# Patient Record
Sex: MALE | Race: WHITE | NOT HISPANIC OR LATINO | Employment: OTHER | URBAN - METROPOLITAN AREA
[De-identification: names, ages, dates, MRNs, and addresses within clinical notes are randomized per-mention and may not be internally consistent; named-entity substitution may affect disease eponyms.]

---

## 2017-01-03 ENCOUNTER — ANTI-COAG VISIT (OUTPATIENT)
Dept: CARDIOLOGY | Facility: CLINIC | Age: 82
End: 2017-01-03

## 2017-01-03 DIAGNOSIS — Z79.01 LONG TERM CURRENT USE OF ANTICOAGULANT THERAPY: ICD-10-CM

## 2017-01-03 LAB — INR PPP: 2.4

## 2017-01-09 ENCOUNTER — ANTI-COAG VISIT (OUTPATIENT)
Dept: CARDIOLOGY | Facility: CLINIC | Age: 82
End: 2017-01-09

## 2017-01-09 ENCOUNTER — LAB VISIT (OUTPATIENT)
Dept: LAB | Facility: HOSPITAL | Age: 82
End: 2017-01-09
Attending: INTERNAL MEDICINE
Payer: MEDICARE

## 2017-01-09 DIAGNOSIS — G70.00 MYASTHENIA GRAVIS: ICD-10-CM

## 2017-01-09 DIAGNOSIS — D50.0 ANEMIA DUE TO CHRONIC BLOOD LOSS: ICD-10-CM

## 2017-01-09 DIAGNOSIS — Z79.60 LONG-TERM USE OF IMMUNOSUPPRESSANT MEDICATION: ICD-10-CM

## 2017-01-09 DIAGNOSIS — Z79.01 LONG TERM CURRENT USE OF ANTICOAGULANT THERAPY: ICD-10-CM

## 2017-01-09 LAB
ALBUMIN SERPL BCP-MCNC: 3.7 G/DL
ALP SERPL-CCNC: 78 U/L
ALT SERPL W/O P-5'-P-CCNC: 5 U/L
ANION GAP SERPL CALC-SCNC: 8 MMOL/L
AST SERPL-CCNC: 19 U/L
BASOPHILS # BLD AUTO: 0.02 K/UL
BASOPHILS NFR BLD: 0.4 %
BILIRUB SERPL-MCNC: 2.3 MG/DL
BUN SERPL-MCNC: 20 MG/DL
CALCIUM SERPL-MCNC: 9.6 MG/DL
CHLORIDE SERPL-SCNC: 105 MMOL/L
CO2 SERPL-SCNC: 22 MMOL/L
CREAT SERPL-MCNC: 1.2 MG/DL
DIFFERENTIAL METHOD: ABNORMAL
EOSINOPHIL # BLD AUTO: 0.1 K/UL
EOSINOPHIL NFR BLD: 1.1 %
ERYTHROCYTE [DISTWIDTH] IN BLOOD BY AUTOMATED COUNT: 18.1 %
EST. GFR  (AFRICAN AMERICAN): >60 ML/MIN/1.73 M^2
EST. GFR  (NON AFRICAN AMERICAN): 54.8 ML/MIN/1.73 M^2
GLUCOSE SERPL-MCNC: 81 MG/DL
HAPTOGLOB SERPL-MCNC: 77 MG/DL
HCT VFR BLD AUTO: 32.6 %
HGB BLD-MCNC: 10.9 G/DL
INR PPP: 2.6
LYMPHOCYTES # BLD AUTO: 0.8 K/UL
LYMPHOCYTES NFR BLD: 14.4 %
MCH RBC QN AUTO: 38.7 PG
MCHC RBC AUTO-ENTMCNC: 33.4 %
MCV RBC AUTO: 116 FL
MONOCYTES # BLD AUTO: 0.4 K/UL
MONOCYTES NFR BLD: 8.1 %
NEUTROPHILS # BLD AUTO: 4 K/UL
NEUTROPHILS NFR BLD: 75.8 %
PLATELET # BLD AUTO: 319 K/UL
PMV BLD AUTO: 10 FL
POTASSIUM SERPL-SCNC: 4.7 MMOL/L
PROT SERPL-MCNC: 7.2 G/DL
RBC # BLD AUTO: 2.82 M/UL
RETICS/RBC NFR AUTO: 1.7 %
SODIUM SERPL-SCNC: 135 MMOL/L
TESTOST SERPL-MCNC: 390 NG/DL
WBC # BLD AUTO: 5.28 K/UL

## 2017-01-09 PROCEDURE — 85025 COMPLETE CBC W/AUTO DIFF WBC: CPT

## 2017-01-09 PROCEDURE — 83010 ASSAY OF HAPTOGLOBIN QUANT: CPT

## 2017-01-09 PROCEDURE — 84403 ASSAY OF TOTAL TESTOSTERONE: CPT

## 2017-01-09 PROCEDURE — 82747 ASSAY OF FOLIC ACID RBC: CPT

## 2017-01-09 PROCEDURE — 80053 COMPREHEN METABOLIC PANEL: CPT

## 2017-01-09 PROCEDURE — 85045 AUTOMATED RETICULOCYTE COUNT: CPT

## 2017-01-09 PROCEDURE — 36415 COLL VENOUS BLD VENIPUNCTURE: CPT | Mod: PO

## 2017-01-10 ENCOUNTER — TELEPHONE (OUTPATIENT)
Dept: HEMATOLOGY/ONCOLOGY | Facility: CLINIC | Age: 82
End: 2017-01-10

## 2017-01-10 NOTE — TELEPHONE ENCOUNTER
----- Message from Arnulfo Welsh Jr., MD sent at 1/10/2017  6:41 AM CST -----  Same Hydrea: 1/1/2 capsules  Cbc 2 months    The cbc result did NOT come to me; I had to find it when this other lab was reported. Please check to be sure i get them in the future  Patient instructed to continue same Hydrea dose.1/1/2. Patient verbalized understanding.

## 2017-01-11 LAB — FOLATE RBC-MCNC: 678 NG/ML

## 2017-01-16 ENCOUNTER — ANTI-COAG VISIT (OUTPATIENT)
Dept: CARDIOLOGY | Facility: CLINIC | Age: 82
End: 2017-01-16

## 2017-01-16 DIAGNOSIS — Z79.01 LONG TERM CURRENT USE OF ANTICOAGULANT THERAPY: ICD-10-CM

## 2017-01-16 LAB — INR PPP: 2.2

## 2017-01-16 NOTE — PROGRESS NOTES
Pt called in a verbal result dated 1/16/17 as: INR -2.2, states unable to call it in to [facility], states office is closed, advised Pt to continue calling in the result so [facility] can have it on record and so a hard copy can be faxed to [facility]

## 2017-01-23 ENCOUNTER — ANTI-COAG VISIT (OUTPATIENT)
Dept: CARDIOLOGY | Facility: CLINIC | Age: 82
End: 2017-01-23

## 2017-01-23 DIAGNOSIS — Z79.01 LONG TERM CURRENT USE OF ANTICOAGULANT THERAPY: ICD-10-CM

## 2017-01-23 LAB — INR PPP: 1.9

## 2017-01-30 ENCOUNTER — ANTI-COAG VISIT (OUTPATIENT)
Dept: CARDIOLOGY | Facility: CLINIC | Age: 82
End: 2017-01-30

## 2017-01-30 DIAGNOSIS — Z79.01 LONG TERM CURRENT USE OF ANTICOAGULANT THERAPY: ICD-10-CM

## 2017-01-30 LAB — INR PPP: 1.6

## 2017-02-06 ENCOUNTER — ANTI-COAG VISIT (OUTPATIENT)
Dept: CARDIOLOGY | Facility: CLINIC | Age: 82
End: 2017-02-06

## 2017-02-06 DIAGNOSIS — Z79.01 LONG TERM CURRENT USE OF ANTICOAGULANT THERAPY: ICD-10-CM

## 2017-02-06 LAB — INR PPP: 2.7

## 2017-02-13 ENCOUNTER — TELEPHONE (OUTPATIENT)
Dept: INTERNAL MEDICINE | Facility: CLINIC | Age: 82
End: 2017-02-13

## 2017-02-13 ENCOUNTER — ANTI-COAG VISIT (OUTPATIENT)
Dept: CARDIOLOGY | Facility: CLINIC | Age: 82
End: 2017-02-13

## 2017-02-13 DIAGNOSIS — Z79.01 LONG TERM CURRENT USE OF ANTICOAGULANT THERAPY: ICD-10-CM

## 2017-02-13 LAB — INR PPP: 1.6

## 2017-02-13 NOTE — TELEPHONE ENCOUNTER
----- Message from Dalia Witt sent at 2/13/2017 12:03 PM CST -----  Contact: wife  Doctor appointment and lab have been scheduled.  Please link lab orders to the lab appointment.  Date of doctor appointment:  3/27/17  Physical or EP:  phys  Date of lab appointment:  3/20/17  Comments:

## 2017-02-20 ENCOUNTER — ANTI-COAG VISIT (OUTPATIENT)
Dept: CARDIOLOGY | Facility: CLINIC | Age: 82
End: 2017-02-20

## 2017-02-20 DIAGNOSIS — Z79.01 LONG TERM CURRENT USE OF ANTICOAGULANT THERAPY: ICD-10-CM

## 2017-02-20 LAB — INR PPP: 2

## 2017-03-01 ENCOUNTER — ANTI-COAG VISIT (OUTPATIENT)
Dept: CARDIOLOGY | Facility: CLINIC | Age: 82
End: 2017-03-01

## 2017-03-01 DIAGNOSIS — Z79.01 LONG TERM CURRENT USE OF ANTICOAGULANT THERAPY: ICD-10-CM

## 2017-03-01 LAB — INR PPP: 1.7

## 2017-03-03 DIAGNOSIS — G70.00 MYASTHENIA GRAVIS: ICD-10-CM

## 2017-03-03 RX ORDER — AZATHIOPRINE 50 MG/1
TABLET ORAL
Qty: 360 TABLET | Refills: 0 | Status: SHIPPED | OUTPATIENT
Start: 2017-03-03 | End: 2017-08-24 | Stop reason: SDUPTHER

## 2017-03-06 ENCOUNTER — ANTI-COAG VISIT (OUTPATIENT)
Dept: CARDIOLOGY | Facility: CLINIC | Age: 82
End: 2017-03-06
Payer: MEDICARE

## 2017-03-06 DIAGNOSIS — Z79.01 LONG TERM CURRENT USE OF ANTICOAGULANT THERAPY: ICD-10-CM

## 2017-03-06 LAB — INR PPP: 2.2

## 2017-03-06 PROCEDURE — G0250 MD INR TEST REVIE INTER MGMT: HCPCS | Mod: S$GLB,,,

## 2017-03-10 ENCOUNTER — LAB VISIT (OUTPATIENT)
Dept: LAB | Facility: HOSPITAL | Age: 82
End: 2017-03-10
Attending: INTERNAL MEDICINE
Payer: MEDICARE

## 2017-03-10 DIAGNOSIS — D75.839 THROMBOCYTOSIS: ICD-10-CM

## 2017-03-10 LAB
BASOPHILS # BLD AUTO: 0.01 K/UL
BASOPHILS NFR BLD: 0.3 %
DIFFERENTIAL METHOD: ABNORMAL
EOSINOPHIL # BLD AUTO: 0 K/UL
EOSINOPHIL NFR BLD: 0.8 %
ERYTHROCYTE [DISTWIDTH] IN BLOOD BY AUTOMATED COUNT: 17.2 %
HCT VFR BLD AUTO: 27.3 %
HGB BLD-MCNC: 9.4 G/DL
LYMPHOCYTES # BLD AUTO: 0.9 K/UL
LYMPHOCYTES NFR BLD: 22.6 %
MCH RBC QN AUTO: 42.9 PG
MCHC RBC AUTO-ENTMCNC: 34.4 %
MCV RBC AUTO: 125 FL
MONOCYTES # BLD AUTO: 0.3 K/UL
MONOCYTES NFR BLD: 8.6 %
NEUTROPHILS # BLD AUTO: 2.7 K/UL
NEUTROPHILS NFR BLD: 67.4 %
PLATELET # BLD AUTO: 294 K/UL
PMV BLD AUTO: 9.6 FL
RBC # BLD AUTO: 2.19 M/UL
WBC # BLD AUTO: 3.94 K/UL

## 2017-03-10 PROCEDURE — 85025 COMPLETE CBC W/AUTO DIFF WBC: CPT

## 2017-03-10 PROCEDURE — 36415 COLL VENOUS BLD VENIPUNCTURE: CPT | Mod: PO

## 2017-03-13 ENCOUNTER — ANTI-COAG VISIT (OUTPATIENT)
Dept: CARDIOLOGY | Facility: CLINIC | Age: 82
End: 2017-03-13
Payer: MEDICARE

## 2017-03-13 DIAGNOSIS — Z79.01 LONG TERM CURRENT USE OF ANTICOAGULANT THERAPY: ICD-10-CM

## 2017-03-13 LAB — INR PPP: 2.1

## 2017-03-13 PROCEDURE — G0250 MD INR TEST REVIE INTER MGMT: HCPCS | Mod: S$GLB,,,

## 2017-03-20 ENCOUNTER — ANTI-COAG VISIT (OUTPATIENT)
Dept: CARDIOLOGY | Facility: CLINIC | Age: 82
End: 2017-03-20

## 2017-03-20 DIAGNOSIS — Z79.01 LONG TERM CURRENT USE OF ANTICOAGULANT THERAPY: ICD-10-CM

## 2017-03-20 LAB — INR PPP: 1.9

## 2017-03-22 DIAGNOSIS — D75.839 THROMBOCYTHEMIA: Primary | ICD-10-CM

## 2017-03-24 DIAGNOSIS — G70.00 MG (MYASTHENIA GRAVIS): ICD-10-CM

## 2017-03-24 NOTE — TELEPHONE ENCOUNTER
Called patient in attempt to schedule appointment; left voicemail with phone number instructing to return call

## 2017-03-27 ENCOUNTER — LAB VISIT (OUTPATIENT)
Dept: LAB | Facility: HOSPITAL | Age: 82
End: 2017-03-27
Attending: INTERNAL MEDICINE
Payer: MEDICARE

## 2017-03-27 ENCOUNTER — ANTI-COAG VISIT (OUTPATIENT)
Dept: CARDIOLOGY | Facility: CLINIC | Age: 82
End: 2017-03-27
Payer: MEDICARE

## 2017-03-27 ENCOUNTER — OFFICE VISIT (OUTPATIENT)
Dept: INTERNAL MEDICINE | Facility: CLINIC | Age: 82
End: 2017-03-27
Payer: MEDICARE

## 2017-03-27 VITALS
HEART RATE: 2 BPM | HEIGHT: 69 IN | BODY MASS INDEX: 26.66 KG/M2 | SYSTOLIC BLOOD PRESSURE: 120 MMHG | WEIGHT: 180 LBS | DIASTOLIC BLOOD PRESSURE: 62 MMHG

## 2017-03-27 DIAGNOSIS — I10 ESSENTIAL HYPERTENSION: ICD-10-CM

## 2017-03-27 DIAGNOSIS — G70.00 MG (MYASTHENIA GRAVIS): ICD-10-CM

## 2017-03-27 DIAGNOSIS — I48.0 PAROXYSMAL ATRIAL FIBRILLATION: ICD-10-CM

## 2017-03-27 DIAGNOSIS — N40.0 BENIGN NON-NODULAR PROSTATIC HYPERPLASIA WITHOUT LOWER URINARY TRACT SYMPTOMS: ICD-10-CM

## 2017-03-27 DIAGNOSIS — B02.29 POSTHERPETIC NEURALGIA: ICD-10-CM

## 2017-03-27 DIAGNOSIS — N18.30 CHRONIC KIDNEY DISEASE, STAGE III (MODERATE): ICD-10-CM

## 2017-03-27 DIAGNOSIS — D47.3 ESSENTIAL THROMBOCYTOSIS: ICD-10-CM

## 2017-03-27 DIAGNOSIS — Z79.01 LONG TERM CURRENT USE OF ANTICOAGULANT THERAPY: ICD-10-CM

## 2017-03-27 DIAGNOSIS — Z00.00 ANNUAL PHYSICAL EXAM: ICD-10-CM

## 2017-03-27 DIAGNOSIS — E78.5 HYPERLIPIDEMIA, UNSPECIFIED HYPERLIPIDEMIA TYPE: ICD-10-CM

## 2017-03-27 DIAGNOSIS — Z00.00 ANNUAL PHYSICAL EXAM: Primary | ICD-10-CM

## 2017-03-27 LAB
ANION GAP SERPL CALC-SCNC: 6 MMOL/L
BUN SERPL-MCNC: 29 MG/DL
CALCIUM SERPL-MCNC: 9.1 MG/DL
CHLORIDE SERPL-SCNC: 105 MMOL/L
CHOLEST/HDLC SERPL: 3.3 {RATIO}
CO2 SERPL-SCNC: 25 MMOL/L
CREAT SERPL-MCNC: 1.2 MG/DL
EST. GFR  (AFRICAN AMERICAN): >60 ML/MIN/1.73 M^2
EST. GFR  (NON AFRICAN AMERICAN): 54.4 ML/MIN/1.73 M^2
GLUCOSE SERPL-MCNC: 107 MG/DL
HDL/CHOLESTEROL RATIO: 30.2 %
HDLC SERPL-MCNC: 149 MG/DL
HDLC SERPL-MCNC: 45 MG/DL
INR PPP: 1.8
LDLC SERPL CALC-MCNC: 79 MG/DL
NONHDLC SERPL-MCNC: 104 MG/DL
POTASSIUM SERPL-SCNC: 4.8 MMOL/L
SODIUM SERPL-SCNC: 136 MMOL/L
TRIGL SERPL-MCNC: 125 MG/DL
TSH SERPL DL<=0.005 MIU/L-ACNC: 1.76 UIU/ML

## 2017-03-27 PROCEDURE — G0009 ADMIN PNEUMOCOCCAL VACCINE: HCPCS | Mod: S$GLB,,, | Performed by: INTERNAL MEDICINE

## 2017-03-27 PROCEDURE — 36415 COLL VENOUS BLD VENIPUNCTURE: CPT | Mod: PO

## 2017-03-27 PROCEDURE — 99499 UNLISTED E&M SERVICE: CPT | Mod: S$GLB,,, | Performed by: INTERNAL MEDICINE

## 2017-03-27 PROCEDURE — 80061 LIPID PANEL: CPT

## 2017-03-27 PROCEDURE — 99397 PER PM REEVAL EST PAT 65+ YR: CPT | Mod: S$GLB,,, | Performed by: INTERNAL MEDICINE

## 2017-03-27 PROCEDURE — 99999 PR PBB SHADOW E&M-EST. PATIENT-LVL III: CPT | Mod: PBBFAC,,, | Performed by: INTERNAL MEDICINE

## 2017-03-27 PROCEDURE — 90732 PPSV23 VACC 2 YRS+ SUBQ/IM: CPT | Mod: S$GLB,,, | Performed by: INTERNAL MEDICINE

## 2017-03-27 PROCEDURE — 84443 ASSAY THYROID STIM HORMONE: CPT

## 2017-03-27 PROCEDURE — 80048 BASIC METABOLIC PNL TOTAL CA: CPT

## 2017-03-27 RX ORDER — PREDNISONE 5 MG/1
TABLET ORAL
Qty: 45 TABLET | Refills: 2 | Status: SHIPPED | OUTPATIENT
Start: 2017-03-27 | End: 2017-06-08 | Stop reason: SDUPTHER

## 2017-03-27 NOTE — PROGRESS NOTES
PAST MEDICAL HISTORY:   Hypertension.  Myasthenia gravis.  Paroxysmal atrial fibrillation.  Hyperlipidemia.  Mitral regurgitation.  BPH.  Gastroesophageal reflux disease.  Postherpetic neuralgia involving the right medial leg.  Cervical degenerative disk disease.  History of depression.  Osteoarthritis of the knees.   ESSENTIAL THROMBOCYTOSIS       PAST SURGICAL HISTORY:   Cholecystectomy.  Bilateral cataract extraction.  Repair of ruptured right Achilles tendon.  Repair of ptosis, both eyes.    SOCIAL HISTORY:  Tobacco and alcohol use - none.  , has no formal   exercise routine.    FAMILY HISTORY:  Father is , stroke.  Mother  from cancer.  One   sister in good health.    REASON FOR VISIT:  This is an 86-year-old male who comes in for a routine check.    His past medical, surgical, social, and family history is outlined above.  He   continues to follow up with Dr. Welsh regarding essential thrombocytosis; Dr. Crawford regarding myasthenia gravis; Dr. Choudhury due to the use of Imuran; and   Dr. Tate due to paroxysmal atrial fibrillation.    In 2016, because of feeling fatigued and frequent PVCs, metoprolol XL was   discontinued, verapamil 240 mg a day was added.  In review of the chart, CBC on    by Dr. Welsh showed the platelet count to be 294,000, this has   improved.  He was a little bit more anemic with a hemoglobin and hematocrit of   9.4 and 27.3.  He will be making an appointment to meet with Dr. Welsh.    MEDICATIONS:  Aspirin 81 mg.  Imuran 50 mg four a day.  Benazepril 40 mg once a day.  Finasteride 5 mg a day.  Hydroxyurea 500 mg two tablets one day, then one tablet for the next two days   and then alternate the sequence.  Prednisone 5 mg every other day.  Mestinon 60 mg usually once a day, sometimes twice a day.  Tamsulosin 0.4 mg a day.  Verapamil 240 mg a day.  Vitamin D 1000 units.  Coumadin.    REVIEW OF SYSTEMS:  No pains in the chest, palpitations, shortness  of breath, or   abdominal pain.  Regular bowel function.  Urination, nocturia x1 to 3 and some   frequency during the day.  No headaches or heartburn.  Still has a chronic   post-herpetic neuralgia, medial aspect of the right leg.    Also on review of the chart, a chemistry study in January 2016 was normal.    PHYSICAL EXAMINATION:  VITAL SIGNS:  Weight is 180 pounds, pulse rate is 52, blood pressure 120/62.  HEENT:  Tympanic membranes normal.  Nasal mucosa is clear.  Oropharynx, no   abnormal findings.  NECK:  No thyromegaly.  LUNGS:  Clear breath sounds, good effort.  HEART:  Regular rate and rhythm, occasional ectopy.  A 2/6 systolic murmur at   the apex.  ABDOMEN:  Active bowel sounds, soft, nontender.  No hepatosplenomegaly or   abdominal masses.  PULSES:  2+ carotid pulses, 2+ pedal pulses.  EXTREMITIES:  1+ edema.  MUSCULOSKELETAL:  Bilateral knee enlargement.  GENITALIA:  No scrotal masses, no hernias.  RECTAL:  Stool is brown, heme negative.  Prostate is moderately enlarged.    IMPRESSION:  1.  General exam.  2.  Hypertension.  3.  Diastolic dysfunction.  4.  Mitral regurgitation.  5.  Hyperlipidemia.  6.  Benign prostatic hypertrophy.  7.  Osteoarthritis.  8.  Myasthenia gravis.    PLAN:    Just to be up to date, we will repeat a basic metabolic profile, lipid profile,   and TSH.    Continue to be attentive to diet and physical activity.   We will arrange for pneumococcal 23.        /ls [id number] review        OPAL/SHELLIE  dd: 03/27/2017 14:37:42 (CDT)  td: 03/28/2017 12:00:15 (CDT)  Doc ID   #[id number]  Job ID #[id number]    CC:

## 2017-03-27 NOTE — MR AVS SNAPSHOT
[facility] [address]  [address]  [address]  [facility] [address]  Phone: [phone]                  Ernie Phan   3/27/2017 2:00 PM   Office Visit    Description:  Male : 3/19/1931   Provider:  Ernie Michel MD   Department:  [facility] [address]           Reason for Visit     Annual Exam           Diagnoses this Visit        Comments    Annual physical exam    -  Primary     Essential hypertension         Hyperlipidemia, unspecified hyperlipidemia type         MG (myasthenia gravis)         Postherpetic neuralgia         Essential thrombocytosis         Benign non-nodular prostatic hyperplasia without lower urinary tract symptoms         Paroxysmal atrial fibrillation         Chronic kidney disease, stage III (moderate)                To Do List           Future Appointments        Provider Department Dept Phone    3/27/2017 3:00 PM LAB, [place] [facility] [phone]    5/15/2017 9:30 AM LAB, HEMONC CANCER BLDG [facility] [address] [phone]    5/15/2017 10:30 AM MD Hung Erazo Jr. - Hematology Oncology [phone]    2017 1:00 PM Bong Crawford III, MD [facility] - Neurology [phone]    2017 3:30 PM Christa Choudhury MD [facility] - Rheumatology [phone]      Goals (5 Years of Data)     None      [facility] On Call     [facility] On Call Nurse Care Line - 24/7 Assistance  Registered nurses in the [facility] provide clinical advisement, health education, appointment booking, and other advisory services.  Call for this free service at [phone].             Medications           Message regarding Medications     Verify the changes and/or additions to your medication regime listed below are the same as discussed with your clinician today.  If any of these changes or additions are incorrect, please notify your healthcare provider.             Verify that the below list of  "medications is an accurate representation of the medications you are currently taking.  If none reported, the list may be blank. If incorrect, please contact your healthcare provider. Carry this list with you in case of emergency.           Current Medications     aspirin 81 mg Tab Take 81 mg by mouth Daily.    azathioprine (IMURAN) 50 mg Tab TAKE 4 TABLETS ONE TIME DAILY    benazepril (LOTENSIN) 40 MG tablet TAKE 1 TABLET ONE TIME DAILY    DENTURE CLEANSER (EFFERVESCENT DENTURE CLEANSR DENT)     ferrous sulfate 325 (65 FE) MG EC tablet Take 1 tablet (325 mg total) by mouth once daily.    finasteride (PROSCAR) 5 mg tablet TAKE 1 TABLET EVERY DAY    hydroxyurea (HYDREA) 500 mg Cap TAKE 3 CAPSULES (1,500 MG TOTAL) BY MOUTH ONE TIME DAILY, OR AS DIRECTED.    ONE TOUCH ULTRA TEST Strp TEST DAILY    ONE TOUCH ULTRASOFT LANCETS lancets TEST DAILY    predniSONE (DELTASONE) 5 MG tablet TAKE 1 TABLET EVERY OTHER DAY    pyridostigmine (MESTINON) 60 mg Tab Take 1 tablet (60 mg total) by mouth every 6 to 8 hours as needed.    tamsulosin (FLOMAX) 0.4 mg Cp24 TAKE 1 CAPSULE EVERY DAY    verapamil (VERELAN) 240 MG C24P Take 1 capsule (240 mg total) by mouth once daily.    vitamin D 1000 units Tab Take 185 mg by mouth once daily.    warfarin (COUMADIN) 5 MG tablet TAKE 1 TABLET EVERY DAY EXCEPT TAKE 1/2 TABLET  ON SUNDAY, OR AS DIRECTED BY COUMADIN CLINIC    acetaminophen (TYLENOL) 500 mg Cap            Clinical Reference Information           Your Vitals Were     BP Pulse Height Weight BMI    120/62 2 5' 9" (1.753 m) 81.6 kg (180 lb) 26.58 kg/m2      Blood Pressure          Most Recent Value    BP  120/62      Allergies as of 3/27/2017     No Known Allergies      Immunizations Administered on Date of Encounter - 3/27/2017     Name Date Dose VIS Date Route    Pneumococcal Polysaccharide - 23 Valent 3/27/2017 0.5 mL 4/24/2015 Intramuscular      Orders Placed During Today's Visit      Normal Orders This Visit    Pneumococcal " Polysaccharide Vaccine (23 Valent) (SQ/IM)     Future Labs/Procedures Expected by Expires    Basic metabolic panel  3/27/2017 3/27/2018    Lipid panel  3/27/2017 3/27/2018    TSH  3/27/2017 3/27/2018      Language Assistance Services     ATTENTION: Language assistance services are available, free of charge. Please call [phone].      ATENCIÓN: Si habla español, tiene a celeste disposición servicios gratuitos de asistencia lingüística. Llame al [phone].     CHÚ Ý: N?u b?n nói Ti?ng Vi?t, có các d?ch v? h? tr? ngôn ng? mi?n phí dành cho b?n. G?i s? [phone].         [facility] [address] complies with applicable Federal civil rights laws and does not discriminate on the basis of race, color, national origin, age, disability, or sex.

## 2017-04-03 ENCOUNTER — ANTI-COAG VISIT (OUTPATIENT)
Dept: CARDIOLOGY | Facility: CLINIC | Age: 82
End: 2017-04-03

## 2017-04-03 DIAGNOSIS — Z79.01 LONG TERM CURRENT USE OF ANTICOAGULANT THERAPY: ICD-10-CM

## 2017-04-03 LAB — INR PPP: 2.1

## 2017-04-10 ENCOUNTER — ANTI-COAG VISIT (OUTPATIENT)
Dept: CARDIOLOGY | Facility: CLINIC | Age: 82
End: 2017-04-10

## 2017-04-10 DIAGNOSIS — Z79.01 LONG TERM CURRENT USE OF ANTICOAGULANT THERAPY: ICD-10-CM

## 2017-04-10 LAB — INR PPP: 3.2

## 2017-04-17 ENCOUNTER — ANTI-COAG VISIT (OUTPATIENT)
Dept: CARDIOLOGY | Facility: CLINIC | Age: 82
End: 2017-04-17
Payer: MEDICARE

## 2017-04-17 DIAGNOSIS — Z79.01 LONG TERM CURRENT USE OF ANTICOAGULANT THERAPY: ICD-10-CM

## 2017-04-17 LAB — INR PPP: 3.1

## 2017-04-17 PROCEDURE — G0250 MD INR TEST REVIE INTER MGMT: HCPCS | Mod: S$GLB,,,

## 2017-04-24 ENCOUNTER — ANTI-COAG VISIT (OUTPATIENT)
Dept: CARDIOLOGY | Facility: CLINIC | Age: 82
End: 2017-04-24

## 2017-04-24 DIAGNOSIS — Z79.01 LONG TERM CURRENT USE OF ANTICOAGULANT THERAPY: ICD-10-CM

## 2017-04-24 LAB — INR PPP: 2.4

## 2017-05-01 ENCOUNTER — ANTI-COAG VISIT (OUTPATIENT)
Dept: CARDIOLOGY | Facility: CLINIC | Age: 82
End: 2017-05-01

## 2017-05-01 DIAGNOSIS — Z79.01 LONG TERM CURRENT USE OF ANTICOAGULANT THERAPY: ICD-10-CM

## 2017-05-01 LAB — INR PPP: 2.1

## 2017-05-08 ENCOUNTER — ANTI-COAG VISIT (OUTPATIENT)
Dept: CARDIOLOGY | Facility: CLINIC | Age: 82
End: 2017-05-08

## 2017-05-08 DIAGNOSIS — Z79.01 LONG TERM CURRENT USE OF ANTICOAGULANT THERAPY: ICD-10-CM

## 2017-05-08 LAB — INR PPP: 2

## 2017-05-15 ENCOUNTER — ANTI-COAG VISIT (OUTPATIENT)
Dept: CARDIOLOGY | Facility: CLINIC | Age: 82
End: 2017-05-15

## 2017-05-15 DIAGNOSIS — Z79.01 LONG TERM CURRENT USE OF ANTICOAGULANT THERAPY: ICD-10-CM

## 2017-05-15 LAB — INR PPP: 2.1

## 2017-05-22 ENCOUNTER — ANTI-COAG VISIT (OUTPATIENT)
Dept: CARDIOLOGY | Facility: CLINIC | Age: 82
End: 2017-05-22
Payer: MEDICARE

## 2017-05-22 DIAGNOSIS — I49.3 PVC (PREMATURE VENTRICULAR CONTRACTION): ICD-10-CM

## 2017-05-22 DIAGNOSIS — Z79.01 LONG TERM CURRENT USE OF ANTICOAGULANT THERAPY: ICD-10-CM

## 2017-05-22 DIAGNOSIS — I48.91 ATRIAL FIBRILLATION: ICD-10-CM

## 2017-05-22 LAB — INR PPP: 1.9

## 2017-05-22 PROCEDURE — G0250 MD INR TEST REVIE INTER MGMT: HCPCS | Mod: S$GLB,,,

## 2017-05-22 RX ORDER — VERAPAMIL HYDROCHLORIDE 240 MG/1
240 CAPSULE, EXTENDED RELEASE ORAL DAILY
Qty: 90 CAPSULE | Refills: 3 | OUTPATIENT
Start: 2017-05-22 | End: 2018-05-22

## 2017-05-26 DIAGNOSIS — I49.3 PVC (PREMATURE VENTRICULAR CONTRACTION): ICD-10-CM

## 2017-05-26 RX ORDER — VERAPAMIL HYDROCHLORIDE 240 MG/1
240 CAPSULE, EXTENDED RELEASE ORAL DAILY
Qty: 90 CAPSULE | Refills: 3 | Status: SHIPPED | OUTPATIENT
Start: 2017-05-26 | End: 2018-05-07 | Stop reason: SDUPTHER

## 2017-05-29 ENCOUNTER — ANTI-COAG VISIT (OUTPATIENT)
Dept: CARDIOLOGY | Facility: CLINIC | Age: 82
End: 2017-05-29

## 2017-05-29 DIAGNOSIS — Z79.01 LONG TERM CURRENT USE OF ANTICOAGULANT THERAPY: ICD-10-CM

## 2017-05-29 LAB — INR PPP: 2.5

## 2017-05-29 NOTE — PROGRESS NOTES
Verbal result taken from Patient/CPS_________. PT/INR _2.5______ Date drawn_5/29/17_______ Hardcopy to be faxed.

## 2017-06-01 ENCOUNTER — LAB VISIT (OUTPATIENT)
Dept: LAB | Facility: HOSPITAL | Age: 82
End: 2017-06-01
Attending: INTERNAL MEDICINE
Payer: MEDICARE

## 2017-06-01 ENCOUNTER — OFFICE VISIT (OUTPATIENT)
Dept: HEMATOLOGY/ONCOLOGY | Facility: CLINIC | Age: 82
End: 2017-06-01
Payer: MEDICARE

## 2017-06-01 VITALS
BODY MASS INDEX: 26.87 KG/M2 | HEART RATE: 68 BPM | DIASTOLIC BLOOD PRESSURE: 68 MMHG | SYSTOLIC BLOOD PRESSURE: 122 MMHG | WEIGHT: 181.44 LBS | HEIGHT: 69 IN

## 2017-06-01 DIAGNOSIS — G70.00 MYASTHENIA GRAVIS WITHOUT ACUTE EXACERBATION: ICD-10-CM

## 2017-06-01 DIAGNOSIS — D47.3 ESSENTIAL THROMBOCYTOSIS: Primary | ICD-10-CM

## 2017-06-01 DIAGNOSIS — D75.839 THROMBOCYTOSIS: ICD-10-CM

## 2017-06-01 DIAGNOSIS — D75.839 THROMBOCYTHEMIA: ICD-10-CM

## 2017-06-01 DIAGNOSIS — I48.20 CHRONIC ATRIAL FIBRILLATION: ICD-10-CM

## 2017-06-01 LAB
ALBUMIN SERPL BCP-MCNC: 3.8 G/DL
ALP SERPL-CCNC: 75 U/L
ALT SERPL W/O P-5'-P-CCNC: 6 U/L
ANION GAP SERPL CALC-SCNC: 9 MMOL/L
AST SERPL-CCNC: 19 U/L
BASOPHILS # BLD AUTO: 0.02 K/UL
BASOPHILS NFR BLD: 0.3 %
BILIRUB SERPL-MCNC: 1.8 MG/DL
BUN SERPL-MCNC: 22 MG/DL
CALCIUM SERPL-MCNC: 9.1 MG/DL
CHLORIDE SERPL-SCNC: 105 MMOL/L
CO2 SERPL-SCNC: 22 MMOL/L
CREAT SERPL-MCNC: 1.2 MG/DL
DIFFERENTIAL METHOD: ABNORMAL
EOSINOPHIL # BLD AUTO: 0 K/UL
EOSINOPHIL NFR BLD: 0.3 %
ERYTHROCYTE [DISTWIDTH] IN BLOOD BY AUTOMATED COUNT: 14.9 %
EST. GFR  (AFRICAN AMERICAN): >60 ML/MIN/1.73 M^2
EST. GFR  (NON AFRICAN AMERICAN): 54.4 ML/MIN/1.73 M^2
GLUCOSE SERPL-MCNC: 105 MG/DL
HCT VFR BLD AUTO: 34.6 %
HGB BLD-MCNC: 11.5 G/DL
LYMPHOCYTES # BLD AUTO: 0.6 K/UL
LYMPHOCYTES NFR BLD: 8.7 %
MCH RBC QN AUTO: 40.6 PG
MCHC RBC AUTO-ENTMCNC: 33.2 %
MCV RBC AUTO: 122 FL
MONOCYTES # BLD AUTO: 0.4 K/UL
MONOCYTES NFR BLD: 5.4 %
NEUTROPHILS # BLD AUTO: 5.7 K/UL
NEUTROPHILS NFR BLD: 85 %
PLATELET # BLD AUTO: 382 K/UL
PMV BLD AUTO: 8.9 FL
POTASSIUM SERPL-SCNC: 4.2 MMOL/L
PROT SERPL-MCNC: 7.1 G/DL
RBC # BLD AUTO: 2.83 M/UL
SODIUM SERPL-SCNC: 136 MMOL/L
WBC # BLD AUTO: 6.67 K/UL

## 2017-06-01 PROCEDURE — 99499 UNLISTED E&M SERVICE: CPT | Mod: S$GLB,,, | Performed by: INTERNAL MEDICINE

## 2017-06-01 PROCEDURE — 99214 OFFICE O/P EST MOD 30 MIN: CPT | Mod: S$GLB,,, | Performed by: INTERNAL MEDICINE

## 2017-06-01 PROCEDURE — 99999 PR PBB SHADOW E&M-EST. PATIENT-LVL III: CPT | Mod: PBBFAC,,, | Performed by: INTERNAL MEDICINE

## 2017-06-01 PROCEDURE — 1159F MED LIST DOCD IN RCRD: CPT | Mod: S$GLB,,, | Performed by: INTERNAL MEDICINE

## 2017-06-01 PROCEDURE — 1125F AMNT PAIN NOTED PAIN PRSNT: CPT | Mod: S$GLB,,, | Performed by: INTERNAL MEDICINE

## 2017-06-01 NOTE — PROGRESS NOTES
Mr. Phan is an 86-year-old man whom I evaluated in January 2016 for   thrombocytosis.  His platelet count had been elevated since 2012, but the counts   had become substantially higher starting in March 2015 and were in the range of   888,000-1,126,000.    A bone marrow examination had 80% cellularity with slight dyserythropoiesis.    There was an increased number of large megakaryocytes and some clusters of   megakaryocytes.  Grade I reticulin fibrosis was present.  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  I thought that his findings were best explained by   diagnosis of essential thrombocytosis.  He is taking aspirin 81 mg daily and he   is also taking warfarin for atrial fibrillation.    After the diagnosis of essential thrombocytosis, I started him on therapy with   hydroxyurea and this has been successful in lowering his platelet count.  His   current dosage is the following three-day schedule: 0.5 g; 0.5 g; and 1 g.    Since I last saw him, he has had no symptoms suggestive of new thromboembolic   events.  He tells me that he is to see his cardiologist soon.    He was diagnosed with myasthenia gravis in 2008 and he remains on Imuran 200 mg   daily and prednisone 5 mg every other day.  He takes Mestinon as needed.  He   still has some pain in his right thigh, which followed an episode of herpes   zoster.    Other problems include degenerative arthritis, gastroesophageal reflux,   prostatic hypertrophy, diastolic dysfunction, peripheral vascular disease and   hypertension.    ADDITIONAL PAST HISTORY, SYSTEM REVIEW, SOCIAL HISTORY AND FAMILY HISTORY:  Have   been reviewed and updated in the electronic record.    PHYSICAL EXAMINATION:  GENERAL APPEARANCE:  Well-developed, well-nourished man who is using a cane, but   can easily climb on to an examination table.  EYES:  No jaundice or pallor.  SINUSES:  No tenderness.  EARS:  Clear canals and membranes.  NOSE:  Clear nares.  MOUTH AND THROAT:  Partial  upper dentures.  Dentition is otherwise unremarkable.    No mucosal lesions.  NECK:  No masses or bruits.  No thyroid abnormalities.  LYMPH NODES:  No enlarged cervical, axillary or inguinal nodes.  CHEST AND LUNGS:  Normal respiratory effort.  Clear to auscultation and   percussion.  HEART:  Irregular rhythm.  No gallop.  1/6 systolic murmur heard at the base.  ABDOMEN:  The spleen is palpable about 3-4 cm below the left costal margin.  No   hepatomegaly.  No tenderness.  EXTREMITIES:  1+ pretibial and ankle edema bilaterally.  PERIPHERAL VASCULATURE:  Diminished pulses in the feet and ankles.  Good   popliteal pulses.  SKIN:  No suspicious lesions in the areas examined.  NEUROLOGIC:  Memory is good.  He is fully oriented.  Motor function is good.    LABORATORY STUDIES:  Blood counts today include hemoglobin 11.5, WBC 6660 and   platelets 382,000.  Comprehensive metabolic profile is normal with the exception   of bilirubin 1.8.    IMPRESSION:  1.  Essential thrombocytosis.  2.  Atrial fibrillation.  3.  Myasthenia gravis.  4.  Right leg weakness related to postherpetic neuropathy and   degenerative arthritis.    RECOMMENDATIONS:  1.  Continue hydroxyurea at the current dose.  2.  CBC every three months.  3.  Return visit in one year with CBC and CMP.    Mr. Phan and I talked about essential thrombosis, hydroxyurea therapy and   other medical issues, most of which were directly related to his blood problem.    Most of his 25-minute appointment today was devoted to this discussion and   answering the questions which he posed.      HOME/IN  dd: 06/01/2017 14:21:09 (CDT)  td: 06/02/2017 08:16:40 (CDT)  Doc ID   #[id number]  Job ID #[id number]    CC:

## 2017-06-05 ENCOUNTER — ANTI-COAG VISIT (OUTPATIENT)
Dept: CARDIOLOGY | Facility: CLINIC | Age: 82
End: 2017-06-05

## 2017-06-05 DIAGNOSIS — Z79.01 LONG TERM CURRENT USE OF ANTICOAGULANT THERAPY: ICD-10-CM

## 2017-06-05 LAB — INR PPP: 2.9

## 2017-06-08 ENCOUNTER — OFFICE VISIT (OUTPATIENT)
Dept: NEUROLOGY | Facility: CLINIC | Age: 82
End: 2017-06-08
Payer: MEDICARE

## 2017-06-08 ENCOUNTER — OFFICE VISIT (OUTPATIENT)
Dept: RHEUMATOLOGY | Facility: CLINIC | Age: 82
End: 2017-06-08
Payer: MEDICARE

## 2017-06-08 VITALS
HEIGHT: 69 IN | DIASTOLIC BLOOD PRESSURE: 73 MMHG | BODY MASS INDEX: 26.64 KG/M2 | HEART RATE: 63 BPM | WEIGHT: 179.88 LBS | SYSTOLIC BLOOD PRESSURE: 140 MMHG

## 2017-06-08 VITALS
HEIGHT: 69 IN | DIASTOLIC BLOOD PRESSURE: 69 MMHG | SYSTOLIC BLOOD PRESSURE: 149 MMHG | BODY MASS INDEX: 26.44 KG/M2 | HEART RATE: 50 BPM | WEIGHT: 178.5 LBS

## 2017-06-08 DIAGNOSIS — G70.00 MG (MYASTHENIA GRAVIS): Primary | ICD-10-CM

## 2017-06-08 DIAGNOSIS — I48.0 PAROXYSMAL ATRIAL FIBRILLATION: ICD-10-CM

## 2017-06-08 DIAGNOSIS — Z79.60 LONG-TERM USE OF IMMUNOSUPPRESSANT MEDICATION: ICD-10-CM

## 2017-06-08 DIAGNOSIS — G70.00 MYASTHENIA GRAVIS: Primary | ICD-10-CM

## 2017-06-08 DIAGNOSIS — D47.3 ESSENTIAL THROMBOCYTHEMIA: ICD-10-CM

## 2017-06-08 DIAGNOSIS — D47.3 ESSENTIAL THROMBOCYTOSIS: ICD-10-CM

## 2017-06-08 DIAGNOSIS — B02.29 POSTHERPETIC NEURALGIA: ICD-10-CM

## 2017-06-08 DIAGNOSIS — N40.0 BENIGN NON-NODULAR PROSTATIC HYPERPLASIA WITHOUT LOWER URINARY TRACT SYMPTOMS: ICD-10-CM

## 2017-06-08 DIAGNOSIS — I10 ESSENTIAL HYPERTENSION: ICD-10-CM

## 2017-06-08 DIAGNOSIS — G70.00 MYASTHENIA GRAVIS WITHOUT ACUTE EXACERBATION: ICD-10-CM

## 2017-06-08 PROCEDURE — 1125F AMNT PAIN NOTED PAIN PRSNT: CPT | Mod: S$GLB,,, | Performed by: INTERNAL MEDICINE

## 2017-06-08 PROCEDURE — 99499 UNLISTED E&M SERVICE: CPT | Mod: S$GLB,,, | Performed by: INTERNAL MEDICINE

## 2017-06-08 PROCEDURE — 99499 UNLISTED E&M SERVICE: CPT | Mod: S$GLB,,,

## 2017-06-08 PROCEDURE — 1125F AMNT PAIN NOTED PAIN PRSNT: CPT | Mod: S$GLB,,,

## 2017-06-08 PROCEDURE — 1159F MED LIST DOCD IN RCRD: CPT | Mod: S$GLB,,,

## 2017-06-08 PROCEDURE — 99999 PR PBB SHADOW E&M-EST. PATIENT-LVL III: CPT | Mod: PBBFAC,,,

## 2017-06-08 PROCEDURE — 99214 OFFICE O/P EST MOD 30 MIN: CPT | Mod: S$GLB,,,

## 2017-06-08 PROCEDURE — 99999 PR PBB SHADOW E&M-EST. PATIENT-LVL III: CPT | Mod: PBBFAC,,, | Performed by: INTERNAL MEDICINE

## 2017-06-08 PROCEDURE — 1159F MED LIST DOCD IN RCRD: CPT | Mod: S$GLB,,, | Performed by: INTERNAL MEDICINE

## 2017-06-08 PROCEDURE — 99214 OFFICE O/P EST MOD 30 MIN: CPT | Mod: S$GLB,,, | Performed by: INTERNAL MEDICINE

## 2017-06-08 RX ORDER — PREDNISONE 5 MG/1
5 TABLET ORAL EVERY OTHER DAY
Qty: 45 TABLET | Refills: 1 | Status: SHIPPED | OUTPATIENT
Start: 2017-06-08 | End: 2018-04-17 | Stop reason: SDUPTHER

## 2017-06-08 RX ORDER — PYRIDOSTIGMINE BROMIDE 60 MG/1
60 TABLET ORAL
Qty: 180 TABLET | Refills: 1 | Status: SHIPPED | OUTPATIENT
Start: 2017-06-08 | End: 2019-06-07

## 2017-06-08 ASSESSMENT — ROUTINE ASSESSMENT OF PATIENT INDEX DATA (RAPID3)
AM STIFFNESS SCORE: 1, YES
WHEN YOU AWAKENED IN THE MORNING OVER THE LAST WEEK, PLEASE INDICATE THE AMOUNT OF TIME IT TAKES UNTIL YOU ARE AS LIMBER AS YOU WILL BE FOR THE DAY: 1 HOUR
TOTAL RAPID3 SCORE: 4.72
PSYCHOLOGICAL DISTRESS SCORE: 3.3
MDHAQ FUNCTION SCORE: .8
FATIGUE SCORE: 3.5
PATIENT GLOBAL ASSESSMENT SCORE: 5.5
PAIN SCORE: 6

## 2017-06-08 NOTE — PROGRESS NOTES
Mr. Phan is a 86-year-old gentleman whom we are following for azathioprine therapy for his myasthenia gravis at the request of Dr. Crawford, his neurologist.   He also has OA of his R knee.    Myasthenia gravis was diagnosed in July 2008, manifested by blurred   vision, respiratory involvement, left lower extremity weakness and gait   abnormality. He also had ocular ptosis. He has been on prednisone and   Mestinon since. He had 1 hospitalization in August 2008 for shortness of   breath. He was started on azathioprine on April 7, 2009. We have been   increasing his dosage and he has been up to 300 mg of azathioprine;   however, he began to experience abnormal liver function tests in November 2009, and his dose was dropped down to 200 mg.     The patient returns to the clinic for followup today. He was last seen over 1 year ago. He is still feeling well, but has lost 9  Lbs since last year. He states he has been at the same weight for a while.  He still maintains that his eating habits have changed. He is not hungry often. Food does not taste good..  He denies joint pain and joint swelling. Herpetic neuralgia is not as bothersome as it used to be. He states he has not had any exacerbations of his myasthenia gravis symptoms in recent years, He was seen by Dr. Crawford this AM but was not told if he should continue azathioprine. He has no problems with azathioprine & is able to get labs every 3 months or so.      He is doing well with his essential thrombocytosis as he is taking hydroxyurea and his platelet count is very close to normal now.          Current Outpatient Prescriptions on File Prior to Visit   Medication Sig Dispense Refill    acetaminophen (TYLENOL) 500 mg Cap       aspirin 81 mg Tab Take 81 mg by mouth Daily.      azathioprine (IMURAN) 50 mg Tab TAKE 4 TABLETS ONE TIME DAILY 360 tablet 0    benazepril (LOTENSIN) 40 MG tablet TAKE 1 TABLET ONE TIME DAILY 90 tablet 3    DENTURE CLEANSER (EFFERVESCENT  DENTURE CLEANSR DENT)       finasteride (PROSCAR) 5 mg tablet TAKE 1 TABLET EVERY DAY 90 tablet 3    hydroxyurea (HYDREA) 500 mg Cap TAKE 3 CAPSULES (1,500 MG TOTAL) BY MOUTH ONE TIME DAILY, OR AS DIRECTED. 270 capsule 10    ONE TOUCH ULTRA TEST Strp TEST DAILY 100 each 3    ONE TOUCH ULTRASOFT LANCETS lancets TEST DAILY 100 each 3    tamsulosin (FLOMAX) 0.4 mg Cp24 TAKE 1 CAPSULE EVERY DAY 90 capsule 3    verapamil (VERELAN) 240 MG C24P Take 1 capsule (240 mg total) by mouth once daily. 90 capsule 3    vitamin D 1000 units Tab Take 185 mg by mouth once daily.      warfarin (COUMADIN) 5 MG tablet TAKE 1 TABLET EVERY DAY EXCEPT TAKE 1/2 TABLET  ON SUNDAY, OR AS DIRECTED BY COUMADIN CLINIC 90 tablet 3    [DISCONTINUED] predniSONE (DELTASONE) 5 MG tablet TAKE 1 TABLET EVERY OTHER DAY 45 tablet 2    [DISCONTINUED] pyridostigmine (MESTINON) 60 mg Tab Take 1 tablet (60 mg total) by mouth every 6 to 8 hours as needed. 270 tablet 1     No current facility-administered medications on file prior to visit.      He has no known allergies.   Past Medical History:   Diagnosis Date    *Atrial fibrillation     Arthritis     Atrial fibrillation     BPH (benign prostatic hypertrophy)     Degenerative disc disease     Depression     GERD (gastroesophageal reflux disease)     Hyperglycemia     Hyperlipidemia     Hypertension     MG (myasthenia gravis)     Postherpetic neuralgia      Past Surgical History:   Procedure Laterality Date    ACHILLES TENDON SURGERY      CHOLECYSTECTOMY     Review of Systems   Constitutional: Positive for weight loss. Negative for fever and malaise/fatigue.   HENT: Negative.    Eyes: Negative.    Respiratory: Negative.  Negative for cough and shortness of breath.    Cardiovascular: Negative.  Negative for chest pain, leg swelling and PND.   Gastrointestinal: Negative.    Genitourinary: Positive for frequency.   Musculoskeletal: Negative.  Negative for back pain, falls and joint pain.  "  Skin: Negative.    Neurological: Positive for dizziness. Negative for headaches.   Endo/Heme/Allergies: Bruises/bleeds easily.   Psychiatric/Behavioral: Positive for depression. The patient is nervous/anxious and has insomnia.        Family History   Problem Relation Age of Onset    Stroke Mother     Cancer Father          SOCIAL HISTORY: He is . He has a remote smoking history 50 years   ago. He does not drink alcohol. He is retired. He does do some walking.  He has given up driving.    PHYSICAL EXAMINATION: This is a well-developed, well-nourished    male, in no acute distress. He is oriented x3 with an appropriate affect   and cognition.     VITAL SIGNS: BP (!) 149/69   Pulse (!) 50   Ht 5' 9" (1.753 m)   Wt 81 kg (178 lb 8 oz)   BMI 26.36 kg/m²    Was 187 lbs 8 oz on 4/27/16: Was 218 lbs 2014;     HEENT: No oral ulcers. Adequate moist of the oral mucosa. No parotid gland   swelling. No TMJ.   NECK: Supple. No adenopathy. No thyromegaly.   LUNGS: clear to A&P;   COR: no murmurs gallops or rubs. Irregularly irregular rhythm.  EXAMINATION OF JOINTS: He has bony hypertrophy of the right knee with varus deformity and PF crepitus.  No tenderness. No instability.  He has mild kyphosis of the thoracic spine.  No synovitis;  MSK: mild pedal edema bilaterally      Labs dated   6/1/17:  Hg 11.5; Ht 34.6; very hi indices; plt 382; cnne 1.2; T bili 1.8;   3/31/16: Hg 11.5; Ht 36.2; plt 389  2/19/15: Hg 13.4; plt 898;hi indices; cnne 1.2; T bili 2.2  8/21/14: CBC, ok; CMP cnne 1.4; Na 134;     IMPRESSION:     Myasthenia gravis with respiratory involvement and lower extremity   weakness with gait disturbance, ocular ptosis and blurred vision status   post plasmapheresis therapy, on Mestinon p.r.n., prednisone 5 mg every   other day and azathioprine 200 mg daily, doing well from the symptomatic   standpoint.     Essential thrombocythemia with mild anemia with macrocytic indices   On hydroxyurea    Weight " loss--continuing    Osteoarthritis of the right knee---symptomatic    Cervical osteoarthritis with moderate spinal canal stenosis.     Pedal edema chronic.     Atrial fibrillation, on warfarin.     Hypertension with hx of postural dizziness.     BPH.     GERD.    Tendency for depression with tolerance issues on cymbalta.     Status post multiple surgeries including cholecystectomy, T&A,   bilateral cataracts, lens implant and ptosis procedure, and repair of ruptured achilles tendon.     Postherpetic neuralgia, right lower extremity intermittent & persisting but appears improved..       PLAN:   Continue azathioprine 200 mg daily.  I will contact Dr. Crawford to ensure that he wants azathioprine continued.  We will get labs every 3 months.  I will see him back in 1 year as per his request unless he has a problem.  Call for problems.  RTC 1 year  .        Addendum: ok to continue azathioprine as per Dr. Crawford.

## 2017-06-08 NOTE — Clinical Note
Dr. Crawford:  Do you want Mr. Phan to continue azathioprine? He was not certain.   Jennie Choudhury

## 2017-06-08 NOTE — PROGRESS NOTES
This office note has been dictated.  HISTORY OF PRESENT ILLNESS:  Ernie Phan returns to Neurology Clinic for   medical management of myasthenia gravis.  His disease is currently well   controlled on a combination of prednisone 5 mg every other day, Mestinon 60 mg   one or two tablets as needed per day from me.  He also has Imuran 200 mg, which   he gets from Dr. Choudhury in Rheumatology who is monitoring his blood work.    He again has other health issues including thrombocytosis, atrial fibrillation   and chronic postherpetic neuralgia, which is having an effect on his gait.    NEUROLOGICAL REVIEW OF SYSTEMS:  He only has occasional chest pain and   infrequent headaches.  The latter are quite mild and usually do not even require   medication.  He has a slight degree of incontinence and is prescribed   medication for prostatic hypertrophy.  He will have an occasional bout of   diarrhea as well.  He denies fever, chills, sweats, dizziness, tinnitus, hearing   loss, vision problems, speech or swallowing difficulty, cough, shortness of   breath, nausea, vomiting and focal weakness in the extremities.    SOCIAL HISTORY:  He will not be doing any traveling this summer.    MEDICATIONS:  I reviewed his list of medicines and edited the electronic record.    PHYSICAL EXAMINATION:  NEUROLOGIC:  This is an alert, oriented and attentive man.  His speech is   appropriate and adequately articulated.  The vital signs are reviewed and also   included in this note.  He is neatly dressed and in no acute distress.  Cranial   nerve examination reveals small postoperative pupils and ectropion as noted   previously.  Nerves II through XII are otherwise serially examined and intact.    Specifically, there is no ptosis, diplopia or bulbar weakness.  The neck is   supple, pulses equal and no bruits are heard.  There is no focal motor weakness,   tremor or dysmetria.  He again has a slightly unsteady gait, which he   attributes to a  combination of postherpetic neuralgia and arthritis in the right   knee.  Romberg sign is not present.  He has normal tone and there are no gross   sensory deficits.  The deep tendon reflexes are symmetrical, though depressed at   the ankles.    I reviewed his clinic record including his most recent laboratory on the   computer console.  I discussed the situation with him in detail.  His prednisone   and pyridostigmine are being refilled electronically.  His next appointment   with me will be in six months.  He will need to follow up with Dr. Choudhury   for his Imuran.  He may call if there are any questions in the interim.      FSO/SHELLIE  dd: 06/08/2017 13:24:58 (CDT)  td: 06/09/2017 08:34:15 (CDT)  Doc ID   #[id number]  Job ID #[id number]    CC:

## 2017-06-12 ENCOUNTER — ANTI-COAG VISIT (OUTPATIENT)
Dept: CARDIOLOGY | Facility: CLINIC | Age: 82
End: 2017-06-12

## 2017-06-12 DIAGNOSIS — Z79.01 LONG TERM CURRENT USE OF ANTICOAGULANT THERAPY: ICD-10-CM

## 2017-06-12 LAB — INR PPP: 2.7

## 2017-06-16 ENCOUNTER — CLINICAL SUPPORT (OUTPATIENT)
Dept: CARDIOLOGY | Facility: CLINIC | Age: 82
End: 2017-06-16
Payer: MEDICARE

## 2017-06-16 DIAGNOSIS — I10 ESSENTIAL HYPERTENSION: ICD-10-CM

## 2017-06-16 DIAGNOSIS — I48.91 ATRIAL FIBRILLATION: ICD-10-CM

## 2017-06-16 LAB
AORTIC VALVE STENOSIS: ABNORMAL
ESTIMATED PA SYSTOLIC PRESSURE: 40.45
MITRAL VALVE MOBILITY: NORMAL
RETIRED EF AND QEF - SEE NOTES: 60 (ref 55–65)
TRICUSPID VALVE REGURGITATION: ABNORMAL

## 2017-06-16 PROCEDURE — 93306 TTE W/DOPPLER COMPLETE: CPT | Mod: S$GLB,,, | Performed by: INTERNAL MEDICINE

## 2017-06-19 ENCOUNTER — ANTI-COAG VISIT (OUTPATIENT)
Dept: CARDIOLOGY | Facility: CLINIC | Age: 82
End: 2017-06-19

## 2017-06-19 DIAGNOSIS — Z79.01 LONG TERM CURRENT USE OF ANTICOAGULANT THERAPY: ICD-10-CM

## 2017-06-19 LAB — INR PPP: 2.4

## 2017-06-21 DIAGNOSIS — I48.0 PAF (PAROXYSMAL ATRIAL FIBRILLATION): Primary | ICD-10-CM

## 2017-06-23 ENCOUNTER — OFFICE VISIT (OUTPATIENT)
Dept: ELECTROPHYSIOLOGY | Facility: CLINIC | Age: 82
End: 2017-06-23
Payer: MEDICARE

## 2017-06-23 ENCOUNTER — HOSPITAL ENCOUNTER (OUTPATIENT)
Dept: CARDIOLOGY | Facility: CLINIC | Age: 82
Discharge: HOME | End: 2017-06-23
Payer: MEDICARE

## 2017-06-23 VITALS
HEART RATE: 54 BPM | SYSTOLIC BLOOD PRESSURE: 148 MMHG | BODY MASS INDEX: 26.36 KG/M2 | DIASTOLIC BLOOD PRESSURE: 60 MMHG | WEIGHT: 178 LBS | HEIGHT: 69 IN

## 2017-06-23 DIAGNOSIS — I48.0 PAF (PAROXYSMAL ATRIAL FIBRILLATION): ICD-10-CM

## 2017-06-23 DIAGNOSIS — I10 ESSENTIAL HYPERTENSION: ICD-10-CM

## 2017-06-23 DIAGNOSIS — I48.19 PERSISTENT ATRIAL FIBRILLATION: Primary | ICD-10-CM

## 2017-06-23 DIAGNOSIS — Z79.01 LONG TERM CURRENT USE OF ANTICOAGULANT THERAPY: ICD-10-CM

## 2017-06-23 DIAGNOSIS — E78.5 HYPERLIPIDEMIA, UNSPECIFIED HYPERLIPIDEMIA TYPE: ICD-10-CM

## 2017-06-23 PROCEDURE — 93000 ELECTROCARDIOGRAM COMPLETE: CPT | Mod: S$GLB,,, | Performed by: INTERNAL MEDICINE

## 2017-06-23 PROCEDURE — 99999 PR PBB SHADOW E&M-EST. PATIENT-LVL III: CPT | Mod: PBBFAC,,, | Performed by: INTERNAL MEDICINE

## 2017-06-23 PROCEDURE — 99214 OFFICE O/P EST MOD 30 MIN: CPT | Mod: S$GLB,,, | Performed by: INTERNAL MEDICINE

## 2017-06-23 PROCEDURE — 1126F AMNT PAIN NOTED NONE PRSNT: CPT | Mod: S$GLB,,, | Performed by: INTERNAL MEDICINE

## 2017-06-23 PROCEDURE — 1159F MED LIST DOCD IN RCRD: CPT | Mod: S$GLB,,, | Performed by: INTERNAL MEDICINE

## 2017-06-23 PROCEDURE — 99499 UNLISTED E&M SERVICE: CPT | Mod: S$GLB,,, | Performed by: INTERNAL MEDICINE

## 2017-06-23 NOTE — PROGRESS NOTES
"Subjective:    Patient ID:  Ernie Phan is a 86 y.o. male who presents for follow-up of Atrial Fibrillation    Atrial Fibrillation   Symptoms are negative for chest pain, dizziness, palpitations, shortness of breath, syncope and weakness. Past medical history includes atrial fibrillation.    Comments: 85 year old M;  of Eulalia Phan (Bitsy), my pt also   PAF, asx, stable, rate-control strategy  Myasthenia gravis, stable  HTN, on meds    Since his last office visit, Mr. Phan reports feeling well overall with occasional fatigue per baseline. He denies chest pain, SOB/MCMILLAN, dizziness, palpitations, or syncope. He remains active with ADLs which he is able to complete without difficulty.  Last year, we changed BB to CCB, with good effect.    Recent cardiac studies:  Echo 60% LVEF    I reviewed today's ECG which demonstrated AF at 54 bpm.    Review of Systems   Constitution: Negative for decreased appetite, diaphoresis and weakness.   HENT: Negative for congestion, headaches and nosebleeds.    Eyes: Negative for blurred vision and double vision.   Cardiovascular: Negative for chest pain, claudication, cyanosis, dyspnea on exertion, irregular heartbeat, leg swelling, near-syncope, orthopnea, palpitations, paroxysmal nocturnal dyspnea and syncope.   Respiratory: Negative for cough, hemoptysis, shortness of breath, sleep disturbances due to breathing, snoring, sputum production and wheezing.    Skin: Negative.    Musculoskeletal: Negative.    Gastrointestinal: Negative for abdominal pain, hematemesis, hematochezia, nausea and vomiting.   Neurological: Negative for dizziness and light-headedness.   Psychiatric/Behavioral: Negative for altered mental status.        Objective:    Physical Exam   Constitutional: He is oriented to person, place, and time. He appears well-developed and well-nourished.   HENT:   Head: Normocephalic and atraumatic.   Eyes: Pupils are equal, round, and reactive to light.   Neck: Normal " range of motion. Neck supple.   Cardiovascular: Normal rate, normal heart sounds and intact distal pulses.  An irregularly irregular rhythm present.   Pulmonary/Chest: Effort normal and breath sounds normal.   Abdominal: Soft. Bowel sounds are normal.   Musculoskeletal:   Mr. Phan is utilizing a rollator walker today to assist in ambulation.    Neurological: He is alert and oriented to person, place, and time.   Vitals reviewed.        Assessment:       1. Persistent atrial fibrillation    2. Essential hypertension    3. Hyperlipidemia, unspecified hyperlipidemia type    4. Long term current use of anticoagulant therapy         Plan:       In summary, Mr. Phan is a 86 y.o. male with a hx of AF, HTN, DM, CKD III, and Myasthenia Gravis.   Mr. Phan is doing well from a rhythm perspective; rate-controlled on verapamil and on a/c.    Return in 1 year with echo, or earlier prn.

## 2017-06-26 ENCOUNTER — ANTI-COAG VISIT (OUTPATIENT)
Dept: CARDIOLOGY | Facility: CLINIC | Age: 82
End: 2017-06-26
Payer: MEDICARE

## 2017-06-26 DIAGNOSIS — Z79.01 LONG TERM CURRENT USE OF ANTICOAGULANT THERAPY: ICD-10-CM

## 2017-06-26 LAB — INR PPP: 1.8

## 2017-06-26 PROCEDURE — G0250 MD INR TEST REVIE INTER MGMT: HCPCS | Mod: S$GLB,,,

## 2017-07-03 ENCOUNTER — ANTI-COAG VISIT (OUTPATIENT)
Dept: CARDIOLOGY | Facility: CLINIC | Age: 82
End: 2017-07-03

## 2017-07-03 DIAGNOSIS — Z79.01 LONG TERM CURRENT USE OF ANTICOAGULANT THERAPY: ICD-10-CM

## 2017-07-03 LAB — INR PPP: 1.8

## 2017-07-03 NOTE — PROGRESS NOTES
Verbal result taken from Pt called in today_________. PT/INR __1.8_____ Date drawn___7/3/17_____ Hardcopy to be faxed.

## 2017-07-10 ENCOUNTER — ANTI-COAG VISIT (OUTPATIENT)
Dept: CARDIOLOGY | Facility: CLINIC | Age: 82
End: 2017-07-10

## 2017-07-10 DIAGNOSIS — Z79.01 LONG TERM CURRENT USE OF ANTICOAGULANT THERAPY: ICD-10-CM

## 2017-07-10 LAB — INR PPP: 2.8

## 2017-07-17 ENCOUNTER — ANTI-COAG VISIT (OUTPATIENT)
Dept: CARDIOLOGY | Facility: CLINIC | Age: 82
End: 2017-07-17

## 2017-07-17 DIAGNOSIS — Z79.01 LONG TERM CURRENT USE OF ANTICOAGULANT THERAPY: ICD-10-CM

## 2017-07-17 LAB — INR PPP: 2

## 2017-07-24 ENCOUNTER — ANTI-COAG VISIT (OUTPATIENT)
Dept: CARDIOLOGY | Facility: CLINIC | Age: 82
End: 2017-07-24

## 2017-07-24 DIAGNOSIS — Z79.01 LONG TERM CURRENT USE OF ANTICOAGULANT THERAPY: ICD-10-CM

## 2017-07-24 LAB — INR PPP: 2.5

## 2017-07-28 ENCOUNTER — TELEPHONE (OUTPATIENT)
Dept: HEMATOLOGY/ONCOLOGY | Facility: CLINIC | Age: 82
End: 2017-07-28

## 2017-07-28 NOTE — TELEPHONE ENCOUNTER
----- Message from Padmaja Foster RN sent at 7/28/2017  2:41 PM CDT -----  Contact: Pt      ----- Message -----  From: Olimpia Horn  Sent: 7/28/2017   2:19 PM  To: Blaise LUNA Jr Staff    PT called to speak to the nurse regarding rescheduling his appt and would like a call back.    Pt can be reached at [phone].    Thanks

## 2017-08-01 ENCOUNTER — ANTI-COAG VISIT (OUTPATIENT)
Dept: CARDIOLOGY | Facility: CLINIC | Age: 82
End: 2017-08-01
Payer: MEDICARE

## 2017-08-01 DIAGNOSIS — Z79.01 LONG TERM CURRENT USE OF ANTICOAGULANT THERAPY: ICD-10-CM

## 2017-08-01 LAB — INR PPP: 1.6

## 2017-08-01 PROCEDURE — G0250 MD INR TEST REVIE INTER MGMT: HCPCS | Mod: S$GLB,,,

## 2017-08-07 ENCOUNTER — ANTI-COAG VISIT (OUTPATIENT)
Dept: CARDIOLOGY | Facility: CLINIC | Age: 82
End: 2017-08-07

## 2017-08-07 DIAGNOSIS — Z79.01 LONG TERM CURRENT USE OF ANTICOAGULANT THERAPY: ICD-10-CM

## 2017-08-07 LAB — INR PPP: 1.9

## 2017-08-14 ENCOUNTER — ANTI-COAG VISIT (OUTPATIENT)
Dept: CARDIOLOGY | Facility: CLINIC | Age: 82
End: 2017-08-14

## 2017-08-14 DIAGNOSIS — Z79.01 LONG TERM CURRENT USE OF ANTICOAGULANT THERAPY: ICD-10-CM

## 2017-08-14 LAB — INR PPP: 2.2

## 2017-08-21 ENCOUNTER — ANTI-COAG VISIT (OUTPATIENT)
Dept: CARDIOLOGY | Facility: CLINIC | Age: 82
End: 2017-08-21

## 2017-08-21 DIAGNOSIS — Z79.01 LONG TERM CURRENT USE OF ANTICOAGULANT THERAPY: ICD-10-CM

## 2017-08-21 LAB — INR PPP: 2.2

## 2017-08-24 DIAGNOSIS — G70.00 MYASTHENIA GRAVIS: ICD-10-CM

## 2017-08-24 RX ORDER — AZATHIOPRINE 50 MG/1
TABLET ORAL
Qty: 360 TABLET | Refills: 0 | Status: SHIPPED | OUTPATIENT
Start: 2017-08-24 | End: 2017-10-31 | Stop reason: SDUPTHER

## 2017-08-29 ENCOUNTER — LAB VISIT (OUTPATIENT)
Dept: LAB | Facility: HOSPITAL | Age: 82
End: 2017-08-29
Attending: INTERNAL MEDICINE
Payer: MEDICARE

## 2017-08-29 ENCOUNTER — OFFICE VISIT (OUTPATIENT)
Dept: INTERNAL MEDICINE | Facility: CLINIC | Age: 82
End: 2017-08-29
Payer: MEDICARE

## 2017-08-29 ENCOUNTER — ANTI-COAG VISIT (OUTPATIENT)
Dept: CARDIOLOGY | Facility: CLINIC | Age: 82
End: 2017-08-29
Payer: MEDICARE

## 2017-08-29 VITALS
SYSTOLIC BLOOD PRESSURE: 110 MMHG | TEMPERATURE: 98 F | DIASTOLIC BLOOD PRESSURE: 60 MMHG | RESPIRATION RATE: 15 BRPM | HEIGHT: 68 IN | BODY MASS INDEX: 27.28 KG/M2 | HEART RATE: 54 BPM | WEIGHT: 180 LBS | OXYGEN SATURATION: 98 %

## 2017-08-29 DIAGNOSIS — Z79.60 LONG-TERM USE OF IMMUNOSUPPRESSANT MEDICATION: ICD-10-CM

## 2017-08-29 DIAGNOSIS — D75.839 THROMBOCYTOSIS: ICD-10-CM

## 2017-08-29 DIAGNOSIS — I48.91 ATRIAL FIBRILLATION, UNSPECIFIED TYPE: ICD-10-CM

## 2017-08-29 DIAGNOSIS — I27.20 PULMONARY HYPERTENSION: ICD-10-CM

## 2017-08-29 DIAGNOSIS — Z79.01 LONG TERM CURRENT USE OF ANTICOAGULANT THERAPY: ICD-10-CM

## 2017-08-29 DIAGNOSIS — K21.9 GASTROESOPHAGEAL REFLUX DISEASE, ESOPHAGITIS PRESENCE NOT SPECIFIED: ICD-10-CM

## 2017-08-29 DIAGNOSIS — Z13.5 SCREENING FOR EYE CONDITION: ICD-10-CM

## 2017-08-29 DIAGNOSIS — G70.00 MYASTHENIA GRAVIS: ICD-10-CM

## 2017-08-29 DIAGNOSIS — H61.91 LESION OF EXTERNAL EAR, RIGHT: ICD-10-CM

## 2017-08-29 DIAGNOSIS — G70.00 MYASTHENIA GRAVIS WITHOUT ACUTE EXACERBATION: ICD-10-CM

## 2017-08-29 DIAGNOSIS — N40.0 BENIGN PROSTATIC HYPERPLASIA, PRESENCE OF LOWER URINARY TRACT SYMPTOMS UNSPECIFIED: ICD-10-CM

## 2017-08-29 DIAGNOSIS — E78.5 HYPERLIPIDEMIA, UNSPECIFIED HYPERLIPIDEMIA TYPE: ICD-10-CM

## 2017-08-29 DIAGNOSIS — I10 ESSENTIAL HYPERTENSION: ICD-10-CM

## 2017-08-29 DIAGNOSIS — D47.3 ESSENTIAL THROMBOCYTOSIS: ICD-10-CM

## 2017-08-29 DIAGNOSIS — Z00.00 ENCOUNTER FOR PREVENTIVE HEALTH EXAMINATION: ICD-10-CM

## 2017-08-29 DIAGNOSIS — I36.1 NON-RHEUMATIC TRICUSPID VALVE INSUFFICIENCY: ICD-10-CM

## 2017-08-29 DIAGNOSIS — N18.30 CHRONIC KIDNEY DISEASE, STAGE III (MODERATE): ICD-10-CM

## 2017-08-29 DIAGNOSIS — B02.29 POSTHERPETIC NEURALGIA: Primary | ICD-10-CM

## 2017-08-29 DIAGNOSIS — R73.9 HYPERGLYCEMIA: ICD-10-CM

## 2017-08-29 PROBLEM — H93.90 EAR LESION: Status: ACTIVE | Noted: 2017-08-29

## 2017-08-29 LAB — INR PPP: 3.6

## 2017-08-29 PROCEDURE — G0439 PPPS, SUBSEQ VISIT: HCPCS | Mod: S$GLB,,, | Performed by: NURSE PRACTITIONER

## 2017-08-29 PROCEDURE — 99999 PR PBB SHADOW E&M-EST. PATIENT-LVL V: CPT | Mod: PBBFAC,,, | Performed by: NURSE PRACTITIONER

## 2017-08-29 PROCEDURE — 99211 OFF/OP EST MAY X REQ PHY/QHP: CPT | Mod: S$GLB,,, | Performed by: INTERNAL MEDICINE

## 2017-08-29 PROCEDURE — 99499 UNLISTED E&M SERVICE: CPT | Mod: S$GLB,,, | Performed by: NURSE PRACTITIONER

## 2017-08-29 NOTE — PATIENT INSTRUCTIONS
Counseling and Referral of Other Preventative  (Italic type indicates deductible and co-insurance are waived)    Patient Name: Ernie Phan  Today's Date: 8/29/2017      SERVICE LIMITATIONS RECOMMENDATION    Vaccines    · Pneumococcal (once after 65)    · Influenza (annually)    · Hepatitis B (if medium/high risk)    · Prevnar 13      Hepatitis B medium/high risk factors:       - End-stage renal disease       - Hemophiliacs who received Factor VII or         IX concentrates       - Clients of institutions for the mentally             retarded       - Persons who live in the same house as          a HepB carrier       - Homosexual men       - Illicit injectable drug abusers     Pneumococcal: current     Influenza: n/a     Hepatitis B: n/a     Prevnar 13: current    Prostate cancer screening (annually to age 75)     Prostate specific antigen (PSA) Shared decision making with Provider. Sometimes a co-pay may be required if the patient decides to have this test. The USPSTF no longer recommends prostate cancer screening routinely in medicine:   n/a    Colorectal cancer screening (to age 75)    · Fecal occult blood test (annual)  · Flexible sigmoidoscopy (5y)  · Screening colonoscopy (10y)  · Barium enema   n/a    Diabetes self-management training (no USPSTF recommendations)  Requires referral by treating physician for patient with diabetes or renal disease. 10 hours of initial DSMT sessions of no less than 30 minutes each in a continuous 12-month period. 2 hours of follow-up DSMT in subsequent years.  declined    Glaucoma screening (no USPSTF recommendation)  Diabetes mellitus, family history   , age 50 or over    American, age 65 or over  due    Medical nutrition therapy for diabetes or renal disease (no recommended schedule)  Requires referral by treating physician for patient with diabetes or renal disease or kidney transplant within the past 3 years.  Can be provided in same year as  diabetes self-management training (DSMT), and CMS recommends medical nutrition therapy take place after DSMT. Up to 3 hours for initial year and 2 hours in subsequent years.  n/a    Cardiovascular screening blood tests (every 5 years)  · Fasting lipid panel  Order as a panel if possible  current    Diabetes screening tests (at least every 3 years, Medicare covers annually or at 6-month intervals for prediabetic patients)  · Fasting blood sugar (FBS) or glucose tolerance test (GTT)  Patient must be diagnosed with one of the following:       - Hypertension       - Dyslipidemia       - Obesity (BMI 30kg/m2)       - Previous elevated impaired FBS or GTT       ... or any two of the following:       - Overweight (BMI 25 but <30)       - Family history of diabetes       - Age 65 or older       - History of gestational diabetes or birth of baby weighing more than 9 pounds  current    Abdominal aortic aneurysm screening (once)  · Sonogram   Limited to patients who meet one of the following criteria:       - Men who are 65-75 years old and have smoked more than 100 cigarette in their lifetime       - Anyone with a family history of abdominal aortic aneurysm       - Anyone recommended for screening by the USPSTF  n/a    HIV screening (annually for increased risk patients)  · HIV-1 and HIV-2 by EIA, or BELKYS, rapid antibody test or oral mucosa transudate  Patients must be at increased risk for HIV infection per USPSTF guidelines or pregnant. Tests covered annually for patient at increased risk or as requested by the patient. Pregnant patients may receive up to 3 tests during pregnancy.  Risks discussed, screening is declined    Smoking cessation counseling (up to 8 sessions per year)  Patients must be asymptomatic of tobacco-related conditions to receive as a preventative service.  n/a    Subsequent annual wellness visit  At least 12 months since last AWV  Return in one year     The following information is provided to all  patients.  This information is to help you find resources for any of the problems found today that may be affecting your health:                Living healthy guide: www.[facility].[place]      Understanding Diabetes: www.diabetes.org      Eating healthy: www.cdc.gov/healthyweight      CDC home safety checklist: www.cdc.gov/steadi/patient.html      Agency on Aging: www.goea.[place]      Alcoholics anonymous (AA): www.aa.org      Physical Activity: www.reece.nih.gov/gl6rqps      Tobacco use: www.quitwithusla.org

## 2017-08-29 NOTE — PROGRESS NOTES
"Ernie Phan presented for a  Medicare AWV and comprehensive Health Risk Assessment today. The following components were reviewed and updated:    · Medical history  · Family History  · Social history  · Allergies and Current Medications  · Health Risk Assessment  · Health Maintenance  · Care Team     ** See Completed Assessments for Annual Wellness Visit within the encounter summary.**       The following assessments were completed:  · Living Situation  · CAGE  · Depression Screening  · Timed Get Up and Go  · Whisper Test  · Cognitive Function Screening  · Nutrition Screening  · ADL Screening  · PAQ Screening    Vitals:    08/29/17 1311   BP: 110/60   Pulse: (!) 54   Resp: 15   Temp: 97.5 °F (36.4 °C)   TempSrc: Oral   SpO2: 98%   Weight: 81.6 kg (180 lb)   Height: 5' 8" (1.727 m)     Body mass index is 27.37 kg/m².  Physical Exam   Constitutional: He is oriented to person, place, and time. He appears well-developed and well-nourished.   HENT:   Head: Normocephalic and atraumatic.   Eyes:   carol lower lid ptosis   Cardiovascular: Normal rate, regular rhythm and normal heart sounds.    No murmur heard.  Pulmonary/Chest: Effort normal and breath sounds normal.   Abdominal: Soft. Bowel sounds are normal. There is no tenderness.   Musculoskeletal: He exhibits no edema.   Neurological: He is alert and oriented to person, place, and time.   Skin: Skin is warm and dry.   Psychiatric: He has a normal mood and affect. His behavior is normal. Judgment and thought content normal.   Nursing note and vitals reviewed.        Diagnoses and health risks identified today and associated recommendations/orders:    1. Postherpetic neuralgia  Stable- followed by PCP    2. Myasthenia gravis without acute exacerbation  Stable- followed by neurology    3. Long-term use of immunosuppressant medication-Imuran,prednisone  Stable- followed by rheumatology    4. Long term current use of anticoagulant therapy  Stable- followed by coumadin " clinic    5. Atrial fibrillation, unspecified type  Stable- followed by cardiology/arrythmia clinics    6. Benign prostatic hyperplasia, presence of lower urinary tract symptoms unspecified  Stable- followed by PCP    7. Chronic kidney disease, stage III (moderate)  Stable- followed by PCP    8. Essential thrombocytosis  Stable- followed by neurology    9. Gastroesophageal reflux disease, esophagitis presence not specified  Stable- followed by PCP    10. Hyperglycemia  Stable- followed by PCP    11. Hyperlipidemia, unspecified hyperlipidemia type  Stable- followed by PCP    12. Essential hypertension  Stable- followed by PCP    13. Encounter for preventive health examination  Assessments completed  Preventative health recommendations reviewed       14. Lesion of external ear, right  - Ambulatory Referral to Dermatology    15. Screening for eye condition  - Ambulatory Referral to Ophthalmology    16. Pulmonary hypertension  Stable- followed by PCP,cardiology    17.  Tricuspid valve regurgation  Stable- followed by cardiology        Provided Ernie with a 5-10 year written screening schedule and personal prevention plan. Recommendations were developed using the USPSTF age appropriate recommendations. Education, counseling, and referrals were provided as needed. After Visit Summary printed and given to patient which includes a list of additional screenings\tests needed. He will self schedule eye & derm appt. States knowledgeable of immunosuppression & prevention of infections & warfarin & potential for bleeding.    Return in about 1 year (around 8/29/2018) for hra.    Erlinda Lucero NP

## 2017-08-29 NOTE — Clinical Note
Primary Care Providers: Ernie Michel MD, MD (General)  Your patient was seen today for a HRA visit. Gap(s) in care (HEDIS gaps) have been identified during this visit that require additional testing and possible follow up.  Orders Placed This Encounter     Ambulatory Referral to Dermatology         Referral Priority:Routine         Referral Type:Consultation         Referral Reason:Specialty Services Required         Requested Specialty:Dermatology         Number of Visits Requested:1     Ambulatory Referral to Ophthalmology         Referral Priority:Routine         Referral Type:Consultation         Referral Reason:Specialty Services Required         Requested Specialty:Ophthalmology         Number of Visits Requested:1    I have included a copy of my visit note; please review the note and feel free to contact me with any questions.   Thank you for allowing me to participate in the care of your patients. Erlinda Lucero NP

## 2017-08-29 NOTE — PROGRESS NOTES
Patient presents for meter follow-up appointment.  Patient demonstrated proper use of meter.  Patient denies any difficulty using home monitor or reporting results.  INR results in meter are consistent with those reported and are without discrepancies. Actually, patient and wife switched meters for the month of March. However, they reported correctly for themselves. We wrote their names on the meters today to help. Patient denies self-adjusting diet or dose based on readings.  Reminded patient to continue to contact clinic with any new medications, changes in health, or changes in diet. Patient will continue monitoring INR weekly and return to clinic in 12 months.  Patient advised to contact clinic with any changes, questions, or concerns.    INR high. Patient reports not feeling well yesterday and having diarrhea. No other changes. No signs or symptoms of bleeding. We will decrease dose and continue weekly INR.

## 2017-08-30 ENCOUNTER — TELEPHONE (OUTPATIENT)
Dept: RHEUMATOLOGY | Facility: CLINIC | Age: 82
End: 2017-08-30

## 2017-08-30 NOTE — TELEPHONE ENCOUNTER
"Verbalized to pt. Per Dr. Lagunas "Blood work remained stable.  He is to continue the same dose of azathioprine. ". Pt. Verbalized understanding.    "

## 2017-09-05 LAB — INR PPP: 2.7

## 2017-09-06 ENCOUNTER — ANTI-COAG VISIT (OUTPATIENT)
Dept: CARDIOLOGY | Facility: CLINIC | Age: 82
End: 2017-09-06
Payer: MEDICARE

## 2017-09-06 DIAGNOSIS — Z79.01 LONG TERM CURRENT USE OF ANTICOAGULANT THERAPY: ICD-10-CM

## 2017-09-06 PROCEDURE — G0250 MD INR TEST REVIE INTER MGMT: HCPCS | Mod: S$GLB,,,

## 2017-09-06 RX ORDER — FINASTERIDE 5 MG/1
TABLET, FILM COATED ORAL
Qty: 90 TABLET | Refills: 3 | Status: SHIPPED | OUTPATIENT
Start: 2017-09-06 | End: 2019-01-31 | Stop reason: SDUPTHER

## 2017-09-06 RX ORDER — WARFARIN SODIUM 5 MG/1
TABLET ORAL
Qty: 90 TABLET | Refills: 3 | Status: SHIPPED | OUTPATIENT
Start: 2017-09-06 | End: 2019-02-21 | Stop reason: SDUPTHER

## 2017-09-06 RX ORDER — TAMSULOSIN HYDROCHLORIDE 0.4 MG/1
CAPSULE ORAL
Qty: 90 CAPSULE | Refills: 3 | Status: SHIPPED | OUTPATIENT
Start: 2017-09-06 | End: 2019-01-31 | Stop reason: SDUPTHER

## 2017-09-06 RX ORDER — BENAZEPRIL HYDROCHLORIDE 40 MG/1
TABLET ORAL
Qty: 90 TABLET | Refills: 3 | Status: SHIPPED | OUTPATIENT
Start: 2017-09-06 | End: 2018-10-01 | Stop reason: SDUPTHER

## 2017-09-11 ENCOUNTER — ANTI-COAG VISIT (OUTPATIENT)
Dept: CARDIOLOGY | Facility: CLINIC | Age: 82
End: 2017-09-11

## 2017-09-11 DIAGNOSIS — Z79.01 LONG TERM CURRENT USE OF ANTICOAGULANT THERAPY: ICD-10-CM

## 2017-09-11 LAB — INR PPP: 3

## 2017-09-18 ENCOUNTER — ANTI-COAG VISIT (OUTPATIENT)
Dept: CARDIOLOGY | Facility: CLINIC | Age: 82
End: 2017-09-18

## 2017-09-18 DIAGNOSIS — Z79.01 LONG TERM CURRENT USE OF ANTICOAGULANT THERAPY: ICD-10-CM

## 2017-09-18 LAB — INR PPP: 3

## 2017-09-25 LAB — INR PPP: 2.5

## 2017-09-26 ENCOUNTER — ANTI-COAG VISIT (OUTPATIENT)
Dept: CARDIOLOGY | Facility: CLINIC | Age: 82
End: 2017-09-26

## 2017-09-26 DIAGNOSIS — Z79.01 LONG TERM CURRENT USE OF ANTICOAGULANT THERAPY: ICD-10-CM

## 2017-10-02 ENCOUNTER — ANTI-COAG VISIT (OUTPATIENT)
Dept: CARDIOLOGY | Facility: CLINIC | Age: 82
End: 2017-10-02

## 2017-10-02 DIAGNOSIS — Z79.01 LONG TERM CURRENT USE OF ANTICOAGULANT THERAPY: ICD-10-CM

## 2017-10-02 LAB — INR PPP: 2.2

## 2017-10-09 ENCOUNTER — ANTI-COAG VISIT (OUTPATIENT)
Dept: CARDIOLOGY | Facility: CLINIC | Age: 82
End: 2017-10-09
Payer: MEDICARE

## 2017-10-09 DIAGNOSIS — Z79.01 LONG TERM CURRENT USE OF ANTICOAGULANT THERAPY: ICD-10-CM

## 2017-10-09 LAB — INR PPP: 2.1

## 2017-10-09 PROCEDURE — G0250 MD INR TEST REVIE INTER MGMT: HCPCS | Mod: S$GLB,,,

## 2017-10-16 ENCOUNTER — ANTI-COAG VISIT (OUTPATIENT)
Dept: CARDIOLOGY | Facility: CLINIC | Age: 82
End: 2017-10-16

## 2017-10-16 DIAGNOSIS — Z79.01 LONG TERM CURRENT USE OF ANTICOAGULANT THERAPY: ICD-10-CM

## 2017-10-16 LAB — INR PPP: 2.7

## 2017-10-23 ENCOUNTER — ANTI-COAG VISIT (OUTPATIENT)
Dept: CARDIOLOGY | Facility: CLINIC | Age: 82
End: 2017-10-23

## 2017-10-23 DIAGNOSIS — Z79.01 LONG TERM CURRENT USE OF ANTICOAGULANT THERAPY: ICD-10-CM

## 2017-10-23 LAB — INR PPP: 2.7

## 2017-10-30 ENCOUNTER — ANTI-COAG VISIT (OUTPATIENT)
Dept: CARDIOLOGY | Facility: CLINIC | Age: 82
End: 2017-10-30

## 2017-10-30 DIAGNOSIS — Z79.01 LONG TERM CURRENT USE OF ANTICOAGULANT THERAPY: ICD-10-CM

## 2017-10-30 LAB — INR PPP: 2.9

## 2017-10-31 DIAGNOSIS — G70.00 MYASTHENIA GRAVIS: ICD-10-CM

## 2017-10-31 RX ORDER — AZATHIOPRINE 50 MG/1
TABLET ORAL
Qty: 360 TABLET | Refills: 0 | Status: SHIPPED | OUTPATIENT
Start: 2017-10-31 | End: 2018-04-17 | Stop reason: SDUPTHER

## 2017-11-06 ENCOUNTER — ANTI-COAG VISIT (OUTPATIENT)
Dept: CARDIOLOGY | Facility: CLINIC | Age: 82
End: 2017-11-06

## 2017-11-06 DIAGNOSIS — Z79.01 LONG TERM CURRENT USE OF ANTICOAGULANT THERAPY: ICD-10-CM

## 2017-11-06 LAB — INR PPP: 2.5

## 2017-11-13 LAB — INR PPP: 2.9

## 2017-11-14 ENCOUNTER — ANTI-COAG VISIT (OUTPATIENT)
Dept: CARDIOLOGY | Facility: CLINIC | Age: 82
End: 2017-11-14
Payer: MEDICARE

## 2017-11-14 DIAGNOSIS — Z79.01 LONG TERM CURRENT USE OF ANTICOAGULANT THERAPY: ICD-10-CM

## 2017-11-14 PROCEDURE — G0250 MD INR TEST REVIE INTER MGMT: HCPCS | Mod: S$GLB,,,

## 2017-11-20 ENCOUNTER — ANTI-COAG VISIT (OUTPATIENT)
Dept: CARDIOLOGY | Facility: CLINIC | Age: 82
End: 2017-11-20

## 2017-11-20 DIAGNOSIS — Z79.01 LONG TERM CURRENT USE OF ANTICOAGULANT THERAPY: ICD-10-CM

## 2017-11-20 LAB — INR PPP: 2.5

## 2017-11-27 ENCOUNTER — ANTI-COAG VISIT (OUTPATIENT)
Dept: CARDIOLOGY | Facility: CLINIC | Age: 82
End: 2017-11-27

## 2017-11-27 DIAGNOSIS — Z79.01 LONG TERM CURRENT USE OF ANTICOAGULANT THERAPY: ICD-10-CM

## 2017-11-27 LAB — INR PPP: 2.1

## 2017-12-04 ENCOUNTER — ANTI-COAG VISIT (OUTPATIENT)
Dept: CARDIOLOGY | Facility: CLINIC | Age: 82
End: 2017-12-04

## 2017-12-04 DIAGNOSIS — Z79.01 LONG TERM CURRENT USE OF ANTICOAGULANT THERAPY: ICD-10-CM

## 2017-12-04 LAB — INR PPP: 2.7

## 2017-12-11 ENCOUNTER — ANTI-COAG VISIT (OUTPATIENT)
Dept: CARDIOLOGY | Facility: CLINIC | Age: 82
End: 2017-12-11

## 2017-12-11 DIAGNOSIS — Z79.01 LONG TERM CURRENT USE OF ANTICOAGULANT THERAPY: ICD-10-CM

## 2017-12-11 LAB — INR PPP: 3.1

## 2017-12-15 ENCOUNTER — LAB VISIT (OUTPATIENT)
Dept: LAB | Facility: HOSPITAL | Age: 82
End: 2017-12-15
Attending: INTERNAL MEDICINE
Payer: MEDICARE

## 2017-12-15 DIAGNOSIS — D75.839 THROMBOCYTOSIS: ICD-10-CM

## 2017-12-15 DIAGNOSIS — G70.00 MYASTHENIA GRAVIS: ICD-10-CM

## 2017-12-15 DIAGNOSIS — Z79.60 LONG-TERM USE OF IMMUNOSUPPRESSANT MEDICATION: ICD-10-CM

## 2017-12-15 LAB
ALBUMIN SERPL BCP-MCNC: 3.4 G/DL
ALP SERPL-CCNC: 79 U/L
ALT SERPL W/O P-5'-P-CCNC: 8 U/L
ANION GAP SERPL CALC-SCNC: 7 MMOL/L
AST SERPL-CCNC: 22 U/L
BASOPHILS # BLD AUTO: 0.04 K/UL
BASOPHILS NFR BLD: 0.5 %
BILIRUB SERPL-MCNC: 2.1 MG/DL
BUN SERPL-MCNC: 23 MG/DL
CALCIUM SERPL-MCNC: 9.3 MG/DL
CHLORIDE SERPL-SCNC: 107 MMOL/L
CO2 SERPL-SCNC: 25 MMOL/L
CREAT SERPL-MCNC: 1.3 MG/DL
DIFFERENTIAL METHOD: ABNORMAL
EOSINOPHIL # BLD AUTO: 0.1 K/UL
EOSINOPHIL NFR BLD: 0.6 %
ERYTHROCYTE [DISTWIDTH] IN BLOOD BY AUTOMATED COUNT: 15.6 %
EST. GFR  (AFRICAN AMERICAN): 57.1 ML/MIN/1.73 M^2
EST. GFR  (NON AFRICAN AMERICAN): 49.4 ML/MIN/1.73 M^2
GLUCOSE SERPL-MCNC: 83 MG/DL
HCT VFR BLD AUTO: 32 %
HGB BLD-MCNC: 10.5 G/DL
IMM GRANULOCYTES # BLD AUTO: 0.08 K/UL
IMM GRANULOCYTES NFR BLD AUTO: 1 %
LYMPHOCYTES # BLD AUTO: 0.9 K/UL
LYMPHOCYTES NFR BLD: 11 %
MCH RBC QN AUTO: 39.3 PG
MCHC RBC AUTO-ENTMCNC: 32.8 G/DL
MCV RBC AUTO: 120 FL
MONOCYTES # BLD AUTO: 0.7 K/UL
MONOCYTES NFR BLD: 8.5 %
NEUTROPHILS # BLD AUTO: 6.4 K/UL
NEUTROPHILS NFR BLD: 78.4 %
NRBC BLD-RTO: 0 /100 WBC
PLATELET # BLD AUTO: 527 K/UL
PMV BLD AUTO: 9.9 FL
POTASSIUM SERPL-SCNC: 4 MMOL/L
PROT SERPL-MCNC: 6.9 G/DL
RBC # BLD AUTO: 2.67 M/UL
SODIUM SERPL-SCNC: 139 MMOL/L
WBC # BLD AUTO: 8.15 K/UL

## 2017-12-15 PROCEDURE — 36415 COLL VENOUS BLD VENIPUNCTURE: CPT | Mod: PO

## 2017-12-15 PROCEDURE — 85025 COMPLETE CBC W/AUTO DIFF WBC: CPT

## 2017-12-15 PROCEDURE — 80053 COMPREHEN METABOLIC PANEL: CPT

## 2017-12-17 DIAGNOSIS — E53.8 B12 DEFICIENCY: ICD-10-CM

## 2017-12-17 DIAGNOSIS — D64.9 ANEMIA, UNSPECIFIED TYPE: Primary | ICD-10-CM

## 2017-12-18 ENCOUNTER — ANTI-COAG VISIT (OUTPATIENT)
Dept: CARDIOLOGY | Facility: CLINIC | Age: 82
End: 2017-12-18
Payer: MEDICARE

## 2017-12-18 ENCOUNTER — TELEPHONE (OUTPATIENT)
Dept: HEMATOLOGY/ONCOLOGY | Facility: CLINIC | Age: 82
End: 2017-12-18

## 2017-12-18 DIAGNOSIS — Z79.01 LONG TERM CURRENT USE OF ANTICOAGULANT THERAPY: ICD-10-CM

## 2017-12-18 LAB — INR PPP: 2.3

## 2017-12-18 PROCEDURE — G0250 MD INR TEST REVIE INTER MGMT: HCPCS | Mod: S$GLB,,,

## 2017-12-18 NOTE — TELEPHONE ENCOUNTER
----- Message from Arnulfo Welsh Jr., MD sent at 12/17/2017  9:03 AM CST -----  Increase Hydrea to 2/2/1 capsules  In one month, cbc,FE/TIBC, ferritin and B12  +    Patient was told to Increase Hydrea to 2/2/1 capsules  In one month, cbc,FE/TIBC, ferritin and B12   Patient verbalized understanding.

## 2017-12-26 ENCOUNTER — ANTI-COAG VISIT (OUTPATIENT)
Dept: CARDIOLOGY | Facility: CLINIC | Age: 82
End: 2017-12-26

## 2017-12-26 DIAGNOSIS — Z79.01 LONG TERM CURRENT USE OF ANTICOAGULANT THERAPY: ICD-10-CM

## 2017-12-26 LAB — INR PPP: 2.1

## 2018-01-02 ENCOUNTER — ANTI-COAG VISIT (OUTPATIENT)
Dept: CARDIOLOGY | Facility: CLINIC | Age: 83
End: 2018-01-02

## 2018-01-02 DIAGNOSIS — Z79.01 LONG TERM CURRENT USE OF ANTICOAGULANT THERAPY: ICD-10-CM

## 2018-01-02 LAB — INR PPP: 2.2

## 2018-01-08 ENCOUNTER — ANTI-COAG VISIT (OUTPATIENT)
Dept: CARDIOLOGY | Facility: CLINIC | Age: 83
End: 2018-01-08

## 2018-01-08 ENCOUNTER — TELEPHONE (OUTPATIENT)
Dept: INTERNAL MEDICINE | Facility: CLINIC | Age: 83
End: 2018-01-08

## 2018-01-08 DIAGNOSIS — Z79.01 LONG TERM CURRENT USE OF ANTICOAGULANT THERAPY: ICD-10-CM

## 2018-01-08 LAB — INR PPP: 2.3

## 2018-01-08 NOTE — TELEPHONE ENCOUNTER
----- Message from Britta Sebastian sent at 1/8/2018 12:24 PM CST -----  Contact: Eulalia/Wife/[phone] home  Pt's wife is calling to speak with someone in the office in regards to getting a sooner appt date. Pt's wife is asking that he be seen the same day as her before or after her. She states that the pt is getting worse, that he's getting weaker and he really needs to be soon as soon as possible. Please call and advise.    Thank You

## 2018-01-09 NOTE — TELEPHONE ENCOUNTER
Called pt back and informed her to bring all of the pts medications and to come to [facility] on the 4th floor for 1pm on thursday

## 2018-01-09 NOTE — TELEPHONE ENCOUNTER
Ok  I can see him 1 pm Thursday      he needs to bring his medications   arrive at 1245 pm     he has to be instructed on the exact location      he will go to [address] [facility]

## 2018-01-16 ENCOUNTER — ANTI-COAG VISIT (OUTPATIENT)
Dept: CARDIOLOGY | Facility: CLINIC | Age: 83
End: 2018-01-16

## 2018-01-16 DIAGNOSIS — Z79.01 LONG TERM CURRENT USE OF ANTICOAGULANT THERAPY: ICD-10-CM

## 2018-01-16 LAB — INR PPP: 1.9

## 2018-01-18 ENCOUNTER — DOCUMENTATION ONLY (OUTPATIENT)
Dept: INTERNAL MEDICINE | Facility: CLINIC | Age: 83
End: 2018-01-18

## 2018-01-18 NOTE — PROGRESS NOTES
PAST MEDICAL HISTORY:   Hypertension.  Myasthenia gravis.  Paroxysmal atrial fibrillation.  Hyperlipidemia.  Tricuspid regurgitation moderate  Aortic stenosis mild  BPH.  Gastroesophageal reflux disease.  Postherpetic neuralgia involving the right medial leg.  Cervical degenerative disk disease.  History of depression.  Osteoarthritis of the knees  Essential thrombocytosis       PAST SURGICAL HISTORY: we need to do to   Cholecystectomy.  Bilateral cataract extraction.  Repair of ruptured right Achilles tendon.  Repair of ptosis, both eyes.    SOCIAL HISTORY:  Tobacco and alcohol use - none.  , has no formal   exercise routine.    FAMILY HISTORY:  Father is , stroke.  Mother  from cancer.  One   sister in good health.

## 2018-01-22 ENCOUNTER — ANTI-COAG VISIT (OUTPATIENT)
Dept: CARDIOLOGY | Facility: CLINIC | Age: 83
End: 2018-01-22
Payer: MEDICARE

## 2018-01-22 DIAGNOSIS — Z79.01 LONG TERM CURRENT USE OF ANTICOAGULANT THERAPY: ICD-10-CM

## 2018-01-22 LAB — INR PPP: 2.1

## 2018-01-22 PROCEDURE — G0250 MD INR TEST REVIE INTER MGMT: HCPCS | Mod: S$GLB,,,

## 2018-01-26 ENCOUNTER — OFFICE VISIT (OUTPATIENT)
Dept: INTERNAL MEDICINE | Facility: CLINIC | Age: 83
End: 2018-01-26
Payer: MEDICARE

## 2018-01-26 ENCOUNTER — LAB VISIT (OUTPATIENT)
Dept: LAB | Facility: HOSPITAL | Age: 83
End: 2018-01-26
Attending: INTERNAL MEDICINE
Payer: MEDICARE

## 2018-01-26 VITALS
BODY MASS INDEX: 28.57 KG/M2 | HEART RATE: 54 BPM | DIASTOLIC BLOOD PRESSURE: 62 MMHG | HEIGHT: 68 IN | WEIGHT: 188.5 LBS | SYSTOLIC BLOOD PRESSURE: 120 MMHG

## 2018-01-26 DIAGNOSIS — B02.29 POSTHERPETIC NEURALGIA: ICD-10-CM

## 2018-01-26 DIAGNOSIS — I48.0 PAROXYSMAL ATRIAL FIBRILLATION: ICD-10-CM

## 2018-01-26 DIAGNOSIS — E53.8 B12 DEFICIENCY: ICD-10-CM

## 2018-01-26 DIAGNOSIS — G70.00 MG (MYASTHENIA GRAVIS): ICD-10-CM

## 2018-01-26 DIAGNOSIS — D47.3 ESSENTIAL THROMBOCYTOSIS: ICD-10-CM

## 2018-01-26 DIAGNOSIS — D75.839 THROMBOCYTOSIS: ICD-10-CM

## 2018-01-26 DIAGNOSIS — N40.0 BENIGN PROSTATIC HYPERPLASIA, PRESENCE OF LOWER URINARY TRACT SYMPTOMS UNSPECIFIED: ICD-10-CM

## 2018-01-26 DIAGNOSIS — D64.9 ANEMIA, UNSPECIFIED TYPE: ICD-10-CM

## 2018-01-26 DIAGNOSIS — I10 ESSENTIAL HYPERTENSION: Primary | ICD-10-CM

## 2018-01-26 DIAGNOSIS — K21.9 GASTROESOPHAGEAL REFLUX DISEASE, ESOPHAGITIS PRESENCE NOT SPECIFIED: ICD-10-CM

## 2018-01-26 DIAGNOSIS — D64.9 ANEMIA, UNSPECIFIED TYPE: Primary | ICD-10-CM

## 2018-01-26 LAB
BASOPHILS # BLD AUTO: 0.02 K/UL
BASOPHILS NFR BLD: 0.3 %
DIFFERENTIAL METHOD: ABNORMAL
EOSINOPHIL # BLD AUTO: 0 K/UL
EOSINOPHIL NFR BLD: 0.6 %
ERYTHROCYTE [DISTWIDTH] IN BLOOD BY AUTOMATED COUNT: 16.3 %
FERRITIN SERPL-MCNC: 203 NG/ML
HAPTOGLOB SERPL-MCNC: 99 MG/DL
HCT VFR BLD AUTO: 32.7 %
HGB BLD-MCNC: 10.8 G/DL
IRON SERPL-MCNC: 97 UG/DL
LYMPHOCYTES # BLD AUTO: 1 K/UL
LYMPHOCYTES NFR BLD: 16.1 %
MCH RBC QN AUTO: 40.9 PG
MCHC RBC AUTO-ENTMCNC: 33 G/DL
MCV RBC AUTO: 124 FL
MONOCYTES # BLD AUTO: 0.4 K/UL
MONOCYTES NFR BLD: 6.9 %
NEUTROPHILS # BLD AUTO: 4.7 K/UL
NEUTROPHILS NFR BLD: 76.1 %
PLATELET # BLD AUTO: 490 K/UL
PMV BLD AUTO: 9.2 FL
RBC # BLD AUTO: 2.64 M/UL
SATURATED IRON: 31 %
TOTAL IRON BINDING CAPACITY: 315 UG/DL
TRANSFERRIN SERPL-MCNC: 213 MG/DL
VIT B12 SERPL-MCNC: 466 PG/ML
WBC # BLD AUTO: 6.23 K/UL

## 2018-01-26 PROCEDURE — 85025 COMPLETE CBC W/AUTO DIFF WBC: CPT | Mod: PO

## 2018-01-26 PROCEDURE — 83540 ASSAY OF IRON: CPT

## 2018-01-26 PROCEDURE — 99999 PR PBB SHADOW E&M-EST. PATIENT-LVL IV: CPT | Mod: PBBFAC,,, | Performed by: INTERNAL MEDICINE

## 2018-01-26 PROCEDURE — 82607 VITAMIN B-12: CPT

## 2018-01-26 PROCEDURE — 83010 ASSAY OF HAPTOGLOBIN QUANT: CPT

## 2018-01-26 PROCEDURE — 99499 UNLISTED E&M SERVICE: CPT | Mod: S$GLB,,, | Performed by: INTERNAL MEDICINE

## 2018-01-26 PROCEDURE — 99214 OFFICE O/P EST MOD 30 MIN: CPT | Mod: S$GLB,,, | Performed by: INTERNAL MEDICINE

## 2018-01-26 PROCEDURE — 82728 ASSAY OF FERRITIN: CPT

## 2018-01-26 PROCEDURE — 82668 ASSAY OF ERYTHROPOIETIN: CPT

## 2018-01-26 NOTE — PROGRESS NOTES
PAST MEDICAL HISTORY:   Hypertension.  Myasthenia gravis.  Paroxysmal atrial fibrillation.  Hyperlipidemia.  Tricuspid regurgitation moderate  Aortic stenosis mild  BPH.  Gastroesophageal reflux disease.  Postherpetic neuralgia involving the right medial leg.  Cervical degenerative disk disease.  History of depression.  Osteoarthritis of the knees  Essential thrombocytosis       PAST SURGICAL HISTORY: we need to do to   Cholecystectomy.  Bilateral cataract extraction.  Repair of ruptured right Achilles tendon.  Repair of ptosis, both eyes.    SOCIAL HISTORY:  Tobacco and alcohol use - none.  , has no formal   exercise routine.    FAMILY HISTORY:  Father is , stroke.  Mother  from cancer.  One   sister in good health.    REASON FOR VISIT:  This is an 86-year-old male who comes in for a follow-up   visit.  In the interim, he continues to follow up with Hematology, Cardiology,   Rheumatology, and Neurology.    Recently he was started on iron once a day.  He had blood test son Karen 15 which   showed him to be a little bit more anemic.  He had a repeat today.    He was seen by Cardiology in  where his beta blocker was changed to a   calcium channel blocker.  Echo at that time showed a normal ejection fraction   with aortic valve stenosis which was stable.  He continues to see Rheumatology   for management of his medications regarding myasthenia gravis, as well as seeing   Neurology.  His last Neurology appointment was in  and the plan was to come   back in six months.  He feels he is doing well in regard to that.  He is only   taking Mestinon usually once a day, still on Imuran 50 mg four a day, and   prednisone 5 mg every other day.  He has no diplopia, but toward the end of the   day, he can feel that his vision is not as focused.  He has no problems with   swallowing and speaking.    REVIEW OF SYMPTOMS:  The main thing is that he just gets tired very easily doing   activities.  Overall,  he feels his breathing is fine.  No chest pain.  No   abdominal pain.  Regular bowel function, maybe twice a day.  No difficulty   urinating.  Nocturia x2.  He will have knee arthralgias.  He still has   paresthesias involving his right anterior medial thigh from previous shingles.    MEDICATIONS:  Aspirin 81 mg.  Imuran 50 mg four a day.  Benazepril 40 mg.  Finasteride 5 mg.  Hydroxyurea 500 mg three capsules once a day.  Iron sulfate.  Prednisone 50 mg twice a day.  Mestinon 60 mg.  Tamsulosin 0.4 mg.  Verapamil  mg.  Coumadin.  Vitamin D 1000 units.    PHYSICAL EXAMINATION:  VITAL SIGNS:  His weight is 188, pulse rate 60, blood pressure 120/62.  HEENT:  Tympanic membranes normal.  Nasal mucosa is clear.  Oropharynx, no   abnormal findings.  NECK:  No thyromegaly.  No masses.  LUNGS:  Clear breath sounds, good effort.  HEART:  Regular rate and rhythm.  No murmurs.  ABDOMEN:  Active bowel sounds, soft, nontender.  No hepatosplenomegaly or   abdominal masses.  PULSES:  2+ carotid pulses, 2+ pedal pulses.  EXTREMITIES:  No edema.    IMPRESSION:  1.  Hypertension.  2.  Myasthenia gravis.  3.  Paroxysmal atrial fibrillation.  4.  Mild aortic stenosis.  5.  BPH.  6.  Essential thrombocytosis  7.  Gastroesophageal reflux disease.  8.  Osteoarthritis of the knees.    PLAN:  He can try taking Tylenol Extra Strength one twice a day to help with   arthralgias.  He has labs ordered through Hematology today.  I am going to add   lipids, basic metabolic profile, and TSH.  Phone review to follow up.      GIAN  dd: 01/26/2018 15:31:02 (CST)  td: 01/27/2018 10:03:59 (CST)  Doc ID   #[id number]  Job ID #[id number]    CC:

## 2018-01-29 ENCOUNTER — ANTI-COAG VISIT (OUTPATIENT)
Dept: CARDIOLOGY | Facility: CLINIC | Age: 83
End: 2018-01-29

## 2018-01-29 DIAGNOSIS — Z79.01 LONG TERM CURRENT USE OF ANTICOAGULANT THERAPY: ICD-10-CM

## 2018-01-29 DIAGNOSIS — D47.3 ESSENTIAL THROMBOCYTHEMIA: ICD-10-CM

## 2018-01-29 DIAGNOSIS — D75.839 THROMBOCYTOSIS: ICD-10-CM

## 2018-01-29 LAB — INR PPP: 1.9

## 2018-01-29 RX ORDER — HYDROXYUREA 500 MG/1
CAPSULE ORAL
Qty: 270 CAPSULE | Refills: 10 | Status: SHIPPED | OUTPATIENT
Start: 2018-01-29 | End: 2019-02-06 | Stop reason: SDUPTHER

## 2018-01-29 NOTE — TELEPHONE ENCOUNTER
----- Message from Arnulfo Welsh Jr., MD sent at 1/26/2018  4:24 PM CST -----  Increase Hydrea to 2 every day  In one month cbc, retic and soluble transferrin receptor    Patient was told to increase Hydrea to 2 every day. Patient verbalized understanding.

## 2018-01-31 LAB — ERYTHROPOIETIN: 23.6 MIU/ML

## 2018-02-12 ENCOUNTER — ANTI-COAG VISIT (OUTPATIENT)
Dept: CARDIOLOGY | Facility: CLINIC | Age: 83
End: 2018-02-12

## 2018-02-12 DIAGNOSIS — Z79.01 LONG TERM CURRENT USE OF ANTICOAGULANT THERAPY: ICD-10-CM

## 2018-02-12 LAB — INR PPP: 2

## 2018-02-19 ENCOUNTER — ANTI-COAG VISIT (OUTPATIENT)
Dept: CARDIOLOGY | Facility: CLINIC | Age: 83
End: 2018-02-19

## 2018-02-19 DIAGNOSIS — Z79.01 LONG TERM CURRENT USE OF ANTICOAGULANT THERAPY: ICD-10-CM

## 2018-02-19 LAB — INR PPP: 1.7

## 2018-02-26 ENCOUNTER — ANTI-COAG VISIT (OUTPATIENT)
Dept: CARDIOLOGY | Facility: CLINIC | Age: 83
End: 2018-02-26
Payer: MEDICARE

## 2018-02-26 DIAGNOSIS — Z79.01 LONG TERM CURRENT USE OF ANTICOAGULANT THERAPY: ICD-10-CM

## 2018-02-26 LAB — INR PPP: 2.6

## 2018-02-26 PROCEDURE — G0250 MD INR TEST REVIE INTER MGMT: HCPCS | Mod: S$GLB,,,

## 2018-03-05 ENCOUNTER — ANTI-COAG VISIT (OUTPATIENT)
Dept: CARDIOLOGY | Facility: CLINIC | Age: 83
End: 2018-03-05

## 2018-03-05 DIAGNOSIS — Z79.01 LONG TERM CURRENT USE OF ANTICOAGULANT THERAPY: ICD-10-CM

## 2018-03-05 LAB — INR PPP: 2.1

## 2018-03-12 ENCOUNTER — ANTI-COAG VISIT (OUTPATIENT)
Dept: CARDIOLOGY | Facility: CLINIC | Age: 83
End: 2018-03-12

## 2018-03-12 DIAGNOSIS — I48.0 PAROXYSMAL ATRIAL FIBRILLATION: ICD-10-CM

## 2018-03-12 DIAGNOSIS — Z79.01 LONG TERM CURRENT USE OF ANTICOAGULANT THERAPY: ICD-10-CM

## 2018-03-12 LAB — INR PPP: 1.7

## 2018-03-14 ENCOUNTER — LAB VISIT (OUTPATIENT)
Dept: LAB | Facility: HOSPITAL | Age: 83
End: 2018-03-14
Attending: INTERNAL MEDICINE
Payer: MEDICARE

## 2018-03-14 DIAGNOSIS — D75.839 THROMBOCYTOSIS: ICD-10-CM

## 2018-03-14 DIAGNOSIS — G70.00 MYASTHENIA GRAVIS: ICD-10-CM

## 2018-03-14 DIAGNOSIS — D64.9 ANEMIA, UNSPECIFIED TYPE: Primary | ICD-10-CM

## 2018-03-14 DIAGNOSIS — Z79.60 LONG-TERM USE OF IMMUNOSUPPRESSANT MEDICATION: ICD-10-CM

## 2018-03-14 DIAGNOSIS — D64.9 ANEMIA, UNSPECIFIED TYPE: ICD-10-CM

## 2018-03-14 LAB
ALBUMIN SERPL BCP-MCNC: 3.9 G/DL
ALP SERPL-CCNC: 66 U/L
ALT SERPL W/O P-5'-P-CCNC: 10 U/L
ANION GAP SERPL CALC-SCNC: 14 MMOL/L
AST SERPL-CCNC: 19 U/L
BASOPHILS # BLD AUTO: 0 K/UL
BASOPHILS NFR BLD: 0 %
BILIRUB SERPL-MCNC: 2.4 MG/DL
BUN SERPL-MCNC: 21 MG/DL
CALCIUM SERPL-MCNC: 9.6 MG/DL
CHLORIDE SERPL-SCNC: 105 MMOL/L
CO2 SERPL-SCNC: 18 MMOL/L
CREAT SERPL-MCNC: 1.2 MG/DL
DIFFERENTIAL METHOD: ABNORMAL
EOSINOPHIL # BLD AUTO: 0 K/UL
EOSINOPHIL NFR BLD: 0.3 %
ERYTHROCYTE [DISTWIDTH] IN BLOOD BY AUTOMATED COUNT: 19.2 %
EST. GFR  (AFRICAN AMERICAN): >60 ML/MIN/1.73 M^2
EST. GFR  (NON AFRICAN AMERICAN): 54.4 ML/MIN/1.73 M^2
GLUCOSE SERPL-MCNC: 86 MG/DL
HCT VFR BLD AUTO: 24.6 %
HGB BLD-MCNC: 8.3 G/DL
IMM GRANULOCYTES # BLD AUTO: 0.03 K/UL
IMM GRANULOCYTES NFR BLD AUTO: 0.9 %
LYMPHOCYTES # BLD AUTO: 0.7 K/UL
LYMPHOCYTES NFR BLD: 20.9 %
MCH RBC QN AUTO: 43.9 PG
MCHC RBC AUTO-ENTMCNC: 33.7 G/DL
MCV RBC AUTO: 130 FL
MONOCYTES # BLD AUTO: 0.3 K/UL
MONOCYTES NFR BLD: 7.4 %
NEUTROPHILS # BLD AUTO: 2.4 K/UL
NEUTROPHILS NFR BLD: 70.5 %
NRBC BLD-RTO: 1 /100 WBC
PLATELET # BLD AUTO: 291 K/UL
PMV BLD AUTO: 9.9 FL
POTASSIUM SERPL-SCNC: 4.3 MMOL/L
PROT SERPL-MCNC: 7.2 G/DL
RBC # BLD AUTO: 1.89 M/UL
RETICS/RBC NFR AUTO: 2.4 %
SODIUM SERPL-SCNC: 137 MMOL/L
WBC # BLD AUTO: 3.39 K/UL

## 2018-03-14 PROCEDURE — 85045 AUTOMATED RETICULOCYTE COUNT: CPT

## 2018-03-14 PROCEDURE — 85025 COMPLETE CBC W/AUTO DIFF WBC: CPT

## 2018-03-14 PROCEDURE — 36415 COLL VENOUS BLD VENIPUNCTURE: CPT

## 2018-03-14 PROCEDURE — 80053 COMPREHEN METABOLIC PANEL: CPT

## 2018-03-14 PROCEDURE — 84238 ASSAY NONENDOCRINE RECEPTOR: CPT

## 2018-03-15 ENCOUNTER — TELEPHONE (OUTPATIENT)
Dept: HEMATOLOGY/ONCOLOGY | Facility: CLINIC | Age: 83
End: 2018-03-15

## 2018-03-15 NOTE — TELEPHONE ENCOUNTER
----- Message from Arnulfo Welsh Jr., MD sent at 3/14/2018  3:56 PM CDT -----  Much more anemic than last month.  Decrease Hydrea to 1 capsule a day  2 stools for OB  Cbc one month    Patient was told to decrease Hydrea to 1 cap/day.  stool kit. Appointment will be mailed for cbc next month. Patient verbalized understanding.

## 2018-03-16 LAB — STFR SERPL-MCNC: 3.3 MG/L

## 2018-03-19 ENCOUNTER — LAB VISIT (OUTPATIENT)
Dept: LAB | Facility: HOSPITAL | Age: 83
End: 2018-03-19
Attending: INTERNAL MEDICINE
Payer: MEDICARE

## 2018-03-19 ENCOUNTER — NURSE TRIAGE (OUTPATIENT)
Dept: ADMINISTRATIVE | Facility: CLINIC | Age: 83
End: 2018-03-19

## 2018-03-19 ENCOUNTER — ANTI-COAG VISIT (OUTPATIENT)
Dept: CARDIOLOGY | Facility: CLINIC | Age: 83
End: 2018-03-19

## 2018-03-19 DIAGNOSIS — D64.9 ANEMIA, UNSPECIFIED TYPE: ICD-10-CM

## 2018-03-19 DIAGNOSIS — I48.0 PAROXYSMAL ATRIAL FIBRILLATION: ICD-10-CM

## 2018-03-19 DIAGNOSIS — Z79.01 LONG TERM CURRENT USE OF ANTICOAGULANT THERAPY: ICD-10-CM

## 2018-03-19 LAB — INR PPP: 2.8

## 2018-03-19 PROCEDURE — 82272 OCCULT BLD FECES 1-3 TESTS: CPT

## 2018-03-19 NOTE — TELEPHONE ENCOUNTER
Pt is a pt of Dr. Welsh Hematologist. Was given a kit for stool collection and there are no instructions. Calling for directions on how to collect specimen. Instructions given and caller verbalized understanding

## 2018-03-19 NOTE — TELEPHONE ENCOUNTER
Reason for Disposition   Health Information question, no triage required and triager able to answer question    Protocols used: ST INFORMATION ONLY CALL-A-AH

## 2018-03-20 LAB
OB PNL STL: NEGATIVE
OB PNL STL: NEGATIVE

## 2018-03-26 ENCOUNTER — ANTI-COAG VISIT (OUTPATIENT)
Dept: CARDIOLOGY | Facility: CLINIC | Age: 83
End: 2018-03-26

## 2018-03-26 DIAGNOSIS — I48.0 PAROXYSMAL ATRIAL FIBRILLATION: ICD-10-CM

## 2018-03-26 DIAGNOSIS — Z79.01 LONG TERM CURRENT USE OF ANTICOAGULANT THERAPY: ICD-10-CM

## 2018-03-26 LAB — INR PPP: 3.3

## 2018-04-02 LAB — INR PPP: 3.9

## 2018-04-03 ENCOUNTER — ANTI-COAG VISIT (OUTPATIENT)
Dept: CARDIOLOGY | Facility: CLINIC | Age: 83
End: 2018-04-03
Payer: MEDICARE

## 2018-04-03 DIAGNOSIS — Z79.01 LONG TERM CURRENT USE OF ANTICOAGULANT THERAPY: ICD-10-CM

## 2018-04-03 DIAGNOSIS — I48.0 PAROXYSMAL ATRIAL FIBRILLATION: ICD-10-CM

## 2018-04-03 PROCEDURE — G0250 MD INR TEST REVIE INTER MGMT: HCPCS | Mod: S$GLB,,,

## 2018-04-09 ENCOUNTER — ANTI-COAG VISIT (OUTPATIENT)
Dept: CARDIOLOGY | Facility: CLINIC | Age: 83
End: 2018-04-09

## 2018-04-09 DIAGNOSIS — I48.0 PAROXYSMAL ATRIAL FIBRILLATION: ICD-10-CM

## 2018-04-09 DIAGNOSIS — Z79.01 LONG TERM CURRENT USE OF ANTICOAGULANT THERAPY: ICD-10-CM

## 2018-04-09 LAB — INR PPP: 3.3

## 2018-04-16 ENCOUNTER — ANTI-COAG VISIT (OUTPATIENT)
Dept: CARDIOLOGY | Facility: CLINIC | Age: 83
End: 2018-04-16

## 2018-04-16 DIAGNOSIS — Z79.01 LONG TERM CURRENT USE OF ANTICOAGULANT THERAPY: ICD-10-CM

## 2018-04-16 DIAGNOSIS — I48.0 PAROXYSMAL ATRIAL FIBRILLATION: ICD-10-CM

## 2018-04-16 LAB — INR PPP: 2.8

## 2018-04-17 ENCOUNTER — OFFICE VISIT (OUTPATIENT)
Dept: NEUROLOGY | Facility: CLINIC | Age: 83
End: 2018-04-17
Payer: MEDICARE

## 2018-04-17 ENCOUNTER — LAB VISIT (OUTPATIENT)
Dept: LAB | Facility: HOSPITAL | Age: 83
End: 2018-04-17
Attending: INTERNAL MEDICINE
Payer: MEDICARE

## 2018-04-17 ENCOUNTER — TELEPHONE (OUTPATIENT)
Dept: HEMATOLOGY/ONCOLOGY | Facility: CLINIC | Age: 83
End: 2018-04-17

## 2018-04-17 VITALS
HEIGHT: 68 IN | HEART RATE: 71 BPM | BODY MASS INDEX: 28.74 KG/M2 | WEIGHT: 189.63 LBS | SYSTOLIC BLOOD PRESSURE: 155 MMHG | DIASTOLIC BLOOD PRESSURE: 74 MMHG

## 2018-04-17 DIAGNOSIS — D75.839 THROMBOCYTOSIS: ICD-10-CM

## 2018-04-17 DIAGNOSIS — E53.8 FOLATE DEFICIENCY: ICD-10-CM

## 2018-04-17 DIAGNOSIS — G70.00 MYASTHENIA GRAVIS WITHOUT ACUTE EXACERBATION: ICD-10-CM

## 2018-04-17 DIAGNOSIS — G70.00 MG (MYASTHENIA GRAVIS): ICD-10-CM

## 2018-04-17 DIAGNOSIS — Z79.60 LONG-TERM USE OF IMMUNOSUPPRESSANT MEDICATION: ICD-10-CM

## 2018-04-17 DIAGNOSIS — D64.9 ANEMIA, UNSPECIFIED TYPE: Primary | ICD-10-CM

## 2018-04-17 DIAGNOSIS — G70.00 MYASTHENIA GRAVIS: ICD-10-CM

## 2018-04-17 LAB
BASOPHILS # BLD AUTO: 0.03 K/UL
BASOPHILS NFR BLD: 0.5 %
DIFFERENTIAL METHOD: ABNORMAL
EOSINOPHIL # BLD AUTO: 0 K/UL
EOSINOPHIL NFR BLD: 0.2 %
ERYTHROCYTE [DISTWIDTH] IN BLOOD BY AUTOMATED COUNT: 15.8 %
HCT VFR BLD AUTO: 26.2 %
HGB BLD-MCNC: 8.5 G/DL
IMM GRANULOCYTES # BLD AUTO: 0.09 K/UL
IMM GRANULOCYTES NFR BLD AUTO: 1.6 %
LYMPHOCYTES # BLD AUTO: 0.5 K/UL
LYMPHOCYTES NFR BLD: 9.4 %
MCH RBC QN AUTO: 44.7 PG
MCHC RBC AUTO-ENTMCNC: 32.4 G/DL
MCV RBC AUTO: 138 FL
MONOCYTES # BLD AUTO: 0.4 K/UL
MONOCYTES NFR BLD: 6.7 %
NEUTROPHILS # BLD AUTO: 4.5 K/UL
NEUTROPHILS NFR BLD: 81.6 %
NRBC BLD-RTO: 0 /100 WBC
PLATELET # BLD AUTO: 382 K/UL
PMV BLD AUTO: 9.5 FL
RBC # BLD AUTO: 1.9 M/UL
WBC # BLD AUTO: 5.53 K/UL

## 2018-04-17 PROCEDURE — 36415 COLL VENOUS BLD VENIPUNCTURE: CPT

## 2018-04-17 PROCEDURE — 99214 OFFICE O/P EST MOD 30 MIN: CPT | Mod: S$GLB,,, | Performed by: PSYCHIATRY & NEUROLOGY

## 2018-04-17 PROCEDURE — 85025 COMPLETE CBC W/AUTO DIFF WBC: CPT

## 2018-04-17 PROCEDURE — 99499 UNLISTED E&M SERVICE: CPT | Mod: S$GLB,,, | Performed by: PSYCHIATRY & NEUROLOGY

## 2018-04-17 PROCEDURE — 99999 PR PBB SHADOW E&M-EST. PATIENT-LVL III: CPT | Mod: PBBFAC,,, | Performed by: PSYCHIATRY & NEUROLOGY

## 2018-04-17 RX ORDER — AZATHIOPRINE 50 MG/1
200 TABLET ORAL DAILY
Qty: 360 TABLET | Refills: 3 | Status: ON HOLD | OUTPATIENT
Start: 2018-04-17 | End: 2019-03-21 | Stop reason: SDUPTHER

## 2018-04-17 RX ORDER — PREDNISONE 5 MG/1
5 TABLET ORAL EVERY OTHER DAY
Qty: 45 TABLET | Refills: 3 | Status: ON HOLD | OUTPATIENT
Start: 2018-04-17 | End: 2019-03-21 | Stop reason: SDUPTHER

## 2018-04-17 NOTE — TELEPHONE ENCOUNTER
Called pt and informed of below results.   Pt verbalized understanding of hydrea decrease.   Pt stated that he cannot get labs done in 2 weeks- wife is getting surgery and he will need scheduled end of May- scheduled per pt's request.   Pt will come get stool cup today.             ----- Message from Arnulfo Welsh Jr., MD sent at 4/17/2018  2:27 PM CDT -----  Decrease Hydrea to 1/1/0 capsules (one for 2 days; none third day)  Cbc, retic count, haptoglobin and folate in 2 weeks  Needs to turn in 2 stool specimens for occult blood

## 2018-04-17 NOTE — PROGRESS NOTES
[facility] - NEUROLOGY EPILEPSY  [facility] [place]    Date: 4/17/18  Patient Name: Ernie Phan   MRN: [id number]   PCP: Ernie Michel  Referring Provider: Self, Aaareferral    Assessment:   Ernie Phan is a 87 y.o. male presenting to establish care for myasthenia gravis.  We will make no changes to the patient's current vacation regimen today as he is currently stable.  Have reviewed labs listed below.    Plan:     Problem List Items Addressed This Visit        Neuro    Myasthenia gravis without acute exacerbation    Current Assessment & Plan     -- continuing current imuran and prednisone  -- mestinon 60 mg PRN  -- CBC and CMP reviewed            Other    Long-term use of immunosuppressant medication    Current Assessment & Plan     -- CBC/CMP reviewed           Other Visit Diagnoses     Myasthenia gravis        Relevant Medications    azaTHIOprine (IMURAN) 50 mg Tab    MG (myasthenia gravis)        Relevant Medications    predniSONE (DELTASONE) 5 MG tablet        Dale Gutierrez MD  [facility]   Department of Neurology    Patient note was created using Dragon Dictation.  Any errors in syntax or even information may not have been identified and edited on initial review prior to signing this note.  Subjective:   HPI:   Mr. Ernie Phan is a 87 y.o. male who presents with a chief complaint of myasthenia gravis.  The patient is previously patient of Dr. Crawford, and I have reviewed his notes detailing his initial treatment.  The patient has been managed on Imuran and Prednisone with Mestinon used only as needed.  He states that very rarely, he will notice diplopia late in the evening.  He denies dysarthria, dysphagia, dyspnea, head drop, or other weakness, indicative of flare.  He has started no new medications.  He recently had screening labs were completed by his PCP.    PAST MEDICAL HISTORY:  Past Medical History:   Diagnosis Date    *Atrial fibrillation      Arthritis     Atrial fibrillation     BPH (benign prostatic hypertrophy)     Degenerative disc disease     Depression     GERD (gastroesophageal reflux disease)     Hyperglycemia     Hyperlipidemia     Hypertension     MG (myasthenia gravis)     Postherpetic neuralgia        PAST SURGICAL HISTORY:  Past Surgical History:   Procedure Laterality Date    ACHILLES TENDON SURGERY      CHOLECYSTECTOMY         CURRENT MEDS:  Current Outpatient Prescriptions   Medication Sig Dispense Refill    aspirin 81 mg Tab Take 81 mg by mouth Daily.      azaTHIOprine (IMURAN) 50 mg Tab Take 4 tablets (200 mg total) by mouth once daily. 360 tablet 3    benazepril (LOTENSIN) 40 MG tablet TAKE 1 TABLET ONE TIME DAILY 90 tablet 3    finasteride (PROSCAR) 5 mg tablet TAKE 1 TABLET EVERY DAY 90 tablet 3    hydroxyurea (HYDREA) 500 mg Cap TAKE 3 CAPSULES (1,500 MG TOTAL) BY MOUTH ONE TIME DAILY, OR AS DIRECTED. 270 capsule 10    IRON, FERROUS SULFATE, ORAL Take 65 mg by mouth once daily.      predniSONE (DELTASONE) 5 MG tablet Take 1 tablet (5 mg total) by mouth every other day. 45 tablet 3    pyridostigmine (MESTINON) 60 mg Tab Take 1 tablet (60 mg total) by mouth every 6 to 8 hours as needed. 180 tablet 1    tamsulosin (FLOMAX) 0.4 mg Cp24 TAKE 1 CAPSULE EVERY DAY 90 capsule 3    verapamil (VERELAN) 240 MG C24P Take 1 capsule (240 mg total) by mouth once daily. 90 capsule 3    vitamin D 1000 units Tab Take 185 mg by mouth once daily.      warfarin (COUMADIN) 5 MG tablet TAKE 1 TABLET EVERY DAY EXCEPT TAKE 1/2 TABLET ON SUNDAY, OR AS DIRECTED BY COUMADIN CLINIC 90 tablet 3    DENTURE CLEANSER (EFFERVESCENT DENTURE CLEANSR DENT)       ONE TOUCH ULTRA TEST Strp TEST DAILY 100 each 3    ONE TOUCH ULTRASOFT LANCETS lancets TEST DAILY 100 each 3     No current facility-administered medications for this visit.        ALLERGIES:  Review of patient's allergies indicates:  No Known Allergies    FAMILY HISTORY:  Family  "History   Problem Relation Age of Onset    Stroke Mother     Cancer Father        SOCIAL HISTORY:  Social History   Substance Use Topics    Smoking status: Former Smoker    Smokeless tobacco: Never Used    Alcohol use No       Review of Systems:  12 review of systems is negative except for the symptoms mentioned in HPI.      Objective:     Vitals:    04/17/18 1355   BP: (!) 155/74   Pulse: 71   Weight: 86 kg (189 lb 9.5 oz)   Height: 5' 8" (1.727 m)     General: NAD, well nourished   Eyes: no tearing, discharge, no erythema   ENT: moist mucous membranes of the oral cavity, nares patent    Neck: Supple, full range of motion  Cardiovascular: Warm and well perfused, pulses equal and symmetrical  Lungs: Normal work of breathing, normal chest wall excursions  Skin: No rash, lesions, or breakdown on exposed skin  Psychiatry: Mood and affect are appropriate   Abdomen: soft, non tender, non distended  Extremeties: No cyanosis, clubbing or edema.    Neurological   MENTAL STATUS: Alert and oriented to person, place, and time. Attention and concentration within normal limits. Speech without dysarthria, able to name and repeat without difficulty. Recent and remote memory within normal limits   CRANIAL NERVES: Visual fields intact. PERRL. EOMI. Facial sensation intact. Face symmetrical with ptosis to 10 and 2. Hearing grossly intact. Full shoulder shrug bilaterally. Tongue protrudes midline. Tongue in cheek and cheek puff mildly weak. Eye closure weak/  SENSORY: Sensation is intact to light touch throughout.  Joint position perception intact. Negative Romberg.   MOTOR: Normal bulk and tone. No pronator drift. Neck flexion 5/5, neck extension 5/5.  5/5 deltoid, biceps, triceps, interosseous, hand  bilaterally. 5/5 iliopsoas, knee extension/flexion, foot dorsi/plantarflexion bilaterally.    REFLEXES: Symmetric and 2+ throughout. CEREBELLAR/COORDINATION/GAIT: Gait steady with normal arm swing and stride length. Finger to " nose intact. Normal rapid alternating movements.

## 2018-04-17 NOTE — PATIENT INSTRUCTIONS
"      THIS PATIENT HAS MYASTHENIA GRAVIS OR LAMBERT-EATON SYNDROME    USE THESE DRUGS WITH CAUTION  1. Antibiotics of the Following Classes   A. Aminoglycosides (end in "mycin", "micin" e.g. Neomycin, tobramycin,                               gentamicin)   B. Flagyl (metronidazole)   C. Macrolides (e.g. Erythromycin, azithromycin (Zithromax), clarithromycin)  2. Beta Blockers (oral, parenteral and ophthalmic preparations)   A. (end in "olol" e.g. Atenolol, labetalol, metoprolol, propranolol, sotalol, timolol,                     etc)  3. Calcium Channel Blockers (end in "ipine" e.g. Amlodipine (Norvasc), nicardipine,     nifedipine (Procardia), felodipine (Plendil))  4. Corticosteroids & ACTH  5. Interferons  6. Magnesium  7. Narcotic analgesics (if there is respiratory compromise e.g. Demerol, morphine)  8. Phenothiazines (end in "zine" e.g. Compazine, chlorpromazine (Thorazine),     fluphenazine (Prolixin), perphenazine (Trilafon), Sparine)  9. Respiratory Depressants  10. Sedatives and Hypnotics    THESE DRUGS SHOULD *NOT* BE USED IN PATIENTS WITH MYASTHENIA GRAVIS WITHOUT PRIOR DISCUSSION WITH A NEUROMUSCULAR SPECIALIST  1. Ketolides (e.g. Telithromycin) - FDA BLACK BOX WARNING - Do NOT Use  2. Fluoroquinolones (end in "acin" e.g. Levofloxacin (Levaquin), ciprofloxacin (CIPRO),     moxifloxacin (Avelox) - FDA BLACK BOX WARNING  3. Botulinum toxin  4. D-Penicillamine    NURSES -- TRIPLE CHECK BEFORE GIVING THE FOLLOWING DUE TO THE HIGH PROBABILITY OF PROLONGED WEAKNESS OR MG CRISIS  1. Neuromuscular blocking agent (both depolarizing and non-depolarizing)   A. Succinylcholine-like   B. Curare-like  2. Quinidine  3. Quinine    Note: This is a small, limited list of potentially dangerous drugs for patient's with disease of neuromuscular transmission (Myasthenia Gravis, Lambert-Eaton Syndrome). Up to date information regarding the potential for drug-induced exacerbation of myasthenic patients may be found at the " Myasthenia Gravis Foundation (MGFA) website, http://www.myasthenia.org/docs/MGFAMedicationsandMG.pdf. The decision to use a potentially dangerous drug must be made on the basis of the clinical decision, urgency of need and lack of alternative agents.

## 2018-04-23 ENCOUNTER — ANTI-COAG VISIT (OUTPATIENT)
Dept: CARDIOLOGY | Facility: CLINIC | Age: 83
End: 2018-04-23

## 2018-04-23 DIAGNOSIS — Z79.01 LONG TERM CURRENT USE OF ANTICOAGULANT THERAPY: ICD-10-CM

## 2018-04-23 DIAGNOSIS — I48.0 PAROXYSMAL ATRIAL FIBRILLATION: ICD-10-CM

## 2018-04-23 LAB — INR PPP: 2.8

## 2018-04-26 ENCOUNTER — PES CALL (OUTPATIENT)
Dept: ADMINISTRATIVE | Facility: CLINIC | Age: 83
End: 2018-04-26

## 2018-04-30 ENCOUNTER — ANTI-COAG VISIT (OUTPATIENT)
Dept: CARDIOLOGY | Facility: CLINIC | Age: 83
End: 2018-04-30

## 2018-04-30 DIAGNOSIS — I48.0 PAROXYSMAL ATRIAL FIBRILLATION: ICD-10-CM

## 2018-04-30 DIAGNOSIS — Z79.01 LONG TERM CURRENT USE OF ANTICOAGULANT THERAPY: ICD-10-CM

## 2018-04-30 LAB — INR PPP: 2.3

## 2018-05-07 ENCOUNTER — ANTI-COAG VISIT (OUTPATIENT)
Dept: CARDIOLOGY | Facility: CLINIC | Age: 83
End: 2018-05-07
Payer: MEDICARE

## 2018-05-07 DIAGNOSIS — I48.0 PAROXYSMAL ATRIAL FIBRILLATION: ICD-10-CM

## 2018-05-07 DIAGNOSIS — I49.3 PVC (PREMATURE VENTRICULAR CONTRACTION): ICD-10-CM

## 2018-05-07 DIAGNOSIS — Z79.01 LONG TERM CURRENT USE OF ANTICOAGULANT THERAPY: ICD-10-CM

## 2018-05-07 LAB — INR PPP: 1.7

## 2018-05-07 PROCEDURE — G0250 MD INR TEST REVIE INTER MGMT: HCPCS | Mod: S$GLB,,,

## 2018-05-09 RX ORDER — VERAPAMIL HYDROCHLORIDE 240 MG/1
CAPSULE, EXTENDED RELEASE ORAL
Qty: 90 CAPSULE | Refills: 3 | Status: SHIPPED | OUTPATIENT
Start: 2018-05-09 | End: 2019-03-08 | Stop reason: SDUPTHER

## 2018-05-14 ENCOUNTER — ANTI-COAG VISIT (OUTPATIENT)
Dept: CARDIOLOGY | Facility: CLINIC | Age: 83
End: 2018-05-14

## 2018-05-14 DIAGNOSIS — I48.0 PAROXYSMAL ATRIAL FIBRILLATION: ICD-10-CM

## 2018-05-14 DIAGNOSIS — Z79.01 LONG TERM CURRENT USE OF ANTICOAGULANT THERAPY: ICD-10-CM

## 2018-05-14 LAB — INR PPP: 2.2

## 2018-05-16 ENCOUNTER — TELEPHONE (OUTPATIENT)
Dept: RHEUMATOLOGY | Facility: CLINIC | Age: 83
End: 2018-05-16

## 2018-05-16 DIAGNOSIS — Z79.60 LONG-TERM USE OF IMMUNOSUPPRESSANT MEDICATION: ICD-10-CM

## 2018-05-16 DIAGNOSIS — G70.00 MYASTHENIA GRAVIS: Primary | ICD-10-CM

## 2018-05-16 NOTE — TELEPHONE ENCOUNTER
----- Message from Lakshmi Lopez RN sent at 5/15/2018  3:31 PM CDT -----  Please put cmp lab order in epic

## 2018-05-21 ENCOUNTER — ANTI-COAG VISIT (OUTPATIENT)
Dept: CARDIOLOGY | Facility: CLINIC | Age: 83
End: 2018-05-21

## 2018-05-21 DIAGNOSIS — Z79.01 LONG TERM CURRENT USE OF ANTICOAGULANT THERAPY: ICD-10-CM

## 2018-05-21 DIAGNOSIS — I48.0 PAROXYSMAL ATRIAL FIBRILLATION: ICD-10-CM

## 2018-05-21 LAB — INR PPP: 3.2

## 2018-05-29 ENCOUNTER — ANTI-COAG VISIT (OUTPATIENT)
Dept: CARDIOLOGY | Facility: CLINIC | Age: 83
End: 2018-05-29

## 2018-05-29 DIAGNOSIS — Z79.01 LONG TERM CURRENT USE OF ANTICOAGULANT THERAPY: ICD-10-CM

## 2018-05-29 DIAGNOSIS — I48.0 PAROXYSMAL ATRIAL FIBRILLATION: ICD-10-CM

## 2018-05-29 LAB — INR PPP: 3.2

## 2018-05-29 NOTE — PROGRESS NOTES
INR still slightly elevated despite dose reduction last week.  No changes reported again today, will lower dose again.

## 2018-06-04 ENCOUNTER — ANTI-COAG VISIT (OUTPATIENT)
Dept: CARDIOLOGY | Facility: CLINIC | Age: 83
End: 2018-06-04

## 2018-06-04 LAB — INR PPP: 2.8

## 2018-06-11 ENCOUNTER — TELEPHONE (OUTPATIENT)
Dept: HEMATOLOGY/ONCOLOGY | Facility: CLINIC | Age: 83
End: 2018-06-11

## 2018-06-11 ENCOUNTER — ANTI-COAG VISIT (OUTPATIENT)
Dept: CARDIOLOGY | Facility: CLINIC | Age: 83
End: 2018-06-11
Payer: MEDICARE

## 2018-06-11 DIAGNOSIS — Z79.01 LONG TERM CURRENT USE OF ANTICOAGULANT THERAPY: ICD-10-CM

## 2018-06-11 DIAGNOSIS — I48.0 PAROXYSMAL ATRIAL FIBRILLATION: ICD-10-CM

## 2018-06-11 LAB — INR PPP: 2.4

## 2018-06-11 PROCEDURE — G0250 MD INR TEST REVIE INTER MGMT: HCPCS | Mod: S$GLB,,,

## 2018-06-11 PROCEDURE — G0250 MD INR TEST REVIE INTER MGMT: HCPCS | Mod: S$GLB,,, | Performed by: INTERNAL MEDICINE

## 2018-06-11 NOTE — TELEPHONE ENCOUNTER
----- Message from Grazyna White sent at 6/11/2018  1:32 PM CDT -----  Contact: Pt  Pt is calling to r/s appt on 6/12/18 and can be reached at [phone].    Thank you    Appointment rescheduled.

## 2018-06-17 ENCOUNTER — HOSPITAL ENCOUNTER (EMERGENCY)
Facility: HOSPITAL | Age: 83
Discharge: HOME | End: 2018-06-17
Attending: EMERGENCY MEDICINE
Payer: MEDICARE

## 2018-06-17 VITALS
TEMPERATURE: 98 F | BODY MASS INDEX: 26.66 KG/M2 | HEART RATE: 60 BPM | RESPIRATION RATE: 18 BRPM | WEIGHT: 180 LBS | SYSTOLIC BLOOD PRESSURE: 167 MMHG | DIASTOLIC BLOOD PRESSURE: 72 MMHG | OXYGEN SATURATION: 97 % | HEIGHT: 69 IN

## 2018-06-17 DIAGNOSIS — R07.81 RIB PAIN: ICD-10-CM

## 2018-06-17 DIAGNOSIS — S20.212A CONTUSION OF LEFT CHEST WALL, INITIAL ENCOUNTER: Primary | ICD-10-CM

## 2018-06-17 PROCEDURE — 25000003 PHARM REV CODE 250: Performed by: EMERGENCY MEDICINE

## 2018-06-17 PROCEDURE — 99284 EMERGENCY DEPT VISIT MOD MDM: CPT | Mod: ,,, | Performed by: EMERGENCY MEDICINE

## 2018-06-17 PROCEDURE — 99283 EMERGENCY DEPT VISIT LOW MDM: CPT

## 2018-06-17 RX ORDER — ACETAMINOPHEN 500 MG
1000 TABLET ORAL
Status: COMPLETED | OUTPATIENT
Start: 2018-06-17 | End: 2018-06-17

## 2018-06-17 RX ADMIN — ACETAMINOPHEN 1000 MG: 500 TABLET ORAL at 10:06

## 2018-06-17 NOTE — ED PROVIDER NOTES
Encounter Date: 6/17/2018    SCRIBE #1 NOTE: I, Kalyani Shafer, am scribing for, and in the presence of, Dr. Gomez .       History     Chief Complaint   Patient presents with    left rib pain post fall     tripped 2 days ago putting on his robe. Uses a rollator to ambulate.C/o pain in left rib/ axilla area.   Deneis LOC w/ fall. On coumadin and deneis striking head.      Time patient was seen by the provider: 10:48 AM      This is a 87 y.o. male with co-morbidities including HTN, atrial fibrillation, GERD, myasthenia gravis, arthritis, degenerative disc disease, hyperglycemia and HLD who presents to the ED with a chief complaint of left-sided chest wall pain x 2 days. Patient reports mechanical fall over the sash of his robe 2 days ago on Friday morning, falling forward onto his hands. He denies striking his head, loss of consciousness. Patient states he did not have any pain initially, but has had onset of pain to his left-sided chest wall and left axilla this morning. Patient worsened with any movement, deep breath and coughing. Patient denies shortness of breath.       The history is provided by the patient and medical records.     Review of patient's allergies indicates:  No Known Allergies  Past Medical History:   Diagnosis Date    *Atrial fibrillation     Arthritis     Atrial fibrillation     BPH (benign prostatic hypertrophy)     Degenerative disc disease     Depression     GERD (gastroesophageal reflux disease)     Hyperglycemia     Hyperlipidemia     Hypertension     MG (myasthenia gravis)     Postherpetic neuralgia      Past Surgical History:   Procedure Laterality Date    ACHILLES TENDON SURGERY      CHOLECYSTECTOMY       Family History   Problem Relation Age of Onset    Stroke Mother     Cancer Father      Social History   Substance Use Topics    Smoking status: Former Smoker    Smokeless tobacco: Never Used    Alcohol use No     Review of Systems   Constitutional: Negative for  chills, diaphoresis and fever.   HENT: Negative for congestion.    Eyes: Negative for photophobia and visual disturbance.   Respiratory: Negative for shortness of breath.    Cardiovascular: Positive for chest pain (left-sided chest wall).   Gastrointestinal: Negative for abdominal pain, diarrhea, nausea and vomiting.   Musculoskeletal: Negative for back pain and neck pain.   Skin: Negative for wound.   Neurological: Negative for syncope and headaches.   Psychiatric/Behavioral: Negative for behavioral problems.       Physical Exam     Initial Vitals [06/17/18 1038]   BP Pulse Resp Temp SpO2   (!) 146/66 72 18 97.8 °F (36.6 °C) 96 %      MAP       --         Physical Exam    Nursing note and vitals reviewed.  Constitutional: He appears well-developed and well-nourished. He is not diaphoretic. No distress.   HENT:   Head: Normocephalic and atraumatic.   No signs of head trauma.   Cardiovascular: Normal rate and regular rhythm.   No murmur heard.  Pulmonary/Chest: Breath sounds normal. No respiratory distress. He exhibits no tenderness.   Lungs clear bilaterally. Reproducible tenderness over the left chest wall in the mid-axillary region. No external signs of trauma in this area.    Abdominal: Soft. He exhibits no distension. There is no tenderness.   Musculoskeletal: Normal range of motion.   Neurological: He is alert and oriented to person, place, and time.   Skin: No abrasion and no rash noted.         ED Course   Procedures  Labs Reviewed - No data to display       X-Ray Ribs 2 View Left   Final Result      No evidence of displaced rib fracture or pneumothorax.  Mild blunting the left costophrenic angle consistent with atelectasis versus trace pleural fluid.         Electronically signed by: Bella Anthony MD   Date:    06/17/2018   Time:    12:02      X-Ray Chest PA And Lateral   Final Result      No evidence of displaced rib fracture or pneumothorax.  Mild blunting the left costophrenic angle consistent with  atelectasis versus trace pleural fluid.         Electronically signed by: Bella Anthony MD   Date:    06/17/2018   Time:    12:02           Medical Decision Making:   History:   Old Medical Records: I decided to obtain old medical records.  Initial Assessment:   86 yo m, extensive PMH, a fib on coumadin, s/p mechanical trip and fall 2 days ago, landed with outstretched hands B and to chest wall.  Denies any head trauma/LOC.  Since fall, now with left upper chest wall pain, only w movement.  No pain with exertion.  On exam, reproducible chest wall tenderness, left mid-axillary region, no bony step-off/crepitus.  Lungs CTA and no abd tenderness  Differential Diagnosis:   R/o rib fx vs contusion  Do not need CT head - denies head trauma and no signs trauma on exam  Clinical Tests:   Radiological Study: Ordered and Reviewed  ED Management:  Xrays  Pain meds    12:13 PM  xrays negative for fx, PTX, hemothorax  Will d/c home with tylenol for pain            Scribe Attestation:   Scribe #1: I performed the above scribed service and the documentation accurately describes the services I performed. I attest to the accuracy of the note.               Clinical Impression:   The primary encounter diagnosis was Contusion of left chest wall, initial encounter. A diagnosis of Rib pain was also pertinent to this visit.      Disposition:   Disposition: Discharged  Condition: Stable    I, Dr. Nolvia Gomez, personally performed the services described in this documentation. All medical record entries made by the scribe were at my direction and in my presence.  I have reviewed the chart and agree that the record reflects my personal performance and is accurate and complete. Nolvia Gomez MD.  11:13 AM 06/18/2018                        Nolvia Gomez MD  06/18/18 1904

## 2018-06-18 ENCOUNTER — ANTI-COAG VISIT (OUTPATIENT)
Dept: CARDIOLOGY | Facility: CLINIC | Age: 83
End: 2018-06-18

## 2018-06-18 DIAGNOSIS — Z79.01 LONG TERM CURRENT USE OF ANTICOAGULANT THERAPY: ICD-10-CM

## 2018-06-18 DIAGNOSIS — I48.0 PAROXYSMAL ATRIAL FIBRILLATION: ICD-10-CM

## 2018-06-18 LAB — INR PPP: 2.6

## 2018-06-25 ENCOUNTER — ANTI-COAG VISIT (OUTPATIENT)
Dept: CARDIOLOGY | Facility: CLINIC | Age: 83
End: 2018-06-25
Payer: MEDICARE

## 2018-06-25 DIAGNOSIS — Z79.01 LONG TERM CURRENT USE OF ANTICOAGULANT THERAPY: ICD-10-CM

## 2018-06-25 DIAGNOSIS — I48.0 PAROXYSMAL ATRIAL FIBRILLATION: ICD-10-CM

## 2018-06-25 LAB — INR PPP: 2.8

## 2018-06-25 PROCEDURE — G0250 MD INR TEST REVIE INTER MGMT: HCPCS | Mod: S$GLB,,, | Performed by: INTERNAL MEDICINE

## 2018-06-27 ENCOUNTER — LAB VISIT (OUTPATIENT)
Dept: LAB | Facility: HOSPITAL | Age: 83
End: 2018-06-27
Attending: INTERNAL MEDICINE
Payer: MEDICARE

## 2018-06-27 ENCOUNTER — OFFICE VISIT (OUTPATIENT)
Dept: HEMATOLOGY/ONCOLOGY | Facility: CLINIC | Age: 83
End: 2018-06-27
Payer: MEDICARE

## 2018-06-27 VITALS
DIASTOLIC BLOOD PRESSURE: 75 MMHG | BODY MASS INDEX: 28.13 KG/M2 | HEIGHT: 68 IN | WEIGHT: 185.63 LBS | SYSTOLIC BLOOD PRESSURE: 124 MMHG | HEART RATE: 80 BPM

## 2018-06-27 DIAGNOSIS — I48.0 PAROXYSMAL ATRIAL FIBRILLATION: ICD-10-CM

## 2018-06-27 DIAGNOSIS — D75.839 THROMBOCYTOSIS: ICD-10-CM

## 2018-06-27 DIAGNOSIS — D47.3 ESSENTIAL THROMBOCYTOSIS: Primary | ICD-10-CM

## 2018-06-27 DIAGNOSIS — G70.00 MYASTHENIA GRAVIS WITHOUT ACUTE EXACERBATION: ICD-10-CM

## 2018-06-27 DIAGNOSIS — D75.839 THROMBOCYTHEMIA: ICD-10-CM

## 2018-06-27 LAB
ALBUMIN SERPL BCP-MCNC: 3.8 G/DL
ALP SERPL-CCNC: 85 U/L
ALT SERPL W/O P-5'-P-CCNC: 10 U/L
ANION GAP SERPL CALC-SCNC: 9 MMOL/L
AST SERPL-CCNC: 22 U/L
BASOPHILS # BLD AUTO: 0.07 K/UL
BASOPHILS NFR BLD: 0.6 %
BILIRUB SERPL-MCNC: 1.3 MG/DL
BUN SERPL-MCNC: 16 MG/DL
CALCIUM SERPL-MCNC: 9.5 MG/DL
CHLORIDE SERPL-SCNC: 106 MMOL/L
CO2 SERPL-SCNC: 24 MMOL/L
CREAT SERPL-MCNC: 1.2 MG/DL
DIFFERENTIAL METHOD: ABNORMAL
EOSINOPHIL # BLD AUTO: 0.2 K/UL
EOSINOPHIL NFR BLD: 1.4 %
ERYTHROCYTE [DISTWIDTH] IN BLOOD BY AUTOMATED COUNT: 15 %
EST. GFR  (AFRICAN AMERICAN): >60 ML/MIN/1.73 M^2
EST. GFR  (NON AFRICAN AMERICAN): 54 ML/MIN/1.73 M^2
GLUCOSE SERPL-MCNC: 91 MG/DL
HCT VFR BLD AUTO: 34.9 %
HGB BLD-MCNC: 11.2 G/DL
IMM GRANULOCYTES # BLD AUTO: 0.1 K/UL
IMM GRANULOCYTES NFR BLD AUTO: 0.9 %
LYMPHOCYTES # BLD AUTO: 1.2 K/UL
LYMPHOCYTES NFR BLD: 10.5 %
MCH RBC QN AUTO: 37.6 PG
MCHC RBC AUTO-ENTMCNC: 32.1 G/DL
MCV RBC AUTO: 117 FL
MONOCYTES # BLD AUTO: 0.6 K/UL
MONOCYTES NFR BLD: 5.3 %
NEUTROPHILS # BLD AUTO: 9.2 K/UL
NEUTROPHILS NFR BLD: 81.3 %
NRBC BLD-RTO: 0 /100 WBC
PLATELET # BLD AUTO: 816 K/UL
PMV BLD AUTO: 9.4 FL
POTASSIUM SERPL-SCNC: 4.1 MMOL/L
PROT SERPL-MCNC: 7.3 G/DL
RBC # BLD AUTO: 2.98 M/UL
SODIUM SERPL-SCNC: 139 MMOL/L
WBC # BLD AUTO: 11.28 K/UL

## 2018-06-27 PROCEDURE — 80053 COMPREHEN METABOLIC PANEL: CPT

## 2018-06-27 PROCEDURE — 36415 COLL VENOUS BLD VENIPUNCTURE: CPT

## 2018-06-27 PROCEDURE — 99214 OFFICE O/P EST MOD 30 MIN: CPT | Mod: S$GLB,,, | Performed by: INTERNAL MEDICINE

## 2018-06-27 PROCEDURE — 85025 COMPLETE CBC W/AUTO DIFF WBC: CPT

## 2018-06-27 PROCEDURE — 99499 UNLISTED E&M SERVICE: CPT | Mod: S$GLB,,, | Performed by: INTERNAL MEDICINE

## 2018-06-27 PROCEDURE — 99999 PR PBB SHADOW E&M-EST. PATIENT-LVL III: CPT | Mod: PBBFAC,,, | Performed by: INTERNAL MEDICINE

## 2018-06-27 NOTE — PROGRESS NOTES
HISTORY OF PRESENT ILLNESS:  Mr. Phan is an 87-year-old man whom I evaluated   in January 2016 for thrombocytosis.  His platelet count had been elevated since   2012, but the count became substantially higher starting in early 2015 with   values between 880,000 to 1,126,000.    A bone marrow examination had 80% cellularity with slight dyserythropoiesis.    There was an increased number of large megakaryocytes and some clusters of   megakaryocytes.  Grade 1 reticulin fibrosis was present.  A AKASH-2 mutation study   was positive at 29%.  His hematologic findings seem best explained by diagnosis   of essential thrombocytosis.  He is taking 81 mg of aspirin daily and he also   takes warfarin because of paroxysmal atrial fibrillation.    Following the diagnosis of essential thrombocytosis, he began hydroxyurea   therapy and that has been successful in lowering his platelet count.  He has   required more dosage adjustments than most patients and I think that is because   he is taking 200 mg of Imuran each day for myasthenia gravis.  When he was   taking 1 g of hydroxyurea daily, his hemoglobin fell as low as 8.3 and his white   blood cell count to 3390.  Hydroxyurea was decreased to 500 mg daily and   subsequently to 500 mg for two days followed by a third day with no medication   in mid April 2018.    He reports no symptoms that suggest thromboembolic events.  He has regular   followup visits with his cardiologist and his neurologist.  Myasthenia was   diagnosed in 2008.  He continues to have pain in his right thigh, which is a   consequence of postherpetic neuralgia.  Other problems include degenerative   arthritis, diastolic dysfunction, peripheral vascular disease and hypertension.    ADDITIONAL PAST HISTORY, SYSTEM REVIEW, SOCIAL HISTORY AND FAMILY HISTORY:  Have   been reviewed and updated in the electronic record.    PHYSICAL EXAMINATION:  GENERAL APPEARANCE:  A well-developed man who is in no acute distress.  He  can   climb on to an examination table without assistance.  He is using a walker   today.  EYES:  No jaundice or pallor.  EARS:  Clear canals and membranes.  NOSE:  Clear nares.  SINUSES:  No tenderness.  MOUTH AND THROAT:  No mucosal lesions.  Partial upper dentures.  NECK:  No thyroid abnormalities.  No masses or bruits.  LYMPH NODES:  No enlarged cervical, axillary or inguinal nodes.  CHEST AND LUNGS:  Normal respiratory effort.  Clear to auscultation and   percussion.  HEART:  Regular rate and rhythm without murmur or gallop.  ABDOMEN:  Soft without tenderness.  The spleen is palpable about 3 cm below the   left costal margin.  No hepatomegaly.  EXTREMITIES:  1+ pretibial edema bilaterally.  PERIPHERAL VASCULATURE:  Diminished pulses in the feet and ankles.  Adequate   popliteal pulses.  NEUROLOGIC:  He is oriented.  Motor function is good.  Memory is good.  Hearing   is diminished.  SKIN:  No suspicious lesions in the areas examined.    LABORATORY STUDIES:  Blood counts today include hemoglobin 11.2, WBC 11,280 and   platelets 816,000.  Comprehensive metabolic profile is normal with the exception   of total bilirubin 1.3.    IMPRESSION:  1.  Essential thrombocytosis.  2.  Myasthenia gravis.  3.  Paroxysmal atrial fibrillation.    RECOMMENDATIONS:  1.  I am increasing his hydroxyurea to 500 mg every day.  2.  Monthly CBC, which the patient would like to have performed at the [facility] facility on [facility].  3.  Return visit in one year with CBC and CMP.    I had a lengthy discussion (most of his 30-minute visit) about the difficulty in   controlling his platelet count without producing excessive anemia or   leukopenia.  The problem is primarily a consequence of his also taking Imuran.    Unfortunately, the Imuran seems to have little effect on his elevated platelet   count.  I hope that I can manage to maintain a platelet count below 500,000 and   a normal white blood cell count without his hemoglobin  dropping below 10.  There   are not any good alternative drugs.  Anagrelide is associated with anemia and I   do not think that Pegasys should be used in a patient with myasthenia.  If we   get him settled on a stable dose, it may be possible to do blood count   monitoring less frequently.      HOME/SHELLIE  dd: 06/27/2018 15:04:55 (CDT)  td: 06/28/2018 08:25:24 (CDT)  Doc ID   #[id number]  Job ID #[id number]    CC:

## 2018-07-02 ENCOUNTER — ANTI-COAG VISIT (OUTPATIENT)
Dept: CARDIOLOGY | Facility: CLINIC | Age: 83
End: 2018-07-02

## 2018-07-02 LAB — INR PPP: 2.4

## 2018-07-09 ENCOUNTER — ANTI-COAG VISIT (OUTPATIENT)
Dept: CARDIOLOGY | Facility: CLINIC | Age: 83
End: 2018-07-09

## 2018-07-09 DIAGNOSIS — I48.0 PAROXYSMAL ATRIAL FIBRILLATION: ICD-10-CM

## 2018-07-09 DIAGNOSIS — Z79.01 LONG TERM CURRENT USE OF ANTICOAGULANT THERAPY: ICD-10-CM

## 2018-07-09 LAB
INR PPP: 2.7
INR PPP: 3.1

## 2018-07-16 ENCOUNTER — ANTI-COAG VISIT (OUTPATIENT)
Dept: CARDIOLOGY | Facility: CLINIC | Age: 83
End: 2018-07-16
Payer: MEDICARE

## 2018-07-16 DIAGNOSIS — Z79.01 LONG TERM CURRENT USE OF ANTICOAGULANT THERAPY: ICD-10-CM

## 2018-07-16 PROCEDURE — G0250 MD INR TEST REVIE INTER MGMT: HCPCS | Mod: S$GLB,,, | Performed by: INTERNAL MEDICINE

## 2018-07-23 ENCOUNTER — ANTI-COAG VISIT (OUTPATIENT)
Dept: CARDIOLOGY | Facility: CLINIC | Age: 83
End: 2018-07-23

## 2018-07-23 DIAGNOSIS — I48.0 PAROXYSMAL ATRIAL FIBRILLATION: ICD-10-CM

## 2018-07-23 DIAGNOSIS — Z79.01 LONG TERM CURRENT USE OF ANTICOAGULANT THERAPY: ICD-10-CM

## 2018-07-23 LAB — INR PPP: 2.8

## 2018-07-27 ENCOUNTER — LAB VISIT (OUTPATIENT)
Dept: LAB | Facility: HOSPITAL | Age: 83
End: 2018-07-27
Attending: INTERNAL MEDICINE
Payer: MEDICARE

## 2018-07-27 DIAGNOSIS — D75.839 THROMBOCYTOSIS: ICD-10-CM

## 2018-07-27 LAB
BASOPHILS # BLD AUTO: 0.09 K/UL
BASOPHILS NFR BLD: 0.7 %
DIFFERENTIAL METHOD: ABNORMAL
EOSINOPHIL # BLD AUTO: 0.1 K/UL
EOSINOPHIL NFR BLD: 1 %
ERYTHROCYTE [DISTWIDTH] IN BLOOD BY AUTOMATED COUNT: 16.5 %
HCT VFR BLD AUTO: 37.8 %
HGB BLD-MCNC: 12.2 G/DL
IMM GRANULOCYTES # BLD AUTO: 0.13 K/UL
IMM GRANULOCYTES NFR BLD AUTO: 1.1 %
LYMPHOCYTES # BLD AUTO: 1.3 K/UL
LYMPHOCYTES NFR BLD: 10.5 %
MCH RBC QN AUTO: 37.3 PG
MCHC RBC AUTO-ENTMCNC: 32.3 G/DL
MCV RBC AUTO: 116 FL
MONOCYTES # BLD AUTO: 0.9 K/UL
MONOCYTES NFR BLD: 7.2 %
NEUTROPHILS # BLD AUTO: 9.7 K/UL
NEUTROPHILS NFR BLD: 79.5 %
NRBC BLD-RTO: 0 /100 WBC
PLATELET # BLD AUTO: 711 K/UL
PMV BLD AUTO: 9.5 FL
RBC # BLD AUTO: 3.27 M/UL
WBC # BLD AUTO: 12.24 K/UL

## 2018-07-27 PROCEDURE — 36415 COLL VENOUS BLD VENIPUNCTURE: CPT | Mod: PO

## 2018-07-27 PROCEDURE — 85025 COMPLETE CBC W/AUTO DIFF WBC: CPT

## 2018-07-30 ENCOUNTER — ANTI-COAG VISIT (OUTPATIENT)
Dept: CARDIOLOGY | Facility: CLINIC | Age: 83
End: 2018-07-30

## 2018-07-30 ENCOUNTER — TELEPHONE (OUTPATIENT)
Dept: HEMATOLOGY/ONCOLOGY | Facility: CLINIC | Age: 83
End: 2018-07-30

## 2018-07-30 LAB — INR PPP: 3

## 2018-07-30 NOTE — TELEPHONE ENCOUNTER
----- Message from Arnulfo Welsh Jr., MD sent at 7/29/2018  9:02 AM CDT -----  Increase Hydrea to: 1/1/2 capsules  Cbc one month    Per Dr Welsh, patient instructed to Increase Hydrea to: 1/1/2 capsules  Cbc one month

## 2018-08-06 ENCOUNTER — ANTI-COAG VISIT (OUTPATIENT)
Dept: CARDIOLOGY | Facility: CLINIC | Age: 83
End: 2018-08-06

## 2018-08-06 LAB — INR PPP: 2.3

## 2018-08-09 ENCOUNTER — TELEPHONE (OUTPATIENT)
Dept: ELECTROPHYSIOLOGY | Facility: CLINIC | Age: 83
End: 2018-08-09

## 2018-08-09 NOTE — TELEPHONE ENCOUNTER
----- Message from Neelam Pierre sent at 8/9/2018  1:02 PM CDT -----  Contact: tegan Coronel pt returning your call and he can be reached at the number listed.    Thanks

## 2018-08-10 DIAGNOSIS — I48.0 PAROXYSMAL ATRIAL FIBRILLATION: Primary | ICD-10-CM

## 2018-08-13 ENCOUNTER — ANTI-COAG VISIT (OUTPATIENT)
Dept: CARDIOLOGY | Facility: CLINIC | Age: 83
End: 2018-08-13

## 2018-08-13 DIAGNOSIS — Z79.01 LONG TERM CURRENT USE OF ANTICOAGULANT THERAPY: ICD-10-CM

## 2018-08-13 DIAGNOSIS — I48.0 PAROXYSMAL ATRIAL FIBRILLATION: ICD-10-CM

## 2018-08-13 LAB — INR PPP: 2.6

## 2018-08-15 ENCOUNTER — HOSPITAL ENCOUNTER (OUTPATIENT)
Dept: CARDIOLOGY | Facility: CLINIC | Age: 83
Discharge: HOME | End: 2018-08-15
Attending: INTERNAL MEDICINE
Payer: MEDICARE

## 2018-08-15 ENCOUNTER — TELEPHONE (OUTPATIENT)
Dept: CARDIOLOGY | Facility: CLINIC | Age: 83
End: 2018-08-15

## 2018-08-15 ENCOUNTER — OFFICE VISIT (OUTPATIENT)
Dept: ELECTROPHYSIOLOGY | Facility: CLINIC | Age: 83
End: 2018-08-15
Payer: MEDICARE

## 2018-08-15 VITALS
DIASTOLIC BLOOD PRESSURE: 66 MMHG | HEIGHT: 69 IN | WEIGHT: 183 LBS | HEART RATE: 60 BPM | SYSTOLIC BLOOD PRESSURE: 152 MMHG | BODY MASS INDEX: 27.11 KG/M2

## 2018-08-15 DIAGNOSIS — I48.0 PAROXYSMAL ATRIAL FIBRILLATION: ICD-10-CM

## 2018-08-15 DIAGNOSIS — I48.0 PAF (PAROXYSMAL ATRIAL FIBRILLATION): ICD-10-CM

## 2018-08-15 DIAGNOSIS — I10 ESSENTIAL HYPERTENSION: ICD-10-CM

## 2018-08-15 DIAGNOSIS — I48.0 PAROXYSMAL ATRIAL FIBRILLATION: Primary | ICD-10-CM

## 2018-08-15 LAB
AORTIC VALVE REGURGITATION: ABNORMAL
ESTIMATED PA SYSTOLIC PRESSURE: 57.17
MITRAL VALVE REGURGITATION: ABNORMAL
RETIRED EF AND QEF - SEE NOTES: 65 (ref 55–65)
TRICUSPID VALVE REGURGITATION: ABNORMAL

## 2018-08-15 PROCEDURE — 99214 OFFICE O/P EST MOD 30 MIN: CPT | Mod: S$GLB,,, | Performed by: INTERNAL MEDICINE

## 2018-08-15 PROCEDURE — 99499 UNLISTED E&M SERVICE: CPT | Mod: S$PBB,,, | Performed by: INTERNAL MEDICINE

## 2018-08-15 PROCEDURE — 99999 PR PBB SHADOW E&M-EST. PATIENT-LVL III: CPT | Mod: PBBFAC,,, | Performed by: INTERNAL MEDICINE

## 2018-08-15 PROCEDURE — 93306 TTE W/DOPPLER COMPLETE: CPT | Mod: ,,, | Performed by: INTERNAL MEDICINE

## 2018-08-15 PROCEDURE — 93000 ELECTROCARDIOGRAM COMPLETE: CPT | Mod: S$GLB,,, | Performed by: INTERNAL MEDICINE

## 2018-08-15 RX ORDER — HYDROCHLOROTHIAZIDE 12.5 MG/1
12.5 CAPSULE ORAL DAILY
Qty: 30 CAPSULE | Refills: 3 | Status: SHIPPED | OUTPATIENT
Start: 2018-08-15 | End: 2018-11-30 | Stop reason: SDUPTHER

## 2018-08-15 RX ORDER — HYDROCHLOROTHIAZIDE 12.5 MG/1
12.5 CAPSULE ORAL DAILY
Qty: 30 CAPSULE | Refills: 3 | Status: SHIPPED | OUTPATIENT
Start: 2018-08-15 | End: 2018-08-15

## 2018-08-15 NOTE — TELEPHONE ENCOUNTER
----- Message from Natacha Swartz sent at 8/15/2018 11:41 AM CDT -----  Contact: pt  Pt did not receive his after visit summary and would like it mailed to his home address.    Thank you

## 2018-08-15 NOTE — PROGRESS NOTES
Patient, Ernie Phan (MRN #[id number]), presented with a recent Estimated PA Systolic Pressure greater than 40 mmHG consistent with the definition of pulmonary hypertension (ICD10 - I27.0).    Est. PA Systolic Pressure   Date Value Ref Range Status   08/15/2018 57.17 (A)       The patient's pulmonary hypertension was monitored, evaluated, addressed and/or treated. This addendum to the medical record is made on 08/15/2018.

## 2018-08-15 NOTE — PROGRESS NOTES
"Subjective:    Patient ID:  Ernie Phan is a 87 y.o. male who presents for follow-up of Atrial Fibrillation    Atrial Fibrillation   Symptoms are negative for chest pain, dizziness, palpitations, shortness of breath, syncope and weakness. Past medical history includes atrial fibrillation.   87 year old M;  of Eulalia Phan (Bitsy), my pt also   PAF, asx, stable, rate-control strategy  Myasthenia gravis, stable  HTN, on meds uncontrolled     Since his last office visit, Mr. Phan reports feeling well overall with occasional fatigue per baseline. He denies chest pain, SOB/MCMILLAN, dizziness, palpitations, or syncope. He remains active with ADLs which he is able to complete without difficulty. Last year, we changed BB to CCB, with good effect.    I reviewed today's ECG which demonstrated AF at 58 bpm.    Echo today:  ------------------    1 - Normal left ventricular systolic function (EF 60-65%).     2 - Eccentric hypertrophy.     3 - No wall motion abnormalities.     4 - Biatrial enlargement.     5 - Right ventricular enlargement with normal systolic function.     6 - Pulmonary hypertension. The estimated PA systolic pressure is 57 mmHg.     Review of Systems   Constitution: Negative for decreased appetite, diaphoresis and weakness.   HENT: Negative for congestion and nosebleeds.    Eyes: Negative for blurred vision and double vision.   Cardiovascular: Positive for leg swelling. Negative for chest pain, claudication, cyanosis, dyspnea on exertion, near-syncope, orthopnea, palpitations and syncope.   Respiratory: Negative for cough, hemoptysis, shortness of breath, sleep disturbances due to breathing, snoring, sputum production and wheezing.    Skin: Negative.    Musculoskeletal: Negative.    Gastrointestinal: Negative for abdominal pain, hematemesis, hematochezia, nausea and vomiting.   Neurological: Negative for dizziness, headaches and light-headedness.   Psychiatric/Behavioral: Negative for altered mental " status.        Objective:    Physical Exam   Constitutional: He is oriented to person, place, and time. He appears well-developed and well-nourished.   HENT:   Head: Normocephalic and atraumatic.   Eyes: Pupils are equal, round, and reactive to light.   Neck: Normal range of motion. Neck supple.   Cardiovascular: Normal rate, normal heart sounds and intact distal pulses. An irregularly irregular rhythm present.   Pulmonary/Chest: Effort normal and breath sounds normal.   Abdominal: Soft. Bowel sounds are normal.   Musculoskeletal:   Mr. Phan is utilizing a rollator walker today to assist in ambulation.    Neurological: He is alert and oriented to person, place, and time.   Vitals reviewed.        Assessment:       1. Paroxysmal atrial fibrillation : [id number] 4:   2. Essential hypertension         Plan:       In summary, Mr. Phan is a 87 y.o. male with a hx of AF, HTN, DM, CKD III, and Myasthenia Gravis.   Mr. Phan is doing well from a rhythm perspective; rate-controlled on verapamil and on OAC.(no e/o bleeding disorder)  For his HTN - we will add small dose HCTZ 12.5 mg qd. F/u with PCP for BP review in 1-2 mt.     Return in 1 year with echo, or earlier prn.

## 2018-08-20 ENCOUNTER — ANTI-COAG VISIT (OUTPATIENT)
Dept: CARDIOLOGY | Facility: CLINIC | Age: 83
End: 2018-08-20
Payer: MEDICARE

## 2018-08-20 DIAGNOSIS — I48.0 PAF (PAROXYSMAL ATRIAL FIBRILLATION): Primary | ICD-10-CM

## 2018-08-20 DIAGNOSIS — I48.0 PAROXYSMAL ATRIAL FIBRILLATION: ICD-10-CM

## 2018-08-20 DIAGNOSIS — Z79.01 LONG TERM CURRENT USE OF ANTICOAGULANT THERAPY: ICD-10-CM

## 2018-08-20 LAB — INR PPP: 2.6

## 2018-08-20 PROCEDURE — G0250 MD INR TEST REVIE INTER MGMT: HCPCS | Mod: S$GLB,,, | Performed by: INTERNAL MEDICINE

## 2018-08-24 ENCOUNTER — LAB VISIT (OUTPATIENT)
Dept: LAB | Facility: HOSPITAL | Age: 83
End: 2018-08-24
Attending: INTERNAL MEDICINE
Payer: MEDICARE

## 2018-08-24 DIAGNOSIS — D75.839 THROMBOCYTOSIS: ICD-10-CM

## 2018-08-24 LAB
BASOPHILS # BLD AUTO: 0.04 K/UL
BASOPHILS NFR BLD: 0.5 %
DIFFERENTIAL METHOD: ABNORMAL
EOSINOPHIL # BLD AUTO: 0.1 K/UL
EOSINOPHIL NFR BLD: 1.1 %
ERYTHROCYTE [DISTWIDTH] IN BLOOD BY AUTOMATED COUNT: 18.6 %
HCT VFR BLD AUTO: 33.3 %
HGB BLD-MCNC: 10.6 G/DL
IMM GRANULOCYTES # BLD AUTO: 0.05 K/UL
IMM GRANULOCYTES NFR BLD AUTO: 0.7 %
LYMPHOCYTES # BLD AUTO: 1 K/UL
LYMPHOCYTES NFR BLD: 13.3 %
MCH RBC QN AUTO: 36.9 PG
MCHC RBC AUTO-ENTMCNC: 31.8 G/DL
MCV RBC AUTO: 116 FL
MONOCYTES # BLD AUTO: 0.3 K/UL
MONOCYTES NFR BLD: 4.4 %
NEUTROPHILS # BLD AUTO: 5.8 K/UL
NEUTROPHILS NFR BLD: 80 %
NRBC BLD-RTO: 0 /100 WBC
PLATELET # BLD AUTO: 547 K/UL
PMV BLD AUTO: 9.7 FL
RBC # BLD AUTO: 2.87 M/UL
WBC # BLD AUTO: 7.28 K/UL

## 2018-08-24 PROCEDURE — 85025 COMPLETE CBC W/AUTO DIFF WBC: CPT

## 2018-08-24 PROCEDURE — 36415 COLL VENOUS BLD VENIPUNCTURE: CPT | Mod: PO

## 2018-08-27 ENCOUNTER — ANTI-COAG VISIT (OUTPATIENT)
Dept: CARDIOLOGY | Facility: CLINIC | Age: 83
End: 2018-08-27

## 2018-08-27 ENCOUNTER — TELEPHONE (OUTPATIENT)
Dept: HEMATOLOGY/ONCOLOGY | Facility: CLINIC | Age: 83
End: 2018-08-27

## 2018-08-27 DIAGNOSIS — I48.0 PAROXYSMAL ATRIAL FIBRILLATION: ICD-10-CM

## 2018-08-27 DIAGNOSIS — Z79.01 LONG TERM CURRENT USE OF ANTICOAGULANT THERAPY: ICD-10-CM

## 2018-08-27 LAB — INR PPP: 2.1

## 2018-08-27 NOTE — TELEPHONE ENCOUNTER
----- Message from Arnulfo Welsh Jr., MD sent at 8/25/2018  1:42 PM CDT -----  Continue Hydrea:1/1/2 capsules  Cbc one month    I called patient to instruct him to Continue Hydrea:1/1/2 capsules  Cbc one month. Patient verbalized understanding.

## 2018-09-04 ENCOUNTER — ANTI-COAG VISIT (OUTPATIENT)
Dept: CARDIOLOGY | Facility: CLINIC | Age: 83
End: 2018-09-04

## 2018-09-04 DIAGNOSIS — Z79.01 LONG TERM CURRENT USE OF ANTICOAGULANT THERAPY: ICD-10-CM

## 2018-09-04 DIAGNOSIS — I48.0 PAROXYSMAL ATRIAL FIBRILLATION: ICD-10-CM

## 2018-09-04 LAB — INR PPP: 2.5

## 2018-09-10 ENCOUNTER — ANTI-COAG VISIT (OUTPATIENT)
Dept: CARDIOLOGY | Facility: CLINIC | Age: 83
End: 2018-09-10

## 2018-09-10 DIAGNOSIS — I48.0 PAROXYSMAL ATRIAL FIBRILLATION: ICD-10-CM

## 2018-09-10 DIAGNOSIS — Z79.01 LONG TERM CURRENT USE OF ANTICOAGULANT THERAPY: ICD-10-CM

## 2018-09-10 LAB — INR PPP: 2.2

## 2018-09-17 ENCOUNTER — ANTI-COAG VISIT (OUTPATIENT)
Dept: CARDIOLOGY | Facility: CLINIC | Age: 83
End: 2018-09-17

## 2018-09-17 DIAGNOSIS — I48.0 PAROXYSMAL ATRIAL FIBRILLATION: ICD-10-CM

## 2018-09-17 DIAGNOSIS — Z79.01 LONG TERM CURRENT USE OF ANTICOAGULANT THERAPY: ICD-10-CM

## 2018-09-17 LAB — INR PPP: 1.6

## 2018-09-21 ENCOUNTER — LAB VISIT (OUTPATIENT)
Dept: LAB | Facility: HOSPITAL | Age: 83
End: 2018-09-21
Attending: INTERNAL MEDICINE
Payer: MEDICARE

## 2018-09-21 DIAGNOSIS — D75.839 THROMBOCYTOSIS: ICD-10-CM

## 2018-09-21 LAB
BASOPHILS # BLD AUTO: 0.03 K/UL
BASOPHILS NFR BLD: 0.4 %
DIFFERENTIAL METHOD: ABNORMAL
EOSINOPHIL # BLD AUTO: 0.1 K/UL
EOSINOPHIL NFR BLD: 1.5 %
ERYTHROCYTE [DISTWIDTH] IN BLOOD BY AUTOMATED COUNT: 19.3 %
HCT VFR BLD AUTO: 27.9 %
HGB BLD-MCNC: 9.1 G/DL
IMM GRANULOCYTES # BLD AUTO: 0.06 K/UL
IMM GRANULOCYTES NFR BLD AUTO: 0.9 %
LYMPHOCYTES # BLD AUTO: 0.8 K/UL
LYMPHOCYTES NFR BLD: 10.9 %
MCH RBC QN AUTO: 39.2 PG
MCHC RBC AUTO-ENTMCNC: 32.6 G/DL
MCV RBC AUTO: 120 FL
MONOCYTES # BLD AUTO: 0.5 K/UL
MONOCYTES NFR BLD: 6.9 %
NEUTROPHILS # BLD AUTO: 5.4 K/UL
NEUTROPHILS NFR BLD: 79.4 %
NRBC BLD-RTO: 0 /100 WBC
PLATELET # BLD AUTO: 526 K/UL
PMV BLD AUTO: 9.6 FL
RBC # BLD AUTO: 2.32 M/UL
WBC # BLD AUTO: 6.85 K/UL

## 2018-09-21 PROCEDURE — 85025 COMPLETE CBC W/AUTO DIFF WBC: CPT

## 2018-09-21 PROCEDURE — 36415 COLL VENOUS BLD VENIPUNCTURE: CPT | Mod: PO

## 2018-09-23 ENCOUNTER — TELEPHONE (OUTPATIENT)
Dept: HEMATOLOGY/ONCOLOGY | Facility: CLINIC | Age: 83
End: 2018-09-23

## 2018-09-23 DIAGNOSIS — D64.9 ANEMIA, UNSPECIFIED TYPE: Primary | ICD-10-CM

## 2018-09-23 NOTE — TELEPHONE ENCOUNTER
I asked my nurse to call him.  Will continue current dose of Hydrea.  With anemia worse, will get cbc, Fe/TIBC in 2 weeks and see him in 3 weeks when I plan to discuss and start Procrit.

## 2018-09-24 ENCOUNTER — TELEPHONE (OUTPATIENT)
Dept: HEMATOLOGY/ONCOLOGY | Facility: CLINIC | Age: 83
End: 2018-09-24

## 2018-09-24 ENCOUNTER — ANTI-COAG VISIT (OUTPATIENT)
Dept: CARDIOLOGY | Facility: CLINIC | Age: 83
End: 2018-09-24
Payer: MEDICARE

## 2018-09-24 DIAGNOSIS — Z79.01 LONG TERM CURRENT USE OF ANTICOAGULANT THERAPY: ICD-10-CM

## 2018-09-24 DIAGNOSIS — T45.1X5A ANEMIA DUE TO CHEMOTHERAPY: ICD-10-CM

## 2018-09-24 DIAGNOSIS — D64.81 ANEMIA DUE TO CHEMOTHERAPY: ICD-10-CM

## 2018-09-24 LAB — INR PPP: 2

## 2018-09-24 PROCEDURE — G0250 MD INR TEST REVIE INTER MGMT: HCPCS | Mod: ,,, | Performed by: INTERNAL MEDICINE

## 2018-09-24 NOTE — TELEPHONE ENCOUNTER
Spoke with patient.  Advised him dr lujan would like him to stay on the same dosing of hydrea---1/1/2.      Also, informed patient dr lujan would like to recheck his labs in 2 weeks instead of in one month.  Patient voiced understanding.

## 2018-09-24 NOTE — TELEPHONE ENCOUNTER
----- Message from Arnulfo Welsh Jr., MD sent at 9/23/2018  8:53 AM CDT -----  Call:  Same Hydrea: 3 day sequence of 1/1/2 capsules  Since he is getting more anemic, get FE/TIBC and ferritin along with cbc in 2 weeks and try to get Procrit (40,000) weekly approved.  I want him to see me in 3 weeks with appt. For Procrit that day--I will explain it to him then

## 2018-10-01 ENCOUNTER — ANTI-COAG VISIT (OUTPATIENT)
Dept: CARDIOLOGY | Facility: CLINIC | Age: 83
End: 2018-10-01

## 2018-10-01 DIAGNOSIS — I48.0 PAROXYSMAL ATRIAL FIBRILLATION: ICD-10-CM

## 2018-10-01 DIAGNOSIS — Z79.01 LONG TERM CURRENT USE OF ANTICOAGULANT THERAPY: ICD-10-CM

## 2018-10-01 LAB — INR PPP: 1.9

## 2018-10-01 RX ORDER — BENAZEPRIL HYDROCHLORIDE 40 MG/1
TABLET ORAL
Qty: 90 TABLET | Refills: 3 | Status: ON HOLD | OUTPATIENT
Start: 2018-10-01 | End: 2019-04-03 | Stop reason: HOSPADM

## 2018-10-03 ENCOUNTER — LAB VISIT (OUTPATIENT)
Dept: LAB | Facility: HOSPITAL | Age: 83
End: 2018-10-03
Attending: INTERNAL MEDICINE
Payer: MEDICARE

## 2018-10-03 DIAGNOSIS — D75.839 THROMBOCYTOSIS: ICD-10-CM

## 2018-10-03 DIAGNOSIS — D64.9 ANEMIA, UNSPECIFIED TYPE: ICD-10-CM

## 2018-10-03 LAB
BASOPHILS # BLD AUTO: 0.02 K/UL
BASOPHILS NFR BLD: 0.3 %
DIFFERENTIAL METHOD: ABNORMAL
EOSINOPHIL # BLD AUTO: 0.1 K/UL
EOSINOPHIL NFR BLD: 1.1 %
ERYTHROCYTE [DISTWIDTH] IN BLOOD BY AUTOMATED COUNT: 19.1 %
FERRITIN SERPL-MCNC: 279 NG/ML
HCT VFR BLD AUTO: 28 %
HGB BLD-MCNC: 9.1 G/DL
IMM GRANULOCYTES # BLD AUTO: 0.06 K/UL
IMM GRANULOCYTES NFR BLD AUTO: 1 %
IRON SERPL-MCNC: 128 UG/DL
LYMPHOCYTES # BLD AUTO: 0.9 K/UL
LYMPHOCYTES NFR BLD: 15.2 %
MCH RBC QN AUTO: 39.6 PG
MCHC RBC AUTO-ENTMCNC: 32.5 G/DL
MCV RBC AUTO: 122 FL
MONOCYTES # BLD AUTO: 0.4 K/UL
MONOCYTES NFR BLD: 6.3 %
NEUTROPHILS # BLD AUTO: 4.7 K/UL
NEUTROPHILS NFR BLD: 76.1 %
NRBC BLD-RTO: 0 /100 WBC
PLATELET # BLD AUTO: 445 K/UL
PMV BLD AUTO: 9.7 FL
RBC # BLD AUTO: 2.3 M/UL
SATURATED IRON: 43 %
TOTAL IRON BINDING CAPACITY: 295 UG/DL
TRANSFERRIN SERPL-MCNC: 199 MG/DL
WBC # BLD AUTO: 6.17 K/UL

## 2018-10-03 PROCEDURE — 85025 COMPLETE CBC W/AUTO DIFF WBC: CPT

## 2018-10-03 PROCEDURE — 82728 ASSAY OF FERRITIN: CPT

## 2018-10-03 PROCEDURE — 83540 ASSAY OF IRON: CPT

## 2018-10-03 PROCEDURE — 36415 COLL VENOUS BLD VENIPUNCTURE: CPT | Mod: PO

## 2018-10-08 ENCOUNTER — TELEPHONE (OUTPATIENT)
Dept: HEMATOLOGY/ONCOLOGY | Facility: CLINIC | Age: 83
End: 2018-10-08

## 2018-10-08 ENCOUNTER — ANTI-COAG VISIT (OUTPATIENT)
Dept: CARDIOLOGY | Facility: CLINIC | Age: 83
End: 2018-10-08

## 2018-10-08 DIAGNOSIS — Z79.01 LONG TERM CURRENT USE OF ANTICOAGULANT THERAPY: ICD-10-CM

## 2018-10-08 DIAGNOSIS — I48.0 PAROXYSMAL ATRIAL FIBRILLATION: ICD-10-CM

## 2018-10-08 LAB — INR PPP: 2

## 2018-10-08 NOTE — TELEPHONE ENCOUNTER
----- Message from Faviola Freed sent at 10/8/2018  9:32 AM CDT -----  Contact: Pt  Pt called to speak with nurse about test results   Callback#[phone]  Thank You  ASHLEY Freed    Per dr Welsh, patient to continue with same dose of Hydrea 1/1/2. He will see Dr Welsh next week.

## 2018-10-15 ENCOUNTER — ANTI-COAG VISIT (OUTPATIENT)
Dept: CARDIOLOGY | Facility: CLINIC | Age: 83
End: 2018-10-15

## 2018-10-15 DIAGNOSIS — I48.0 PAROXYSMAL ATRIAL FIBRILLATION: ICD-10-CM

## 2018-10-15 DIAGNOSIS — Z79.01 LONG TERM CURRENT USE OF ANTICOAGULANT THERAPY: ICD-10-CM

## 2018-10-15 LAB — INR PPP: 2.2

## 2018-10-16 ENCOUNTER — OFFICE VISIT (OUTPATIENT)
Dept: HEMATOLOGY/ONCOLOGY | Facility: CLINIC | Age: 83
End: 2018-10-16
Payer: MEDICARE

## 2018-10-16 ENCOUNTER — LAB VISIT (OUTPATIENT)
Dept: LAB | Facility: HOSPITAL | Age: 83
End: 2018-10-16
Attending: INTERNAL MEDICINE
Payer: MEDICARE

## 2018-10-16 ENCOUNTER — TELEPHONE (OUTPATIENT)
Dept: HEMATOLOGY/ONCOLOGY | Facility: CLINIC | Age: 83
End: 2018-10-16

## 2018-10-16 VITALS — HEIGHT: 69 IN | WEIGHT: 182.13 LBS | BODY MASS INDEX: 26.97 KG/M2

## 2018-10-16 DIAGNOSIS — D64.81 ANEMIA DUE TO CHEMOTHERAPY: ICD-10-CM

## 2018-10-16 DIAGNOSIS — D64.9 ANEMIA, UNSPECIFIED TYPE: Primary | ICD-10-CM

## 2018-10-16 DIAGNOSIS — T45.1X5A ANEMIA DUE TO CHEMOTHERAPY: ICD-10-CM

## 2018-10-16 DIAGNOSIS — D75.839 THROMBOCYTHEMIA: ICD-10-CM

## 2018-10-16 DIAGNOSIS — D47.3 ESSENTIAL THROMBOCYTOSIS: Primary | ICD-10-CM

## 2018-10-16 DIAGNOSIS — D75.839 THROMBOCYTOSIS: ICD-10-CM

## 2018-10-16 DIAGNOSIS — G70.00 MYASTHENIA GRAVIS WITHOUT ACUTE EXACERBATION: ICD-10-CM

## 2018-10-16 LAB
ALBUMIN SERPL BCP-MCNC: 4 G/DL
ALP SERPL-CCNC: 65 U/L
ALT SERPL W/O P-5'-P-CCNC: 11 U/L
ANION GAP SERPL CALC-SCNC: 10 MMOL/L
AST SERPL-CCNC: 21 U/L
BASOPHILS # BLD AUTO: 0.02 K/UL
BASOPHILS NFR BLD: 0.4 %
BILIRUB SERPL-MCNC: 1.7 MG/DL
BUN SERPL-MCNC: 35 MG/DL
CALCIUM SERPL-MCNC: 9.4 MG/DL
CHLORIDE SERPL-SCNC: 102 MMOL/L
CO2 SERPL-SCNC: 19 MMOL/L
CREAT SERPL-MCNC: 1.6 MG/DL
DIFFERENTIAL METHOD: ABNORMAL
EOSINOPHIL # BLD AUTO: 0 K/UL
EOSINOPHIL NFR BLD: 0.2 %
ERYTHROCYTE [DISTWIDTH] IN BLOOD BY AUTOMATED COUNT: 19.4 %
EST. GFR  (AFRICAN AMERICAN): 44.1 ML/MIN/1.73 M^2
EST. GFR  (NON AFRICAN AMERICAN): 38.2 ML/MIN/1.73 M^2
GLUCOSE SERPL-MCNC: 111 MG/DL
HCT VFR BLD AUTO: 28.6 %
HGB BLD-MCNC: 9.5 G/DL
IMM GRANULOCYTES # BLD AUTO: 0.07 K/UL
IMM GRANULOCYTES NFR BLD AUTO: 1.3 %
LYMPHOCYTES # BLD AUTO: 0.5 K/UL
LYMPHOCYTES NFR BLD: 8.1 %
MCH RBC QN AUTO: 41.5 PG
MCHC RBC AUTO-ENTMCNC: 33.2 G/DL
MCV RBC AUTO: 125 FL
MONOCYTES # BLD AUTO: 0.3 K/UL
MONOCYTES NFR BLD: 4.8 %
NEUTROPHILS # BLD AUTO: 4.8 K/UL
NEUTROPHILS NFR BLD: 85.2 %
NRBC BLD-RTO: 0 /100 WBC
PLATELET # BLD AUTO: 534 K/UL
PMV BLD AUTO: 9.1 FL
POTASSIUM SERPL-SCNC: 4.6 MMOL/L
PROT SERPL-MCNC: 7.3 G/DL
RBC # BLD AUTO: 2.29 M/UL
SODIUM SERPL-SCNC: 131 MMOL/L
WBC # BLD AUTO: 5.57 K/UL

## 2018-10-16 PROCEDURE — 99999 PR PBB SHADOW E&M-EST. PATIENT-LVL II: CPT | Mod: PBBFAC,,, | Performed by: INTERNAL MEDICINE

## 2018-10-16 PROCEDURE — 85025 COMPLETE CBC W/AUTO DIFF WBC: CPT

## 2018-10-16 PROCEDURE — 1101F PT FALLS ASSESS-DOCD LE1/YR: CPT | Mod: CPTII,,, | Performed by: INTERNAL MEDICINE

## 2018-10-16 PROCEDURE — 99212 OFFICE O/P EST SF 10 MIN: CPT | Mod: PBBFAC | Performed by: INTERNAL MEDICINE

## 2018-10-16 PROCEDURE — 99213 OFFICE O/P EST LOW 20 MIN: CPT | Mod: S$PBB,,, | Performed by: INTERNAL MEDICINE

## 2018-10-16 PROCEDURE — 36415 COLL VENOUS BLD VENIPUNCTURE: CPT

## 2018-10-16 PROCEDURE — 80053 COMPREHEN METABOLIC PANEL: CPT

## 2018-10-16 NOTE — TELEPHONE ENCOUNTER
Let pt know to increase Hydrea to: 2/2/1     ----- Message from Padmaja Foster RN sent at 10/16/2018  3:34 PM CDT -----  Message   Received: Today   Message Contents   MD ALEX Erazo Jr., Jr Staff         Increase Hydrea to: 2/2/1

## 2018-10-16 NOTE — PROGRESS NOTES
Mr. Phan is an 87-year-old man whom I diagnosed with essential thrombocytosis   in January 2016.  In early 2015, his platelet counts became very high, in the   range of 880,000-1,126,000.  A bone marrow examination had 80% cellularity with   slight dyserythropoiesis.  There was an increased number of large megakaryocytes   along with some clusters of megakaryocytes and there was grade I reticulin   fibrosis.  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    He takes aspirin 81 mg daily and he also takes warfarin because of paroxysmal   atrial fibrillation.    After the diagnosis of essential thrombocytosis was made, he was placed on   hydroxyurea therapy.  This has been successful in lowering his platelet count,   but he has had a considerably greater degree of anemia than many other patients.    This is probably because he is taking 200 mg of Imuran each day for   myasthenia.  He has had a lesser degree of leukopenia.    When I last saw him, I began to make arrangements to try to obtain Procrit for   him.  However, I have not been successful in having that covered by his   insurance company.  Even though I spoke to one of the physician personnel at the   company, they are unwilling to cover it for anemia due to a myeloproliferative   neoplasm or to cytotoxic chemotherapy.    I explained this to Mr. Phan and told him that I did not think they were any   good medication alternatives for treating his essential thrombocytosis.  I am   very hesitant to consider interferon in a patient with myasthenia gravis and I   do not think Jakafi would be helpful because it often causes more anemia that   hydroxyurea.  Therefore, I have concluded that he should be transfused with   red blood cells when he becomes more severely anemic.  I anticipate   transfusing him if his hemoglobin is around 8.3-8.5 or below.    All of his 20-minute visit today was devoted to discussing transfusion therapy.    He has some difficulty with  transportation and depends on his son.  He lives   much closer to the [facility] on [address] so he would like to   have his blood counts drawn there and he would also like to have blood for type   and cross drawn there.    IMPRESSION:  1.  Essential thrombocytosis.  2.  Myasthenia gravis.  3.  Paroxysmal atrial fibrillation.  4.  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    PLAN:  1.  CBC today.  2.  Unless there is some major change in his hematologic parameters, he will   remain on the current dose of hydroxyurea, which is the following three-day   sequence:  0.5 g; 0.5 g; 1 g.  3.  I will decide on when his next blood count should be when I have the result   of the study performed today.  4.  Return visit in four months with CBC and CMP.      HOME/SHELLIE  dd: 10/16/2018 15:12:11 (CDT)  td: 10/17/2018 03:17:05 (CDT)  Doc ID   #[id number]  Job ID #[id number]    CC:

## 2018-10-24 ENCOUNTER — ANTI-COAG VISIT (OUTPATIENT)
Dept: CARDIOLOGY | Facility: CLINIC | Age: 83
End: 2018-10-24

## 2018-10-24 DIAGNOSIS — Z79.01 LONG TERM CURRENT USE OF ANTICOAGULANT THERAPY: ICD-10-CM

## 2018-10-24 DIAGNOSIS — I48.0 PAROXYSMAL ATRIAL FIBRILLATION: ICD-10-CM

## 2018-10-24 LAB — INR PPP: 2.2

## 2018-10-24 NOTE — PROGRESS NOTES
Called Patient to check if INR was tested 10/23/18, he stated he did test and did call result in to [facility], asked Patient to please call them back and to give them the result again so a hard copy can be faxed to coumadin clinic

## 2018-10-29 ENCOUNTER — ANTI-COAG VISIT (OUTPATIENT)
Dept: CARDIOLOGY | Facility: CLINIC | Age: 83
End: 2018-10-29
Payer: MEDICARE

## 2018-10-29 DIAGNOSIS — I48.0 PAROXYSMAL ATRIAL FIBRILLATION: ICD-10-CM

## 2018-10-29 DIAGNOSIS — Z79.01 LONG TERM CURRENT USE OF ANTICOAGULANT THERAPY: ICD-10-CM

## 2018-10-29 LAB — INR PPP: 2.3

## 2018-10-29 PROCEDURE — G0250 MD INR TEST REVIE INTER MGMT: HCPCS | Mod: ,,,

## 2018-11-05 ENCOUNTER — ANTI-COAG VISIT (OUTPATIENT)
Dept: CARDIOLOGY | Facility: CLINIC | Age: 83
End: 2018-11-05

## 2018-11-05 DIAGNOSIS — I48.0 PAROXYSMAL ATRIAL FIBRILLATION: ICD-10-CM

## 2018-11-05 DIAGNOSIS — Z79.01 LONG TERM CURRENT USE OF ANTICOAGULANT THERAPY: Primary | ICD-10-CM

## 2018-11-05 LAB — INR PPP: 2.3

## 2018-11-05 NOTE — PROGRESS NOTES
Discussed with him that strips have been recalled and he will go to lab at [facility] tomorrow to have INR drawn.

## 2018-11-06 ENCOUNTER — LAB VISIT (OUTPATIENT)
Dept: LAB | Facility: HOSPITAL | Age: 83
End: 2018-11-06
Attending: INTERNAL MEDICINE
Payer: MEDICARE

## 2018-11-06 DIAGNOSIS — D75.839 THROMBOCYTOSIS: ICD-10-CM

## 2018-11-06 DIAGNOSIS — Z79.01 LONG TERM CURRENT USE OF ANTICOAGULANT THERAPY: ICD-10-CM

## 2018-11-06 DIAGNOSIS — I48.0 PAROXYSMAL ATRIAL FIBRILLATION: ICD-10-CM

## 2018-11-06 DIAGNOSIS — D64.9 ANEMIA, UNSPECIFIED TYPE: ICD-10-CM

## 2018-11-06 LAB
ABO + RH BLD: NORMAL
BASOPHILS # BLD AUTO: 0.02 K/UL
BASOPHILS NFR BLD: 0.4 %
BLD GP AB SCN CELLS X3 SERPL QL: NORMAL
DIFFERENTIAL METHOD: ABNORMAL
EOSINOPHIL # BLD AUTO: 0 K/UL
EOSINOPHIL NFR BLD: 0.8 %
ERYTHROCYTE [DISTWIDTH] IN BLOOD BY AUTOMATED COUNT: 18.6 %
HCT VFR BLD AUTO: 25.4 %
HGB BLD-MCNC: 8.2 G/DL
IMM GRANULOCYTES # BLD AUTO: 0.03 K/UL
IMM GRANULOCYTES NFR BLD AUTO: 0.6 %
INR PPP: 2.4
LYMPHOCYTES # BLD AUTO: 0.9 K/UL
LYMPHOCYTES NFR BLD: 19.3 %
MCH RBC QN AUTO: 42.3 PG
MCHC RBC AUTO-ENTMCNC: 32.3 G/DL
MCV RBC AUTO: 131 FL
MONOCYTES # BLD AUTO: 0.4 K/UL
MONOCYTES NFR BLD: 7.4 %
NEUTROPHILS # BLD AUTO: 3.5 K/UL
NEUTROPHILS NFR BLD: 71.5 %
NRBC BLD-RTO: 0 /100 WBC
PLATELET # BLD AUTO: 372 K/UL
PMV BLD AUTO: 9.8 FL
PROTHROMBIN TIME: 23.6 SEC
RBC # BLD AUTO: 1.94 M/UL
WBC # BLD AUTO: 4.87 K/UL

## 2018-11-06 PROCEDURE — 85610 PROTHROMBIN TIME: CPT | Mod: HCNC

## 2018-11-06 PROCEDURE — 86901 BLOOD TYPING SEROLOGIC RH(D): CPT | Mod: HCNC

## 2018-11-06 PROCEDURE — 85025 COMPLETE CBC W/AUTO DIFF WBC: CPT | Mod: HCNC

## 2018-11-06 PROCEDURE — 86920 COMPATIBILITY TEST SPIN: CPT

## 2018-11-06 PROCEDURE — 36415 COLL VENOUS BLD VENIPUNCTURE: CPT | Mod: HCNC,PO

## 2018-11-07 ENCOUNTER — ANTI-COAG VISIT (OUTPATIENT)
Dept: CARDIOLOGY | Facility: CLINIC | Age: 83
End: 2018-11-07

## 2018-11-07 ENCOUNTER — TELEPHONE (OUTPATIENT)
Dept: HEMATOLOGY/ONCOLOGY | Facility: CLINIC | Age: 83
End: 2018-11-07

## 2018-11-07 DIAGNOSIS — D63.0 ANEMIA IN NEOPLASTIC DISEASE: Primary | ICD-10-CM

## 2018-11-07 DIAGNOSIS — I48.0 PAROXYSMAL ATRIAL FIBRILLATION: ICD-10-CM

## 2018-11-07 DIAGNOSIS — Z79.01 LONG TERM CURRENT USE OF ANTICOAGULANT THERAPY: ICD-10-CM

## 2018-11-07 RX ORDER — HYDROCODONE BITARTRATE AND ACETAMINOPHEN 500; 5 MG/1; MG/1
TABLET ORAL ONCE
Status: CANCELLED | OUTPATIENT
Start: 2018-11-07 | End: 2018-11-07

## 2018-11-07 RX ORDER — FUROSEMIDE 10 MG/ML
20 INJECTION INTRAMUSCULAR; INTRAVENOUS
Status: CANCELLED | OUTPATIENT
Start: 2018-11-07

## 2018-11-07 NOTE — PROGRESS NOTES
INR from lab  is stable.  Will schedule lab INR in 1.5 weeks with his already scheduled non-fasting lab.  He may resume home testing when his new test strips arrive.

## 2018-11-07 NOTE — TELEPHONE ENCOUNTER
Returned call to pt.   Pt stated just wanted to know what time his appts on 11/14 were.   Informed pt of times.   Pt verbalized understanding.           ----- Message from Vandana Carr sent at 11/7/2018  4:04 PM CST -----  Contact: PT  Patient would like to speak to nurse about blood levels and the infusion that is scheduled for Wednesday 11/14.    ----- Message -----  From: Faviola Freed  Sent: 11/7/2018   3:36 PM  To: Vandana Carr    Patient Returning Call from [facility]    Who Left Message for Patient:  Communication Preference:Phone [phone] or [phone]  Additional Information:  Thank You  ASHLEY Freed

## 2018-11-07 NOTE — TELEPHONE ENCOUNTER
Returning call patient wanted to know if the infusion was scheduled. It's scheduled for Wednesday 11/14. Mailed appt slip

## 2018-11-11 LAB
BLD PROD TYP BPU: NORMAL
BLD PROD TYP BPU: NORMAL
BLOOD UNIT EXPIRATION DATE: NORMAL
BLOOD UNIT EXPIRATION DATE: NORMAL
BLOOD UNIT TYPE CODE: 7300
BLOOD UNIT TYPE CODE: 7300
BLOOD UNIT TYPE: NORMAL
BLOOD UNIT TYPE: NORMAL
CODING SYSTEM: NORMAL
CODING SYSTEM: NORMAL
DISPENSE STATUS: NORMAL
DISPENSE STATUS: NORMAL
NUM UNITS TRANS PACKED RBC: NORMAL
NUM UNITS TRANS PACKED RBC: NORMAL

## 2018-11-13 NOTE — PROGRESS NOTES
Wife called 11/13/18 to report that new test strips have been received (lot # [id number]), advised wife to have Patient resume testing on Mondays with the new strips

## 2018-11-14 ENCOUNTER — LAB VISIT (OUTPATIENT)
Dept: LAB | Facility: HOSPITAL | Age: 83
End: 2018-11-14
Attending: INTERNAL MEDICINE
Payer: MEDICARE

## 2018-11-14 ENCOUNTER — TELEPHONE (OUTPATIENT)
Dept: HEMATOLOGY/ONCOLOGY | Facility: CLINIC | Age: 83
End: 2018-11-14

## 2018-11-14 DIAGNOSIS — D64.9 ANEMIA, UNSPECIFIED TYPE: ICD-10-CM

## 2018-11-14 DIAGNOSIS — D75.839 THROMBOCYTOSIS: ICD-10-CM

## 2018-11-14 DIAGNOSIS — D64.9 ANEMIA, UNSPECIFIED TYPE: Primary | ICD-10-CM

## 2018-11-14 LAB
ABO + RH BLD: NORMAL
ANISOCYTOSIS BLD QL SMEAR: SLIGHT
BASOPHILS # BLD AUTO: 0.03 K/UL
BASOPHILS NFR BLD: 0.6 %
BLD GP AB SCN CELLS X3 SERPL QL: NORMAL
DACRYOCYTES BLD QL SMEAR: ABNORMAL
DIFFERENTIAL METHOD: ABNORMAL
DOHLE BOD BLD QL SMEAR: PRESENT
EOSINOPHIL # BLD AUTO: 0.1 K/UL
EOSINOPHIL NFR BLD: 1.1 %
ERYTHROCYTE [DISTWIDTH] IN BLOOD BY AUTOMATED COUNT: 18.7 %
HCT VFR BLD AUTO: 26.3 %
HGB BLD-MCNC: 8.5 G/DL
HYPOCHROMIA BLD QL SMEAR: ABNORMAL
IMM GRANULOCYTES # BLD AUTO: 0.04 K/UL
IMM GRANULOCYTES NFR BLD AUTO: 0.8 %
LYMPHOCYTES # BLD AUTO: 1.3 K/UL
LYMPHOCYTES NFR BLD: 24.2 %
MCH RBC QN AUTO: 44.3 PG
MCHC RBC AUTO-ENTMCNC: 32.3 G/DL
MCV RBC AUTO: 137 FL
MONOCYTES # BLD AUTO: 0.4 K/UL
MONOCYTES NFR BLD: 7.4 %
NEUTROPHILS # BLD AUTO: 3.5 K/UL
NEUTROPHILS NFR BLD: 65.9 %
NRBC BLD-RTO: 0 /100 WBC
PLATELET # BLD AUTO: 445 K/UL
PMV BLD AUTO: 9.7 FL
POIKILOCYTOSIS BLD QL SMEAR: SLIGHT
POLYCHROMASIA BLD QL SMEAR: ABNORMAL
RBC # BLD AUTO: 1.92 M/UL
WBC # BLD AUTO: 5.28 K/UL

## 2018-11-14 PROCEDURE — 85025 COMPLETE CBC W/AUTO DIFF WBC: CPT | Mod: 91,HCNC

## 2018-11-14 PROCEDURE — 86901 BLOOD TYPING SEROLOGIC RH(D): CPT | Mod: HCNC

## 2018-11-14 PROCEDURE — 36415 COLL VENOUS BLD VENIPUNCTURE: CPT | Mod: HCNC

## 2018-11-14 NOTE — TELEPHONE ENCOUNTER
Patient instructed to decrease Hydrea to 1/1/2. Per Dr Welsh, patient does not need a blood transfusion today. Next appointment will be in Dec. Appointment mailed.    ----- Message from Arnulfo Welsh Jr., MD sent at 11/14/2018  1:44 PM CST -----  Decrease Hydrea to: 1/1/2  I am surprised that his anemia did not improve much after the transfusion--next Tues or Wed.                            Decrease Hydrea to 1/1/2  Cbc and type and cross 1 week  More tests today

## 2018-11-19 ENCOUNTER — ANTI-COAG VISIT (OUTPATIENT)
Dept: CARDIOLOGY | Facility: CLINIC | Age: 83
End: 2018-11-19

## 2018-11-19 DIAGNOSIS — Z79.01 LONG TERM CURRENT USE OF ANTICOAGULANT THERAPY: ICD-10-CM

## 2018-11-19 DIAGNOSIS — I48.0 PAROXYSMAL ATRIAL FIBRILLATION: ICD-10-CM

## 2018-11-19 LAB — INR PPP: 1.9

## 2018-11-26 ENCOUNTER — ANTI-COAG VISIT (OUTPATIENT)
Dept: CARDIOLOGY | Facility: CLINIC | Age: 83
End: 2018-11-26

## 2018-11-26 ENCOUNTER — PES CALL (OUTPATIENT)
Dept: ADMINISTRATIVE | Facility: CLINIC | Age: 83
End: 2018-11-26

## 2018-11-26 DIAGNOSIS — I48.0 PAROXYSMAL ATRIAL FIBRILLATION: Primary | ICD-10-CM

## 2018-11-26 LAB — INR PPP: 2.2

## 2018-11-26 RX ORDER — HYDROCHLOROTHIAZIDE 12.5 MG/1
12.5 CAPSULE ORAL DAILY
Qty: 30 CAPSULE | Refills: 3 | Status: CANCELLED | OUTPATIENT
Start: 2018-11-26 | End: 2019-11-26

## 2018-11-30 DIAGNOSIS — I48.0 PAF (PAROXYSMAL ATRIAL FIBRILLATION): Primary | ICD-10-CM

## 2018-12-03 ENCOUNTER — ANTI-COAG VISIT (OUTPATIENT)
Dept: CARDIOLOGY | Facility: CLINIC | Age: 83
End: 2018-12-03
Payer: MEDICARE

## 2018-12-03 DIAGNOSIS — Z79.01 LONG TERM CURRENT USE OF ANTICOAGULANT THERAPY: ICD-10-CM

## 2018-12-03 DIAGNOSIS — I48.0 PAROXYSMAL ATRIAL FIBRILLATION: ICD-10-CM

## 2018-12-03 LAB — INR PPP: 2.2

## 2018-12-03 PROCEDURE — G0250 MD INR TEST REVIE INTER MGMT: HCPCS | Mod: S$GLB,,,

## 2018-12-03 RX ORDER — HYDROCHLOROTHIAZIDE 12.5 MG/1
12.5 CAPSULE ORAL DAILY
Qty: 90 CAPSULE | Refills: 3 | Status: SHIPPED | OUTPATIENT
Start: 2018-12-03 | End: 2019-03-11 | Stop reason: SDUPTHER

## 2018-12-10 ENCOUNTER — ANTI-COAG VISIT (OUTPATIENT)
Dept: CARDIOLOGY | Facility: CLINIC | Age: 83
End: 2018-12-10

## 2018-12-10 DIAGNOSIS — Z79.01 LONG TERM CURRENT USE OF ANTICOAGULANT THERAPY: ICD-10-CM

## 2018-12-10 DIAGNOSIS — I48.0 PAROXYSMAL ATRIAL FIBRILLATION: ICD-10-CM

## 2018-12-10 LAB — INR PPP: 2.7

## 2018-12-14 ENCOUNTER — LAB VISIT (OUTPATIENT)
Dept: LAB | Facility: HOSPITAL | Age: 83
End: 2018-12-14
Attending: INTERNAL MEDICINE
Payer: MEDICARE

## 2018-12-14 DIAGNOSIS — D75.839 THROMBOCYTOSIS: ICD-10-CM

## 2018-12-14 LAB
BASOPHILS # BLD AUTO: 0.02 K/UL
BASOPHILS NFR BLD: 0.4 %
DIFFERENTIAL METHOD: ABNORMAL
EOSINOPHIL # BLD AUTO: 0 K/UL
EOSINOPHIL NFR BLD: 0.7 %
ERYTHROCYTE [DISTWIDTH] IN BLOOD BY AUTOMATED COUNT: 16.8 %
HCT VFR BLD AUTO: 26.4 %
HGB BLD-MCNC: 8.5 G/DL
IMM GRANULOCYTES # BLD AUTO: 0.06 K/UL
IMM GRANULOCYTES NFR BLD AUTO: 1.1 %
LYMPHOCYTES # BLD AUTO: 1 K/UL
LYMPHOCYTES NFR BLD: 18.7 %
MCH RBC QN AUTO: 43.8 PG
MCHC RBC AUTO-ENTMCNC: 32.2 G/DL
MCV RBC AUTO: 136 FL
MONOCYTES # BLD AUTO: 0.4 K/UL
MONOCYTES NFR BLD: 7.4 %
NEUTROPHILS # BLD AUTO: 4 K/UL
NEUTROPHILS NFR BLD: 71.7 %
NRBC BLD-RTO: 0 /100 WBC
PLATELET # BLD AUTO: 517 K/UL
PMV BLD AUTO: 9.7 FL
RBC # BLD AUTO: 1.94 M/UL
WBC # BLD AUTO: 5.52 K/UL

## 2018-12-14 PROCEDURE — 85025 COMPLETE CBC W/AUTO DIFF WBC: CPT | Mod: HCNC

## 2018-12-14 PROCEDURE — 36415 COLL VENOUS BLD VENIPUNCTURE: CPT | Mod: HCNC,PO

## 2018-12-16 DIAGNOSIS — D53.9 MACROCYTIC ANEMIA: Primary | ICD-10-CM

## 2018-12-16 DIAGNOSIS — E53.8 B12 DEFICIENCY: ICD-10-CM

## 2018-12-17 ENCOUNTER — TELEPHONE (OUTPATIENT)
Dept: HEMATOLOGY/ONCOLOGY | Facility: CLINIC | Age: 83
End: 2018-12-17

## 2018-12-17 LAB — INR PPP: 2.4

## 2018-12-17 NOTE — TELEPHONE ENCOUNTER
----- Message from Arnulfo Welsh Jr., MD sent at 12/16/2018  9:11 AM CST -----  Change Hydrea back to: 2/2/1  In 2 weeks, cbc, B12, MMA, folate, retic, haptoglobin,type and hold      Patient was told to change Hydrea to 2/2/1.

## 2018-12-18 ENCOUNTER — ANTI-COAG VISIT (OUTPATIENT)
Dept: CARDIOLOGY | Facility: CLINIC | Age: 83
End: 2018-12-18

## 2018-12-18 DIAGNOSIS — I48.0 PAROXYSMAL ATRIAL FIBRILLATION: ICD-10-CM

## 2018-12-18 DIAGNOSIS — Z79.01 LONG TERM CURRENT USE OF ANTICOAGULANT THERAPY: ICD-10-CM

## 2018-12-24 ENCOUNTER — ANTI-COAG VISIT (OUTPATIENT)
Dept: CARDIOLOGY | Facility: CLINIC | Age: 83
End: 2018-12-24

## 2018-12-24 DIAGNOSIS — I48.0 PAROXYSMAL ATRIAL FIBRILLATION: ICD-10-CM

## 2018-12-24 DIAGNOSIS — Z79.01 LONG TERM CURRENT USE OF ANTICOAGULANT THERAPY: ICD-10-CM

## 2018-12-24 LAB — INR PPP: 3.3

## 2018-12-31 LAB — INR PPP: 2.1

## 2019-01-02 ENCOUNTER — ANTI-COAG VISIT (OUTPATIENT)
Dept: CARDIOLOGY | Facility: CLINIC | Age: 84
End: 2019-01-02

## 2019-01-02 DIAGNOSIS — I48.0 PAROXYSMAL ATRIAL FIBRILLATION: ICD-10-CM

## 2019-01-02 DIAGNOSIS — Z79.01 LONG TERM CURRENT USE OF ANTICOAGULANT THERAPY: ICD-10-CM

## 2019-01-07 ENCOUNTER — ANTI-COAG VISIT (OUTPATIENT)
Dept: CARDIOLOGY | Facility: CLINIC | Age: 84
End: 2019-01-07
Payer: MEDICARE

## 2019-01-07 DIAGNOSIS — I48.0 PAROXYSMAL ATRIAL FIBRILLATION: ICD-10-CM

## 2019-01-07 DIAGNOSIS — Z79.01 LONG TERM CURRENT USE OF ANTICOAGULANT THERAPY: ICD-10-CM

## 2019-01-07 LAB — INR PPP: 2.3

## 2019-01-07 PROCEDURE — G0250 MD INR TEST REVIE INTER MGMT: HCPCS | Mod: S$GLB,,, | Performed by: INTERNAL MEDICINE

## 2019-01-07 PROCEDURE — G0250 PR MD REVIEW INTERPRET OF TEST: ICD-10-PCS | Mod: S$GLB,,, | Performed by: INTERNAL MEDICINE

## 2019-01-11 ENCOUNTER — ANTI-COAG VISIT (OUTPATIENT)
Dept: CARDIOLOGY | Facility: CLINIC | Age: 84
End: 2019-01-11
Payer: MEDICARE

## 2019-01-11 ENCOUNTER — LAB VISIT (OUTPATIENT)
Dept: LAB | Facility: HOSPITAL | Age: 84
End: 2019-01-11
Attending: INTERNAL MEDICINE
Payer: MEDICARE

## 2019-01-11 DIAGNOSIS — Z79.01 LONG TERM CURRENT USE OF ANTICOAGULANT THERAPY: ICD-10-CM

## 2019-01-11 DIAGNOSIS — D53.9 MACROCYTIC ANEMIA: ICD-10-CM

## 2019-01-11 DIAGNOSIS — I48.0 PAROXYSMAL ATRIAL FIBRILLATION: ICD-10-CM

## 2019-01-11 DIAGNOSIS — E53.8 B12 DEFICIENCY: ICD-10-CM

## 2019-01-11 DIAGNOSIS — D75.839 THROMBOCYTOSIS: ICD-10-CM

## 2019-01-11 LAB
BASOPHILS # BLD AUTO: 0.02 K/UL
BASOPHILS # BLD AUTO: 0.02 K/UL
BASOPHILS NFR BLD: 0.5 %
BASOPHILS NFR BLD: 0.5 %
DIFFERENTIAL METHOD: ABNORMAL
DIFFERENTIAL METHOD: ABNORMAL
EOSINOPHIL # BLD AUTO: 0.1 K/UL
EOSINOPHIL # BLD AUTO: 0.1 K/UL
EOSINOPHIL NFR BLD: 1.5 %
EOSINOPHIL NFR BLD: 1.5 %
ERYTHROCYTE [DISTWIDTH] IN BLOOD BY AUTOMATED COUNT: 16.2 %
ERYTHROCYTE [DISTWIDTH] IN BLOOD BY AUTOMATED COUNT: 16.2 %
FOLATE SERPL-MCNC: 9.9 NG/ML
HAPTOGLOB SERPL-MCNC: 81 MG/DL
HCT VFR BLD AUTO: 25.4 %
HCT VFR BLD AUTO: 25.4 %
HGB BLD-MCNC: 8.1 G/DL
HGB BLD-MCNC: 8.1 G/DL
IMM GRANULOCYTES # BLD AUTO: 0.03 K/UL
IMM GRANULOCYTES # BLD AUTO: 0.03 K/UL
IMM GRANULOCYTES NFR BLD AUTO: 0.7 %
IMM GRANULOCYTES NFR BLD AUTO: 0.7 %
INR PPP: 2.8
LYMPHOCYTES # BLD AUTO: 0.9 K/UL
LYMPHOCYTES # BLD AUTO: 0.9 K/UL
LYMPHOCYTES NFR BLD: 21.9 %
LYMPHOCYTES NFR BLD: 21.9 %
MCH RBC QN AUTO: 44 PG
MCH RBC QN AUTO: 44 PG
MCHC RBC AUTO-ENTMCNC: 31.9 G/DL
MCHC RBC AUTO-ENTMCNC: 31.9 G/DL
MCV RBC AUTO: 138 FL
MCV RBC AUTO: 138 FL
MONOCYTES # BLD AUTO: 0.3 K/UL
MONOCYTES # BLD AUTO: 0.3 K/UL
MONOCYTES NFR BLD: 6.8 %
MONOCYTES NFR BLD: 6.8 %
NEUTROPHILS # BLD AUTO: 2.8 K/UL
NEUTROPHILS # BLD AUTO: 2.8 K/UL
NEUTROPHILS NFR BLD: 68.6 %
NEUTROPHILS NFR BLD: 68.6 %
NRBC BLD-RTO: 0 /100 WBC
NRBC BLD-RTO: 0 /100 WBC
PLATELET # BLD AUTO: 384 K/UL
PLATELET # BLD AUTO: 384 K/UL
PMV BLD AUTO: 9.4 FL
PMV BLD AUTO: 9.4 FL
RBC # BLD AUTO: 1.84 M/UL
RBC # BLD AUTO: 1.84 M/UL
RETICS/RBC NFR AUTO: 2 %
VIT B12 SERPL-MCNC: 561 PG/ML
WBC # BLD AUTO: 4.11 K/UL
WBC # BLD AUTO: 4.11 K/UL

## 2019-01-11 PROCEDURE — 82746 ASSAY OF FOLIC ACID SERUM: CPT | Mod: HCNC

## 2019-01-11 PROCEDURE — 82607 VITAMIN B-12: CPT | Mod: HCNC

## 2019-01-11 PROCEDURE — 85045 AUTOMATED RETICULOCYTE COUNT: CPT | Mod: HCNC

## 2019-01-11 PROCEDURE — 99211 OFF/OP EST MAY X REQ PHY/QHP: CPT | Mod: HCNC,S$GLB,, | Performed by: INTERNAL MEDICINE

## 2019-01-11 PROCEDURE — 83921 ORGANIC ACID SINGLE QUANT: CPT | Mod: HCNC

## 2019-01-11 PROCEDURE — 85025 COMPLETE CBC W/AUTO DIFF WBC: CPT | Mod: HCNC

## 2019-01-11 PROCEDURE — 36415 COLL VENOUS BLD VENIPUNCTURE: CPT | Mod: HCNC

## 2019-01-11 PROCEDURE — 99211 PR OFFICE/OUTPT VISIT, EST, LEVL I: ICD-10-PCS | Mod: HCNC,S$GLB,, | Performed by: INTERNAL MEDICINE

## 2019-01-11 PROCEDURE — 83010 ASSAY OF HAPTOGLOBIN QUANT: CPT | Mod: HCNC

## 2019-01-11 NOTE — PROGRESS NOTES
NR within range today, will maintain, and follow up in 1 week with home meter testing.    Patient presents for meter follow-up appointment.  Patient demonstrated proper use of meter.  Patient denies any difficulty using home monitor or reporting results.  INR results in meter are consistent with those reported and are without discrepancies.  Patient denies self-adjusting diet or dose based on readings.  Reminded patient to continue to contact clinic with any new medications, changes in health, or changes in diet. Patient will continue monitoring INR weekly and return to clinic in 6 months.  Patient advised to contact clinic with any changes, questions, or concerns.    Patient was re-educated on situations that would require placing a call to the Coumadin Clinic, including bleeding or unusual bruising issues, changes in health, diet or medications, upcoming procedures that require Coumadin interruption, and missed Coumadin dose(s). Patient expressed understanding that avoidance of consistency with these parameters could cause fluctuations in INR, leading to more frequent visits and increase risk of adverse events.

## 2019-01-14 ENCOUNTER — TELEPHONE (OUTPATIENT)
Dept: HEMATOLOGY/ONCOLOGY | Facility: CLINIC | Age: 84
End: 2019-01-14

## 2019-01-14 ENCOUNTER — ANTI-COAG VISIT (OUTPATIENT)
Dept: CARDIOLOGY | Facility: CLINIC | Age: 84
End: 2019-01-14

## 2019-01-14 DIAGNOSIS — D64.81 ANEMIA DUE TO CHEMOTHERAPY: Primary | ICD-10-CM

## 2019-01-14 DIAGNOSIS — I48.0 PAROXYSMAL ATRIAL FIBRILLATION: ICD-10-CM

## 2019-01-14 DIAGNOSIS — T45.1X5A ANEMIA DUE TO CHEMOTHERAPY: Primary | ICD-10-CM

## 2019-01-14 DIAGNOSIS — Z79.01 LONG TERM CURRENT USE OF ANTICOAGULANT THERAPY: ICD-10-CM

## 2019-01-14 LAB — INR PPP: 2.6

## 2019-01-14 RX ORDER — HYDROCODONE BITARTRATE AND ACETAMINOPHEN 500; 5 MG/1; MG/1
TABLET ORAL ONCE
Status: CANCELLED | OUTPATIENT
Start: 2019-01-14 | End: 2019-01-14

## 2019-01-14 NOTE — TELEPHONE ENCOUNTER
Spoke to patient to let him know that I scheduled him for a lab and infusion appt omn 1/23. Mailed appt slip.          ----- Message from Padmaja Foster RN sent at 1/14/2019  8:29 AM CST -----  Please schedule a blood transfusion, 2 units, on 1/23. He will need a type and hold. Please call with appointment.   ----- Message -----  From: Anrulfo Welsh Jr., MD  Sent: 1/13/2019   9:58 AM  To: Blaise LUNA Jr Staff    Same [facility] 2/2/1    2 units RBCs with Lasix 20 mg iv between

## 2019-01-15 LAB — METHYLMALONATE SERPL-SCNC: 0.52 UMOL/L

## 2019-01-21 ENCOUNTER — ANTI-COAG VISIT (OUTPATIENT)
Dept: CARDIOLOGY | Facility: CLINIC | Age: 84
End: 2019-01-21

## 2019-01-21 DIAGNOSIS — I48.0 PAROXYSMAL ATRIAL FIBRILLATION: ICD-10-CM

## 2019-01-21 DIAGNOSIS — Z79.01 LONG TERM CURRENT USE OF ANTICOAGULANT THERAPY: ICD-10-CM

## 2019-01-21 LAB — INR PPP: 2.3

## 2019-01-21 NOTE — PROGRESS NOTES
Verbal result taken from _Patient william valadez________. PT/INR _2.3_____ Date drawn___1/21/19 11:56 am._____ Hardcopy to be faxed.  Pt reports [facility] is closed today 1/21/19

## 2019-01-23 ENCOUNTER — INFUSION (OUTPATIENT)
Dept: INFUSION THERAPY | Facility: HOSPITAL | Age: 84
End: 2019-01-23
Attending: INTERNAL MEDICINE
Payer: MEDICARE

## 2019-01-23 ENCOUNTER — TELEPHONE (OUTPATIENT)
Dept: HEMATOLOGY/ONCOLOGY | Facility: CLINIC | Age: 84
End: 2019-01-23

## 2019-01-23 VITALS
TEMPERATURE: 98 F | RESPIRATION RATE: 18 BRPM | DIASTOLIC BLOOD PRESSURE: 60 MMHG | HEART RATE: 49 BPM | SYSTOLIC BLOOD PRESSURE: 136 MMHG

## 2019-01-23 DIAGNOSIS — D64.81 ANEMIA DUE TO CHEMOTHERAPY: ICD-10-CM

## 2019-01-23 DIAGNOSIS — T45.1X5A ANEMIA DUE TO CHEMOTHERAPY: ICD-10-CM

## 2019-01-23 PROCEDURE — P9038 RBC IRRADIATED: HCPCS | Mod: HCNC

## 2019-01-23 PROCEDURE — 86920 COMPATIBILITY TEST SPIN: CPT | Mod: HCNC,59

## 2019-01-23 PROCEDURE — 36430 TRANSFUSION BLD/BLD COMPNT: CPT | Mod: HCNC

## 2019-01-23 RX ORDER — HYDROCODONE BITARTRATE AND ACETAMINOPHEN 500; 5 MG/1; MG/1
TABLET ORAL ONCE
Status: DISCONTINUED | OUTPATIENT
Start: 2019-01-23 | End: 2019-01-23 | Stop reason: HOSPADM

## 2019-01-23 NOTE — TELEPHONE ENCOUNTER
Call to pt.   Pt getting blood transfusion now.   Informed pt same hydrea- 2/2/1 and will recheck CBC in 2 weeks.   Pt verbalized understanding.           ----- Message from Arnulfo Welsh Jr., MD sent at 1/23/2019  1:09 PM CST -----  2 units RBCs with Lasix 20 mg iv between  Same Hydrea:3 day sequence of 2/2/1 capsules  Cbc 2 weeks

## 2019-01-23 NOTE — PLAN OF CARE
Problem: Adult Inpatient Plan of Care  Goal: Plan of Care Review  Outcome: Ongoing (interventions implemented as appropriate)  Pt tolerated 2units PRBC well.  No s/s of reaction. Vitals stable, NAD.

## 2019-01-24 ENCOUNTER — OFFICE VISIT (OUTPATIENT)
Dept: INTERNAL MEDICINE | Facility: CLINIC | Age: 84
End: 2019-01-24
Payer: MEDICARE

## 2019-01-24 ENCOUNTER — LAB VISIT (OUTPATIENT)
Dept: LAB | Facility: HOSPITAL | Age: 84
End: 2019-01-24
Attending: INTERNAL MEDICINE
Payer: MEDICARE

## 2019-01-24 VITALS
OXYGEN SATURATION: 99 % | WEIGHT: 176 LBS | DIASTOLIC BLOOD PRESSURE: 80 MMHG | SYSTOLIC BLOOD PRESSURE: 132 MMHG | BODY MASS INDEX: 26.07 KG/M2 | HEIGHT: 69 IN | HEART RATE: 80 BPM

## 2019-01-24 DIAGNOSIS — B02.29 POSTHERPETIC NEURALGIA: ICD-10-CM

## 2019-01-24 DIAGNOSIS — D47.3 ESSENTIAL THROMBOCYTOSIS: ICD-10-CM

## 2019-01-24 DIAGNOSIS — I48.0 PAROXYSMAL ATRIAL FIBRILLATION: ICD-10-CM

## 2019-01-24 DIAGNOSIS — I10 ESSENTIAL HYPERTENSION: Primary | ICD-10-CM

## 2019-01-24 DIAGNOSIS — I27.20 PULMONARY HYPERTENSION: ICD-10-CM

## 2019-01-24 DIAGNOSIS — I10 ESSENTIAL HYPERTENSION: ICD-10-CM

## 2019-01-24 DIAGNOSIS — G70.00 MG (MYASTHENIA GRAVIS): ICD-10-CM

## 2019-01-24 DIAGNOSIS — N40.0 BENIGN PROSTATIC HYPERPLASIA, PRESENCE OF LOWER URINARY TRACT SYMPTOMS UNSPECIFIED: ICD-10-CM

## 2019-01-24 LAB
ALBUMIN SERPL BCP-MCNC: 4 G/DL
ALP SERPL-CCNC: 56 U/L
ALT SERPL W/O P-5'-P-CCNC: 11 U/L
ANION GAP SERPL CALC-SCNC: 8 MMOL/L
AST SERPL-CCNC: 23 U/L
BILIRUB SERPL-MCNC: 4.8 MG/DL
BUN SERPL-MCNC: 45 MG/DL
CALCIUM SERPL-MCNC: 9.6 MG/DL
CHLORIDE SERPL-SCNC: 107 MMOL/L
CHOLEST SERPL-MCNC: 133 MG/DL
CHOLEST/HDLC SERPL: 2.6 {RATIO}
CO2 SERPL-SCNC: 22 MMOL/L
CREAT SERPL-MCNC: 1.7 MG/DL
EST. GFR  (AFRICAN AMERICAN): 41 ML/MIN/1.73 M^2
EST. GFR  (NON AFRICAN AMERICAN): 35.5 ML/MIN/1.73 M^2
GLUCOSE SERPL-MCNC: 89 MG/DL
HDLC SERPL-MCNC: 51 MG/DL
HDLC SERPL: 38.3 %
LDLC SERPL CALC-MCNC: 49 MG/DL
NONHDLC SERPL-MCNC: 82 MG/DL
POTASSIUM SERPL-SCNC: 5.1 MMOL/L
PROT SERPL-MCNC: 7.4 G/DL
SODIUM SERPL-SCNC: 137 MMOL/L
TRIGL SERPL-MCNC: 165 MG/DL
TSH SERPL DL<=0.005 MIU/L-ACNC: 1.79 UIU/ML

## 2019-01-24 PROCEDURE — 99499 RISK ADDL DX/OHS AUDIT: ICD-10-PCS | Mod: HCNC,,, | Performed by: INTERNAL MEDICINE

## 2019-01-24 PROCEDURE — 1101F PR PT FALLS ASSESS DOC 0-1 FALLS W/OUT INJ PAST YR: ICD-10-PCS | Mod: HCNC,CPTII,S$GLB, | Performed by: INTERNAL MEDICINE

## 2019-01-24 PROCEDURE — 99499 RISK ADDL DX/OHS AUDIT: ICD-10-PCS | Mod: HCNC,S$GLB,, | Performed by: INTERNAL MEDICINE

## 2019-01-24 PROCEDURE — 99214 PR OFFICE/OUTPT VISIT, EST, LEVL IV, 30-39 MIN: ICD-10-PCS | Mod: HCNC,S$GLB,, | Performed by: INTERNAL MEDICINE

## 2019-01-24 PROCEDURE — 36415 COLL VENOUS BLD VENIPUNCTURE: CPT | Mod: HCNC,PO

## 2019-01-24 PROCEDURE — 99999 PR PBB SHADOW E&M-EST. PATIENT-LVL IV: CPT | Mod: PBBFAC,HCNC,, | Performed by: INTERNAL MEDICINE

## 2019-01-24 PROCEDURE — 1101F PT FALLS ASSESS-DOCD LE1/YR: CPT | Mod: HCNC,CPTII,S$GLB, | Performed by: INTERNAL MEDICINE

## 2019-01-24 PROCEDURE — 99499 UNLISTED E&M SERVICE: CPT | Mod: HCNC,S$GLB,, | Performed by: INTERNAL MEDICINE

## 2019-01-24 PROCEDURE — 99499 UNLISTED E&M SERVICE: CPT | Mod: HCNC,,, | Performed by: INTERNAL MEDICINE

## 2019-01-24 PROCEDURE — 99214 OFFICE O/P EST MOD 30 MIN: CPT | Mod: HCNC,S$GLB,, | Performed by: INTERNAL MEDICINE

## 2019-01-24 PROCEDURE — 84443 ASSAY THYROID STIM HORMONE: CPT | Mod: HCNC

## 2019-01-24 PROCEDURE — 99999 PR PBB SHADOW E&M-EST. PATIENT-LVL IV: ICD-10-PCS | Mod: PBBFAC,HCNC,, | Performed by: INTERNAL MEDICINE

## 2019-01-24 PROCEDURE — 80053 COMPREHEN METABOLIC PANEL: CPT | Mod: HCNC

## 2019-01-24 PROCEDURE — 80061 LIPID PANEL: CPT | Mod: HCNC

## 2019-01-24 NOTE — PROGRESS NOTES
PAST MEDICAL HISTORY:   Hypertension.  Myasthenia gravis.  Paroxysmal atrial fibrillation.  Essential thrombocytosis   Anemia , due to Myeloproliferative disorder , Imuran, Hydroxyurea  Hyperlipidemia.  Tricuspid regurgitation moderate  Aortic stenosis mild  Pulmonary Hypertension  BPH.  Gastroesophageal reflux disease.  Postherpetic neuralgia involving the right medial leg.  Cervical degenerative disk disease.  History of depression.  Osteoarthritis of the knees       2DECHO 2018  CONCLUSIONS     1 - Normal left ventricular systolic function (EF 60-65%).     2 - Eccentric hypertrophy.     3 - No wall motion abnormalities.     4 - Biatrial enlargement.     5 - Right ventricular enlargement with normal systolic function.     6 - Pulmonary hypertension. The estimated PA systolic pressure is 57 mmHg.     7 - Trivial to mild aortic regurgitation.     8 - Mild mitral regurgitation.     9 - Moderate tricuspid regurgitation.         PAST SURGICAL HISTORY: we need to do to   Cholecystectomy.  Bilateral cataract extraction.  Repair of ruptured right Achilles tendon.  Repair of ptosis, both eyes.    SOCIAL HISTORY:  Tobacco and alcohol use - none.  , has no formal   exercise routine.    FAMILY HISTORY:  Father is , stroke.  Mother  from cancer.  One   sister in good health.    MEDICATIONS:  Aspirin 81 mg.  Imuran 50 mg four a day.  Benazepril 40 mg.  Finasteride 5 mg.  Hydroxyurea 500 mg three capsules once a day.  Iron sulfate.  Prednisone 50 mg twice a day.  Mestinon 60 mg.  Tamsulosin 0.4 mg.  Verapamil  mg.  Coumadin.  Vitamin D 1000 units.  Hydrochlorothiazide 12.5 mg        This is an 87-year-old male who is coming in for a regular followup visit.    Yesterday, he received a transfusion of 2 units when his hemoglobin and   hematocrit down to 7.4 and 22.2.  Also at that time, the white count was 3.19   and a platelet count 272,000.  The situation regarding this as I felt that his  "  anemia is result of having essential thrombocytosis and being on the medications   of Imuran and hydroxyurea.  It appears that his hematologist, Dr. Welsh,   initially wanted to have him on Procrit, but the insurance company would not   approve this.  So, the plan was to periodically give blood transfusions when his   hemoglobin and hematocrit, gets below 8 and 24.  He can tell a difference on   how he feels, mentally and physically.  He does not feel sluggish today and he   is more awake and alert.    In the interim, in August, hydrochlorothiazide was added by the cardiologist.    Another issue brought up as an insurance technicality of the insurance covering   verapamil for "hypertension".  However, in June 2016, he was on the medicine   metoprolol XL for that of paroxysmal atrial fibrillation and PVCs.  He saw his   cardiologist, Dr. Tate, who noted that he was having extra PVCs and feeling   fatigued and at that time, the metoprolol was changed to verapamil in June of 2016.  He has been on this medication since then.  It appears that it has worked   well for him and thus the medication was not given for the sole purpose of   hypertension both for that to treat his arrhythmia and this was explained to Mr. Phan.    PAST MEDICAL HISTORY AND MEDICATIONS:  Outlined above.    Review of the chart, he continues to follow up with Neurology regarding   myasthenia gravis, Hematology regarding essential thrombocytosis and anemia, and   Cardiology given his history of paroxysmal atrial fibrillation.    REVIEW OF SYSTEMS:  Currently, no chest pain, no shortness of breath.    Occasional dyspnea on exertion.  No heart palpitations.  No abdominal pain.    Bowel function every day.  Urination can be frequent in the morning and some   urgency.  Still has paresthesia involving his right inner thigh due to   postherpetic neuralgia.  No headaches or heartburn.    PHYSICAL EXAMINATION:  VITAL SIGNS:  Weight 176 pounds, pulse " rate is 80, blood pressure 132/72.  HEENT:  Tympanic membranes normal.  Nasal mucosa is clear.  Oropharynx, no   abnormal findings.  NECK:  No thyromegaly.  LUNGS:  Clear.  HEART:  Regular rate and rhythm.  No murmurs detected.  ABDOMEN:  Active bowel sounds, soft, nontender.  No hepatosplenomegaly or   abdominal masses.  PULSES:  2+ carotid pulses, no bruits.  1+ pedal pulses.  EXTREMITIES:  No edema.    IMPRESSION:  1.  Hypertension.  2.  Paroxysmal atrial fibrillation.  3.  Myasthenia gravis.  4.  Essential thrombocytosis.  5.  Mild aortic stenosis.  6.  BPH.  7.  Chronic anemia, multifactorial.    PLAN:  Today, we will get a chemistry, lipid profile and TSH.  Continue to   maintain attention, any physical activity and proper diet and he has to receive   the flu vaccine this year.        /nils [id number] review        OPAL/SHELLIE  dd: 01/24/2019 13:44:07 (CST)  td: 01/24/2019 23:52:11 (CST)  Doc ID   #[id number]  Job ID #[id number]    CC:

## 2019-01-26 ENCOUNTER — TELEPHONE (OUTPATIENT)
Dept: INTERNAL MEDICINE | Facility: CLINIC | Age: 84
End: 2019-01-26

## 2019-01-26 DIAGNOSIS — R17 TOTAL BILIRUBIN, ELEVATED: Primary | ICD-10-CM

## 2019-01-26 NOTE — TELEPHONE ENCOUNTER
[facility]    A at or length the hepatic function panel lab order to that lab, under my name

## 2019-01-26 NOTE — PROGRESS NOTES
Test results reviewed      Creatinine 1.7     discussed with him for improve hydration    Total bilirubin was 4.8 the the of the liver enzymes were normal      He will have a follow-up CBC on December 13, 2020 meet with Hematology    Will link the hepatic function panel at that time

## 2019-01-28 ENCOUNTER — TELEPHONE (OUTPATIENT)
Dept: HEMATOLOGY/ONCOLOGY | Facility: CLINIC | Age: 84
End: 2019-01-28

## 2019-01-28 ENCOUNTER — ANTI-COAG VISIT (OUTPATIENT)
Dept: CARDIOLOGY | Facility: CLINIC | Age: 84
End: 2019-01-28

## 2019-01-28 DIAGNOSIS — Z79.01 LONG TERM CURRENT USE OF ANTICOAGULANT THERAPY: ICD-10-CM

## 2019-01-28 DIAGNOSIS — I48.0 PAROXYSMAL ATRIAL FIBRILLATION: ICD-10-CM

## 2019-01-28 LAB — INR PPP: 1.9

## 2019-01-28 NOTE — TELEPHONE ENCOUNTER
Returned call patient rescheduled the lab appts to [facility]. Mailed appt slips.      ----- Message from Padmaja Foster RN sent at 1/28/2019  3:06 PM CST -----  Contact: self      ----- Message -----  From: Ella Gonzalez  Sent: 1/28/2019   2:52 PM  To: Blaise LUNA Jr Staff    Pt is calling in regards to rescheduling his lab(02/06) and appt(02/13). Pt would like a call back to do so. Per pt, he would like to have both appts on 02/11 in the afternoon, if possible.     Pt can be reached at [phone].    Thank you

## 2019-01-31 RX ORDER — FINASTERIDE 5 MG/1
TABLET, FILM COATED ORAL
Qty: 90 TABLET | Refills: 3 | Status: SHIPPED | OUTPATIENT
Start: 2019-01-31 | End: 2019-12-03 | Stop reason: SDUPTHER

## 2019-01-31 RX ORDER — TAMSULOSIN HYDROCHLORIDE 0.4 MG/1
1 CAPSULE ORAL DAILY
Qty: 90 CAPSULE | Refills: 3 | Status: SHIPPED | OUTPATIENT
Start: 2019-01-31 | End: 2019-03-26 | Stop reason: SDUPTHER

## 2019-01-31 RX ORDER — TAMSULOSIN HYDROCHLORIDE 0.4 MG/1
CAPSULE ORAL
Qty: 90 CAPSULE | Refills: 3 | Status: ON HOLD | OUTPATIENT
Start: 2019-01-31 | End: 2020-05-22

## 2019-01-31 NOTE — TELEPHONE ENCOUNTER
----- Message from Matthias Tellez sent at 1/31/2019 12:36 PM CST -----  Contact: Patient [phone]  RX request - refill or new RX.  Is this a refill or new RX:  Refill  RX name and strength: tamsulosin (FLOMAX) 0.4 mg Cp24 and finasteride (PROSCAR) 5 mg tablet    Pharmacy name and phone # [facility] - [place], [place] - [address] [facility] [phone] (Phone) [phone] (Fax)       Comments:  Patient stating is all out of Rx and would like to have called in asap, stating for got to mention at last office visit.    Please call an advise  Thank you

## 2019-02-01 RX ORDER — VERAPAMIL HYDROCHLORIDE 120 MG/1
120 TABLET, FILM COATED ORAL 2 TIMES DAILY
Qty: 180 TABLET | Refills: 3 | Status: SHIPPED | OUTPATIENT
Start: 2019-02-01 | End: 2019-03-10

## 2019-02-04 ENCOUNTER — ANTI-COAG VISIT (OUTPATIENT)
Dept: CARDIOLOGY | Facility: CLINIC | Age: 84
End: 2019-02-04

## 2019-02-04 DIAGNOSIS — Z79.01 LONG TERM CURRENT USE OF ANTICOAGULANT THERAPY: ICD-10-CM

## 2019-02-04 DIAGNOSIS — I48.0 PAROXYSMAL ATRIAL FIBRILLATION: ICD-10-CM

## 2019-02-04 LAB — INR PPP: 1.6

## 2019-02-06 DIAGNOSIS — G70.00 MYASTHENIA GRAVIS: ICD-10-CM

## 2019-02-06 DIAGNOSIS — D75.839 THROMBOCYTOSIS: ICD-10-CM

## 2019-02-06 DIAGNOSIS — D47.3 ESSENTIAL THROMBOCYTHEMIA: ICD-10-CM

## 2019-02-06 DIAGNOSIS — G70.00 MG (MYASTHENIA GRAVIS): ICD-10-CM

## 2019-02-06 RX ORDER — AZATHIOPRINE 50 MG/1
TABLET ORAL
Qty: 360 TABLET | Refills: 3 | OUTPATIENT
Start: 2019-02-06

## 2019-02-06 RX ORDER — PREDNISONE 5 MG/1
TABLET ORAL
Qty: 45 TABLET | Refills: 3 | OUTPATIENT
Start: 2019-02-06

## 2019-02-07 RX ORDER — HYDROXYUREA 500 MG/1
CAPSULE ORAL
Qty: 270 CAPSULE | Refills: 10 | Status: SHIPPED | OUTPATIENT
Start: 2019-02-07 | End: 2019-03-01

## 2019-02-11 ENCOUNTER — ANTI-COAG VISIT (OUTPATIENT)
Dept: CARDIOLOGY | Facility: CLINIC | Age: 84
End: 2019-02-11
Payer: MEDICARE

## 2019-02-11 DIAGNOSIS — I48.0 PAROXYSMAL ATRIAL FIBRILLATION: ICD-10-CM

## 2019-02-11 DIAGNOSIS — Z79.01 LONG TERM CURRENT USE OF ANTICOAGULANT THERAPY: ICD-10-CM

## 2019-02-11 LAB — INR PPP: 1.8

## 2019-02-11 PROCEDURE — G0250 MD INR TEST REVIE INTER MGMT: HCPCS | Mod: S$GLB,,, | Performed by: INTERNAL MEDICINE

## 2019-02-11 PROCEDURE — G0250 PR MD REVIEW INTERPRET OF TEST: ICD-10-PCS | Mod: S$GLB,,, | Performed by: INTERNAL MEDICINE

## 2019-02-13 ENCOUNTER — LAB VISIT (OUTPATIENT)
Dept: LAB | Facility: HOSPITAL | Age: 84
End: 2019-02-13
Attending: INTERNAL MEDICINE
Payer: MEDICARE

## 2019-02-13 ENCOUNTER — TELEPHONE (OUTPATIENT)
Dept: HEMATOLOGY/ONCOLOGY | Facility: CLINIC | Age: 84
End: 2019-02-13

## 2019-02-13 ENCOUNTER — OFFICE VISIT (OUTPATIENT)
Dept: HEMATOLOGY/ONCOLOGY | Facility: CLINIC | Age: 84
End: 2019-02-13
Payer: MEDICARE

## 2019-02-13 VITALS
HEIGHT: 69 IN | SYSTOLIC BLOOD PRESSURE: 130 MMHG | WEIGHT: 178 LBS | DIASTOLIC BLOOD PRESSURE: 75 MMHG | HEART RATE: 80 BPM | BODY MASS INDEX: 26.36 KG/M2

## 2019-02-13 DIAGNOSIS — D63.1 ANEMIA OF RENAL DISEASE: ICD-10-CM

## 2019-02-13 DIAGNOSIS — N18.9 ANEMIA OF RENAL DISEASE: ICD-10-CM

## 2019-02-13 DIAGNOSIS — G70.00 MYASTHENIA GRAVIS WITHOUT ACUTE EXACERBATION: ICD-10-CM

## 2019-02-13 DIAGNOSIS — D64.9 ANEMIA, UNSPECIFIED TYPE: ICD-10-CM

## 2019-02-13 DIAGNOSIS — N18.30 CHRONIC KIDNEY DISEASE, STAGE III (MODERATE): ICD-10-CM

## 2019-02-13 DIAGNOSIS — D63.0 ANEMIA IN NEOPLASTIC DISEASE: Primary | ICD-10-CM

## 2019-02-13 DIAGNOSIS — D75.839 THROMBOCYTHEMIA: ICD-10-CM

## 2019-02-13 DIAGNOSIS — D75.839 THROMBOCYTOSIS: ICD-10-CM

## 2019-02-13 DIAGNOSIS — D47.3 ESSENTIAL THROMBOCYTOSIS: Primary | ICD-10-CM

## 2019-02-13 DIAGNOSIS — R17 TOTAL BILIRUBIN, ELEVATED: ICD-10-CM

## 2019-02-13 PROBLEM — T45.1X5A ANEMIA DUE TO CHEMOTHERAPY: Status: RESOLVED | Noted: 2018-09-24 | Resolved: 2019-02-13

## 2019-02-13 PROBLEM — D64.81 ANEMIA DUE TO CHEMOTHERAPY: Status: RESOLVED | Noted: 2018-09-24 | Resolved: 2019-02-13

## 2019-02-13 LAB
ABO + RH BLD: NORMAL
ALBUMIN SERPL BCP-MCNC: 3.7 G/DL
ALBUMIN SERPL BCP-MCNC: 3.7 G/DL
ALP SERPL-CCNC: 70 U/L
ALP SERPL-CCNC: 70 U/L
ALT SERPL W/O P-5'-P-CCNC: 16 U/L
ALT SERPL W/O P-5'-P-CCNC: 16 U/L
ANION GAP SERPL CALC-SCNC: 8 MMOL/L
AST SERPL-CCNC: 21 U/L
AST SERPL-CCNC: 21 U/L
BASOPHILS # BLD AUTO: 0.01 K/UL
BASOPHILS NFR BLD: 0.3 %
BILIRUB DIRECT SERPL-MCNC: 0.5 MG/DL
BILIRUB SERPL-MCNC: 1.3 MG/DL
BILIRUB SERPL-MCNC: 1.3 MG/DL
BLD GP AB SCN CELLS X3 SERPL QL: NORMAL
BUN SERPL-MCNC: 34 MG/DL
CALCIUM SERPL-MCNC: 9.4 MG/DL
CHLORIDE SERPL-SCNC: 102 MMOL/L
CO2 SERPL-SCNC: 21 MMOL/L
CREAT SERPL-MCNC: 1.6 MG/DL
DIFFERENTIAL METHOD: ABNORMAL
EOSINOPHIL # BLD AUTO: 0 K/UL
EOSINOPHIL NFR BLD: 0.9 %
ERYTHROCYTE [DISTWIDTH] IN BLOOD BY AUTOMATED COUNT: ABNORMAL %
EST. GFR  (AFRICAN AMERICAN): 44.1 ML/MIN/1.73 M^2
EST. GFR  (NON AFRICAN AMERICAN): 38.2 ML/MIN/1.73 M^2
FERRITIN SERPL-MCNC: 603 NG/ML
GLUCOSE SERPL-MCNC: 80 MG/DL
HCT VFR BLD AUTO: 25.1 %
HGB BLD-MCNC: 8.4 G/DL
IMM GRANULOCYTES # BLD AUTO: 0.02 K/UL
IMM GRANULOCYTES NFR BLD AUTO: 0.6 %
IRON SERPL-MCNC: 162 UG/DL
LYMPHOCYTES # BLD AUTO: 1 K/UL
LYMPHOCYTES NFR BLD: 27.7 %
MCH RBC QN AUTO: 42.9 PG
MCHC RBC AUTO-ENTMCNC: 33.5 G/DL
MCV RBC AUTO: 128 FL
MONOCYTES # BLD AUTO: 0.3 K/UL
MONOCYTES NFR BLD: 9.3 %
NEUTROPHILS # BLD AUTO: 2.1 K/UL
NEUTROPHILS NFR BLD: 61.2 %
NRBC BLD-RTO: 0 /100 WBC
PLATELET # BLD AUTO: 383 K/UL
PMV BLD AUTO: 9.2 FL
POTASSIUM SERPL-SCNC: 4.5 MMOL/L
PROT SERPL-MCNC: 6.8 G/DL
PROT SERPL-MCNC: 6.8 G/DL
RBC # BLD AUTO: 1.96 M/UL
SATURATED IRON: 58 %
SODIUM SERPL-SCNC: 131 MMOL/L
TOTAL IRON BINDING CAPACITY: 277 UG/DL
TRANSFERRIN SERPL-MCNC: 187 MG/DL
WBC # BLD AUTO: 3.43 K/UL

## 2019-02-13 PROCEDURE — 99999 PR PBB SHADOW E&M-EST. PATIENT-LVL IV: CPT | Mod: PBBFAC,HCNC,, | Performed by: INTERNAL MEDICINE

## 2019-02-13 PROCEDURE — 99214 OFFICE O/P EST MOD 30 MIN: CPT | Mod: HCNC,S$GLB,, | Performed by: INTERNAL MEDICINE

## 2019-02-13 PROCEDURE — 85025 COMPLETE CBC W/AUTO DIFF WBC: CPT | Mod: HCNC

## 2019-02-13 PROCEDURE — 99999 PR PBB SHADOW E&M-EST. PATIENT-LVL IV: ICD-10-PCS | Mod: PBBFAC,HCNC,, | Performed by: INTERNAL MEDICINE

## 2019-02-13 PROCEDURE — 36415 COLL VENOUS BLD VENIPUNCTURE: CPT | Mod: HCNC

## 2019-02-13 PROCEDURE — 86901 BLOOD TYPING SEROLOGIC RH(D): CPT | Mod: HCNC

## 2019-02-13 PROCEDURE — 1101F PT FALLS ASSESS-DOCD LE1/YR: CPT | Mod: HCNC,CPTII,S$GLB, | Performed by: INTERNAL MEDICINE

## 2019-02-13 PROCEDURE — 82668 ASSAY OF ERYTHROPOIETIN: CPT | Mod: HCNC

## 2019-02-13 PROCEDURE — 83540 ASSAY OF IRON: CPT | Mod: HCNC

## 2019-02-13 PROCEDURE — 99499 RISK ADDL DX/OHS AUDIT: ICD-10-PCS | Mod: HCNC,S$GLB,, | Performed by: INTERNAL MEDICINE

## 2019-02-13 PROCEDURE — 86920 COMPATIBILITY TEST SPIN: CPT

## 2019-02-13 PROCEDURE — 82728 ASSAY OF FERRITIN: CPT | Mod: HCNC

## 2019-02-13 PROCEDURE — 99214 PR OFFICE/OUTPT VISIT, EST, LEVL IV, 30-39 MIN: ICD-10-PCS | Mod: HCNC,S$GLB,, | Performed by: INTERNAL MEDICINE

## 2019-02-13 PROCEDURE — 27201040 HC RC 50 FILTER

## 2019-02-13 PROCEDURE — 1101F PR PT FALLS ASSESS DOC 0-1 FALLS W/OUT INJ PAST YR: ICD-10-PCS | Mod: HCNC,CPTII,S$GLB, | Performed by: INTERNAL MEDICINE

## 2019-02-13 PROCEDURE — 80053 COMPREHEN METABOLIC PANEL: CPT | Mod: HCNC

## 2019-02-13 PROCEDURE — 80076 HEPATIC FUNCTION PANEL: CPT | Mod: HCNC

## 2019-02-13 PROCEDURE — 99499 UNLISTED E&M SERVICE: CPT | Mod: HCNC,S$GLB,, | Performed by: INTERNAL MEDICINE

## 2019-02-13 RX ORDER — FUROSEMIDE 10 MG/ML
20 INJECTION INTRAMUSCULAR; INTRAVENOUS
Status: CANCELLED | OUTPATIENT
Start: 2019-02-13

## 2019-02-13 RX ORDER — HYDROCODONE BITARTRATE AND ACETAMINOPHEN 500; 5 MG/1; MG/1
TABLET ORAL ONCE
Status: CANCELLED | OUTPATIENT
Start: 2019-02-13 | End: 2019-02-13

## 2019-02-13 NOTE — TELEPHONE ENCOUNTER
----- Message from Faviola Freed sent at 2/13/2019 12:56 PM CST -----  Contact: pt   Pt called to speak with nurse judy have some questions about infusion and would like appt for 3/14  Callback#[phone]  Thank You   ASHLEY Freed    Appointment made for blood transfusion.

## 2019-02-13 NOTE — PROGRESS NOTES
HISTORY OF PRESENT ILLNESS:  Mr. Phan is an 87-year-old man with essential   thrombocytosis.  I evaluated him initially in January 2016.  His platelet count   had been high since 2012, but it became much higher in 2015 with values between   888,000 to 1,126,000.  A bone marrow examination had 80% cellularity with slight   dyserythropoiesis.  There was an increased number of large megakaryocytes along   with some clusters of megakaryocytes and there was grade I reticulin fibrosis. A   JAK2 V617F mutation study was positive at 29%.  He is taking aspirin 81 mg   daily and he also takes warfarin because of paroxysmal atrial fibrillation.    Following the diagnosis of essential thrombocytosis, he began to take   hydroxyurea and that drug has been very successful in lowering his platelet   count.  However, he has had far more anemia than many other patients with this   disorder.  This is likely related to his taking Imuran 200 mg a day for   myasthenia gravis and to renal impairment.    He has recently been requiring periodic red cell transfusions.  I attempted   to obtain Procrit, but was unable to get it approved for anemia related to a   myeloproliferative disease and cytotoxic chemotherapy.  The physician that I   talked to at his insurance carrier did not understand that Imuran and Hydrea can   have the same effects on red cell production as other cytotoxic drugs.  I may   be able to get at least low-dose Procrit approved for anemia of renal disease.    He is currently taking hydroxyurea on the following three-day schedule:  1 g; 1   g; 0.5 g.  This has been adequate in controlling his platelet count.  I have   been transfusing him with red cells when his hemoglobin in the range of 8.3-8.5   or below.    He reports no change in his overall function today.  He has had myasthenia since   2008, but has had reasonably good control of that with his current medication.    Other problems include degenerative arthritis,  diastolic dysfunction,   peripheral vascular disease and hypertension.    ADDITIONAL PAST HISTORY, SYSTEM REVIEW, SOCIAL HISTORY AND FAMILY HISTORY:  Have   been reviewed and updated in the electronic record.    PHYSICAL EXAMINATION:  GENERAL APPEARANCE:  A well-developed and well-nourished man in no distress.  He   can climb on to an examination table without assistance.  He uses a walker to   move around the clinic.  EYES:  Some conjunctival pallor.  No jaundice.  MOUTH AND THROAT:  Partial upper dentures.  No mucosal lesions.  NECK:  No masses or bruits.  No thyroid abnormalities.  LYMPH NODES:  No enlarged cervical, axillary or inguinal nodes.  CHEST AND LUNGS:  Normal respiratory effort.  Clear to auscultation and   percussion.  HEART:  Regular rate and rhythm without murmur or gallop.  ABDOMEN:  soft, without tenderness.  The spleen is palpable about 3 cm below the   left costal margin.  No hepatomegaly.  EXTREMITIES:  1+ pretibial and ankle edema on the left and trace pretibial edema   on the right.  PERIPHERAL VASCULATURE:  Very diminished pulses in the feet and ankles.    Adequate popliteal pulses.  NEUROLOGIC:  Motor function is good.  Memory is good.  He is fully oriented.    His hearing is impaired.  SKIN:  No suspicious lesions in the areas examined.    LABORATORY STUDIES:  Blood counts today include hemoglobin 8.4, WBC 3430 and   platelets 383,000.  Chemistry profile is remarkable for sodium 131, BUN 34 and   creatinine 1.6.    IMPRESSION:  1.  Essential thrombocytosis.  2.  Myasthenia gravis.  3.  Paroxysmal atrial fibrillation.  4.  Anemia of chronic renal disease.    RECOMMENDATIONS:  1.  He will be transfused with 2 units of red blood cells within the next week   and given Lasix 20 mg intravenously between the 2 units.  2.  I am ordering Procrit 20,000 units weekly for anemia of renal dysfunction.  3.  CBC every three weeks at [facility] with results sent to Dr. Nick Gutierrez.  4.   Return visit in four months to see Dr. Gutierrez.  At that time, he will have a   CBC and CMP.  I explained to Mr. Phan that he can be started on Procrit  if it is approved, but he may still continue to require periodic transfusions.      AWB/HN  dd: 02/13/2019 10:33:42 (CST)  td: 02/14/2019 07:01:21 (CST)  Doc ID   #[id number]  Job ID #[id number]    CC:  KALI Gutierrez MD

## 2019-02-14 ENCOUNTER — INFUSION (OUTPATIENT)
Dept: INFUSION THERAPY | Facility: HOSPITAL | Age: 84
End: 2019-02-14
Attending: INTERNAL MEDICINE
Payer: MEDICARE

## 2019-02-14 VITALS
DIASTOLIC BLOOD PRESSURE: 59 MMHG | RESPIRATION RATE: 16 BRPM | HEART RATE: 51 BPM | SYSTOLIC BLOOD PRESSURE: 130 MMHG | TEMPERATURE: 98 F

## 2019-02-14 DIAGNOSIS — D63.0 ANEMIA IN NEOPLASTIC DISEASE: ICD-10-CM

## 2019-02-14 PROCEDURE — 36430 TRANSFUSION BLD/BLD COMPNT: CPT | Mod: HCNC

## 2019-02-14 PROCEDURE — 96374 THER/PROPH/DIAG INJ IV PUSH: CPT | Mod: HCNC

## 2019-02-14 PROCEDURE — 25000003 PHARM REV CODE 250: Mod: HCNC | Performed by: INTERNAL MEDICINE

## 2019-02-14 PROCEDURE — 63600175 PHARM REV CODE 636 W HCPCS: Mod: HCNC | Performed by: INTERNAL MEDICINE

## 2019-02-14 PROCEDURE — P9038 RBC IRRADIATED: HCPCS | Mod: HCNC

## 2019-02-14 RX ORDER — FUROSEMIDE 10 MG/ML
20 INJECTION INTRAMUSCULAR; INTRAVENOUS
Status: DISCONTINUED | OUTPATIENT
Start: 2019-02-14 | End: 2019-02-14 | Stop reason: HOSPADM

## 2019-02-14 RX ORDER — HYDROCODONE BITARTRATE AND ACETAMINOPHEN 500; 5 MG/1; MG/1
TABLET ORAL ONCE
Status: COMPLETED | OUTPATIENT
Start: 2019-02-14 | End: 2019-02-14

## 2019-02-14 RX ADMIN — FUROSEMIDE 20 MG: 10 INJECTION, SOLUTION INTRAMUSCULAR; INTRAVENOUS at 03:02

## 2019-02-14 RX ADMIN — SODIUM CHLORIDE: 9 INJECTION, SOLUTION INTRAVENOUS at 01:02

## 2019-02-14 NOTE — PLAN OF CARE
Problem: Anemia  Goal: Anemia Symptom Improvement  Outcome: Ongoing (interventions implemented as appropriate)  1400 pt here for 2 units PRBC, labs, hx, meds, allergies reviewed, pt reclined in chair, continue to monitor

## 2019-02-15 ENCOUNTER — PES CALL (OUTPATIENT)
Dept: ADMINISTRATIVE | Facility: CLINIC | Age: 84
End: 2019-02-15

## 2019-02-15 ENCOUNTER — TELEPHONE (OUTPATIENT)
Dept: HEMATOLOGY/ONCOLOGY | Facility: CLINIC | Age: 84
End: 2019-02-15

## 2019-02-15 LAB — EPO SERPL-ACNC: 57.3 MIU/ML

## 2019-02-15 NOTE — TELEPHONE ENCOUNTER
----- Message from Quinten Chiu sent at 2/15/2019  2:00 PM CST -----  Contact: Patient  Pt calling for clarification on 02/22 infusion, and wants to know if this can me done at the [place] location.     Contact:: [phone]    Patient was told that Procrit can be given at the main campus.

## 2019-02-15 NOTE — PLAN OF CARE
Problem: Adult Inpatient Plan of Care  Goal: Plan of Care Review  Outcome: Outcome(s) achieved Date Met: 02/14/19  Tolerated 2 units of PRBCs with complications or adverse reactions. Lasix 20 mg IV given between units. VS stable. PIV removed. Discharged home with AVS.

## 2019-02-18 ENCOUNTER — ANTI-COAG VISIT (OUTPATIENT)
Dept: CARDIOLOGY | Facility: CLINIC | Age: 84
End: 2019-02-18

## 2019-02-18 DIAGNOSIS — I48.0 PAROXYSMAL ATRIAL FIBRILLATION: ICD-10-CM

## 2019-02-18 DIAGNOSIS — Z79.01 LONG TERM CURRENT USE OF ANTICOAGULANT THERAPY: ICD-10-CM

## 2019-02-18 LAB — INR PPP: 1.8

## 2019-02-21 RX ORDER — WARFARIN SODIUM 5 MG/1
TABLET ORAL
Qty: 90 TABLET | Refills: 3 | Status: SHIPPED | OUTPATIENT
Start: 2019-02-21 | End: 2019-04-04

## 2019-02-22 ENCOUNTER — INFUSION (OUTPATIENT)
Dept: INFUSION THERAPY | Facility: HOSPITAL | Age: 84
End: 2019-02-22
Attending: INTERNAL MEDICINE
Payer: MEDICARE

## 2019-02-22 ENCOUNTER — TELEPHONE (OUTPATIENT)
Dept: HEMATOLOGY/ONCOLOGY | Facility: CLINIC | Age: 84
End: 2019-02-22

## 2019-02-22 VITALS — SYSTOLIC BLOOD PRESSURE: 129 MMHG | HEART RATE: 64 BPM | RESPIRATION RATE: 18 BRPM | DIASTOLIC BLOOD PRESSURE: 61 MMHG

## 2019-02-22 DIAGNOSIS — D47.3 ESSENTIAL THROMBOCYTOSIS: ICD-10-CM

## 2019-02-22 DIAGNOSIS — D63.1 ANEMIA OF RENAL DISEASE: Primary | ICD-10-CM

## 2019-02-22 DIAGNOSIS — N18.30 CHRONIC KIDNEY DISEASE, STAGE III (MODERATE): ICD-10-CM

## 2019-02-22 DIAGNOSIS — N18.9 ANEMIA OF RENAL DISEASE: Primary | ICD-10-CM

## 2019-02-22 PROCEDURE — 63600175 PHARM REV CODE 636 W HCPCS: Mod: HCNC | Performed by: INTERNAL MEDICINE

## 2019-02-22 PROCEDURE — 63600175 PHARM REV CODE 636 W HCPCS: Mod: HCNC,JG | Performed by: INTERNAL MEDICINE

## 2019-02-22 NOTE — NURSING
Pt arrived for Procrit, not approved from insurance.  Insurance wants Retacrit.  Order put in, but still not approved by insurance.  Sent pt home, notified Padmaja Foster RN and pharmacy.  Instructed pt we will call him.

## 2019-02-22 NOTE — TELEPHONE ENCOUNTER
----- Message from Amira Bay sent at 2/22/2019  2:16 PM CST -----  Contact: self  Patient need to speak with nurse regarding injections scheduled for 3/1/2019.   Pt states need to changed lab appointment scheduled for 3/6/2019 need it on Monday 2/25/2019   Please call patient [phone]    Patient called regarding his Procrit appointment. Appointment for injection was not approved by insurance and patient had to wait.     Patient canceled all appointments until he sees Dr Gutierrez on 3/01.

## 2019-02-25 ENCOUNTER — ANTI-COAG VISIT (OUTPATIENT)
Dept: CARDIOLOGY | Facility: CLINIC | Age: 84
End: 2019-02-25

## 2019-02-25 DIAGNOSIS — I48.0 PAROXYSMAL ATRIAL FIBRILLATION: ICD-10-CM

## 2019-02-25 DIAGNOSIS — Z79.01 LONG TERM CURRENT USE OF ANTICOAGULANT THERAPY: ICD-10-CM

## 2019-02-25 LAB — INR PPP: 1.7

## 2019-02-28 ENCOUNTER — ANTI-COAG VISIT (OUTPATIENT)
Dept: CARDIOLOGY | Facility: CLINIC | Age: 84
End: 2019-02-28

## 2019-02-28 DIAGNOSIS — I48.0 PAROXYSMAL ATRIAL FIBRILLATION: ICD-10-CM

## 2019-02-28 DIAGNOSIS — Z79.01 LONG TERM CURRENT USE OF ANTICOAGULANT THERAPY: ICD-10-CM

## 2019-02-28 LAB — INR PPP: 1.9

## 2019-03-01 ENCOUNTER — OFFICE VISIT (OUTPATIENT)
Dept: HEMATOLOGY/ONCOLOGY | Facility: CLINIC | Age: 84
End: 2019-03-01
Payer: MEDICARE

## 2019-03-01 ENCOUNTER — LAB VISIT (OUTPATIENT)
Dept: LAB | Facility: HOSPITAL | Age: 84
End: 2019-03-01
Attending: INTERNAL MEDICINE
Payer: MEDICARE

## 2019-03-01 VITALS
BODY MASS INDEX: 26.4 KG/M2 | TEMPERATURE: 98 F | HEART RATE: 60 BPM | OXYGEN SATURATION: 99 % | WEIGHT: 178.81 LBS | DIASTOLIC BLOOD PRESSURE: 67 MMHG | RESPIRATION RATE: 16 BRPM | SYSTOLIC BLOOD PRESSURE: 146 MMHG

## 2019-03-01 DIAGNOSIS — E53.8 FOLATE DEFICIENCY: ICD-10-CM

## 2019-03-01 DIAGNOSIS — D64.9 ANEMIA, UNSPECIFIED TYPE: ICD-10-CM

## 2019-03-01 DIAGNOSIS — D51.0 PERNICIOUS ANEMIA: ICD-10-CM

## 2019-03-01 DIAGNOSIS — D75.839 THROMBOCYTOSIS: ICD-10-CM

## 2019-03-01 DIAGNOSIS — D75.839 THROMBOCYTHEMIA: ICD-10-CM

## 2019-03-01 DIAGNOSIS — D63.1 ANEMIA OF RENAL DISEASE: ICD-10-CM

## 2019-03-01 DIAGNOSIS — D47.3 ESSENTIAL THROMBOCYTHEMIA: ICD-10-CM

## 2019-03-01 DIAGNOSIS — Z09 CHEMOTHERAPY FOLLOW-UP EXAMINATION: ICD-10-CM

## 2019-03-01 DIAGNOSIS — N18.9 ANEMIA OF RENAL DISEASE: ICD-10-CM

## 2019-03-01 DIAGNOSIS — D47.3 ESSENTIAL THROMBOCYTOSIS: Primary | ICD-10-CM

## 2019-03-01 LAB
ABO + RH BLD: NORMAL
ALBUMIN SERPL BCP-MCNC: 3.7 G/DL
ALP SERPL-CCNC: 82 U/L
ALT SERPL W/O P-5'-P-CCNC: 19 U/L
ANION GAP SERPL CALC-SCNC: 8 MMOL/L
ANISOCYTOSIS BLD QL SMEAR: SLIGHT
AST SERPL-CCNC: 22 U/L
BASOPHILS # BLD AUTO: 0.02 K/UL
BASOPHILS NFR BLD: 0.6 %
BILIRUB SERPL-MCNC: 2.1 MG/DL
BLD GP AB SCN CELLS X3 SERPL QL: NORMAL
BUN SERPL-MCNC: 49 MG/DL
CALCIUM SERPL-MCNC: 9.2 MG/DL
CHLORIDE SERPL-SCNC: 99 MMOL/L
CO2 SERPL-SCNC: 21 MMOL/L
CREAT SERPL-MCNC: 1.8 MG/DL
DIFFERENTIAL METHOD: ABNORMAL
EOSINOPHIL # BLD AUTO: 0 K/UL
EOSINOPHIL NFR BLD: 0.8 %
ERYTHROCYTE [DISTWIDTH] IN BLOOD BY AUTOMATED COUNT: ABNORMAL %
EST. GFR  (AFRICAN AMERICAN): 38.3 ML/MIN/1.73 M^2
EST. GFR  (NON AFRICAN AMERICAN): 33.1 ML/MIN/1.73 M^2
FERRITIN SERPL-MCNC: 844 NG/ML
FOLATE SERPL-MCNC: 7.5 NG/ML
GLUCOSE SERPL-MCNC: 98 MG/DL
HCT VFR BLD AUTO: 29.1 %
HGB BLD-MCNC: 9.6 G/DL
HYPOCHROMIA BLD QL SMEAR: ABNORMAL
IMM GRANULOCYTES # BLD AUTO: 0.03 K/UL
IMM GRANULOCYTES NFR BLD AUTO: 0.8 %
IRON SERPL-MCNC: 211 UG/DL
LYMPHOCYTES # BLD AUTO: 0.9 K/UL
LYMPHOCYTES NFR BLD: 24 %
MCH RBC QN AUTO: 37.2 PG
MCHC RBC AUTO-ENTMCNC: 33 G/DL
MCV RBC AUTO: 113 FL
MONOCYTES # BLD AUTO: 0.3 K/UL
MONOCYTES NFR BLD: 9.1 %
NEUTROPHILS # BLD AUTO: 2.4 K/UL
NEUTROPHILS NFR BLD: 64.7 %
NRBC BLD-RTO: 0 /100 WBC
OVALOCYTES BLD QL SMEAR: ABNORMAL
PLATELET # BLD AUTO: 342 K/UL
PLATELET BLD QL SMEAR: ABNORMAL
PMV BLD AUTO: 9.2 FL
POIKILOCYTOSIS BLD QL SMEAR: SLIGHT
POLYCHROMASIA BLD QL SMEAR: ABNORMAL
POTASSIUM SERPL-SCNC: 4.6 MMOL/L
PROT SERPL-MCNC: 7 G/DL
RBC # BLD AUTO: 2.58 M/UL
RETICS/RBC NFR AUTO: 1.4 %
SATURATED IRON: 82 %
SODIUM SERPL-SCNC: 128 MMOL/L
TOTAL IRON BINDING CAPACITY: 256 UG/DL
TRANSFERRIN SERPL-MCNC: 173 MG/DL
URATE SERPL-MCNC: 7.6 MG/DL
VIT B12 SERPL-MCNC: 479 PG/ML
WBC # BLD AUTO: 3.63 K/UL

## 2019-03-01 PROCEDURE — 99999 PR PBB SHADOW E&M-EST. PATIENT-LVL III: ICD-10-PCS | Mod: PBBFAC,HCNC,, | Performed by: INTERNAL MEDICINE

## 2019-03-01 PROCEDURE — 82746 ASSAY OF FOLIC ACID SERUM: CPT | Mod: HCNC

## 2019-03-01 PROCEDURE — 99499 UNLISTED E&M SERVICE: CPT | Mod: HCNC,S$GLB,, | Performed by: INTERNAL MEDICINE

## 2019-03-01 PROCEDURE — 1101F PT FALLS ASSESS-DOCD LE1/YR: CPT | Mod: HCNC,CPTII,S$GLB, | Performed by: INTERNAL MEDICINE

## 2019-03-01 PROCEDURE — 83540 ASSAY OF IRON: CPT | Mod: HCNC

## 2019-03-01 PROCEDURE — 99214 PR OFFICE/OUTPT VISIT, EST, LEVL IV, 30-39 MIN: ICD-10-PCS | Mod: HCNC,S$GLB,, | Performed by: INTERNAL MEDICINE

## 2019-03-01 PROCEDURE — 36415 COLL VENOUS BLD VENIPUNCTURE: CPT | Mod: HCNC

## 2019-03-01 PROCEDURE — 99214 OFFICE O/P EST MOD 30 MIN: CPT | Mod: HCNC,S$GLB,, | Performed by: INTERNAL MEDICINE

## 2019-03-01 PROCEDURE — 1101F PR PT FALLS ASSESS DOC 0-1 FALLS W/OUT INJ PAST YR: ICD-10-PCS | Mod: HCNC,CPTII,S$GLB, | Performed by: INTERNAL MEDICINE

## 2019-03-01 PROCEDURE — 85025 COMPLETE CBC W/AUTO DIFF WBC: CPT | Mod: HCNC

## 2019-03-01 PROCEDURE — 99499 RISK ADDL DX/OHS AUDIT: ICD-10-PCS | Mod: HCNC,S$GLB,, | Performed by: INTERNAL MEDICINE

## 2019-03-01 PROCEDURE — 82607 VITAMIN B-12: CPT | Mod: HCNC

## 2019-03-01 PROCEDURE — 99999 PR PBB SHADOW E&M-EST. PATIENT-LVL III: CPT | Mod: PBBFAC,HCNC,, | Performed by: INTERNAL MEDICINE

## 2019-03-01 PROCEDURE — 85045 AUTOMATED RETICULOCYTE COUNT: CPT | Mod: HCNC

## 2019-03-01 PROCEDURE — 82728 ASSAY OF FERRITIN: CPT | Mod: HCNC

## 2019-03-01 PROCEDURE — 84550 ASSAY OF BLOOD/URIC ACID: CPT | Mod: HCNC

## 2019-03-01 PROCEDURE — 86901 BLOOD TYPING SEROLOGIC RH(D): CPT | Mod: HCNC

## 2019-03-01 PROCEDURE — 80053 COMPREHEN METABOLIC PANEL: CPT | Mod: HCNC

## 2019-03-01 RX ORDER — HYDROXYUREA 500 MG/1
CAPSULE ORAL
Qty: 150 CAPSULE | Refills: 10 | Status: ON HOLD | OUTPATIENT
Start: 2019-03-01 | End: 2019-03-23 | Stop reason: HOSPADM

## 2019-03-01 NOTE — Clinical Note
Please seek authorization for darbepoetin plan. Once approved, please schedule first injection per patient preference with CBC, CMP, type and screen on day of 1st appt.Schedule follow-up appt 4 weeks after darbepoetin shot with CBC, CMp, Type and screen. Please schedule chemo appt for second shot on that day.

## 2019-03-04 ENCOUNTER — ANTI-COAG VISIT (OUTPATIENT)
Dept: CARDIOLOGY | Facility: CLINIC | Age: 84
End: 2019-03-04

## 2019-03-04 DIAGNOSIS — I48.0 PAROXYSMAL ATRIAL FIBRILLATION: ICD-10-CM

## 2019-03-04 DIAGNOSIS — Z79.01 LONG TERM CURRENT USE OF ANTICOAGULANT THERAPY: ICD-10-CM

## 2019-03-04 LAB — INR PPP: 1.7

## 2019-03-05 NOTE — ASSESSMENT & PLAN NOTE
Platelet count at goal. Continue hydroxyurea at current dosing schedule (1000 mg on days 1 and 2 and 500 mg on day 3)

## 2019-03-05 NOTE — PROGRESS NOTES
"HEMATOLOGIC MALIGNANCIES CONSULT NOTE    IDENTIFYING STATEMENT   Ernie Perez) is a 87 y.o. male with a  of 3/19/1931 from [place], referred by Dr. Arnulfo Welsh for evaluation of essential thrombocytosis.     HISTORY OF PRESENT ILLNESS:      Ms. Phan is 87 and presents to establish care with me for management of essential thrombocytosis. He was previously followed by Dr. Welsh. Please see his history below:    "Mr. Phan is an 87-year-old man with essential   thrombocytosis.  I evaluated him initially in 2016.  His platelet count   had been high since , but it became much higher in  with values between   888,000 to 1,126,000.  A bone marrow examination had 80% cellularity with slight   dyserythropoiesis.  There was an increased number of large megakaryocytes along   with some clusters of megakaryocytes and there was grade I reticulin fibrosis. A   JAK2 V617F mutation study was positive at 29%.  He is taking aspirin 81 mg   daily and he also takes warfarin because of paroxysmal atrial fibrillation.     Following the diagnosis of essential thrombocytosis, he began to take   hydroxyurea and that drug has been very successful in lowering his platelet   count.  However, he has had far more anemia than many other patients with this   disorder.  This is likely related to his taking Imuran 200 mg a day for   myasthenia gravis and to renal impairment.     He has recently been requiring periodic red cell transfusions.  I attempted   to obtain Procrit, but was unable to get it approved for anemia related to a   myeloproliferative disease and cytotoxic chemotherapy.  The physician that I   talked to at his insurance carrier did not understand that Imuran and Hydrea can   have the same effects on red cell production as other cytotoxic drugs.  I may   be able to get at least low-dose Procrit approved for anemia of renal disease.     He is currently taking hydroxyurea on the following three-day " "schedule:  1 g; 1   g; 0.5 g.  This has been adequate in controlling his platelet count.  I have   been transfusing him with red cells when his hemoglobin in the range of 8.3-8.5   or below."    Today, he is feeling okay. He wants to know if he can receive ESAs for his anemia. He has required two transfusions in the last several months and wishes to avoid being transfusion dependent.     Past Medical History:   Diagnosis Date    *Atrial fibrillation     Arthritis     Atrial fibrillation     BPH (benign prostatic hypertrophy)     Degenerative disc disease     Depression     GERD (gastroesophageal reflux disease)     Hyperglycemia     Hyperlipidemia     Hypertension     MG (myasthenia gravis)     Postherpetic neuralgia        Family History   Problem Relation Age of Onset    Stroke Mother     Cancer Father        Social History     Socioeconomic History    Marital status:      Spouse name: Not on file    Number of children: Not on file    Years of education: Not on file    Highest education level: Not on file   Social Needs    Financial resource strain: Not on file    Food insecurity - worry: Not on file    Food insecurity - inability: Not on file    Transportation needs - medical: Not on file    Transportation needs - non-medical: Not on file   Occupational History    Not on file   Tobacco Use    Smoking status: Former Smoker    Smokeless tobacco: Never Used   Substance and Sexual Activity    Alcohol use: No    Drug use: No    Sexual activity: Not Currently   Other Topics Concern    Not on file   Social History Narrative    Not on file         MEDICATIONS:     Current Outpatient Medications on File Prior to Visit   Medication Sig Dispense Refill    aspirin 81 mg Tab Take 81 mg by mouth Daily.      azaTHIOprine (IMURAN) 50 mg Tab Take 4 tablets (200 mg total) by mouth once daily. 360 tablet 3    benazepril (LOTENSIN) 40 MG tablet TAKE 1 TABLET EVERY DAY 90 tablet 3    DENTURE " CLEANSER (EFFERVESCENT DENTURE CLEANSR DENT)       finasteride (PROSCAR) 5 mg tablet TAKE 1 TABLET EVERY DAY 90 tablet 3    FLUZONE HIGH-DOSE 2018-19, PF, 180 mcg/0.5 mL vaccine       hydroCHLOROthiazide (MICROZIDE) 12.5 mg capsule Take 1 capsule (12.5 mg total) by mouth once daily. 90 capsule 3    IRON, FERROUS SULFATE, ORAL Take 65 mg by mouth once daily.      ONE TOUCH ULTRA TEST Strp TEST DAILY 100 each 3    ONE TOUCH ULTRASOFT LANCETS lancets TEST DAILY 100 each 3    predniSONE (DELTASONE) 5 MG tablet Take 1 tablet (5 mg total) by mouth every other day. 45 tablet 3    pyridostigmine (MESTINON) 60 mg Tab Take 1 tablet (60 mg total) by mouth every 6 to 8 hours as needed. 180 tablet 1    tamsulosin (FLOMAX) 0.4 mg Cap Take 1 capsule (0.4 mg total) by mouth once daily. 90 capsule 3    tamsulosin (FLOMAX) 0.4 mg Cap TAKE 1 CAPSULE EVERY DAY 90 capsule 3    verapamil (CALAN) 120 MG tablet Take 1 tablet (120 mg total) by mouth 2 (two) times daily. 180 tablet 3    verapamil (VERELAN) 240 MG C24P TAKE 1 CAPSULE ONE TIME DAILY 90 capsule 3    vitamin D 1000 units Tab Take 185 mg by mouth once daily.      warfarin (COUMADIN) 5 MG tablet TAKE 1 TABLET EVERY DAY EXCEPT TAKE 1/2 TABLET ON SUNDAY, OR AS DIRECTED BY COUMADIN CLINIC 90 tablet 3     No current facility-administered medications on file prior to visit.        ALLERGIES: Review of patient's allergies indicates:  No Known Allergies     ROS:       Review of Systems - Oncology    PHYSICAL EXAM:  Vitals:    03/01/19 1309   BP: (!) 146/67   Pulse: 60   Resp: 16   Temp: 97.5 °F (36.4 °C)   SpO2: 99%   Weight: 81.1 kg (178 lb 12.7 oz)   PainSc: 0-No pain       Physical Exam    LAB:   Results for orders placed or performed in visit on 03/01/19   CBC auto differential   Result Value Ref Range    WBC 3.63 (L) 3.90 - 12.70 K/uL    RBC 2.58 (L) 4.60 - 6.20 M/uL    Hemoglobin 9.6 (L) 14.0 - 18.0 g/dL    Hematocrit 29.1 (L) 40.0 - 54.0 %     (H) 82 - 98 fL     MCH 37.2 (H) 27.0 - 31.0 pg    MCHC 33.0 32.0 - 36.0 g/dL    RDW SEE COMMENT 11.5 - 14.5 %    Platelets 342 150 - 350 K/uL    MPV 9.2 9.2 - 12.9 fL    Immature Granulocytes 0.8 (H) 0.0 - 0.5 %    Gran # (ANC) 2.4 1.8 - 7.7 K/uL    Immature Grans (Abs) 0.03 0.00 - 0.04 K/uL    Lymph # 0.9 (L) 1.0 - 4.8 K/uL    Mono # 0.3 0.3 - 1.0 K/uL    Eos # 0.0 0.0 - 0.5 K/uL    Baso # 0.02 0.00 - 0.20 K/uL    nRBC 0 0 /100 WBC    Gran% 64.7 38.0 - 73.0 %    Lymph% 24.0 18.0 - 48.0 %    Mono% 9.1 4.0 - 15.0 %    Eosinophil% 0.8 0.0 - 8.0 %    Basophil% 0.6 0.0 - 1.9 %    Platelet Estimate Appears normal     Aniso Slight     Poik Slight     Poly Occasional     Hypo Occasional     Ovalocytes Occasional     Differential Method Automated    Uric acid   Result Value Ref Range    Uric Acid 7.6 (H) 3.4 - 7.0 mg/dL   Ferritin   Result Value Ref Range    Ferritin 844 (H) 20.0 - 300.0 ng/mL   Iron and TIBC   Result Value Ref Range    Iron 211 (H) 45 - 160 ug/dL    Transferrin 173 (L) 200 - 375 mg/dL    TIBC 256 250 - 450 ug/dL    Saturated Iron 82 (H) 20 - 50 %   Vitamin B12   Result Value Ref Range    Vitamin B-12 479 210 - 950 pg/mL   Comprehensive metabolic panel   Result Value Ref Range    Sodium 128 (L) 136 - 145 mmol/L    Potassium 4.6 3.5 - 5.1 mmol/L    Chloride 99 95 - 110 mmol/L    CO2 21 (L) 23 - 29 mmol/L    Glucose 98 70 - 110 mg/dL    BUN, Bld 49 (H) 8 - 23 mg/dL    Creatinine 1.8 (H) 0.5 - 1.4 mg/dL    Calcium 9.2 8.7 - 10.5 mg/dL    Total Protein 7.0 6.0 - 8.4 g/dL    Albumin 3.7 3.5 - 5.2 g/dL    Total Bilirubin 2.1 (H) 0.1 - 1.0 mg/dL    Alkaline Phosphatase 82 55 - 135 U/L    AST 22 10 - 40 U/L    ALT 19 10 - 44 U/L    Anion Gap 8 8 - 16 mmol/L    eGFR if African American 38.3 (A) >60 mL/min/1.73 m^2    eGFR if non  33.1 (A) >60 mL/min/1.73 m^2   Reticulocytes   Result Value Ref Range    Retic 1.4 0.4 - 2.0 %   Folate   Result Value Ref Range    Folate 7.5 4.0 - 24.0 ng/mL   Type & Screen   Result  Value Ref Range    Group & Rh B POS     Indirect Devika NEG        PROBLEMS ASSESSED THIS VISIT:    1. Essential thrombocytosis    2. Thrombocytosis    3. Essential thrombocythemia    4. Anemia of renal disease    5. Chemotherapy follow-up examination        IMPRESSION:    1. Essential thrombocytosis    2. 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 Myasthenia gravis on azathioprine  4. Pulmonary hypertension  5. HTN  6. Atrial fibrillation  7. CKD-III    PLAN:       Essential thrombocytosis  Platelet count at goal. Continue hydroxyurea at current dosing schedule (1000 mg on days 1 and 2 and 500 mg on day 3)    Anemia of renal disease  Will manage with darbepoetin. Will begin once authorized.       Advance Care Planning   Not addressed today. will discuss within next 2 visits.     Follow-up to begin darbepoetin    Nick Gutierrez MD  Hematology and Stem Cell Transplant

## 2019-03-08 ENCOUNTER — TELEPHONE (OUTPATIENT)
Dept: NEUROLOGY | Facility: CLINIC | Age: 84
End: 2019-03-08

## 2019-03-08 DIAGNOSIS — I49.3 PVC (PREMATURE VENTRICULAR CONTRACTION): ICD-10-CM

## 2019-03-08 DIAGNOSIS — G70.00 MG (MYASTHENIA GRAVIS): ICD-10-CM

## 2019-03-08 DIAGNOSIS — G70.00 MYASTHENIA GRAVIS: ICD-10-CM

## 2019-03-08 RX ORDER — PREDNISONE 5 MG/1
TABLET ORAL
Qty: 45 TABLET | Refills: 3 | OUTPATIENT
Start: 2019-03-08

## 2019-03-08 RX ORDER — AZATHIOPRINE 50 MG/1
TABLET ORAL
Qty: 360 TABLET | Refills: 3 | OUTPATIENT
Start: 2019-03-08

## 2019-03-08 NOTE — TELEPHONE ENCOUNTER
The patient asked that I get the doctor on call to get the rx refill request handled for him. I will call back on Tuesday.

## 2019-03-10 RX ORDER — VERAPAMIL HYDROCHLORIDE 240 MG/1
CAPSULE, EXTENDED RELEASE ORAL
Qty: 90 CAPSULE | Refills: 3 | Status: ON HOLD | OUTPATIENT
Start: 2019-03-10 | End: 2019-04-03 | Stop reason: SDUPTHER

## 2019-03-11 ENCOUNTER — ANTI-COAG VISIT (OUTPATIENT)
Dept: CARDIOLOGY | Facility: CLINIC | Age: 84
End: 2019-03-11

## 2019-03-11 DIAGNOSIS — Z79.01 LONG TERM CURRENT USE OF ANTICOAGULANT THERAPY: ICD-10-CM

## 2019-03-11 DIAGNOSIS — I48.0 PAF (PAROXYSMAL ATRIAL FIBRILLATION): ICD-10-CM

## 2019-03-11 DIAGNOSIS — I48.0 PAROXYSMAL ATRIAL FIBRILLATION: ICD-10-CM

## 2019-03-11 LAB — INR PPP: 1.8

## 2019-03-11 RX ORDER — HYDROCHLOROTHIAZIDE 12.5 MG/1
12.5 CAPSULE ORAL DAILY
Qty: 90 CAPSULE | Refills: 3 | Status: SHIPPED | OUTPATIENT
Start: 2019-03-11 | End: 2019-03-18 | Stop reason: SDUPTHER

## 2019-03-12 ENCOUNTER — TELEPHONE (OUTPATIENT)
Dept: HEMATOLOGY/ONCOLOGY | Facility: CLINIC | Age: 84
End: 2019-03-12

## 2019-03-12 NOTE — TELEPHONE ENCOUNTER
Spoke to patient. Scheduled appts.    ----- Message from Quinten Chiu sent at 3/12/2019  4:08 PM CDT -----  Contact: Patient  Pt would like a call ASAP has a few questions regarding an injection.      Contact:: [phone] or [phone]     negative...

## 2019-03-14 ENCOUNTER — ANTI-COAG VISIT (OUTPATIENT)
Dept: CARDIOLOGY | Facility: CLINIC | Age: 84
End: 2019-03-14

## 2019-03-14 DIAGNOSIS — I48.0 PAROXYSMAL ATRIAL FIBRILLATION: ICD-10-CM

## 2019-03-14 DIAGNOSIS — Z79.01 LONG TERM CURRENT USE OF ANTICOAGULANT THERAPY: ICD-10-CM

## 2019-03-14 LAB — INR PPP: 1.8

## 2019-03-18 ENCOUNTER — ANTI-COAG VISIT (OUTPATIENT)
Dept: CARDIOLOGY | Facility: CLINIC | Age: 84
End: 2019-03-18
Payer: MEDICARE

## 2019-03-18 DIAGNOSIS — I48.0 PAF (PAROXYSMAL ATRIAL FIBRILLATION): ICD-10-CM

## 2019-03-18 DIAGNOSIS — I48.0 PAROXYSMAL ATRIAL FIBRILLATION: ICD-10-CM

## 2019-03-18 DIAGNOSIS — Z79.01 LONG TERM CURRENT USE OF ANTICOAGULANT THERAPY: ICD-10-CM

## 2019-03-18 LAB — INR PPP: 2.7

## 2019-03-18 PROCEDURE — G0250 MD INR TEST REVIE INTER MGMT: HCPCS | Mod: S$GLB,,,

## 2019-03-18 PROCEDURE — G0250 PR MD REVIEW INTERPRET OF TEST: ICD-10-PCS | Mod: S$GLB,,,

## 2019-03-19 ENCOUNTER — HOSPITAL ENCOUNTER (INPATIENT)
Facility: HOSPITAL | Age: 84
LOS: 4 days | Discharge: HOME HEALTH | DRG: 378 | End: 2019-03-23
Attending: EMERGENCY MEDICINE | Admitting: HOSPITALIST
Payer: MEDICARE

## 2019-03-19 ENCOUNTER — ANESTHESIA EVENT (OUTPATIENT)
Dept: ENDOSCOPY | Facility: HOSPITAL | Age: 84
DRG: 378 | End: 2019-03-19
Payer: MEDICARE

## 2019-03-19 DIAGNOSIS — D64.9 SYMPTOMATIC ANEMIA: ICD-10-CM

## 2019-03-19 DIAGNOSIS — N18.3 ACUTE RENAL FAILURE SUPERIMPOSED ON STAGE 3 CHRONIC KIDNEY DISEASE, UNSPECIFIED ACUTE RENAL FAILURE TYPE: ICD-10-CM

## 2019-03-19 DIAGNOSIS — Z79.60 LONG-TERM USE OF IMMUNOSUPPRESSANT MEDICATION: ICD-10-CM

## 2019-03-19 DIAGNOSIS — D47.3 ESSENTIAL THROMBOCYTOSIS: ICD-10-CM

## 2019-03-19 DIAGNOSIS — D62 ACUTE BLOOD LOSS ANEMIA: ICD-10-CM

## 2019-03-19 DIAGNOSIS — G70.00 MG (MYASTHENIA GRAVIS): ICD-10-CM

## 2019-03-19 DIAGNOSIS — D70.9 NEUTROPENIA, UNSPECIFIED TYPE: ICD-10-CM

## 2019-03-19 DIAGNOSIS — K62.5 RECTAL BLEEDING: Primary | ICD-10-CM

## 2019-03-19 DIAGNOSIS — G70.00 MYASTHENIA GRAVIS: ICD-10-CM

## 2019-03-19 DIAGNOSIS — D64.9 ANEMIA, UNSPECIFIED TYPE: ICD-10-CM

## 2019-03-19 DIAGNOSIS — G70.00 MYASTHENIA GRAVIS WITHOUT ACUTE EXACERBATION: ICD-10-CM

## 2019-03-19 DIAGNOSIS — N17.9 ACUTE RENAL FAILURE SUPERIMPOSED ON STAGE 3 CHRONIC KIDNEY DISEASE, UNSPECIFIED ACUTE RENAL FAILURE TYPE: ICD-10-CM

## 2019-03-19 PROBLEM — K92.2 GASTROINTESTINAL HEMORRHAGE: Status: ACTIVE | Noted: 2019-03-19

## 2019-03-19 PROBLEM — N18.30 ACUTE RENAL FAILURE SUPERIMPOSED ON STAGE 3 CHRONIC KIDNEY DISEASE: Status: ACTIVE | Noted: 2019-03-19

## 2019-03-19 LAB
ABO + RH BLD: NORMAL
ALBUMIN SERPL BCP-MCNC: 3.7 G/DL
ALP SERPL-CCNC: 88 U/L
ALT SERPL W/O P-5'-P-CCNC: 36 U/L
ANION GAP SERPL CALC-SCNC: 13 MMOL/L
ANION GAP SERPL CALC-SCNC: 9 MMOL/L
ANISOCYTOSIS BLD QL SMEAR: ABNORMAL
ANISOCYTOSIS BLD QL SMEAR: SLIGHT
AST SERPL-CCNC: 55 U/L
BASOPHILS # BLD AUTO: 0 K/UL
BASOPHILS # BLD AUTO: ABNORMAL K/UL
BASOPHILS NFR BLD: 0 %
BASOPHILS NFR BLD: 0 %
BILIRUB SERPL-MCNC: 1.4 MG/DL
BILIRUB UR QL STRIP: NEGATIVE
BLD GP AB SCN CELLS X3 SERPL QL: NORMAL
BUN SERPL-MCNC: 45 MG/DL
BUN SERPL-MCNC: 67 MG/DL
BURR CELLS BLD QL SMEAR: ABNORMAL
CALCIUM SERPL-MCNC: 9.2 MG/DL
CALCIUM SERPL-MCNC: 9.3 MG/DL
CHLORIDE SERPL-SCNC: 109 MMOL/L
CHLORIDE SERPL-SCNC: 111 MMOL/L
CLARITY UR REFRACT.AUTO: CLEAR
CO2 SERPL-SCNC: 14 MMOL/L
CO2 SERPL-SCNC: 16 MMOL/L
COLOR UR AUTO: YELLOW
CREAT SERPL-MCNC: 1.5 MG/DL
CREAT SERPL-MCNC: 2.2 MG/DL
DIFFERENTIAL METHOD: ABNORMAL
DIFFERENTIAL METHOD: ABNORMAL
EOSINOPHIL # BLD AUTO: 0 K/UL
EOSINOPHIL # BLD AUTO: ABNORMAL K/UL
EOSINOPHIL NFR BLD: 0.8 %
EOSINOPHIL NFR BLD: 1 %
ERYTHROCYTE [DISTWIDTH] IN BLOOD BY AUTOMATED COUNT: ABNORMAL %
ERYTHROCYTE [DISTWIDTH] IN BLOOD BY AUTOMATED COUNT: ABNORMAL %
EST. GFR  (AFRICAN AMERICAN): 29.8 ML/MIN/1.73 M^2
EST. GFR  (AFRICAN AMERICAN): 47.4 ML/MIN/1.73 M^2
EST. GFR  (NON AFRICAN AMERICAN): 25.8 ML/MIN/1.73 M^2
EST. GFR  (NON AFRICAN AMERICAN): 41 ML/MIN/1.73 M^2
GLUCOSE SERPL-MCNC: 94 MG/DL
GLUCOSE SERPL-MCNC: 98 MG/DL
GLUCOSE UR QL STRIP: NEGATIVE
HCT VFR BLD AUTO: 21.7 %
HCT VFR BLD AUTO: 24.3 %
HGB BLD-MCNC: 6.9 G/DL
HGB BLD-MCNC: 8.1 G/DL
HGB UR QL STRIP: NEGATIVE
HYPOCHROMIA BLD QL SMEAR: ABNORMAL
IMM GRANULOCYTES # BLD AUTO: 0.04 K/UL
IMM GRANULOCYTES # BLD AUTO: ABNORMAL K/UL
IMM GRANULOCYTES NFR BLD AUTO: 3.1 %
IMM GRANULOCYTES NFR BLD AUTO: ABNORMAL %
INR PPP: 2.4
INR PPP: 2.4
KETONES UR QL STRIP: NEGATIVE
LEUKOCYTE ESTERASE UR QL STRIP: NEGATIVE
LYMPHOCYTES # BLD AUTO: 0.4 K/UL
LYMPHOCYTES # BLD AUTO: ABNORMAL K/UL
LYMPHOCYTES NFR BLD: 32.8 %
LYMPHOCYTES NFR BLD: 37 %
MCH RBC QN AUTO: 36.8 PG
MCH RBC QN AUTO: 38.3 PG
MCHC RBC AUTO-ENTMCNC: 31.8 G/DL
MCHC RBC AUTO-ENTMCNC: 33.3 G/DL
MCV RBC AUTO: 111 FL
MCV RBC AUTO: 121 FL
MONOCYTES # BLD AUTO: 0.1 K/UL
MONOCYTES # BLD AUTO: ABNORMAL K/UL
MONOCYTES NFR BLD: 7 %
MONOCYTES NFR BLD: 8.6 %
NEUTROPHILS # BLD AUTO: 0.7 K/UL
NEUTROPHILS NFR BLD: 54 %
NEUTROPHILS NFR BLD: 54.7 %
NEUTS BAND NFR BLD MANUAL: 1 %
NITRITE UR QL STRIP: NEGATIVE
NRBC BLD-RTO: 0 /100 WBC
NRBC BLD-RTO: 0 /100 WBC
OVALOCYTES BLD QL SMEAR: ABNORMAL
OVALOCYTES BLD QL SMEAR: ABNORMAL
PH UR STRIP: 5 [PH] (ref 5–8)
PLATELET # BLD AUTO: 193 K/UL
PLATELET # BLD AUTO: 266 K/UL
PLATELET BLD QL SMEAR: ABNORMAL
PLATELET BLD QL SMEAR: ABNORMAL
PMV BLD AUTO: 10.5 FL
PMV BLD AUTO: 9.4 FL
POIKILOCYTOSIS BLD QL SMEAR: SLIGHT
POIKILOCYTOSIS BLD QL SMEAR: SLIGHT
POLYCHROMASIA BLD QL SMEAR: ABNORMAL
POLYCHROMASIA BLD QL SMEAR: ABNORMAL
POTASSIUM SERPL-SCNC: 4.5 MMOL/L
POTASSIUM SERPL-SCNC: 5.2 MMOL/L
PROT SERPL-MCNC: 7.2 G/DL
PROT UR QL STRIP: NEGATIVE
PROTHROMBIN TIME: 22.8 SEC
PROTHROMBIN TIME: 23.2 SEC
RBC # BLD AUTO: 1.8 M/UL
RBC # BLD AUTO: 2.2 M/UL
SODIUM SERPL-SCNC: 136 MMOL/L
SODIUM SERPL-SCNC: 136 MMOL/L
SP GR UR STRIP: 1.01 (ref 1–1.03)
URN SPEC COLLECT METH UR: NORMAL
WBC # BLD AUTO: 1.28 K/UL
WBC # BLD AUTO: 1.6 K/UL

## 2019-03-19 PROCEDURE — 99223 PR INITIAL HOSPITAL CARE,LEVL III: ICD-10-PCS | Mod: HCNC,AI,, | Performed by: HOSPITALIST

## 2019-03-19 PROCEDURE — 99291 PR CRITICAL CARE, E/M 30-74 MINUTES: ICD-10-PCS | Mod: ,,, | Performed by: EMERGENCY MEDICINE

## 2019-03-19 PROCEDURE — 36430 TRANSFUSION BLD/BLD COMPNT: CPT | Mod: HCNC

## 2019-03-19 PROCEDURE — 99223 PR INITIAL HOSPITAL CARE,LEVL III: ICD-10-PCS | Mod: HCNC,GC,, | Performed by: INTERNAL MEDICINE

## 2019-03-19 PROCEDURE — 27201040 HC RC 50 FILTER: Mod: HCNC

## 2019-03-19 PROCEDURE — 99223 1ST HOSP IP/OBS HIGH 75: CPT | Mod: HCNC,AI,, | Performed by: HOSPITALIST

## 2019-03-19 PROCEDURE — 63600175 PHARM REV CODE 636 W HCPCS: Mod: HCNC | Performed by: PHYSICIAN ASSISTANT

## 2019-03-19 PROCEDURE — 85007 BL SMEAR W/DIFF WBC COUNT: CPT | Mod: HCNC

## 2019-03-19 PROCEDURE — 85025 COMPLETE CBC W/AUTO DIFF WBC: CPT | Mod: HCNC

## 2019-03-19 PROCEDURE — 25000003 PHARM REV CODE 250: Mod: HCNC | Performed by: HOSPITALIST

## 2019-03-19 PROCEDURE — 93010 ELECTROCARDIOGRAM REPORT: CPT | Mod: HCNC,,, | Performed by: INTERNAL MEDICINE

## 2019-03-19 PROCEDURE — 96376 TX/PRO/DX INJ SAME DRUG ADON: CPT | Mod: HCNC

## 2019-03-19 PROCEDURE — 99223 1ST HOSP IP/OBS HIGH 75: CPT | Mod: HCNC,GC,, | Performed by: INTERNAL MEDICINE

## 2019-03-19 PROCEDURE — 99223 1ST HOSP IP/OBS HIGH 75: CPT | Mod: HCNC,GC,, | Performed by: PSYCHIATRY & NEUROLOGY

## 2019-03-19 PROCEDURE — C9113 INJ PANTOPRAZOLE SODIUM, VIA: HCPCS | Mod: HCNC | Performed by: PHYSICIAN ASSISTANT

## 2019-03-19 PROCEDURE — 80053 COMPREHEN METABOLIC PANEL: CPT | Mod: HCNC

## 2019-03-19 PROCEDURE — 85027 COMPLETE CBC AUTOMATED: CPT | Mod: HCNC

## 2019-03-19 PROCEDURE — 63600175 PHARM REV CODE 636 W HCPCS: Mod: HCNC | Performed by: HOSPITALIST

## 2019-03-19 PROCEDURE — 12000002 HC ACUTE/MED SURGE SEMI-PRIVATE ROOM: Mod: HCNC

## 2019-03-19 PROCEDURE — 85610 PROTHROMBIN TIME: CPT | Mod: 91,HCNC

## 2019-03-19 PROCEDURE — P9038 RBC IRRADIATED: HCPCS | Mod: HCNC

## 2019-03-19 PROCEDURE — 86901 BLOOD TYPING SEROLOGIC RH(D): CPT | Mod: HCNC

## 2019-03-19 PROCEDURE — 96375 TX/PRO/DX INJ NEW DRUG ADDON: CPT | Mod: HCNC

## 2019-03-19 PROCEDURE — 25000003 PHARM REV CODE 250: Mod: HCNC | Performed by: PHYSICIAN ASSISTANT

## 2019-03-19 PROCEDURE — 93005 ELECTROCARDIOGRAM TRACING: CPT | Mod: HCNC

## 2019-03-19 PROCEDURE — 81003 URINALYSIS AUTO W/O SCOPE: CPT | Mod: HCNC

## 2019-03-19 PROCEDURE — 96361 HYDRATE IV INFUSION ADD-ON: CPT | Mod: HCNC

## 2019-03-19 PROCEDURE — 85610 PROTHROMBIN TIME: CPT | Mod: HCNC

## 2019-03-19 PROCEDURE — 99223 PR INITIAL HOSPITAL CARE,LEVL III: ICD-10-PCS | Mod: HCNC,GC,, | Performed by: PSYCHIATRY & NEUROLOGY

## 2019-03-19 PROCEDURE — 99291 CRITICAL CARE FIRST HOUR: CPT | Mod: ,,, | Performed by: EMERGENCY MEDICINE

## 2019-03-19 PROCEDURE — 99291 CRITICAL CARE FIRST HOUR: CPT | Mod: 25,HCNC

## 2019-03-19 PROCEDURE — 93010 EKG 12-LEAD: ICD-10-PCS | Mod: HCNC,,, | Performed by: INTERNAL MEDICINE

## 2019-03-19 PROCEDURE — 86920 COMPATIBILITY TEST SPIN: CPT | Mod: HCNC

## 2019-03-19 PROCEDURE — 96374 THER/PROPH/DIAG INJ IV PUSH: CPT | Mod: HCNC

## 2019-03-19 PROCEDURE — 25000003 PHARM REV CODE 250: Mod: HCNC | Performed by: STUDENT IN AN ORGANIZED HEALTH CARE EDUCATION/TRAINING PROGRAM

## 2019-03-19 PROCEDURE — 80048 BASIC METABOLIC PNL TOTAL CA: CPT | Mod: HCNC

## 2019-03-19 RX ORDER — FINASTERIDE 5 MG/1
5 TABLET, FILM COATED ORAL DAILY
Status: DISCONTINUED | OUTPATIENT
Start: 2019-03-19 | End: 2019-03-23 | Stop reason: HOSPADM

## 2019-03-19 RX ORDER — HYDROCODONE BITARTRATE AND ACETAMINOPHEN 500; 5 MG/1; MG/1
TABLET ORAL
Status: DISCONTINUED | OUTPATIENT
Start: 2019-03-19 | End: 2019-03-23 | Stop reason: HOSPADM

## 2019-03-19 RX ORDER — AZATHIOPRINE 50 MG/1
200 TABLET ORAL DAILY
Status: DISCONTINUED | OUTPATIENT
Start: 2019-03-19 | End: 2019-03-23 | Stop reason: HOSPADM

## 2019-03-19 RX ORDER — ONDANSETRON 8 MG/1
8 TABLET, ORALLY DISINTEGRATING ORAL EVERY 8 HOURS PRN
Status: DISCONTINUED | OUTPATIENT
Start: 2019-03-19 | End: 2019-03-23 | Stop reason: HOSPADM

## 2019-03-19 RX ORDER — GLUCAGON 1 MG
1 KIT INJECTION
Status: DISCONTINUED | OUTPATIENT
Start: 2019-03-19 | End: 2019-03-23 | Stop reason: HOSPADM

## 2019-03-19 RX ORDER — SODIUM CHLORIDE 0.9 % (FLUSH) 0.9 %
5 SYRINGE (ML) INJECTION
Status: DISCONTINUED | OUTPATIENT
Start: 2019-03-19 | End: 2019-03-23 | Stop reason: HOSPADM

## 2019-03-19 RX ORDER — PREDNISONE 5 MG/1
5 TABLET ORAL EVERY OTHER DAY
Status: DISCONTINUED | OUTPATIENT
Start: 2019-03-20 | End: 2019-03-19

## 2019-03-19 RX ORDER — TAMSULOSIN HYDROCHLORIDE 0.4 MG/1
1 CAPSULE ORAL DAILY
Status: DISCONTINUED | OUTPATIENT
Start: 2019-03-19 | End: 2019-03-23 | Stop reason: HOSPADM

## 2019-03-19 RX ORDER — PANTOPRAZOLE SODIUM 40 MG/10ML
40 INJECTION, POWDER, LYOPHILIZED, FOR SOLUTION INTRAVENOUS
Status: COMPLETED | OUTPATIENT
Start: 2019-03-19 | End: 2019-03-19

## 2019-03-19 RX ORDER — HYDROXYUREA 500 MG/1
500 CAPSULE ORAL DAILY
Status: DISCONTINUED | OUTPATIENT
Start: 2019-03-19 | End: 2019-03-22

## 2019-03-19 RX ORDER — ACETAMINOPHEN 325 MG/1
650 TABLET ORAL EVERY 4 HOURS PRN
Status: DISCONTINUED | OUTPATIENT
Start: 2019-03-19 | End: 2019-03-23 | Stop reason: HOSPADM

## 2019-03-19 RX ORDER — TAMSULOSIN HYDROCHLORIDE 0.4 MG/1
1 CAPSULE ORAL DAILY
Status: DISCONTINUED | OUTPATIENT
Start: 2019-03-19 | End: 2019-03-19

## 2019-03-19 RX ORDER — POLYETHYLENE GLYCOL 3350, SODIUM SULFATE ANHYDROUS, SODIUM BICARBONATE, SODIUM CHLORIDE, POTASSIUM CHLORIDE 236; 22.74; 6.74; 5.86; 2.97 G/4L; G/4L; G/4L; G/4L; G/4L
4000 POWDER, FOR SOLUTION ORAL ONCE
Status: COMPLETED | OUTPATIENT
Start: 2019-03-19 | End: 2019-03-19

## 2019-03-19 RX ORDER — IBUPROFEN 200 MG
24 TABLET ORAL
Status: DISCONTINUED | OUTPATIENT
Start: 2019-03-19 | End: 2019-03-23 | Stop reason: HOSPADM

## 2019-03-19 RX ORDER — PYRIDOSTIGMINE BROMIDE 60 MG/1
60 TABLET ORAL EVERY 6 HOURS
Status: DISCONTINUED | OUTPATIENT
Start: 2019-03-19 | End: 2019-03-19

## 2019-03-19 RX ORDER — HYDROCHLOROTHIAZIDE 12.5 MG/1
12.5 CAPSULE ORAL DAILY
Qty: 90 CAPSULE | Refills: 3 | Status: ON HOLD | OUTPATIENT
Start: 2019-03-19 | End: 2019-03-23 | Stop reason: HOSPADM

## 2019-03-19 RX ORDER — IBUPROFEN 200 MG
16 TABLET ORAL
Status: DISCONTINUED | OUTPATIENT
Start: 2019-03-19 | End: 2019-03-23 | Stop reason: HOSPADM

## 2019-03-19 RX ADMIN — SODIUM CHLORIDE 1000 ML: 0.9 INJECTION, SOLUTION INTRAVENOUS at 02:03

## 2019-03-19 RX ADMIN — PANTOPRAZOLE SODIUM 40 MG: 40 INJECTION, POWDER, FOR SOLUTION INTRAVENOUS at 05:03

## 2019-03-19 RX ADMIN — HYDROXYUREA 500 MG: 500 CAPSULE ORAL at 05:03

## 2019-03-19 RX ADMIN — METHYLPREDNISOLONE SODIUM SUCCINATE 4 MG: 40 INJECTION, POWDER, FOR SOLUTION INTRAMUSCULAR; INTRAVENOUS at 07:03

## 2019-03-19 RX ADMIN — Medication 5 MG: at 05:03

## 2019-03-19 RX ADMIN — POLYETHYLENE GLYCOL 3350, SODIUM SULFATE ANHYDROUS, SODIUM BICARBONATE, SODIUM CHLORIDE, POTASSIUM CHLORIDE 4000 ML: 236; 22.74; 6.74; 5.86; 2.97 POWDER, FOR SOLUTION ORAL at 09:03

## 2019-03-19 RX ADMIN — PANTOPRAZOLE SODIUM 40 MG: 40 INJECTION, POWDER, FOR SOLUTION INTRAVENOUS at 02:03

## 2019-03-19 RX ADMIN — AZATHIOPRINE 200 MG: 50 TABLET ORAL at 07:03

## 2019-03-19 RX ADMIN — FINASTERIDE 5 MG: 5 TABLET, FILM COATED ORAL at 05:03

## 2019-03-19 RX ADMIN — TAMSULOSIN HYDROCHLORIDE 0.4 MG: 0.4 CAPSULE ORAL at 06:03

## 2019-03-19 NOTE — ED NOTES
Pt resting comfortably and independently repositioned in stretcher with bed locked in lowest position for safety. NAD noted at this time. Respirations even and unlabored and visible chest rise noted. Blood infusing @ 125 ml/h. IV patent. No signs of a reaction noted. Pt instructed to call if assistance is needed. Pt on continuous cardiac, BP, and O2 monitoring. Call light within reach. Wife at bedside. No needs at this time. Will continue to monitor.

## 2019-03-19 NOTE — ED NOTES
Patient is resting comfortably. Wife at bedside. Continuing to ask about treatment plan. Instructed dr is on the way to talk to him.

## 2019-03-19 NOTE — SUBJECTIVE & OBJECTIVE
Past Medical History:   Diagnosis Date    *Atrial fibrillation     Arthritis     Atrial fibrillation     BPH (benign prostatic hypertrophy)     Degenerative disc disease     Depression     GERD (gastroesophageal reflux disease)     Hyperglycemia     Hyperlipidemia     Hypertension     MG (myasthenia gravis)     Postherpetic neuralgia        Past Surgical History:   Procedure Laterality Date    ACHILLES TENDON SURGERY      CHOLECYSTECTOMY         Review of patient's allergies indicates:  No Known Allergies    Current Neurological Medications:   Imuran 200mg qd  Prednisone 5mg qd  Mestinon 60mg PRN    No current facility-administered medications on file prior to encounter.      Current Outpatient Medications on File Prior to Encounter   Medication Sig    aspirin 81 mg Tab Take 81 mg by mouth Daily.    azaTHIOprine (IMURAN) 50 mg Tab Take 4 tablets (200 mg total) by mouth once daily.    benazepril (LOTENSIN) 40 MG tablet TAKE 1 TABLET EVERY DAY    DENTURE CLEANSER (EFFERVESCENT DENTURE CLEANSR DENT)     finasteride (PROSCAR) 5 mg tablet TAKE 1 TABLET EVERY DAY    FLUZONE HIGH-DOSE 2018-19, PF, 180 mcg/0.5 mL vaccine     hydroxyurea (HYDREA) 500 mg Cap Take 2 capsules (1,000 mg) on day 1; 2 caps (1,000 mg) on day 2; and then 1 capsule (500 mg) on day 3. Repeat continuously..    IRON, FERROUS SULFATE, ORAL Take 65 mg by mouth once daily.    ONE TOUCH ULTRA TEST Strp TEST DAILY    ONE TOUCH ULTRASOFT LANCETS lancets TEST DAILY    predniSONE (DELTASONE) 5 MG tablet Take 1 tablet (5 mg total) by mouth every other day.    pyridostigmine (MESTINON) 60 mg Tab Take 1 tablet (60 mg total) by mouth every 6 to 8 hours as needed.    tamsulosin (FLOMAX) 0.4 mg Cap Take 1 capsule (0.4 mg total) by mouth once daily.    tamsulosin (FLOMAX) 0.4 mg Cap TAKE 1 CAPSULE EVERY DAY    verapamil (VERELAN) 240 MG C24P TAKE 1 CAPSULE EVERY DAY    vitamin D 1000 units Tab Take 185 mg by mouth once daily.    warfarin  (COUMADIN) 5 MG tablet TAKE 1 TABLET EVERY DAY EXCEPT TAKE 1/2 TABLET ON SUNDAY, OR AS DIRECTED BY COUMADIN CLINIC    hydroCHLOROthiazide (MICROZIDE) 12.5 mg capsule Take 1 capsule (12.5 mg total) by mouth once daily.     Family History     Problem Relation (Age of Onset)    Cancer Father    Stroke Mother        Tobacco Use    Smoking status: Former Smoker    Smokeless tobacco: Never Used   Substance and Sexual Activity    Alcohol use: No    Drug use: No    Sexual activity: Not Currently     Review of Systems   Constitutional: Positive for fatigue.   Eyes: Negative for visual disturbance.   Respiratory: Positive for shortness of breath.    Cardiovascular: Negative for chest pain.   Gastrointestinal: Negative for abdominal pain.   Genitourinary: Negative for dysuria.   Musculoskeletal: Negative for back pain.   Neurological: Positive for weakness. Negative for numbness.   Psychiatric/Behavioral: Negative for agitation.     Objective:     Vital Signs (Most Recent):  Temp: 97.6 °F (36.4 °C) (03/19/19 0627)  Pulse: (!) 51 (03/19/19 1732)  Resp: 15 (03/19/19 1447)  BP: 129/60 (03/19/19 1732)  SpO2: 100 % (03/19/19 1732) Vital Signs (24h Range):  Temp:  [97.4 °F (36.3 °C)-97.6 °F (36.4 °C)] 97.6 °F (36.4 °C)  Pulse:  [48-85] 51  Resp:  [15-40] 15  SpO2:  [98 %-100 %] 100 %  BP: (102-151)/(53-70) 129/60     Weight: 77.1 kg (170 lb)  Body mass index is 25.1 kg/m².    Physical Exam   Constitutional: He is oriented to person, place, and time. He appears well-developed and well-nourished.   HENT:   Head: Normocephalic and atraumatic.   Eyes: EOM are normal. Pupils are equal, round, and reactive to light.   Neurological: He is oriented to person, place, and time. He has normal strength.       NEUROLOGICAL EXAMINATION:     MENTAL STATUS   Oriented to person, place, and time.   Level of consciousness: alert    CRANIAL NERVES     CN II   Visual fields full to confrontation.     CN III, IV, VI   Pupils are equal, round, and  reactive to light.  Extraocular motions are normal.     CN V   Facial sensation intact.     CN VII   Facial expression full, symmetric.     CN IX, X   CN IX normal.     CN XII   CN XII normal.     MOTOR EXAM   Muscle bulk: normal  Overall muscle tone: normal    Strength   Strength 5/5 throughout.   Right deltoid: 5/5  Left deltoid: 5/5  Right biceps: 5/5  Left biceps: 5/5  Right triceps: 5/5  Left triceps: 5/5  Right wrist flexion: 5/5  Left wrist flexion: 5/5  Right wrist extension: 5/5  Left wrist extension: 5/5  Right interossei: 5/5  Left interossei: 5/5  Right iliopsoas: 5/5  Left iliopsoas: 5/5  Right quadriceps: 5/5  Left quadriceps: 5/5  Right hamstrin/5  Left hamstrin/5  Right anterior tibial: 5/5  Left anterior tibial: 5/5  Right gastroc: 5/5  Left gastroc: 5/5       Neck flexion 5/5  Neck extension 5/5       Significant Labs:   Recent Lab Results       19  0639   19  0139        Immature Grans (Abs)   CANCELED  Comment:  Mild elevation in immature granulocytes is non specific and   can be seen in a variety of conditions including stress response,   acute inflammation, trauma and pregnancy. Correlation with other   laboratory and clinical findings is essential.    Result canceled by the ancillary.       Unit Blood Type Code   7300[P]        7300[P]     Unit Expiration   [id number][P]        [id number][P]     Unit Blood Type   B POS[P]        B POS[P]     Albumin   3.7     Alkaline Phosphatase   88     ALT   36     Anion Gap   13     Aniso   Moderate     Appearance, UA Clear       AST   55  Comment:  *Result may be interfered by visible hemolysis     BANDS   1.0     Baso #   Test Not Performed  Comment:  Corrected result; previously reported as 0.00 on %DDDDDDDD% at %TTT%.[C]     Basophil%   0.0     Bilirubin (UA) Negative       Total Bilirubin   1.4  Comment:  For infants and newborns, interpretation of results should be based  on gestational age, weight and in agreement with  clinical  observations.  Premature Infant recommended reference ranges:  Up to 24 hours.............<8.0 mg/dL  Up to 48 hours............<12.0 mg/dL  3-5 days..................<15.0 mg/dL  6-29 days.................<15.0 mg/dL       BUN, Bld   67     [place] Cells   Occasional     Calcium   9.2     Chloride   109     CO2   14     CODING SYSTEM   [id number][P]        [id number][P]     Color, UA Yellow       Creatinine   2.2     Differential Method   Manual     DISPENSE STATUS   CROSSMATCHED[P]        ISSUED[P]     eGFR if African American   29.8     eGFR if non    25.8  Comment:  Calculation used to obtain the estimated glomerular filtration  rate (eGFR) is the CKD-EPI equation.        Eos #   Test Not Performed  Comment:  Corrected result; previously reported as 0.0 on %DDDDDDDD% at %TTT%.[C]     Eosinophil%   1.0  Comment:  Corrected result; previously reported as 0.6 on %DDDDDDDD% at %TTT%.[C]     Glucose   98     Glucose, UA Negative       Gran%   54.0  Comment:  Corrected result; previously reported as 44.4 on %DDDDDDDD% at %TTT%.[C]     Group & Rh   B POS     Hematocrit   21.7     Hemoglobin   6.9     Immature Granulocytes   CANCELED  Comment:  Result canceled by the ancillary.     INDIRECT PHAN   NEG     Coumadin Monitoring INR   2.4  Comment:  Coumadin Therapy:  2.0 - 3.0 for INR for all indicators except mechanical heart valves  and antiphospholipid syndromes which should use 2.5 - 3.5.       Ketones, UA Negative       Leukocytes, UA Negative       Lymph #   Test Not Performed  Comment:  Corrected result; previously reported as 0.6 on %DDDDDDDD% at %TTT%.[C]     Lymph%   37.0  Comment:  Corrected result; previously reported as 36.9 on %DDDDDDDD% at %TTT%.[C]     MCH   38.3     MCHC   31.8     MCV   121     Mono #   Test Not Performed  Comment:  Corrected result; previously reported as 0.2 on %DDDDDDDD% at %TTT%.[C]     Mono%   7.0  Comment:  Corrected result; previously reported as 10.6 on  %DDDDDDDD% at %TTT%.[C]     MPV   10.5     Nitrite, UA Negative       nRBC   0     Occult Blood UA Negative       Ovalocytes   Occasional     pH, UA 5.0       Platelet Estimate   Appears normal     Platelets   266     Poik   Slight     Poly   Occasional     Potassium   5.2  Comment:  *Result may be interfered by visible hemolysis     PRODUCT CODE   [id number][P]        [id number][P]     Total Protein   7.2  Comment:  *Result may be interfered by visible hemolysis     Protein, UA Negative  Comment:  Recommend a 24 hour urine protein or a urine   protein/creatinine ratio if globulin induced proteinuria is  clinically suspected.         Protime   22.8     RBC   1.80     RDW   SEE COMMENT  Comment:  Result not available.     Sodium   136     Specific Gravity, UA 1.010       Specimen UA Urine, Clean Catch       UNIT NUMBER   [id number][P]        [id number][P]     WBC   1.60  Comment:  WBC critical result(s) called and verbal readback obtained from   FAIZAN SANDOVAL RN, 03/19/2019 02:09

## 2019-03-19 NOTE — ED NOTES
Assumed care of pt. Pt is resting comfortably on bed with wife at bedside. Pt asking about treatment plan at this time.

## 2019-03-19 NOTE — ED PROVIDER NOTES
Encounter Date: 3/19/2019       History     Chief Complaint   Patient presents with    Rectal Bleeding     Pt to ER stating he had a BM 30 minutes ago and had bright red blood when he wiped. Denies abd pain. He takes coumadin at home.      88-year-old male presents to the ER for evaluation of rectal bleeding.  The patient was sitting at home and went to the bathroom and noticed blood in his rectum, bright red blood.  He denies any nausea vomiting fever or chills tonight.  He reports for the past week increased fatigue, and increasing fatigue with exertion.   He denies any history of GI bleeding.  He is on coumadin for Afib.           Review of patient's allergies indicates:  No Known Allergies  Past Medical History:   Diagnosis Date    *Atrial fibrillation     Arthritis     Atrial fibrillation     BPH (benign prostatic hypertrophy)     Degenerative disc disease     Depression     GERD (gastroesophageal reflux disease)     Hyperglycemia     Hyperlipidemia     Hypertension     MG (myasthenia gravis)     Postherpetic neuralgia      Past Surgical History:   Procedure Laterality Date    ACHILLES TENDON SURGERY      CHOLECYSTECTOMY       Family History   Problem Relation Age of Onset    Stroke Mother     Cancer Father      Social History     Tobacco Use    Smoking status: Former Smoker    Smokeless tobacco: Never Used   Substance Use Topics    Alcohol use: No    Drug use: No     Review of Systems   Constitutional: Negative for fever.   HENT: Negative for sore throat.    Respiratory: Negative for shortness of breath.    Cardiovascular: Negative for chest pain.   Gastrointestinal: Positive for abdominal pain, anal bleeding and blood in stool. Negative for nausea and vomiting.   Genitourinary: Negative for dysuria.   Musculoskeletal: Negative for back pain.   Skin: Negative for rash.   Neurological: Negative for weakness.   Hematological: Bruises/bleeds easily.       Physical Exam     Initial Vitals  [03/19/19 0025]   BP Pulse Resp Temp SpO2   (!) 141/70 76 16 97.6 °F (36.4 °C) 100 %      MAP       --         Physical Exam    Constitutional: Vital signs are normal. He appears well-developed and well-nourished. He is not diaphoretic. No distress.   HENT:   Head: Normocephalic and atraumatic.   Right Ear: External ear normal.   Left Ear: External ear normal.   Eyes: Conjunctivae are normal.   Cardiovascular:   AFib, bradycardic   Abdominal: Soft. Normal appearance and bowel sounds are normal.   Abdomen soft and nontender   Genitourinary:   Genitourinary Comments: Bright red blood per rectum  No fissures or hemorrhoids, no pain on exam   Musculoskeletal: Normal range of motion.   Neurological: He is alert and oriented to person, place, and time.   Skin: Skin is warm and intact.   Psychiatric: He has a normal mood and affect. His speech is normal and behavior is normal. Cognition and memory are normal.         ED Course   Critical Care  Date/Time: 3/19/2019 4:12 AM  Performed by: Manoj Chamberlain PA-C  Authorized by: Phu Bennett MD   Direct patient critical care time: 15 minutes  Additional history critical care time: 5 minutes  Ordering / reviewing critical care time: 5 minutes  Documentation critical care time: 5 minutes  Consulting other physicians critical care time: 5 minutes  Consult with family critical care time: 5 minutes  Total critical care time (exclusive of procedural time) : 40 minutes  Critical care was necessary to treat or prevent imminent or life-threatening deterioration of the following conditions: Anemia requiring blood transfusion.  Critical care was time spent personally by me on the following activities: re-evaluation of patient's condition, ordering and performing treatments and interventions and examination of patient.        Labs Reviewed   CBC W/ AUTO DIFFERENTIAL - Abnormal; Notable for the following components:       Result Value    WBC 1.60 (*)     RBC 1.80 (*)     Hemoglobin  6.9 (*)     Hematocrit 21.7 (*)      (*)     MCH 38.3 (*)     MCHC 31.8 (*)     All other components within normal limits    Narrative:       WBC critical result(s) called and verbal readback obtained from   FAIZAN SANDOVAL RN, 03/19/2019 02:09   COMPREHENSIVE METABOLIC PANEL - Abnormal; Notable for the following components:    Potassium 5.2 (*)     CO2 14 (*)     BUN, Bld 67 (*)     Creatinine 2.2 (*)     Total Bilirubin 1.4 (*)     AST 55 (*)     eGFR if  29.8 (*)     eGFR if non  25.8 (*)     All other components within normal limits   PROTIME-INR - Abnormal; Notable for the following components:    Prothrombin Time 22.8 (*)     INR 2.4 (*)     All other components within normal limits   URINALYSIS, REFLEX TO URINE CULTURE   TYPE & SCREEN   PREPARE RBC SOFT          Imaging Results    None          Medical Decision Making:   Differential Diagnosis:   Diverticulosis, diverticulitis, upper GI bleed, lower GI bleed  ED Management:  88-year-old male with rectal bleeding.   Labs revealed neutropenia, ANC calculated at 880  Hemoglobin less than 7, patient consented and will start transfusion in the ED  After initial workup patient is slightly orthostatic with a raise in his heart rate,   Will reassess after 1 L of fluids plan on admission.  Patient NPO.  Patient's blood pressure remained stable  His heart rate does increase with orthostasis but the patient does not feel orthostatic  Case discussed with Hospital Medicine, he will be admitted hospital medicine                      Clinical Impression:       ICD-10-CM ICD-9-CM   1. Rectal bleeding K62.5 569.3   2. Neutropenia, unspecified type D70.9 288.00   3. Anemia, unspecified type D64.9 285.9         Disposition:   Disposition: Admitted  Condition: Stable                        Manoj Chamberlain PA-C  03/19/19 0413

## 2019-03-19 NOTE — ED TRIAGE NOTES
Pt presents to ED c/o bloody BM x1 today. Reports that the blood was bright red and was a large amount. Pt currently on Coumodin. Pt reports weakness and fatigue. Denies abd pain, CP, or SOB.    LOC: Patient name and date of birth verified.  The patient is awake, alert and aware of environment with an appropriate affect, the patient is oriented x 3 and speaking appropriately.  Pt in NAD.    APPEARANCE: Patient resting comfortably and in no acute distress, patient is clean and well groomed, patient's clothing is properly fastened.  SKIN: The skin is warm and dry, color consistent with ethnicity, patient has normal skin turgor and moist mucus membranes, skin intact, no breakdown or brusing noted.  MUSCULOSKELETAL: Patient moving all extremities well, no obvious swelling or deformities noted.  RESPIRATORY: Airway is open and patent, respirations are spontaneous, patient has a normal effort and rate, no accessory muscle use noted.  CARDIAC: Patient has a normal rate and rhythm, no periphreal edema noted, capillary refill < 3 seconds.  ABDOMEN: Soft and non tender to palpation, no distention noted. Bowel sounds present in all four quadrants.  NEUROLOGIC: Eyes open spontaneously, behavior appropriate to situation, follows commands, facial expression symmetrical, bilateral hand grasp equal and even, purposeful motor response noted, normal sensation in all extremities when touched with a finger.

## 2019-03-19 NOTE — HPI
88 yr old male with PMHx of A fib (on Coumadin), myasthenia gravis, essential thrombocytosis who presents to the ED with chief complaint of rectal bleeding. Neurology is being consulted for possible myasthenia flare.   History obtained from the patient and his wife. Patient reports that for the past 7-10 days, he has been having symptoms of generalized fatigue, dizziness, and shortness of breath. Patient reports that he walking a few steps within his condo has been because of the shortness of breath. Prior to 10 days back, patient reports that he intermittently has diplopia for the past 1 year. Denies symptoms of neck weakness, swallowing difficulties or worsening diplopia in the past 10 days however.  Patient's wife reports that over the past 1 year, patient has been slowing declining - specifically that he gets short winded and has weakness with minimal activity and that overall his activity level has decreased this past year compared to years prior. He has been on prednisone 5mg every other day and azathioprine 200mg daily for several years now.   He used to be followed by Dr. Crawford in clinic and most recently was followed by Dr. Gutierrez in neurology. His azathioprine has been managed by Dr. Diaz in rheumatology for myasthenia gravis.   On admit, patient's labs were remarkable for H/H of 6.9/21.7, a drop from 9.6/29.1, 2 weeks prior. He was administered one unit of pRBC by the primary team this morning.

## 2019-03-19 NOTE — ED NOTES
LOC: Patient name and date of birth verified for Ernie Phan. The patient is awake, alert and aware of environment with an appropriate affect, the patient is oriented x 3 and speaking appropriately.   APPEARANCE: Patient resting comfortably, patient is clean and well groomed, patient's clothing is properly fastened.  SKIN: The skin is warm and dry, color consistent with ethnicity, patient has normal skin turgor and moist mucus membranes, skin intact, no breakdown or bruising noted.  MUSCULOSKELETAL: Patient moving all extremities well, no obvious swelling or deformities noted.   RESPIRATORY: Respirations are spontaneous, patient has a normal effort and rate, no accessory muscle use noted.  CARDIAC: Patient has a normal rate, no periphreal edema noted.  ABDOMEN: Soft and non tender, no distention noted.   NEUROLOGIC: Eyes open spontaneously, behavior appropriate to situation, follows commands, facial expression symmetrical.

## 2019-03-20 ENCOUNTER — ANESTHESIA (OUTPATIENT)
Dept: ENDOSCOPY | Facility: HOSPITAL | Age: 84
DRG: 378 | End: 2019-03-20
Payer: MEDICARE

## 2019-03-20 LAB
ANION GAP SERPL CALC-SCNC: 9 MMOL/L
ANISOCYTOSIS BLD QL SMEAR: ABNORMAL
BASOPHILS # BLD AUTO: 0 K/UL
BASOPHILS NFR BLD: 0 %
BUN SERPL-MCNC: 38 MG/DL
CALCIUM SERPL-MCNC: 9.8 MG/DL
CHLORIDE SERPL-SCNC: 110 MMOL/L
CO2 SERPL-SCNC: 20 MMOL/L
CREAT SERPL-MCNC: 1.5 MG/DL
DIFFERENTIAL METHOD: ABNORMAL
EOSINOPHIL # BLD AUTO: 0 K/UL
EOSINOPHIL NFR BLD: 1.4 %
ERYTHROCYTE [DISTWIDTH] IN BLOOD BY AUTOMATED COUNT: ABNORMAL %
EST. GFR  (AFRICAN AMERICAN): 47.4 ML/MIN/1.73 M^2
EST. GFR  (NON AFRICAN AMERICAN): 41 ML/MIN/1.73 M^2
ESTIMATED AVG GLUCOSE: 105 MG/DL
GLUCOSE SERPL-MCNC: 92 MG/DL
HBA1C MFR BLD HPLC: 5.3 %
HCT VFR BLD AUTO: 26.1 %
HGB BLD-MCNC: 8.6 G/DL
HYPOCHROMIA BLD QL SMEAR: ABNORMAL
IMM GRANULOCYTES # BLD AUTO: 0.02 K/UL
IMM GRANULOCYTES NFR BLD AUTO: 1.4 %
INR PPP: 1.9
LYMPHOCYTES # BLD AUTO: 0.5 K/UL
LYMPHOCYTES NFR BLD: 33.8 %
MAGNESIUM SERPL-MCNC: 1.9 MG/DL
MCH RBC QN AUTO: 36.6 PG
MCHC RBC AUTO-ENTMCNC: 33 G/DL
MCV RBC AUTO: 111 FL
MONOCYTES # BLD AUTO: 0.1 K/UL
MONOCYTES NFR BLD: 9.4 %
NEUTROPHILS # BLD AUTO: 0.8 K/UL
NEUTROPHILS NFR BLD: 54 %
NRBC BLD-RTO: 0 /100 WBC
OVALOCYTES BLD QL SMEAR: ABNORMAL
PHOSPHATE SERPL-MCNC: 2.7 MG/DL
PLATELET # BLD AUTO: 209 K/UL
PMV BLD AUTO: 9.7 FL
POIKILOCYTOSIS BLD QL SMEAR: SLIGHT
POLYCHROMASIA BLD QL SMEAR: ABNORMAL
POTASSIUM SERPL-SCNC: 5.1 MMOL/L
PREALB SERPL-MCNC: 38 MG/DL
PROTHROMBIN TIME: 18.9 SEC
RBC # BLD AUTO: 2.35 M/UL
SODIUM SERPL-SCNC: 139 MMOL/L
WBC # BLD AUTO: 1.39 K/UL

## 2019-03-20 PROCEDURE — 85610 PROTHROMBIN TIME: CPT | Mod: HCNC

## 2019-03-20 PROCEDURE — 63600175 PHARM REV CODE 636 W HCPCS: Mod: HCNC | Performed by: NURSE ANESTHETIST, CERTIFIED REGISTERED

## 2019-03-20 PROCEDURE — 25000003 PHARM REV CODE 250: Mod: HCNC | Performed by: NURSE ANESTHETIST, CERTIFIED REGISTERED

## 2019-03-20 PROCEDURE — 83735 ASSAY OF MAGNESIUM: CPT | Mod: HCNC

## 2019-03-20 PROCEDURE — 84100 ASSAY OF PHOSPHORUS: CPT | Mod: HCNC

## 2019-03-20 PROCEDURE — 36415 COLL VENOUS BLD VENIPUNCTURE: CPT | Mod: HCNC

## 2019-03-20 PROCEDURE — 84134 ASSAY OF PREALBUMIN: CPT | Mod: HCNC

## 2019-03-20 PROCEDURE — 88305 TISSUE EXAM BY PATHOLOGIST: CPT | Mod: HCNC | Performed by: PATHOLOGY

## 2019-03-20 PROCEDURE — 25000003 PHARM REV CODE 250: Mod: HCNC | Performed by: HOSPITALIST

## 2019-03-20 PROCEDURE — 80048 BASIC METABOLIC PNL TOTAL CA: CPT | Mod: HCNC

## 2019-03-20 PROCEDURE — 37000009 HC ANESTHESIA EA ADD 15 MINS: Mod: HCNC | Performed by: INTERNAL MEDICINE

## 2019-03-20 PROCEDURE — 43235 PR EGD, FLEX, DIAGNOSTIC: ICD-10-PCS | Mod: 51,HCNC,, | Performed by: INTERNAL MEDICINE

## 2019-03-20 PROCEDURE — 63600175 PHARM REV CODE 636 W HCPCS: Mod: HCNC | Performed by: PHYSICIAN ASSISTANT

## 2019-03-20 PROCEDURE — 25000003 PHARM REV CODE 250: Mod: HCNC | Performed by: INTERNAL MEDICINE

## 2019-03-20 PROCEDURE — 88305 TISSUE EXAM BY PATHOLOGIST: CPT | Mod: 26,HCNC,, | Performed by: PATHOLOGY

## 2019-03-20 PROCEDURE — 27201012 HC FORCEPS, HOT/COLD, DISP: Mod: HCNC | Performed by: INTERNAL MEDICINE

## 2019-03-20 PROCEDURE — 83036 HEMOGLOBIN GLYCOSYLATED A1C: CPT | Mod: HCNC

## 2019-03-20 PROCEDURE — 99232 PR SUBSEQUENT HOSPITAL CARE,LEVL II: ICD-10-PCS | Mod: HCNC,GC,, | Performed by: PSYCHIATRY & NEUROLOGY

## 2019-03-20 PROCEDURE — 11000001 HC ACUTE MED/SURG PRIVATE ROOM: Mod: HCNC

## 2019-03-20 PROCEDURE — 45380 COLONOSCOPY AND BIOPSY: CPT | Mod: HCNC | Performed by: INTERNAL MEDICINE

## 2019-03-20 PROCEDURE — 99232 SBSQ HOSP IP/OBS MODERATE 35: CPT | Mod: HCNC,GC,, | Performed by: PSYCHIATRY & NEUROLOGY

## 2019-03-20 PROCEDURE — 99233 SBSQ HOSP IP/OBS HIGH 50: CPT | Mod: HCNC,,, | Performed by: HOSPITALIST

## 2019-03-20 PROCEDURE — 88305 TISSUE SPECIMEN TO PATHOLOGY - SURGERY: ICD-10-PCS | Mod: 26,HCNC,, | Performed by: PATHOLOGY

## 2019-03-20 PROCEDURE — 43235 EGD DIAGNOSTIC BRUSH WASH: CPT | Mod: 51,HCNC,, | Performed by: INTERNAL MEDICINE

## 2019-03-20 PROCEDURE — 45380 COLONOSCOPY AND BIOPSY: CPT | Mod: HCNC,GC,, | Performed by: INTERNAL MEDICINE

## 2019-03-20 PROCEDURE — 63600175 PHARM REV CODE 636 W HCPCS: Mod: HCNC | Performed by: HOSPITALIST

## 2019-03-20 PROCEDURE — 43235 EGD DIAGNOSTIC BRUSH WASH: CPT | Mod: HCNC | Performed by: INTERNAL MEDICINE

## 2019-03-20 PROCEDURE — D9220A PRA ANESTHESIA: ICD-10-PCS | Mod: HCNC,ANES,, | Performed by: ANESTHESIOLOGY

## 2019-03-20 PROCEDURE — 37000008 HC ANESTHESIA 1ST 15 MINUTES: Mod: HCNC | Performed by: INTERNAL MEDICINE

## 2019-03-20 PROCEDURE — 97165 OT EVAL LOW COMPLEX 30 MIN: CPT | Mod: HCNC

## 2019-03-20 PROCEDURE — 85025 COMPLETE CBC W/AUTO DIFF WBC: CPT | Mod: HCNC

## 2019-03-20 PROCEDURE — D9220A PRA ANESTHESIA: Mod: HCNC,ANES,, | Performed by: ANESTHESIOLOGY

## 2019-03-20 PROCEDURE — 99233 PR SUBSEQUENT HOSPITAL CARE,LEVL III: ICD-10-PCS | Mod: HCNC,,, | Performed by: HOSPITALIST

## 2019-03-20 PROCEDURE — 45380 PR COLONOSCOPY,BIOPSY: ICD-10-PCS | Mod: HCNC,GC,, | Performed by: INTERNAL MEDICINE

## 2019-03-20 RX ORDER — FENTANYL CITRATE 50 UG/ML
INJECTION, SOLUTION INTRAMUSCULAR; INTRAVENOUS
Status: DISCONTINUED | OUTPATIENT
Start: 2019-03-20 | End: 2019-03-20

## 2019-03-20 RX ORDER — PROPOFOL 10 MG/ML
VIAL (ML) INTRAVENOUS CONTINUOUS PRN
Status: DISCONTINUED | OUTPATIENT
Start: 2019-03-20 | End: 2019-03-20

## 2019-03-20 RX ORDER — PROPOFOL 10 MG/ML
VIAL (ML) INTRAVENOUS
Status: DISCONTINUED | OUTPATIENT
Start: 2019-03-20 | End: 2019-03-20

## 2019-03-20 RX ORDER — FENTANYL CITRATE 50 UG/ML
25 INJECTION, SOLUTION INTRAMUSCULAR; INTRAVENOUS EVERY 5 MIN PRN
Status: DISCONTINUED | OUTPATIENT
Start: 2019-03-20 | End: 2019-03-20 | Stop reason: HOSPADM

## 2019-03-20 RX ORDER — LIDOCAINE HCL/PF 100 MG/5ML
SYRINGE (ML) INTRAVENOUS
Status: DISCONTINUED | OUTPATIENT
Start: 2019-03-20 | End: 2019-03-20

## 2019-03-20 RX ORDER — OXYCODONE HYDROCHLORIDE 5 MG/1
5 TABLET ORAL
Status: DISCONTINUED | OUTPATIENT
Start: 2019-03-20 | End: 2019-03-20 | Stop reason: HOSPADM

## 2019-03-20 RX ORDER — GLYCOPYRROLATE 0.2 MG/ML
INJECTION INTRAMUSCULAR; INTRAVENOUS
Status: DISCONTINUED | OUTPATIENT
Start: 2019-03-20 | End: 2019-03-20

## 2019-03-20 RX ORDER — WARFARIN SODIUM 5 MG/1
5 TABLET ORAL DAILY
Status: DISCONTINUED | OUTPATIENT
Start: 2019-03-20 | End: 2019-03-21

## 2019-03-20 RX ORDER — SODIUM CHLORIDE 9 MG/ML
INJECTION, SOLUTION INTRAVENOUS CONTINUOUS
Status: DISCONTINUED | OUTPATIENT
Start: 2019-03-20 | End: 2019-03-22

## 2019-03-20 RX ORDER — PHENYLEPHRINE HYDROCHLORIDE 10 MG/ML
INJECTION INTRAVENOUS
Status: DISCONTINUED | OUTPATIENT
Start: 2019-03-20 | End: 2019-03-20

## 2019-03-20 RX ADMIN — PROPOFOL 10 MG: 10 INJECTION, EMULSION INTRAVENOUS at 10:03

## 2019-03-20 RX ADMIN — FENTANYL CITRATE 25 MCG: 50 INJECTION, SOLUTION INTRAMUSCULAR; INTRAVENOUS at 11:03

## 2019-03-20 RX ADMIN — FENTANYL CITRATE 25 MCG: 50 INJECTION, SOLUTION INTRAMUSCULAR; INTRAVENOUS at 10:03

## 2019-03-20 RX ADMIN — HYDROXYUREA 500 MG: 500 CAPSULE ORAL at 09:03

## 2019-03-20 RX ADMIN — PROPOFOL 100 MCG/KG/MIN: 10 INJECTION, EMULSION INTRAVENOUS at 10:03

## 2019-03-20 RX ADMIN — PHENYLEPHRINE HYDROCHLORIDE 150 MCG: 10 INJECTION INTRAVENOUS at 11:03

## 2019-03-20 RX ADMIN — PHENYLEPHRINE HYDROCHLORIDE 100 MCG: 10 INJECTION INTRAVENOUS at 11:03

## 2019-03-20 RX ADMIN — LIDOCAINE HYDROCHLORIDE 50 MG: 20 INJECTION, SOLUTION INTRAVENOUS at 10:03

## 2019-03-20 RX ADMIN — FINASTERIDE 5 MG: 5 TABLET, FILM COATED ORAL at 09:03

## 2019-03-20 RX ADMIN — PROPOFOL 5 MG: 10 INJECTION, EMULSION INTRAVENOUS at 10:03

## 2019-03-20 RX ADMIN — WARFARIN SODIUM 5 MG: 5 TABLET ORAL at 06:03

## 2019-03-20 RX ADMIN — GLYCOPYRROLATE 0.2 MG: 0.2 INJECTION, SOLUTION INTRAMUSCULAR; INTRAVENOUS at 10:03

## 2019-03-20 RX ADMIN — TAMSULOSIN HYDROCHLORIDE 0.4 MG: 0.4 CAPSULE ORAL at 09:03

## 2019-03-20 RX ADMIN — AZATHIOPRINE 200 MG: 50 TABLET ORAL at 09:03

## 2019-03-20 RX ADMIN — PROPOFOL 20 MG: 10 INJECTION, EMULSION INTRAVENOUS at 10:03

## 2019-03-20 RX ADMIN — PHENYLEPHRINE HYDROCHLORIDE 50 MCG: 10 INJECTION INTRAVENOUS at 11:03

## 2019-03-20 RX ADMIN — SODIUM CHLORIDE: 0.9 INJECTION, SOLUTION INTRAVENOUS at 09:03

## 2019-03-20 RX ADMIN — METHYLPREDNISOLONE SODIUM SUCCINATE 4 MG: 40 INJECTION, POWDER, FOR SOLUTION INTRAMUSCULAR; INTRAVENOUS at 09:03

## 2019-03-20 NOTE — ANESTHESIA PREPROCEDURE EVALUATION
[facility]  Anesthesia Pre-Operative Evaluation         Patient Name: Ernie Phan  YOB: 1931  MRN: [id number]    SUBJECTIVE:     Pre-operative evaluation for Procedure(s) (LRB):  EGD (ESOPHAGOGASTRODUODENOSCOPY) (N/A)  COLONOSCOPY (N/A)     03/19/2019    Ernie Phan is a 88 y.o. male w/ a significant PMHx of  A fib (on Coumadin), myasthenia gravis (9 yr hx), essential thrombocytosis, pulm htn (PASP 57) and rectal bleeding who presents for the above procedure.    Neurology assessed patient for possible myasthenia exacerbation due to patient reporting 1 yr hx of worsening weakness and SOB; neurology feels these sx worsening acutely are likely more related to anemia and did not feel patient was experiencing myasthenia crisis at this time.     LDA:   R AV 18  R FA 20    Prev airway: None documented.     Drips: none           Patient Active Problem List   Diagnosis    Paroxysmal atrial fibrillation    Long term current use of anticoagulant therapy    Myasthenia gravis without acute exacerbation    Chronic kidney disease, stage III (moderate)    Essential hypertension    Benign prostatic hyperplasia    GERD (gastroesophageal reflux disease)    Postherpetic neuralgia    Hyperglycemia    Hyperlipidemia    Long-term use of immunosuppressant medication    Essential thrombocytosis    Pulmonary hypertension    Ear lesion    Non-rheumatic tricuspid valve insufficiency    Anemia of renal disease    Rectal bleeding    Symptomatic anemia       Review of patient's allergies indicates:  No Known Allergies    Current Inpatient Medications:   azaTHIOprine  200 mg Oral Daily    finasteride  5 mg Oral Daily    hydroxyurea  500 mg Oral Daily    methylPREDNISolone sodium succinate  4 mg Intravenous Daily    polyethylene glycol  4,000 mL Oral Once    tamsulosin  1 capsule Oral Daily       No current facility-administered medications on file prior to encounter.      Current  Outpatient Medications on File Prior to Encounter   Medication Sig Dispense Refill    aspirin 81 mg Tab Take 81 mg by mouth Daily.      azaTHIOprine (IMURAN) 50 mg Tab Take 4 tablets (200 mg total) by mouth once daily. 360 tablet 3    benazepril (LOTENSIN) 40 MG tablet TAKE 1 TABLET EVERY DAY 90 tablet 3    DENTURE CLEANSER (EFFERVESCENT DENTURE CLEANSR DENT)       finasteride (PROSCAR) 5 mg tablet TAKE 1 TABLET EVERY DAY 90 tablet 3    FLUZONE HIGH-DOSE 2018-19, PF, 180 mcg/0.5 mL vaccine       hydroxyurea (HYDREA) 500 mg Cap Take 2 capsules (1,000 mg) on day 1; 2 caps (1,000 mg) on day 2; and then 1 capsule (500 mg) on day 3. Repeat continuously.. 150 capsule 10    IRON, FERROUS SULFATE, ORAL Take 65 mg by mouth once daily.      ONE TOUCH ULTRA TEST Strp TEST DAILY 100 each 3    ONE TOUCH ULTRASOFT LANCETS lancets TEST DAILY 100 each 3    predniSONE (DELTASONE) 5 MG tablet Take 1 tablet (5 mg total) by mouth every other day. 45 tablet 3    pyridostigmine (MESTINON) 60 mg Tab Take 1 tablet (60 mg total) by mouth every 6 to 8 hours as needed. 180 tablet 1    tamsulosin (FLOMAX) 0.4 mg Cap Take 1 capsule (0.4 mg total) by mouth once daily. 90 capsule 3    tamsulosin (FLOMAX) 0.4 mg Cap TAKE 1 CAPSULE EVERY DAY 90 capsule 3    verapamil (VERELAN) 240 MG C24P TAKE 1 CAPSULE EVERY DAY 90 capsule 3    vitamin D 1000 units Tab Take 185 mg by mouth once daily.      warfarin (COUMADIN) 5 MG tablet TAKE 1 TABLET EVERY DAY EXCEPT TAKE 1/2 TABLET ON SUNDAY, OR AS DIRECTED BY COUMADIN CLINIC 90 tablet 3    hydroCHLOROthiazide (MICROZIDE) 12.5 mg capsule Take 1 capsule (12.5 mg total) by mouth once daily. 90 capsule 3       Past Surgical History:   Procedure Laterality Date    ACHILLES TENDON SURGERY      CHOLECYSTECTOMY         Social History     Socioeconomic History    Marital status:      Spouse name: Not on file    Number of children: Not on file    Years of education: Not on file    Highest  education level: Not on file   Social Needs    Financial resource strain: Not on file    Food insecurity - worry: Not on file    Food insecurity - inability: Not on file    Transportation needs - medical: Not on file    Transportation needs - non-medical: Not on file   Occupational History    Not on file   Tobacco Use    Smoking status: Former Smoker    Smokeless tobacco: Never Used   Substance and Sexual Activity    Alcohol use: No    Drug use: No    Sexual activity: Not Currently   Other Topics Concern    Not on file   Social History Narrative    Not on file       OBJECTIVE:     Vital Signs Range (Last 24H):  Temp:  [36.3 °C (97.4 °F)-36.4 °C (97.6 °F)]   Pulse:  [48-85]   Resp:  [15-40]   BP: (102-151)/(53-70)   SpO2:  [98 %-100 %]       CBC:   Recent Labs     03/19/19 0139   WBC 1.60*   RBC 1.80*   HGB 6.9*   HCT 21.7*      *   MCH 38.3*   MCHC 31.8*       CMP:   Recent Labs     03/19/19 0139      K 5.2*      CO2 14*   BUN 67*   CREATININE 2.2*   GLU 98   CALCIUM 9.2   ALBUMIN 3.7   PROT 7.2   ALKPHOS 88   ALT 36   AST 55*   BILITOT 1.4*       INR:  Recent Labs     03/18/19 03/19/19 0139   INR 2.7 2.4*       Diagnostic Studies: No relevant studies.    EKG: No recent studies available.    2D ECHO:  Results for orders placed or performed during the hospital encounter of 08/15/18   2D echo with color flow doppler   Result Value Ref Range    QEF 65 55 - 65    Mitral Valve Regurgitation MILD     Aortic Valve Regurgitation TRIVIAL TO MILD     Est. PA Systolic Pressure 57.17 (A)     Tricuspid Valve Regurgitation MODERATE (A)          ASSESSMENT/PLAN:         Anesthesia Evaluation    I have reviewed the Patient Summary Reports.     I have reviewed the Medications.     Review of Systems  Anesthesia Hx:  History of prior surgery of interest to airway management or planning: Denies Family Hx of Anesthesia complications.    Cardiovascular:   Hypertension    Pulmonary:   pulm htn     Renal/:   Chronic Renal Disease, CRI    Hepatic/GI:   GERD GI bleed   Musculoskeletal:   Arthritis     Neurological:   Neuromuscular Disease, (myasthenia gravis)        Physical Exam  General:  Well nourished    Airway/Jaw/Neck:  Airway Findings: Mouth Opening: Normal Tongue: Normal  Mallampati: III  Improves to II with phonation.  TM Distance: Normal, at least 6 cm      Dental:  Dental Findings: Upper Dentures    Chest/Lungs:  Chest/Lungs Findings: Clear to auscultation, Normal Respiratory Rate     Heart/Vascular:  Heart Findings: Rate: Normal  Rhythm: Irregularly Irregular     Abdomen:  Abdomen Findings:  Normal, Soft, Nontender       Mental Status:  Mental Status Findings:  Cooperative, Alert and Oriented         Anesthesia Plan  Type of Anesthesia, risks & benefits discussed:  Anesthesia Type:  general, MAC  Patient's Preference:   Intra-op Monitoring Plan: standard ASA monitors  Intra-op Monitoring Plan Comments:   Post Op Pain Control Plan: multimodal analgesia, IV/PO Opioids PRN and per primary service following discharge from PACU  Post Op Pain Control Plan Comments:   Induction:   IV  Beta Blocker:  Patient is not currently on a Beta-Blocker (No further documentation required).       Informed Consent: Patient understands risks and agrees with Anesthesia plan.  Questions answered. Anesthesia consent signed with patient.  ASA Score: 3     Day of Surgery Review of History & Physical:    H&P update referred to the surgeon.         Ready For Surgery From Anesthesia Perspective.

## 2019-03-20 NOTE — ASSESSMENT & PLAN NOTE
Management per primary team and GI consulted  S/p EGD and colonoscopy today with evidence of colonic polyp which was removed and diverticulosis. Otherwise unremarkable.   Plan to resume home dose coumadin today per GI recs

## 2019-03-20 NOTE — ASSESSMENT & PLAN NOTE
Management per primary team and GI consulted  Plan for EGD+colonoscopy tomorrow.   Continue to trend H/H

## 2019-03-20 NOTE — [employer] PATIENT INSTRUCTIONS
Discharge Summary/Instructions after an Endoscopic Procedure  Patient Name: Ernie Phan  Patient MRN: [id number]  Patient YOB: 1931  Wednesday, March 20, 2019  Leeroy Thornton MD  RESTRICTIONS:  During your procedure today, you received medications for sedation.  These   medications may affect your judgment, balance and coordination.  Therefore,   for 24 hours, you have the following restrictions:   - DO NOT drive a car, operate machinery, make legal/financial decisions,   sign important papers or drink alcohol.    ACTIVITY:  Today: no heavy lifting, straining or running due to procedural   sedation/anesthesia.  The following day: return to full activity including work.  DIET:  Eat and drink normally unless instructed otherwise.     TREATMENT FOR COMMON SIDE EFFECTS:  - Mild abdominal pain, nausea, belching, bloating or excessive gas:  rest,   eat lightly and use a heating pad.  - Sore Throat: treat with throat lozenges and/or gargle with warm salt   water.  - Because air was used during the procedure, expelling large amounts of air   from your rectum or belching is normal.  - If a bowel prep was taken, you may not have a bowel movement for 1-3 days.    This is normal.  SYMPTOMS TO WATCH FOR AND REPORT TO YOUR PHYSICIAN:  1. Abdominal pain or bloating, other than gas cramps.  2. Chest pain.  3. Back pain.  4. Signs of infection such as: chills or fever occurring within 24 hours   after the procedure.  5. Rectal bleeding, which would show as bright red, maroon, or black stools.   (A tablespoon of blood from the rectum is not serious, especially if   hemorrhoids are present.)  6. Vomiting.  7. Weakness or dizziness.  GO DIRECTLY TO THE NEAREST EMERGENCY ROOM IF YOU HAVE ANY OF THE FOLLOWING:      Difficulty breathing              Chills and/or fever over 101 F   Persistent vomiting and/or vomiting blood   Severe abdominal pain   Severe chest pain   Black, tarry stools   Bleeding- more than one  tablespoon   Any other symptom or condition that you feel may need urgent attention  Your doctor recommends these additional instructions:  If any biopsies were taken, your doctors clinic will contact you in 1 to 2   weeks with any results.  - Return patient to hospital cain for ongoing care.   - Perform a colonoscopy today.   - The findings and recommendations were discussed with the patient.   - The findings and recommendations were discussed with the patient's family.     - The findings and recommendations were discussed with the patient's primary   physician.   - Resume Coumadin (warfarin) at prior dose today.  For questions, problems or results please call your physician - Leeroy Thornton MD at Work:  [phone].  [facility], EMERGENCY ROOM PHONE NUMBER: [phone]  IF A COMPLICATION OR EMERGENCY SITUATION ARISES AND YOU ARE UNABLE TO REACH   YOUR PHYSICIAN - GO DIRECTLY TO THE EMERGENCY ROOM.  Leeroy Thornton MD  3/20/2019 11:30:37 AM  This report has been verified and signed electronically.  [employer]

## 2019-03-20 NOTE — ASSESSMENT & PLAN NOTE
Patient does report 1 year history of worsening weakness and SOB presumably from myasthenia gravis and would want to adjust medications to help optimize his symptoms. However, acute worsening over the last week most likely related to anemia and patient does not have symptoms concerning for myasthenic exacerbation/crisis at this time    - continue prednisone 5mg qd  - mestinon 60mg q6-8h PRN for worsening myasthenic symptoms.   For both the above mentioned medications - plan for the patient to have a bowel prep tonight and unlikely that these medications will be well absorbed orally.   - In the interim, please consider switching to IV mestinon 2mg q6h PRN and IV methyprednisolone 4mg daily until patient is to resume a PO diet  - continue imuran 200mg qd for now as myasthenic symptoms are not optimized and would not want to discontinue imuran at this time. Trend CBC to follow WBC, H/H - less likely that imuran is cause for bone marrow suppression (although platelets are normal -- patient has a history of essential thrombocytosis and is on hydroxyurea which can also cause suspected bone marrow suppression - management per primary team)  - Check NIF/VC daily    Myasthenia Gravis IMPORTANT INFORMATION:    Elective intubation should be considered if serial measurements of the VC show values less than 20 mL/kg or if the MIF is worse than -30 cmH20.    Medication warning list:          1. Absolute contraindication   curare,   d-penicillamine,   botulinum toxin,   interferon alpha    2. Contraindicated    a. Antibiotics-- aminoglycosides (gentamycin, kanamycin, neomycin, streptomycin, tobramycine); macrolides (erythromycin, azithromycin (Z-pack), telithromycin, Biaxin)  Fluoroquinolones ( ciprofloxacin, norfloxacin, levofloxacin);  b. quinine, quinidine, procainamide,  c. magnesium salts, iv magnesium replacement.    3. Caution- may exacerbate weakness in some myasthenics  a. Calcium channel blockers  b. Beta blockers  c.  Lithium  d. Statins  e. Iodinated contrast agents  F. benzodiazepines

## 2019-03-20 NOTE — PROGRESS NOTES
Hospital Medicine  Progress note    Team: [facility] Rafael Mullins MD  Admit Date: 3/19/2019  BRIANA 3/22/2019  Length of Stay:  LOS: 1 day   Code status: Full Code    Principal Problem:  Rectal bleeding    Overview:    Interval hx:Patient seen and examined at bedside, underwent EGD and colonoscopy today which did not find any active bleeding.     ROS     Constitutional: Negative for chills, fatigue, fever.   HENT: Negative for sore throat, trouble swallowing.    Eyes: Negative for photophobia, visual disturbance.   Respiratory: Negative for cough, shortness of breath.    Cardiovascular: Negative for chest pain, palpitations, leg swelling.   Gastrointestinal: Negative for abdominal pain, constipation, diarrhea, nausea, vomiting.   Endocrine: Negative for cold intolerance, heat intolerance.   Genitourinary: Negative for dysuria, frequency.   Musculoskeletal: Negative for arthralgias, myalgias.   Skin: Negative for rash, wound, erythema   Neurological: Negative for dizziness, syncope, weakness, light-headedness.   Psychiatric/Behavioral: Negative for confusion, hallucinations, anxiety  All other systems reviewed and are negative.        PEx  Temp:  [97.1 °F (36.2 °C)-98.4 °F (36.9 °C)]   Pulse:  []   Resp:  [10-20]   BP: (108-161)/(58-72)   SpO2:  [96 %-100 %]     Intake/Output Summary (Last 24 hours) at 3/20/2019 1714  Last data filed at 3/20/2019 1139  Gross per 24 hour   Intake 950 ml   Output 700 ml   Net 250 ml       Constitutional: Appears well-developed and well-nourished.   Head: Normocephalic and atraumatic.   Mouth/Throat: Oropharynx is clear and moist.   Eyes: EOM are normal. Pupils are equal, round, and reactive to light. No scleral icterus.   Neck: Normal range of motion. Neck supple.   Cardiovascular: Normal rate and regular rhythm.  No murmur heard.  Pulmonary/Chest: Effort normal and breath sounds normal. No respiratory distress. No wheezes, rales, or rhonchi  Abdominal: Soft. Bowel sounds  are normal.  No distension or tenderness  Musculoskeletal: Normal range of motion. No edema.   Neurological: Alert and oriented to person, place, and time.   Skin: Skin is warm and dry.   Psychiatric: Normal mood and affect. Behavior is normal.         Recent Labs   Lab 03/19/19 0139 03/19/19 2045 03/20/19 0458   WBC 1.60* 1.28* 1.39*   HGB 6.9* 8.1* 8.6*   HCT 21.7* 24.3* 26.1*    193 209     Recent Labs   Lab 03/19/19 0139 03/19/19 2045 03/20/19 0457    136 139   K 5.2* 4.5 5.1    111* 110   CO2 14* 16* 20*   BUN 67* 45* 38*   CREATININE 2.2* 1.5* 1.5*   GLU 98 94 92   CALCIUM 9.2 9.3 9.8   MG  --   --  1.9   PHOS  --   --  2.7     Recent Labs   Lab 03/19/19 0139 03/19/19 2045 03/20/19 0458   ALKPHOS 88  --   --    ALT 36  --   --    AST 55*  --   --    ALBUMIN 3.7  --   --    PROT 7.2  --   --    BILITOT 1.4*  --   --    INR 2.4* 2.4* 1.9*        No results for input(s): CPK, CPKMB, MB, TROPONINI in the last 72 hours.  No results for input(s): POCTGLUCOSE in the last 168 hours.  Hemoglobin A1C   Date Value Ref Range Status   03/20/2019 5.3 4.0 - 5.6 % Final     Comment:     ADA Screening Guidelines:  5.7-6.4%  Consistent with prediabetes  >or=6.5%  Consistent with diabetes  High levels of fetal hemoglobin interfere with the HbA1C  assay. Heterozygous hemoglobin variants (HbS, HgC, etc)do  not significantly interfere with this assay.   However, presence of multiple variants may affect accuracy.     03/02/2015 5.3 4.5 - 6.2 % Final   11/16/2012 6.1 4.0 - 6.2 % Final       Scheduled Meds:   azaTHIOprine  200 mg Oral Daily    finasteride  5 mg Oral Daily    hydroxyurea  500 mg Oral Daily    methylPREDNISolone sodium succinate  4 mg Intravenous Daily    tamsulosin  1 capsule Oral Daily    warfarin  5 mg Oral Daily     Continuous Infusions:   sodium chloride 0.9% 10 mL/hr at 03/20/19 0951     As Needed:  sodium chloride, acetaminophen, dextrose 50%, dextrose 50%, glucagon (human  recombinant), glucose, glucose, ondansetron, pyridostigmine, sodium chloride 0.9%, sodium chloride 0.9%    Active Hospital Problems    Diagnosis  POA    *Rectal bleeding [K62.5]  Yes    Symptomatic anemia [D64.9]  Yes    Acute blood loss anemia [D62]  Yes    Gastrointestinal hemorrhage [K92.2]  Yes    Acute renal failure superimposed on stage 3 chronic kidney disease [N17.9, N18.3]  Yes    Essential thrombocytosis [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         Assessment and Plan    # Acute blood loss anemia  # Gastrointestinal hemorrhage   # Rectal bleeding  - unclear etiology   - s/p 1 unit of prbc; on coumadin at home; holding giving vitamin K PO  - EGD/colonoscopy performed on 03/20 only relevant for one polyp found     # Myasthenia Gravis   - seems to be in a slight flare  - seen by neurology   - cont azathioprine and started on Methylprednisolone 4mg daily      # Acute on chronic renal failure stage 3  - back to baseline of 1.6 after transfusion      # Paroxysmal afib  - may resume coumadin as per GI recs   - rate control     # Essential HTN  - holding BP meds as patient is orthostatic       High Risk Conditions  afib on coumadin, myasthenia gravis      Diet:  Cardiac diet  GI PPx:   DVT PPx:    Anticoagulants   Medication Route Frequency    warfarin (COUMADIN) tablet 5 mg Oral Daily         Goals of Care: Full code  Discharge plan: pending Neuro recs     Time (minutes) spent in care of the patient (Greater than 1/2 spent in direct face-to-face contact) 35 minutes     Rafael Mullins MD  Staff Hospitalist  Department of Hospital Medicine  [facility]   [phone]

## 2019-03-20 NOTE — ASSESSMENT & PLAN NOTE
Hgb 6.9 on admit, 8.6 today s/p pRBC transfusion  Symptoms in the last week most consistent severe anemia rather than a myasthenic crisis.  Management per primary team

## 2019-03-20 NOTE — SUBJECTIVE & OBJECTIVE
Past Medical History:   Diagnosis Date    *Atrial fibrillation     Arthritis     Atrial fibrillation     BPH (benign prostatic hypertrophy)     Degenerative disc disease     Depression     GERD (gastroesophageal reflux disease)     Hyperglycemia     Hyperlipidemia     Hypertension     MG (myasthenia gravis)     Postherpetic neuralgia        Past Surgical History:   Procedure Laterality Date    ACHILLES TENDON SURGERY      CHOLECYSTECTOMY         Review of patient's allergies indicates:  No Known Allergies  Family History     Problem Relation (Age of Onset)    Cancer Father    Stroke Mother        Tobacco Use    Smoking status: Former Smoker    Smokeless tobacco: Never Used   Substance and Sexual Activity    Alcohol use: No    Drug use: No    Sexual activity: Not Currently     Review of Systems   Constitutional: Positive for appetite change and unexpected weight change. Negative for activity change, chills and fever.   HENT: Negative for trouble swallowing.    Eyes: Negative for visual disturbance.   Respiratory: Negative for cough and shortness of breath.    Cardiovascular: Negative for chest pain.   Gastrointestinal: Positive for blood in stool. Negative for abdominal distention, abdominal pain, constipation, diarrhea, nausea and vomiting.   Genitourinary: Negative for flank pain.   Musculoskeletal: Negative for arthralgias and back pain.   Skin: Positive for pallor. Negative for color change.   Allergic/Immunologic: Positive for immunocompromised state.   Psychiatric/Behavioral: Negative for confusion.     Objective:     Vital Signs (Most Recent):  Temp: 97.6 °F (36.4 °C) (03/19/19 0627)  Pulse: 72 (03/19/19 1832)  Resp: 15 (03/19/19 1447)  BP: (!) 144/65 (03/19/19 1832)  SpO2: 100 % (03/19/19 1832) Vital Signs (24h Range):  Temp:  [97.4 °F (36.3 °C)-97.6 °F (36.4 °C)] 97.6 °F (36.4 °C)  Pulse:  [48-85] 72  Resp:  [15-40] 15  SpO2:  [98 %-100 %] 100 %  BP: (102-151)/(53-70) 144/65     Weight:  77.1 kg (170 lb) (03/19/19 0025)  Body mass index is 25.1 kg/m².      Intake/Output Summary (Last 24 hours) at 3/19/2019 2129  Last data filed at 3/19/2019 0627  Gross per 24 hour   Intake 1658.33 ml   Output --   Net 1658.33 ml       Lines/Drains/Airways     Peripheral Intravenous Line                 Peripheral IV - Single Lumen 03/19/19 0216 Right Antecubital less than 1 day         Peripheral IV - Single Lumen 03/19/19 0349 Right Forearm less than 1 day                Physical Exam   Constitutional: He is oriented to person, place, and time. He appears lethargic. No distress.   HENT:   Head: Normocephalic.   Eyes: Conjunctivae are normal. No scleral icterus.   Neck: Normal range of motion. Neck supple.   Cardiovascular: Normal rate and regular rhythm.   Pulmonary/Chest: Effort normal and breath sounds normal.   Abdominal: Soft. Bowel sounds are normal. He exhibits no distension and no mass. There is no tenderness. There is no guarding.   Musculoskeletal: Normal range of motion.   Neurological: He is oriented to person, place, and time. He appears lethargic.   Skin: Skin is warm and dry. There is pallor.   Psychiatric: He has a normal mood and affect.       Significant Labs:  CBC:   Recent Labs   Lab 03/19/19 0139 03/19/19 2045   WBC 1.60* 1.28*   HGB 6.9* 8.1*   HCT 21.7* 24.3*    193     BMP:   Recent Labs   Lab 03/19/19 2045   GLU 94      K 4.5   *   CO2 16*   BUN 45*   CREATININE 1.5*   CALCIUM 9.3     CMP:   Recent Labs   Lab 03/19/19 0139 03/19/19 2045   GLU 98 94   CALCIUM 9.2 9.3   ALBUMIN 3.7  --    PROT 7.2  --     136   K 5.2* 4.5   CO2 14* 16*    111*   BUN 67* 45*   CREATININE 2.2* 1.5*   ALKPHOS 88  --    ALT 36  --    AST 55*  --    BILITOT 1.4*  --      Coagulation:   Recent Labs   Lab 03/19/19 2045   INR 2.4*     Lipase: No results for input(s): LIPASE in the last 48 hours.    Significant Imaging:  Imaging results within the past 24 hours have been reviewed.

## 2019-03-20 NOTE — PLAN OF CARE
03/20/19 1432   Discharge Assessment   Assessment Type Discharge Planning Assessment   Confirmed/corrected address and phone number on facesheet? Yes   Assessment information obtained from? Caregiver   Expected Length of Stay (days) 3   Communicated expected length of stay with patient/caregiver yes   Prior to hospitilization cognitive status: Alert/Oriented   Prior to hospitalization functional status: Assistive Equipment   Current cognitive status: Alert/Oriented   Current Functional Status: Assistive Equipment   Lives With spouse   Able to Return to Prior Arrangements yes   Readmission Within the Last 30 Days no previous admission in last 30 days   Patient currently being followed by outpatient case management? No   Equipment Currently Used at Home walker, standard   Do you have any problems affording any of your prescribed medications? No   Is the patient taking medications as prescribed? yes   Does the patient have transportation home? Yes   Transportation Anticipated family or friend will provide   Does the patient receive services at the [facility]? Yes   Discharge Plan A Home Health   Discharge Plan B Skilled Nursing Facility   Patient/Family in Agreement with Plan yes   Patient sleeping. Spoke with spouse at bedside. Explained role of CM/SW. Wife states that patient is weak. PT/OT eval pending. Will follow for discharge needs.

## 2019-03-20 NOTE — ASSESSMENT & PLAN NOTE
Patient does report 1 year history of worsening weakness and SOB presumably from myasthenia gravis and would want to adjust medications to help optimize his symptoms. However, acute worsening the week prior to admission most likely related to anemia and patient does not have symptoms concerning for myasthenic exacerbation/crisis at this time    - IV mestinon 2mg q6h PRN and IV methyprednisolone 4mg daily until patient is to resume a PO diet. At that time, can consider switching to prednisone 5mg qd and mestinon 60mg q6-8h PRN for worsening myasthenic symptoms.   - continue imuran 200mg qd for now as myasthenic symptoms are not optimized and would not want to discontinue imuran at this time. Trend CBC to follow WBC, H/H - less likely that imuran is cause for bone marrow suppression (although platelets are normal -- patient has a history of essential thrombocytosis and is on hydroxyurea which can also cause suspected bone marrow suppression - management per primary team  - Consider heme/onc consult to evaluate anemia and leukopenia.   - Check NIF/VC daily    Myasthenia Gravis IMPORTANT INFORMATION:    Elective intubation should be considered if serial measurements of the VC show values less than 20 mL/kg or if the MIF is worse than -30 cmH20.    Medication warning list:          1. Absolute contraindication  NM blocking agents   curare,   d-penicillamine,   botulinum toxin,   interferon alpha    2. Contraindicated    a. Antibiotics-- aminoglycosides (gentamycin, kanamycin, neomycin, streptomycin, tobramycine); macrolides (erythromycin, azithromycin (Z-pack), telithromycin, Biaxin)  Fluoroquinolones ( ciprofloxacin, norfloxacin, levofloxacin);  b. quinine, quinidine, procainamide,  c. magnesium salts, iv magnesium replacement.    3. Caution- may exacerbate weakness in some myasthenics  a. Calcium channel blockers  b. Beta blockers  c. Lithium  d. Statins  e. Iodinated contrast agents  F. benzodiazepines

## 2019-03-20 NOTE — TREATMENT PLAN
Colonoscopy done today    Impression:           - The examined portion of the ileum was normal.                        - One 2 mm polyp in the transverse colon, removed                         with a jumbo cold forceps. Resected and retrieved.                        - Diverticulosis in the recto-sigmoid colon, in the                         sigmoid colon, in the descending colon, in the                         transverse colon and in the ascending colon.                        - Non-bleeding internal hemorrhoids.  Recommendation:       - Return patient to hospital cain for ongoing care.                         If any further bleeding call GI to offer patient                         small bowel Video Capsule Endoscopy Study.                        - Repeat colonoscopy in 5 years for surveillance                         based on pathology results.                        - Resume Coumadin (warfarin) at prior dose today.                        - Await pathology results.    Please contact us with further questions or concerns.

## 2019-03-20 NOTE — [employer] PATIENT INSTRUCTIONS
Discharge Summary/Instructions after an Endoscopic Procedure  Patient Name: Ernie Phan  Patient MRN: [id number]  Patient YOB: 1931  Wednesday, March 20, 2019  Leeroy Thornton MD  RESTRICTIONS:  During your procedure today, you received medications for sedation.  These   medications may affect your judgment, balance and coordination.  Therefore,   for 24 hours, you have the following restrictions:   - DO NOT drive a car, operate machinery, make legal/financial decisions,   sign important papers or drink alcohol.    ACTIVITY:  Today: no heavy lifting, straining or running due to procedural   sedation/anesthesia.  The following day: return to full activity including work.  DIET:  Eat and drink normally unless instructed otherwise.     TREATMENT FOR COMMON SIDE EFFECTS:  - Mild abdominal pain, nausea, belching, bloating or excessive gas:  rest,   eat lightly and use a heating pad.  - Sore Throat: treat with throat lozenges and/or gargle with warm salt   water.  - Because air was used during the procedure, expelling large amounts of air   from your rectum or belching is normal.  - If a bowel prep was taken, you may not have a bowel movement for 1-3 days.    This is normal.  SYMPTOMS TO WATCH FOR AND REPORT TO YOUR PHYSICIAN:  1. Abdominal pain or bloating, other than gas cramps.  2. Chest pain.  3. Back pain.  4. Signs of infection such as: chills or fever occurring within 24 hours   after the procedure.  5. Rectal bleeding, which would show as bright red, maroon, or black stools.   (A tablespoon of blood from the rectum is not serious, especially if   hemorrhoids are present.)  6. Vomiting.  7. Weakness or dizziness.  GO DIRECTLY TO THE NEAREST EMERGENCY ROOM IF YOU HAVE ANY OF THE FOLLOWING:      Difficulty breathing              Chills and/or fever over 101 F   Persistent vomiting and/or vomiting blood   Severe abdominal pain   Severe chest pain   Black, tarry stools   Bleeding- more than one  tablespoon   Any other symptom or condition that you feel may need urgent attention  Your doctor recommends these additional instructions:  If any biopsies were taken, your doctors clinic will contact you in 1 to 2   weeks with any results.  - Return patient to hospital cain for ongoing care. If any further bleeding   call GI to offer patient small bowel Video Capsule Endoscopy Study.  - Await pathology results.   - Telephone endoscopist for pathology results in 2 weeks.   - Repeat colonoscopy in 5 years for surveillance based on pathology results.     - The findings and recommendations were discussed with the patient.   - The findings and recommendations were discussed with the patient's family.     - Resume Coumadin (warfarin) at prior dose today.   - Await pathology results.   - The findings and recommendations were discussed with the patient's primary   physician.  For questions, problems or results please call your physician - Leeroy Thornton MD at Work:  [phone].  [facility], EMERGENCY ROOM PHONE NUMBER: [phone]  IF A COMPLICATION OR EMERGENCY SITUATION ARISES AND YOU ARE UNABLE TO REACH   YOUR PHYSICIAN - GO DIRECTLY TO THE EMERGENCY ROOM.  Leeroy Thornton MD  3/20/2019 11:29:46 AM  This report has been verified and signed electronically.  [employer]

## 2019-03-20 NOTE — CONSULTS
[facility]  Gastroenterology  Consult Note    Patient Name: Ernie Phan  MRN: [id number]  Admission Date: 3/19/2019  Hospital Length of Stay: 0 days  Code Status: Full Code   Attending Provider: Renny Dixon MD   Consulting Provider: Robert Ruiz MD  Primary Care Physician: Ernie Michel MD  Principal Problem:Rectal bleeding    Inpatient consult to Gastroenterology  Consult performed by: Robert Ruiz MD  Consult ordered by: Renny Dixon MD        Subjective:     HPI:  Mr. Phan is an 89 y/o male with past medical history of Afib on coumadin, HTN, Myasthenia Gravis on imuran and pyridostigmine, essential thrombocythemia on hydroxyurea and ASA 81mg daily, presented with multiple episodes of hematochezia.    The patient states that he was doing well until ~  Midnight when he went to have a BM, and noted to have a large bloody bowel movement, with brown stool. This was not associated with abdominal pain. Denies associated nausea, vomiting, hematemesis. Denies fevers or chills. Denies constipation or diarrhea. He continued to have a few bowel movements, however with decreasing amount of blood in stool. He reports that his last BM was in the morning today with brown stool and small amount of red blood with it.     The patient denies history of GI bleeding in the past. Denies use of NSAIDs. Endorses change in appetite recently and weight loss, however he is not sure how much he lost. Denies tobacco use.     On presentation the patient is hemodynamically stable with Hb of 6.9 from a baseline of 9-10.    Colonoscopy 2008 for screening, reported normal colon.      Past Medical History:   Diagnosis Date    *Atrial fibrillation     Arthritis     Atrial fibrillation     BPH (benign prostatic hypertrophy)     Degenerative disc disease     Depression     GERD (gastroesophageal reflux disease)     Hyperglycemia     Hyperlipidemia     Hypertension     MG (myasthenia gravis)     Postherpetic  neuralgia        Past Surgical History:   Procedure Laterality Date    ACHILLES TENDON SURGERY      CHOLECYSTECTOMY         Review of patient's allergies indicates:  No Known Allergies  Family History     Problem Relation (Age of Onset)    Cancer Father    Stroke Mother        Tobacco Use    Smoking status: Former Smoker    Smokeless tobacco: Never Used   Substance and Sexual Activity    Alcohol use: No    Drug use: No    Sexual activity: Not Currently     Review of Systems   Constitutional: Positive for appetite change and unexpected weight change. Negative for activity change, chills and fever.   HENT: Negative for trouble swallowing.    Eyes: Negative for visual disturbance.   Respiratory: Negative for cough and shortness of breath.    Cardiovascular: Negative for chest pain.   Gastrointestinal: Positive for blood in stool. Negative for abdominal distention, abdominal pain, constipation, diarrhea, nausea and vomiting.   Genitourinary: Negative for flank pain.   Musculoskeletal: Negative for arthralgias and back pain.   Skin: Positive for pallor. Negative for color change.   Allergic/Immunologic: Positive for immunocompromised state.   Psychiatric/Behavioral: Negative for confusion.     Objective:     Vital Signs (Most Recent):  Temp: 97.6 °F (36.4 °C) (03/19/19 0627)  Pulse: 72 (03/19/19 1832)  Resp: 15 (03/19/19 1447)  BP: (!) 144/65 (03/19/19 1832)  SpO2: 100 % (03/19/19 1832) Vital Signs (24h Range):  Temp:  [97.4 °F (36.3 °C)-97.6 °F (36.4 °C)] 97.6 °F (36.4 °C)  Pulse:  [48-85] 72  Resp:  [15-40] 15  SpO2:  [98 %-100 %] 100 %  BP: (102-151)/(53-70) 144/65     Weight: 77.1 kg (170 lb) (03/19/19 0025)  Body mass index is 25.1 kg/m².      Intake/Output Summary (Last 24 hours) at 3/19/2019 2129  Last data filed at 3/19/2019 0627  Gross per 24 hour   Intake 1658.33 ml   Output --   Net 1658.33 ml       Lines/Drains/Airways     Peripheral Intravenous Line                 Peripheral IV - Single Lumen  03/19/19 0216 Right Antecubital less than 1 day         Peripheral IV - Single Lumen 03/19/19 0349 Right Forearm less than 1 day                Physical Exam   Constitutional: He is oriented to person, place, and time. He appears lethargic. No distress.   HENT:   Head: Normocephalic.   Eyes: Conjunctivae are normal. No scleral icterus.   Neck: Normal range of motion. Neck supple.   Cardiovascular: Normal rate and regular rhythm.   Pulmonary/Chest: Effort normal and breath sounds normal.   Abdominal: Soft. Bowel sounds are normal. He exhibits no distension and no mass. There is no tenderness. There is no guarding.   Musculoskeletal: Normal range of motion.   Neurological: He is oriented to person, place, and time. He appears lethargic.   Skin: Skin is warm and dry. There is pallor.   Psychiatric: He has a normal mood and affect.       Significant Labs:  CBC:   Recent Labs   Lab 03/19/19 0139 03/19/19 2045   WBC 1.60* 1.28*   HGB 6.9* 8.1*   HCT 21.7* 24.3*    193     BMP:   Recent Labs   Lab 03/19/19 2045   GLU 94      K 4.5   *   CO2 16*   BUN 45*   CREATININE 1.5*   CALCIUM 9.3     CMP:   Recent Labs   Lab 03/19/19 0139 03/19/19 2045   GLU 98 94   CALCIUM 9.2 9.3   ALBUMIN 3.7  --    PROT 7.2  --     136   K 5.2* 4.5   CO2 14* 16*    111*   BUN 67* 45*   CREATININE 2.2* 1.5*   ALKPHOS 88  --    ALT 36  --    AST 55*  --    BILITOT 1.4*  --      Coagulation:   Recent Labs   Lab 03/19/19 2045   INR 2.4*     Lipase: No results for input(s): LIPASE in the last 48 hours.    Significant Imaging:  Imaging results within the past 24 hours have been reviewed.    Assessment/Plan:     * Rectal bleeding    Patient is an 87 y/o male with history of Afib on coumadin with current INR of 2.4, presented with hematochezia painless that seems to be improving. Likely source of bleeding is diverticular. We will evaluated patient with EGD and colonoscopy to evaluate for source of bleeding.    Of  note, patient has new onset leukopenia, which can be a side effect of imuran. Recommend evaluation by primary team if imuran needs to be adjusted/discontinued given leukopenia.    Trend H/H and transfuse for target H/H >7/21  Maintain 2 large bore IVs  Continue to hold coumadin. No contraindication to steroid treatment.  Keep on Clear liquid today, and NPO after MN, prep ordered for colonoscopy         Thank you for your consult. I will follow-up with patient. Please contact us if you have any additional questions.    Chandana Ruiz MD  Gastroenterology  [facility]

## 2019-03-20 NOTE — NURSING TRANSFER
Nursing Transfer Note      3/20/2019     Transfer To: 610[address]    Transfer via stretcher    Transfer with cardiac monitoring    Transported by pct    Medicines sent: no    Chart send with patient: Yes    Notified: spouse    Patient reassessed at: 3/20/19@1229hrs

## 2019-03-20 NOTE — PROGRESS NOTES
[facility]  Neurology  Progress Note    Patient Name: Ernie Phan  MRN: [id number]  Admission Date: 3/19/2019  Hospital Length of Stay: 1 days  Code Status: Full Code   Attending Provider: Rafael Mullins MD  Primary Care Physician: Ernie Michel MD   Principal Problem:Rectal bleeding      Subjective:     Interval History:   No acute events overnight. Patient had his EGD and colonoscopy this morning    Current Neurological Medications:   Azathioprine 200mg qd  Methylprednisolone 4mg qd    Current Facility-Administered Medications   Medication Dose Route Frequency Provider Last Rate Last Dose    0.9%  NaCl infusion (for blood administration)   Intravenous Q24H PRN Manoj Chamberlain PA-C        0.9%  NaCl infusion   Intravenous Continuous Leeroy Thornton MD 10 mL/hr at 03/20/19 0951      acetaminophen tablet 650 mg  650 mg Oral Q4H PRN Renny Dixon MD        azaTHIOprine tablet 200 mg  200 mg Oral Daily Renny Dixon MD   200 mg at 03/20/19 0916    dextrose 50% injection 12.5 g  12.5 g Intravenous PRN Renny Dixon MD        dextrose 50% injection 25 g  25 g Intravenous PRN Renny Dixon MD        finasteride tablet 5 mg  5 mg Oral Daily Renny Dixon MD   5 mg at 03/20/19 0916    glucagon (human recombinant) injection 1 mg  1 mg Intramuscular PRN Renny Dixon MD        glucose chewable tablet 16 g  16 g Oral PRN Renny Dixon MD        glucose chewable tablet 24 g  24 g Oral PRN Renny Dixon MD        hydroxyurea capsule 500 mg  500 mg Oral Daily Renny Dixon MD   500 mg at 03/20/19 0916    methylPREDNISolone sodium succinate injection 4 mg  4 mg Intravenous Daily Cristino Hogan PA-C   4 mg at 03/20/19 0917    ondansetron disintegrating tablet 8 mg  8 mg Oral Q8H PRN Renny Dixon MD        pyridostigmine injection 2 mg  2 mg Intravenous Q6H PRN Cristino Hogan PA-C        sodium chloride 0.9% flush 5 mL  5 mL Intravenous PRN Manoj Chamberlain PA-C        sodium  chloride 0.9% flush 5 mL  5 mL Intravenous PRN Renny Dixon MD        tamsulosin 24 hr capsule 0.4 mg  1 capsule Oral Daily Renny Dixon MD   0.4 mg at 03/20/19 0916    warfarin (COUMADIN) tablet 5 mg  5 mg Oral Daily Rafael Mullins MD           Review of Systems   HENT: Negative for trouble swallowing.    Respiratory: Negative for shortness of breath.    Cardiovascular: Negative for chest pain.   Neurological: Negative for weakness.     Objective:     Vital Signs (Most Recent):  Temp: 97.2 °F (36.2 °C) (03/20/19 1628)  Pulse: 62 (03/20/19 1628)  Resp: 18 (03/20/19 1628)  BP: 108/68 (03/20/19 1628)  SpO2: 98 % (03/20/19 1628) Vital Signs (24h Range):  Temp:  [97.1 °F (36.2 °C)-98.4 °F (36.9 °C)] 97.2 °F (36.2 °C)  Pulse:  [] 62  Resp:  [10-20] 18  SpO2:  [96 %-100 %] 98 %  BP: (108-161)/(58-72) 108/68     Weight: 75.2 kg (165 lb 12.8 oz)  Body mass index is 24.48 kg/m².    Physical Exam   Constitutional: He appears well-developed and well-nourished.   HENT:   Head: Normocephalic and atraumatic.       NEUROLOGICAL EXAMINATION:     MENTAL STATUS        Exam was done soon after patient returned from endoscopy    - drowsy but awaken to voice, does not stay awake too long  - mumbles appropriately to questions, drowsy and mostly not using full sentences  - followed simple and lateralized commands     CRANIAL NERVES        No notable facial asymmetry     MOTOR EXAM     Strength   Right biceps: 5/5  Left biceps: 5/5  Right iliopsoas: 5/5  Left iliopsoas: 5/5  Right anterior tibial: 5/5       Was able to participate in the strength exam.       Significant Labs:   Recent Lab Results       03/20/19  0458   03/20/19  0457   03/19/19 2045        Immature Grans (Abs) 0.02  Comment:  Mild elevation in immature granulocytes is non specific and   can be seen in a variety of conditions including stress response,   acute inflammation, trauma and pregnancy. Correlation with other   laboratory and clinical findings is  essential.     0.04  Comment:  Mild elevation in immature granulocytes is non specific and   can be seen in a variety of conditions including stress response,   acute inflammation, trauma and pregnancy. Correlation with other   laboratory and clinical findings is essential.       Anion Gap   9 9     Aniso Moderate   Slight     Baso # 0.00   0.00     Basophil% 0.0   0.0     BUN, Bld   38 45     Calcium   9.8 9.3     Chloride   110 111     CO2   20 16     Creatinine   1.5 1.5     Differential Method Automated   Automated     eGFR if    47.4 47.4     eGFR if non    41.0  Comment:  Calculation used to obtain the estimated glomerular filtration  rate (eGFR) is the CKD-EPI equation.    41.0  Comment:  Calculation used to obtain the estimated glomerular filtration  rate (eGFR) is the CKD-EPI equation.        Eos # 0.0   0.0     Eosinophil% 1.4   0.8     Estimated Avg Glucose 105         Glucose   92 94     Gran # (ANC) 0.8   0.7     Gran% 54.0   54.7     Hematocrit 26.1   24.3     Hemoglobin 8.6   8.1     Hemoglobin A1C External 5.3  Comment:  ADA Screening Guidelines:  5.7-6.4%  Consistent with prediabetes  >or=6.5%  Consistent with diabetes  High levels of fetal hemoglobin interfere with the HbA1C  assay. Heterozygous hemoglobin variants (HbS, HgC, etc)do  not significantly interfere with this assay.   However, presence of multiple variants may affect accuracy.           Hypo Occasional   Occasional     Immature Granulocytes 1.4   3.1     Coumadin Monitoring INR 1.9  Comment:  Coumadin Therapy:  2.0 - 3.0 for INR for all indicators except mechanical heart valves  and antiphospholipid syndromes which should use 2.5 - 3.5.     2.4  Comment:  Coumadin Therapy:  2.0 - 3.0 for INR for all indicators except mechanical heart valves  and antiphospholipid syndromes which should use 2.5 - 3.5.       Lymph # 0.5   0.4     Lymph% 33.8   32.8     Magnesium   1.9       MCH 36.6   36.8     MCHC 33.0    33.3        111     Mono # 0.1   0.1     Mono% 9.4   8.6     MPV 9.7   9.4     nRBC 0   0     Ovalocytes Occasional   Occasional     Phosphorus   2.7       Platelet Estimate     Appears normal     Platelets 209   193     Poik Slight   Slight     Poly Occasional   Occasional     Potassium   5.1 4.5     Prealbumin   38       Protime 18.9   23.2     RBC 2.35   2.20     RDW SEE COMMENT  Comment:  Result not available.   SEE COMMENT  Comment:  Result not available.     Sodium   139 136     WBC 1.39  Comment:  WBC critical result(s) called and verbal readback obtained from   ADAIR HODGE RN, 03/20/2019 06:21     1.28  Comment:  WBC critical result(s) called and verbal readback obtained from   SRIRAM ROJO RN, 03/19/2019 21:20             Significant Imaging:   No pertinent neurologic imaging to review    Assessment and Plan:     * Rectal bleeding    Management per primary team and GI consulted  S/p EGD and colonoscopy today with evidence of colonic polyp which was removed and diverticulosis. Otherwise unremarkable.   Plan to resume home dose coumadin today per GI recs     Symptomatic anemia    Hgb 6.9 on admit, 8.6 today s/p pRBC transfusion  Symptoms in the last week most consistent severe anemia rather than a myasthenic crisis.  Management per primary team     Essential thrombocytosis    On hydroxyurea  Followed by Dr. Welsh as an outpatient     Myasthenia gravis without acute exacerbation    Patient does report 1 year history of worsening weakness and SOB presumably from myasthenia gravis and would want to adjust medications to help optimize his symptoms. However, acute worsening the week prior to admission most likely related to anemia and patient does not have symptoms concerning for myasthenic exacerbation/crisis at this time    - IV mestinon 2mg q6h PRN and IV methyprednisolone 4mg daily until patient is to resume a PO diet. At that time, can consider switching to prednisone 5mg qd and mestinon 60mg  q6-8h PRN for worsening myasthenic symptoms.   - continue imuran 200mg qd for now as myasthenic symptoms are not optimized and would not want to discontinue imuran at this time. Trend CBC to follow WBC, H/H - less likely that imuran is cause for bone marrow suppression (although platelets are normal -- patient has a history of essential thrombocytosis and is on hydroxyurea which can also cause suspected bone marrow suppression - management per primary team  - Consider heme/onc consult to evaluate anemia and leukopenia.   - Check NIF/VC daily    Myasthenia Gravis IMPORTANT INFORMATION:    Elective intubation should be considered if serial measurements of the VC show values less than 20 mL/kg or if the MIF is worse than -30 cmH20.    Medication warning list:          1. Absolute contraindication  NM blocking agents   curare,   d-penicillamine,   botulinum toxin,   interferon alpha    2. Contraindicated    a. Antibiotics-- aminoglycosides (gentamycin, kanamycin, neomycin, streptomycin, tobramycine); macrolides (erythromycin, azithromycin (Z-pack), telithromycin, Biaxin)  Fluoroquinolones ( ciprofloxacin, norfloxacin, levofloxacin);  b. quinine, quinidine, procainamide,  c. magnesium salts, iv magnesium replacement.    3. Caution- may exacerbate weakness in some myasthenics  a. Calcium channel blockers  b. Beta blockers  c. Lithium  d. Statins  e. Iodinated contrast agents  F. benzodiazepines         VTE Risk Mitigation (From admission, onward)        Ordered     warfarin (COUMADIN) tablet 5 mg  Daily      03/20/19 1454     IP VTE HIGH RISK PATIENT  Once      03/19/19 1327     Place STELLA hose  Until discontinued      03/19/19 1327     IP VTE LOW RISK PATIENT  Once      03/19/19 1327          Darius Barrios MD  Neurology  [facility]

## 2019-03-20 NOTE — HPI
Mr. Phan is an 87 y/o male with past medical history of Afib on coumadin, HTN, Myasthenia Gravis on imuran and pyridostigmine, essential thrombocythemia on hydroxyurea and ASA 81mg daily, presented with multiple episodes of hematochezia.    The patient states that he was doing well until ~  Midnight when he went to have a BM, and noted to have a large bloody bowel movement, with brown stool. This was not associated with abdominal pain. Denies associated nausea, vomiting, hematemesis. Denies fevers or chills. Denies constipation or diarrhea. He continued to have a few bowel movements, however with decreasing amount of blood in stool. He reports that his last BM was in the morning today with brown stool and small amount of red blood with it.     The patient denies history of GI bleeding in the past. Denies use of NSAIDs. Endorses change in appetite recently and weight loss, however he is not sure how much he lost. Denies tobacco use.     On presentation the patient is hemodynamically stable with Hb of 6.9 from a baseline of 9-10.    Colonoscopy 2008 for screening, reported normal colon.

## 2019-03-20 NOTE — SUBJECTIVE & OBJECTIVE
Subjective:     Interval History:   No acute events overnight. Patient had his EGD and colonoscopy this morning    Current Neurological Medications:   Azathioprine 200mg qd  Methylprednisolone 4mg qd    Current Facility-Administered Medications   Medication Dose Route Frequency Provider Last Rate Last Dose    0.9%  NaCl infusion (for blood administration)   Intravenous Q24H PRN Manoj Chamberlain PA-C        0.9%  NaCl infusion   Intravenous Continuous Leeroy Thornton MD 10 mL/hr at 03/20/19 0951      acetaminophen tablet 650 mg  650 mg Oral Q4H PRN Renny Dixon MD        azaTHIOprine tablet 200 mg  200 mg Oral Daily Renny Dixon MD   200 mg at 03/20/19 0916    dextrose 50% injection 12.5 g  12.5 g Intravenous PRN Renny Dixon MD        dextrose 50% injection 25 g  25 g Intravenous PRN Renny Dixon MD        finasteride tablet 5 mg  5 mg Oral Daily Renny Dixon MD   5 mg at 03/20/19 0916    glucagon (human recombinant) injection 1 mg  1 mg Intramuscular PRN Renny Dixon MD        glucose chewable tablet 16 g  16 g Oral PRN Renny Dixon MD        glucose chewable tablet 24 g  24 g Oral PRN Renny Dixon MD        hydroxyurea capsule 500 mg  500 mg Oral Daily Renny Dixon MD   500 mg at 03/20/19 0916    methylPREDNISolone sodium succinate injection 4 mg  4 mg Intravenous Daily Cristino Hogan PA-C   4 mg at 03/20/19 0917    ondansetron disintegrating tablet 8 mg  8 mg Oral Q8H PRN Renny Dixon MD        pyridostigmine injection 2 mg  2 mg Intravenous Q6H PRN Cristino Hogan PA-C        sodium chloride 0.9% flush 5 mL  5 mL Intravenous PRN Manoj Chamberlain PA-C        sodium chloride 0.9% flush 5 mL  5 mL Intravenous PRN Renny Dixon MD        tamsulosin 24 hr capsule 0.4 mg  1 capsule Oral Daily Renny Dixon MD   0.4 mg at 03/20/19 0916    warfarin (COUMADIN) tablet 5 mg  5 mg Oral Daily Rafael Mullins MD           Review of Systems   HENT: Negative for trouble swallowing.     Respiratory: Negative for shortness of breath.    Cardiovascular: Negative for chest pain.   Neurological: Negative for weakness.     Objective:     Vital Signs (Most Recent):  Temp: 97.2 °F (36.2 °C) (03/20/19 1628)  Pulse: 62 (03/20/19 1628)  Resp: 18 (03/20/19 1628)  BP: 108/68 (03/20/19 1628)  SpO2: 98 % (03/20/19 1628) Vital Signs (24h Range):  Temp:  [97.1 °F (36.2 °C)-98.4 °F (36.9 °C)] 97.2 °F (36.2 °C)  Pulse:  [] 62  Resp:  [10-20] 18  SpO2:  [96 %-100 %] 98 %  BP: (108-161)/(58-72) 108/68     Weight: 75.2 kg (165 lb 12.8 oz)  Body mass index is 24.48 kg/m².    Physical Exam   Constitutional: He appears well-developed and well-nourished.   HENT:   Head: Normocephalic and atraumatic.       NEUROLOGICAL EXAMINATION:     MENTAL STATUS        Exam was done soon after patient returned from endoscopy    - drowsy but awaken to voice, does not stay awake too long  - mumbles appropriately to questions, drowsy and mostly not using full sentences  - followed simple and lateralized commands     CRANIAL NERVES        No notable facial asymmetry     MOTOR EXAM     Strength   Right biceps: 5/5  Left biceps: 5/5  Right iliopsoas: 5/5  Left iliopsoas: 5/5  Right anterior tibial: 5/5       Was able to participate in the strength exam.       Significant Labs:   Recent Lab Results       03/20/19  0458   03/20/19  0457   03/19/19 2045        Immature Grans (Abs) 0.02  Comment:  Mild elevation in immature granulocytes is non specific and   can be seen in a variety of conditions including stress response,   acute inflammation, trauma and pregnancy. Correlation with other   laboratory and clinical findings is essential.     0.04  Comment:  Mild elevation in immature granulocytes is non specific and   can be seen in a variety of conditions including stress response,   acute inflammation, trauma and pregnancy. Correlation with other   laboratory and clinical findings is essential.       Anion Gap   9 9     Aniso Moderate    Slight     Baso # 0.00   0.00     Basophil% 0.0   0.0     BUN, Bld   38 45     Calcium   9.8 9.3     Chloride   110 111     CO2   20 16     Creatinine   1.5 1.5     Differential Method Automated   Automated     eGFR if    47.4 47.4     eGFR if non    41.0  Comment:  Calculation used to obtain the estimated glomerular filtration  rate (eGFR) is the CKD-EPI equation.    41.0  Comment:  Calculation used to obtain the estimated glomerular filtration  rate (eGFR) is the CKD-EPI equation.        Eos # 0.0   0.0     Eosinophil% 1.4   0.8     Estimated Avg Glucose 105         Glucose   92 94     Gran # (ANC) 0.8   0.7     Gran% 54.0   54.7     Hematocrit 26.1   24.3     Hemoglobin 8.6   8.1     Hemoglobin A1C External 5.3  Comment:  ADA Screening Guidelines:  5.7-6.4%  Consistent with prediabetes  >or=6.5%  Consistent with diabetes  High levels of fetal hemoglobin interfere with the HbA1C  assay. Heterozygous hemoglobin variants (HbS, HgC, etc)do  not significantly interfere with this assay.   However, presence of multiple variants may affect accuracy.           Hypo Occasional   Occasional     Immature Granulocytes 1.4   3.1     Coumadin Monitoring INR 1.9  Comment:  Coumadin Therapy:  2.0 - 3.0 for INR for all indicators except mechanical heart valves  and antiphospholipid syndromes which should use 2.5 - 3.5.     2.4  Comment:  Coumadin Therapy:  2.0 - 3.0 for INR for all indicators except mechanical heart valves  and antiphospholipid syndromes which should use 2.5 - 3.5.       Lymph # 0.5   0.4     Lymph% 33.8   32.8     Magnesium   1.9       MCH 36.6   36.8     MCHC 33.0   33.3        111     Mono # 0.1   0.1     Mono% 9.4   8.6     MPV 9.7   9.4     nRBC 0   0     Ovalocytes Occasional   Occasional     Phosphorus   2.7       Platelet Estimate     Appears normal     Platelets 209   193     Poik Slight   Slight     Poly Occasional   Occasional     Potassium   5.1 4.5     Prealbumin    38       Protime 18.9   23.2     RBC 2.35   2.20     RDW SEE COMMENT  Comment:  Result not available.   SEE COMMENT  Comment:  Result not available.     Sodium   139 136     WBC 1.39  Comment:  WBC critical result(s) called and verbal readback obtained from   ADAIR HODGE RN, 03/20/2019 06:21     1.28  Comment:  WBC critical result(s) called and verbal readback obtained from   SRIRAM ROJO RN, 03/19/2019 21:20             Significant Imaging:   No pertinent neurologic imaging to review

## 2019-03-20 NOTE — NURSING
Pt arrived to Room 610 via bed, with transports. Oriented to  Room and environment. AAO X4. Denies any pain and discomfort. Wife at the bedside. Getting GoLytely. NPO for Coloscopy tomorrow. Safety maintained. Will monitor.

## 2019-03-20 NOTE — H&P
Short Stay Endoscopy History and Physical    PCP - Ernie Michel MD  Referring Physician - Aaareferral Self  No address on file    Procedure - EGD and Colonoscopy  ASA - per anesthesia  Mallampati - per anesthesia  History of Anesthesia problems - see anesthesia note  Family history Anesthesia problems -  see anesthesia note  Plan of anesthesia - as per anesthesia    HPI  88 y.o. male    Reason for procedure:  acute blood loss anemia, hematochezia    Medical History:  has a past medical history of *Atrial fibrillation, Arthritis, Atrial fibrillation, BPH (benign prostatic hypertrophy), Degenerative disc disease, Depression, GERD (gastroesophageal reflux disease), Hyperglycemia, Hyperlipidemia, Hypertension, MG (myasthenia gravis), and Postherpetic neuralgia.    Surgical History:  has a past surgical history that includes Cholecystectomy and Achilles tendon surgery.    Family History: family history includes Cancer in his father; Stroke in his mother.    Social History:  reports that he has quit smoking. he has never used smokeless tobacco. He reports that he does not drink alcohol or use drugs.    Review of patient's allergies indicates:  No Known Allergies    Medications:   Medications Prior to Admission   Medication Sig Dispense Refill Last Dose    aspirin 81 mg Tab Take 81 mg by mouth Daily.   3/19/2019    azaTHIOprine (IMURAN) 50 mg Tab Take 4 tablets (200 mg total) by mouth once daily. 360 tablet 3 3/19/2019    benazepril (LOTENSIN) 40 MG tablet TAKE 1 TABLET EVERY DAY 90 tablet 3 3/19/2019    DENTURE CLEANSER (EFFERVESCENT DENTURE CLEANSR DENT)    3/19/2019    finasteride (PROSCAR) 5 mg tablet TAKE 1 TABLET EVERY DAY 90 tablet 3 3/19/2019    FLUZONE HIGH-DOSE 2018-19, PF, 180 mcg/0.5 mL vaccine    3/19/2019    hydroxyurea (HYDREA) 500 mg Cap Take 2 capsules (1,000 mg) on day 1; 2 caps (1,000 mg) on day 2; and then 1 capsule (500 mg) on day 3. Repeat continuously.. 150 capsule 10 3/19/2019    IRON,  FERROUS SULFATE, ORAL Take 65 mg by mouth once daily.   3/19/2019    ONE TOUCH ULTRA TEST Strp TEST DAILY 100 each 3 3/19/2019    ONE TOUCH ULTRASOFT LANCETS lancets TEST DAILY 100 each 3 3/19/2019    predniSONE (DELTASONE) 5 MG tablet Take 1 tablet (5 mg total) by mouth every other day. 45 tablet 3 3/19/2019    pyridostigmine (MESTINON) 60 mg Tab Take 1 tablet (60 mg total) by mouth every 6 to 8 hours as needed. 180 tablet 1 3/19/2019    tamsulosin (FLOMAX) 0.4 mg Cap Take 1 capsule (0.4 mg total) by mouth once daily. 90 capsule 3 3/19/2019    tamsulosin (FLOMAX) 0.4 mg Cap TAKE 1 CAPSULE EVERY DAY 90 capsule 3 3/19/2019    verapamil (VERELAN) 240 MG C24P TAKE 1 CAPSULE EVERY DAY 90 capsule 3 3/19/2019    vitamin D 1000 units Tab Take 185 mg by mouth once daily.   3/19/2019    warfarin (COUMADIN) 5 MG tablet TAKE 1 TABLET EVERY DAY EXCEPT TAKE 1/2 TABLET ON SUNDAY, OR AS DIRECTED BY COUMADIN CLINIC 90 tablet 3 3/19/2019    hydroCHLOROthiazide (MICROZIDE) 12.5 mg capsule Take 1 capsule (12.5 mg total) by mouth once daily. 90 capsule 3        Physical Exam:    Vital Signs:   Vitals:    03/20/19 0941   BP: (!) 159/69   Pulse: 64   Resp: 17   Temp: 97.9 °F (36.6 °C)       General Appearance: Chronically ill appearing in no acute distress  Lungs: No respiratory distress.   Heart:  Regular rate, S1, S2 normal.  Abdomen: Soft, non tender, non distended with normal bowel sounds, no masses    Labs:  Lab Results   Component Value Date    WBC 1.39 (LL) 03/20/2019    HGB 8.6 (L) 03/20/2019    HCT 26.1 (L) 03/20/2019     (H) 03/20/2019     03/20/2019     Lab Results   Component Value Date    INR 1.9 (H) 03/20/2019     Lab Results   Component Value Date    IRON 211 (H) 03/01/2019    FERRITIN 844 (H) 03/01/2019    TIBC 256 03/01/2019    FESATURATED 82 (H) 03/01/2019     Lab Results   Component Value Date     03/20/2019    K 5.1 03/20/2019     03/20/2019    CO2 20 (L) 03/20/2019    BUN 38 (H)  03/20/2019    CREATININE 1.5 (H) 03/20/2019       I have explained the risks and benefits of this endoscopic procedure to the patient/family including but not limited to missed lesion, missed cancer, bleeding, inflammation, infection, perforation, hypoxia/respiratory failure, and death.       Kavin Thakur MD  Gastroenterology & Hepatology Fellow  Pager: [phone]

## 2019-03-20 NOTE — PLAN OF CARE
Problem: Adult Inpatient Plan of Care  Goal: Plan of Care Review  Outcome: Ongoing (interventions implemented as appropriate)   03/20/19 9382   Plan of Care Review   Plan of Care Reviewed With patient     Pt remain free from fall and injuries at this time. Remain NPO. Encourage to finish Golytely. Bedside commode provided. Voiding freely. No further C/O blood in stool. Denies any pain and discomfort. Safety maintained. Remain fall risk. Advised/ encouraged to call for assistance before getting up. Verbalize understanding. Wife remain at the bedside. Call light to easy reach, bed locked and lowest position. Will monitor.

## 2019-03-20 NOTE — ASSESSMENT & PLAN NOTE
Patient is an 89 y/o male with history of Afib on coumadin with current INR of 2.4, presented with hematochezia painless that seems to be improving. Likely source of bleeding is diverticular. We will evaluated patient with EGD and colonoscopy to evaluate for source of bleeding.    Of note, patient has new onset leukopenia, which can be a side effect of imuran. Recommend evaluation by primary team if imuran needs to be adjusted/discontinued given leukopenia.    Trend H/H and transfuse for target H/H >7/21  Maintain 2 large bore IVs  Continue to hold coumadin. No contraindication to steroid treatment.  Keep on Clear liquid today, and NPO after MN, prep ordered for colonoscopy

## 2019-03-20 NOTE — ANESTHESIA POSTPROCEDURE EVALUATION
"Anesthesia Post Evaluation    Patient: Ernie Phan    Procedure(s) Performed: Procedure(s) (LRB):  EGD (ESOPHAGOGASTRODUODENOSCOPY) (N/A)  COLONOSCOPY (N/A)    Final Anesthesia Type: general  Patient location during evaluation: PACU  Patient participation: Yes- Able to Participate  Level of consciousness: awake and alert and oriented  Post-procedure vital signs: reviewed and stable  Pain management: adequate  Airway patency: patent  PONV status at discharge: No PONV  Anesthetic complications: no      Cardiovascular status: stable  Respiratory status: unassisted  Hydration status: euvolemic  Follow-up not needed.        Visit Vitals  /65   Pulse (!) 58   Temp 36.9 °C (98.4 °F) (Temporal)   Resp 19   Ht 5' 9" (1.753 m)   Wt 75.2 kg (165 lb 12.8 oz)   SpO2 98%   BMI 24.48 kg/m²       Pain/Kenia Score: No Data Recorded      "

## 2019-03-20 NOTE — ASSESSMENT & PLAN NOTE
Hgb 6.9 today - drop from 9.6, 2 weeks prior  Symptoms in the last week most consistent severe anemia rather than a myasthenic crisis.  Management per primary team

## 2019-03-20 NOTE — H&P
History and Physical  Hospital Medicine       Patient Name: Ernie Phan  MRN:  [id number]  [facility] Medicine Team: [facility] MED R Renny Dixon MD  Date of Admission:  3/19/2019     Principal Problem:  Rectal bleeding   Primary Care Physician: Ernie Michel MD      History of Present Illness:     88-year-old male presents to the ER for evaluation of rectal bleeding.  The patient was sitting at home and went to the bathroom and noticed blood in his rectum, bright red blood.  He denies any nausea vomiting fever or chills tonight.  He reports for the past week increased fatigue, and increasing fatigue with exertion.   He denies any history of GI bleeding.  He is on coumadin for Afib.         Review of Systems   Constitutional: Negative for chills, fatigue, fever.   HENT: Negative for sore throat, trouble swallowing.    Eyes: Negative for photophobia, visual disturbance.   Respiratory: Negative for cough, shortness of breath.    Cardiovascular: Negative for chest pain, palpitations, leg swelling.   Gastrointestinal: Negative for abdominal pain, constipation, diarrhea, nausea, vomiting.   Endocrine: Negative for cold intolerance, heat intolerance.   Genitourinary: Negative for dysuria, frequency.   Musculoskeletal: Negative for arthralgias, myalgias.   Skin: Negative for rash, wound, erythema   Neurological: Negative for dizziness, syncope, weakness, light-headedness.   Psychiatric/Behavioral: Negative for confusion, hallucinations, anxiety  All other systems reviewed and are negative.      Past Medical History: Patient has a past medical history of *Atrial fibrillation, Arthritis, Atrial fibrillation, BPH (benign prostatic hypertrophy), Degenerative disc disease, Depression, GERD (gastroesophageal reflux disease), Hyperglycemia, Hyperlipidemia, Hypertension, MG (myasthenia gravis), and Postherpetic neuralgia.    Past Surgical History: Patient has a past surgical history that includes Cholecystectomy and Achilles  tendon surgery.    Social History: Patient reports that he has quit smoking. he has never used smokeless tobacco. He reports that he does not drink alcohol or use drugs.    Family History: family history includes Cancer in his father; Stroke in his mother.    Medications: Scheduled Meds:   azaTHIOprine  200 mg Oral Daily    finasteride  5 mg Oral Daily    hydroxyurea  500 mg Oral Daily    methylPREDNISolone sodium succinate  4 mg Intravenous Daily    tamsulosin  1 capsule Oral Daily     Continuous Infusions:  PRN Meds:.sodium chloride, acetaminophen, dextrose 50%, dextrose 50%, glucagon (human recombinant), glucose, glucose, ondansetron, pyridostigmine, sodium chloride 0.9%, sodium chloride 0.9%    Allergies: Patient has No Known Allergies.    Physical Exam:     Vital Signs (Most Recent):  Temp: 97.6 °F (36.4 °C) (03/19/19 0627)  Pulse: 72 (03/19/19 1832)  Resp: 15 (03/19/19 1447)  BP: (!) 144/65 (03/19/19 1832)  SpO2: 100 % (03/19/19 1832) Vital Signs Range (Last 24H):  Temp:  [97.4 °F (36.3 °C)-97.6 °F (36.4 °C)]   Pulse:  [48-85]   Resp:  [15-40]   BP: (102-151)/(53-70)   SpO2:  [98 %-100 %]    Body mass index is 25.1 kg/m².     Physical Exam:  Constitutional: Appears well-developed and well-nourished.   Head: Normocephalic and atraumatic.   Mouth/Throat: Oropharynx is clear and moist.   Eyes: EOM are normal. Pupils are equal, round, and reactive to light. No scleral icterus.   Neck: Normal range of motion. Neck supple.   Cardiovascular: Normal rate and regular rhythm.  No murmur heard.  Pulmonary/Chest: Effort normal and breath sounds normal. No respiratory distress. No wheezes, rales, or rhonchi  Abdominal: Soft. Bowel sounds are normal.  No distension or tenderness  Musculoskeletal: Normal range of motion. No edema.   Neurological: Alert and oriented to person, place, and time.   Skin: Skin is warm and dry.   Psychiatric: Normal mood and affect. Behavior is normal.   Vitals reviewed.    Recent Labs   Lab  03/19/19 0139 03/19/19 2045   WBC 1.60* 1.28*   HGB 6.9* 8.1*   HCT 21.7* 24.3*    193       Recent Labs   Lab 03/19/19 0139 03/19/19 2045    136   K 5.2* 4.5    111*   CO2 14* 16*   BUN 67* 45*   CREATININE 2.2* 1.5*   GLU 98 94   CALCIUM 9.2 9.3     Recent Labs   Lab 03/18/19 03/19/19 0139 03/19/19 2045   ALKPHOS  --  88  --    ALT  --  36  --    AST  --  55*  --    ALBUMIN  --  3.7  --    PROT  --  7.2  --    BILITOT  --  1.4*  --    INR 2.7 2.4* 2.4*      No results for input(s): POCTGLUCOSE in the last 168 hours.      Assessment and Plan:     Mr. Ernie Phan is a 88 y.o. 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     # Acute blood loss anemia  # Gastrointestinal hemorrhage   # Rectal bleeding  - unclear etiology   - s/p 1 unit of prbc; on coumadin at home; holding giving vitamin K PO  - GI consulted, planning for EGD/C-scope    # Myasthenia Gravis   - seems to be in a slight flare  - neurology consulted, appreciate recs    # Acute on chronic renal failure stage 3  - Cr 2.2, baseline 1.6  - getting PRBC    # Paroxysmal afib  - holding warfarin, consider switching to xarelto on d/c  - rate control    # Essential HTN  - holding BP meds as patient is orthostatic        Diet:  CLD  GI PPx:    DVT PPx:    Goals of Care:  full    High Risk Conditions:    GI bleed     Disposition:  Later this week once bleed controlled     Renny Dixon MD  Medical  Director [facility] Medicine  Spectra:  [phone]  Pager: [phone]

## 2019-03-20 NOTE — TRANSFER OF CARE
"Anesthesia Transfer of Care Note    Patient: Ernie Phan    Procedure(s) Performed: Procedure(s) (LRB):  EGD (ESOPHAGOGASTRODUODENOSCOPY) (N/A)  COLONOSCOPY (N/A)    Patient location: PACU    Anesthesia Type: general    Transport from OR: Transported from OR on 2-3 L/min O2 by NC with adequate spontaneous ventilation    Post pain: adequate analgesia    Post assessment: no apparent anesthetic complications and tolerated procedure well    Post vital signs: stable    Level of consciousness: awake    Nausea/Vomiting: no nausea/vomiting    Complications: none    Transfer of care protocol was followed      Last vitals:   Visit Vitals  /66   Pulse 65   Temp 36.9 °C (98.4 °F) (Temporal)   Resp 20   Ht 5' 9" (1.753 m)   Wt 75.2 kg (165 lb 12.8 oz)   SpO2 100%   BMI 24.48 kg/m²     "

## 2019-03-21 LAB
ANION GAP SERPL CALC-SCNC: 8 MMOL/L
ANISOCYTOSIS BLD QL SMEAR: SLIGHT
BASOPHILS NFR BLD: 0 %
BUN SERPL-MCNC: 31 MG/DL
CALCIUM SERPL-MCNC: 9.2 MG/DL
CHLORIDE SERPL-SCNC: 110 MMOL/L
CO2 SERPL-SCNC: 19 MMOL/L
CREAT SERPL-MCNC: 1.5 MG/DL
DIFFERENTIAL METHOD: ABNORMAL
EOSINOPHIL NFR BLD: 1 %
ERYTHROCYTE [DISTWIDTH] IN BLOOD BY AUTOMATED COUNT: ABNORMAL %
EST. GFR  (AFRICAN AMERICAN): 47.4 ML/MIN/1.73 M^2
EST. GFR  (NON AFRICAN AMERICAN): 41 ML/MIN/1.73 M^2
GLUCOSE SERPL-MCNC: 90 MG/DL
HCT VFR BLD AUTO: 23.7 %
HGB BLD-MCNC: 7.9 G/DL
IMM GRANULOCYTES # BLD AUTO: ABNORMAL K/UL
IMM GRANULOCYTES NFR BLD AUTO: ABNORMAL %
INR PPP: 1.5
LYMPHOCYTES NFR BLD: 33 %
MAGNESIUM SERPL-MCNC: 1.6 MG/DL
MCH RBC QN AUTO: 36.9 PG
MCHC RBC AUTO-ENTMCNC: 33.3 G/DL
MCV RBC AUTO: 111 FL
MONOCYTES NFR BLD: 5 %
NEUTROPHILS NFR BLD: 60 %
NEUTS BAND NFR BLD MANUAL: 1 %
NRBC BLD-RTO: 0 /100 WBC
OVALOCYTES BLD QL SMEAR: ABNORMAL
PHOSPHATE SERPL-MCNC: 2.4 MG/DL
PLATELET # BLD AUTO: 208 K/UL
PLATELET BLD QL SMEAR: ABNORMAL
PMV BLD AUTO: 9.5 FL
POIKILOCYTOSIS BLD QL SMEAR: SLIGHT
POLYCHROMASIA BLD QL SMEAR: ABNORMAL
POTASSIUM SERPL-SCNC: 4.9 MMOL/L
PROTHROMBIN TIME: 14.7 SEC
RBC # BLD AUTO: 2.14 M/UL
SODIUM SERPL-SCNC: 137 MMOL/L
WBC # BLD AUTO: 1.49 K/UL

## 2019-03-21 PROCEDURE — 36415 COLL VENOUS BLD VENIPUNCTURE: CPT | Mod: HCNC

## 2019-03-21 PROCEDURE — 84100 ASSAY OF PHOSPHORUS: CPT | Mod: HCNC

## 2019-03-21 PROCEDURE — 99222 PR INITIAL HOSPITAL CARE,LEVL II: ICD-10-PCS | Mod: HCNC,GC,, | Performed by: INTERNAL MEDICINE

## 2019-03-21 PROCEDURE — 63600175 PHARM REV CODE 636 W HCPCS: Mod: HCNC | Performed by: PHYSICIAN ASSISTANT

## 2019-03-21 PROCEDURE — 94761 N-INVAS EAR/PLS OXIMETRY MLT: CPT | Mod: HCNC

## 2019-03-21 PROCEDURE — 85027 COMPLETE CBC AUTOMATED: CPT | Mod: HCNC

## 2019-03-21 PROCEDURE — 80048 BASIC METABOLIC PNL TOTAL CA: CPT | Mod: HCNC

## 2019-03-21 PROCEDURE — 11000001 HC ACUTE MED/SURG PRIVATE ROOM: Mod: HCNC

## 2019-03-21 PROCEDURE — 99233 SBSQ HOSP IP/OBS HIGH 50: CPT | Mod: HCNC,,, | Performed by: HOSPITALIST

## 2019-03-21 PROCEDURE — 85610 PROTHROMBIN TIME: CPT | Mod: HCNC

## 2019-03-21 PROCEDURE — 25000003 PHARM REV CODE 250: Mod: HCNC | Performed by: HOSPITALIST

## 2019-03-21 PROCEDURE — 83735 ASSAY OF MAGNESIUM: CPT | Mod: HCNC

## 2019-03-21 PROCEDURE — 85007 BL SMEAR W/DIFF WBC COUNT: CPT | Mod: HCNC

## 2019-03-21 PROCEDURE — 94150 VITAL CAPACITY TEST: CPT | Mod: HCNC

## 2019-03-21 PROCEDURE — 99222 1ST HOSP IP/OBS MODERATE 55: CPT | Mod: HCNC,GC,, | Performed by: INTERNAL MEDICINE

## 2019-03-21 PROCEDURE — 99233 PR SUBSEQUENT HOSPITAL CARE,LEVL III: ICD-10-PCS | Mod: HCNC,,, | Performed by: HOSPITALIST

## 2019-03-21 PROCEDURE — 63600175 PHARM REV CODE 636 W HCPCS: Mod: HCNC | Performed by: HOSPITALIST

## 2019-03-21 PROCEDURE — 94010 BREATHING CAPACITY TEST: CPT | Mod: HCNC

## 2019-03-21 RX ORDER — WARFARIN SODIUM 5 MG/1
5 TABLET ORAL
Status: DISCONTINUED | OUTPATIENT
Start: 2019-03-25 | End: 2019-03-23 | Stop reason: HOSPADM

## 2019-03-21 RX ORDER — PYRIDOSTIGMINE BROMIDE 60 MG/1
60 TABLET ORAL EVERY 6 HOURS PRN
Status: DISCONTINUED | OUTPATIENT
Start: 2019-03-21 | End: 2019-03-23 | Stop reason: HOSPADM

## 2019-03-21 RX ORDER — PREDNISONE 5 MG/1
5 TABLET ORAL DAILY
Status: DISCONTINUED | OUTPATIENT
Start: 2019-03-21 | End: 2019-03-23 | Stop reason: HOSPADM

## 2019-03-21 RX ORDER — PREDNISONE 5 MG/1
5 TABLET ORAL DAILY
Qty: 45 TABLET | Refills: 3 | Status: SHIPPED | OUTPATIENT
Start: 2019-03-21 | End: 2019-06-05 | Stop reason: SDUPTHER

## 2019-03-21 RX ORDER — VERAPAMIL HYDROCHLORIDE 240 MG/1
240 TABLET, FILM COATED, EXTENDED RELEASE ORAL DAILY
Status: DISCONTINUED | OUTPATIENT
Start: 2019-03-21 | End: 2019-03-22

## 2019-03-21 RX ORDER — BENAZEPRIL HYDROCHLORIDE 20 MG/1
40 TABLET ORAL DAILY
Status: DISCONTINUED | OUTPATIENT
Start: 2019-03-21 | End: 2019-03-23 | Stop reason: HOSPADM

## 2019-03-21 RX ORDER — PREDNISONE 5 MG/1
5 TABLET ORAL EVERY OTHER DAY
Qty: 45 TABLET | Refills: 3 | Status: SHIPPED | OUTPATIENT
Start: 2019-03-21 | End: 2019-03-21 | Stop reason: SDUPTHER

## 2019-03-21 RX ORDER — AZATHIOPRINE 50 MG/1
200 TABLET ORAL DAILY
Qty: 360 TABLET | Refills: 3 | Status: SHIPPED | OUTPATIENT
Start: 2019-03-21 | End: 2020-01-13 | Stop reason: SDUPTHER

## 2019-03-21 RX ORDER — WARFARIN SODIUM 5 MG/1
5 TABLET ORAL
Status: DISCONTINUED | OUTPATIENT
Start: 2019-03-27 | End: 2019-03-23 | Stop reason: HOSPADM

## 2019-03-21 RX ADMIN — HYDROXYUREA 500 MG: 500 CAPSULE ORAL at 08:03

## 2019-03-21 RX ADMIN — BENAZEPRIL HYDROCHLORIDE 40 MG: 20 TABLET, FILM COATED ORAL at 11:03

## 2019-03-21 RX ADMIN — VERAPAMIL HYDROCHLORIDE 240 MG: 240 TABLET, FILM COATED, EXTENDED RELEASE ORAL at 11:03

## 2019-03-21 RX ADMIN — METHYLPREDNISOLONE SODIUM SUCCINATE 4 MG: 40 INJECTION, POWDER, FOR SOLUTION INTRAMUSCULAR; INTRAVENOUS at 08:03

## 2019-03-21 RX ADMIN — PREDNISONE 5 MG: 5 TABLET ORAL at 03:03

## 2019-03-21 RX ADMIN — FINASTERIDE 5 MG: 5 TABLET, FILM COATED ORAL at 08:03

## 2019-03-21 RX ADMIN — AZATHIOPRINE 200 MG: 50 TABLET ORAL at 08:03

## 2019-03-21 RX ADMIN — TAMSULOSIN HYDROCHLORIDE 0.4 MG: 0.4 CAPSULE ORAL at 08:03

## 2019-03-21 RX ADMIN — WARFARIN SODIUM 7.5 MG: 2.5 TABLET ORAL at 05:03

## 2019-03-21 NOTE — CONSULTS
"Consult Note    Inpatient consult to Hematology/Oncology  Consult performed by: Theresa Bautista MD  Consult ordered by: Rafael Mullins MD        SUBJECTIVE:     History of Present Illness:  Ernie Phan is an 88-year-old male with JAK2+ ET, MG, chronic anemia, who presented to the ER for evaluation of rectal bleeding.  The patient was sitting at home and went to the bathroom and noticed blood in his rectum, bright red blood. He reports for the past week increased fatigue, and increasing fatigue with exertion. He denies any history of GI bleeding.  He is on coumadin for Afib. Hematology consulted for anemia and leukopenia.    Patient endorses bruising d/t Coumadin, not more than usual. No weight loss, fevers/chills, night sweats, LN. Had colonoscopy on 3/20. Noted some BRBPR after that not mixed in with stool, but on toilet paper after BM. Was due for hematology visit and procrit on 3/22.    Hematologic History  "Mr. Phan is an 87-year-old man with essential thrombocytosis.  I evaluated him initially in January 2016.  His platelet count had been high since 2012, but it became much higher in 2015 with values between 888,000 to 1,126,000.  A bone marrow examination had 80% cellularity with slight dyserythropoiesis. There was an increased number of large megakaryocytes along with some clusters of megakaryocytes and there was grade I reticulin fibrosis. A JAK2 V617F mutation study was positive at 29%.  He is taking aspirin 81 mg daily and he also takes warfarin because of paroxysmal atrial fibrillation.     Following the diagnosis of essential thrombocytosis, he began to take   hydroxyurea and that drug has been very successful in lowering his platelet count.  However, he has had far more anemia than many other patients with this disorder.  This is likely related to his taking Imuran 200 mg a day for myasthenia gravis and to renal impairment.     He has recently been requiring periodic red cell transfusions.  I " "attempted to obtain Procrit, but was unable to get it approved for anemia related to a myeloproliferative disease and cytotoxic chemotherapy.  The physician that I talked to at his insurance carrier did not understand that Imuran and Hydrea can have the same effects on red cell production as other cytotoxic drugs.  I may be able to get at least low-dose Procrit approved for anemia of renal disease.     He is currently taking hydroxyurea on the following three-day schedule:  1 g; 1 g; 0.5 g.  This has been adequate in controlling his platelet count.  I have been transfusing him with red cells when his hemoglobin in the range of 8.3-8.5 or below."    Review of patient's allergies indicates:  No Known Allergies  Past Medical History:   Diagnosis Date    *Atrial fibrillation     Arthritis     Atrial fibrillation     BPH (benign prostatic hypertrophy)     Degenerative disc disease     Depression     GERD (gastroesophageal reflux disease)     Hyperglycemia     Hyperlipidemia     Hypertension     MG (myasthenia gravis)     Postherpetic neuralgia      Past Surgical History:   Procedure Laterality Date    ACHILLES TENDON SURGERY      CHOLECYSTECTOMY      COLONOSCOPY N/A 3/20/2019    Performed by Leer[facility] at [facility] (2ND FLR)    EGD (ESOPHAGOGASTRODUODENOSCOPY) N/A 3/20/2019    Performed by Leeroy Thornton MD at [facility] (2ND FLR)     Family History   Problem Relation Age of Onset    Stroke Mother     Cancer Father      Social History     Tobacco Use    Smoking status: Former Smoker    Smokeless tobacco: Never Used   Substance Use Topics    Alcohol use: No    Drug use: No     Review of Systems   Constitutional: Positive for malaise/fatigue. Negative for chills and fever.   HENT: Negative for nosebleeds and sore throat.    Eyes: Negative for blurred vision and double vision.   Respiratory: Positive for shortness of breath. Negative for cough and hemoptysis.    Cardiovascular: Negative for " chest pain and leg swelling.   Gastrointestinal: Negative for abdominal pain, blood in stool, melena, nausea and vomiting.   Genitourinary: Negative for hematuria.   Musculoskeletal: Negative for joint pain and myalgias.   Skin: Negative for rash.   Neurological: Negative for dizziness, sensory change and headaches.   Endo/Heme/Allergies: Bruises/bleeds easily.     OBJECTIVE:     Vital Signs:  Temp:  [97.2 °F (36.2 °C)-97.4 °F (36.3 °C)]   Pulse:  [54-93]   Resp:  [18]   BP: (105-189)/(54-81)   SpO2:  [98 %]     Physical Exam   Constitutional: He appears well-developed and well-nourished.   HENT:   Head: Normocephalic and atraumatic.   Eyes: EOM are normal. Pupils are equal, round, and reactive to light. No scleral icterus.   Neck: Neck supple.   Cardiovascular: Normal rate and regular rhythm.   Pulmonary/Chest: Effort normal and breath sounds normal. No respiratory distress.   Abdominal: Soft. He exhibits no distension. There is no tenderness.   Musculoskeletal: Normal range of motion. He exhibits no edema.   Lymphadenopathy:     He has no cervical adenopathy.   Neurological: He is alert.   Skin: Skin is warm and dry.   Ecchymosis   Psychiatric: He has a normal mood and affect. His behavior is normal.     Laboratory:  CBC:   Recent Labs   Lab 03/21/19  0429   WBC 1.49*   RBC 2.14*   HGB 7.9*   HCT 23.7*      *   MCH 36.9*   MCHC 33.3     CMP:   Recent Labs   Lab 03/19/19  0139  03/21/19  0430   GLU 98   < > 90   CALCIUM 9.2   < > 9.2   ALBUMIN 3.7  --   --    PROT 7.2  --   --       < > 137   K 5.2*   < > 4.9   CO2 14*   < > 19*      < > 110   BUN 67*   < > 31*   CREATININE 2.2*   < > 1.5*   ALKPHOS 88  --   --    ALT 36  --   --    AST 55*  --   --    BILITOT 1.4*  --   --     < > = values in this interval not displayed.       ASSESSMENT/PLAN:   IMPRESSION  1) Anemia, leukopenia. Acute on chronic. In setting of Imuran use  -WBC 3/21: 1.49, Hb 7.9.   -3/1/19: WBC 3.63, Hb 9.6  -B12  and iron studies were checked 3/1/19  -TSH was checked 1/24/19  -On weekly procrit  -EGD/colonoscopy negative for active bleeding, showed diverticulosis and internal hemorrhoids, needs repeat colonoscopy in 5 years  -In 2015: bone marrow examination had 80% cellularity with slight dyserythropoiesis. There was an increased number of large megakaryocytes along with some clusters of megakaryocytes and there was grade I reticulin fibrosis. A JAK2 V617F mutation study was positive at 29%.     2) ET, JAK2+  -On hydrea    RECOMMENDATIONS  -Transfuse for Hb<7, plt<10 or if active brisk bleeding  -Can check PTT and fibrinogen, haptoglobin, reticulocyte, LDH, peripheral blood smear  -Consider abdominal U/S to evaluate for splenomegaly  -Hold Hydrea for 1 week  -Patient should have CBC checked 1 week after discharge  -If persistent pancytopenia, can consider repeat BM biopsy  -Patient has a f/u visit with Dr. Gutierrez on 4/22/19. Was supposed to get procrit today 3/22, will send message to Dr. Gutierrez regarding rescheduling this.  -Patient also indicated he needs assistance getting his MG medications refilled    The following was staffed and discussed with supervising physician Dr. Roman.     Theresa Bautista MD  Hematology/Oncology Fellow

## 2019-03-21 NOTE — PT/OT/SLP EVAL
Occupational Therapy   Evaluation    Name: Ernie Phan  MRN: [id number]  Admitting Diagnosis:  Rectal bleeding 1 Day Post-Op    Recommendations:     Discharge Recommendations: home with home health  Discharge Equipment Recommendations:  none  Barriers to discharge:  Decreased caregiver support    Assessment:     Ernie Phan is a 88 y.o. male with a medical diagnosis of Rectal bleeding.  He presents with supine with wife on room.  Pt with poor balance and tolerance for daily tasks.  Pt with good potential for gains and return to PLOF and will benefit from home health to ensure home safety  Performance deficits affecting function: weakness, impaired endurance, impaired self care skills, impaired functional mobilty, gait instability, impaired balance.      Rehab Prognosis: Good; patient would benefit from acute skilled OT services to address these deficits and reach maximum level of function.       Plan:     Patient to be seen 3 x/week to address the above listed problems via self-care/home management, therapeutic activities, therapeutic exercises  · Plan of Care Expires: 04/20/19  · Plan of Care Reviewed with: patient, spouse    Subjective     Chief Complaint: fatigue   Patient/Family Comments/goals: return to home     Occupational Profile:  Living Environment: Pt lives in [facility] with wife with no steps to enter   Previous level of function: Pt was able to complete dressing and toielting without assist but admits asssit with shower task  Equipment Used at Home:  shower chair, walker, rolling, cane, straight  Assistance upon Discharge: wife able to assist and pt with adult children in area that are able to assist     Pain/Comfort:  ·      Patients cultural, spiritual, Gnosticism conflicts given the current situation: no    Objective:     Communicated with: RN prior to session.  Patient found supine with (lines intact) upon OT entry to room.    General Precautions: Standard, fall   Orthopedic Precautions:N/A    Braces:       Occupational Performance:    Bed Mobility:    · Patient completed Rolling/Turning to Right with stand by assistance  · Patient completed Scooting/Bridging with stand by assistance  · Patient completed Supine to Sit with stand by assistance  · Patient completed Sit to Supine with stand by assistance    Functional Mobility/Transfers:  · Patient completed Sit <> Stand Transfer with contact guard assistance  with  no assistive device   · Patient completed Bed <> Chair Transfer using Step Transfer technique with contact guard assistance with no assistive device  · Patient completed Toilet Transfer Step Transfer technique with contact guard assistance with  no AD  · Functional Mobility: pt with poor balance and endurance     Activities of Daily Living:  · Feeding:  independence    · Grooming: stand by assistance    · Bathing: moderate assistance    · Upper Body Dressing: contact guard assistance    · Lower Body Dressing: minimum assistance    · Toileting: contact guard assistance      Cognitive/Visual Perceptual:  Cognitive/Psychosocial Skills:     -       Oriented to: Person, Place and Time   -       Follows Commands/attention:Follows two-step commands  -       Communication: clear/fluent  -       Memory: No Deficits noted  -       Safety awareness/insight to disability: intact   -       Mood/Affect/Coping skills/emotional control: Appropriate to situation    Physical Exam:  Upper Extremity Range of Motion:     -       Right Upper Extremity: WFL  -       Left Upper Extremity: WFL  Upper Extremity Strength:    -       Right Upper Extremity: WFL  -       Left Upper Extremity: WFL    AMPAC 6 Click ADL:  AMPAC Total Score:      Treatment & Education:  Evaluation complete and goals set. Pt educated on safety, role of OT, importance of increased participation in self care for gains , expectations for participation, expectations for gains, POC, energy conservation, caregiver strain. White board updated.      Education:    Patient left up in chair with all lines intact and wife present    GOALS:   Multidisciplinary Problems     Occupational Therapy Goals        Problem: Occupational Therapy Goal    Goal Priority Disciplines Outcome Interventions   Occupational Therapy Goal     OT, PT/OT Ongoing (interventions implemented as appropriate)    Description:  Goals to be met by:3/31    Patient will increase functional independence with ADLs by performing:    LE Dressing with [place].  Grooming while standing with Contact Guard Assistance.  Toileting from toilet with Contact Guard Assistance for hygiene and clothing management.   Bathing from  tub bench with Minimal Assistance.                      History:     Past Medical History:   Diagnosis Date    *Atrial fibrillation     Arthritis     Atrial fibrillation     BPH (benign prostatic hypertrophy)     Degenerative disc disease     Depression     GERD (gastroesophageal reflux disease)     Hyperglycemia     Hyperlipidemia     Hypertension     MG (myasthenia gravis)     Postherpetic neuralgia        Past Surgical History:   Procedure Laterality Date    ACHILLES TENDON SURGERY      CHOLECYSTECTOMY         Time Tracking:     OT Date of Treatment: 03/20/19  OT Start Time: 0845  OT Stop Time: 0900  OT Total Time (min): 15 min    Billable Minutes:Evaluation 15    Carly Kern OT  3/21/2019

## 2019-03-21 NOTE — PLAN OF CARE
Problem: Occupational Therapy Goal  Goal: Occupational Therapy Goal  Goals to be met by:3/31    Patient will increase functional independence with ADLs by performing:    LE Dressing with [place].  Grooming while standing with Contact Guard Assistance.  Toileting from toilet with Contact Guard Assistance for hygiene and clothing management.   Bathing from  tub bench with Minimal Assistance.    Outcome: Ongoing (interventions implemented as appropriate)  Evaluation complete and goals set.  Cont with POC  Carly Kern, OT  3/21/2019

## 2019-03-21 NOTE — ASSESSMENT & PLAN NOTE
- Hgb 6.9 on admit, 8.6 today s/p pRBC transfusion  - Symptoms in the last week most consistent severe anemia rather than a myasthenic crisis.  - Management per primary team

## 2019-03-21 NOTE — PLAN OF CARE
Problem: Fall Injury Risk  Goal: Absence of Fall and Fall-Related Injury    Intervention: Identify and Manage Contributors to Fall Injury Risk   03/20/19 2135 03/21/19 0519   Identify and Manage Contributors to Fall Injury Risk   Self-Care Promotion --  independence encouraged;BADL personal objects within reach   Manage Acute Allergic Reaction   Medication Review/Management medications reviewed;high risk medications identified --      Intervention: Promote Injury-Free Environment   03/20/19 2135 03/21/19 0400   Optimize Balance and Safe Activity   Safety Promotion/Fall Prevention --  assistive device/personal item within reach   Optimize Iron and Functional Mobility   Environmental Safety Modification assistive device/personal items within reach;clutter free environment maintained;lighting adjusted --          Problem: Adult Inpatient Plan of Care  Goal: Plan of Care Review  Outcome: Ongoing (interventions implemented as appropriate)   03/21/19 0519   Plan of Care Review   Plan of Care Reviewed With patient     Goal: Absence of Hospital-Acquired Illness or Injury    Intervention: Identify and Manage Fall Risk   03/21/19 0400   Optimize Balance and Safe Activity   Safety Promotion/Fall Prevention assistive device/personal item within reach     Intervention: Prevent VTE (venous thromboembolism)   03/20/19 2135   Prevent or Manage Embolism   VTE Prevention/Management bleeding risk assessed       Goal: Optimal Comfort and Wellbeing    Intervention: Provide Person-Centered Care   03/21/19 0519   Support Dyspnea Relief   Trust Relationship/Rapport care explained;choices provided;emotional support provided;empathic listening provided;questions answered;questions encouraged;reassurance provided;thoughts/feelings acknowledged

## 2019-03-21 NOTE — SUBJECTIVE & OBJECTIVE
Subjective:     Interval History: Patient doing well this morning, states that his weakness (generalized) had improved. States that he has no issues tolerating PO, no dysphagia, no diplopia and no changes in his voice quality.   Seen eating breakfast w/ no issues.    Current Neurological Medications:   Azathioprine 200mg qd  Methylprednisolone 4mg qd    Current Facility-Administered Medications   Medication Dose Route Frequency Provider Last Rate Last Dose    0.9%  NaCl infusion (for blood administration)   Intravenous Q24H PRN Manoj Chamberlain PA-C        0.9%  NaCl infusion   Intravenous Continuous Leeroy Thornton MD 10 mL/hr at 03/20/19 0951      acetaminophen tablet 650 mg  650 mg Oral Q4H PRN Renny Dixon MD        azaTHIOprine tablet 200 mg  200 mg Oral Daily Renny Dixon MD   200 mg at 03/21/19 0832    benazepril tablet 40 mg  40 mg Oral Daily Rafael Mullins MD        dextrose 50% injection 12.5 g  12.5 g Intravenous PRN Renny Dixon MD        dextrose 50% injection 25 g  25 g Intravenous PRN Renny Dixon MD        finasteride tablet 5 mg  5 mg Oral Daily Renny Dixon MD   5 mg at 03/21/19 0832    glucagon (human recombinant) injection 1 mg  1 mg Intramuscular PRN Renny Dixon MD        glucose chewable tablet 16 g  16 g Oral PRN Renny Dixon MD        glucose chewable tablet 24 g  24 g Oral PRN Renny Dixon MD        hydroxyurea capsule 500 mg  500 mg Oral Daily Renny Dixon MD   500 mg at 03/21/19 0832    methylPREDNISolone sodium succinate injection 4 mg  4 mg Intravenous Daily Cristino Hogan PA-C   4 mg at 03/21/19 0832    ondansetron disintegrating tablet 8 mg  8 mg Oral Q8H PRN Renny Dixon MD        pyridostigmine injection 2 mg  2 mg Intravenous Q6H PRN Cristino Hogan PA-C        sodium chloride 0.9% flush 5 mL  5 mL Intravenous PRN Manoj Chamberlain PA-C        sodium chloride 0.9% flush 5 mL  5 mL Intravenous PRN Renny Dixon MD        tamsulosin 24 hr  capsule 0.4 mg  1 capsule Oral Daily Renny Dixon MD   0.4 mg at 03/21/19 0832    verapamil CR tablet 240 mg  240 mg Oral Daily Rafael Mullins MD        warfarin (COUMADIN) tablet 5 mg  5 mg Oral Daily Rafael Mullins MD   5 mg at 03/20/19 1829       Review of Systems   HENT: Negative for trouble swallowing.    Respiratory: Negative for shortness of breath.    Cardiovascular: Negative for chest pain.   Neurological: Negative for weakness.   Objective:     Vital Signs (Most Recent):  Temp: 97.4 °F (36.3 °C) (03/21/19 0734)  Pulse: 93 (03/21/19 0747)  Resp: 18 (03/21/19 0734)  BP: (!) 182/73 (03/21/19 0802)  SpO2: 98 % (03/21/19 0734) Vital Signs (24h Range):  Temp:  [96.4 °F (35.8 °C)-98.4 °F (36.9 °C)] 97.4 °F (36.3 °C)  Pulse:  [50-93] 93  Resp:  [10-20] 18  SpO2:  [96 %-100 %] 98 %  BP: (108-189)/(58-81) 182/73     Weight: 75.2 kg (165 lb 12.8 oz)  Body mass index is 24.48 kg/m².    Physical Exam  General:  Well-developed, well-nourished, nad, pleasant elderly gentleman   HEENT:  NCAT, PERRLA, EOMI, oropharyngeal membranes non-erythematous/without exudate  Neck:  Supple, normal ROM without nuchal rigidity  Resp:  Symmetric expansion, no increased wob  CVS:  No LE edema, peripheral pulses 2+ (radial, dorsalis pedis)  GI:  Abd soft, non-distended, non-tender to palpation  Neurologic Exam:  Mental Status:  AAOx3.  Speech, thought content appropriate.   Cranial Nerves:  VFs intact on counting fingers in all quadrants bilaterally.  PERRLA, EOMI.  Facial movement, sensation intact and symmetric.  Palate raises symmetrically, tongue protrudes midline.  Trapezius, SCM strength 5/5 bilaterally.  Neck flexion and extension 5/5  No diplopia noted  No ptosis  Single breath count test (fast) - patient got to 42  Motor:  Normal bulk and tone.  BUE shoulder abduction, biceps/triceps, wrist flexion/extension,  strength 5/5.  BLE hip flexors, knee flexion/extension, plantarflexion/dorsiflexion 5/5.  Sensory:  Intact to  light touch at all extremities and face without inattention.  Vibratory sensation intact at BUE/BLE digits.  Reflexes:  Biceps, brachioradialis 1+ and symmetric, patellar, Achilles absent and symmetric.  Coordination: No resting tremor.  Gait:  Deferred          Significant Labs:   Hemoglobin A1c:   Recent Labs   Lab 03/20/19 0458   HGBA1C 5.3     Blood Culture: No results for input(s): LABBLOO in the last 48 hours.  BMP:   Recent Labs   Lab 03/19/19 2045 03/20/19 0457 03/21/19  0430   GLU 94 92 90    139 137   K 4.5 5.1 4.9   * 110 110   CO2 16* 20* 19*   BUN 45* 38* 31*   CREATININE 1.5* 1.5* 1.5*   CALCIUM 9.3 9.8 9.2   MG  --  1.9 1.6     CBC:   Recent Labs   Lab 03/19/19 2045 03/20/19 0458 03/21/19 0429   WBC 1.28* 1.39* 1.49*   HGB 8.1* 8.6* 7.9*   HCT 24.3* 26.1* 23.7*    209 208     CMP:   Recent Labs   Lab 03/19/19 2045 03/20/19 0457 03/21/19  0430   GLU 94 92 90    139 137   K 4.5 5.1 4.9   * 110 110   CO2 16* 20* 19*   BUN 45* 38* 31*   CREATININE 1.5* 1.5* 1.5*   CALCIUM 9.3 9.8 9.2   MG  --  1.9 1.6   ANIONGAP 9 9 8   EGFRNONAA 41.0* 41.0* 41.0*     CSF Culture: No results for input(s): CSFCULTURE in the last 48 hours.  CSF Studies: No results for input(s): ALIQUT, APPEARCSF, COLORCSF, CSFWBC, CSFRBC, GLUCCSF, LDHCSF, PROTEINCSF, VDRLCSF in the last 48 hours.  Inflammatory Markers: No results for input(s): SEDRATE, CRP, PROCAL in the last 48 hours.  POCT Glucose: No results for input(s): POCTGLUCOSE in the last 24 hours.  Prealbumin:   Recent Labs   Lab 03/20/19  0457   PREALBUMIN 38     Respiratory Culture: No results for input(s): GSRESP, RESPIRATORYC in the last 48 hours.  Urine Culture: No results for input(s): LABURIN in the last 48 hours.  Urine Studies: No results for input(s): COLORU, APPEARANCEUA, PHUR, SPECGRAV, PROTEINUA, GLUCUA, KETONESU, BILIRUBINUA, OCCULTUA, NITRITE, UROBILINOGEN, LEUKOCYTESUR, RBCUA, WBCUA, BACTERIA, SQUAMEPITHEL, HYALINECASTS in  the last 48 hours.    Invalid input(s): ANGELO  All pertinent lab results from the past 24 hours have been reviewed.    Significant Imaging: I have reviewed all pertinent imaging results/findings within the past 24 hours.

## 2019-03-21 NOTE — PROGRESS NOTES
Hospital Medicine  Progress note    [place] [facility] Rafael Mullins MD  Admit Date: 3/19/2019  BRIANA 3/22/2019  Length of Stay:  LOS: 2 days   Code status: Full Code    Principal Problem:  Rectal bleeding    Overview:    Interval hx: Patient seen and examined at bedside, Hgb slightly down trending.  Methylprednisolone IV switched to po prednisone.     ROS     Constitutional: Negative for chills, fatigue, fever.   HENT: Negative for sore throat, trouble swallowing.    Eyes: Negative for photophobia, visual disturbance.   Respiratory: Negative for cough, shortness of breath.    Cardiovascular: Negative for chest pain, palpitations, leg swelling.   Gastrointestinal: Negative for abdominal pain, constipation, diarrhea, nausea, vomiting.   Endocrine: Negative for cold intolerance, heat intolerance.   Genitourinary: Negative for dysuria, frequency.   Musculoskeletal: Negative for arthralgias, myalgias.   Skin: Negative for rash, wound, erythema   Neurological: Negative for dizziness, syncope, weakness, light-headedness.   Psychiatric/Behavioral: Negative for confusion, hallucinations, anxiety  All other systems reviewed and are negative.        PEx  Temp:  [96.4 °F (35.8 °C)-97.4 °F (36.3 °C)]   Pulse:  [50-93]   Resp:  [16-18]   BP: (105-189)/(54-81)   SpO2:  [98 %-99 %]     Intake/Output Summary (Last 24 hours) at 3/21/2019 1708  Last data filed at 3/21/2019 0600  Gross per 24 hour   Intake 250 ml   Output --   Net 250 ml       Constitutional: Appears well-developed and well-nourished.   Head: Normocephalic and atraumatic.   Mouth/Throat: Oropharynx is clear and moist.   Eyes: EOM are normal. Pupils are equal, round, and reactive to light. No scleral icterus.   Neck: Normal range of motion. Neck supple.   Cardiovascular: Normal rate and regular rhythm.  No murmur heard.  Pulmonary/Chest: Effort normal and breath sounds normal. No respiratory distress. No wheezes, rales, or rhonchi  Abdominal: Soft. Bowel sounds are  normal.  No distension or tenderness  Musculoskeletal: Normal range of motion. No edema.   Neurological: Alert and oriented to person, place, and time.   Skin: Skin is warm and dry.   Psychiatric: Normal mood and affect. Behavior is normal.         Recent Labs   Lab 03/19/19 2045 03/20/19 0458 03/21/19 0429   WBC 1.28* 1.39* 1.49*   HGB 8.1* 8.6* 7.9*   HCT 24.3* 26.1* 23.7*    209 208     Recent Labs   Lab 03/19/19 2045 03/20/19 0457 03/21/19 0430    139 137   K 4.5 5.1 4.9   * 110 110   CO2 16* 20* 19*   BUN 45* 38* 31*   CREATININE 1.5* 1.5* 1.5*   GLU 94 92 90   CALCIUM 9.3 9.8 9.2   MG  --  1.9 1.6   PHOS  --  2.7 2.4*     Recent Labs   Lab 03/19/19 0139 03/19/19 2045 03/20/19 0458 03/21/19 0430   ALKPHOS 88  --   --   --    ALT 36  --   --   --    AST 55*  --   --   --    ALBUMIN 3.7  --   --   --    PROT 7.2  --   --   --    BILITOT 1.4*  --   --   --    INR 2.4* 2.4* 1.9* 1.5*        No results for input(s): CPK, CPKMB, MB, TROPONINI in the last 72 hours.  No results for input(s): POCTGLUCOSE in the last 168 hours.  Hemoglobin A1C   Date Value Ref Range Status   03/20/2019 5.3 4.0 - 5.6 % Final     Comment:     ADA Screening Guidelines:  5.7-6.4%  Consistent with prediabetes  >or=6.5%  Consistent with diabetes  High levels of fetal hemoglobin interfere with the HbA1C  assay. Heterozygous hemoglobin variants (HbS, HgC, etc)do  not significantly interfere with this assay.   However, presence of multiple variants may affect accuracy.     03/02/2015 5.3 4.5 - 6.2 % Final   11/16/2012 6.1 4.0 - 6.2 % Final       Scheduled Meds:   azaTHIOprine  200 mg Oral Daily    benazepril  40 mg Oral Daily    finasteride  5 mg Oral Daily    hydroxyurea  500 mg Oral Daily    predniSONE  5 mg Oral Daily    tamsulosin  1 capsule Oral Daily    verapamil  240 mg Oral Daily    [START ON 3/25/2019] warfarin  5 mg Oral Every Mon    And    [START ON 3/27/2019] warfarin  5 mg Oral Every Wed    And     warfarin  7.5 mg Oral Every Tues, Thurs, Sat, Sun    And    [START ON 3/22/2019] warfarin  7.5 mg Oral Every Fri     Continuous Infusions:   sodium chloride 0.9% 10 mL/hr at 03/20/19 0951     As Needed:  sodium chloride, acetaminophen, dextrose 50%, dextrose 50%, glucagon (human recombinant), glucose, glucose, ondansetron, pyridostigmine, sodium chloride 0.9%, sodium chloride 0.9%    Active Hospital Problems    Diagnosis  POA    *Rectal bleeding [K62.5]  Yes    Symptomatic anemia [D64.9]  Yes    Acute blood loss anemia [D62]  Yes    Gastrointestinal hemorrhage [K92.2]  Yes    Acute renal failure superimposed on stage 3 chronic kidney disease [N17.9, N18.3]  Yes    Essential thrombocytosis [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         Assessment and Plan    # Acute blood loss anemia  # Gastrointestinal hemorrhage   # Rectal bleeding  - resolved,  - on Coumadin   - EGD/colonoscopy performed on 03/20 only relevant for one polyp found     # Myasthenia Gravis   - seems to be in a slight flare  - seen by neurology   - cont azathioprine, resume Prednisone daily and Mestinone PRN      # Acute on chronic renal failure stage 3  - back to baseline of 1.6 after transfusion      # Paroxysmal afib  - cont coumadin as per GI recs   - rate control     # Essential HTN  - holding BP meds as patient is orthostatic       High Risk Conditions  Afib on coumadin, Rectal bleeding myasthenia gravis      Diet:  Cardiac diet  GI PPx:   DVT PPx:    Anticoagulants   Medication Route Frequency    [START ON 3/25/2019] warfarin (COUMADIN) tablet 5 mg Oral Every Mon    And    [START ON 3/27/2019] warfarin (COUMADIN) tablet 5 mg Oral Every Wed    And    warfarin tablet 7.5 mg Oral Every Tues, Thurs, Sat, Sun    And    [START ON 3/22/2019] warfarin tablet  7.5 mg Oral Every Fri         Goals of Care: Full code  Discharge plan: pending Neuro recs     Time (minutes) spent in care of the patient (Greater than 1/2 spent in direct face-to-face contact) 35 minutes     Rafael Mullins MD  Staff Hospitalist  Department of Hospital Medicine  [facility]   [phone]

## 2019-03-21 NOTE — PROGRESS NOTES
[facility]  Neurology  Progress Note    Patient Name: Ernie Phan  MRN: [id number]  Admission Date: 3/19/2019  Hospital Length of Stay: 2 days  Code Status: Full Code   Attending Provider: Rafael Mullins MD  Primary Care Physician: Ernie Michel MD   Principal Problem:Rectal bleeding      Subjective:     Interval History: Patient doing well this morning, states that his weakness (generalized) had improved. States that he has no issues tolerating PO, no dysphagia, no diplopia and no changes in his voice quality.   Seen eating breakfast w/ no issues.    Current Neurological Medications:   Azathioprine 200mg qd  Methylprednisolone 4mg qd    Current Facility-Administered Medications   Medication Dose Route Frequency Provider Last Rate Last Dose    0.9%  NaCl infusion (for blood administration)   Intravenous Q24H PRN Manoj Chamberlain PA-C        0.9%  NaCl infusion   Intravenous Continuous Leeroy Thornton MD 10 mL/hr at 03/20/19 0951      acetaminophen tablet 650 mg  650 mg Oral Q4H PRN Renny Dixon MD        azaTHIOprine tablet 200 mg  200 mg Oral Daily Renny Dixon MD   200 mg at 03/21/19 0832    benazepril tablet 40 mg  40 mg Oral Daily Rafael Mullins MD        dextrose 50% injection 12.5 g  12.5 g Intravenous PRN Renny Dixon MD        dextrose 50% injection 25 g  25 g Intravenous PRN Renny Dixon MD        finasteride tablet 5 mg  5 mg Oral Daily Renny Dixon MD   5 mg at 03/21/19 0832    glucagon (human recombinant) injection 1 mg  1 mg Intramuscular PRN Renny Dixon MD        glucose chewable tablet 16 g  16 g Oral PRN Renny Dixon MD        glucose chewable tablet 24 g  24 g Oral PRN Renny Dixon MD        hydroxyurea capsule 500 mg  500 mg Oral Daily Renny Dixon MD   500 mg at 03/21/19 0832    methylPREDNISolone sodium succinate injection 4 mg  4 mg Intravenous Daily Cristino Hogan PA-C   4 mg at 03/21/19 0832    ondansetron disintegrating tablet 8  mg  8 mg Oral Q8H PRN Renny Dixon MD        pyridostigmine injection 2 mg  2 mg Intravenous Q6H PRN Cristino Hogan PA-C        sodium chloride 0.9% flush 5 mL  5 mL Intravenous PRN Manoj Chamberlain PA-C        sodium chloride 0.9% flush 5 mL  5 mL Intravenous PRN Renny Dixon MD        tamsulosin 24 hr capsule 0.4 mg  1 capsule Oral Daily Renny Dixon MD   0.4 mg at 03/21/19 0832    verapamil CR tablet 240 mg  240 mg Oral Daily Rafael Mullins MD        warfarin (COUMADIN) tablet 5 mg  5 mg Oral Daily Rafael Mullins MD   5 mg at 03/20/19 1829       Review of Systems   HENT: Negative for trouble swallowing.    Respiratory: Negative for shortness of breath.    Cardiovascular: Negative for chest pain.   Neurological: Negative for weakness.   Objective:     Vital Signs (Most Recent):  Temp: 97.4 °F (36.3 °C) (03/21/19 0734)  Pulse: 93 (03/21/19 0747)  Resp: 18 (03/21/19 0734)  BP: (!) 182/73 (03/21/19 0802)  SpO2: 98 % (03/21/19 0734) Vital Signs (24h Range):  Temp:  [96.4 °F (35.8 °C)-98.4 °F (36.9 °C)] 97.4 °F (36.3 °C)  Pulse:  [50-93] 93  Resp:  [10-20] 18  SpO2:  [96 %-100 %] 98 %  BP: (108-189)/(58-81) 182/73     Weight: 75.2 kg (165 lb 12.8 oz)  Body mass index is 24.48 kg/m².    Physical Exam  General:  Well-developed, well-nourished, nad, pleasant elderly gentleman   HEENT:  NCAT, PERRLA, EOMI, oropharyngeal membranes non-erythematous/without exudate  Neck:  Supple, normal ROM without nuchal rigidity  Resp:  Symmetric expansion, no increased wob  CVS:  No LE edema, peripheral pulses 2+ (radial, dorsalis pedis)  GI:  Abd soft, non-distended, non-tender to palpation  Neurologic Exam:  Mental Status:  AAOx3.  Speech, thought content appropriate.   Cranial Nerves:  VFs intact on counting fingers in all quadrants bilaterally.  PERRLA, EOMI.  Facial movement, sensation intact and symmetric.  Palate raises symmetrically, tongue protrudes midline.  Trapezius, SCM strength 5/5 bilaterally.  Neck flexion  and extension 5/5  No diplopia noted  No ptosis  Single breath count test (fast) - patient got to 42  Motor:  Normal bulk and tone.  BUE shoulder abduction, biceps/triceps, wrist flexion/extension,  strength 5/5.  BLE hip flexors, knee flexion/extension, plantarflexion/dorsiflexion 5/5.  Sensory:  Intact to light touch at all extremities and face without inattention.  Vibratory sensation intact at BUE/BLE digits.  Reflexes:  Biceps, brachioradialis 1+ and symmetric, patellar, Achilles absent and symmetric.  Coordination: No resting tremor.  Gait:  Deferred          Significant Labs:   Hemoglobin A1c:   Recent Labs   Lab 03/20/19 0458   HGBA1C 5.3     Blood Culture: No results for input(s): LABBLOO in the last 48 hours.  BMP:   Recent Labs   Lab 03/19/19 2045 03/20/19 0457 03/21/19  0430   GLU 94 92 90    139 137   K 4.5 5.1 4.9   * 110 110   CO2 16* 20* 19*   BUN 45* 38* 31*   CREATININE 1.5* 1.5* 1.5*   CALCIUM 9.3 9.8 9.2   MG  --  1.9 1.6     CBC:   Recent Labs   Lab 03/19/19 2045 03/20/19 0458 03/21/19  0429   WBC 1.28* 1.39* 1.49*   HGB 8.1* 8.6* 7.9*   HCT 24.3* 26.1* 23.7*    209 208     CMP:   Recent Labs   Lab 03/19/19 2045 03/20/19 0457 03/21/19  0430   GLU 94 92 90    139 137   K 4.5 5.1 4.9   * 110 110   CO2 16* 20* 19*   BUN 45* 38* 31*   CREATININE 1.5* 1.5* 1.5*   CALCIUM 9.3 9.8 9.2   MG  --  1.9 1.6   ANIONGAP 9 9 8   EGFRNONAA 41.0* 41.0* 41.0*     CSF Culture: No results for input(s): CSFCULTURE in the last 48 hours.  CSF Studies: No results for input(s): ALIQUT, APPEARCSF, COLORCSF, CSFWBC, CSFRBC, GLUCCSF, LDHCSF, PROTEINCSF, VDRLCSF in the last 48 hours.  Inflammatory Markers: No results for input(s): SEDRATE, CRP, PROCAL in the last 48 hours.  POCT Glucose: No results for input(s): POCTGLUCOSE in the last 24 hours.  Prealbumin:   Recent Labs   Lab 03/20/19  0457   PREALBUMIN 38     Respiratory Culture: No results for input(s): GSRESP, RESPIRATORYC in  the last 48 hours.  Urine Culture: No results for input(s): LABURIN in the last 48 hours.  Urine Studies: No results for input(s): COLORU, APPEARANCEUA, PHUR, SPECGRAV, PROTEINUA, GLUCUA, KETONESU, BILIRUBINUA, OCCULTUA, NITRITE, UROBILINOGEN, LEUKOCYTESUR, RBCUA, WBCUA, BACTERIA, SQUAMEPITHEL, HYALINECASTS in the last 48 hours.    Invalid input(s): ANAR  All pertinent lab results from the past 24 hours have been reviewed.    Significant Imaging: I have reviewed all pertinent imaging results/findings within the past 24 hours.    Assessment and Plan:     * Rectal bleeding    - Management per primary team and GI consulted  - S/p EGD and colonoscopy today with evidence of colonic polyp which was removed and diverticulosis. Otherwise unremarkable.   - Plan to resume home dose coumadin today per GI recs     Symptomatic anemia    - Hgb 6.9 on admit, 8.6 today s/p pRBC transfusion  - Symptoms in the last week most consistent severe anemia rather than a myasthenic crisis.  - Management per primary team     Essential thrombocytosis    - On hydroxyurea  - Followed by Dr. Welsh as an outpatient   - States that has an appointment tomorrow, and also scheduled for an injection      Myasthenia gravis without acute exacerbation    Patient does report 1 year history of worsening weakness and SOB presumably from myasthenia gravis and would want to adjust medications to help optimize his symptoms. However, acute worsening the week prior to admission most likely related to anemia and patient does not have symptoms concerning for myasthenic exacerbation/crisis at this time    - Patient can be transitioned to PO medication as he is toleration PO well    - Consider switching to prednisone 5mg qd and mestinon 60mg q6-8h PRN for worsening myasthenic symptoms from IV mestinon 2mg q6h PRN and IV methyprednisolone 4mg daily  - Continue imuran 200mg qd for now as myasthenic symptoms are not optimized and would not want to discontinue  imuran at this time. Trend CBC to follow WBC, H/H - less likely that imuran is cause for bone marrow suppression (although platelets are normal -- patient has a history of essential thrombocytosis and is on hydroxyurea which can also cause suspected bone marrow suppression - management per primary team  - Consider heme/onc consult to evaluate anemia and leukopenia.   - Check NIF/VC daily  - No further recommendations at this time, will sign off.   - Appreciate consult, will sign off at this time.   - Please ensure patient follows up w/ Dr. Gutierrez     Myasthenia Gravis IMPORTANT INFORMATION:    Elective intubation should be considered if serial measurements of the VC show values less than 20 mL/kg or if the MIF is worse than -30 cmH20.    Medication warning list:          1. Absolute contraindication  NM blocking agents   curare,   d-penicillamine,   botulinum toxin,   interferon alpha    2. Contraindicated    a. Antibiotics-- aminoglycosides (gentamycin, kanamycin, neomycin, streptomycin, tobramycine); macrolides (erythromycin, azithromycin (Z-pack), telithromycin, Biaxin)  Fluoroquinolones ( ciprofloxacin, norfloxacin, levofloxacin);  b. quinine, quinidine, procainamide,  c. magnesium salts, iv magnesium replacement.    3. Caution- may exacerbate weakness in some myasthenics  a. Calcium channel blockers  b. Beta blockers  c. Lithium  d. Statins  e. Iodinated contrast agents  F. benzodiazepines         VTE Risk Mitigation (From admission, onward)        Ordered     warfarin (COUMADIN) tablet 5 mg  Every Wednesday 03/21/19 1032     warfarin (COUMADIN) tablet 5 mg  Every Monday 03/21/19 1032     warfarin tablet 7.5 mg  Every Friday 03/21/19 1032     warfarin tablet 7.5 mg  Every Tues, Thurs, Sat, Sun      03/21/19 1032     IP VTE HIGH RISK PATIENT  Once      03/19/19 1327     Place STELLA hose  Until discontinued      03/19/19 1327     IP VTE LOW RISK PATIENT  Once      03/19/19 1327          Alanis Shanks,  MD  Neurology  [facility]

## 2019-03-21 NOTE — PROGRESS NOTES
Notified Dr. Acosta pts /73. He stated pt has been off home BP meds D/T GIB. Stated he will place orders to resume his home BP meds. Will continue to monitor pt.

## 2019-03-21 NOTE — ASSESSMENT & PLAN NOTE
- Management per primary team and GI consulted  - S/p EGD and colonoscopy today with evidence of colonic polyp which was removed and diverticulosis. Otherwise unremarkable.   - Plan to resume home dose coumadin today per GI recs

## 2019-03-21 NOTE — ASSESSMENT & PLAN NOTE
- On hydroxyurea  - Followed by Dr. Welsh as an outpatient   - States that has an appointment tomorrow, and also scheduled for an injection

## 2019-03-21 NOTE — ASSESSMENT & PLAN NOTE
Patient does report 1 year history of worsening weakness and SOB presumably from myasthenia gravis and would want to adjust medications to help optimize his symptoms. However, acute worsening the week prior to admission most likely related to anemia and patient does not have symptoms concerning for myasthenic exacerbation/crisis at this time    - Patient can be transitioned to PO medication as he is toleration PO well    - Consider switching to prednisone 5mg qd and mestinon 60mg q6-8h PRN for worsening myasthenic symptoms from IV mestinon 2mg q6h PRN and IV methyprednisolone 4mg daily  - Continue imuran 200mg qd for now as myasthenic symptoms are not optimized and would not want to discontinue imuran at this time. Trend CBC to follow WBC, H/H - less likely that imuran is cause for bone marrow suppression (although platelets are normal -- patient has a history of essential thrombocytosis and is on hydroxyurea which can also cause suspected bone marrow suppression - management per primary team  - Consider heme/onc consult to evaluate anemia and leukopenia.   - Check NIF/VC daily  - No further recommendations at this time, will sign off.   - Appreciate consult, will sign off at this time.   - Please ensure patient follows up w/ Dr. Gutierrez     Myasthenia Gravis IMPORTANT INFORMATION:    Elective intubation should be considered if serial measurements of the VC show values less than 20 mL/kg or if the MIF is worse than -30 cmH20.    Medication warning list:          1. Absolute contraindication  NM blocking agents   curare,   d-penicillamine,   botulinum toxin,   interferon alpha    2. Contraindicated    a. Antibiotics-- aminoglycosides (gentamycin, kanamycin, neomycin, streptomycin, tobramycine); macrolides (erythromycin, azithromycin (Z-pack), telithromycin, Biaxin)  Fluoroquinolones ( ciprofloxacin, norfloxacin, levofloxacin);  b. quinine, quinidine, procainamide,  c. magnesium salts, iv magnesium replacement.    3.  Caution- may exacerbate weakness in some myasthenics  a. Calcium channel blockers  b. Beta blockers  c. Lithium  d. Statins  e. Iodinated contrast agents  F. benzodiazepines

## 2019-03-21 NOTE — PT/OT/SLP PROGRESS
Physical Therapy      Patient Name:  Ernie Phan   MRN:  [id number]    Patient not seen today secondary to Unavailable (Comment)(off floor ). Will follow-up as able.    Satniago Glass, PT

## 2019-03-21 NOTE — PLAN OF CARE
Problem: Adult Inpatient Plan of Care  Goal: Plan of Care Review  Outcome: Ongoing (interventions implemented as appropriate)  POC reviewed with pt & spouse. AAO x4.  Steroids changed to PO. Telemetry monitoring.  Pt remained free from falls. Questions and concerns addressed. Pt progressing towards goals. Will continue to monitor. See flow sheets for full assessment and VS

## 2019-03-22 LAB
ANION GAP SERPL CALC-SCNC: 10 MMOL/L
ANISOCYTOSIS BLD QL SMEAR: SLIGHT
BASOPHILS # BLD AUTO: 0 K/UL
BASOPHILS NFR BLD: 0 %
BUN SERPL-MCNC: 35 MG/DL
CALCIUM SERPL-MCNC: 8.9 MG/DL
CHLORIDE SERPL-SCNC: 108 MMOL/L
CO2 SERPL-SCNC: 18 MMOL/L
CREAT SERPL-MCNC: 1.6 MG/DL
DIFFERENTIAL METHOD: ABNORMAL
EOSINOPHIL # BLD AUTO: 0 K/UL
EOSINOPHIL NFR BLD: 0.7 %
ERYTHROCYTE [DISTWIDTH] IN BLOOD BY AUTOMATED COUNT: ABNORMAL %
EST. GFR  (AFRICAN AMERICAN): 43.8 ML/MIN/1.73 M^2
EST. GFR  (NON AFRICAN AMERICAN): 37.9 ML/MIN/1.73 M^2
GLUCOSE SERPL-MCNC: 98 MG/DL
HCT VFR BLD AUTO: 22.8 %
HGB BLD-MCNC: 7.4 G/DL
HYPOCHROMIA BLD QL SMEAR: ABNORMAL
IMM GRANULOCYTES # BLD AUTO: 0.03 K/UL
IMM GRANULOCYTES NFR BLD AUTO: 2.2 %
INR PPP: 1.3
LYMPHOCYTES # BLD AUTO: 0.5 K/UL
LYMPHOCYTES NFR BLD: 34.3 %
MAGNESIUM SERPL-MCNC: 1.8 MG/DL
MCH RBC QN AUTO: 36.6 PG
MCHC RBC AUTO-ENTMCNC: 32.5 G/DL
MCV RBC AUTO: 113 FL
MONOCYTES # BLD AUTO: 0.1 K/UL
MONOCYTES NFR BLD: 10.2 %
NEUTROPHILS # BLD AUTO: 0.7 K/UL
NEUTROPHILS NFR BLD: 52.6 %
NRBC BLD-RTO: 0 /100 WBC
OVALOCYTES BLD QL SMEAR: ABNORMAL
PHOSPHATE SERPL-MCNC: 2.9 MG/DL
PLATELET # BLD AUTO: 207 K/UL
PMV BLD AUTO: 9.7 FL
POIKILOCYTOSIS BLD QL SMEAR: SLIGHT
POLYCHROMASIA BLD QL SMEAR: ABNORMAL
POTASSIUM SERPL-SCNC: 4.4 MMOL/L
PROTHROMBIN TIME: 13.2 SEC
RBC # BLD AUTO: 2.02 M/UL
SODIUM SERPL-SCNC: 136 MMOL/L
WBC # BLD AUTO: 1.37 K/UL

## 2019-03-22 PROCEDURE — 99233 PR SUBSEQUENT HOSPITAL CARE,LEVL III: ICD-10-PCS | Mod: HCNC,,, | Performed by: HOSPITALIST

## 2019-03-22 PROCEDURE — 11000001 HC ACUTE MED/SURG PRIVATE ROOM: Mod: HCNC

## 2019-03-22 PROCEDURE — 36415 COLL VENOUS BLD VENIPUNCTURE: CPT | Mod: HCNC

## 2019-03-22 PROCEDURE — 94150 VITAL CAPACITY TEST: CPT | Mod: HCNC

## 2019-03-22 PROCEDURE — 63600175 PHARM REV CODE 636 W HCPCS: Mod: HCNC | Performed by: HOSPITALIST

## 2019-03-22 PROCEDURE — 85610 PROTHROMBIN TIME: CPT | Mod: HCNC

## 2019-03-22 PROCEDURE — 84100 ASSAY OF PHOSPHORUS: CPT | Mod: HCNC

## 2019-03-22 PROCEDURE — 94010 BREATHING CAPACITY TEST: CPT | Mod: HCNC

## 2019-03-22 PROCEDURE — 83735 ASSAY OF MAGNESIUM: CPT | Mod: HCNC

## 2019-03-22 PROCEDURE — 97530 THERAPEUTIC ACTIVITIES: CPT | Mod: HCNC

## 2019-03-22 PROCEDURE — 97161 PT EVAL LOW COMPLEX 20 MIN: CPT | Mod: HCNC

## 2019-03-22 PROCEDURE — 25000003 PHARM REV CODE 250: Mod: HCNC | Performed by: HOSPITALIST

## 2019-03-22 PROCEDURE — 85025 COMPLETE CBC W/AUTO DIFF WBC: CPT | Mod: HCNC

## 2019-03-22 PROCEDURE — 80048 BASIC METABOLIC PNL TOTAL CA: CPT | Mod: HCNC

## 2019-03-22 PROCEDURE — 99233 SBSQ HOSP IP/OBS HIGH 50: CPT | Mod: HCNC,,, | Performed by: HOSPITALIST

## 2019-03-22 RX ORDER — VERAPAMIL HYDROCHLORIDE 180 MG/1
180 TABLET, FILM COATED, EXTENDED RELEASE ORAL DAILY
Status: DISCONTINUED | OUTPATIENT
Start: 2019-03-23 | End: 2019-03-23 | Stop reason: HOSPADM

## 2019-03-22 RX ADMIN — PREDNISONE 5 MG: 5 TABLET ORAL at 08:03

## 2019-03-22 RX ADMIN — AZATHIOPRINE 200 MG: 50 TABLET ORAL at 08:03

## 2019-03-22 RX ADMIN — TAMSULOSIN HYDROCHLORIDE 0.4 MG: 0.4 CAPSULE ORAL at 08:03

## 2019-03-22 RX ADMIN — FINASTERIDE 5 MG: 5 TABLET, FILM COATED ORAL at 08:03

## 2019-03-22 RX ADMIN — VERAPAMIL HYDROCHLORIDE 240 MG: 240 TABLET, FILM COATED, EXTENDED RELEASE ORAL at 08:03

## 2019-03-22 RX ADMIN — BENAZEPRIL HYDROCHLORIDE 40 MG: 20 TABLET, FILM COATED ORAL at 08:03

## 2019-03-22 RX ADMIN — WARFARIN SODIUM 7.5 MG: 2.5 TABLET ORAL at 06:03

## 2019-03-22 RX ADMIN — HYDROXYUREA 500 MG: 500 CAPSULE ORAL at 08:03

## 2019-03-22 NOTE — PROGRESS NOTES
Hospital Medicine  Progress note    Team: [facility] HOSP MED R Rafael Mullins MD  Admit Date: 3/19/2019  BRIANA 3/22/2019  Length of Stay:  LOS: 3 days   Code status: Full Code    Principal Problem:  Rectal bleeding    Overview:    Interval hx: Patient seen and examined at bedside, Hgb slightly down trending.       ROS     Constitutional: Negative for chills, fatigue, fever.   HENT: Negative for sore throat, trouble swallowing.    Eyes: Negative for photophobia, visual disturbance.   Respiratory: Negative for cough, shortness of breath.    Cardiovascular: Negative for chest pain, palpitations, leg swelling.   Gastrointestinal: Negative for abdominal pain, constipation, diarrhea, nausea, vomiting.   Endocrine: Negative for cold intolerance, heat intolerance.   Genitourinary: Negative for dysuria, frequency.   Musculoskeletal: Negative for arthralgias, myalgias.   Skin: Negative for rash, wound, erythema   Neurological: Negative for dizziness, syncope, weakness, light-headedness.   Psychiatric/Behavioral: Negative for confusion, hallucinations, anxiety  All other systems reviewed and are negative.        PEx  Temp:  [96.2 °F (35.7 °C)-97.7 °F (36.5 °C)]   Pulse:  [44-65]   Resp:  [16-20]   BP: (105-165)/(54-74)   SpO2:  [94 %-98 %]     Intake/Output Summary (Last 24 hours) at 3/22/2019 1456  Last data filed at 3/22/2019 0600  Gross per 24 hour   Intake 100 ml   Output --   Net 100 ml       Constitutional: Appears well-developed and well-nourished.   Head: Normocephalic and atraumatic.   Mouth/Throat: Oropharynx is clear and moist.   Eyes: EOM are normal. Pupils are equal, round, and reactive to light. No scleral icterus.   Neck: Normal range of motion. Neck supple.   Cardiovascular: Normal rate and regular rhythm.  No murmur heard.  Pulmonary/Chest: Effort normal and breath sounds normal. No respiratory distress. No wheezes, rales, or rhonchi  Abdominal: Soft. Bowel sounds are normal.  No distension or  tenderness  Musculoskeletal: Normal range of motion. No edema.   Neurological: Alert and oriented to person, place, and time.   Skin: Skin is warm and dry.   Psychiatric: Normal mood and affect. Behavior is normal.         Recent Labs   Lab 03/20/19 0458 03/21/19 0429 03/22/19  0326   WBC 1.39* 1.49* 1.37*   HGB 8.6* 7.9* 7.4*   HCT 26.1* 23.7* 22.8*    208 207     Recent Labs   Lab 03/20/19 0457 03/21/19  0430 03/22/19  0326    137 136   K 5.1 4.9 4.4    110 108   CO2 20* 19* 18*   BUN 38* 31* 35*   CREATININE 1.5* 1.5* 1.6*   GLU 92 90 98   CALCIUM 9.8 9.2 8.9   MG 1.9 1.6 1.8   PHOS 2.7 2.4* 2.9     Recent Labs   Lab 03/19/19  0139  03/20/19 0458 03/21/19 0430 03/22/19  0326   ALKPHOS 88  --   --   --   --    ALT 36  --   --   --   --    AST 55*  --   --   --   --    ALBUMIN 3.7  --   --   --   --    PROT 7.2  --   --   --   --    BILITOT 1.4*  --   --   --   --    INR 2.4*   < > 1.9* 1.5* 1.3*    < > = values in this interval not displayed.        No results for input(s): CPK, CPKMB, MB, TROPONINI in the last 72 hours.  No results for input(s): POCTGLUCOSE in the last 168 hours.  Hemoglobin A1C   Date Value Ref Range Status   03/20/2019 5.3 4.0 - 5.6 % Final     Comment:     ADA Screening Guidelines:  5.7-6.4%  Consistent with prediabetes  >or=6.5%  Consistent with diabetes  High levels of fetal hemoglobin interfere with the HbA1C  assay. Heterozygous hemoglobin variants (HbS, HgC, etc)do  not significantly interfere with this assay.   However, presence of multiple variants may affect accuracy.     03/02/2015 5.3 4.5 - 6.2 % Final   11/16/2012 6.1 4.0 - 6.2 % Final       Scheduled Meds:   azaTHIOprine  200 mg Oral Daily    benazepril  40 mg Oral Daily    finasteride  5 mg Oral Daily    predniSONE  5 mg Oral Daily    tamsulosin  1 capsule Oral Daily    verapamil  240 mg Oral Daily    [START ON 3/25/2019] warfarin  5 mg Oral Every Mon    And    [START ON 3/27/2019] warfarin  5 mg  Oral Every Wed    And    warfarin  7.5 mg Oral Every Tues, Thurs, Sat, Sun    And    warfarin  7.5 mg Oral Every Fri     Continuous Infusions:    As Needed:  sodium chloride, acetaminophen, dextrose 50%, dextrose 50%, glucagon (human recombinant), glucose, glucose, ondansetron, pyridostigmine, sodium chloride 0.9%, sodium chloride 0.9%    Active Hospital Problems    Diagnosis  POA    *Rectal bleeding [K62.5]  Yes    Symptomatic anemia [D64.9]  Yes    Acute blood loss anemia [D62]  Yes    Gastrointestinal hemorrhage [K92.2]  Yes    Acute renal failure superimposed on stage 3 chronic kidney disease [N17.9, N18.3]  Yes    Essential thrombocytosis [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         Assessment and Plan    # Acute blood loss anemia  # Gastrointestinal hemorrhage   # Rectal bleeding  #Anemia and leukopenia  - resolved,  - cont Coumadin   - holding hydrea for 1 week as per Hematology recommendations   - EGD/colonoscopy performed on 03/20 only relevant for one polyp found  - will need repeat CBC as outpatient in 2 week  - US Abdomen to rule out splenomegaly       # Myasthenia Gravis   - seems to be in a slight flare  - seen by neurology   - cont azathioprine, resume Prednisone daily and Mestinone PRN      # Acute on chronic renal failure stage 3  - back to baseline of 1.6 after transfusion      # Paroxysmal afib  - cont coumadin as per GI recs   - rate control     # Essential HTN  - BP meds resumed, well controlled        High Risk Conditions  Afib on coumadin, Rectal bleeding myasthenia gravis      Diet:  Cardiac diet  GI PPx:   DVT PPx:    Anticoagulants   Medication Route Frequency    [START ON 3/25/2019] warfarin (COUMADIN) tablet 5 mg Oral Every Mon    And    [START ON 3/27/2019] warfarin (COUMADIN) tablet 5 mg Oral Every  Wed    And    warfarin tablet 7.5 mg Oral Every Tues, Thurs, Sat, Sun    And    warfarin tablet 7.5 mg Oral Every Fri         Goals of Care: Full code  Discharge plan: pending Neuro recs     Time (minutes) spent in care of the patient (Greater than 1/2 spent in direct face-to-face contact) 35 minutes     Rafael Mullins MD  Staff Hospitalist  Department of Hospital Medicine  [facility]   [phone]

## 2019-03-22 NOTE — PLAN OF CARE
Discharge planning: Discussed recommendation for home health with patient. He is agreeable and has no preferred agency. Plan for d/c with HH when medically stable.  Referral to [facility] placed in [facility].

## 2019-03-22 NOTE — PLAN OF CARE
Problem: Adult Inpatient Plan of Care  Goal: Plan of Care Review  Outcome: Ongoing (interventions implemented as appropriate)  POC reviewed with pt. AAO.  Telemetry monitoring. abd U/S pending.  Pt remained free from falls. Questions and concerns addressed. Pt progressing towards goals. Will continue to monitor. See flow sheets for full assessment and VS

## 2019-03-22 NOTE — PT/OT/SLP EVAL
Physical Therapy Evaluation and Discharge Note    Patient Name:  Ernie Phan   MRN:  [id number]    Recommendations:     Discharge Recommendations:  home health PT   Discharge Equipment Recommendations: none   Barriers to discharge: None    Assessment:     Ernie Phan is a 88 y.o. male admitted with a medical diagnosis of Rectal bleeding. .  At this time, patient is functioning at their prior level of function and does not require further acute PT services. Pt performing bed mobility Ind. And standing/gait training with SBA/CGA secondary to initial unsteadiness. Educated and agreeable to d/c from acute therapy with HHPT following d/c. Pt safe to amb using RW with family/staff.     Recent Surgery: Procedure(s) (LRB):  EGD (ESOPHAGOGASTRODUODENOSCOPY) (N/A)  COLONOSCOPY (N/A) 2 Days Post-Op    Plan:     During this hospitalization, patient does not require further acute PT services.  Please re-consult if situation changes.      Subjective     Chief Complaint: none noted   Patient/Family Comments/goals: pt very pleasant and willing to participate with therapy on this date.  Pt in agreeable to d/c from acute therapy with plans to begin HHPT following d/c.   Pain/Comfort:  · Pain Rating 1: 0/10    Patients cultural, spiritual, Methodist conflicts given the current situation: no    Living Environment:  Pt lives with wife in single level condo     Prior to admission, patients level of function was mostly Mod I with ADL's and amb but requiring assist to enter shower.  Equipment used at home: walker, rolling, cane, straight, shower chair.  DME owned (not currently used): none.  Upon discharge, patient will have assistance from wife.    Objective:     Communicated with RN prior to session.  Patient found supine with peripheral IV upon PT entry to room.    General Precautions: Standard, fall   Orthopedic Precautions:N/A   Braces: N/A     Exams:  · RLE ROM: WFL  · RLE Strength: WFL  · LLE ROM: WFL  · LLE Strength:  WFL    Functional Mobility:  · Bed Mobility:     · Rolling Right: independence  · Scooting: independence  · Supine to Sit: independence  · Sit to Supine: independence  · Transfers:     · Sit to Stand:  contact guard assistance with rolling walker  · Gait: pt amb 240' with RW SBA with VC to decrease bella and speed while naviagating obstacles. Minor LOB with initial amb  · Balance: Sitting: Ind.    Standing: SBA/CGA    AM-PAC 6 CLICK MOBILITY  Total Score:20       Therapeutic Activities and Exercises:   - Initial LOB with STS requiring CGA to regain balance  -Pt educated on:   -PT roles, expectations, and POC    -Safety with mobility   -Benefits of OOB activities to increase strength and functional mobility    -Performing ther ex for increasing LE ROM and strength   -Discharge recommendations     AM-PAC 6 CLICK MOBILITY  Total Score:20     Patient left supine with all lines intact and call button in reach.    GOALS:   Multidisciplinary Problems     Physical Therapy Goals     Not on file          Multidisciplinary Problems (Resolved)        Problem: Physical Therapy Goal    Goal Priority Disciplines Outcome Goal Variances Interventions   Physical Therapy Goal   (Resolved)     PT, PT/OT Outcome(s) achieved                     History:     Past Medical History:   Diagnosis Date    *Atrial fibrillation     Arthritis     Atrial fibrillation     BPH (benign prostatic hypertrophy)     Degenerative disc disease     Depression     GERD (gastroesophageal reflux disease)     Hyperglycemia     Hyperlipidemia     Hypertension     MG (myasthenia gravis)     Postherpetic neuralgia        Past Surgical History:   Procedure Laterality Date    ACHILLES TENDON SURGERY      CHOLECYSTECTOMY      COLONOSCOPY N/A 3/20/2019    Performed by Leeroy Thornton MD at [facility] (2ND FLR)    EGD (ESOPHAGOGASTRODUODENOSCOPY) N/A 3/20/2019    Performed by Leeroy Thornton MD at [facility] ENDO (2ND FLR)       Time Tracking:     PT  Received On: 03/22/19  PT Start Time: 1142     PT Stop Time: 1156  PT Total Time (min): 14 min     Billable Minutes: Evaluation 14      Santiago Glass, PT  03/22/2019

## 2019-03-22 NOTE — PLAN OF CARE
Problem: Occupational Therapy Goal  Goal: Occupational Therapy Goal  Goals to be met by:3/31    Patient will increase functional independence with ADLs by performing:    LE Dressing with [place].  Grooming while standing with Contact Guard Assistance.  Toileting from toilet with Contact Guard Assistance for hygiene and clothing management.   Bathing from  tub bench with Minimal Assistance.     Outcome: Outcome(s) achieved Date Met: 03/22/19  Goals addressed and unmet.  Cont with POC    Carly Kern, OT  3/22/2019

## 2019-03-22 NOTE — PLAN OF CARE
Problem: Physical Therapy Goal  Goal: Physical Therapy Goal  Outcome: Outcome(s) achieved Date Met: 03/22/19  No goals set, pt does not require additional acute PT services at this time due to baseline functional mobility.     Pt educated on asking medical staff for PT consult if changes in functional status occurs.     - Jason Glass, PT, DPT  3/22/2019

## 2019-03-22 NOTE — PLAN OF CARE
Problem: Fall Injury Risk  Goal: Absence of Fall and Fall-Related Injury    Intervention: Identify and Manage Contributors to Fall Injury Risk   03/21/19 1928 03/22/19 0200   Identify and Manage Contributors to Fall Injury Risk   Self-Care Promotion --  independence encouraged;BADL personal objects within reach   Manage Acute Allergic Reaction   Medication Review/Management medications reviewed;high risk medications identified --      Intervention: Promote Injury-Free Environment   03/21/19 1928 03/22/19 0200   Optimize Balance and Safe Activity   Safety Promotion/Fall Prevention --  assistive device/personal item within reach;commode/urinal/bedpan at bedside;diversional activities provided;Fall Risk reviewed with patient/family;Fall Risk signage in place;high risk medications identified;lighting adjusted;medications reviewed;nonskid shoes/socks when out of bed;side rails raised x 2;instructed to call staff for mobility   Optimize [place] and Functional Mobility   Environmental Safety Modification assistive device/personal items within reach;clutter free environment maintained;lighting adjusted --          Problem: Adult Inpatient Plan of Care  Goal: Absence of Hospital-Acquired Illness or Injury    Intervention: Identify and Manage Fall Risk   03/22/19 0200   Optimize Balance and Safe Activity   Safety Promotion/Fall Prevention assistive device/personal item within reach;commode/urinal/bedpan at bedside;diversional activities provided;Fall Risk reviewed with patient/family;Fall Risk signage in place;high risk medications identified;lighting adjusted;medications reviewed;nonskid shoes/socks when out of bed;side rails raised x 2;instructed to call staff for mobility     Intervention: Prevent VTE (venous thromboembolism)   03/21/19 1928   Prevent or Manage Embolism   VTE Prevention/Management bleeding risk assessed       Goal: Optimal Comfort and Wellbeing    Intervention: Provide Person-Centered Care   03/22/19  0213   Support Dyspnea Relief   Trust Relationship/Rapport care explained;choices provided;emotional support provided;empathic listening provided;questions answered;questions encouraged;reassurance provided;thoughts/feelings acknowledged

## 2019-03-23 VITALS
TEMPERATURE: 97 F | DIASTOLIC BLOOD PRESSURE: 67 MMHG | OXYGEN SATURATION: 98 % | RESPIRATION RATE: 18 BRPM | HEART RATE: 59 BPM | WEIGHT: 165.81 LBS | BODY MASS INDEX: 24.56 KG/M2 | HEIGHT: 69 IN | SYSTOLIC BLOOD PRESSURE: 106 MMHG

## 2019-03-23 LAB
ANION GAP SERPL CALC-SCNC: 7 MMOL/L
ANISOCYTOSIS BLD QL SMEAR: SLIGHT
BASOPHILS # BLD AUTO: 0 K/UL
BASOPHILS NFR BLD: 0 %
BLD PROD TYP BPU: NORMAL
BLD PROD TYP BPU: NORMAL
BLOOD UNIT EXPIRATION DATE: NORMAL
BLOOD UNIT EXPIRATION DATE: NORMAL
BLOOD UNIT TYPE CODE: 7300
BLOOD UNIT TYPE CODE: 7300
BLOOD UNIT TYPE: NORMAL
BLOOD UNIT TYPE: NORMAL
BUN SERPL-MCNC: 37 MG/DL
CALCIUM SERPL-MCNC: 9.3 MG/DL
CHLORIDE SERPL-SCNC: 108 MMOL/L
CO2 SERPL-SCNC: 20 MMOL/L
CODING SYSTEM: NORMAL
CODING SYSTEM: NORMAL
CREAT SERPL-MCNC: 1.4 MG/DL
DACRYOCYTES BLD QL SMEAR: ABNORMAL
DIFFERENTIAL METHOD: ABNORMAL
DISPENSE STATUS: NORMAL
DISPENSE STATUS: NORMAL
EOSINOPHIL # BLD AUTO: 0 K/UL
EOSINOPHIL NFR BLD: 1.3 %
ERYTHROCYTE [DISTWIDTH] IN BLOOD BY AUTOMATED COUNT: 28.2 %
EST. GFR  (AFRICAN AMERICAN): 51.5 ML/MIN/1.73 M^2
EST. GFR  (NON AFRICAN AMERICAN): 44.5 ML/MIN/1.73 M^2
GLUCOSE SERPL-MCNC: 93 MG/DL
HCT VFR BLD AUTO: 22.5 %
HGB BLD-MCNC: 7.3 G/DL
HYPOCHROMIA BLD QL SMEAR: ABNORMAL
IMM GRANULOCYTES # BLD AUTO: 0.04 K/UL
IMM GRANULOCYTES NFR BLD AUTO: 2.6 %
INR PPP: 1.4
LYMPHOCYTES # BLD AUTO: 0.5 K/UL
LYMPHOCYTES NFR BLD: 31.2 %
MAGNESIUM SERPL-MCNC: 1.6 MG/DL
MCH RBC QN AUTO: 37.2 PG
MCHC RBC AUTO-ENTMCNC: 32.4 G/DL
MCV RBC AUTO: 115 FL
MONOCYTES # BLD AUTO: 0.2 K/UL
MONOCYTES NFR BLD: 11 %
NEUTROPHILS # BLD AUTO: 0.8 K/UL
NEUTROPHILS NFR BLD: 53.9 %
NRBC BLD-RTO: 0 /100 WBC
NUM UNITS TRANS PACKED RBC: NORMAL
NUM UNITS TRANS PACKED RBC: NORMAL
OVALOCYTES BLD QL SMEAR: ABNORMAL
PHOSPHATE SERPL-MCNC: 3.1 MG/DL
PLATELET # BLD AUTO: 193 K/UL
PLATELET BLD QL SMEAR: ABNORMAL
PMV BLD AUTO: 9.7 FL
POIKILOCYTOSIS BLD QL SMEAR: SLIGHT
POLYCHROMASIA BLD QL SMEAR: ABNORMAL
POTASSIUM SERPL-SCNC: 4.1 MMOL/L
PROTHROMBIN TIME: 13.5 SEC
RBC # BLD AUTO: 1.96 M/UL
SODIUM SERPL-SCNC: 135 MMOL/L
WBC # BLD AUTO: 1.54 K/UL

## 2019-03-23 PROCEDURE — 99238 PR HOSPITAL DISCHARGE DAY,<30 MIN: ICD-10-PCS | Mod: HCNC,,, | Performed by: HOSPITALIST

## 2019-03-23 PROCEDURE — 99238 HOSP IP/OBS DSCHRG MGMT 30/<: CPT | Mod: HCNC,,, | Performed by: HOSPITALIST

## 2019-03-23 PROCEDURE — 25000003 PHARM REV CODE 250: Mod: HCNC | Performed by: HOSPITALIST

## 2019-03-23 PROCEDURE — 36415 COLL VENOUS BLD VENIPUNCTURE: CPT | Mod: HCNC

## 2019-03-23 PROCEDURE — 63600175 PHARM REV CODE 636 W HCPCS: Mod: HCNC | Performed by: HOSPITALIST

## 2019-03-23 PROCEDURE — 84100 ASSAY OF PHOSPHORUS: CPT | Mod: HCNC

## 2019-03-23 PROCEDURE — 94150 VITAL CAPACITY TEST: CPT | Mod: HCNC

## 2019-03-23 PROCEDURE — 94010 BREATHING CAPACITY TEST: CPT | Mod: HCNC

## 2019-03-23 PROCEDURE — 80048 BASIC METABOLIC PNL TOTAL CA: CPT | Mod: HCNC

## 2019-03-23 PROCEDURE — 85610 PROTHROMBIN TIME: CPT | Mod: HCNC

## 2019-03-23 PROCEDURE — 85025 COMPLETE CBC W/AUTO DIFF WBC: CPT | Mod: HCNC

## 2019-03-23 PROCEDURE — 83735 ASSAY OF MAGNESIUM: CPT | Mod: HCNC

## 2019-03-23 RX ADMIN — VERAPAMIL HYDROCHLORIDE 180 MG: 180 TABLET, FILM COATED, EXTENDED RELEASE ORAL at 08:03

## 2019-03-23 RX ADMIN — PREDNISONE 5 MG: 5 TABLET ORAL at 08:03

## 2019-03-23 RX ADMIN — TAMSULOSIN HYDROCHLORIDE 0.4 MG: 0.4 CAPSULE ORAL at 08:03

## 2019-03-23 RX ADMIN — AZATHIOPRINE 200 MG: 50 TABLET ORAL at 08:03

## 2019-03-23 RX ADMIN — BENAZEPRIL HYDROCHLORIDE 40 MG: 20 TABLET, FILM COATED ORAL at 08:03

## 2019-03-23 RX ADMIN — SODIUM CHLORIDE 125 MG: 9 INJECTION, SOLUTION INTRAVENOUS at 12:03

## 2019-03-23 RX ADMIN — FINASTERIDE 5 MG: 5 TABLET, FILM COATED ORAL at 08:03

## 2019-03-23 NOTE — PLAN OF CARE
CM updated by Dr Acosta that pt will d/c today w/ h/h. CM noted that referral in place for [facility] and CM forwarded orders.

## 2019-03-23 NOTE — PLAN OF CARE
[facility]    HOME HEALTH ORDERS  FACE TO FACE ENCOUNTER    Patient Name: Ernie Phan  YOB: 1931    PCP: Ernie Michel MD   PCP Address: [address] / [place] [address]  PCP Phone Number: [phone]  PCP Fax: [phone]    Encounter Date: 03/23/2019    Admit to Home Health    Diagnoses:  Active Hospital Problems    Diagnosis  POA    *Rectal bleeding [K62.5]  Yes    Symptomatic anemia [D64.9]  Yes    Acute blood loss anemia [D62]  Yes    Gastrointestinal hemorrhage [K92.2]  Yes    Acute renal failure superimposed on stage 3 chronic kidney disease [N17.9, N18.3]  Yes    Essential thrombocytosis [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       Future Appointments   Date Time Provider Department Center   3/29/2019 10:30 AM LAB, [facility]CER BLDG [facility]   3/29/2019 11:45 AM INJECTION, [facility]   [facility]  [facility]40 [facility]   4/22/2019 10:40 AM Nick Gutierrez MD [facility]   4/22/2019 11:30 AM INJECTION, [facility]           I have seen and examined this patient face to face today. My clinical findings that support the need for the home health skilled services and home bound status are the following:  Weakness/numbness causing balance and gait disturbance due to Anemia making it taxing to leave home.    Allergies:Review of patient's allergies indicates:  No Known Allergies    Diet: cardiac diet    Activities: activity as tolerated    Nursing:   SN to complete comprehensive assessment including routine vital signs. Instruct on disease process and s/s of complications to report to MD. Review/verify medication list sent home with the patient at time of  discharge  and instruct patient/caregiver as needed. Frequency may be adjusted depending on start of care date.    Notify MD if SBP > 160 or < 90; DBP > 90 or < 50; HR > 120 or < 50; Temp > 101; Other:          CONSULTS:    Physical Therapy to evaluate and treat. Evaluate for home safety and equipment needs; Establish/upgrade home exercise program. Perform / instruct on therapeutic exercises, gait training, transfer training, and Range of Motion.  Occupational Therapy to evaluate and treat. Evaluate home environment for safety and equipment needs. Perform/Instruct on transfers, ADL training, ROM, and therapeutic exercises.    MISCELLANEOUS CARE:  N/A    WOUND CARE ORDERS  n/a      Medications: Review discharge medications with patient and family and provide education.      Current Discharge Medication List      CONTINUE these medications which have CHANGED    Details   azaTHIOprine (IMURAN) 50 mg Tab Take 4 tablets (200 mg total) by mouth once daily.  Qty: 360 tablet, Refills: 3    Associated Diagnoses: Myasthenia gravis      predniSONE (DELTASONE) 5 MG tablet Take 1 tablet (5 mg total) by mouth once daily.  Qty: 45 tablet, Refills: 3    Associated Diagnoses: MG (myasthenia gravis)         CONTINUE these medications which have NOT CHANGED    Details   benazepril (LOTENSIN) 40 MG tablet TAKE 1 TABLET EVERY DAY  Qty: 90 tablet, Refills: 3      DENTURE CLEANSER (EFFERVESCENT DENTURE CLEANSR DENT)       finasteride (PROSCAR) 5 mg tablet TAKE 1 TABLET EVERY DAY  Qty: 90 tablet, Refills: 3      FLUZONE HIGH-DOSE 2018-19, PF, 180 mcg/0.5 mL vaccine       IRON, FERROUS SULFATE, ORAL Take 65 mg by mouth once daily.      ONE TOUCH ULTRA TEST Strp TEST DAILY  Qty: 100 each, Refills: 3      ONE TOUCH ULTRASOFT LANCETS lancets TEST DAILY  Qty: 100 each, Refills: 3      pyridostigmine (MESTINON) 60 mg Tab Take 1 tablet (60 mg total) by mouth every 6 to 8 hours as needed.  Qty: 180 tablet, Refills: 1    Associated Diagnoses:  Myasthenia gravis without acute exacerbation      !! tamsulosin (FLOMAX) 0.4 mg Cap Take 1 capsule (0.4 mg total) by mouth once daily.  Qty: 90 capsule, Refills: 3      !! tamsulosin (FLOMAX) 0.4 mg Cap TAKE 1 CAPSULE EVERY DAY  Qty: 90 capsule, Refills: 3      verapamil (VERELAN) 240 MG C24P TAKE 1 CAPSULE EVERY DAY  Qty: 90 capsule, Refills: 3    Associated Diagnoses: PVC (premature ventricular contraction)      vitamin D 1000 units Tab Take 185 mg by mouth once daily.      warfarin (COUMADIN) 5 MG tablet TAKE 1 TABLET EVERY DAY EXCEPT TAKE 1/2 TABLET ON SUNDAY, OR AS DIRECTED BY COUMADIN CLINIC  Qty: 90 tablet, Refills: 3       !! - Potential duplicate medications found. Please discuss with provider.      STOP taking these medications       aspirin 81 mg Tab Comments:   Reason for Stopping:         hydroxyurea (HYDREA) 500 mg Cap Comments:   Reason for Stopping:               I certify that this patient is confined to his home and needs intermittent skilled nursing care, physical therapy and occupational therapy.

## 2019-03-23 NOTE — PLAN OF CARE
Problem: Adult Inpatient Plan of Care  Goal: Plan of Care Review  Went over plan of care - all questions answered. No complaints during night. States he is supposed to go home today

## 2019-03-23 NOTE — NURSING
2030 called and advised md of heart rate going as low as 40. He stated hes been doing that all day. No new orders

## 2019-03-24 NOTE — DISCHARGE SUMMARY
Discharge Summary  Hospital Medicine    Attending Provider on Discharge: Rafael Mullins MD  [facility] Medicine Team: [facility]OSP MED R  Date of Admission:  3/19/2019     Date of Discharge:  3/23/2019  Length of Stay:  LOS: 4 days   Code status: Full Code    Active Hospital Problems    Diagnosis  POA    *Rectal bleeding [K62.5]  Yes    Symptomatic anemia [D64.9]  Yes    Acute blood loss anemia [D62]  Yes    Gastrointestinal hemorrhage [K92.2]  Yes    Acute renal failure superimposed on stage 3 chronic kidney disease [N17.9, N18.3]  Yes    Essential thrombocytosis [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        HPI    88-year-old male presents to the ER for evaluation of rectal bleeding.  The patient was sitting at home and went to the bathroom and noticed blood in his rectum, bright red blood.  He denies any nausea vomiting fever or chills tonight.  He reports for the past week increased fatigue, and increasing fatigue with exertion. He denies any history of GI bleeding.  He is on coumadin for Afib.     Hospital Course    Patient admitted for GI bleed, underwent EGD and colonoscopy which were unremarkable except for external hemorrhoids and a polyp. Hematology was consulted due to anemia and leukopenia, recommended outpatient follow up and Abdominal US which was performed prior to discharge. One time dose of IV iron was given. Coumadin restarted as per GI recs. Patient discharged with home Health.        Recent Labs   Lab 03/21/19  0429 03/22/19  0326 03/23/19 0417   WBC 1.49* 1.37* 1.54*   HGB 7.9* 7.4* 7.3*   HCT 23.7* 22.8* 22.5*    207 193     Recent Labs   Lab 03/21/19  0430 03/22/19  0326 03/23/19 0417    136 135*   K 4.9 4.4 4.1    108 108   CO2 19* 18* 20*   BUN 31* 35* 37*   CREATININE 1.5* 1.6* 1.4   GLU 90  98 93   CALCIUM 9.2 8.9 9.3   MG 1.6 1.8 1.6   PHOS 2.4* 2.9 3.1     Recent Labs   Lab 03/19/19  0139  03/21/19  0430 03/22/19  0326 03/23/19  0417   ALKPHOS 88  --   --   --   --    ALT 36  --   --   --   --    AST 55*  --   --   --   --    ALBUMIN 3.7  --   --   --   --    PROT 7.2  --   --   --   --    BILITOT 1.4*  --   --   --   --    INR 2.4*   < > 1.5* 1.3* 1.4*    < > = values in this interval not displayed.      No results for input(s): CPK, CPKMB, MB, TROPONINI in the last 72 hours.  No results for input(s): LACTATE in the last 72 hours.    No results for input(s): POCTGLUCOSE in the last 168 hours.   Hemoglobin A1C   Date Value Ref Range Status   03/20/2019 5.3 4.0 - 5.6 % Final     Comment:     ADA Screening Guidelines:  5.7-6.4%  Consistent with prediabetes  >or=6.5%  Consistent with diabetes  High levels of fetal hemoglobin interfere with the HbA1C  assay. Heterozygous hemoglobin variants (HbS, HgC, etc)do  not significantly interfere with this assay.   However, presence of multiple variants may affect accuracy.     03/02/2015 5.3 4.5 - 6.2 % Final   11/16/2012 6.1 4.0 - 6.2 % Final       Procedures:   EGD:         The examined duodenum was normal.       The entire examined stomach was normal.       The cardia and gastric fundus were normal on retroflexion.       A 1 cm hiatal hernia was found. The proximal extent of the gastric        folds (end of tubular esophagus) was 39 cm from the incisors. The        hiatal narrowing was 40 cm from the incisors. The Z-line was 39 cm        from the incisors. Z-line was sharp. No esophagitis and no columnar        esophagus and no lesions seen with NBI or white light.       A widely patent, non-obstructing and mild Schatzki ring was found at        the gastroesophageal junction.  Impression:           - Normal examined duodenum.                        - Normal stomach.                        - 1 cm hiatal hernia.                        - Widely patent,  non-obstructing and mild Schatzki                         ring.                        - No specimens collected.  Recommendation:       - Return patient to hospital cain for ongoing care.                        - Perform a colonoscopy today.                        - The findings and recommendations were discussed                         with the patient.                        - The findings and recommendations were discussed                         with the patient's family.                        - The findings and recommendations were discussed                         with the patient's primary physician.                        - Resume Coumadin (warfarin) at prior dose today.      Colonoscopy:     mpression:           - The examined portion of the ileum was normal.                        - One 2 mm polyp in the transverse colon, removed                         with a jumbo cold forceps. Resected and retrieved.                        - Diverticulosis in the recto-sigmoid colon, in the                         sigmoid colon, in the descending colon, in the                         transverse colon and in the ascending colon.                        - Non-bleeding internal hemorrhoids.  Recommendation:       - Return patient to hospital cain for ongoing care.                         If any further bleeding call GI to offer patient                         small bowel Video Capsule Endoscopy Study.                        - Await pathology results.                        - Telephone endoscopist for pathology results in 2                         weeks.                        - Repeat colonoscopy in 5 years for surveillance                         based on pathology results.                        - The findings and recommendations were discussed                         with the patient.                        - The findings and recommendations were discussed                         with the patient's family.                         - Resume Coumadin (warfarin) at prior dose today.                        - Await pathology results.                        - The findings and recommendations were discussed                         with the patient's primary physician.    Consultants: Gastroenterology, Hematology     Discharge Medication List as of 3/23/2019  2:06 PM      CONTINUE these medications which have CHANGED    Details   azaTHIOprine (IMURAN) 50 mg Tab Take 4 tablets (200 mg total) by mouth once daily., Starting Thu 3/21/2019, Normal      predniSONE (DELTASONE) 5 MG tablet Take 1 tablet (5 mg total) by mouth once daily., Starting Thu 3/21/2019, Normal         CONTINUE these medications which have NOT CHANGED    Details   benazepril (LOTENSIN) 40 MG tablet TAKE 1 TABLET EVERY DAY, Normal      DENTURE CLEANSER (EFFERVESCENT DENTURE CLEANSR DENT) Starting 5/9/2016, Until Discontinued, Historical Med      finasteride (PROSCAR) 5 mg tablet TAKE 1 TABLET EVERY DAY, Normal      FLUZONE HIGH-DOSE 2018-19, PF, 180 mcg/0.5 mL vaccine Starting Tue 10/30/2018, Historical Med      IRON, FERROUS SULFATE, ORAL Take 65 mg by mouth once daily., Historical Med      ONE TOUCH ULTRA TEST Strp TEST DAILY, Normal      ONE TOUCH ULTRASOFT LANCETS lancets TEST DAILY, Normal      pyridostigmine (MESTINON) 60 mg Tab Take 1 tablet (60 mg total) by mouth every 6 to 8 hours as needed., Starting Thu 6/8/2017, Normal      !! tamsulosin (FLOMAX) 0.4 mg Cap Take 1 capsule (0.4 mg total) by mouth once daily., Starting Thu 1/31/2019, Normal      !! tamsulosin (FLOMAX) 0.4 mg Cap TAKE 1 CAPSULE EVERY DAY, Normal      verapamil (VERELAN) 240 MG C24P TAKE 1 CAPSULE EVERY DAY, Normal      vitamin D 1000 units Tab Take 185 mg by mouth once daily., Until Discontinued, Historical Med      warfarin (COUMADIN) 5 MG tablet TAKE 1 TABLET EVERY DAY EXCEPT TAKE 1/2 TABLET ON SUNDAY, OR AS DIRECTED BY COUMADIN CLINIC, Normal       !! - Potential duplicate medications found. Please  discuss with provider.      STOP taking these medications       aspirin 81 mg Tab Comments:   Reason for Stopping:         hydroCHLOROthiazide (MICROZIDE) 12.5 mg capsule Comments:   Reason for Stopping:         hydroxyurea (HYDREA) 500 mg Cap Comments:   Reason for Stopping:               Discharge Diet:cardiac diet with Normal Fluid intake of 1500 - 2000 mL per day    Activity: activity as tolerated    Discharge Condition: Stable    Disposition: Home-Health Care Svc    Tests pending at the time of discharge: none     Time spent  on the discharge of the patient including review of hospital course with the patient. reviewing discharge medications and arranging follow-up care 35 minutes    Discharge examination Patient was seen and examined on the date of discharge and determined to be suitable for discharge.    Discharge plan   Follow up with PCP, Neurology and Hematology   Future Appointments   Date Time Provider Department Center   3/29/2019 10:30 AM LAB, [facility]   3/29/2019 11:45 AM INJECTION, [facility]   4/22/2019  9:40 AM LAB, [facility]ER [facility]   4/22/2019 10:40 AM Nick Gutierrez MD [facility] BM [facility]   4/22/2019 11:30 AM INJECTION, [facility]       Rafael Mullins MD  Staff Hospitalist  Department of Hospital Medicine  [facility]   [phone]

## 2019-03-25 ENCOUNTER — TELEPHONE (OUTPATIENT)
Dept: HEMATOLOGY/ONCOLOGY | Facility: CLINIC | Age: 84
End: 2019-03-25

## 2019-03-25 ENCOUNTER — ANTI-COAG VISIT (OUTPATIENT)
Dept: CARDIOLOGY | Facility: CLINIC | Age: 84
End: 2019-03-25

## 2019-03-25 DIAGNOSIS — I48.0 PAROXYSMAL ATRIAL FIBRILLATION: ICD-10-CM

## 2019-03-25 DIAGNOSIS — Z79.01 LONG TERM CURRENT USE OF ANTICOAGULANT THERAPY: ICD-10-CM

## 2019-03-25 LAB — INR PPP: 1.3

## 2019-03-25 NOTE — TELEPHONE ENCOUNTER
Spoke to patient.  Moved appts time to later in the day.  Informed him I would have approval extended for his injection. Verbalized understanding      ----- Message from Ray Massey sent at 3/25/2019 11:47 AM CDT -----  Contact: Pt  Pt called asking for advice about changes chemotherapy injection appt time with Lab. No further information was given.    Pt can be reached at [phone].    Thank You.

## 2019-03-25 NOTE — PHYSICIAN QUERY
PT Name: Ernie Phan  MR #: [id number]    Physician Query Form - CardioPulmonary Clarification    Missy Wei RN, CCDS  Desk # [phone]; stefan # [phone] [email or web address]    This form is a permanent document in the medical record.    Query Date: March 25, 2019    By submitting this query, we are merely seeking further clarification of documentation. Please utilize your independent clinical judgment when addressing the question(s) below.    The Medical record contains the following:   Indicators   Supporting Clinical Findings Location in Medical Record   x Pulmonary Hypertension documented Pulmonary HTN EGD & Colonoscopy report;   Anesthesia Chart   x Acute/Chronic Illness Rectal bleed  Abla  Arf superimposed on ckd 3  Essential thrombocytosis  Myasthenia Gravis - seems to be in a slight flare  Paroxysmal afib H&P   x Echo and/or Heart Cath Findings 2D ECHO:  Results for orders placed or performed during the hospital encounter of 08/15/18  2D echo with color flow doppler  Result Value Ref Range    QEF 65 55 - 65    Mitral Valve Regurgitation MILD     Aortic Valve Regurgitation TRIVIAL TO MILD     Est. PA Systolic Pressure 57.17 (A)     Tricuspid Valve Regurgitation MODERATE (A)  Anesthesia Chart    BiPAP/Intubation/Supplemental O2      SOB, MCMILLAN, Fatigue, Dizziness, LE Edema, Cyanosis, Chest Pain, Respiratory Distress, Hypoxia, etc.     x Treatment         Medication Benzapril  Inpt Meds    Other Home Meds:   Medications: Scheduled Meds:  o azaTHIOprine 200 mg Oral Daily  o finasteride 5 mg Oral Daily  o hydroxyurea 500 mg Oral Daily  o methylPREDNISolone sodium succinate 4 mg Intravenous Daily  o tamsulosin 1 capsule Oral Daily H&P   Provider, please specify the type of pulmonary hypertension:  [   ] Group 1:  Pulmonary Arterial Hypertension - includes Primary, Idiopathic, Inheritable, and Secondary (due to drugs, toxins, congenital heart diease, HIV infection, etc.)     [  X  ] Group 2:  Pulmonary  Hypertension due to Left Heart Disease, including left heart failure and/or left heart valve disease     [   ] Group 3:  Pulmonary Hypertension due to Lung Disease     [   ] Group 4:  Pulmonary Hypertension due to Obstruction of the Pulmonary Vessels caused by Chronic Thromboemboli, Tumor, or Foreign Bodies     [   ] Group 5:  Pulmonary Hypertension due to other, multifactorial, or unclear mechanisms     [   ] Pulmonary Hypertension, unspecified     [   ] Other Cardiopulmonary Condition (please specify):     [  ] Clinically Undetermined         Please document in your progress notes daily for the duration of treatment, until resolved, and include in your discharge summary.

## 2019-03-25 NOTE — PROGRESS NOTES
INR low. Patient was admitted late 3/18 for bloody BM. See hospital course below. D/c 3/23 on current dose. Hydrea stopped. Prednisone increased from EOD to daily. He was given 5mg vit K on 3/19 for INR reversal. Coumadin held 3/19 then resumed 3/20. We will have to work against vit K reversal and dose cautiously due to bleeding event.     Hospital Course Patient admitted for GI bleed, underwent EGD and colonoscopy which were unremarkable except for external hemorrhoids and a polyp. Hematology was consulted due to anemia and leukopenia, recommended outpatient follow up and Abdominal US which was performed prior to discharge. One time dose of IV iron was given. Coumadin restarted as per GI recs. Patient discharged with home Health.

## 2019-03-25 NOTE — PHYSICIAN QUERY
"PT Name: Ernie Phan  MR #: [id number]     Physician Query Form - Etiology of Condition Clarification    Missy Wei RN, CCDS  Desk # [phone]; [facility] # [phone] [email or web address]    This form is a permanent document in the medical record.     Query Date: March 25, 2019    By submitting this query, we are merely seeking further clarification of documentation.  Please utilize your independent clinical judgment when addressing the question(s) below.     The medical record contains the following:    Findings Supporting Clinical Information Location in Medical Record   GI Bleed  Per DC Summary admitted for GI bleed,   underwent EGD and colonoscopy which were unremarkable except for external hemorrhoids and a polyp.    Hematology was consulted due to anemia and leukopenia, recommended outpatient follow up and Abdominal US which was performed prior to discharge.  One time dose of IV iron was given    PRBC x2 units   DC Summary                  Blood Bank    EGD:  Impression:    - Normal examined duodenum.  - Normal stomach.  - 1 cm hiatal hernia.  - Widely patent, non-obstructing and mild Schatzki ring.  - No specimens collected.    Colonoscopy:   Impression:    - The examined portion of the ileum was normal.  - One 2 mm polyp in the transverse colon, removed    with a jumbo cold forceps. Resected and retrieved.   - Diverticulosis in the recto-sigmoid colon, in the    sigmoid colon, in the descending colon, in the     transverse colon and in the ascending colon.   - Non-bleeding internal hemorrhoids. DC Summary     Please document your best medical opinion regarding the etiology of  "GI Bleed" for which treatment is/was directed.     Provider use only    Polyp, diverticulosis              [  ] Clinically Undetermined     Please document in your progress notes daily for the duration of treatment, until resolved, and include in your discharge summary.                                                             "

## 2019-03-26 ENCOUNTER — PATIENT OUTREACH (OUTPATIENT)
Dept: ADMINISTRATIVE | Facility: CLINIC | Age: 84
End: 2019-03-26

## 2019-03-26 DIAGNOSIS — K62.5 RECTAL BLEEDING: Primary | ICD-10-CM

## 2019-03-26 NOTE — PATIENT INSTRUCTIONS
When You Have Gastrointestinal (GI) Bleeding    Blood in your vomit or stool can be a sign of gastrointestinal (GI) bleeding. GI bleeding can be scary. But the cause may not be serious. You should always see a doctor if GI bleeding occurs.  The GI tract  The GI tract is the path through which food travels in the body. Food passes from the mouth down the esophagus (the tube from the mouth to the stomach). Food begins to break down in the stomach. It then moves through the duodenum, the first part of the small intestine. Nutrients are absorbed as food travels through the small intestine. What is left passes into the colon (large intestine) as waste. The colon removes water from the waste. Waste continues from the colon to the rectum (where stool is stored). Waste then leaves the body through the anus.  Causes of GI bleeding  GI bleeding can be caused by many different problems. Some of the more common causes include:  · Swollen veins in the anus (hemorrhoids)  · Swollen veins in the esophagus (varices)  · Sore on the lining of the GI tract (ulcer)  · Cuts or scrapes in the mouth or throat  · Infection caused by germs such as bacteria or parasites  · Food allergies, such as milk allergy in young children  · Medicines  · Inflammation of the GI tract (gastritis or esophagitis)  · Colitis (Crohn's disease or ulcerative colitis)  · Cancer (tumors or polyps)  · Abnormal pouches in the colon (diverticula)  · Tears in the esophagus or anus  · Nosebleed  · Abnormal blood vessels in the GI tract (angiodysplasia)  Diagnosing the cause of blood in stool  If blood is coming out in your stool, you may have a lower GI tract problem or a very fast upper GI tract bleed. Bleeding from the GI tract can be bright red. Or it may look dark and tarry. Tests may also find blood in your stool that cant be seen with the eye (occult blood). To find out the cause, tests that may be ordered include:  · Blood tests. A blood sample is taken and  sent to a lab for exam.  · Hemoccult test. Checks a stool sample for blood.  · Stool culture. Checks a stool sample for bacteria or parasites.  · X-ray, ultrasound, or CT scan. Imaging tests that take pictures of the digestive tract.  · Colonoscopy or sigmoidoscopy. This test uses a flexible tube with a tiny camera. The tube is inserted through your anus into your rectum to see the inside of your colon. Your provider can also take a tiny tissue sample (biopsy) and treat a bleeding source  Diagnosing the cause of blood in vomit  If you are vomiting blood or something that looks like coffee grounds, you may have an upper GI tract problem. To find the cause, tests that may be done include:  · Upper Endoscopy. A flexible tube with a tiny camera is inserted through your mouth and throat to see inside your upper GI tract. This lets your provider take a tiny tissue sample (biopsy) and treat a bleeding source.  · Nasogastric lavage. This can tell if you have upper GI or lower GI bleeding.  · X-ray, ultrasound, or CT scan. Imaging tests that take pictures of your digestive tract.  · Upper GI series. X-rays of the upper part of your GI tract taken from inside your body.  · Enteroscopy. This sends a flexible tube or a small, swallowed capsule camera into your small intestine.  When to call your healthcare provider  Call your healthcare provider right away if you have any of the following:  · Bleeding from your mouth or anus that can't be stopped  · Fever of 100.4°F (38.0°) or higher  · Bleeding along with feeling lightheaded or dizzy  · Signs of fluid loss (dehydration). These include a dry, sticky mouth, decreased urine output; and very dark urine.  · Belly (abdominal) pain   Date Last Reviewed: 7/1/2016  © 1994-3333 [employer]. [address] [place] [address], [place] [address]. All rights reserved. This information is not intended as a substitute for professional medical care. Always follow your healthcare  professional's instructions.

## 2019-03-26 NOTE — PROGRESS NOTES
C3 nurse attempted to contact patient. The following occurred:   C3 nurse attempted to contact Ernie Phan for a TCC post hospital discharge follow up call. The patient is unable to conduct the call @ this time. The patient requested a callback.    The patient does not have a scheduled HOSFU appointment within 7 days post hospital discharge date 3\23. Message sent to Physician staff to assist with HOSFU appointment scheduling.

## 2019-03-28 ENCOUNTER — LAB VISIT (OUTPATIENT)
Dept: LAB | Facility: HOSPITAL | Age: 84
End: 2019-03-28
Attending: INTERNAL MEDICINE
Payer: MEDICARE

## 2019-03-28 ENCOUNTER — ANTI-COAG VISIT (OUTPATIENT)
Dept: CARDIOLOGY | Facility: CLINIC | Age: 84
End: 2019-03-28

## 2019-03-28 DIAGNOSIS — N39.0 URINARY TRACT INFECTION, SITE NOT SPECIFIED: Primary | ICD-10-CM

## 2019-03-28 DIAGNOSIS — Z79.01 LONG TERM CURRENT USE OF ANTICOAGULANT THERAPY: ICD-10-CM

## 2019-03-28 DIAGNOSIS — I48.0 PAROXYSMAL ATRIAL FIBRILLATION: ICD-10-CM

## 2019-03-28 LAB
BACTERIA #/AREA URNS AUTO: ABNORMAL /HPF
BILIRUB UR QL STRIP: NEGATIVE
CLARITY UR REFRACT.AUTO: CLEAR
COLOR UR AUTO: ABNORMAL
GLUCOSE UR QL STRIP: NEGATIVE
HGB UR QL STRIP: ABNORMAL
HYALINE CASTS UR QL AUTO: 0 /LPF
INR PPP: 1.6
KETONES UR QL STRIP: NEGATIVE
LEUKOCYTE ESTERASE UR QL STRIP: ABNORMAL
MICROSCOPIC COMMENT: ABNORMAL
NITRITE UR QL STRIP: POSITIVE
PH UR STRIP: 5 [PH] (ref 5–8)
PROT UR QL STRIP: ABNORMAL
RBC #/AREA URNS AUTO: 65 /HPF (ref 0–4)
SP GR UR STRIP: 1.01 (ref 1–1.03)
SQUAMOUS #/AREA URNS AUTO: 1 /HPF
URN SPEC COLLECT METH UR: ABNORMAL
WBC #/AREA URNS AUTO: 7 /HPF (ref 0–5)

## 2019-03-28 PROCEDURE — 87086 URINE CULTURE/COLONY COUNT: CPT | Mod: HCNC

## 2019-03-28 PROCEDURE — 87088 URINE BACTERIA CULTURE: CPT | Mod: HCNC

## 2019-03-28 PROCEDURE — 87077 CULTURE AEROBIC IDENTIFY: CPT | Mod: HCNC

## 2019-03-28 PROCEDURE — 81001 URINALYSIS AUTO W/SCOPE: CPT | Mod: HCNC

## 2019-03-28 PROCEDURE — 87186 SC STD MICRODIL/AGAR DIL: CPT | Mod: HCNC

## 2019-03-29 NOTE — PROGRESS NOTES
Forwarded results to Delaney Nolasco, my collaborating LARISSA in [facility]. She had ordered the result during home visits.  She would take appropriate actions based on her encounter with the patient.

## 2019-03-30 NOTE — PROGRESS NOTES
Forwarded results to Delaney Nolasco, my collaborating LARISSA in [facility]. She had ordered the result during home visits.  She would take appropriate actions based on her encounter with the patient.

## 2019-04-01 ENCOUNTER — TELEPHONE (OUTPATIENT)
Dept: ELECTROPHYSIOLOGY | Facility: CLINIC | Age: 84
End: 2019-04-01

## 2019-04-01 ENCOUNTER — TELEPHONE (OUTPATIENT)
Dept: INTERNAL MEDICINE | Facility: CLINIC | Age: 84
End: 2019-04-01

## 2019-04-01 ENCOUNTER — ANTI-COAG VISIT (OUTPATIENT)
Dept: CARDIOLOGY | Facility: CLINIC | Age: 84
End: 2019-04-01

## 2019-04-01 ENCOUNTER — HOSPITAL ENCOUNTER (INPATIENT)
Facility: HOSPITAL | Age: 84
LOS: 2 days | Discharge: HOME HEALTH | DRG: 312 | End: 2019-04-03
Attending: EMERGENCY MEDICINE | Admitting: EMERGENCY MEDICINE
Payer: MEDICARE

## 2019-04-01 DIAGNOSIS — D47.3 ESSENTIAL THROMBOCYTOSIS: ICD-10-CM

## 2019-04-01 DIAGNOSIS — Z79.60 LONG-TERM USE OF IMMUNOSUPPRESSANT MEDICATION: ICD-10-CM

## 2019-04-01 DIAGNOSIS — I48.0 PAROXYSMAL ATRIAL FIBRILLATION: ICD-10-CM

## 2019-04-01 DIAGNOSIS — R00.1 BRADYCARDIA: ICD-10-CM

## 2019-04-01 DIAGNOSIS — I49.3 PVC (PREMATURE VENTRICULAR CONTRACTION): ICD-10-CM

## 2019-04-01 DIAGNOSIS — N18.9 ANEMIA OF RENAL DISEASE: ICD-10-CM

## 2019-04-01 DIAGNOSIS — G70.00 MYASTHENIA GRAVIS WITHOUT ACUTE EXACERBATION: ICD-10-CM

## 2019-04-01 DIAGNOSIS — I10 ESSENTIAL HYPERTENSION: ICD-10-CM

## 2019-04-01 DIAGNOSIS — Z79.01 LONG TERM CURRENT USE OF ANTICOAGULANT THERAPY: Primary | ICD-10-CM

## 2019-04-01 DIAGNOSIS — D63.1 ANEMIA OF RENAL DISEASE: ICD-10-CM

## 2019-04-01 DIAGNOSIS — N18.3 ACUTE RENAL FAILURE SUPERIMPOSED ON STAGE 3 CHRONIC KIDNEY DISEASE, UNSPECIFIED ACUTE RENAL FAILURE TYPE: ICD-10-CM

## 2019-04-01 DIAGNOSIS — D64.9 SYMPTOMATIC ANEMIA: Primary | ICD-10-CM

## 2019-04-01 DIAGNOSIS — N17.9 ACUTE RENAL FAILURE SUPERIMPOSED ON STAGE 3 CHRONIC KIDNEY DISEASE, UNSPECIFIED ACUTE RENAL FAILURE TYPE: ICD-10-CM

## 2019-04-01 LAB
ABO + RH BLD: NORMAL
ALBUMIN SERPL BCP-MCNC: 3.3 G/DL (ref 3.5–5.2)
ALP SERPL-CCNC: 108 U/L (ref 55–135)
ALT SERPL W/O P-5'-P-CCNC: 35 U/L (ref 10–44)
ANION GAP SERPL CALC-SCNC: 8 MMOL/L (ref 8–16)
ANISOCYTOSIS BLD QL SMEAR: SLIGHT
AST SERPL-CCNC: 38 U/L (ref 10–40)
BACTERIA UR CULT: NORMAL
BASOPHILS # BLD AUTO: ABNORMAL K/UL (ref 0–0.2)
BASOPHILS NFR BLD: 1 % (ref 0–1.9)
BILIRUB SERPL-MCNC: 1.3 MG/DL (ref 0.1–1)
BLD GP AB SCN CELLS X3 SERPL QL: NORMAL
BLD PROD TYP BPU: NORMAL
BLOOD UNIT EXPIRATION DATE: NORMAL
BLOOD UNIT TYPE CODE: 7300
BLOOD UNIT TYPE: NORMAL
BUN SERPL-MCNC: 32 MG/DL (ref 8–23)
CALCIUM SERPL-MCNC: 8.8 MG/DL (ref 8.7–10.5)
CHLORIDE SERPL-SCNC: 106 MMOL/L (ref 95–110)
CO2 SERPL-SCNC: 18 MMOL/L (ref 23–29)
CODING SYSTEM: NORMAL
CREAT SERPL-MCNC: 1.8 MG/DL (ref 0.5–1.4)
DACRYOCYTES BLD QL SMEAR: ABNORMAL
DIFFERENTIAL METHOD: ABNORMAL
DISPENSE STATUS: NORMAL
EOSINOPHIL # BLD AUTO: ABNORMAL K/UL (ref 0–0.5)
EOSINOPHIL NFR BLD: 0 % (ref 0–8)
ERYTHROCYTE [DISTWIDTH] IN BLOOD BY AUTOMATED COUNT: ABNORMAL % (ref 11.5–14.5)
EST. GFR  (AFRICAN AMERICAN): 38 ML/MIN/1.73 M^2
EST. GFR  (NON AFRICAN AMERICAN): 32.9 ML/MIN/1.73 M^2
GLUCOSE SERPL-MCNC: 118 MG/DL (ref 70–110)
HCT VFR BLD AUTO: 20.5 % (ref 40–54)
HGB BLD-MCNC: 6.5 G/DL (ref 14–18)
HYPOCHROMIA BLD QL SMEAR: ABNORMAL
IMM GRANULOCYTES # BLD AUTO: ABNORMAL K/UL (ref 0–0.04)
IMM GRANULOCYTES NFR BLD AUTO: ABNORMAL % (ref 0–0.5)
INR PPP: 2.6
LACTATE SERPL-SCNC: 1.4 MMOL/L (ref 0.5–2.2)
LYMPHOCYTES # BLD AUTO: ABNORMAL K/UL (ref 1–4.8)
LYMPHOCYTES NFR BLD: 19 % (ref 18–48)
MAGNESIUM SERPL-MCNC: 1.9 MG/DL (ref 1.6–2.6)
MCH RBC QN AUTO: 37.4 PG (ref 27–31)
MCHC RBC AUTO-ENTMCNC: 31.7 G/DL (ref 32–36)
MCV RBC AUTO: 118 FL (ref 82–98)
MONOCYTES # BLD AUTO: ABNORMAL K/UL (ref 0.3–1)
MONOCYTES NFR BLD: 7 % (ref 4–15)
NEUTROPHILS NFR BLD: 73 % (ref 38–73)
NRBC BLD-RTO: 0 /100 WBC
NUM UNITS TRANS PACKED RBC: NORMAL
OVALOCYTES BLD QL SMEAR: ABNORMAL
PHOSPHATE SERPL-MCNC: 3.5 MG/DL (ref 2.7–4.5)
PLATELET # BLD AUTO: 303 K/UL (ref 150–350)
PLATELET BLD QL SMEAR: ABNORMAL
PMV BLD AUTO: 9.8 FL (ref 9.2–12.9)
POIKILOCYTOSIS BLD QL SMEAR: SLIGHT
POLYCHROMASIA BLD QL SMEAR: ABNORMAL
POTASSIUM SERPL-SCNC: 4.6 MMOL/L (ref 3.5–5.1)
PROT SERPL-MCNC: 6.2 G/DL (ref 6–8.4)
RBC # BLD AUTO: 1.74 M/UL (ref 4.6–6.2)
SODIUM SERPL-SCNC: 132 MMOL/L (ref 136–145)
TROPONIN I SERPL DL<=0.01 NG/ML-MCNC: 0.64 NG/ML (ref 0–0.03)
WBC # BLD AUTO: 1.2 K/UL (ref 3.9–12.7)

## 2019-04-01 PROCEDURE — 99291 CRITICAL CARE FIRST HOUR: CPT | Mod: HCNC,,, | Performed by: EMERGENCY MEDICINE

## 2019-04-01 PROCEDURE — 80053 COMPREHEN METABOLIC PANEL: CPT | Mod: HCNC

## 2019-04-01 PROCEDURE — 36430 TRANSFUSION BLD/BLD COMPNT: CPT | Mod: HCNC

## 2019-04-01 PROCEDURE — 84100 ASSAY OF PHOSPHORUS: CPT | Mod: HCNC

## 2019-04-01 PROCEDURE — 99223 1ST HOSP IP/OBS HIGH 75: CPT | Mod: HCNC,AI,, | Performed by: HOSPITALIST

## 2019-04-01 PROCEDURE — 99291 PR CRITICAL CARE, E/M 30-74 MINUTES: ICD-10-PCS | Mod: HCNC,,, | Performed by: EMERGENCY MEDICINE

## 2019-04-01 PROCEDURE — 99223 PR INITIAL HOSPITAL CARE,LEVL III: ICD-10-PCS | Mod: HCNC,AI,, | Performed by: HOSPITALIST

## 2019-04-01 PROCEDURE — 85007 BL SMEAR W/DIFF WBC COUNT: CPT | Mod: HCNC

## 2019-04-01 PROCEDURE — 93005 ELECTROCARDIOGRAM TRACING: CPT | Mod: HCNC

## 2019-04-01 PROCEDURE — 86920 COMPATIBILITY TEST SPIN: CPT | Mod: HCNC

## 2019-04-01 PROCEDURE — P9040 RBC LEUKOREDUCED IRRADIATED: HCPCS | Mod: HCNC

## 2019-04-01 PROCEDURE — 11000001 HC ACUTE MED/SURG PRIVATE ROOM: Mod: HCNC

## 2019-04-01 PROCEDURE — 93010 ELECTROCARDIOGRAM REPORT: CPT | Mod: HCNC,,, | Performed by: INTERNAL MEDICINE

## 2019-04-01 PROCEDURE — 86850 RBC ANTIBODY SCREEN: CPT | Mod: HCNC

## 2019-04-01 PROCEDURE — 99285 EMERGENCY DEPT VISIT HI MDM: CPT | Mod: HCNC

## 2019-04-01 PROCEDURE — 83605 ASSAY OF LACTIC ACID: CPT | Mod: HCNC

## 2019-04-01 PROCEDURE — 83735 ASSAY OF MAGNESIUM: CPT | Mod: HCNC

## 2019-04-01 PROCEDURE — 85027 COMPLETE CBC AUTOMATED: CPT | Mod: HCNC

## 2019-04-01 PROCEDURE — 12000002 HC ACUTE/MED SURGE SEMI-PRIVATE ROOM: Mod: HCNC

## 2019-04-01 PROCEDURE — 25000003 PHARM REV CODE 250: Mod: HCNC | Performed by: EMERGENCY MEDICINE

## 2019-04-01 PROCEDURE — 93010 EKG 12-LEAD: ICD-10-PCS | Mod: HCNC,,, | Performed by: INTERNAL MEDICINE

## 2019-04-01 PROCEDURE — 84484 ASSAY OF TROPONIN QUANT: CPT | Mod: HCNC

## 2019-04-01 RX ORDER — HYDROCODONE BITARTRATE AND ACETAMINOPHEN 500; 5 MG/1; MG/1
TABLET ORAL
Status: DISCONTINUED | OUTPATIENT
Start: 2019-04-01 | End: 2019-04-03 | Stop reason: HOSPADM

## 2019-04-01 RX ADMIN — SODIUM CHLORIDE 500 ML: 0.9 INJECTION, SOLUTION INTRAVENOUS at 07:04

## 2019-04-01 NOTE — TELEPHONE ENCOUNTER
----- Message from Frannie Bush sent at 4/1/2019  3:01 PM CDT -----  Contact: Val [phone]  Type: Home Health (orders, updates, clarifications, etc.)    Home Health Agency/Nurse:[facility]     Phone number:[phone]  Reason for call: Reporting HBP 80/40 sitting upon arrival 30 minutes; Blurred vision, warmth on back of neck , nausea, feeling of gas in chest, very week. Pulse 60. Notified Cardiology    Comments:

## 2019-04-01 NOTE — PROGRESS NOTES
Forwarded results to Delaney Nolasco, my collaborating LARISSA in [facility]. She had ordered the result during home visits.  She would take appropriate actions based on her encounter with the patient.

## 2019-04-01 NOTE — TELEPHONE ENCOUNTER
HH with Ernie Phan, on arrival 45 minutes ago , wife/c/o symptoms blurred vision, warm neck, nausea, felt like gas in his chest, weak and unable to get up.  80/40 sitting, pulse 60, resp 20, she elevated his leg, 90/45,  I asked Val to take a lying down blood pressure, 80/50, feeling of gas on his chest, no sensation now but did feel a sensation down the back of his neck, no vomiting.  Ems Called to get patient to the ED for triage/evaluation, his blood pressure is significantly down from his visit to Hem/Onc 3/1//2019

## 2019-04-01 NOTE — TELEPHONE ENCOUNTER
----- Message from Nancy Espinoza MA sent at 4/1/2019  4:02 PM CDT -----  Contact: [facility]      ----- Message -----  From: Dianne Rodriguez  Sent: 4/1/2019   2:56 PM  To: Bebeto JOHNSON Staff    .Needs Advice    Reason for call: Called to report Pt's complaint of blurry vision, warm back of neck, Nausea, felt like needs to burp & weakness. Around 1:30, Vitals- BP 80/40, RESp-20 , PULSE 60        Communication Preference: Laura [phone]    Additional Information:

## 2019-04-01 NOTE — ED TRIAGE NOTES
Ernie Phan, a 88 y.o. male presents to the ED via EMS with CC generalized weakness, reported hypotension per home health nurse.       Patient identifiers verified verbally with patient and correct for Ernie Phan.    LOC/ APPEARANCE: The patient is AAOx4. Pt is speaking appropriately, no slurred speech. Pt changed into hospital gown. Continuous cardiac monitor, cont pulse ox, and auto BP cuff applied to patient. Pt is clean and well groomed. No JVD visible. Pt reports pain level of 0. Pt updated on POC. Bed low and locked with side rails up x2, call bell in pt reach.  SKIN: Skin is warm dry and intact, and pale. Capillary refill <3 seconds. No breakdown or brusing visible. Mucus membranes moist, acyanotic.  RESPIRATORY: Airway is open and patent. Respirations-spontaneous, unlabored, regular rate, equal bilaterally on inspiration and expiration. No accessory muscle use noted. Lungs clear to auscultation in all fields bilaterally anterior and posterior.   CARDIAC: Patient has regular heart rate.  No peripheral edema noted, and patient has no c/o chest pain. Peripheral pulses present equal and strong throughout.  ABDOMEN: Soft and non-tender to palpation with no distention noted. Normoactive bowel sounds x4 quadrants. Pt has no complaints of abnormal bowel movements, denies blood in stool. Pt reports normal appetite.   NEUROLOGIC: Eyes open spontaneously and facial expression symmetrical. Pt behavior appropriate to situation, and pt follows commands. Pt reports sensation present in all extremities when touched with a finger, denies any numbness or tingling. PERRLA  MUSCULOSKELETAL: Spontaneous movement noted to all extremities. Hand  equal and leg strength strong +5 bilaterally.   : No complaints of frequency, burning, urgency or blood in the urine. No complaints of incontinence.

## 2019-04-01 NOTE — ED PROVIDER NOTES
Encounter Date: 4/1/2019    SCRIBE #1 NOTE: I, Zofia Grace, am scribing for, and in the presence of,  Dr. Ram. I have scribed the entire note.       History     Chief Complaint   Patient presents with    Fatigue     Pt complaining of generalized weakness and hypotension per home health nurse. Recently discharged for rectal bleed. Pt denies rectal bleeding.      Time patient was seen by the provider: 6:12 PM    The patient is a 88 y.o. male presents to the ED with a chief complaint of generalized weakness today. The patient reports his blood pressure was low today per home health nurse. Patient was recently discharged for rectal bleeding. He denies any rectal bleed at this time.       The history is provided by the patient and medical records.     Review of patient's allergies indicates:  No Known Allergies  Past Medical History:   Diagnosis Date    *Atrial fibrillation     Arthritis     Atrial fibrillation     BPH (benign prostatic hypertrophy)     Degenerative disc disease     Depression     GERD (gastroesophageal reflux disease)     Hyperglycemia     Hyperlipidemia     Hypertension     MG (myasthenia gravis)     Postherpetic neuralgia      Past Surgical History:   Procedure Laterality Date    ACHILLES TENDON SURGERY      CHOLECYSTECTOMY      COLONOSCOPY N/A 3/20/2019    Performed by [facility] at [facility] (2ND F[facility])    EGD (ESOPHAGOGASTRODUODENOSCOPY) N/A 3/20/2019    Performed by Le[facility] at [facility] (2ND FLR)     Family History   Problem Relation Age of Onset    Stroke Mother     Cancer Father      Social History     Tobacco Use    Smoking status: Former Smoker    Smokeless tobacco: Never Used   Substance Use Topics    Alcohol use: No    Drug use: No     Review of Systems  General: No fever.  No chills. Fatigue  Eyes: No visual changes.  Head: No headache.    Integument: No rashes or lesions.  Chest: No shortness of breath.  Cardiovascular: No chest  pain.  Abdomen: No abdominal pain.  No nausea or vomiting.  Urinary: No abnormal urination.  Neurologic: No focal weakness.  No numbness.  Hematologic: No easy bruising.  Endocrine: No excessive thirst or urination.    Physical Exam     Initial Vitals [04/01/19 1625]   BP Pulse Resp Temp SpO2   116/62 65 18 97.4 °F (36.3 °C) 98 %      MAP       --         Physical Exam    Nursing note and vitals reviewed.    Appearance: No acute distress. Wan appearing.   Skin: Pale No rashes seen.  Good turgor.  No abrasions.  No ecchymoses.  Eyes: No conjunctival injection.  ENT: Oropharynx clear.    Chest: Clear to auscultation bilaterally.  Good air movement.  No wheezes.  No rhonchi.  Cardiovascular: Bradycardia. Regular rhythm.  No murmurs. No gallops. No rubs.  Abdomen: Soft.  Not distended.  Nontender.  No guarding.  No rebound.  Musculoskeletal: Good range of motion all joints.  No deformities.  Neck supple.  No meningismus.  Neurologic: Motor intact.  Sensation intact.  Cerebellar intact.  Cranial nerves intact.  Mental Status:  Alert and oriented x 3.  Appropriate, conversant.      ED Course   Procedures  Labs Reviewed   CBC W/ AUTO DIFFERENTIAL - Abnormal; Notable for the following components:       Result Value    WBC 1.20 (*)     RBC 1.74 (*)     Hemoglobin 6.5 (*)     Hematocrit 20.5 (*)      (*)     MCH 37.4 (*)     MCHC 31.7 (*)     All other components within normal limits    Narrative:        WBC critical result(s) called and verbal readback obtained from   Daiana Gotti., 04/01/2019 18:53   COMPREHENSIVE METABOLIC PANEL - Abnormal; Notable for the following components:    Sodium 132 (*)     CO2 18 (*)     Glucose 118 (*)     BUN, Bld 32 (*)     Creatinine 1.8 (*)     Albumin 3.3 (*)     Total Bilirubin 1.3 (*)     eGFR if  38.0 (*)     eGFR if non  32.9 (*)     All other components within normal limits   TROPONIN I - Abnormal; Notable for the following components:     Troponin I 0.642 (*)     All other components within normal limits   LACTIC ACID, PLASMA   MAGNESIUM   PHOSPHORUS   TYPE & SCREEN   PREPARE RBC SOFT     EKG Readings: (Independently Interpreted)   Initial Reading: No STEMI. Rhythm: Atrial Fibrillation. Heart Rate: 50.   Occasional PVC        Imaging Results    None          Medical Decision Making:   History:   Old Medical Records: I decided to obtain old medical records.  Initial Assessment:   Patient with hypotension and weakness. He was recently admitted for rectal bleeding. It looks like white blood count is low again. Will evaluate. Anticipate admission.   Independently Interpreted Test(s):   I have ordered and independently interpreted EKG Reading(s) - see prior notes  Clinical Tests:   Lab Tests: Ordered and Reviewed  Medical Tests: Ordered and Reviewed            Scribe Attestation:   Scribe #1: I performed the above scribed service and the documentation accurately describes the services I performed. I attest to the accuracy of the note.    Attending Attestation:         Attending Critical Care:   Critical Care Times:   ==============================================================  · Total Critical Care Time - exclusive of procedural time: 30 minutes.  ==============================================================  Critical care was necessary to treat or prevent imminent or life-threatening deterioration of the following conditions: GI bleeding.               ED Course as of Apr 03 2020 Mon Apr 01, 2019   1908 Hemoglobin(!): 6.5 [DC]   1908 Troponin I(!): 0.642 [DC]   1908 WBC(!!): 1.20 [DC]   1909 Creatinine(!): 1.8 [DC]   1909 CO2(!): 18 [DC]      ED Course User Index  [DC] Toño Ram MD     Clinical Impression:       ICD-10-CM ICD-9-CM   1. Symptomatic anemia D64.9 285.9   2. Bradycardia R00.1 427.89   3. PVC (premature ventricular contraction) I49.3 427.69   4. Paroxysmal atrial fibrillation I48.0 427.31   5. Myasthenia gravis without acute  exacerbation G70.00 358.00   6. Essential hypertension I10 401.9   7. Long-term use of immunosuppressant medication Z79.899 V58.69   8. Anemia of renal disease D63.1 285.21   9. Acute renal failure superimposed on stage 3 chronic kidney disease, unspecified acute renal failure type N17.9 584.9    N18.3 585.3   10. Essential thrombocytosis D47.3 238.71         Disposition:   Disposition: Admitted                        Toño Ram MD  04/03/19 2020

## 2019-04-02 ENCOUNTER — TELEPHONE (OUTPATIENT)
Dept: GASTROENTEROLOGY | Facility: CLINIC | Age: 84
End: 2019-04-02

## 2019-04-02 PROBLEM — I95.9 HYPOTENSION: Status: ACTIVE | Noted: 2019-04-02

## 2019-04-02 LAB
ANION GAP SERPL CALC-SCNC: 6 MMOL/L (ref 8–16)
ANISOCYTOSIS BLD QL SMEAR: ABNORMAL
BASOPHILS # BLD AUTO: 0 K/UL (ref 0–0.2)
BASOPHILS NFR BLD: 0 % (ref 0–1.9)
BNP SERPL-MCNC: 971 PG/ML (ref 0–99)
BUN SERPL-MCNC: 27 MG/DL (ref 8–23)
CALCIUM SERPL-MCNC: 9.2 MG/DL (ref 8.7–10.5)
CHLORIDE SERPL-SCNC: 107 MMOL/L (ref 95–110)
CO2 SERPL-SCNC: 21 MMOL/L (ref 23–29)
CREAT SERPL-MCNC: 1.6 MG/DL (ref 0.5–1.4)
DIFFERENTIAL METHOD: ABNORMAL
EOSINOPHIL # BLD AUTO: 0 K/UL (ref 0–0.5)
EOSINOPHIL NFR BLD: 0.7 % (ref 0–8)
ERYTHROCYTE [DISTWIDTH] IN BLOOD BY AUTOMATED COUNT: ABNORMAL % (ref 11.5–14.5)
EST. GFR  (AFRICAN AMERICAN): 43.8 ML/MIN/1.73 M^2
EST. GFR  (NON AFRICAN AMERICAN): 37.9 ML/MIN/1.73 M^2
GLUCOSE SERPL-MCNC: 92 MG/DL (ref 70–110)
HCT VFR BLD AUTO: 23.4 % (ref 40–54)
HCT VFR BLD AUTO: 23.9 % (ref 40–54)
HGB BLD-MCNC: 7.6 G/DL (ref 14–18)
HGB BLD-MCNC: 7.8 G/DL (ref 14–18)
HYPOCHROMIA BLD QL SMEAR: ABNORMAL
IMM GRANULOCYTES # BLD AUTO: 0.02 K/UL (ref 0–0.04)
IMM GRANULOCYTES NFR BLD AUTO: 1.4 % (ref 0–0.5)
INR PPP: 2.5 (ref 0.8–1.2)
LYMPHOCYTES # BLD AUTO: 0.5 K/UL (ref 1–4.8)
LYMPHOCYTES NFR BLD: 35.5 % (ref 18–48)
MAGNESIUM SERPL-MCNC: 1.9 MG/DL (ref 1.6–2.6)
MCH RBC QN AUTO: 35.7 PG (ref 27–31)
MCHC RBC AUTO-ENTMCNC: 32.5 G/DL (ref 32–36)
MCV RBC AUTO: 110 FL (ref 82–98)
MONOCYTES # BLD AUTO: 0.2 K/UL (ref 0.3–1)
MONOCYTES NFR BLD: 14.2 % (ref 4–15)
NEUTROPHILS # BLD AUTO: 0.7 K/UL (ref 1.8–7.7)
NEUTROPHILS NFR BLD: 48.2 % (ref 38–73)
NRBC BLD-RTO: 0 /100 WBC
OVALOCYTES BLD QL SMEAR: ABNORMAL
PHOSPHATE SERPL-MCNC: 3.3 MG/DL (ref 2.7–4.5)
PLATELET # BLD AUTO: 305 K/UL (ref 150–350)
PMV BLD AUTO: 9.7 FL (ref 9.2–12.9)
POIKILOCYTOSIS BLD QL SMEAR: SLIGHT
POLYCHROMASIA BLD QL SMEAR: ABNORMAL
POTASSIUM SERPL-SCNC: 4.8 MMOL/L (ref 3.5–5.1)
PREALB SERPL-MCNC: 27 MG/DL (ref 20–43)
PROTHROMBIN TIME: 23.9 SEC (ref 9–12.5)
RBC # BLD AUTO: 2.13 M/UL (ref 4.6–6.2)
SODIUM SERPL-SCNC: 134 MMOL/L (ref 136–145)
TROPONIN I SERPL DL<=0.01 NG/ML-MCNC: 0.58 NG/ML (ref 0–0.03)
TROPONIN I SERPL DL<=0.01 NG/ML-MCNC: 0.77 NG/ML (ref 0–0.03)
WBC # BLD AUTO: 1.41 K/UL (ref 3.9–12.7)

## 2019-04-02 PROCEDURE — 84484 ASSAY OF TROPONIN QUANT: CPT | Mod: 91,HCNC

## 2019-04-02 PROCEDURE — 83735 ASSAY OF MAGNESIUM: CPT | Mod: HCNC

## 2019-04-02 PROCEDURE — 85025 COMPLETE CBC W/AUTO DIFF WBC: CPT | Mod: HCNC

## 2019-04-02 PROCEDURE — 85018 HEMOGLOBIN: CPT | Mod: HCNC

## 2019-04-02 PROCEDURE — 83880 ASSAY OF NATRIURETIC PEPTIDE: CPT | Mod: HCNC

## 2019-04-02 PROCEDURE — 99232 SBSQ HOSP IP/OBS MODERATE 35: CPT | Mod: HCNC,,, | Performed by: INTERNAL MEDICINE

## 2019-04-02 PROCEDURE — 84484 ASSAY OF TROPONIN QUANT: CPT | Mod: HCNC

## 2019-04-02 PROCEDURE — 84100 ASSAY OF PHOSPHORUS: CPT | Mod: HCNC

## 2019-04-02 PROCEDURE — 85610 PROTHROMBIN TIME: CPT | Mod: HCNC

## 2019-04-02 PROCEDURE — 25000003 PHARM REV CODE 250: Mod: HCNC | Performed by: HOSPITALIST

## 2019-04-02 PROCEDURE — 36415 COLL VENOUS BLD VENIPUNCTURE: CPT | Mod: HCNC

## 2019-04-02 PROCEDURE — 80048 BASIC METABOLIC PNL TOTAL CA: CPT | Mod: HCNC

## 2019-04-02 PROCEDURE — 84134 ASSAY OF PREALBUMIN: CPT | Mod: HCNC

## 2019-04-02 PROCEDURE — 11000001 HC ACUTE MED/SURG PRIVATE ROOM: Mod: HCNC

## 2019-04-02 PROCEDURE — 99232 PR SUBSEQUENT HOSPITAL CARE,LEVL II: ICD-10-PCS | Mod: HCNC,,, | Performed by: INTERNAL MEDICINE

## 2019-04-02 PROCEDURE — 85014 HEMATOCRIT: CPT | Mod: HCNC

## 2019-04-02 PROCEDURE — 25000003 PHARM REV CODE 250: Mod: HCNC | Performed by: INTERNAL MEDICINE

## 2019-04-02 PROCEDURE — 63600175 PHARM REV CODE 636 W HCPCS: Mod: HCNC | Performed by: HOSPITALIST

## 2019-04-02 RX ORDER — ONDANSETRON 8 MG/1
8 TABLET, ORALLY DISINTEGRATING ORAL EVERY 8 HOURS PRN
Status: DISCONTINUED | OUTPATIENT
Start: 2019-04-02 | End: 2019-04-03 | Stop reason: HOSPADM

## 2019-04-02 RX ORDER — ACETAMINOPHEN 325 MG/1
650 TABLET ORAL EVERY 4 HOURS PRN
Status: DISCONTINUED | OUTPATIENT
Start: 2019-04-02 | End: 2019-04-03 | Stop reason: HOSPADM

## 2019-04-02 RX ORDER — IBUPROFEN 200 MG
24 TABLET ORAL
Status: DISCONTINUED | OUTPATIENT
Start: 2019-04-02 | End: 2019-04-03 | Stop reason: HOSPADM

## 2019-04-02 RX ORDER — AZATHIOPRINE 50 MG/1
200 TABLET ORAL DAILY
Status: DISCONTINUED | OUTPATIENT
Start: 2019-04-02 | End: 2019-04-03 | Stop reason: HOSPADM

## 2019-04-02 RX ORDER — SODIUM CHLORIDE 0.9 % (FLUSH) 0.9 %
10 SYRINGE (ML) INJECTION
Status: DISCONTINUED | OUTPATIENT
Start: 2019-04-02 | End: 2019-04-03 | Stop reason: HOSPADM

## 2019-04-02 RX ORDER — GABAPENTIN 100 MG/1
100 CAPSULE ORAL NIGHTLY
Status: DISCONTINUED | OUTPATIENT
Start: 2019-04-02 | End: 2019-04-03 | Stop reason: HOSPADM

## 2019-04-02 RX ORDER — FINASTERIDE 5 MG/1
5 TABLET, FILM COATED ORAL DAILY
Status: DISCONTINUED | OUTPATIENT
Start: 2019-04-02 | End: 2019-04-03 | Stop reason: HOSPADM

## 2019-04-02 RX ORDER — WARFARIN SODIUM 5 MG/1
5 TABLET ORAL DAILY
Status: DISCONTINUED | OUTPATIENT
Start: 2019-04-02 | End: 2019-04-02

## 2019-04-02 RX ORDER — INSULIN ASPART 100 [IU]/ML
0-5 INJECTION, SOLUTION INTRAVENOUS; SUBCUTANEOUS
Status: DISCONTINUED | OUTPATIENT
Start: 2019-04-02 | End: 2019-04-03 | Stop reason: HOSPADM

## 2019-04-02 RX ORDER — PREDNISONE 5 MG/1
5 TABLET ORAL DAILY
Status: DISCONTINUED | OUTPATIENT
Start: 2019-04-02 | End: 2019-04-03 | Stop reason: HOSPADM

## 2019-04-02 RX ORDER — FERROUS SULFATE 325(65) MG
325 TABLET, DELAYED RELEASE (ENTERIC COATED) ORAL DAILY
Status: DISCONTINUED | OUTPATIENT
Start: 2019-04-03 | End: 2019-04-03 | Stop reason: HOSPADM

## 2019-04-02 RX ORDER — GLUCAGON 1 MG
1 KIT INJECTION
Status: DISCONTINUED | OUTPATIENT
Start: 2019-04-02 | End: 2019-04-03 | Stop reason: HOSPADM

## 2019-04-02 RX ORDER — VERAPAMIL HYDROCHLORIDE 120 MG/1
240 TABLET, FILM COATED, EXTENDED RELEASE ORAL DAILY
Status: DISCONTINUED | OUTPATIENT
Start: 2019-04-02 | End: 2019-04-02

## 2019-04-02 RX ORDER — WARFARIN 2.5 MG/1
2.5 TABLET ORAL DAILY
Status: DISCONTINUED | OUTPATIENT
Start: 2019-04-02 | End: 2019-04-02

## 2019-04-02 RX ORDER — TAMSULOSIN HYDROCHLORIDE 0.4 MG/1
1 CAPSULE ORAL DAILY
Status: DISCONTINUED | OUTPATIENT
Start: 2019-04-02 | End: 2019-04-03 | Stop reason: HOSPADM

## 2019-04-02 RX ORDER — IBUPROFEN 200 MG
16 TABLET ORAL
Status: DISCONTINUED | OUTPATIENT
Start: 2019-04-02 | End: 2019-04-03 | Stop reason: HOSPADM

## 2019-04-02 RX ORDER — PYRIDOSTIGMINE BROMIDE 60 MG/1
60 TABLET ORAL EVERY 6 HOURS
Status: DISCONTINUED | OUTPATIENT
Start: 2019-04-02 | End: 2019-04-03 | Stop reason: HOSPADM

## 2019-04-02 RX ORDER — VERAPAMIL HYDROCHLORIDE 120 MG/1
240 TABLET, FILM COATED, EXTENDED RELEASE ORAL DAILY
Status: DISCONTINUED | OUTPATIENT
Start: 2019-04-02 | End: 2019-04-03 | Stop reason: HOSPADM

## 2019-04-02 RX ADMIN — AZATHIOPRINE 200 MG: 50 TABLET ORAL at 09:04

## 2019-04-02 RX ADMIN — TAMSULOSIN HYDROCHLORIDE 0.4 MG: 0.4 CAPSULE ORAL at 09:04

## 2019-04-02 RX ADMIN — PYRIDOSTIGMINE BROMIDE 60 MG: 60 TABLET ORAL at 06:04

## 2019-04-02 RX ADMIN — PYRIDOSTIGMINE BROMIDE 60 MG: 60 TABLET ORAL at 12:04

## 2019-04-02 RX ADMIN — FINASTERIDE 5 MG: 5 TABLET, FILM COATED ORAL at 09:04

## 2019-04-02 RX ADMIN — VERAPAMIL HYDROCHLORIDE 240 MG: 120 TABLET, FILM COATED, EXTENDED RELEASE ORAL at 11:04

## 2019-04-02 RX ADMIN — PREDNISONE 5 MG: 5 TABLET ORAL at 09:04

## 2019-04-02 RX ADMIN — WARFARIN SODIUM 7.5 MG: 2.5 TABLET ORAL at 06:04

## 2019-04-02 NOTE — PLAN OF CARE
Problem: Adult Inpatient Plan of Care  Goal: Plan of Care Review  Outcome: Ongoing (interventions implemented as appropriate)  Pt remains free of falls and injury. Family member remains in room overnight. Pt makes statement of no pain. Bed low and locked, Call light within reach.

## 2019-04-02 NOTE — MEDICAL/APP STUDENT
Hospital Medicine  Progress Note    Patient Name: Ernie Phan  MRN: [id number]  Team: [facility] HOSP MED D Farhana Lam MD   Admit Date: 4/1/2019  BRIANA   Code status: Full Code    Principal Problem:  Hypotension    Interval history: No more diaphoretic episodes. Feels less weak.      Review of Systems   Constitutional: Negative for fever.   Respiratory: Negative for shortness of breath.    Cardiovascular: Negative for chest pain.       Physical Exam:  Temp:  [96 °F (35.6 °C)-97.8 °F (36.6 °C)]   Pulse:  [50-65]   Resp:  [16-20]   BP: (116-147)/(61-87)   SpO2:  [96 %-100 %]      Temp: 97.5 °F (36.4 °C) (04/02/19 0457)  Pulse: (!) 54 (04/02/19 0800)  Resp: 16 (04/02/19 0457)  BP: 131/65 (04/02/19 0457)  SpO2: 96 % (04/02/19 0457)    Intake/Output Summary (Last 24 hours) at 4/2/2019 1021  Last data filed at 4/2/2019 0600  Gross per 24 hour   Intake 1070 ml   Output 400 ml   Net 670 ml     Weight: 82 kg (180 lb 12.4 oz)  Body mass index is 26.7 kg/m².    Physical Exam   Constitutional: No distress.   Eyes: Conjunctivae and lids are normal.   Cardiovascular: S1 normal and S2 normal.   Pulmonary/Chest: Effort normal and breath sounds normal.   Abdominal: Soft. Bowel sounds are normal. There is no tenderness.   Musculoskeletal: He exhibits edema.       Significant Labs:  Recent Labs   Lab 04/01/19 1825 04/02/19  0540 04/02/19  1335   WBC 1.20* 1.41*  --    HGB 6.5* 7.6* 7.8*   HCT 20.5* 23.4* 23.9*    305  --      Recent Labs   Lab 03/28/19 04/01/19 04/01/19  1825 04/02/19  0540   NA  --   --  132* 134*   K  --   --  4.6 4.8   CL  --   --  106 107   CO2  --   --  18* 21*   BUN  --   --  32* 27*   CREATININE  --   --  1.8* 1.6*   GLU  --   --  118* 92   CALCIUM  --   --  8.8 9.2   MG  --   --  1.9 1.9   PHOS  --   --  3.5 3.3   ALKPHOS  --   --  108  --    ALT  --   --  35  --    AST  --   --  38  --    ALBUMIN  --   --  3.3*  --    PROT  --   --  6.2  --    BILITOT  --   --  1.3*  --    INR 1.6 2.6  --  2.5*      A1C:   Recent Labs   Lab 03/20/19  0458   HGBA1C 5.3     Recent Labs   Lab 04/01/19  1825 04/02/19  0540 04/02/19  1335   TROPONINI 0.642* 0.767* 0.583*     Lactic Acid:   Recent Labs   Lab 04/01/19  1825   LACTATE 1.4     TSH:   Recent Labs   Lab 01/24/19  1357   TSH 1.790       Inpatient Medications prescribed for management of current Problems:   Scheduled Meds:    azaTHIOprine  200 mg Oral Daily    [START ON 4/3/2019] ferrous sulfate  325 mg Oral Daily    finasteride  5 mg Oral Daily    predniSONE  5 mg Oral Daily    pyridostigmine  60 mg Oral Q6H    tamsulosin  1 capsule Oral Daily    verapamil  240 mg Oral Daily    warfarin  7.5 mg Oral Daily     Continuous Infusions:   As Needed: sodium chloride, acetaminophen, dextrose 50%, dextrose 50%, glucagon (human recombinant), glucose, glucose, insulin aspart U-100, ondansetron, sodium chloride 0.9%, sodium chloride 0.9%    Active Hospital Problems    Diagnosis  POA    *Hypotension [I95.9]  Yes    Symptomatic anemia [D64.9]  Yes    Acute renal failure superimposed on stage 3 chronic kidney disease [N17.9, N18.3]  Yes    Anemia of renal disease [D63.1]  Yes    Long-term use of immunosuppressant medication [Z79.899]  Not Applicable    Essential hypertension [I10]  Yes    Myasthenia gravis without acute exacerbation [G70.00]  Yes    Paroxysmal atrial fibrillation [I48.0]  Yes      Resolved Hospital Problems    Diagnosis Date Resolved POA    Benign prostatic hyperplasia [N40.0] 04/02/2019 Yes       Overview:    87 yo male on a background significant for pA-fib, myasthenia gravis, CKD-III & HTN admitted for hypotension and symptomatic anemia.     Assessment and Plan for Problems addressed today:    Hypotension  Essential hypertension  · Holding home BP meds on admission: benazepril & verapamil. Adjust home BP med regimen as indicated.   · Hypotension resolved; resumed home verapamil. Still holding benazepril. Orthostatic BP negative for postural  hypotension. (4/2)    Symptomatic anemia  Anemia of renal disease  · Hgb 6.7 upon admit. Baseline ~8.   · No s/s of bleeding. Transfused 1U pRBCs with appropriate response. (4/1)  · Recent iron studies (3/2019) consistent with anemia of chronic disease; consider resumption of iron supplementation.   Ordered FOBT. Heme/Onc plans to start darbepoetin as OP (4/2)    Paroxysmal atrial fibrillation  · EKG: A-fib with slow ventricular response.   · Warfarin held initially.   · Troponin peaked at 0.767 following pRBC transfusion. Last Echo (8/2018): EF 60-65%, NWMA, Biatrial enlargement, PulmHTN with PASP of 57mmHg & moderated TR. BNP elevated. Rate control with verapamil (bradycardic when sleeping) and resume anticoagulation. PharmD consulted. (4/2)    Myasthenia gravis without acute exacerbation  · Continuing home Imuran, prednisone & pyridostigmine.     Acute renal failure superimposed on stage 3 chronic kidney disease  · Baseline sCr ~1.4. Holding home ACEi.   · Improving sCr.  Continue to hold ACEi. (4/2)    Diabetes mellitus, Type II with CKD-III  · A1c (3/2019): 5.3%. Diet controlled.   · Managing with low-dose SQ insulin correction.     Benign prostatic hyperplasia  · Continued home finasteride.     Diet: Diet Low Sodium, 2gm    DVT Prophylaxis: TEDs  Anticoagulants   Medication Route Frequency    warfarin tablet 7.5 mg Oral Daily       L/D/A:  None    Discharge plan and follow up  Home or Self Care      Provider  Farhana Lam MD  [facility]OS[facility] MED D   Department of Hospital Medicine    Scribe Attestation: I personally scribed for Farhana Lam MD on 04/02/2019 at 10:21 AM. Electronically signed by elaina Kasper on 04/02/2019 at 10:21 AM.

## 2019-04-02 NOTE — H&P
History and Physical  Hospital Medicine       Patient Name: Ernie Phan  MRN:  [id number]  Hospital Medicine Team: Networked reference to record PCT  Renny Dixon MD  Date of Admission:  4/1/2019     Principal Problem:  Hypotension   Primary Care Physician: Ernie Michel MD      History of Present Illness:       The patient is a 88 y.o. male presents to the ED with a chief complaint of generalized weakness today. The patient reports his blood pressure was low today per home health nurse. Patient was recently discharged for rectal bleeding. He denies any rectal bleed at this time. He states that he first noted it while he was out with his wife and then when he got home the  nurse took his BP and it was first 85/40 and then on another check it was 90/44 and HH told him to go to the ED.  Patient denies any rectal bleeding or any type of bleeding at all.  On recent visit he did have rectal bleeding and had both and upper and lower endoscopy and showed internal hemorrhoids and diverticulosis.  In ED patient had hemoglobin on 6.7.        Review of Systems   Constitutional: Negative for chills, fatigue, fever.   HENT: Negative for sore throat, trouble swallowing.    Eyes: Negative for photophobia, visual disturbance.   Respiratory: Negative for cough, shortness of breath.    Cardiovascular: Negative for chest pain, palpitations, leg swelling.   Gastrointestinal: Negative for abdominal pain, constipation, diarrhea, nausea, vomiting.   Endocrine: Negative for cold intolerance, heat intolerance.   Genitourinary: Negative for dysuria, frequency.   Musculoskeletal: Negative for arthralgias, myalgias.   Skin: Negative for rash, wound, erythema   Neurological: Negative for dizziness, syncope, weakness, light-headedness.   Psychiatric/Behavioral: Negative for confusion, hallucinations, anxiety  All other systems reviewed and are negative.      Past Medical History: Patient has a past medical history of *Atrial  fibrillation, Arthritis, Atrial fibrillation, BPH (benign prostatic hypertrophy), Degenerative disc disease, Depression, GERD (gastroesophageal reflux disease), Hyperglycemia, Hyperlipidemia, Hypertension, MG (myasthenia gravis), and Postherpetic neuralgia.    Past Surgical History: Patient has a past surgical history that includes Cholecystectomy; Achilles tendon surgery; Esophagogastroduodenoscopy (N/A, 3/20/2019); and Colonoscopy (N/A, 3/20/2019).    Social History: Patient reports that he has quit smoking. He has never used smokeless tobacco. He reports that he does not drink alcohol or use drugs.    Family History: family history includes Cancer in his father; Stroke in his mother.    Medications: Scheduled Meds:   azaTHIOprine  200 mg Oral Daily    finasteride  5 mg Oral Daily    predniSONE  5 mg Oral Daily    pyridostigmine  60 mg Oral Q6H    tamsulosin  1 capsule Oral Daily     Continuous Infusions:  PRN Meds:.sodium chloride, acetaminophen, dextrose 50%, dextrose 50%, glucagon (human recombinant), glucose, glucose, insulin aspart U-100, ondansetron, sodium chloride 0.9%, sodium chloride 0.9%    Allergies: Patient has No Known Allergies.    Physical Exam:     Vital Signs (Most Recent):  Temp: 96 °F (35.6 °C) (04/02/19 0012)  Pulse: 65 (04/02/19 0012)  Resp: 20 (04/02/19 0012)  BP: 125/70 (04/02/19 0012)  SpO2: 98 % (04/02/19 0012) Vital Signs Range (Last 24H):  Temp:  [96 °F (35.6 °C)-97.8 °F (36.6 °C)]   Pulse:  [50-65]   Resp:  [18-20]   BP: (116-147)/(61-87)   SpO2:  [97 %-100 %]    Body mass index is 26.7 kg/m².     Physical Exam:  Constitutional: Appears well-developed and well-nourished.   Head: Normocephalic and atraumatic.   Mouth/Throat: Oropharynx is clear and moist.   Eyes: EOM are normal. Pupils are equal, round, and reactive to light. No scleral icterus.   Neck: Normal range of motion. Neck supple.   Cardiovascular: Normal rate and regular rhythm.  No murmur heard.  Pulmonary/Chest: Effort  normal and breath sounds normal. No respiratory distress. No wheezes, rales, or rhonchi  Abdominal: Soft. Bowel sounds are normal.  No distension or tenderness  Musculoskeletal: Normal range of motion. No edema.   Neurological: Alert and oriented to person, place, and time.   Skin: Skin is warm and dry.   Psychiatric: Normal mood and affect. Behavior is normal.   Vitals reviewed.    Recent Labs   Lab 04/01/19  1825   WBC 1.20*   HGB 6.5*   HCT 20.5*          Recent Labs   Lab 04/01/19  1825   *   K 4.6      CO2 18*   BUN 32*   CREATININE 1.8*   *   CALCIUM 8.8   MG 1.9   PHOS 3.5     Recent Labs   Lab 03/28/19 04/01/19 04/01/19  1825   ALKPHOS  --   --  108   ALT  --   --  35   AST  --   --  38   ALBUMIN  --   --  3.3*   PROT  --   --  6.2   BILITOT  --   --  1.3*   INR 1.6 2.6  --       No results for input(s): POCTGLUCOSE in the last 168 hours.      Assessment and Plan:     Mr. Ernie Phan is a 88 y.o. male who presented to [facility] on 4/1/2019 with     Active Hospital Problems    Diagnosis  POA    *Hypotension [I95.9]  Yes    Symptomatic anemia [D64.9]  Yes    Acute renal failure superimposed on stage 3 chronic kidney disease [N17.9, N18.3]  Yes    Anemia of renal disease [D63.1]  Yes    Long-term use of immunosuppressant medication [Z79.899]  Not Applicable    Essential hypertension [I10]  Yes    Myasthenia gravis without acute exacerbation [G70.00]  Yes    Paroxysmal atrial fibrillation [I48.0]  Yes      Resolved Hospital Problems    Diagnosis Date Resolved POA    Benign prostatic hyperplasia [N40.0] 04/02/2019 Yes     # Hypotension  # Orthostatic hypotension  - likely due to being on too many BP meds and some dysautonomia from myasthnia gravis  - will hold all BP medications and likely needs to only go home on one    # Anemia of CKD stage 3  - hemoglobin 6.7 with a baseline of 8  - no evidence of acute bleeding, possible chronic component and patient is on warfarin  - on  warfarin;   - transfusing 1 unit of PRBC  - needs to be on iron; iron studies checked last time and no need for IV iron    # Myasthenia Gravis  - cont imuran and prednisone and pyridostigmine     # NELY on CKD stage 3  - baseline of 1.4, today 1.8  - holding ace-I  - transfusing 1 unit of PRBC    # Paroxysmal Atrial fibrillation  # Long term anti-coagulation  - patient on warfarin at home,   - holding coumadin until INR check; would try to change patient to xarelto or eliquis, family states in the past it was too expensive, will consult pharm D    # Hyponatremia  - getting PRBC    # DM2 with CKD stage 3  - SSI and Hba1c     Diet:  diabetic  GI PPx:    DVT PPx:    Goals of Care:  full      Disposition:  Tomorrow     Renny Dixon MD  Medical Director [facility] Medicine  Spectra:  [phone]  Pager: [phone]

## 2019-04-02 NOTE — CONSULTS
PharmD Consult received for:    1.) Renally adjust all medications:  Estimated Creatinine Clearance: 31.9 mL/min (A) (based on SCr of 1.6 mg/dL (H)).   · Medications reviewed, no dose adjustments needed     2.) Warfarin dosing:   Ernie Phan is a 88 y.o. male on warfarin therapy for Atrial Fibrillation. PharmD has been consulted for warfarin dosing. Recent admission for GI bleed 3/18/19. S/p EGD and colonoscopy which were unremarkable except for external hemorrhoids and a polyp.    Current order: n/a  Home dose: warfarin 7.5 mg daily   Coumadin clinic enrollment: Active  INR goal: 2-3    Lab Results   Component Value Date    INR 2.5 (H) 04/02/2019    INR 2.6 04/01/2019    INR 1.6 03/28/2019     Diet: Low sodium without supplements     Recommendation(s):    If not converting to DOAC, recommend restarting warfarin 7.5 mg daily with daily INR monitoring   Pharmacy will continue to follow and monitor warfarin    3.) Medication acquisition:   Medication Apixaban 2.5 mg BID was evaluated and the cost for a 30-day supply is $47.00. Please let us know if you have any questions.     Thank you for the consult,  Sandra Cerna, PharmD, BCPS  [id number]     **Note: Consults are reviewed Monday-Friday 7:00am-3:30pm. THE ABOVE RECOMMENDATIONS ARE ONLY SUGGESTED.THE RECOMMENDATIONS SHOULD BE CONSIDERED IN CONJUNCTION WITH ALL PATIENT FACTORS.**

## 2019-04-02 NOTE — PLAN OF CARE
Ernie Michel MD  [address] [facility] [place] [address]    Future Appointments   Date Time Provider Department Center   4/22/2019  9:40 AM LAB, [facility] CANCER BLDG [facility]   4/22/2019 10:40 AM Nick Gutierrez MD [facility] BM V[facility]   4/22/2019 11:30 AM INJECTION, [facility] INFUSION [facility]         [facility] [address]  [address] [facility] [address]  Phone: [phone] Fax: [phone]    Payor: HU[employer] MANAGED MEDICARE / Plan: HU[employer] MEDICARE HMO / Product Type: Capitation /        04/02/19 1435   Discharge Assessment   Assessment Type Discharge Planning Assessment   Confirmed/corrected address and phone number on facesheet? Yes   Assessment information obtained from? Patient;Caregiver  (spouse is at the bedside )   Expected Length of Stay (days) 3   Communicated expected length of stay with patient/caregiver yes   Prior to hospitilization cognitive status: Alert/Oriented   Prior to hospitalization functional status: Assistive Equipment   Current cognitive status: Alert/Oriented   Current Functional Status: Assistive Equipment   Lives With spouse   Able to Return to Prior Arrangements yes   Is patient able to care for self after discharge? Yes   Who are your caregiver(s) and their phone number(s)?   (Eulalia Phan (Bitsy) spouse [phone])   Patient's perception of discharge disposition home health   Patient currently receives any other outside agency services? Yes   Name and contact number of agency or person providing outside services   ([facility] )   Is it the patient/care giver preference to resume care with the current outside agency? Yes   Equipment Currently Used at Home walker, rolling   Do you have any problems affording any of your prescribed medications? TBD   Does the patient have transportation home? Yes   Transportation Anticipated family or friend will provide   Discharge Plan A Home with  family;Home Health   Discharge Plan B Home with family   DME Needed Upon Discharge  none   Patient/Family in Agreement with Plan yes

## 2019-04-02 NOTE — TELEPHONE ENCOUNTER
----- Message from Leeroy Thornton MD sent at 3/31/2019  2:04 PM CDT -----  Lily - please tell Ernie that his colonoscopy pathology was benign but his colon polyp was an adenoma.    SPECIMEN  1) Transverse colon, 2 mm polyp.  FINAL PATHOLOGIC DIAGNOSIS  1. Transverse colon polyp (2 mm), biopsy: Tubular adenoma  [facility]  Diagnosed by: Fara Dave

## 2019-04-03 ENCOUNTER — TELEPHONE (OUTPATIENT)
Dept: HEMATOLOGY/ONCOLOGY | Facility: CLINIC | Age: 84
End: 2019-04-03

## 2019-04-03 VITALS
SYSTOLIC BLOOD PRESSURE: 102 MMHG | OXYGEN SATURATION: 97 % | RESPIRATION RATE: 18 BRPM | DIASTOLIC BLOOD PRESSURE: 52 MMHG | TEMPERATURE: 97 F | HEART RATE: 44 BPM | HEIGHT: 69 IN | WEIGHT: 180.75 LBS | BODY MASS INDEX: 26.77 KG/M2

## 2019-04-03 PROBLEM — I95.9 HYPOTENSION: Status: RESOLVED | Noted: 2019-04-02 | Resolved: 2019-04-03

## 2019-04-03 PROBLEM — D64.9 SYMPTOMATIC ANEMIA: Status: RESOLVED | Noted: 2019-03-19 | Resolved: 2019-04-03

## 2019-04-03 LAB
ANION GAP SERPL CALC-SCNC: 6 MMOL/L (ref 8–16)
ANISOCYTOSIS BLD QL SMEAR: ABNORMAL
BASOPHILS # BLD AUTO: 0.01 K/UL (ref 0–0.2)
BASOPHILS NFR BLD: 0.6 % (ref 0–1.9)
BLD PROD TYP BPU: NORMAL
BLOOD UNIT EXPIRATION DATE: NORMAL
BLOOD UNIT TYPE CODE: 1700
BLOOD UNIT TYPE: NORMAL
BUN SERPL-MCNC: 29 MG/DL (ref 8–23)
CALCIUM SERPL-MCNC: 8.5 MG/DL (ref 8.7–10.5)
CHLORIDE SERPL-SCNC: 107 MMOL/L (ref 95–110)
CO2 SERPL-SCNC: 18 MMOL/L (ref 23–29)
CODING SYSTEM: NORMAL
CREAT SERPL-MCNC: 1.7 MG/DL (ref 0.5–1.4)
DIFFERENTIAL METHOD: ABNORMAL
DISPENSE STATUS: NORMAL
EOSINOPHIL # BLD AUTO: 0 K/UL (ref 0–0.5)
EOSINOPHIL NFR BLD: 1.1 % (ref 0–8)
ERYTHROCYTE [DISTWIDTH] IN BLOOD BY AUTOMATED COUNT: ABNORMAL % (ref 11.5–14.5)
EST. GFR  (AFRICAN AMERICAN): 40.7 ML/MIN/1.73 M^2
EST. GFR  (NON AFRICAN AMERICAN): 35.2 ML/MIN/1.73 M^2
GLUCOSE SERPL-MCNC: 88 MG/DL (ref 70–110)
HCT VFR BLD AUTO: 22.6 % (ref 40–54)
HGB BLD-MCNC: 7.3 G/DL (ref 14–18)
HYPOCHROMIA BLD QL SMEAR: ABNORMAL
IMM GRANULOCYTES # BLD AUTO: 0.02 K/UL (ref 0–0.04)
IMM GRANULOCYTES NFR BLD AUTO: 1.1 % (ref 0–0.5)
INR PPP: 2.3 (ref 0.8–1.2)
LYMPHOCYTES # BLD AUTO: 0.6 K/UL (ref 1–4.8)
LYMPHOCYTES NFR BLD: 35.2 % (ref 18–48)
MAGNESIUM SERPL-MCNC: 1.8 MG/DL (ref 1.6–2.6)
MCH RBC QN AUTO: 36.3 PG (ref 27–31)
MCHC RBC AUTO-ENTMCNC: 32.3 G/DL (ref 32–36)
MCV RBC AUTO: 112 FL (ref 82–98)
MONOCYTES # BLD AUTO: 0.2 K/UL (ref 0.3–1)
MONOCYTES NFR BLD: 13.4 % (ref 4–15)
NEUTROPHILS # BLD AUTO: 0.9 K/UL (ref 1.8–7.7)
NEUTROPHILS NFR BLD: 48.6 % (ref 38–73)
NRBC BLD-RTO: 0 /100 WBC
NUM UNITS TRANS PACKED RBC: NORMAL
OB PNL STL: POSITIVE
OVALOCYTES BLD QL SMEAR: ABNORMAL
PHOSPHATE SERPL-MCNC: 3.2 MG/DL (ref 2.7–4.5)
PLATELET # BLD AUTO: 324 K/UL (ref 150–350)
PMV BLD AUTO: 9.9 FL (ref 9.2–12.9)
POIKILOCYTOSIS BLD QL SMEAR: SLIGHT
POLYCHROMASIA BLD QL SMEAR: ABNORMAL
POTASSIUM SERPL-SCNC: 4.4 MMOL/L (ref 3.5–5.1)
PROTHROMBIN TIME: 22.5 SEC (ref 9–12.5)
RBC # BLD AUTO: 2.01 M/UL (ref 4.6–6.2)
SODIUM SERPL-SCNC: 131 MMOL/L (ref 136–145)
WBC # BLD AUTO: 1.79 K/UL (ref 3.9–12.7)

## 2019-04-03 PROCEDURE — 63600175 PHARM REV CODE 636 W HCPCS: Mod: HCNC | Performed by: HOSPITALIST

## 2019-04-03 PROCEDURE — 97165 OT EVAL LOW COMPLEX 30 MIN: CPT | Mod: HCNC

## 2019-04-03 PROCEDURE — 97161 PT EVAL LOW COMPLEX 20 MIN: CPT | Mod: HCNC

## 2019-04-03 PROCEDURE — 82272 OCCULT BLD FECES 1-3 TESTS: CPT | Mod: HCNC

## 2019-04-03 PROCEDURE — 25000003 PHARM REV CODE 250: Mod: HCNC | Performed by: INTERNAL MEDICINE

## 2019-04-03 PROCEDURE — 84100 ASSAY OF PHOSPHORUS: CPT | Mod: HCNC

## 2019-04-03 PROCEDURE — 63600175 PHARM REV CODE 636 W HCPCS: Mod: HCNC | Performed by: INTERNAL MEDICINE

## 2019-04-03 PROCEDURE — 83735 ASSAY OF MAGNESIUM: CPT | Mod: HCNC

## 2019-04-03 PROCEDURE — 25000003 PHARM REV CODE 250: Mod: HCNC | Performed by: HOSPITALIST

## 2019-04-03 PROCEDURE — P9038 RBC IRRADIATED: HCPCS | Mod: HCNC

## 2019-04-03 PROCEDURE — 85025 COMPLETE CBC W/AUTO DIFF WBC: CPT | Mod: HCNC

## 2019-04-03 PROCEDURE — 99239 PR HOSPITAL DISCHARGE DAY,>30 MIN: ICD-10-PCS | Mod: HCNC,,, | Performed by: INTERNAL MEDICINE

## 2019-04-03 PROCEDURE — 80048 BASIC METABOLIC PNL TOTAL CA: CPT | Mod: HCNC

## 2019-04-03 PROCEDURE — 94761 N-INVAS EAR/PLS OXIMETRY MLT: CPT | Mod: HCNC

## 2019-04-03 PROCEDURE — 27201040 HC RC 50 FILTER: Mod: HCNC

## 2019-04-03 PROCEDURE — 85610 PROTHROMBIN TIME: CPT | Mod: HCNC

## 2019-04-03 PROCEDURE — 36415 COLL VENOUS BLD VENIPUNCTURE: CPT | Mod: HCNC

## 2019-04-03 PROCEDURE — 99239 HOSP IP/OBS DSCHRG MGMT >30: CPT | Mod: HCNC,,, | Performed by: INTERNAL MEDICINE

## 2019-04-03 RX ORDER — FUROSEMIDE 10 MG/ML
20 INJECTION INTRAMUSCULAR; INTRAVENOUS ONCE
Status: COMPLETED | OUTPATIENT
Start: 2019-04-03 | End: 2019-04-03

## 2019-04-03 RX ORDER — VERAPAMIL HYDROCHLORIDE 120 MG/1
240 CAPSULE, EXTENDED RELEASE ORAL DAILY
Qty: 180 CAPSULE | Refills: 3 | Status: SHIPPED | OUTPATIENT
Start: 2019-04-03 | End: 2019-06-06 | Stop reason: SDUPTHER

## 2019-04-03 RX ORDER — GABAPENTIN 100 MG/1
100 CAPSULE ORAL 3 TIMES DAILY
Qty: 270 CAPSULE | Refills: 0 | Status: SHIPPED | OUTPATIENT
Start: 2019-04-03 | End: 2019-04-17

## 2019-04-03 RX ORDER — HYDROCODONE BITARTRATE AND ACETAMINOPHEN 500; 5 MG/1; MG/1
TABLET ORAL
Status: DISCONTINUED | OUTPATIENT
Start: 2019-04-03 | End: 2019-04-03 | Stop reason: HOSPADM

## 2019-04-03 RX ADMIN — PYRIDOSTIGMINE BROMIDE 60 MG: 60 TABLET ORAL at 06:04

## 2019-04-03 RX ADMIN — GABAPENTIN 100 MG: 100 CAPSULE ORAL at 12:04

## 2019-04-03 RX ADMIN — FUROSEMIDE 20 MG: 10 INJECTION, SOLUTION INTRAMUSCULAR; INTRAVENOUS at 03:04

## 2019-04-03 RX ADMIN — PYRIDOSTIGMINE BROMIDE 60 MG: 60 TABLET ORAL at 12:04

## 2019-04-03 RX ADMIN — FINASTERIDE 5 MG: 5 TABLET, FILM COATED ORAL at 08:04

## 2019-04-03 RX ADMIN — VERAPAMIL HYDROCHLORIDE 240 MG: 120 TABLET, FILM COATED, EXTENDED RELEASE ORAL at 08:04

## 2019-04-03 RX ADMIN — FERROUS SULFATE TAB EC 325 MG (65 MG FE EQUIVALENT) 325 MG: 325 (65 FE) TABLET DELAYED RESPONSE at 08:04

## 2019-04-03 RX ADMIN — AZATHIOPRINE 200 MG: 50 TABLET ORAL at 08:04

## 2019-04-03 RX ADMIN — PREDNISONE 5 MG: 5 TABLET ORAL at 08:04

## 2019-04-03 RX ADMIN — TAMSULOSIN HYDROCHLORIDE 0.4 MG: 0.4 CAPSULE ORAL at 08:04

## 2019-04-03 NOTE — TELEPHONE ENCOUNTER
See previous message    ----- Message from Cassandra Velasco sent at 4/3/2019 12:13 PM CDT -----  Contact: pts spouse   Please call the per his Spouse's request , Pt Rm# [phone]. Thank you

## 2019-04-03 NOTE — PLAN OF CARE
Problem: Physical Therapy Goal  Goal: Physical Therapy Goal  Outcome: Outcome(s) achieved Date Met: 04/03/19  No goals set, pt does not require additional acute PT services at this time due to baseline functional mobility.     Pt educated on asking medical staff for PT consult if changes in functional status occurs.     - Jason Glass, PT, DPT  4/3/2019

## 2019-04-03 NOTE — MEDICAL/APP STUDENT
Hospital Medicine  Progress Note    Patient Name: Ernie Phan  MRN: [id number]  Team: [facility] HOSP MED D Farhana Lam MD   Admit Date: 4/1/2019  BRIANA 4/3/2019  Code status: Full Code    Principal Problem:  Hypotension    Interval history: Eager to go home. H&H decreased.    Review of Systems   Constitutional: Negative for fever.   Respiratory: Negative for shortness of breath.    Cardiovascular: Negative for chest pain.       Physical Exam:  Temp:  [97.3 °F (36.3 °C)-98.2 °F (36.8 °C)]   Pulse:  [38-71]   Resp:  [16-18]   BP: (112-146)/(53-71)   SpO2:  [92 %-100 %]      Temp: 97.3 °F (36.3 °C) (04/03/19 0433)  Pulse: (!) 54 (04/03/19 0800)  Resp: 18 (04/03/19 0433)  BP: 122/62 (04/03/19 0433)  SpO2: 97 % (04/03/19 0835)    Intake/Output Summary (Last 24 hours) at 4/3/2019 0845  Last data filed at 4/3/2019 0500  Gross per 24 hour   Intake --   Output 150 ml   Net -150 ml     Weight: 82 kg (180 lb 12.4 oz)  Body mass index is 26.7 kg/m².    Physical Exam   Constitutional: No distress.   Eyes: Conjunctivae and lids are normal.   Cardiovascular: S1 normal and S2 normal.   Pulmonary/Chest: Effort normal and breath sounds normal.   Abdominal: Soft. Bowel sounds are normal. There is no tenderness.   Musculoskeletal: He exhibits edema.       Significant Labs:  Recent Labs   Lab 04/01/19 1825 04/02/19  0540 04/02/19  1335 04/03/19  0432   WBC 1.20* 1.41*  --  1.79*   HGB 6.5* 7.6* 7.8* 7.3*   HCT 20.5* 23.4* 23.9* 22.6*    305  --  324     Recent Labs   Lab 04/01/19 04/01/19 1825 04/02/19  0540 04/03/19  0432   NA  --  132* 134* 131*   K  --  4.6 4.8 4.4   CL  --  106 107 107   CO2  --  18* 21* 18*   BUN  --  32* 27* 29*   CREATININE  --  1.8* 1.6* 1.7*   GLU  --  118* 92 88   CALCIUM  --  8.8 9.2 8.5*   MG  --  1.9 1.9 1.8   PHOS  --  3.5 3.3 3.2   ALKPHOS  --  108  --   --    ALT  --  35  --   --    AST  --  38  --   --    ALBUMIN  --  3.3*  --   --    PROT  --  6.2  --   --    BILITOT  --  1.3*  --   --    INR  2.6  --  2.5* 2.3*     A1C:   Recent Labs   Lab 03/20/19  0458   HGBA1C 5.3     Recent Labs   Lab 04/01/19  1825 04/02/19  0540 04/02/19  1335   TROPONINI 0.642* 0.767* 0.583*     Lactic Acid:   Recent Labs   Lab 04/01/19  1825   LACTATE 1.4     TSH:   Recent Labs   Lab 01/24/19  1357   TSH 1.790       Inpatient Medications prescribed for management of current Problems:   Scheduled Meds:    azaTHIOprine  200 mg Oral Daily    ferrous sulfate  325 mg Oral Daily    finasteride  5 mg Oral Daily    gabapentin  100 mg Oral QHS    predniSONE  5 mg Oral Daily    pyridostigmine  60 mg Oral Q6H    tamsulosin  1 capsule Oral Daily    verapamil  240 mg Oral Daily    warfarin  7.5 mg Oral Daily     Continuous Infusions:   As Needed: sodium chloride, acetaminophen, dextrose 50%, dextrose 50%, glucagon (human recombinant), glucose, glucose, insulin aspart U-100, ondansetron, sodium chloride 0.9%, sodium chloride 0.9%    Active Hospital Problems    Diagnosis  POA    *Hypotension [I95.9]  Yes    Symptomatic anemia [D64.9]  Yes    Acute renal failure superimposed on stage 3 chronic kidney disease [N17.9, N18.3]  Yes    Anemia of renal disease [D63.1]  Yes    Long-term use of immunosuppressant medication [Z79.899]  Not Applicable    Essential hypertension [I10]  Yes    Myasthenia gravis without acute exacerbation [G70.00]  Yes    Paroxysmal atrial fibrillation [I48.0]  Yes      Resolved Hospital Problems    Diagnosis Date Resolved POA    Benign prostatic hyperplasia [N40.0] 04/02/2019 Yes       Overview:    87 yo male with pA-fib, myasthenia gravis, CKD-III & HTN admitted for hypotension and symptomatic anemia. The patient was transfused a total of 2U pRBC with appropriate Hgb response and iron supplementation was ordered. Of note, Heme/Onc planning to start darbepoetin as an OP. Orthostatic BPs were negative for postural hypotension and his home verapamil was resumed.      Assessment and Plan for Problems addressed  today:    Hypotension  Essential hypertension  · Holding home BP meds on admission: benazepril & verapamil. Adjust home BP med regimen as indicated.   · Hypotension resolved; resumed home verapamil. Still holding benazepril. Orthostatic BP negative for postural hypotension. (4/2)    Symptomatic anemia  Anemia of renal disease  · Hgb 6.7 upon admit. Baseline ~8.   · No s/s of bleeding. Transfused 1U pRBCs with appropriate response. (4/1)  · Recent iron studies (3/2019) consistent with anemia of chronic disease; consider resumption of iron supplementation.   · Heme/Onc plans to start darbepoetin as OP. (4/2)  · Slight downward trend in Hgb with associated hypotension. FOBT (+). Transfusing additional 1U pRBC prior to discharge. (4/3)    Paroxysmal atrial fibrillation  · EKG: A-fib with slow ventricular response.   · Warfarin held initially.   · Troponin peaked at 0.767 following pRBC transfusion. Last Echo (8/2018): EF 60-65%, NWMA, Biatrial enlargement, PulmHTN with PASP of 57mmHg & moderated TR. BNP elevated. Rate control with verapamil (bradycardic when sleeping) and resume anticoagulation. PharmD consulted. (4/2)  · Follow up with Coumadin Clinic.    Myasthenia gravis without acute exacerbation  · Continuing home Imuran, prednisone & pyridostigmine.     Acute renal failure superimposed on stage 3 chronic kidney disease  · Baseline sCr ~1.4. Holding home ACEi.   · Improving sCr. Continue to hold ACEi. (4/2)  · Monitoring sCr. (4/3)    Diabetes mellitus, Type II with CKD-III  · A1c (3/2019): 5.3%. Diet controlled.   · Managing with low-dose SQ insulin correction.     Benign prostatic hyperplasia  · Continued home finasteride.     Diet: Diet Low Sodium, 2gm    DVT Prophylaxis: TEDs  Anticoagulants   Medication Route Frequency    warfarin tablet 7.5 mg Oral Daily       L/D/A:  None    Discharge plan and follow up  Home or Self Care      Provider  Farhana Lam MD  [facility] D   Department [facility]  Medicine    Scribe Attestation: I personally scribed for Farhana Lam MD on 04/03/2019 at 10:21 AM. Electronically signed by elaina Kasper on 04/03/2019 at 10:21 AM.

## 2019-04-03 NOTE — PROGRESS NOTES
Physician Attestation for Scribe:  I, Farhana Lam MD, personally performed the services described in this documentation. All medical record entries made by the scribe were at my direction and in my presence.  I have reviewed this note and agree that the record reflects my personal performance and is accurate and complete.     Hospital Medicine  Progress Note     Patient Name: Ernie Phan  MRN: [id number]  Team: [facility] MED D Farhana Lam MD   Admit Date: 4/1/2019  BRIANA   Code status: Full Code     Principal Problem:  Hypotension     Interval history: No more diaphoretic episodes. Feels less weak.       Review of Systems   Constitutional: Negative for fever.   Respiratory: Negative for shortness of breath.    Cardiovascular: Negative for chest pain.         Physical Exam:  Temp:  [96 °F (35.6 °C)-97.8 °F (36.6 °C)]   Pulse:  [50-65]   Resp:  [16-20]   BP: (116-147)/(61-87)   SpO2:  [96 %-100 %]       Temp: 97.5 °F (36.4 °C) (04/02/19 0457)  Pulse: (!) 54 (04/02/19 0800)  Resp: 16 (04/02/19 0457)  BP: 131/65 (04/02/19 0457)  SpO2: 96 % (04/02/19 0457)     Intake/Output Summary (Last 24 hours) at 4/2/2019 1021  Last data filed at 4/2/2019 0600      Gross per 24 hour   Intake 1070 ml   Output 400 ml   Net 670 ml      Weight: 82 kg (180 lb 12.4 oz)  Body mass index is 26.7 kg/m².     Physical Exam   Constitutional: No distress.   Eyes: Conjunctivae and lids are normal.   Cardiovascular: S1 normal and S2 normal.   Pulmonary/Chest: Effort normal and breath sounds normal.   Abdominal: Soft. Bowel sounds are normal. There is no tenderness.   Musculoskeletal: He exhibits edema.         Significant Labs:        Recent Labs   Lab 04/01/19  1825 04/02/19  0540 04/02/19  1335   WBC 1.20* 1.41*  --    HGB 6.5* 7.6* 7.8*   HCT 20.5* 23.4* 23.9*    305  --              Recent Labs   Lab 03/28/19 04/01/19 04/01/19  1825 04/02/19  0540   NA  --   --  132* 134*   K  --   --  4.6 4.8   CL  --   --  106 107   CO2  --    --  18* 21*   BUN  --   --  32* 27*   CREATININE  --   --  1.8* 1.6*   GLU  --   --  118* 92   CALCIUM  --   --  8.8 9.2   MG  --   --  1.9 1.9   PHOS  --   --  3.5 3.3   ALKPHOS  --   --  108  --    ALT  --   --  35  --    AST  --   --  38  --    ALBUMIN  --   --  3.3*  --    PROT  --   --  6.2  --    BILITOT  --   --  1.3*  --    INR 1.6 2.6  --  2.5*      A1C:   Recent Labs   Lab 03/20/19  0458   HGBA1C 5.3            Recent Labs   Lab 04/01/19  1825 04/02/19  0540 04/02/19  1335   TROPONINI 0.642* 0.767* 0.583*      Lactic Acid:       Recent Labs   Lab 04/01/19  1825   LACTATE 1.4      TSH:       Recent Labs   Lab 01/24/19  1357   TSH 1.790         Inpatient Medications prescribed for management of current Problems:   Scheduled Meds:    azaTHIOprine  200 mg Oral Daily    [START ON 4/3/2019] ferrous sulfate  325 mg Oral Daily    finasteride  5 mg Oral Daily    predniSONE  5 mg Oral Daily    pyridostigmine  60 mg Oral Q6H    tamsulosin  1 capsule Oral Daily    verapamil  240 mg Oral Daily    warfarin  7.5 mg Oral Daily      Continuous Infusions:   As Needed: sodium chloride, acetaminophen, dextrose 50%, dextrose 50%, glucagon (human recombinant), glucose, glucose, insulin aspart U-100, ondansetron, sodium chloride 0.9%, sodium chloride 0.9%           Active Hospital Problems     Diagnosis   POA    *Hypotension [I95.9]   Yes    Symptomatic anemia [D64.9]   Yes    Acute renal failure superimposed on stage 3 chronic kidney disease [N17.9, N18.3]   Yes    Anemia of renal disease [D63.1]   Yes    Long-term use of immunosuppressant medication [Z79.899]   Not Applicable    Essential hypertension [I10]   Yes    Myasthenia gravis without acute exacerbation [G70.00]   Yes    Paroxysmal atrial fibrillation [I48.0]   Yes       Resolved Hospital Problems     Diagnosis Date Resolved POA    Benign prostatic hyperplasia [N40.0] 04/02/2019 Yes         Overview:    87 yo male on a background significant for pA-fib,  myasthenia gravis, CKD-III & HTN admitted for hypotension and symptomatic anemia.      Assessment and Plan for Problems addressed today:     Hypotension  Essential hypertension  · Holding home BP meds on admission: benazepril & verapamil. Adjust home BP med regimen as indicated.   · Hypotension resolved; resumed home verapamil. Still holding benazepril. Orthostatic BP negative for postural hypotension. (4/2)     Symptomatic anemia  Anemia of renal disease  · Hgb 6.7 upon admit. Baseline ~8.   · No s/s of bleeding. Transfused 1U pRBCs with appropriate response. (4/1)  · Recent iron studies (3/2019) consistent with anemia of chronic disease; consider resumption of iron supplementation.   Ordered FOBT. Heme/Onc plans to start darbepoetin as OP (4/2)     Paroxysmal atrial fibrillation  · EKG: A-fib with slow ventricular response.   · Warfarin held initially.   · Troponin peaked at 0.767 following pRBC transfusion. Last Echo (8/2018): EF 60-65%, NWMA, Biatrial enlargement, PulmHTN with PASP of 57mmHg & moderated TR. BNP elevated. Rate control with verapamil (bradycardic when sleeping) and resume anticoagulation. PharmD consulted. (4/2)     Myasthenia gravis without acute exacerbation  · Continuing home Imuran, prednisone & pyridostigmine.      Acute renal failure superimposed on stage 3 chronic kidney disease  · Baseline sCr ~1.4. Holding home ACEi.   · Improving sCr.  Continue to hold ACEi. (4/2)     Diabetes mellitus, Type II with CKD-III  · A1c (3/2019): 5.3%. Diet controlled.   · Managing with low-dose SQ insulin correction.      Benign prostatic hyperplasia  · Continued home finasteride.      Diet: Diet Low Sodium, 2gm     DVT Prophylaxis: TEDs        Anticoagulants   Medication Route Frequency    warfarin tablet 7.5 mg Oral Daily         L/D/A:  None     Discharge plan and follow up  Home or Self Care        Provider  Farhana Lam MD  [facility]   Department of Hospital Medicine     Scribe Attestation:  I personally scribed for Farhana Lam MD on 04/02/2019 at 10:21 AM. Electronically signed by elaina Kasper on 04/02/2019 at 10:21 AM.

## 2019-04-03 NOTE — PLAN OF CARE
[facility]    HOME HEALTH ORDERS  FACE TO FACE ENCOUNTER    Patient Name: Ernie Phan  YOB: 1931    PCP: Ernie Michel MD   PCP Address: [address] [facility] [address]  PCP Phone Number: [phone]  PCP Fax: [phone]    Encounter Date: 04/03/2019    Admit to Home Health    Diagnoses:  Active Hospital Problems    Diagnosis  POA    Acute renal failure superimposed on stage 3 chronic kidney disease [N17.9, N18.3]  Yes    Anemia of renal disease [D63.1]  Yes    Long-term use of immunosuppressant medication [Z79.899]  Not Applicable    Essential hypertension [I10]  Yes    Myasthenia gravis without acute exacerbation [G70.00]  Yes    Paroxysmal atrial fibrillation [I48.0]  Yes      Resolved Hospital Problems    Diagnosis Date Resolved POA    *Hypotension [I95.9] 04/03/2019 Yes    Symptomatic anemia [D64.9] 04/03/2019 Yes    Benign prostatic hyperplasia [N40.0] 04/02/2019 Yes       Future Appointments   Date Time Provider Department Center   4/22/2019  9:40 AM LAB, HEMONC CANCER BLDG [facility]   4/22/2019 10:40 AM Nick Gutierrez MD [facility] BM V[facility]   4/22/2019 11:30 AM INJECTION, [facility] INFUSION [facility]     Follow-up Information     Ernie Michel MD. Schedule an appointment as soon as possible for a visit in 1 week.    Specialty:  Internal Medicine  Why:  For discharge from hospital follow up  Contact information:  [address]  [facility] [address]  [phone]             Herman Chambers - Coumadin. Call in 1 day.    Specialty:  Cardiology  Why:  For discharge from hospital follow up  Contact information:  [address]  [facility] [address] [phone]  Additional information:  3rd floor           Nick Gutierrez MD. Schedule an appointment as soon as possible for a visit in 1 week.    Specialty:  Hematology and Oncology  Why:  For discharge from hospital follow up  Contact  information:  [address] [facility] [address]  [phone]             Go to Phu Tate MD.    Specialties:  Cardiology, Electrophysiology  Why:  As previously planned  Contact information:  [address]  [place] [id number] [phone]             [facility] CARDIOLOGY. Schedule an appointment as soon as possible for a visit in 1 week.    Specialty:  Cardiology  Why:  For discharge from hospital follow up, To establish care  Contact information:  [address]  [place] [address]  [phone]           Go to Leeroy Thornton MD.    Specialty:  Gastroenterology  Why:  As previously planned  Contact information:  [address]  [place] [id number]  [phone]                     I have seen and examined this patient face to face today. My clinical findings that support the need for the home health skilled services and home bound status are the following:  Weakness/numbness causing balance and gait disturbance due to Weakness/Debility and Anemia making it taxing to leave home.    Allergies:Review of patient's allergies indicates:  No Known Allergies    Diet: cardiac diet and diabetic diet: 2200 calorie    Activities: activity as tolerated    Nursing:   SN to complete comprehensive assessment including routine vital signs. Instruct on disease process and s/s of complications to report to MD. Review/verify medication list sent home with the patient at time of discharge  and instruct patient/caregiver as needed. Frequency may be adjusted depending on start of care date.    Notify MD if SBP > 160 or < 90; DBP > 90 or < 50; HR > 120 or < 50; Temp > 101      CONSULTS:    Physical Therapy to evaluate and treat. Evaluate for home safety and equipment needs; Establish/upgrade home exercise program. Perform / instruct on therapeutic exercises, gait training, transfer training, and Range of Motion.  Occupational Therapy to evaluate and treat. Evaluate home environment for safety  and equipment needs. Perform/Instruct on transfers, ADL training, ROM, and therapeutic exercises.      Medications: Review discharge medications with patient and family and provide education.      Current Discharge Medication List      START taking these medications    Details   gabapentin (NEURONTIN) 100 MG capsule Take 1 capsule (100 mg total) by mouth 3 (three) times daily.  Qty: 270 capsule, Refills: 0         CONTINUE these medications which have CHANGED    Details   verapamil (VERELAN) 120 MG C24P Take 2 capsules (240 mg total) by mouth once daily.  Qty: 180 capsule, Refills: 3    Associated Diagnoses: PVC (premature ventricular contraction)         CONTINUE these medications which have NOT CHANGED    Details   azaTHIOprine (IMURAN) 50 mg Tab Take 4 tablets (200 mg total) by mouth once daily.  Qty: 360 tablet, Refills: 3    Associated Diagnoses: Myasthenia gravis      finasteride (PROSCAR) 5 mg tablet TAKE 1 TABLET EVERY DAY  Qty: 90 tablet, Refills: 3      IRON, FERROUS SULFATE, ORAL Take 65 mg by mouth once daily.      predniSONE (DELTASONE) 5 MG tablet Take 1 tablet (5 mg total) by mouth once daily.  Qty: 45 tablet, Refills: 3    Associated Diagnoses: MG (myasthenia gravis)      tamsulosin (FLOMAX) 0.4 mg Cap TAKE 1 CAPSULE EVERY DAY  Qty: 90 capsule, Refills: 3      vitamin D 1000 units Tab Take 185 mg by mouth once daily.      warfarin (COUMADIN) 5 MG tablet TAKE 1 TABLET EVERY DAY EXCEPT TAKE 1/2 TABLET ON SUNDAY, OR AS DIRECTED BY COUMADIN CLINIC  Qty: 90 tablet, Refills: 3      DENTURE CLEANSER (EFFERVESCENT DENTURE CLEANSR DENT)       FLUZONE HIGH-DOSE 2018-19, PF, 180 mcg/0.5 mL vaccine       ONE TOUCH ULTRA TEST Strp TEST DAILY  Qty: 100 each, Refills: 3      ONE TOUCH ULTRASOFT LANCETS lancets TEST DAILY  Qty: 100 each, Refills: 3      pyridostigmine (MESTINON) 60 mg Tab Take 1 tablet (60 mg total) by mouth every 6 to 8 hours as needed.  Qty: 180 tablet, Refills: 1    Associated Diagnoses:  Myasthenia gravis without acute exacerbation         STOP taking these medications       benazepril (LOTENSIN) 40 MG tablet Comments:   Reason for Stopping:               I certify that this patient is confined to his home and needs intermittent skilled nursing care, physical therapy and occupational therapy.      Farhana Lam MD

## 2019-04-03 NOTE — PLAN OF CARE
04/03/19 1312   Post-Acute Status   Post-Acute Authorization Home Health/Hospice   Home Health/Hospice Status Referrals Sent     Sw informed by staff pt to d/c today, current with [facility], orders to follow. Referral given. HH orders uploaded to [facility].

## 2019-04-03 NOTE — PT/OT/SLP EVAL
Physical Therapy Evaluation and Discharge Note    Patient Name:  Ernie Phan   MRN:  [id number]    Recommendations:     Discharge Recommendations:  home health PT   Discharge Equipment Recommendations: none   Barriers to discharge: None    Assessment:     Enrie Phan is a 88 y.o. male admitted with a medical diagnosis of Hypotension. .  At this time, patient is functioning at their prior level of function and does not require further acute PT services.     Recent Surgery: * No surgery found *      Plan:     During this hospitalization, patient does not require further acute PT services.  Please re-consult if situation changes.      Subjective     Chief Complaint: none noted   Patient/Family Comments/goals: pt very pleasant and willing to participate with therapy on this date.    Pain/Comfort:  · Pain Rating 1: 0/10    Patients cultural, spiritual, Anabaptism conflicts given the current situation: no    Living Environment:  Pt lives with wife in single level condo with 0 RO  Prior to admission, patients level of function was mostly Mod I with all ADL's requiring increased time and Mod I with amb using RW.  Equipment used at home: walker, rolling, cane, straight, shower chair.  DME owned (not currently used): none.  Upon discharge, patient will have assistance from wife.    Objective:     Communicated with RN prior to session.  Patient found supine with   upon PT entry to room.    General Precautions: Standard, fall   Orthopedic Precautions:N/A   Braces: N/A     Exams:  · RLE ROM: WFL  · RLE Strength: WFL  · LLE ROM: WFL  · LLE Strength: WFL    Functional Mobility:  · Bed Mobility:     · Rolling Left:  modified independence  · Scooting: modified independence  · Supine to Sit: modified independence  · Sit to Supine: modified independence  · Transfers:     · Sit to Stand:  modified independence with rolling walker  · Gait: pt amb 300ft with RW Mod I requiring increased time and occasional VC to decrease shoulder  tension  · Balance: Mod I     AM-PAC 6 CLICK MOBILITY  Total Score:24       Therapeutic Activities and Exercises:  - Educated to use supplied RW with assist for OOB   -Pt educated on:   -PT roles, expectations, and POC    -Safety with mobility   -Benefits of OOB activities to increase strength and functional mobility    -Discharge recommendations     AM-PAC 6 CLICK MOBILITY  Total Score:24     Patient left up in chair with all lines intact and call button in reach.    GOALS:   Multidisciplinary Problems     Physical Therapy Goals     Not on file          Multidisciplinary Problems (Resolved)        Problem: Physical Therapy Goal    Goal Priority Disciplines Outcome Goal Variances Interventions   Physical Therapy Goal   (Resolved)     PT, PT/OT Outcome(s) achieved                     History:     Past Medical History:   Diagnosis Date    *Atrial fibrillation     Arthritis     Atrial fibrillation     BPH (benign prostatic hypertrophy)     Degenerative disc disease     Depression     GERD (gastroesophageal reflux disease)     Hyperglycemia     Hyperlipidemia     Hypertension     MG (myasthenia gravis)     Postherpetic neuralgia        Past Surgical History:   Procedure Laterality Date    ACHILLES TENDON SURGERY      CHOLECYSTECTOMY      COLONOSCOPY N/A 3/20/2019    [facility] ENDO (2ND FLR)    EGD (ESOPHAGOGASTRODUODENOSCOPY) N/A 3/20/2019    Performed by [facility] at [facility] ENDO (2ND FLR)       Time Tracking:     PT Received On: 04/03/19  PT Start Time: 1006     PT Stop Time: 1024  PT Total Time (min): 18 min     Billable Minutes: Evaluation 18      Santiago Glass, PT  04/03/2019

## 2019-04-03 NOTE — PLAN OF CARE
Problem: Occupational Therapy Goal  Goal: Occupational Therapy Goal  Outcome: Outcome(s) achieved Date Met: 04/03/19  OT eval completed.  No further OT needed.  Discharge OT.

## 2019-04-03 NOTE — PLAN OF CARE
Problem: Adult Inpatient Plan of Care  Goal: Plan of Care Review  Outcome: Ongoing (interventions implemented as appropriate)  Pt remains free from falls and injury. Pt makes statement of no pain. Pt makes frequent position changes. Bed low and locked, Call light within reach.

## 2019-04-03 NOTE — PT/OT/SLP EVAL
Occupational Therapy   Evaluation and Discharge Note    Name: Ernie Phan  MRN: [id number]  Admitting Diagnosis:  Hypotension      Recommendations:     Discharge Recommendations: home  Discharge Equipment Recommendations:  none  Barriers to discharge:  None    Assessment:     Ernie Phan is a 88 y.o. male with a medical diagnosis of Hypotension. At this time, patient is functioning at their prior level of function and does not require further acute OT services.     Plan:     During this hospitalization, patient does not require further acute OT services.  Please re-consult if situation changes.    · Plan of Care Reviewed with: patient    Subjective     Chief Complaint: none  Patient/Family Comments/goals: to get back home    Occupational Profile:  Living Environment & PLOF: Pt resides with spouse in 1st floor condo with no steps to enter. Pt was modified independent with all ADL's & at times used RW with ambulation.  Pt does not drive.  Pt is a retired restaurant owner & enjoys watching TV.  Pt has a bathtub for bathing.  Equipment Used at home:  (RW, SC, TTB)    Pain/Comfort:  · Pain Rating 1: 0/10  · Pain Rating Post-Intervention 1: 0/10    Patients cultural, spiritual, Confucianism conflicts given the current situation: no    Objective:     Communicated with: RN prior to session.  Patient found supine with (all lines intact) upon OT entry to room.  No family present    General Precautions: Standard, fall     Occupational Performance:    Bed Mobility:    · Patient completed Supine to Sit with modified independence    Functional Mobility/Transfers:  · Patient completed Sit <> Stand Transfer with modified independence  with  rolling walker     Activities of Daily Living:  · Upper Body Dressing: modified independence while seated EOB  · Lower Body Dressing: modified independence donning socks seated EOB    Cognitive/Visual Perceptual:  Cognitive/Psychosocial Skills:     -       Oriented to: Person, Place, Time and  Situation   -       Follows Commands/attention:Follows multistep  commands  -       Communication: clear/fluent  -       Memory: No Deficits noted  -       Safety awareness/insight to disability: intact   -       Mood/Affect/Coping skills/emotional control: Appropriate to situation    Physical Exam:  Dominant hand:    -       right  Upper Extremity Range of Motion:     -       Right Upper Extremity: WFL except 90* shoulder flexion  -       Left Upper Extremity: WFL except 90* shoulder flexion  Upper Extremity Strength:    -       Right Upper Extremity: WFL except 3-/5 shoulder flexion  -       Left Upper Extremity: WFL except 3-/5 shoulder flexion   Strength:    -       Right Upper Extremity: WFL  -       Left Upper Extremity: WFL    AMPAC 6 Click ADL:  AMPAC Total Score: 24    Treatment & Education:  Provided education regarding role of OT, POC, & discharge recommendations with pt  verbalizing understanding.  Pt had no further questions & when asked whether there were any concerns pt reported none.      Education:    Patient left up in chair with all lines intact, call button in reach, RN notified and white board updated.    GOALS:   Multidisciplinary Problems     Occupational Therapy Goals     Not on file          Multidisciplinary Problems (Resolved)        Problem: Occupational Therapy Goal    Goal Priority Disciplines Outcome Interventions   Occupational Therapy Goal   (Resolved)     OT, PT/OT Outcome(s) achieved                    History:     Past Medical History:   Diagnosis Date    *Atrial fibrillation     Arthritis     Atrial fibrillation     BPH (benign prostatic hypertrophy)     Degenerative disc disease     Depression     GERD (gastroesophageal reflux disease)     Hyperglycemia     Hyperlipidemia     Hypertension     MG (myasthenia gravis)     Postherpetic neuralgia        Past Surgical History:   Procedure Laterality Date    ACHILLES TENDON SURGERY      CHOLECYSTECTOMY       COLONOSCOPY N/A 3/20/2019    [facility] END[facility] (2ND FLR)    EGD (ESOPHAGOGASTRODUODENOSCOPY) N/A 3/20/2019    Performed by Le[facility] MD at [facility] ENDO (2ND FLR)       Time Tracking:     OT Date of Treatment: 04/03/19  OT Start Time: 1006  OT Stop Time: 1022  OT Total Time (min): 16 min    Billable Minutes:Evaluation 16    BRENDA Myles  4/3/2019

## 2019-04-03 NOTE — TELEPHONE ENCOUNTER
Attempted to return call. Unable to leave message    ----- Message from Faviola Freed sent at 4/3/2019 12:19 PM CDT -----  Contact: pt   Pt wife called to speak with nurse Kaylie have some questions   Callback#[phone]  Thank You  ASHLEY Freed

## 2019-04-03 NOTE — DISCHARGE SUMMARY
"Discharge Summary  Hospital Medicine    Patient Name: Ernie Phan  MRN: [id number]  Attending Provider on Discharge: Farhana Lam MD  Hospital Medicine Team: [facility] [place] [facility] MED D  Date of Admission:  4/1/2019     Date of Discharge:  4/3/2019  6:35 PM  Code status: Full Code    Active Hospital Problems    Diagnosis  POA    Acute renal failure superimposed on stage 3 chronic kidney disease [N17.9, N18.3]  Yes    Anemia of renal disease [D63.1]  Yes    Long-term use of immunosuppressant medication [Z79.899]  Not Applicable    Essential hypertension [I10]  Yes    Myasthenia gravis without acute exacerbation [G70.00]  Yes    Paroxysmal atrial fibrillation [I48.0]  Yes      Resolved Hospital Problems    Diagnosis Date Resolved POA    *Hypotension [I95.9] 04/03/2019 Yes    Symptomatic anemia [D64.9] 04/03/2019 Yes    Benign prostatic hyperplasia [N40.0] 04/02/2019 Yes        HPI:  "88 y.o. male presents to the ED with a chief complaint of generalized weakness today. The patient reports his blood pressure was low today per home health nurse. Patient was recently discharged for rectal bleeding. He denies any rectal bleed at this time. He states that he first noted it while he was out with his wife and then when he got home the  nurse took his BP and it was first 85/40 and then on another check it was 90/44 and HH told him to go to the ED.  Patient denies any rectal bleeding or any type of bleeding at all.  On recent visit he did have rectal bleeding and had both and upper and lower endoscopy and showed internal hemorrhoids and diverticulosis.  In ED patient had hemoglobin on 6.7."    Hospital Course:  Patient with pA-fib, myasthenia gravis, CKD-III & HTN admitted for hypotension and symptomatic anemia. The patient was transfused a total of 2U pRBC with appropriate Hgb response and iron supplementation was ordered. Of note, Heme/Onc planning to start darbepoetin as an OP. Orthostatic BPs were negative for postural " hypotension and his home verapamil was resumed.       Hypotension  Essential hypertension  · Holding home BP meds on admission: benazepril & verapamil. Adjust home BP med regimen as indicated.   · Hypotension resolved; resumed home verapamil. Still holding benazepril. Orthostatic BP negative for postural hypotension. (4/2)     Symptomatic anemia  Anemia of renal disease  · Hgb 6.7 upon admit. Baseline ~8.   · No s/s of bleeding. Transfused 1U pRBCs with appropriate response. (4/1)  · Recent iron studies (3/2019) consistent with anemia of chronic disease; consider resumption of iron supplementation.   · Heme/Onc plans to start darbepoetin as OP. (4/2)  · Slight downward trend in Hgb with associated hypotension. FOBT (+). Transfusing additional 1U pRBC prior to discharge. (4/3)     Paroxysmal atrial fibrillation  · EKG: A-fib with slow ventricular response.   · Warfarin held initially.   · Troponin peaked at 0.767 following pRBC transfusion. Last Echo (8/2018): EF 60-65%, NWMA, Biatrial enlargement, PulmHTN with PASP of 57mmHg & moderated TR. BNP elevated. Rate control with verapamil (bradycardic when sleeping) and resume anticoagulation. PharmD consulted. (4/2)  · Follow up with Coumadin Clinic.     Myasthenia gravis without acute exacerbation  · Continuing home Imuran, prednisone & pyridostigmine.      Acute renal failure superimposed on stage 3 chronic kidney disease  · Baseline sCr ~1.4. Holding home ACEi.   · Improving sCr. Continue to hold ACEi. (4/2)  · Monitoring sCr. (4/3)     Diabetes mellitus, Type II with CKD-III  · A1c (3/2019): 5.3%. Diet controlled.   · Managing with low-dose SQ insulin correction.      Benign prostatic hyperplasia  · Continued home finasteride.        Recent Labs   Lab 04/01/19  1825 04/02/19  0540 04/02/19  1335 04/03/19  0432   WBC 1.20* 1.41*  --  1.79*   HGB 6.5* 7.6* 7.8* 7.3*   HCT 20.5* 23.4* 23.9* 22.6*    305  --  324     Recent Labs   Lab 04/01/19  1825  04/02/19 0540 04/03/19 0432   * 134* 131*   K 4.6 4.8 4.4    107 107   CO2 18* 21* 18*   BUN 32* 27* 29*   CREATININE 1.8* 1.6* 1.7*   * 92 88   CALCIUM 8.8 9.2 8.5*   MG 1.9 1.9 1.8   PHOS 3.5 3.3 3.2     Recent Labs   Lab 04/01/19 04/01/19 1825 04/02/19 0540 04/03/19 0432   ALKPHOS  --  108  --   --    ALT  --  35  --   --    AST  --  38  --   --    ALBUMIN  --  3.3*  --   --    PROT  --  6.2  --   --    BILITOT  --  1.3*  --   --    INR 2.6  --  2.5* 2.3*      Recent Labs     04/01/19 1825 04/02/19 0540 04/02/19  1335   TROPONINI 0.642* 0.767* 0.583*     Recent Labs     04/01/19  1825   LACTATE 1.4        Procedures: none    Consultants:   Consults (From admission, onward)        Status Ordering Provider     [facility] PharmD Consult  Once     Provider:  (Not yet assigned)    Acknowledged IRVIN TO          Medications:    Current Discharge Medication List      START taking these medications    Details   gabapentin (NEURONTIN) 100 MG capsule Take 1 capsule (100 mg total) by mouth 3 (three) times daily.  Qty: 270 capsule, Refills: 0         CONTINUE these medications which have CHANGED    Details   verapamil (VERELAN) 120 MG C24P Take 2 capsules (240 mg total) by mouth once daily.  Qty: 180 capsule, Refills: 3    Associated Diagnoses: PVC (premature ventricular contraction)         CONTINUE these medications which have NOT CHANGED    Details   azaTHIOprine (IMURAN) 50 mg Tab Take 4 tablets (200 mg total) by mouth once daily.  Qty: 360 tablet, Refills: 3    Associated Diagnoses: Myasthenia gravis      finasteride (PROSCAR) 5 mg tablet TAKE 1 TABLET EVERY DAY  Qty: 90 tablet, Refills: 3      IRON, FERROUS SULFATE, ORAL Take 65 mg by mouth once daily.      predniSONE (DELTASONE) 5 MG tablet Take 1 tablet (5 mg total) by mouth once daily.  Qty: 45 tablet, Refills: 3    Associated Diagnoses: MG (myasthenia gravis)      tamsulosin (FLOMAX) 0.4 mg Cap TAKE 1 CAPSULE EVERY DAY  Qty:  90 capsule, Refills: 3      vitamin D 1000 units Tab Take 185 mg by mouth once daily.      warfarin (COUMADIN) 5 MG tablet TAKE 1 TABLET EVERY DAY EXCEPT TAKE 1/2 TABLET ON SUNDAY, OR AS DIRECTED BY COUMADIN CLINIC  Qty: 90 tablet, Refills: 3      DENTURE CLEANSER (EFFERVESCENT DENTURE CLEANSR DENT)       FLUZONE HIGH-DOSE 2018-19, PF, 180 mcg/0.5 mL vaccine       ONE TOUCH ULTRA TEST Strp TEST DAILY  Qty: 100 each, Refills: 3      ONE TOUCH ULTRASOFT LANCETS lancets TEST DAILY  Qty: 100 each, Refills: 3      pyridostigmine (MESTINON) 60 mg Tab Take 1 tablet (60 mg total) by mouth every 6 to 8 hours as needed.  Qty: 180 tablet, Refills: 1    Associated Diagnoses: Myasthenia gravis without acute exacerbation         STOP taking these medications       benazepril (LOTENSIN) 40 MG tablet Comments:   Reason for Stopping:               Discharge Instructions:  Discharge Procedure Orders   Diet Adult Regular     Order Specific Question Answer Comments   Additional restrictions: Cardiac (Low Na/Chol)    Additional restrictions: Diabetic 2200      Notify your health care provider if you experience any of the following:  temperature >100.4     Notify your health care provider if you experience any of the following:  persistent nausea and vomiting or diarrhea     Notify your health care provider if you experience any of the following:  difficulty breathing or increased cough     Notify your health care provider if you experience any of the following:  persistent dizziness, light-headedness, or visual disturbances     Notify your health care provider if you experience any of the following:  increased confusion or weakness     Activity as tolerated       Discharge Condition: stable    Disposition: Home-Health Care [facility]    Indwelling Lines/Drains at time of discharge: none    Tests pending at the time of discharge: none      Time spent on the discharge of the patient including review of hospital course with the patient, reviewing  discharge medications and arranging follow-up care: 45 minutes.    Discharge examination Patient was seen and examined on 4/3/2019 and determined to be suitable for discharge.    Discharge plan and follow up:  Follow-up Information     Ernie Michel MD. Schedule an appointment as soon as possible for a visit in 1 week.    Specialty:  Internal Medicine  Why:  For discharge from hospital follow up  Contact information:  [address]  [place] [address]  [phone]             [facility] - Cou[facility]. Call in 1 day.    Specialty:  Cardiology  Why:  For discharge from hospital follow up  Contact information:  [address] [facility] [id number] [phone]  Additional information:  3rd floor           Nick Gutierrez MD. Schedule an appointment as soon as possible for a visit in 1 week.    Specialty:  Hematology and Oncology  Why:  For discharge from hospital follow up  Contact information:  [address] [id number]  [phone]             Go to Phu Tate MD.    Specialties:  Cardiology, Electrophysiology  Why:  As previously planned  Contact information:  [address] [address]  [phone]             [facility] CARDIOLOGY. Schedule an appointment as soon as possible for a visit in 1 week.    Specialty:  Cardiology  Why:  For discharge from hospital follow up, To establish care  Contact information:  [address] [address]  [phone]           Go to Leeroy Thornton MD.    Specialty:  Gastroenterology  Why:  As previously planned  Contact information:  [address] [address]  [phone]                 Future Appointments   Date Time Provider Department Center   4/22/2019  9:40 AM LAB, HEMONC CANCER BLDG [facility]   4/22/2019 10:40 AM Nick Gutierrez MD [facility] BM V[facility]   4/22/2019 11:30 AM INJECTION, [facility] INFUSION [facility]       Provider  Farhana Lam,  MD  Department of Hospital Medicine  [facility]

## 2019-04-03 NOTE — PROGRESS NOTES
PharmD Consult received for:    1.) Renally adjust all medications:  Estimated Creatinine Clearance: 30 mL/min (A) (based on SCr of 1.7 mg/dL (H)).   · Medications reviewed, no dose adjustments needed     2.) Warfarin dosing:   Ernie Phan is a 88 y.o. male on warfarin therapy for Atrial Fibrillation. PharmD has been consulted for warfarin dosing. Recent admission for GI bleed 3/18/19. S/p EGD and colonoscopy which were unremarkable except for external hemorrhoids and a polyp.    Current order: warfarin 7.5 mg daily   Home dose: 7.5 mg daily   Coumadin clinic enrollment: Active  INR goal: 2-3    Lab Results   Component Value Date    INR 2.3 (H) 04/03/2019    INR 2.5 (H) 04/02/2019    INR 2.6 04/01/2019     Diet: Low sodium without supplements     Recommendation(s):    Continue warfarin 7.5 mg daily with daily INR monitoring   Pharmacy will continue to follow and monitor warfarin    3.) Medication acquisition:   Medication Apixaban 2.5 mg BID was evaluated and the cost for a 30-day supply is $47.00.     Please let us know if you have any questions.     Thank you for the consult,  Sandra Cerna, PharmD, BCPS  [id number]     **Note: Consults are reviewed Monday-Friday 7:00am-3:30pm. THE ABOVE RECOMMENDATIONS ARE ONLY SUGGESTED.THE RECOMMENDATIONS SHOULD BE CONSIDERED IN CONJUNCTION WITH ALL PATIENT FACTORS.**

## 2019-04-03 NOTE — CARE UPDATE
RN Proactive Rounding Note  Time of Visit: 1400    Admit Date: 2019  LOS: 2  Code Status: Full Code   Date of Visit: 2019  : 3/19/1931  Age: 88 y.o.  Sex: male  Race: White  Bed: 7074/7074 A:   MRN: [id number]  Was the patient discharged from an ICU this admission? no   Was the patient discharged from a PACU within last 24 hours?  no  Did the patient receive conscious sedation/general anesthesia in last 24 hours?  no  Was the patient in the ED within the past 24 hours?  no  Was the patient started on NIPPV within the past 24 hours?  no  Attending Physician: Farhana Lam MD  Primary Service: [facility] [place] HOSP MED D      ASSESSMENT:     Abnormal Vital Signs:  Clinical Issues: Circulatory     INTERVENTIONS/ RECOMMENDATIONS:     NC performed for hypotension. Pt receiving 1 u RBC's. BP improving. Respirations even and unlabored. Pt in NAD at this time.     Monitor VS and notify primary team of changes in pt's condition.     Discussed plan of care with RN.    PHYSICIAN ESCALATION:     Yes/No  no    Orders received and case discussed with   N/A .    Disposition: Remain in room 7074 A.    FOLLOW-UP/CONTINGENCY:     Call back the Rapid Response Nurse at [phone] for additional questions or concerns.

## 2019-04-03 NOTE — NURSING
Pt discharged per MD orders; All written and verbal d/c orders given to pt and family, both verbalized an understanding. PIV d/c'd, no redness or swelling noted at site,cath intact. Pt waiting on family to arrive to transport him home.

## 2019-04-04 ENCOUNTER — TELEPHONE (OUTPATIENT)
Dept: ADMINISTRATIVE | Facility: CLINIC | Age: 84
End: 2019-04-04

## 2019-04-04 RX ORDER — WARFARIN SODIUM 5 MG/1
7.5 TABLET ORAL DAILY
Qty: 90 TABLET | Refills: 3 | Status: ON HOLD
Start: 2019-04-04 | End: 2019-12-13 | Stop reason: SDUPTHER

## 2019-04-04 NOTE — PROGRESS NOTES
Recent admit for hypotension (see calendar for recent warfarin doses/INRs). Will resume pre-admit dose and testing schedule. Will plan to continue testing with home meter unless patent unable. If he cannot, we can arrange INR w/HH.

## 2019-04-04 NOTE — PROGRESS NOTES
Chandler with [facility]  called to report that the patient was in the hospital from 4/01/19 to 4/03/19 due to low blood pressure, discharged 4/03/19 on coumadin: 5mg daily except 2.5mg Sunday, Patient does self testing with his home meter but needs to know what dose to continue now that he's home, Chandler's  [phone]

## 2019-04-08 ENCOUNTER — ANTI-COAG VISIT (OUTPATIENT)
Dept: CARDIOLOGY | Facility: CLINIC | Age: 84
End: 2019-04-08

## 2019-04-08 DIAGNOSIS — Z79.01 LONG TERM CURRENT USE OF ANTICOAGULANT THERAPY: ICD-10-CM

## 2019-04-08 DIAGNOSIS — I48.0 PAROXYSMAL ATRIAL FIBRILLATION: ICD-10-CM

## 2019-04-08 LAB — INR PPP: 1.8

## 2019-04-11 ENCOUNTER — LAB VISIT (OUTPATIENT)
Dept: LAB | Facility: HOSPITAL | Age: 84
End: 2019-04-11
Attending: INTERNAL MEDICINE
Payer: MEDICARE

## 2019-04-11 ENCOUNTER — OFFICE VISIT (OUTPATIENT)
Dept: INTERNAL MEDICINE | Facility: CLINIC | Age: 84
End: 2019-04-11
Payer: MEDICARE

## 2019-04-11 ENCOUNTER — ANTI-COAG VISIT (OUTPATIENT)
Dept: CARDIOLOGY | Facility: CLINIC | Age: 84
End: 2019-04-11
Payer: MEDICARE

## 2019-04-11 VITALS
BODY MASS INDEX: 27.62 KG/M2 | WEIGHT: 187 LBS | SYSTOLIC BLOOD PRESSURE: 132 MMHG | OXYGEN SATURATION: 98 % | DIASTOLIC BLOOD PRESSURE: 84 MMHG | HEART RATE: 66 BPM

## 2019-04-11 DIAGNOSIS — D64.9 CHRONIC ANEMIA: Primary | ICD-10-CM

## 2019-04-11 DIAGNOSIS — D64.9 CHRONIC ANEMIA: ICD-10-CM

## 2019-04-11 DIAGNOSIS — K62.5 RECTAL BLEEDING: ICD-10-CM

## 2019-04-11 DIAGNOSIS — I48.0 PAROXYSMAL ATRIAL FIBRILLATION: ICD-10-CM

## 2019-04-11 DIAGNOSIS — N18.30 CHRONIC KIDNEY DISEASE, STAGE III (MODERATE): ICD-10-CM

## 2019-04-11 DIAGNOSIS — Z79.01 LONG TERM CURRENT USE OF ANTICOAGULANT THERAPY: ICD-10-CM

## 2019-04-11 DIAGNOSIS — I10 ESSENTIAL HYPERTENSION: ICD-10-CM

## 2019-04-11 DIAGNOSIS — D47.3 ESSENTIAL THROMBOCYTOSIS: ICD-10-CM

## 2019-04-11 DIAGNOSIS — G70.00 MG (MYASTHENIA GRAVIS): ICD-10-CM

## 2019-04-11 LAB
ANION GAP SERPL CALC-SCNC: 9 MMOL/L (ref 8–16)
ANISOCYTOSIS BLD QL SMEAR: SLIGHT
BASO STIPL BLD QL SMEAR: ABNORMAL
BASOPHILS # BLD AUTO: 0.04 K/UL (ref 0–0.2)
BASOPHILS NFR BLD: 0.9 % (ref 0–1.9)
BUN SERPL-MCNC: 24 MG/DL (ref 8–23)
BURR CELLS BLD QL SMEAR: ABNORMAL
CALCIUM SERPL-MCNC: 9.3 MG/DL (ref 8.7–10.5)
CHLORIDE SERPL-SCNC: 104 MMOL/L (ref 95–110)
CO2 SERPL-SCNC: 21 MMOL/L (ref 23–29)
CREAT SERPL-MCNC: 1.3 MG/DL (ref 0.5–1.4)
DIFFERENTIAL METHOD: ABNORMAL
EOSINOPHIL # BLD AUTO: 0 K/UL (ref 0–0.5)
EOSINOPHIL NFR BLD: 0.2 % (ref 0–8)
ERYTHROCYTE [DISTWIDTH] IN BLOOD BY AUTOMATED COUNT: ABNORMAL % (ref 11.5–14.5)
EST. GFR  (AFRICAN AMERICAN): 56.3 ML/MIN/1.73 M^2
EST. GFR  (NON AFRICAN AMERICAN): 48.7 ML/MIN/1.73 M^2
FERRITIN SERPL-MCNC: 1112 NG/ML (ref 20–300)
GLUCOSE SERPL-MCNC: 102 MG/DL (ref 70–110)
HCT VFR BLD AUTO: 27.9 % (ref 40–54)
HGB BLD-MCNC: 8.6 G/DL (ref 14–18)
HYPOCHROMIA BLD QL SMEAR: ABNORMAL
IMM GRANULOCYTES # BLD AUTO: 0.1 K/UL (ref 0–0.04)
IMM GRANULOCYTES NFR BLD AUTO: 2.1 % (ref 0–0.5)
INR PPP: 2.2
IRON SERPL-MCNC: 56 UG/DL (ref 45–160)
LYMPHOCYTES # BLD AUTO: 0.3 K/UL (ref 1–4.8)
LYMPHOCYTES NFR BLD: 6.6 % (ref 18–48)
MCH RBC QN AUTO: 34.8 PG (ref 27–31)
MCHC RBC AUTO-ENTMCNC: 30.8 G/DL (ref 32–36)
MCV RBC AUTO: 113 FL (ref 82–98)
MONOCYTES # BLD AUTO: 0.3 K/UL (ref 0.3–1)
MONOCYTES NFR BLD: 5.8 % (ref 4–15)
NEUTROPHILS # BLD AUTO: 4 K/UL (ref 1.8–7.7)
NEUTROPHILS NFR BLD: 84.4 % (ref 38–73)
NRBC BLD-RTO: 0 /100 WBC
OVALOCYTES BLD QL SMEAR: ABNORMAL
PLATELET # BLD AUTO: 444 K/UL (ref 150–350)
PLATELET BLD QL SMEAR: ABNORMAL
PMV BLD AUTO: 10 FL (ref 9.2–12.9)
POIKILOCYTOSIS BLD QL SMEAR: SLIGHT
POLYCHROMASIA BLD QL SMEAR: ABNORMAL
POTASSIUM SERPL-SCNC: 4.2 MMOL/L (ref 3.5–5.1)
RBC # BLD AUTO: 2.47 M/UL (ref 4.6–6.2)
RETICS/RBC NFR AUTO: 2.3 % (ref 0.4–2)
SATURATED IRON: 23 % (ref 20–50)
SCHISTOCYTES BLD QL SMEAR: ABNORMAL
SODIUM SERPL-SCNC: 134 MMOL/L (ref 136–145)
TOTAL IRON BINDING CAPACITY: 243 UG/DL (ref 250–450)
TRANSFERRIN SERPL-MCNC: 164 MG/DL (ref 200–375)
WBC # BLD AUTO: 4.69 K/UL (ref 3.9–12.7)

## 2019-04-11 PROCEDURE — 99214 OFFICE O/P EST MOD 30 MIN: CPT | Mod: HCNC,S$GLB,, | Performed by: INTERNAL MEDICINE

## 2019-04-11 PROCEDURE — 99999 PR PBB SHADOW E&M-EST. PATIENT-LVL III: ICD-10-PCS | Mod: PBBFAC,HCNC,, | Performed by: INTERNAL MEDICINE

## 2019-04-11 PROCEDURE — 36415 COLL VENOUS BLD VENIPUNCTURE: CPT | Mod: HCNC,PO

## 2019-04-11 PROCEDURE — 1101F PR PT FALLS ASSESS DOC 0-1 FALLS W/OUT INJ PAST YR: ICD-10-PCS | Mod: HCNC,CPTII,S$GLB, | Performed by: INTERNAL MEDICINE

## 2019-04-11 PROCEDURE — 85025 COMPLETE CBC W/AUTO DIFF WBC: CPT | Mod: HCNC

## 2019-04-11 PROCEDURE — 93793 PR ANTICOAGULANT MGMT FOR PT TAKING WARFARIN: ICD-10-PCS | Mod: S$GLB,,,

## 2019-04-11 PROCEDURE — 93793 ANTICOAG MGMT PT WARFARIN: CPT | Mod: S$GLB,,,

## 2019-04-11 PROCEDURE — 83540 ASSAY OF IRON: CPT | Mod: HCNC

## 2019-04-11 PROCEDURE — 99999 PR PBB SHADOW E&M-EST. PATIENT-LVL III: CPT | Mod: PBBFAC,HCNC,, | Performed by: INTERNAL MEDICINE

## 2019-04-11 PROCEDURE — 99214 PR OFFICE/OUTPT VISIT, EST, LEVL IV, 30-39 MIN: ICD-10-PCS | Mod: HCNC,S$GLB,, | Performed by: INTERNAL MEDICINE

## 2019-04-11 PROCEDURE — 80048 BASIC METABOLIC PNL TOTAL CA: CPT | Mod: HCNC

## 2019-04-11 PROCEDURE — 1101F PT FALLS ASSESS-DOCD LE1/YR: CPT | Mod: HCNC,CPTII,S$GLB, | Performed by: INTERNAL MEDICINE

## 2019-04-11 PROCEDURE — 85045 AUTOMATED RETICULOCYTE COUNT: CPT | Mod: HCNC

## 2019-04-11 PROCEDURE — 82728 ASSAY OF FERRITIN: CPT | Mod: HCNC

## 2019-04-11 NOTE — PROGRESS NOTES
PAST MEDICAL HISTORY:   Hypertension.  Myasthenia gravis.  Paroxysmal atrial fibrillation.  Essential thrombocytosis   Anemia , due to Myeloproliferative disorder , Imuran, Hydroxyurea  Hyperlipidemia.  Tricuspid regurgitation moderate  Aortic stenosis mild  Pulmonary Hypertension  BPH.  Gastroesophageal reflux disease.  Postherpetic neuralgia involving the right medial leg.  Cervical degenerative disk disease.  History of depression.  Osteoarthritis of the knees    MEDICATIONS:  Aspirin 81 mg.  Imuran 50 mg four a day.  Gabapentin 100 mg tid  Finasteride 5 mg.  Iron sulfate.  Prednisone 5 mg daily  Mestinon 60 mg. As needed  Tamsulosin 0.4 mg.  Verapamil  mg.  Coumadin.  Vitamin D 1000 units.              REASON FOR VISIT:  This is an 88-year-old male who comes in for hospital   follow-up.  He was in the hospital from March 19th to the 23rd, then later April 1st to April 3rd.    The first hospitalization occurred after he attempted to have a bowel movement   and he saw rectal bleeding.  There was no stool.  When he presented to the   Emergency Room, he was noted to have a hemoglobin of 6.9.  He received 2 units   of blood, had abdominal ultrasound that showed no acute changes, an upper   endoscopy that showed no abnormal finding other than hiatal hernia and a   colonoscopy that showed no abnormal findings other than a polyp, which was   resected.  From this hospitalization, his medications of Hydrea,   hydrochlorothiazide and aspirin were discontinued.    The second hospitalization, he went in because he was just feeling weak.  Home   health nurse took his blood pressure and it was then systolic readings of 80.    On presentation, he was noted to have a hemoglobin of 6.5, received 1 unit of   blood and otherwise did well.  His medication of benazepril was discontinued.    It is noted, however, that gabapentin 100 mg three times a day was started.  It   is not totally certain why this was the case other than  that he still has   neuropathic pain involving the medial aspect of the right leg from postherpetic   neuralgia.  He can feel maybe some slight improvement.    Prior to this, he had followup with his original hematologist, Dr. Welsh, and   now his new hematologist, Dr. Nick Gutierrez.  Apparently, he was noted just to   still have anemia due to multiple factors.  It seemed like there was significant   insurance technicalities trying to get erythropoietin approved, but it appears   that it has been accomplished, and then on April 26th, there is an appointment   with Hematology for the first injection and this will be darbepoetin.    CURRENTLY, REVIEW OF SYMPTOMS:  He feels his breathing is fine, may get short of   breath after walking 100 yards as he puts it.  There is no chest pain or   abdominal pain.  He has a bowel function may be three to four days out the week   and stool is brown.  No difficulty urinating.  Still slightly weak, but not as   bad as when he was in the hospital.    PAST MEDICAL HISTORY:  Outlined above.    PHYSICAL EXAMINATION:  VITAL SIGNS:  Weight recorded is 187 pounds, pulse rate 64, blood pressure   130/72.  LUNGS:  Clear.  HEART:  Irregular, 1-2 systolic murmur at the apex.  ABDOMEN:  Active bowel sounds, soft, nontender.  EXTREMITIES:  1-2+ edema.    IMPRESSION:  1. Chronic anemia with recent GI bleed.  2. Hypertension with a recent bout of hypotension, probably due to the anemia.  3. Essential thrombocytosis.  4. Persistent atrial fibrillation.  5. Myasthenia gravis.  6. Pulmonary hypertension.  7. Chronic kidney disease, stage III.    PLAN:  Today, we will be repeating a basic metabolic profile and a CBC, also get   iron and TIBC.    Regarding his hematologic disorder, it appears that he had been taking iron for   a while.  About two weeks prior to his first admission, he had stopped the   medication, but after the first admission, he restarted.  In regard to   hydroxyurea, I suspected  that got discontinued because of worsening anemia.    There is noted to be a leukopenia on his lab testing and his platelet counts   have been fine.        GIAN  dd: 04/11/2019 14:28:17 (CDT)  td: 04/12/2019 04:33:53 (CDT)  Doc ID   #[id number]  Job ID #[id number]    CC:

## 2019-04-12 ENCOUNTER — TELEPHONE (OUTPATIENT)
Dept: HEMATOLOGY/ONCOLOGY | Facility: CLINIC | Age: 84
End: 2019-04-12

## 2019-04-12 NOTE — PROGRESS NOTES
Test results reviewed    All three blood elements looks better    Irons levels look better    Follow up with hematology regarding erythropoietin injection and at some point will probably need to restart hydrea

## 2019-04-12 NOTE — TELEPHONE ENCOUNTER
Spoke to patient.  appts rescheduled      ----- Message from Quinten Chiu sent at 4/12/2019 11:44 AM CDT -----  Contact: Patient  Pt would like a call from Kaylie regarding 04/22 infusion appt,      Contact:: [phone]

## 2019-04-15 ENCOUNTER — ANTI-COAG VISIT (OUTPATIENT)
Dept: CARDIOLOGY | Facility: CLINIC | Age: 84
End: 2019-04-15
Payer: MEDICARE

## 2019-04-15 LAB — INR PPP: 2.3

## 2019-04-15 PROCEDURE — 93793 ANTICOAG MGMT PT WARFARIN: CPT | Mod: S$GLB,,,

## 2019-04-15 PROCEDURE — 93793 PR ANTICOAGULANT MGMT FOR PT TAKING WARFARIN: ICD-10-PCS | Mod: S$GLB,,,

## 2019-04-17 ENCOUNTER — DOCUMENTATION ONLY (OUTPATIENT)
Dept: INTERNAL MEDICINE | Facility: CLINIC | Age: 84
End: 2019-04-17

## 2019-04-17 ENCOUNTER — TELEPHONE (OUTPATIENT)
Dept: INTERNAL MEDICINE | Facility: CLINIC | Age: 84
End: 2019-04-17

## 2019-04-17 RX ORDER — GABAPENTIN 300 MG/1
300 CAPSULE ORAL 3 TIMES DAILY
Qty: 270 CAPSULE | Refills: 3 | Status: SHIPPED | OUTPATIENT
Start: 2019-04-17 | End: 2020-05-22

## 2019-04-17 NOTE — PROGRESS NOTES
Recent labs reviewed    There is improvement with the creatinine at 1.3 since stopping the hydrochlorothiazide    There is improvement in the hemoglobin hematocrit and iron levels are acceptable    The white blood cell count as well as the platelet count is a little bit higher compared to before which currently is acceptable but he has an appointment with his hematologist next week and will need to discuss about getting back on hydroxy urea    Also gabapentin was increased to 300 mg 3 times a day to help with neuropathy of his leg

## 2019-04-17 NOTE — TELEPHONE ENCOUNTER
----- Message from Leigh Dey sent at 4/17/2019  1:43 PM CDT -----  [facility]/[phone]  [facility] called in regard to getting the pt Rx for     gabapentin (NEURONTIN) 100 MG capsule 270 capsule 0 4/3/2019  No  Take 1 capsule (100 mg total) by mouth 3 (three) times daily    Increase due the pt nerve pain. He has some at home and wanted to know if he would double up and take those until he get the Rx from mail order.       [facility]  Please advise

## 2019-04-18 NOTE — TELEPHONE ENCOUNTER
----- Message from Britta Sebastian sent at 4/18/2019  9:30 AM CDT -----  Contact: [facility]/[phone]  Would like to continue home health theraphy. Please advise.          Thank You

## 2019-04-24 ENCOUNTER — INFUSION (OUTPATIENT)
Dept: INFUSION THERAPY | Facility: HOSPITAL | Age: 84
End: 2019-04-24
Attending: INTERNAL MEDICINE
Payer: MEDICARE

## 2019-04-24 ENCOUNTER — LAB VISIT (OUTPATIENT)
Dept: LAB | Facility: HOSPITAL | Age: 84
End: 2019-04-24
Attending: INTERNAL MEDICINE
Payer: MEDICARE

## 2019-04-24 ENCOUNTER — OFFICE VISIT (OUTPATIENT)
Dept: HEMATOLOGY/ONCOLOGY | Facility: CLINIC | Age: 84
End: 2019-04-24
Payer: MEDICARE

## 2019-04-24 VITALS
SYSTOLIC BLOOD PRESSURE: 142 MMHG | HEART RATE: 62 BPM | TEMPERATURE: 98 F | WEIGHT: 181.19 LBS | DIASTOLIC BLOOD PRESSURE: 62 MMHG | OXYGEN SATURATION: 99 % | BODY MASS INDEX: 26.84 KG/M2 | HEIGHT: 69 IN

## 2019-04-24 DIAGNOSIS — N18.9 ANEMIA OF RENAL DISEASE: Primary | ICD-10-CM

## 2019-04-24 DIAGNOSIS — D47.3 ESSENTIAL THROMBOCYTOSIS: ICD-10-CM

## 2019-04-24 DIAGNOSIS — N18.9 ANEMIA OF RENAL DISEASE: ICD-10-CM

## 2019-04-24 DIAGNOSIS — D63.1 ANEMIA OF RENAL DISEASE: Primary | ICD-10-CM

## 2019-04-24 DIAGNOSIS — D63.1 ANEMIA OF RENAL DISEASE: ICD-10-CM

## 2019-04-24 DIAGNOSIS — D47.3 ESSENTIAL THROMBOCYTOSIS: Primary | ICD-10-CM

## 2019-04-24 LAB
ABO + RH BLD: NORMAL
ALBUMIN SERPL BCP-MCNC: 3.4 G/DL (ref 3.5–5.2)
ALP SERPL-CCNC: 137 U/L (ref 55–135)
ALT SERPL W/O P-5'-P-CCNC: 23 U/L (ref 10–44)
ANION GAP SERPL CALC-SCNC: 10 MMOL/L (ref 8–16)
AST SERPL-CCNC: 29 U/L (ref 10–40)
BASOPHILS # BLD AUTO: 0.03 K/UL (ref 0–0.2)
BASOPHILS NFR BLD: 1 % (ref 0–1.9)
BILIRUB SERPL-MCNC: 3.5 MG/DL (ref 0.1–1)
BLD GP AB SCN CELLS X3 SERPL QL: NORMAL
BUN SERPL-MCNC: 27 MG/DL (ref 8–23)
CALCIUM SERPL-MCNC: 9.1 MG/DL (ref 8.7–10.5)
CHLORIDE SERPL-SCNC: 103 MMOL/L (ref 95–110)
CO2 SERPL-SCNC: 20 MMOL/L (ref 23–29)
CREAT SERPL-MCNC: 1.8 MG/DL (ref 0.5–1.4)
DIFFERENTIAL METHOD: ABNORMAL
EOSINOPHIL # BLD AUTO: 0 K/UL (ref 0–0.5)
EOSINOPHIL NFR BLD: 1 % (ref 0–8)
ERYTHROCYTE [DISTWIDTH] IN BLOOD BY AUTOMATED COUNT: ABNORMAL % (ref 11.5–14.5)
EST. GFR  (AFRICAN AMERICAN): 38 ML/MIN/1.73 M^2
EST. GFR  (NON AFRICAN AMERICAN): 32.9 ML/MIN/1.73 M^2
GLUCOSE SERPL-MCNC: 100 MG/DL (ref 70–110)
HCT VFR BLD AUTO: 24.9 % (ref 40–54)
HGB BLD-MCNC: 7.8 G/DL (ref 14–18)
IMM GRANULOCYTES # BLD AUTO: 0.14 K/UL (ref 0–0.04)
IMM GRANULOCYTES NFR BLD AUTO: 4.8 % (ref 0–0.5)
LYMPHOCYTES # BLD AUTO: 0.5 K/UL (ref 1–4.8)
LYMPHOCYTES NFR BLD: 17.6 % (ref 18–48)
MCH RBC QN AUTO: 34.5 PG (ref 27–31)
MCHC RBC AUTO-ENTMCNC: 31.3 G/DL (ref 32–36)
MCV RBC AUTO: 110 FL (ref 82–98)
MONOCYTES # BLD AUTO: 0.3 K/UL (ref 0.3–1)
MONOCYTES NFR BLD: 9 % (ref 4–15)
NEUTROPHILS # BLD AUTO: 1.9 K/UL (ref 1.8–7.7)
NEUTROPHILS NFR BLD: 66.6 % (ref 38–73)
NRBC BLD-RTO: 0 /100 WBC
PLATELET # BLD AUTO: 470 K/UL (ref 150–350)
PMV BLD AUTO: 9.8 FL (ref 9.2–12.9)
POTASSIUM SERPL-SCNC: 4.2 MMOL/L (ref 3.5–5.1)
PROT SERPL-MCNC: 6.5 G/DL (ref 6–8.4)
RBC # BLD AUTO: 2.26 M/UL (ref 4.6–6.2)
SODIUM SERPL-SCNC: 133 MMOL/L (ref 136–145)
WBC # BLD AUTO: 2.89 K/UL (ref 3.9–12.7)

## 2019-04-24 PROCEDURE — 96372 THER/PROPH/DIAG INJ SC/IM: CPT | Mod: HCNC

## 2019-04-24 PROCEDURE — 99999 PR PBB SHADOW E&M-EST. PATIENT-LVL III: CPT | Mod: PBBFAC,HCNC,, | Performed by: INTERNAL MEDICINE

## 2019-04-24 PROCEDURE — 99999 PR PBB SHADOW E&M-EST. PATIENT-LVL III: ICD-10-PCS | Mod: PBBFAC,HCNC,, | Performed by: INTERNAL MEDICINE

## 2019-04-24 PROCEDURE — 1101F PT FALLS ASSESS-DOCD LE1/YR: CPT | Mod: HCNC,CPTII,S$GLB, | Performed by: INTERNAL MEDICINE

## 2019-04-24 PROCEDURE — 85025 COMPLETE CBC W/AUTO DIFF WBC: CPT | Mod: HCNC

## 2019-04-24 PROCEDURE — 99213 PR OFFICE/OUTPT VISIT, EST, LEVL III, 20-29 MIN: ICD-10-PCS | Mod: HCNC,S$GLB,, | Performed by: INTERNAL MEDICINE

## 2019-04-24 PROCEDURE — 86850 RBC ANTIBODY SCREEN: CPT | Mod: HCNC

## 2019-04-24 PROCEDURE — 63600175 PHARM REV CODE 636 W HCPCS: Mod: HCNC | Performed by: INTERNAL MEDICINE

## 2019-04-24 PROCEDURE — 80053 COMPREHEN METABOLIC PANEL: CPT | Mod: HCNC

## 2019-04-24 PROCEDURE — 1101F PR PT FALLS ASSESS DOC 0-1 FALLS W/OUT INJ PAST YR: ICD-10-PCS | Mod: HCNC,CPTII,S$GLB, | Performed by: INTERNAL MEDICINE

## 2019-04-24 PROCEDURE — 99213 OFFICE O/P EST LOW 20 MIN: CPT | Mod: HCNC,S$GLB,, | Performed by: INTERNAL MEDICINE

## 2019-04-24 RX ADMIN — EPOETIN ALFA-EPBX 8000 UNITS: 10000 INJECTION, SOLUTION INTRAVENOUS; SUBCUTANEOUS at 12:04

## 2019-04-24 NOTE — NURSING
Patient here for procrit injection-he is very anxious because wife is in hospital recovering from hip replacement and not doing well-tolerated injection well.

## 2019-04-24 NOTE — Clinical Note
Labs in 2 weeks: CBC, CMP, retic, type and screen. Please schedule chemo appt same day for Retacrit injection.Follow-up in 4 weeks with same labs as above and chemo appt for Retacrit.

## 2019-04-24 NOTE — PROGRESS NOTES
Patient called on 04/22/19 to inform us that he is with wife she is admitted in [facility]). He will contact us when he is available for the next (Fingerstick). Wife is having SX.

## 2019-04-26 NOTE — PROGRESS NOTES
HEMATOLOGIC MALIGNANCIES PROGRESS NOTE    IDENTIFYING STATEMENT   Ernie Phan (Ernie) is a 88 y.o. male with a  of 3/19/1931 from [place] with the diagnosis of essential thrombocytosis.      ONCOLOGY HISTORY:    1. Essential thrombocytosis     2. 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 Myasthenia gravis on azathioprine  4. Pulmonary hypertension  5. HTN  6. Atrial fibrillation  7. CKD-III    INTERVAL HISTORY:      Mr. Phan returns to clinic for follow-up of his essential thrombocytosis and anemia of chronic kidney disease. He is under a lot of stress today as his wife is currently hospitalized with a broken hip. She is apparently not doing well after surgery. He is very anxious to spend more time with her.    Since his last visit, we were unable to start ESAs quickly as we would have liked due to insurance requirement of a specific formulary agent. He is now approved for epoetin birdie (Retacrit formulation) and will begin today.     Past Medical History, Past Social History and Past Family History have been reviewed and are unchanged except as noted in the interval history.    MEDICATIONS:     Current Outpatient Medications on File Prior to Visit   Medication Sig Dispense Refill    azaTHIOprine (IMURAN) 50 mg Tab Take 4 tablets (200 mg total) by mouth once daily. 360 tablet 3    DENTURE CLEANSER (EFFERVESCENT DENTURE CLEANSR DENT)       finasteride (PROSCAR) 5 mg tablet TAKE 1 TABLET EVERY DAY 90 tablet 3    FLUZONE HIGH-DOSE , PF, 180 mcg/0.5 mL vaccine       gabapentin (NEURONTIN) 300 MG capsule Take 1 capsule (300 mg total) by mouth 3 (three) times daily. 270 capsule 3    IRON, FERROUS SULFATE, ORAL Take 65 mg by mouth once daily.      ONE TOUCH ULTRA TEST Strp TEST DAILY 100 each 3    ONE TOUCH ULTRASOFT LANCETS lancets TEST DAILY 100 each 3    predniSONE (DELTASONE) 5 MG tablet Take 1 tablet (5 mg total) by mouth once daily. 45 tablet 3    tamsulosin (FLOMAX) 0.4 mg Cap TAKE 1  "CAPSULE EVERY DAY 90 capsule 3    verapamil (VERELAN) 120 MG C24P Take 2 capsules (240 mg total) by mouth once daily. 180 capsule 3    vitamin D 1000 units Tab Take 185 mg by mouth once daily.      warfarin (COUMADIN) 5 MG tablet Take 1.5 tablets (7.5 mg total) by mouth Daily. OR AS DIRECTED BY COUMADIN CLINIC 90 tablet 3    pyridostigmine (MESTINON) 60 mg Tab Take 1 tablet (60 mg total) by mouth every 6 to 8 hours as needed. 180 tablet 1     No current facility-administered medications on file prior to visit.        ALLERGIES: Review of patient's allergies indicates:  No Known Allergies     ROS:       Review of Systems   Constitutional: Negative for diaphoresis, fatigue, fever and unexpected weight change.   HENT:   Negative for lump/mass and sore throat.    Eyes: Negative for icterus.   Respiratory: Negative for cough and shortness of breath.    Cardiovascular: Negative for chest pain and palpitations.   Gastrointestinal: Negative for abdominal distention, constipation, diarrhea, nausea and vomiting.   Genitourinary: Negative for dysuria and frequency.    Musculoskeletal: Negative for arthralgias, gait problem and myalgias.   Skin: Negative for rash.   Neurological: Negative for dizziness, gait problem and headaches.   Hematological: Negative for adenopathy. Does not bruise/bleed easily.   Psychiatric/Behavioral: The patient is nervous/anxious.        PHYSICAL EXAM:  Vitals:    04/24/19 1153   BP: (!) 142/62   Pulse: 62   Temp: 97.6 °F (36.4 °C)   SpO2: 99%   Weight: 82.2 kg (181 lb 3.5 oz)   Height: 5' 9" (1.753 m)   PainSc:   6   PainLoc: Leg       KARNOFSKY PERFORMANCE STATUS 60%  ECOG 2    Physical Exam   Constitutional: He is oriented to person, place, and time. He appears well-developed and well-nourished. No distress.   HENT:   Head: Normocephalic and atraumatic.   Mouth/Throat: Mucous membranes are normal. No oral lesions.   Eyes: Conjunctivae are normal.   Neck: No thyromegaly present. "   Cardiovascular: Normal rate, regular rhythm and normal heart sounds.   No murmur heard.  Pulmonary/Chest: Breath sounds normal. He has no wheezes. He has no rales.   Abdominal: Soft. He exhibits no distension and no mass. There is no splenomegaly or hepatomegaly. There is no tenderness.   Lymphadenopathy:     He has no cervical adenopathy.        Right cervical: No deep cervical adenopathy present.       Left cervical: No deep cervical adenopathy present.     He has no axillary adenopathy.        Right: No inguinal adenopathy present.        Left: No inguinal adenopathy present.   Neurological: He is alert and oriented to person, place, and time. He has normal strength and normal reflexes. No cranial nerve deficit. Coordination normal.   Skin: No rash noted.       LAB:   Results for orders placed or performed in visit on 04/24/19   CBC auto differential   Result Value Ref Range    WBC 2.89 (L) 3.90 - 12.70 K/uL    RBC 2.26 (L) 4.60 - 6.20 M/uL    Hemoglobin 7.8 (L) 14.0 - 18.0 g/dL    Hematocrit 24.9 (L) 40.0 - 54.0 %     (H) 82 - 98 fL    MCH 34.5 (H) 27.0 - 31.0 pg    MCHC 31.3 (L) 32.0 - 36.0 g/dL    RDW SEE COMMENT 11.5 - 14.5 %    Platelets 470 (H) 150 - 350 K/uL    MPV 9.8 9.2 - 12.9 fL    Immature Granulocytes 4.8 (H) 0.0 - 0.5 %    Gran # (ANC) 1.9 1.8 - 7.7 K/uL    Immature Grans (Abs) 0.14 (H) 0.00 - 0.04 K/uL    Lymph # 0.5 (L) 1.0 - 4.8 K/uL    Mono # 0.3 0.3 - 1.0 K/uL    Eos # 0.0 0.0 - 0.5 K/uL    Baso # 0.03 0.00 - 0.20 K/uL    nRBC 0 0 /100 WBC    Gran% 66.6 38.0 - 73.0 %    Lymph% 17.6 (L) 18.0 - 48.0 %    Mono% 9.0 4.0 - 15.0 %    Eosinophil% 1.0 0.0 - 8.0 %    Basophil% 1.0 0.0 - 1.9 %    Differential Method Automated    Comprehensive metabolic panel   Result Value Ref Range    Sodium 133 (L) 136 - 145 mmol/L    Potassium 4.2 3.5 - 5.1 mmol/L    Chloride 103 95 - 110 mmol/L    CO2 20 (L) 23 - 29 mmol/L    Glucose 100 70 - 110 mg/dL    BUN, Bld 27 (H) 8 - 23 mg/dL    Creatinine 1.8 (H)  0.5 - 1.4 mg/dL    Calcium 9.1 8.7 - 10.5 mg/dL    Total Protein 6.5 6.0 - 8.4 g/dL    Albumin 3.4 (L) 3.5 - 5.2 g/dL    Total Bilirubin 3.5 (H) 0.1 - 1.0 mg/dL    Alkaline Phosphatase 137 (H) 55 - 135 U/L    AST 29 10 - 40 U/L    ALT 23 10 - 44 U/L    Anion Gap 10 8 - 16 mmol/L    eGFR if African American 38.0 (A) >60 mL/min/1.73 m^2    eGFR if non  32.9 (A) >60 mL/min/1.73 m^2   Type & Screen   Result Value Ref Range    Group & Rh B POS     Indirect Devika NEG        PROBLEMS ASSESSED THIS VISIT:    1. Essential thrombocytosis    2. Anemia of renal disease        PLAN:       Essential thrombocytosis  Platelet count is slightly above goal of 400 but acceptable for Mr. Phan. We will continue to monitor this while he is off therapy.     Anemia of renal disease  Begin epoetin birdie today. We will administer every two weeks if hemoglobin is less than 11 g/dl.     Follow-up   - Labs in 2 weeks  - Follow-up visit in 4 weeks.     Nick Gutierrez MD  Hematology and Stem Cell Transplant

## 2019-04-26 NOTE — ASSESSMENT & PLAN NOTE
Begin epoetin birdie today. We will administer every two weeks if hemoglobin is less than 11 g/dl.

## 2019-04-26 NOTE — ASSESSMENT & PLAN NOTE
Platelet count is slightly above goal of 400 but acceptable for Mr. Phan. We will continue to monitor this while he is off therapy.

## 2019-04-30 NOTE — PROGRESS NOTES
Spoke with patient regarding missed INR. Patient states that he has not been able to test because his wife passed away on Sunday and it is too difficult for him right now. He requested we give him a week to process her death.

## 2019-05-07 ENCOUNTER — TELEPHONE (OUTPATIENT)
Dept: HEMATOLOGY/ONCOLOGY | Facility: CLINIC | Age: 84
End: 2019-05-07

## 2019-05-07 NOTE — TELEPHONE ENCOUNTER
Spoke to patient.  States wife passed away and will call back to reschedule appts      ----- Message from Cassnadra Velasco sent at 5/7/2019 10:51 AM CDT -----  Contact: Pt   Pt calling in regards to the reason he cancelled his last appt,

## 2019-05-13 ENCOUNTER — ANTI-COAG VISIT (OUTPATIENT)
Dept: CARDIOLOGY | Facility: CLINIC | Age: 84
End: 2019-05-13
Payer: MEDICARE

## 2019-05-13 DIAGNOSIS — I48.0 PAROXYSMAL ATRIAL FIBRILLATION: ICD-10-CM

## 2019-05-13 DIAGNOSIS — Z79.01 LONG TERM CURRENT USE OF ANTICOAGULANT THERAPY: ICD-10-CM

## 2019-05-13 LAB — INR PPP: 6.9

## 2019-05-13 PROCEDURE — 93793 ANTICOAG MGMT PT WARFARIN: CPT | Mod: S$GLB,,,

## 2019-05-13 PROCEDURE — 93793 PR ANTICOAGULANT MGMT FOR PT TAKING WARFARIN: ICD-10-PCS | Mod: S$GLB,,,

## 2019-05-13 NOTE — PROGRESS NOTES
INR very high. Patient's wife passed away about 3 weeks ago. Hence he has not tested and definitely out of his routine. Hold coumadin and follow closely

## 2019-05-15 ENCOUNTER — ANTI-COAG VISIT (OUTPATIENT)
Dept: CARDIOLOGY | Facility: CLINIC | Age: 84
End: 2019-05-15

## 2019-05-15 DIAGNOSIS — I48.0 PAROXYSMAL ATRIAL FIBRILLATION: ICD-10-CM

## 2019-05-15 DIAGNOSIS — Z79.01 LONG TERM CURRENT USE OF ANTICOAGULANT THERAPY: ICD-10-CM

## 2019-05-15 LAB — INR PPP: 2.6

## 2019-05-17 ENCOUNTER — TELEPHONE (OUTPATIENT)
Dept: HEMATOLOGY/ONCOLOGY | Facility: CLINIC | Age: 84
End: 2019-05-17

## 2019-05-17 NOTE — TELEPHONE ENCOUNTER
Returned the pt's call to change his appt for 5/22.  Pt states that he is not ready to be seen due to the loss of his wife.

## 2019-05-20 ENCOUNTER — ANTI-COAG VISIT (OUTPATIENT)
Dept: CARDIOLOGY | Facility: CLINIC | Age: 84
End: 2019-05-20
Payer: MEDICARE

## 2019-05-20 DIAGNOSIS — Z79.01 LONG TERM CURRENT USE OF ANTICOAGULANT THERAPY: ICD-10-CM

## 2019-05-20 DIAGNOSIS — I48.0 PAROXYSMAL ATRIAL FIBRILLATION: ICD-10-CM

## 2019-05-20 LAB — INR PPP: 3.1

## 2019-05-20 PROCEDURE — 93793 ANTICOAG MGMT PT WARFARIN: CPT | Mod: S$GLB,,,

## 2019-05-20 PROCEDURE — 93793 PR ANTICOAGULANT MGMT FOR PT TAKING WARFARIN: ICD-10-PCS | Mod: S$GLB,,,

## 2019-05-27 LAB — INR PPP: 2.7

## 2019-05-28 ENCOUNTER — ANTI-COAG VISIT (OUTPATIENT)
Dept: CARDIOLOGY | Facility: CLINIC | Age: 84
End: 2019-05-28
Payer: MEDICARE

## 2019-05-28 DIAGNOSIS — Z79.01 LONG TERM CURRENT USE OF ANTICOAGULANT THERAPY: ICD-10-CM

## 2019-05-28 DIAGNOSIS — I48.0 PAROXYSMAL ATRIAL FIBRILLATION: ICD-10-CM

## 2019-05-28 PROCEDURE — 93793 PR ANTICOAGULANT MGMT FOR PT TAKING WARFARIN: ICD-10-PCS | Mod: S$GLB,,,

## 2019-05-28 PROCEDURE — 93793 ANTICOAG MGMT PT WARFARIN: CPT | Mod: S$GLB,,,

## 2019-06-03 ENCOUNTER — ANTI-COAG VISIT (OUTPATIENT)
Dept: CARDIOLOGY | Facility: CLINIC | Age: 84
End: 2019-06-03
Payer: MEDICARE

## 2019-06-03 DIAGNOSIS — Z79.01 LONG TERM CURRENT USE OF ANTICOAGULANT THERAPY: ICD-10-CM

## 2019-06-03 DIAGNOSIS — I48.0 PAROXYSMAL ATRIAL FIBRILLATION: ICD-10-CM

## 2019-06-03 LAB — INR PPP: 2

## 2019-06-03 PROCEDURE — 93793 ANTICOAG MGMT PT WARFARIN: CPT | Mod: S$GLB,,,

## 2019-06-03 PROCEDURE — 93793 PR ANTICOAGULANT MGMT FOR PT TAKING WARFARIN: ICD-10-PCS | Mod: S$GLB,,,

## 2019-06-05 DIAGNOSIS — G70.00 MG (MYASTHENIA GRAVIS): ICD-10-CM

## 2019-06-05 RX ORDER — PREDNISONE 5 MG/1
5 TABLET ORAL DAILY
Qty: 45 TABLET | Refills: 3 | Status: SHIPPED | OUTPATIENT
Start: 2019-06-05 | End: 2019-10-22 | Stop reason: SDUPTHER

## 2019-06-05 NOTE — TELEPHONE ENCOUNTER
----- Message from Leigh Dey sent at 6/5/2019  2:05 PM CDT -----  Contact: self/[phone]  Pt called in regards to having questions about his Rx for prednisone (DELTASONE) 5 MG tablet.      Please advise

## 2019-06-05 NOTE — TELEPHONE ENCOUNTER
Pt need 90 day supply for 3 refills so the dispense of the prednisone need to be changed he takes one a day

## 2019-06-06 DIAGNOSIS — I49.3 PVC (PREMATURE VENTRICULAR CONTRACTION): ICD-10-CM

## 2019-06-06 RX ORDER — VERAPAMIL HYDROCHLORIDE 120 MG/1
240 CAPSULE, EXTENDED RELEASE ORAL DAILY
Qty: 180 CAPSULE | Refills: 3 | Status: SHIPPED | OUTPATIENT
Start: 2019-06-06 | End: 2019-06-14 | Stop reason: SDUPTHER

## 2019-06-07 ENCOUNTER — INFUSION (OUTPATIENT)
Dept: INFUSION THERAPY | Facility: HOSPITAL | Age: 84
End: 2019-06-07
Attending: INTERNAL MEDICINE
Payer: MEDICARE

## 2019-06-07 ENCOUNTER — LAB VISIT (OUTPATIENT)
Dept: LAB | Facility: HOSPITAL | Age: 84
End: 2019-06-07
Attending: INTERNAL MEDICINE
Payer: MEDICARE

## 2019-06-07 ENCOUNTER — OFFICE VISIT (OUTPATIENT)
Dept: HEMATOLOGY/ONCOLOGY | Facility: CLINIC | Age: 84
End: 2019-06-07
Payer: MEDICARE

## 2019-06-07 VITALS
TEMPERATURE: 98 F | HEART RATE: 58 BPM | DIASTOLIC BLOOD PRESSURE: 65 MMHG | SYSTOLIC BLOOD PRESSURE: 141 MMHG | BODY MASS INDEX: 27.04 KG/M2 | WEIGHT: 182.56 LBS | HEIGHT: 69 IN | OXYGEN SATURATION: 99 %

## 2019-06-07 DIAGNOSIS — I70.0 AORTIC ATHEROSCLEROSIS: ICD-10-CM

## 2019-06-07 DIAGNOSIS — N18.9 ANEMIA OF RENAL DISEASE: ICD-10-CM

## 2019-06-07 DIAGNOSIS — D63.1 ANEMIA OF RENAL DISEASE: Primary | ICD-10-CM

## 2019-06-07 DIAGNOSIS — D47.3 ESSENTIAL THROMBOCYTOSIS: Primary | ICD-10-CM

## 2019-06-07 DIAGNOSIS — D47.3 ESSENTIAL THROMBOCYTOSIS: ICD-10-CM

## 2019-06-07 DIAGNOSIS — N18.9 ANEMIA OF RENAL DISEASE: Primary | ICD-10-CM

## 2019-06-07 DIAGNOSIS — D63.1 ANEMIA OF RENAL DISEASE: ICD-10-CM

## 2019-06-07 DIAGNOSIS — Z79.01 LONG TERM CURRENT USE OF ANTICOAGULANT THERAPY: ICD-10-CM

## 2019-06-07 LAB
ABO + RH BLD: NORMAL
ALBUMIN SERPL BCP-MCNC: 3.5 G/DL (ref 3.5–5.2)
ALP SERPL-CCNC: 118 U/L (ref 55–135)
ALT SERPL W/O P-5'-P-CCNC: 20 U/L (ref 10–44)
ANION GAP SERPL CALC-SCNC: 9 MMOL/L (ref 8–16)
AST SERPL-CCNC: 28 U/L (ref 10–40)
BASOPHILS # BLD AUTO: 0.08 K/UL (ref 0–0.2)
BASOPHILS NFR BLD: 0.7 % (ref 0–1.9)
BILIRUB SERPL-MCNC: 1.1 MG/DL (ref 0.1–1)
BLD GP AB SCN CELLS X3 SERPL QL: NORMAL
BUN SERPL-MCNC: 30 MG/DL (ref 8–23)
CALCIUM SERPL-MCNC: 9.5 MG/DL (ref 8.7–10.5)
CHLORIDE SERPL-SCNC: 104 MMOL/L (ref 95–110)
CO2 SERPL-SCNC: 24 MMOL/L (ref 23–29)
CREAT SERPL-MCNC: 1.4 MG/DL (ref 0.5–1.4)
DIFFERENTIAL METHOD: ABNORMAL
EOSINOPHIL # BLD AUTO: 0.1 K/UL (ref 0–0.5)
EOSINOPHIL NFR BLD: 1.1 % (ref 0–8)
ERYTHROCYTE [DISTWIDTH] IN BLOOD BY AUTOMATED COUNT: 19.4 % (ref 11.5–14.5)
EST. GFR  (AFRICAN AMERICAN): 51.5 ML/MIN/1.73 M^2
EST. GFR  (NON AFRICAN AMERICAN): 44.5 ML/MIN/1.73 M^2
GLUCOSE SERPL-MCNC: 91 MG/DL (ref 70–110)
HCT VFR BLD AUTO: 32 % (ref 40–54)
HGB BLD-MCNC: 10 G/DL (ref 14–18)
IMM GRANULOCYTES # BLD AUTO: 0.18 K/UL (ref 0–0.04)
IMM GRANULOCYTES NFR BLD AUTO: 1.6 % (ref 0–0.5)
LYMPHOCYTES # BLD AUTO: 0.6 K/UL (ref 1–4.8)
LYMPHOCYTES NFR BLD: 5.4 % (ref 18–48)
MCH RBC QN AUTO: 35.3 PG (ref 27–31)
MCHC RBC AUTO-ENTMCNC: 31.3 G/DL (ref 32–36)
MCV RBC AUTO: 113 FL (ref 82–98)
MONOCYTES # BLD AUTO: 0.5 K/UL (ref 0.3–1)
MONOCYTES NFR BLD: 4.9 % (ref 4–15)
NEUTROPHILS # BLD AUTO: 9.6 K/UL (ref 1.8–7.7)
NEUTROPHILS NFR BLD: 86.3 % (ref 38–73)
NRBC BLD-RTO: 0 /100 WBC
PLATELET # BLD AUTO: 998 K/UL (ref 150–350)
PMV BLD AUTO: 9.6 FL (ref 9.2–12.9)
POTASSIUM SERPL-SCNC: 4.1 MMOL/L (ref 3.5–5.1)
PROT SERPL-MCNC: 7 G/DL (ref 6–8.4)
RBC # BLD AUTO: 2.83 M/UL (ref 4.6–6.2)
RETICS/RBC NFR AUTO: 2.4 % (ref 0.4–2)
SODIUM SERPL-SCNC: 137 MMOL/L (ref 136–145)
WBC # BLD AUTO: 11.07 K/UL (ref 3.9–12.7)

## 2019-06-07 PROCEDURE — 63600175 PHARM REV CODE 636 W HCPCS: Mod: HCNC | Performed by: INTERNAL MEDICINE

## 2019-06-07 PROCEDURE — 99499 UNLISTED E&M SERVICE: CPT | Mod: HCNC,S$GLB,, | Performed by: INTERNAL MEDICINE

## 2019-06-07 PROCEDURE — 85045 AUTOMATED RETICULOCYTE COUNT: CPT | Mod: HCNC

## 2019-06-07 PROCEDURE — 99499 RISK ADDL DX/OHS AUDIT: ICD-10-PCS | Mod: HCNC,S$GLB,, | Performed by: INTERNAL MEDICINE

## 2019-06-07 PROCEDURE — 1101F PR PT FALLS ASSESS DOC 0-1 FALLS W/OUT INJ PAST YR: ICD-10-PCS | Mod: HCNC,CPTII,S$GLB, | Performed by: INTERNAL MEDICINE

## 2019-06-07 PROCEDURE — 85025 COMPLETE CBC W/AUTO DIFF WBC: CPT | Mod: HCNC

## 2019-06-07 PROCEDURE — 36415 COLL VENOUS BLD VENIPUNCTURE: CPT | Mod: HCNC

## 2019-06-07 PROCEDURE — 99999 PR PBB SHADOW E&M-EST. PATIENT-LVL III: CPT | Mod: PBBFAC,HCNC,, | Performed by: INTERNAL MEDICINE

## 2019-06-07 PROCEDURE — 99999 PR PBB SHADOW E&M-EST. PATIENT-LVL III: ICD-10-PCS | Mod: PBBFAC,HCNC,, | Performed by: INTERNAL MEDICINE

## 2019-06-07 PROCEDURE — 99215 PR OFFICE/OUTPT VISIT, EST, LEVL V, 40-54 MIN: ICD-10-PCS | Mod: HCNC,S$GLB,, | Performed by: INTERNAL MEDICINE

## 2019-06-07 PROCEDURE — 96372 THER/PROPH/DIAG INJ SC/IM: CPT | Mod: HCNC

## 2019-06-07 PROCEDURE — 86850 RBC ANTIBODY SCREEN: CPT | Mod: HCNC

## 2019-06-07 PROCEDURE — 80053 COMPREHEN METABOLIC PANEL: CPT | Mod: HCNC

## 2019-06-07 PROCEDURE — 99215 OFFICE O/P EST HI 40 MIN: CPT | Mod: HCNC,S$GLB,, | Performed by: INTERNAL MEDICINE

## 2019-06-07 PROCEDURE — 1101F PT FALLS ASSESS-DOCD LE1/YR: CPT | Mod: HCNC,CPTII,S$GLB, | Performed by: INTERNAL MEDICINE

## 2019-06-07 RX ADMIN — EPOETIN ALFA-EPBX 8000 UNITS: 10000 INJECTION, SOLUTION INTRAVENOUS; SUBCUTANEOUS at 03:06

## 2019-06-07 NOTE — NURSING
Patient tolerated Retacrit injection into right arm without complications. VS WNL. Labs reviewed. Patient has no complaints at this time. AVS provided. Discharged home.

## 2019-06-09 PROBLEM — I70.0 AORTIC ATHEROSCLEROSIS: Status: ACTIVE | Noted: 2019-06-09

## 2019-06-10 ENCOUNTER — ANTI-COAG VISIT (OUTPATIENT)
Dept: CARDIOLOGY | Facility: CLINIC | Age: 84
End: 2019-06-10
Payer: MEDICARE

## 2019-06-10 DIAGNOSIS — I48.0 PAROXYSMAL ATRIAL FIBRILLATION: ICD-10-CM

## 2019-06-10 DIAGNOSIS — Z79.01 LONG TERM CURRENT USE OF ANTICOAGULANT THERAPY: ICD-10-CM

## 2019-06-10 LAB — INR PPP: 3.2

## 2019-06-10 PROCEDURE — 93793 ANTICOAG MGMT PT WARFARIN: CPT | Mod: S$GLB,,,

## 2019-06-10 PROCEDURE — 93793 PR ANTICOAGULANT MGMT FOR PT TAKING WARFARIN: ICD-10-PCS | Mod: S$GLB,,,

## 2019-06-10 NOTE — PROGRESS NOTES
HEMATOLOGIC MALIGNANCIES PROGRESS NOTE    IDENTIFYING STATEMENT   Ernie Phan (Ernie) is a 88 y.o. male with a  of 3/19/1931 from [place] with the diagnosis of essential thrombocytosis.      ONCOLOGY HISTORY:    1. Essential thrombocytosis     2. 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 Myasthenia gravis on azathioprine  4. Pulmonary hypertension  5. HTN  6. Atrial fibrillation  7. CKD-III    INTERVAL HISTORY:      Mr. Phan returns to clinic for follow-up of his essential thrombocytosis and anemia of chronic kidney disease.    His wife passed away since his last visit. He is still grieving. He had delayed this appointment in the aftermath of her death.    He otherwise is asymptomatic from his anemia. Denies shortness of breath or chest pain.    He had been off hydroxyurea since his last visit.    Past Medical History, Past Social History and Past Family History have been reviewed and are unchanged except as noted in the interval history.    MEDICATIONS:     Current Outpatient Medications on File Prior to Visit   Medication Sig Dispense Refill    azaTHIOprine (IMURAN) 50 mg Tab Take 4 tablets (200 mg total) by mouth once daily. 360 tablet 3    DENTURE CLEANSER (EFFERVESCENT DENTURE CLEANSR DENT)       finasteride (PROSCAR) 5 mg tablet TAKE 1 TABLET EVERY DAY 90 tablet 3    FLUZONE HIGH-DOSE , PF, 180 mcg/0.5 mL vaccine       gabapentin (NEURONTIN) 300 MG capsule Take 1 capsule (300 mg total) by mouth 3 (three) times daily. 270 capsule 3    IRON, FERROUS SULFATE, ORAL Take 65 mg by mouth once daily.      ONE TOUCH ULTRA TEST Strp TEST DAILY 100 each 3    ONE TOUCH ULTRASOFT LANCETS lancets TEST DAILY 100 each 3    predniSONE (DELTASONE) 5 MG tablet Take 1 tablet (5 mg total) by mouth once daily. 45 tablet 3    tamsulosin (FLOMAX) 0.4 mg Cap TAKE 1 CAPSULE EVERY DAY 90 capsule 3    verapamil (VERELAN) 120 MG C24P Take 2 capsules (240 mg total) by mouth once daily. 180 capsule 3     "vitamin D 1000 units Tab Take 185 mg by mouth once daily.      warfarin (COUMADIN) 5 MG tablet Take 1.5 tablets (7.5 mg total) by mouth Daily. OR AS DIRECTED BY COUMADIN CLINIC 90 tablet 3     No current facility-administered medications on file prior to visit.        ALLERGIES: Review of patient's allergies indicates:  No Known Allergies     ROS:       Review of Systems   Constitutional: Negative for diaphoresis, fatigue, fever and unexpected weight change.   HENT:   Negative for lump/mass and sore throat.    Eyes: Negative for icterus.   Respiratory: Negative for cough and shortness of breath.    Cardiovascular: Negative for chest pain and palpitations.   Gastrointestinal: Negative for abdominal distention, constipation, diarrhea, nausea and vomiting.   Genitourinary: Negative for dysuria and frequency.    Musculoskeletal: Negative for arthralgias, gait problem and myalgias.   Skin: Negative for rash.   Neurological: Negative for dizziness, gait problem and headaches.   Hematological: Negative for adenopathy. Does not bruise/bleed easily.   Psychiatric/Behavioral: Positive for depression (grieving from his wife's recent death). The patient is not nervous/anxious.        PHYSICAL EXAM:  Vitals:    06/07/19 1333   BP: (!) 141/65   Pulse: (!) 58   Temp: 97.8 °F (36.6 °C)   SpO2: 99%   Weight: 82.8 kg (182 lb 8.7 oz)   Height: 5' 9" (1.753 m)   PainSc:   6   PainLoc: Leg       KARNOFSKY PERFORMANCE STATUS 60%  ECOG 2    Physical Exam   Constitutional: He is oriented to person, place, and time. He appears well-developed and well-nourished. No distress.   HENT:   Head: Normocephalic and atraumatic.   Mouth/Throat: Mucous membranes are normal. No oral lesions.   Eyes: Conjunctivae are normal.   Neck: No thyromegaly present.   Cardiovascular: Normal rate, regular rhythm and normal heart sounds.   No murmur heard.  Pulmonary/Chest: Breath sounds normal. He has no wheezes. He has no rales.   Abdominal: Soft. He exhibits no " distension and no mass. There is no splenomegaly or hepatomegaly. There is no tenderness.   Lymphadenopathy:     He has no cervical adenopathy.        Right cervical: No deep cervical adenopathy present.       Left cervical: No deep cervical adenopathy present.     He has no axillary adenopathy.        Right: No inguinal adenopathy present.        Left: No inguinal adenopathy present.   Neurological: He is alert and oriented to person, place, and time. He has normal strength and normal reflexes. No cranial nerve deficit. Coordination normal.   Skin: No rash noted.   Psychiatric:   Tearful when discussing his wife.       LAB:   Results for orders placed or performed in visit on 06/07/19   CBC auto differential   Result Value Ref Range    WBC 11.07 3.90 - 12.70 K/uL    RBC 2.83 (L) 4.60 - 6.20 M/uL    Hemoglobin 10.0 (L) 14.0 - 18.0 g/dL    Hematocrit 32.0 (L) 40.0 - 54.0 %    Mean Corpuscular Volume 113 (H) 82 - 98 fL    Mean Corpuscular Hemoglobin 35.3 (H) 27.0 - 31.0 pg    Mean Corpuscular Hemoglobin Conc 31.3 (L) 32.0 - 36.0 g/dL    RDW 19.4 (H) 11.5 - 14.5 %    Platelets 998 (H) 150 - 350 K/uL    MPV 9.6 9.2 - 12.9 fL    Immature Granulocytes 1.6 (H) 0.0 - 0.5 %    Gran # (ANC) 9.6 (H) 1.8 - 7.7 K/uL    Immature Grans (Abs) 0.18 (H) 0.00 - 0.04 K/uL    Lymph # 0.6 (L) 1.0 - 4.8 K/uL    Mono # 0.5 0.3 - 1.0 K/uL    Eos # 0.1 0.0 - 0.5 K/uL    Baso # 0.08 0.00 - 0.20 K/uL    nRBC 0 0 /100 WBC    Gran% 86.3 (H) 38.0 - 73.0 %    Lymph% 5.4 (L) 18.0 - 48.0 %    Mono% 4.9 4.0 - 15.0 %    Eosinophil% 1.1 0.0 - 8.0 %    Basophil% 0.7 0.0 - 1.9 %    Differential Method Automated    Comprehensive metabolic panel   Result Value Ref Range    Sodium 137 136 - 145 mmol/L    Potassium 4.1 3.5 - 5.1 mmol/L    Chloride 104 95 - 110 mmol/L    CO2 24 23 - 29 mmol/L    Glucose 91 70 - 110 mg/dL    BUN, Bld 30 (H) 8 - 23 mg/dL    Creatinine 1.4 0.5 - 1.4 mg/dL    Calcium 9.5 8.7 - 10.5 mg/dL    Total Protein 7.0 6.0 - 8.4 g/dL     Albumin 3.5 3.5 - 5.2 g/dL    Total Bilirubin 1.1 (H) 0.1 - 1.0 mg/dL    Alkaline Phosphatase 118 55 - 135 U/L    AST 28 10 - 40 U/L    ALT 20 10 - 44 U/L    Anion Gap 9 8 - 16 mmol/L    eGFR if African American 51.5 (A) >60 mL/min/1.73 m^2    eGFR if non African American 44.5 (A) >60 mL/min/1.73 m^2   Reticulocytes   Result Value Ref Range    Retic 2.4 (H) 0.4 - 2.0 %   Type & Screen   Result Value Ref Range    Group & Rh B POS     Indirect Devika NEG        PROBLEMS ASSESSED THIS VISIT:    1. Essential thrombocytosis    2. Anemia of renal disease    3. Aortic atherosclerosis    4. Long term current use of anticoagulant therapy        PLAN:       Essential thrombocytosis  Platelet count is now 998. We will need to restart hydroxyurea therapy. We will start at a dose of 500 mg daily to try to minimize other hematologic toxicity.     Anemia of renal disease  Continue Retacrit monthly.     Aortic atherosclerosis  Visualized on chest x-ray from 6/2018. Asymptomatic. He should discuss management with his PCP.     Long term current use of anticoagulant therapy  On warfarin for atrial fibrillation. Continue.     Follow-up   - 4 weeks    Nick Gutierrez MD  Hematology and Stem Cell Transplant

## 2019-06-10 NOTE — ASSESSMENT & PLAN NOTE
Platelet count is now 998. We will need to restart hydroxyurea therapy. We will start at a dose of 500 mg daily to try to minimize other hematologic toxicity.

## 2019-06-10 NOTE — PROGRESS NOTES
Line busy at home #, at cell # recording states--call can not be completed as dialed--Needs to be questioned---erm

## 2019-06-14 DIAGNOSIS — I49.3 PVC (PREMATURE VENTRICULAR CONTRACTION): ICD-10-CM

## 2019-06-14 RX ORDER — VERAPAMIL HYDROCHLORIDE 120 MG/1
240 CAPSULE, EXTENDED RELEASE ORAL DAILY
Qty: 180 CAPSULE | Refills: 3 | Status: SHIPPED | OUTPATIENT
Start: 2019-06-14 | End: 2019-06-24 | Stop reason: CLARIF

## 2019-06-17 ENCOUNTER — ANTI-COAG VISIT (OUTPATIENT)
Dept: CARDIOLOGY | Facility: CLINIC | Age: 84
End: 2019-06-17
Payer: MEDICARE

## 2019-06-17 DIAGNOSIS — I48.0 PAROXYSMAL ATRIAL FIBRILLATION: ICD-10-CM

## 2019-06-17 DIAGNOSIS — Z79.01 LONG TERM CURRENT USE OF ANTICOAGULANT THERAPY: ICD-10-CM

## 2019-06-17 LAB — INR PPP: 1.9

## 2019-06-17 PROCEDURE — 93793 PR ANTICOAGULANT MGMT FOR PT TAKING WARFARIN: ICD-10-PCS | Mod: S$GLB,,,

## 2019-06-17 PROCEDURE — 93793 ANTICOAG MGMT PT WARFARIN: CPT | Mod: S$GLB,,,

## 2019-06-24 ENCOUNTER — ANTI-COAG VISIT (OUTPATIENT)
Dept: CARDIOLOGY | Facility: CLINIC | Age: 84
End: 2019-06-24
Payer: MEDICARE

## 2019-06-24 DIAGNOSIS — Z79.01 LONG TERM CURRENT USE OF ANTICOAGULANT THERAPY: ICD-10-CM

## 2019-06-24 DIAGNOSIS — I49.3 PVC (PREMATURE VENTRICULAR CONTRACTION): ICD-10-CM

## 2019-06-24 DIAGNOSIS — I48.0 PAROXYSMAL ATRIAL FIBRILLATION: ICD-10-CM

## 2019-06-24 LAB — INR PPP: 2

## 2019-06-24 PROCEDURE — 93793 PR ANTICOAGULANT MGMT FOR PT TAKING WARFARIN: ICD-10-PCS | Mod: S$GLB,,,

## 2019-06-24 PROCEDURE — 93793 ANTICOAG MGMT PT WARFARIN: CPT | Mod: S$GLB,,,

## 2019-06-24 RX ORDER — VERAPAMIL HYDROCHLORIDE 120 MG/1
120 TABLET, FILM COATED ORAL 2 TIMES DAILY
Qty: 180 TABLET | Refills: 0 | Status: SHIPPED | OUTPATIENT
Start: 2019-06-24 | End: 2019-06-28 | Stop reason: SDUPTHER

## 2019-06-24 RX ORDER — VERAPAMIL HYDROCHLORIDE 120 MG/1
240 CAPSULE, EXTENDED RELEASE ORAL DAILY
Qty: 180 CAPSULE | Refills: 3 | Status: CANCELLED | OUTPATIENT
Start: 2019-06-24 | End: 2020-06-23

## 2019-06-24 NOTE — TELEPHONE ENCOUNTER
----- Message from Dianne Rodriguez sent at 6/24/2019  3:35 PM CDT -----  Contact: Self   .Rx Refill/Request       Is this a Refill or New Rx:   refill for tabs not capsules-has no co-pay   Rx Name and Strength:  verapamil (VERELAN) 120 MG -TABS   Preferred Pharmacy with phone number: [facility]   Communication Preference: [phone]   Additional Information:  Pt called on 6/14 asking for tabs calling to ask if prescription was send to [facility], Pt running out of tabs. Please call Pt if completed.  Thanks

## 2019-06-28 ENCOUNTER — TELEPHONE (OUTPATIENT)
Dept: INTERNAL MEDICINE | Facility: CLINIC | Age: 84
End: 2019-06-28

## 2019-06-28 RX ORDER — VERAPAMIL HYDROCHLORIDE 120 MG/1
120 TABLET, FILM COATED ORAL 2 TIMES DAILY
Qty: 180 TABLET | Refills: 3 | Status: SHIPPED | OUTPATIENT
Start: 2019-06-28 | End: 2019-07-12 | Stop reason: SDUPTHER

## 2019-06-28 NOTE — TELEPHONE ENCOUNTER
----- Message from Rosibel Pate sent at 6/28/2019  2:17 PM CDT -----  Contact: Rosibel/Daughter in Law/[phone]  Rosibel called in regards needing to talk with Dr Michel's nurse about following up on home health request paper work that were already turn to the PCP. Please call and advise. Thank you!

## 2019-07-01 ENCOUNTER — ANTI-COAG VISIT (OUTPATIENT)
Dept: CARDIOLOGY | Facility: CLINIC | Age: 84
End: 2019-07-01
Payer: MEDICARE

## 2019-07-01 DIAGNOSIS — I48.0 PAROXYSMAL ATRIAL FIBRILLATION: ICD-10-CM

## 2019-07-01 DIAGNOSIS — Z79.01 LONG TERM CURRENT USE OF ANTICOAGULANT THERAPY: ICD-10-CM

## 2019-07-01 LAB — INR PPP: 2.2

## 2019-07-01 PROCEDURE — 93793 PR ANTICOAGULANT MGMT FOR PT TAKING WARFARIN: ICD-10-PCS | Mod: S$GLB,,,

## 2019-07-01 PROCEDURE — 93793 ANTICOAG MGMT PT WARFARIN: CPT | Mod: S$GLB,,,

## 2019-07-05 ENCOUNTER — TELEPHONE (OUTPATIENT)
Dept: INTERNAL MEDICINE | Facility: CLINIC | Age: 84
End: 2019-07-05

## 2019-07-05 NOTE — TELEPHONE ENCOUNTER
----- Message from Frannie Bush sent at 7/5/2019 12:22 PM CDT -----  Contact: [facility] [phone]  ext [phone]  Calling for the status of request that was faxed over on 6/11/19 and on 7/1/19. [facility] forms.  Re- faxing today mamadou [phone].     Please call and advise  Thank you

## 2019-07-07 ENCOUNTER — NURSE TRIAGE (OUTPATIENT)
Dept: ADMINISTRATIVE | Facility: CLINIC | Age: 84
End: 2019-07-07

## 2019-07-08 ENCOUNTER — ANTI-COAG VISIT (OUTPATIENT)
Dept: CARDIOLOGY | Facility: CLINIC | Age: 84
End: 2019-07-08
Payer: MEDICARE

## 2019-07-08 LAB — INR PPP: 2.2

## 2019-07-08 PROCEDURE — 93793 PR ANTICOAGULANT MGMT FOR PT TAKING WARFARIN: ICD-10-PCS | Mod: S$GLB,,,

## 2019-07-08 PROCEDURE — 93793 ANTICOAG MGMT PT WARFARIN: CPT | Mod: S$GLB,,,

## 2019-07-08 NOTE — TELEPHONE ENCOUNTER
Reason for Disposition   Health Information question, no triage required and triager able to answer question    Protocols used: INFORMATION ONLY CALL-A-  pt called re passing BM. Pt has urge to go. Stool is too hard. Pt has miralax here. Stool like a rock. Just a little bit coming out. + flatus, no abd pain, no N/V. only pieces of stool are coming out. last BM fri 7/5. Wife passed away recently. Pt not sure if he is has suppository at home. Rec to take miralax in prune juice. Eat more high fiber foods. Miralax may not work for up to 24 hours. pt states he will use supp and then miralax. Rec to titrate miralx to have soft stool each day. call back with questions

## 2019-07-12 RX ORDER — VERAPAMIL HYDROCHLORIDE 120 MG/1
120 TABLET, FILM COATED ORAL 2 TIMES DAILY
Qty: 180 TABLET | Refills: 3 | Status: SHIPPED | OUTPATIENT
Start: 2019-07-12 | End: 2020-07-11

## 2019-07-15 ENCOUNTER — ANTI-COAG VISIT (OUTPATIENT)
Dept: CARDIOLOGY | Facility: CLINIC | Age: 84
End: 2019-07-15
Payer: MEDICARE

## 2019-07-15 DIAGNOSIS — I48.0 PAROXYSMAL ATRIAL FIBRILLATION: ICD-10-CM

## 2019-07-15 DIAGNOSIS — Z79.01 LONG TERM CURRENT USE OF ANTICOAGULANT THERAPY: ICD-10-CM

## 2019-07-15 LAB — INR PPP: 2.1

## 2019-07-15 PROCEDURE — 93793 PR ANTICOAGULANT MGMT FOR PT TAKING WARFARIN: ICD-10-PCS | Mod: S$GLB,,, | Performed by: PHARMACIST

## 2019-07-15 PROCEDURE — 93793 ANTICOAG MGMT PT WARFARIN: CPT | Mod: S$GLB,,, | Performed by: PHARMACIST

## 2019-07-19 ENCOUNTER — OFFICE VISIT (OUTPATIENT)
Dept: HEMATOLOGY/ONCOLOGY | Facility: CLINIC | Age: 84
End: 2019-07-19
Payer: MEDICARE

## 2019-07-19 ENCOUNTER — LAB VISIT (OUTPATIENT)
Dept: LAB | Facility: HOSPITAL | Age: 84
End: 2019-07-19
Attending: INTERNAL MEDICINE
Payer: MEDICARE

## 2019-07-19 VITALS
WEIGHT: 175.94 LBS | HEART RATE: 53 BPM | SYSTOLIC BLOOD PRESSURE: 154 MMHG | HEIGHT: 69 IN | BODY MASS INDEX: 26.06 KG/M2 | OXYGEN SATURATION: 99 % | DIASTOLIC BLOOD PRESSURE: 67 MMHG | TEMPERATURE: 98 F

## 2019-07-19 DIAGNOSIS — Z79.01 LONG TERM CURRENT USE OF ANTICOAGULANT THERAPY: ICD-10-CM

## 2019-07-19 DIAGNOSIS — D63.1 ANEMIA OF RENAL DISEASE: ICD-10-CM

## 2019-07-19 DIAGNOSIS — N18.9 ANEMIA OF RENAL DISEASE: ICD-10-CM

## 2019-07-19 DIAGNOSIS — D47.3 ESSENTIAL THROMBOCYTOSIS: Primary | ICD-10-CM

## 2019-07-19 DIAGNOSIS — D47.3 ESSENTIAL THROMBOCYTOSIS: ICD-10-CM

## 2019-07-19 LAB
ALBUMIN SERPL BCP-MCNC: 3.6 G/DL (ref 3.5–5.2)
ALP SERPL-CCNC: 92 U/L (ref 55–135)
ALT SERPL W/O P-5'-P-CCNC: 10 U/L (ref 10–44)
ANION GAP SERPL CALC-SCNC: 10 MMOL/L (ref 8–16)
AST SERPL-CCNC: 19 U/L (ref 10–40)
BASOPHILS # BLD AUTO: 0.02 K/UL (ref 0–0.2)
BASOPHILS NFR BLD: 0.2 % (ref 0–1.9)
BILIRUB SERPL-MCNC: 1 MG/DL (ref 0.1–1)
BUN SERPL-MCNC: 25 MG/DL (ref 8–23)
CALCIUM SERPL-MCNC: 9.6 MG/DL (ref 8.7–10.5)
CHLORIDE SERPL-SCNC: 99 MMOL/L (ref 95–110)
CO2 SERPL-SCNC: 26 MMOL/L (ref 23–29)
CREAT SERPL-MCNC: 1.5 MG/DL (ref 0.5–1.4)
DIFFERENTIAL METHOD: ABNORMAL
EOSINOPHIL # BLD AUTO: 0 K/UL (ref 0–0.5)
EOSINOPHIL NFR BLD: 0.2 % (ref 0–8)
ERYTHROCYTE [DISTWIDTH] IN BLOOD BY AUTOMATED COUNT: 19.2 % (ref 11.5–14.5)
EST. GFR  (AFRICAN AMERICAN): 47.4 ML/MIN/1.73 M^2
EST. GFR  (NON AFRICAN AMERICAN): 41 ML/MIN/1.73 M^2
GLUCOSE SERPL-MCNC: 99 MG/DL (ref 70–110)
HCT VFR BLD AUTO: 32.4 % (ref 40–54)
HGB BLD-MCNC: 10.3 G/DL (ref 14–18)
IMM GRANULOCYTES # BLD AUTO: 0.11 K/UL (ref 0–0.04)
IMM GRANULOCYTES NFR BLD AUTO: 1.1 % (ref 0–0.5)
LYMPHOCYTES # BLD AUTO: 0.6 K/UL (ref 1–4.8)
LYMPHOCYTES NFR BLD: 6 % (ref 18–48)
MCH RBC QN AUTO: 36.8 PG (ref 27–31)
MCHC RBC AUTO-ENTMCNC: 31.8 G/DL (ref 32–36)
MCV RBC AUTO: 116 FL (ref 82–98)
MONOCYTES # BLD AUTO: 0.5 K/UL (ref 0.3–1)
MONOCYTES NFR BLD: 5.1 % (ref 4–15)
NEUTROPHILS # BLD AUTO: 8.7 K/UL (ref 1.8–7.7)
NEUTROPHILS NFR BLD: 87.4 % (ref 38–73)
NRBC BLD-RTO: 0 /100 WBC
PLATELET # BLD AUTO: 637 K/UL (ref 150–350)
PMV BLD AUTO: 9.1 FL (ref 9.2–12.9)
POTASSIUM SERPL-SCNC: 4.1 MMOL/L (ref 3.5–5.1)
PROT SERPL-MCNC: 7 G/DL (ref 6–8.4)
RBC # BLD AUTO: 2.8 M/UL (ref 4.6–6.2)
SODIUM SERPL-SCNC: 135 MMOL/L (ref 136–145)
WBC # BLD AUTO: 9.93 K/UL (ref 3.9–12.7)

## 2019-07-19 PROCEDURE — 85025 COMPLETE CBC W/AUTO DIFF WBC: CPT | Mod: HCNC

## 2019-07-19 PROCEDURE — 99499 RISK ADDL DX/OHS AUDIT: ICD-10-PCS | Mod: HCNC,S$GLB,, | Performed by: INTERNAL MEDICINE

## 2019-07-19 PROCEDURE — 1101F PR PT FALLS ASSESS DOC 0-1 FALLS W/OUT INJ PAST YR: ICD-10-PCS | Mod: HCNC,CPTII,S$GLB, | Performed by: INTERNAL MEDICINE

## 2019-07-19 PROCEDURE — 99214 PR OFFICE/OUTPT VISIT, EST, LEVL IV, 30-39 MIN: ICD-10-PCS | Mod: HCNC,S$GLB,, | Performed by: INTERNAL MEDICINE

## 2019-07-19 PROCEDURE — 99499 UNLISTED E&M SERVICE: CPT | Mod: HCNC,S$GLB,, | Performed by: INTERNAL MEDICINE

## 2019-07-19 PROCEDURE — 36415 COLL VENOUS BLD VENIPUNCTURE: CPT | Mod: HCNC

## 2019-07-19 PROCEDURE — 1101F PT FALLS ASSESS-DOCD LE1/YR: CPT | Mod: HCNC,CPTII,S$GLB, | Performed by: INTERNAL MEDICINE

## 2019-07-19 PROCEDURE — 99999 PR PBB SHADOW E&M-EST. PATIENT-LVL III: ICD-10-PCS | Mod: PBBFAC,HCNC,, | Performed by: INTERNAL MEDICINE

## 2019-07-19 PROCEDURE — 99999 PR PBB SHADOW E&M-EST. PATIENT-LVL III: CPT | Mod: PBBFAC,HCNC,, | Performed by: INTERNAL MEDICINE

## 2019-07-19 PROCEDURE — 80053 COMPREHEN METABOLIC PANEL: CPT | Mod: HCNC

## 2019-07-19 PROCEDURE — 99214 OFFICE O/P EST MOD 30 MIN: CPT | Mod: HCNC,S$GLB,, | Performed by: INTERNAL MEDICINE

## 2019-07-19 RX ORDER — HYDROXYUREA 500 MG/1
CAPSULE ORAL
Qty: 120 CAPSULE | Refills: 3 | Status: ON HOLD | OUTPATIENT
Start: 2019-07-19 | End: 2020-05-29 | Stop reason: SDUPTHER

## 2019-07-22 ENCOUNTER — ANTI-COAG VISIT (OUTPATIENT)
Dept: CARDIOLOGY | Facility: CLINIC | Age: 84
End: 2019-07-22
Payer: MEDICARE

## 2019-07-22 DIAGNOSIS — I48.0 PAROXYSMAL ATRIAL FIBRILLATION: ICD-10-CM

## 2019-07-22 DIAGNOSIS — Z79.01 LONG TERM CURRENT USE OF ANTICOAGULANT THERAPY: ICD-10-CM

## 2019-07-22 LAB — INR PPP: 1.9

## 2019-07-22 PROCEDURE — 93793 PR ANTICOAGULANT MGMT FOR PT TAKING WARFARIN: ICD-10-PCS | Mod: S$GLB,,,

## 2019-07-22 PROCEDURE — 93793 ANTICOAG MGMT PT WARFARIN: CPT | Mod: S$GLB,,,

## 2019-07-22 NOTE — PROGRESS NOTES
INR 1.9 which is ok. He is due for 7.5mg dose tonight already and has been doing well on this dose. Recheck INR in 1 week

## 2019-07-24 NOTE — PROGRESS NOTES
HEMATOLOGIC MALIGNANCIES PROGRESS NOTE    IDENTIFYING STATEMENT   Ernie Phan (rEnie) is a 88 y.o. male with a  of 3/19/1931 from [place] with the diagnosis of essential thrombocytosis.      ONCOLOGY HISTORY:    1. Essential thrombocytosis     2. 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 Myasthenia gravis on azathioprine  4. Pulmonary hypertension  5. HTN  6. Atrial fibrillation  7. CKD-III    INTERVAL HISTORY:      Mr. Phan returns to clinic for follow-up of his essential thrombocytosis and anemia of chronic kidney disease.    He is feeling okay overall and reports no symptoms of anemia. He has resumed hydroxyurea after his last visit at a dose of 1000 mg on day 1 and then 500 mg on days 2 and 3 of a 3-day cycle. He is tolerating this well without adverse effect.     Past Medical History, Past Social History and Past Family History have been reviewed and are unchanged except as noted in the interval history.    MEDICATIONS:     Current Outpatient Medications on File Prior to Visit   Medication Sig Dispense Refill    azaTHIOprine (IMURAN) 50 mg Tab Take 4 tablets (200 mg total) by mouth once daily. 360 tablet 3    DENTURE CLEANSER (EFFERVESCENT DENTURE CLEANSR DENT)       finasteride (PROSCAR) 5 mg tablet TAKE 1 TABLET EVERY DAY 90 tablet 3    FLUZONE HIGH-DOSE , PF, 180 mcg/0.5 mL vaccine       gabapentin (NEURONTIN) 300 MG capsule Take 1 capsule (300 mg total) by mouth 3 (three) times daily. 270 capsule 3    IRON, FERROUS SULFATE, ORAL Take 65 mg by mouth once daily.      ONE TOUCH ULTRA TEST Strp TEST DAILY 100 each 3    ONE TOUCH ULTRASOFT LANCETS lancets TEST DAILY 100 each 3    predniSONE (DELTASONE) 5 MG tablet Take 1 tablet (5 mg total) by mouth once daily. 45 tablet 3    tamsulosin (FLOMAX) 0.4 mg Cap TAKE 1 CAPSULE EVERY DAY 90 capsule 3    verapamil (CALAN) 120 MG tablet Take 1 tablet (120 mg total) by mouth 2 (two) times daily. 180 tablet 3    warfarin (COUMADIN) 5 MG  "tablet Take 1.5 tablets (7.5 mg total) by mouth Daily. OR AS DIRECTED BY COUMADIN CLINIC 90 tablet 3    vitamin D 1000 units Tab Take 185 mg by mouth once daily.       No current facility-administered medications on file prior to visit.        ALLERGIES: Review of patient's allergies indicates:  No Known Allergies     ROS:       Review of Systems   Constitutional: Negative for diaphoresis, fatigue, fever and unexpected weight change.   HENT:   Negative for lump/mass and sore throat.    Eyes: Negative for icterus.   Respiratory: Negative for cough and shortness of breath.    Cardiovascular: Negative for chest pain and palpitations.   Gastrointestinal: Negative for abdominal distention, constipation, diarrhea, nausea and vomiting.   Genitourinary: Negative for dysuria and frequency.    Musculoskeletal: Negative for arthralgias, gait problem and myalgias.   Skin: Negative for rash.   Neurological: Negative for dizziness, gait problem and headaches.   Hematological: Negative for adenopathy. Does not bruise/bleed easily.   Psychiatric/Behavioral: Positive for depression (grieving from his wife's recent death). The patient is not nervous/anxious.        PHYSICAL EXAM:  Vitals:    07/19/19 1351   BP: (!) 154/67   Pulse: (!) 53   Temp: 97.7 °F (36.5 °C)   SpO2: 99%   Weight: 79.8 kg (175 lb 14.8 oz)   Height: 5' 9" (1.753 m)   PainSc:   5   PainLoc: Leg       KARNOFSKY PERFORMANCE STATUS 60%  ECOG 2    Physical Exam   Constitutional: He is oriented to person, place, and time. He appears well-developed and well-nourished. No distress.   HENT:   Head: Normocephalic and atraumatic.   Mouth/Throat: Mucous membranes are normal. No oral lesions.   Eyes: Conjunctivae are normal.   Neck: No thyromegaly present.   Cardiovascular: Normal rate, regular rhythm and normal heart sounds.   No murmur heard.  Pulmonary/Chest: Breath sounds normal. He has no wheezes. He has no rales.   Abdominal: Soft. He exhibits no distension and no mass. " There is no splenomegaly or hepatomegaly. There is no tenderness.   Lymphadenopathy:     He has no cervical adenopathy.        Right cervical: No deep cervical adenopathy present.       Left cervical: No deep cervical adenopathy present.     He has no axillary adenopathy. No inguinal adenopathy noted on the right or left side.   Neurological: He is alert and oriented to person, place, and time. He has normal strength and normal reflexes. No cranial nerve deficit. Coordination normal.   Skin: No rash noted.       LAB:   Results for orders placed or performed in visit on 07/19/19   CBC auto differential   Result Value Ref Range    WBC 9.93 3.90 - 12.70 K/uL    RBC 2.80 (L) 4.60 - 6.20 M/uL    Hemoglobin 10.3 (L) 14.0 - 18.0 g/dL    Hematocrit 32.4 (L) 40.0 - 54.0 %    Mean Corpuscular Volume 116 (H) 82 - 98 fL    Mean Corpuscular Hemoglobin 36.8 (H) 27.0 - 31.0 pg    Mean Corpuscular Hemoglobin Conc 31.8 (L) 32.0 - 36.0 g/dL    RDW 19.2 (H) 11.5 - 14.5 %    Platelets 637 (H) 150 - 350 K/uL    MPV 9.1 (L) 9.2 - 12.9 fL    Immature Granulocytes 1.1 (H) 0.0 - 0.5 %    Gran # (ANC) 8.7 (H) 1.8 - 7.7 K/uL    Immature Grans (Abs) 0.11 (H) 0.00 - 0.04 K/uL    Lymph # 0.6 (L) 1.0 - 4.8 K/uL    Mono # 0.5 0.3 - 1.0 K/uL    Eos # 0.0 0.0 - 0.5 K/uL    Baso # 0.02 0.00 - 0.20 K/uL    nRBC 0 0 /100 WBC    Gran% 87.4 (H) 38.0 - 73.0 %    Lymph% 6.0 (L) 18.0 - 48.0 %    Mono% 5.1 4.0 - 15.0 %    Eosinophil% 0.2 0.0 - 8.0 %    Basophil% 0.2 0.0 - 1.9 %    Differential Method Automated    Comprehensive metabolic panel   Result Value Ref Range    Sodium 135 (L) 136 - 145 mmol/L    Potassium 4.1 3.5 - 5.1 mmol/L    Chloride 99 95 - 110 mmol/L    CO2 26 23 - 29 mmol/L    Glucose 99 70 - 110 mg/dL    BUN, Bld 25 (H) 8 - 23 mg/dL    Creatinine 1.5 (H) 0.5 - 1.4 mg/dL    Calcium 9.6 8.7 - 10.5 mg/dL    Total Protein 7.0 6.0 - 8.4 g/dL    Albumin 3.6 3.5 - 5.2 g/dL    Total Bilirubin 1.0 0.1 - 1.0 mg/dL    Alkaline Phosphatase 92 55 - 135  U/L    AST 19 10 - 40 U/L    ALT 10 10 - 44 U/L    Anion Gap 10 8 - 16 mmol/L    eGFR if African American 47.4 (A) >60 mL/min/1.73 m^2    eGFR if non  41.0 (A) >60 mL/min/1.73 m^2       PROBLEMS ASSESSED THIS VISIT:    1. Essential thrombocytosis    2. Anemia of renal disease    3. Long term current use of anticoagulant therapy        PLAN:       Essential thrombocytosis  Platelet count much improved on hydroxyurea though not at goal. We will not increase at this time given his anemia, but we may consider this in the future if it remains elevated > 400. He should continue warfarin and low-dose aspirin.     Anemia of renal disease  Much improved at this visit. ESAs not currently required.     Follow-up   - 4 weeks    Nick Gutierrez MD  Hematology and Stem Cell Transplant

## 2019-07-24 NOTE — ASSESSMENT & PLAN NOTE
Platelet count much improved on hydroxyurea though not at goal. We will not increase at this time given his anemia, but we may consider this in the future if it remains elevated > 400. He should continue warfarin and low-dose aspirin.

## 2019-07-29 ENCOUNTER — ANTI-COAG VISIT (OUTPATIENT)
Dept: CARDIOLOGY | Facility: CLINIC | Age: 84
End: 2019-07-29
Payer: MEDICARE

## 2019-07-29 DIAGNOSIS — I48.0 PAROXYSMAL ATRIAL FIBRILLATION: ICD-10-CM

## 2019-07-29 DIAGNOSIS — Z79.01 LONG TERM CURRENT USE OF ANTICOAGULANT THERAPY: ICD-10-CM

## 2019-07-29 LAB — INR PPP: 2

## 2019-07-29 PROCEDURE — 93793 ANTICOAG MGMT PT WARFARIN: CPT | Mod: S$GLB,,,

## 2019-07-29 PROCEDURE — 93793 PR ANTICOAGULANT MGMT FOR PT TAKING WARFARIN: ICD-10-PCS | Mod: S$GLB,,,

## 2019-08-05 ENCOUNTER — ANTI-COAG VISIT (OUTPATIENT)
Dept: CARDIOLOGY | Facility: CLINIC | Age: 84
End: 2019-08-05
Payer: MEDICARE

## 2019-08-05 DIAGNOSIS — I48.0 PAROXYSMAL ATRIAL FIBRILLATION: ICD-10-CM

## 2019-08-05 DIAGNOSIS — Z79.01 LONG TERM CURRENT USE OF ANTICOAGULANT THERAPY: ICD-10-CM

## 2019-08-05 LAB — INR PPP: 2

## 2019-08-05 PROCEDURE — 93793 ANTICOAG MGMT PT WARFARIN: CPT | Mod: S$GLB,,,

## 2019-08-05 PROCEDURE — 93793 PR ANTICOAGULANT MGMT FOR PT TAKING WARFARIN: ICD-10-PCS | Mod: S$GLB,,,

## 2019-08-12 ENCOUNTER — ANTI-COAG VISIT (OUTPATIENT)
Dept: CARDIOLOGY | Facility: CLINIC | Age: 84
End: 2019-08-12
Payer: MEDICARE

## 2019-08-12 DIAGNOSIS — I48.0 PAROXYSMAL ATRIAL FIBRILLATION: ICD-10-CM

## 2019-08-12 DIAGNOSIS — Z79.01 LONG TERM CURRENT USE OF ANTICOAGULANT THERAPY: ICD-10-CM

## 2019-08-12 LAB — INR PPP: 1.5

## 2019-08-12 PROCEDURE — 93793 PR ANTICOAGULANT MGMT FOR PT TAKING WARFARIN: ICD-10-PCS | Mod: S$GLB,,,

## 2019-08-12 PROCEDURE — 93793 ANTICOAG MGMT PT WARFARIN: CPT | Mod: S$GLB,,,

## 2019-08-19 ENCOUNTER — ANTI-COAG VISIT (OUTPATIENT)
Dept: CARDIOLOGY | Facility: CLINIC | Age: 84
End: 2019-08-19
Payer: MEDICARE

## 2019-08-19 DIAGNOSIS — I48.0 PAROXYSMAL ATRIAL FIBRILLATION: ICD-10-CM

## 2019-08-19 DIAGNOSIS — Z79.01 LONG TERM CURRENT USE OF ANTICOAGULANT THERAPY: ICD-10-CM

## 2019-08-19 LAB
INR PPP: 1.8
INR PPP: 1.8

## 2019-08-19 PROCEDURE — 93793 PR ANTICOAGULANT MGMT FOR PT TAKING WARFARIN: ICD-10-PCS | Mod: S$GLB,,,

## 2019-08-19 PROCEDURE — 93793 ANTICOAG MGMT PT WARFARIN: CPT | Mod: S$GLB,,,

## 2019-08-26 ENCOUNTER — ANTI-COAG VISIT (OUTPATIENT)
Dept: CARDIOLOGY | Facility: CLINIC | Age: 84
End: 2019-08-26
Payer: MEDICARE

## 2019-08-26 DIAGNOSIS — I48.0 PAROXYSMAL ATRIAL FIBRILLATION: ICD-10-CM

## 2019-08-26 DIAGNOSIS — Z79.01 LONG TERM CURRENT USE OF ANTICOAGULANT THERAPY: ICD-10-CM

## 2019-08-26 LAB — INR PPP: 2.6

## 2019-08-26 PROCEDURE — 93793 PR ANTICOAGULANT MGMT FOR PT TAKING WARFARIN: ICD-10-PCS | Mod: S$GLB,,,

## 2019-08-26 PROCEDURE — 93793 ANTICOAG MGMT PT WARFARIN: CPT | Mod: S$GLB,,,

## 2019-08-30 ENCOUNTER — LAB VISIT (OUTPATIENT)
Dept: LAB | Facility: HOSPITAL | Age: 84
End: 2019-08-30
Attending: INTERNAL MEDICINE
Payer: MEDICARE

## 2019-08-30 ENCOUNTER — OFFICE VISIT (OUTPATIENT)
Dept: HEMATOLOGY/ONCOLOGY | Facility: CLINIC | Age: 84
End: 2019-08-30
Payer: MEDICARE

## 2019-08-30 VITALS
BODY MASS INDEX: 26.55 KG/M2 | DIASTOLIC BLOOD PRESSURE: 71 MMHG | HEIGHT: 69 IN | OXYGEN SATURATION: 99 % | HEART RATE: 56 BPM | TEMPERATURE: 98 F | SYSTOLIC BLOOD PRESSURE: 162 MMHG | WEIGHT: 179.25 LBS

## 2019-08-30 DIAGNOSIS — N18.30 CHRONIC KIDNEY DISEASE, STAGE III (MODERATE): ICD-10-CM

## 2019-08-30 DIAGNOSIS — D63.1 ANEMIA OF RENAL DISEASE: ICD-10-CM

## 2019-08-30 DIAGNOSIS — Z79.01 LONG TERM CURRENT USE OF ANTICOAGULANT THERAPY: ICD-10-CM

## 2019-08-30 DIAGNOSIS — D47.3 ESSENTIAL THROMBOCYTOSIS: ICD-10-CM

## 2019-08-30 DIAGNOSIS — N18.9 ANEMIA OF RENAL DISEASE: ICD-10-CM

## 2019-08-30 DIAGNOSIS — D47.3 ESSENTIAL THROMBOCYTOSIS: Primary | ICD-10-CM

## 2019-08-30 LAB
ABO + RH BLD: NORMAL
ALBUMIN SERPL BCP-MCNC: 3.8 G/DL (ref 3.5–5.2)
ALP SERPL-CCNC: 95 U/L (ref 55–135)
ALT SERPL W/O P-5'-P-CCNC: 15 U/L (ref 10–44)
ANION GAP SERPL CALC-SCNC: 9 MMOL/L (ref 8–16)
AST SERPL-CCNC: 23 U/L (ref 10–40)
BASOPHILS # BLD AUTO: 0.06 K/UL (ref 0–0.2)
BASOPHILS NFR BLD: 0.6 % (ref 0–1.9)
BILIRUB SERPL-MCNC: 1.1 MG/DL (ref 0.1–1)
BLD GP AB SCN CELLS X3 SERPL QL: NORMAL
BUN SERPL-MCNC: 22 MG/DL (ref 8–23)
CALCIUM SERPL-MCNC: 9.3 MG/DL (ref 8.7–10.5)
CHLORIDE SERPL-SCNC: 104 MMOL/L (ref 95–110)
CO2 SERPL-SCNC: 24 MMOL/L (ref 23–29)
CREAT SERPL-MCNC: 1.3 MG/DL (ref 0.5–1.4)
DIFFERENTIAL METHOD: ABNORMAL
EOSINOPHIL # BLD AUTO: 0.1 K/UL (ref 0–0.5)
EOSINOPHIL NFR BLD: 0.5 % (ref 0–8)
ERYTHROCYTE [DISTWIDTH] IN BLOOD BY AUTOMATED COUNT: 19.9 % (ref 11.5–14.5)
EST. GFR  (AFRICAN AMERICAN): 56.3 ML/MIN/1.73 M^2
EST. GFR  (NON AFRICAN AMERICAN): 48.7 ML/MIN/1.73 M^2
GLUCOSE SERPL-MCNC: 83 MG/DL (ref 70–110)
HCT VFR BLD AUTO: 33.6 % (ref 40–54)
HGB BLD-MCNC: 11 G/DL (ref 14–18)
IMM GRANULOCYTES # BLD AUTO: 0.15 K/UL (ref 0–0.04)
IMM GRANULOCYTES NFR BLD AUTO: 1.5 % (ref 0–0.5)
LYMPHOCYTES # BLD AUTO: 0.8 K/UL (ref 1–4.8)
LYMPHOCYTES NFR BLD: 8.1 % (ref 18–48)
MCH RBC QN AUTO: 39.1 PG (ref 27–31)
MCHC RBC AUTO-ENTMCNC: 32.7 G/DL (ref 32–36)
MCV RBC AUTO: 120 FL (ref 82–98)
MONOCYTES # BLD AUTO: 0.8 K/UL (ref 0.3–1)
MONOCYTES NFR BLD: 7.9 % (ref 4–15)
NEUTROPHILS # BLD AUTO: 7.9 K/UL (ref 1.8–7.7)
NEUTROPHILS NFR BLD: 81.4 % (ref 38–73)
NRBC BLD-RTO: 0 /100 WBC
PLATELET # BLD AUTO: 609 K/UL (ref 150–350)
PMV BLD AUTO: 9.2 FL (ref 9.2–12.9)
POTASSIUM SERPL-SCNC: 4.4 MMOL/L (ref 3.5–5.1)
PROT SERPL-MCNC: 7.1 G/DL (ref 6–8.4)
RBC # BLD AUTO: 2.81 M/UL (ref 4.6–6.2)
SODIUM SERPL-SCNC: 137 MMOL/L (ref 136–145)
WBC # BLD AUTO: 9.73 K/UL (ref 3.9–12.7)

## 2019-08-30 PROCEDURE — 85025 COMPLETE CBC W/AUTO DIFF WBC: CPT | Mod: HCNC

## 2019-08-30 PROCEDURE — 1101F PT FALLS ASSESS-DOCD LE1/YR: CPT | Mod: HCNC,CPTII,S$GLB, | Performed by: INTERNAL MEDICINE

## 2019-08-30 PROCEDURE — 1101F PR PT FALLS ASSESS DOC 0-1 FALLS W/OUT INJ PAST YR: ICD-10-PCS | Mod: HCNC,CPTII,S$GLB, | Performed by: INTERNAL MEDICINE

## 2019-08-30 PROCEDURE — 99999 PR PBB SHADOW E&M-EST. PATIENT-LVL III: ICD-10-PCS | Mod: PBBFAC,HCNC,, | Performed by: INTERNAL MEDICINE

## 2019-08-30 PROCEDURE — 99999 PR PBB SHADOW E&M-EST. PATIENT-LVL III: CPT | Mod: PBBFAC,HCNC,, | Performed by: INTERNAL MEDICINE

## 2019-08-30 PROCEDURE — 36415 COLL VENOUS BLD VENIPUNCTURE: CPT | Mod: HCNC

## 2019-08-30 PROCEDURE — 80053 COMPREHEN METABOLIC PANEL: CPT | Mod: HCNC

## 2019-08-30 PROCEDURE — 99215 OFFICE O/P EST HI 40 MIN: CPT | Mod: HCNC,S$GLB,, | Performed by: INTERNAL MEDICINE

## 2019-08-30 PROCEDURE — 99215 PR OFFICE/OUTPT VISIT, EST, LEVL V, 40-54 MIN: ICD-10-PCS | Mod: HCNC,S$GLB,, | Performed by: INTERNAL MEDICINE

## 2019-08-30 PROCEDURE — 86850 RBC ANTIBODY SCREEN: CPT | Mod: HCNC

## 2019-09-03 ENCOUNTER — ANTI-COAG VISIT (OUTPATIENT)
Dept: CARDIOLOGY | Facility: CLINIC | Age: 84
End: 2019-09-03
Payer: MEDICARE

## 2019-09-03 DIAGNOSIS — Z79.01 LONG TERM CURRENT USE OF ANTICOAGULANT THERAPY: ICD-10-CM

## 2019-09-03 DIAGNOSIS — I48.0 PAROXYSMAL ATRIAL FIBRILLATION: ICD-10-CM

## 2019-09-03 LAB — INR PPP: 2.1

## 2019-09-03 PROCEDURE — 93793 ANTICOAG MGMT PT WARFARIN: CPT | Mod: S$GLB,,,

## 2019-09-03 PROCEDURE — 93793 PR ANTICOAGULANT MGMT FOR PT TAKING WARFARIN: ICD-10-PCS | Mod: S$GLB,,,

## 2019-09-04 NOTE — PROGRESS NOTES
HEMATOLOGIC MALIGNANCIES PROGRESS NOTE    IDENTIFYING STATEMENT   Ernie Phan (Ernie) is a 88 y.o. male with a  of 3/19/1931 from [place] with the diagnosis of essential thrombocytosis.      ONCOLOGY HISTORY:    1. Essential thrombocytosis     2. 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 Myasthenia gravis on azathioprine  4. Pulmonary hypertension  5. HTN  6. Atrial fibrillation  7. CKD-III    INTERVAL HISTORY:      Mr. Phan returns to clinic for follow-up of his essential thrombocytosis and anemia of chronic kidney disease.    He is feeling okay overall and reports no symptoms of anemia. He continues on hydroxyurea at a dose of 1000 mg on day 1 and then 500 mg on days 2 and 3 of a 3-day cycle. He is tolerating this well without adverse effect.      Past Medical History, Past Social History and Past Family History have been reviewed and are unchanged except as noted in the interval history.    MEDICATIONS:     Current Outpatient Medications on File Prior to Visit   Medication Sig Dispense Refill    azaTHIOprine (IMURAN) 50 mg Tab Take 4 tablets (200 mg total) by mouth once daily. 360 tablet 3    DENTURE CLEANSER (EFFERVESCENT DENTURE CLEANSR DENT)       finasteride (PROSCAR) 5 mg tablet TAKE 1 TABLET EVERY DAY 90 tablet 3    FLUZONE HIGH-DOSE , PF, 180 mcg/0.5 mL vaccine       gabapentin (NEURONTIN) 300 MG capsule Take 1 capsule (300 mg total) by mouth 3 (three) times daily. 270 capsule 3    hydroxyurea (HYDREA) 500 mg Cap Take 1,000 mg on day 1, then 500 mg on days 2 and 3 and repeat.. 120 capsule 3    IRON, FERROUS SULFATE, ORAL Take 65 mg by mouth once daily.      ONE TOUCH ULTRA TEST Strp TEST DAILY 100 each 3    ONE TOUCH ULTRASOFT LANCETS lancets TEST DAILY 100 each 3    predniSONE (DELTASONE) 5 MG tablet Take 1 tablet (5 mg total) by mouth once daily. 45 tablet 3    tamsulosin (FLOMAX) 0.4 mg Cap TAKE 1 CAPSULE EVERY DAY 90 capsule 3    verapamil (CALAN) 120 MG tablet Take 1  "tablet (120 mg total) by mouth 2 (two) times daily. 180 tablet 3    warfarin (COUMADIN) 5 MG tablet Take 1.5 tablets (7.5 mg total) by mouth Daily. OR AS DIRECTED BY COUMADIN CLINIC 90 tablet 3    vitamin D 1000 units Tab Take 185 mg by mouth once daily.       No current facility-administered medications on file prior to visit.        ALLERGIES: Review of patient's allergies indicates:  No Known Allergies     ROS:       Review of Systems   Constitutional: Negative for diaphoresis, fatigue, fever and unexpected weight change.   HENT:   Negative for lump/mass and sore throat.    Eyes: Negative for icterus.   Respiratory: Negative for cough and shortness of breath.    Cardiovascular: Negative for chest pain and palpitations.   Gastrointestinal: Negative for abdominal distention, constipation, diarrhea, nausea and vomiting.   Genitourinary: Negative for dysuria and frequency.    Musculoskeletal: Negative for arthralgias, gait problem and myalgias.   Skin: Negative for rash.   Neurological: Negative for dizziness, gait problem and headaches.   Hematological: Negative for adenopathy. Does not bruise/bleed easily.   Psychiatric/Behavioral: Positive for depression (grieving from his wife's recent death). The patient is not nervous/anxious.        PHYSICAL EXAM:  Vitals:    08/30/19 1134   BP: (!) 162/71   Pulse: (!) 56   Temp: 97.5 °F (36.4 °C)   SpO2: 99%   Weight: 81.3 kg (179 lb 3.7 oz)   Height: 5' 9" (1.753 m)   PainSc:   4   PainLoc: Leg       KARNOFSKY PERFORMANCE STATUS 60%  ECOG 2    Physical Exam   Constitutional: He is oriented to person, place, and time. He appears well-developed and well-nourished. No distress.   HENT:   Head: Normocephalic and atraumatic.   Mouth/Throat: Mucous membranes are normal. No oral lesions.   Eyes: Conjunctivae are normal.   Neck: No thyromegaly present.   Cardiovascular: Normal rate, regular rhythm and normal heart sounds.   No murmur heard.  Pulmonary/Chest: Breath sounds normal. " He has no wheezes. He has no rales.   Abdominal: Soft. He exhibits no distension and no mass. There is no splenomegaly or hepatomegaly. There is no tenderness.   Lymphadenopathy:     He has no cervical adenopathy.        Right cervical: No deep cervical adenopathy present.       Left cervical: No deep cervical adenopathy present.     He has no axillary adenopathy. No inguinal adenopathy noted on the right or left side.   Neurological: He is alert and oriented to person, place, and time. He has normal strength and normal reflexes. No cranial nerve deficit. Coordination normal.   Skin: No rash noted.       LAB:   Results for orders placed or performed in visit on 08/30/19   Rapid BMT CBC with Diff   Result Value Ref Range    WBC 9.73 3.90 - 12.70 K/uL    RBC 2.81 (L) 4.60 - 6.20 M/uL    Hemoglobin 11.0 (L) 14.0 - 18.0 g/dL    Hematocrit 33.6 (L) 40.0 - 54.0 %    Mean Corpuscular Volume 120 (H) 82 - 98 fL    Mean Corpuscular Hemoglobin 39.1 (H) 27.0 - 31.0 pg    Mean Corpuscular Hemoglobin Conc 32.7 32.0 - 36.0 g/dL    RDW 19.9 (H) 11.5 - 14.5 %    Platelets 609 (H) 150 - 350 K/uL    MPV 9.2 9.2 - 12.9 fL    Immature Granulocytes 1.5 (H) 0.0 - 0.5 %    Gran # (ANC) 7.9 (H) 1.8 - 7.7 K/uL    Immature Grans (Abs) 0.15 (H) 0.00 - 0.04 K/uL    Lymph # 0.8 (L) 1.0 - 4.8 K/uL    Mono # 0.8 0.3 - 1.0 K/uL    Eos # 0.1 0.0 - 0.5 K/uL    Baso # 0.06 0.00 - 0.20 K/uL    nRBC 0 0 /100 WBC    Gran% 81.4 (H) 38.0 - 73.0 %    Lymph% 8.1 (L) 18.0 - 48.0 %    Mono% 7.9 4.0 - 15.0 %    Eosinophil% 0.5 0.0 - 8.0 %    Basophil% 0.6 0.0 - 1.9 %    Differential Method Automated    Comprehensive metabolic panel   Result Value Ref Range    Sodium 137 136 - 145 mmol/L    Potassium 4.4 3.5 - 5.1 mmol/L    Chloride 104 95 - 110 mmol/L    CO2 24 23 - 29 mmol/L    Glucose 83 70 - 110 mg/dL    BUN, Bld 22 8 - 23 mg/dL    Creatinine 1.3 0.5 - 1.4 mg/dL    Calcium 9.3 8.7 - 10.5 mg/dL    Total Protein 7.1 6.0 - 8.4 g/dL    Albumin 3.8 3.5 - 5.2 g/dL     Total Bilirubin 1.1 (H) 0.1 - 1.0 mg/dL    Alkaline Phosphatase 95 55 - 135 U/L    AST 23 10 - 40 U/L    ALT 15 10 - 44 U/L    Anion Gap 9 8 - 16 mmol/L    eGFR if African American 56.3 (A) >60 mL/min/1.73 m^2    eGFR if non  48.7 (A) >60 mL/min/1.73 m^2   Type & Screen   Result Value Ref Range    Group & Rh B POS     Indirect Devika NEG        PROBLEMS ASSESSED THIS VISIT:    1. Essential thrombocytosis    2. Anemia of renal disease    3. Long term current use of anticoagulant therapy    4. Chronic kidney disease, stage III (moderate)        PLAN:       Essential thrombocytosis  Platelet count improved but not at goal of < 400. He should continue warfarin for anticoagulation. We will hold on aspirin for now to prevent excessive bleeding risk. We will continue hydroxyurea at current dose as he previously has had anemia associated with this, so I do not want to cause excess toxicity.     Anemia of renal disease  Mild. No indication for ESAs today. We will monitor throughout therapy    Long term current use of anticoagulant therapy  Continue warfarin.     Chronic kidney disease, stage III (moderate)  Stable. We will monitor carefully as hydroxyurea has renal clearance.     Follow-up   - 8 weeks    Nick Gutierrez MD  Hematology and Stem Cell Transplant

## 2019-09-04 NOTE — ASSESSMENT & PLAN NOTE
Platelet count improved but not at goal of < 400. He should continue warfarin for anticoagulation. We will hold on aspirin for now to prevent excessive bleeding risk. We will continue hydroxyurea at current dose as he previously has had anemia associated with this, so I do not want to cause excess toxicity.

## 2019-09-09 ENCOUNTER — ANTI-COAG VISIT (OUTPATIENT)
Dept: CARDIOLOGY | Facility: CLINIC | Age: 84
End: 2019-09-09
Payer: MEDICARE

## 2019-09-09 ENCOUNTER — TELEPHONE (OUTPATIENT)
Dept: DERMATOLOGY | Facility: CLINIC | Age: 84
End: 2019-09-09

## 2019-09-09 DIAGNOSIS — Z79.01 LONG TERM CURRENT USE OF ANTICOAGULANT THERAPY: ICD-10-CM

## 2019-09-09 DIAGNOSIS — I48.0 PAROXYSMAL ATRIAL FIBRILLATION: ICD-10-CM

## 2019-09-09 LAB — INR PPP: 1.9

## 2019-09-09 PROCEDURE — 93793 ANTICOAG MGMT PT WARFARIN: CPT | Mod: S$GLB,,,

## 2019-09-09 PROCEDURE — 93793 PR ANTICOAGULANT MGMT FOR PT TAKING WARFARIN: ICD-10-PCS | Mod: S$GLB,,,

## 2019-09-09 NOTE — TELEPHONE ENCOUNTER
Spoke with pt, rescheduled.    ----- Message from Juani Baca sent at 9/9/2019  4:17 PM CDT -----  Contact: pt   Cool - pt Patient Requesting Sooner Appointment.     Reason for sooner appt.: pt is calling to speak with the nurse pt has an appt tomorrow pt can't make it due to transportation pt is able to come pt can come Monday Wed Friday between 10:30 to 12:00  When is the first available appointment? Pt has an appt   Communication Preference: can you please call pt at [phone]  Additional Information: none    RAYMUNDO

## 2019-09-09 NOTE — PROGRESS NOTES
INR acceptable at 1.9 especially considering age. Dose seemed to be stabilizing. Recheck INR in 1 week

## 2019-09-16 ENCOUNTER — ANTI-COAG VISIT (OUTPATIENT)
Dept: CARDIOLOGY | Facility: CLINIC | Age: 84
End: 2019-09-16
Payer: MEDICARE

## 2019-09-16 DIAGNOSIS — Z79.01 LONG TERM CURRENT USE OF ANTICOAGULANT THERAPY: ICD-10-CM

## 2019-09-16 DIAGNOSIS — I48.0 PAROXYSMAL ATRIAL FIBRILLATION: ICD-10-CM

## 2019-09-16 LAB — INR PPP: 1.9

## 2019-09-16 PROCEDURE — 93793 ANTICOAG MGMT PT WARFARIN: CPT | Mod: S$GLB,,,

## 2019-09-16 PROCEDURE — 93793 PR ANTICOAGULANT MGMT FOR PT TAKING WARFARIN: ICD-10-PCS | Mod: S$GLB,,,

## 2019-09-16 NOTE — PROGRESS NOTES
INR not at goal. Medications, chart, and patient findings reviewed. See calendar for adjustments to dose and follow up plan.    Trending low and no changes reported. Increase dose

## 2019-09-23 ENCOUNTER — ANTI-COAG VISIT (OUTPATIENT)
Dept: CARDIOLOGY | Facility: CLINIC | Age: 84
End: 2019-09-23
Payer: MEDICARE

## 2019-09-23 DIAGNOSIS — I48.0 PAROXYSMAL ATRIAL FIBRILLATION: ICD-10-CM

## 2019-09-23 DIAGNOSIS — Z79.01 LONG TERM CURRENT USE OF ANTICOAGULANT THERAPY: ICD-10-CM

## 2019-09-23 LAB — INR PPP: 2.1

## 2019-09-23 PROCEDURE — 93793 ANTICOAG MGMT PT WARFARIN: CPT | Mod: S$GLB,,,

## 2019-09-23 PROCEDURE — 93793 PR ANTICOAGULANT MGMT FOR PT TAKING WARFARIN: ICD-10-PCS | Mod: S$GLB,,,

## 2019-09-27 ENCOUNTER — PATIENT OUTREACH (OUTPATIENT)
Dept: ADMINISTRATIVE | Facility: OTHER | Age: 84
End: 2019-09-27

## 2019-09-30 ENCOUNTER — ANTI-COAG VISIT (OUTPATIENT)
Dept: CARDIOLOGY | Facility: CLINIC | Age: 84
End: 2019-09-30
Payer: MEDICARE

## 2019-09-30 ENCOUNTER — OFFICE VISIT (OUTPATIENT)
Dept: DERMATOLOGY | Facility: CLINIC | Age: 84
End: 2019-09-30
Payer: MEDICARE

## 2019-09-30 DIAGNOSIS — Z12.83 SCREENING EXAM FOR SKIN CANCER: ICD-10-CM

## 2019-09-30 DIAGNOSIS — L85.3 XEROSIS CUTIS: ICD-10-CM

## 2019-09-30 DIAGNOSIS — L57.0 ACTINIC KERATOSIS: ICD-10-CM

## 2019-09-30 DIAGNOSIS — I48.0 PAROXYSMAL ATRIAL FIBRILLATION: ICD-10-CM

## 2019-09-30 DIAGNOSIS — D48.5 NEOPLASM OF UNCERTAIN BEHAVIOR OF SKIN: Primary | ICD-10-CM

## 2019-09-30 DIAGNOSIS — Z79.01 LONG TERM CURRENT USE OF ANTICOAGULANT THERAPY: ICD-10-CM

## 2019-09-30 LAB — INR PPP: 1.9

## 2019-09-30 PROCEDURE — 17000 DESTRUCT PREMALG LESION: CPT | Mod: 59,HCNC,S$GLB, | Performed by: DERMATOLOGY

## 2019-09-30 PROCEDURE — 99999 PR PBB SHADOW E&M-EST. PATIENT-LVL II: ICD-10-PCS | Mod: PBBFAC,HCNC,, | Performed by: DERMATOLOGY

## 2019-09-30 PROCEDURE — 11102 PR TANGENTIAL BIOPSY, SKIN, SINGLE LESION: ICD-10-PCS | Mod: HCNC,S$GLB,, | Performed by: DERMATOLOGY

## 2019-09-30 PROCEDURE — 99999 PR PBB SHADOW E&M-EST. PATIENT-LVL II: CPT | Mod: PBBFAC,HCNC,, | Performed by: DERMATOLOGY

## 2019-09-30 PROCEDURE — 11102 TANGNTL BX SKIN SINGLE LES: CPT | Mod: HCNC,S$GLB,, | Performed by: DERMATOLOGY

## 2019-09-30 PROCEDURE — 99202 OFFICE O/P NEW SF 15 MIN: CPT | Mod: 25,HCNC,S$GLB, | Performed by: DERMATOLOGY

## 2019-09-30 PROCEDURE — 1101F PT FALLS ASSESS-DOCD LE1/YR: CPT | Mod: HCNC,CPTII,S$GLB, | Performed by: DERMATOLOGY

## 2019-09-30 PROCEDURE — 1101F PR PT FALLS ASSESS DOC 0-1 FALLS W/OUT INJ PAST YR: ICD-10-PCS | Mod: HCNC,CPTII,S$GLB, | Performed by: DERMATOLOGY

## 2019-09-30 PROCEDURE — 99202 PR OFFICE/OUTPT VISIT, NEW, LEVL II, 15-29 MIN: ICD-10-PCS | Mod: 25,HCNC,S$GLB, | Performed by: DERMATOLOGY

## 2019-09-30 PROCEDURE — 88305 TISSUE SPECIMEN TO PATHOLOGY, DERMATOLOGY: ICD-10-PCS | Mod: 26,HCNC,, | Performed by: PATHOLOGY

## 2019-09-30 PROCEDURE — 88305 TISSUE EXAM BY PATHOLOGIST: CPT | Mod: HCNC | Performed by: PATHOLOGY

## 2019-09-30 PROCEDURE — 17003 DESTRUCTION, PREMALIGNANT LESIONS; SECOND THROUGH 14 LESIONS: ICD-10-PCS | Mod: 59,HCNC,S$GLB, | Performed by: DERMATOLOGY

## 2019-09-30 PROCEDURE — 17000 PR DESTRUCTION(LASER SURGERY,CRYOSURGERY,CHEMOSURGERY),PREMALIGNANT LESIONS,FIRST LESION: ICD-10-PCS | Mod: 59,HCNC,S$GLB, | Performed by: DERMATOLOGY

## 2019-09-30 PROCEDURE — 93793 PR ANTICOAGULANT MGMT FOR PT TAKING WARFARIN: ICD-10-PCS | Mod: S$GLB,,,

## 2019-09-30 PROCEDURE — 93793 ANTICOAG MGMT PT WARFARIN: CPT | Mod: S$GLB,,,

## 2019-09-30 PROCEDURE — 17003 DESTRUCT PREMALG LES 2-14: CPT | Mod: 59,HCNC,S$GLB, | Performed by: DERMATOLOGY

## 2019-09-30 RX ORDER — HYDROCHLOROTHIAZIDE 12.5 MG/1
CAPSULE ORAL
COMMUNITY
Start: 2019-08-28 | End: 2019-12-03 | Stop reason: SDUPTHER

## 2019-09-30 NOTE — PROGRESS NOTES
"  Subjective:       Patient ID:  Ernie Phan is a 88 y.o. male who presents for   Chief Complaint   Patient presents with    Rash     posterior scalp     89yo M with hx of AKS tx with LN2 and topical therapy, here for initial eval of thick scaling plaques on the vertex scalp. Pt states that the spots have been present for years but one in particular has recently gotten thicker over the last 6 months. He is on Imuran for mysethenia gravis x past "6 or 7" years. He also complains of scaling spots on the b/l ears and on the forearms. He denies any recent unintentional weight loss, fevers, chills, or night sweats. The spots do not bleed nor hurt. He has a significant past h/o sun exposure as a child. He doesn't wear sunscreen or a hat.    Rash  - Initial  Affected locations: scalp  Duration: 1 month  Signs / symptoms: itching, pain and scaling  Treatments tried: OTC antiseptic abx oint.        Review of Systems   Constitutional: Negative for fever, chills and weight loss.   Skin: Positive for itching and rash.   Hematologic/Lymphatic: Bruises/bleeds easily.        Objective:    Physical Exam   Constitutional: He appears well-developed and well-nourished. No distress.   Neurological: He is alert and oriented to person, place, and time. He is not disoriented.   Psychiatric: He has a normal mood and affect.   Skin:   Areas Examined (abnormalities noted in diagram):   Scalp / Hair Palpated and Inspected  Head / Face Inspection Performed  Neck Inspection Performed  Chest / Axilla Inspection Performed  Abdomen Inspection Performed  Back Inspection Performed  RUE Inspected  LUE Inspection Performed                   Diagram Legend     Erythematous scaling macule/papule c/w actinic keratosis       Vascular papule c/w angioma      Pigmented verrucoid papule/plaque c/w seborrheic keratosis      Yellow umbilicated papule c/w sebaceous hyperplasia      Irregularly shaped tan macule c/w lentigo     1-2 mm smooth white papules " consistent with Milia      Movable subcutaneous cyst with punctum c/w epidermal inclusion cyst      Subcutaneous movable cyst c/w pilar cyst      Firm pink to brown papule c/w dermatofibroma      Pedunculated fleshy papule(s) c/w skin tag(s)      Evenly pigmented macule c/w junctional nevus     Mildly variegated pigmented, slightly irregular-bordered macule c/w mildly atypical nevus      Flesh colored to evenly pigmented papule c/w intradermal nevus       Pink pearly papule/plaque c/w basal cell carcinoma      Erythematous hyperkeratotic cursted plaque c/w SCC      Surgical scar with no sign of skin cancer recurrence      Open and closed comedones      Inflammatory papules and pustules      Verrucoid papule consistent consistent with wart     Erythematous eczematous patches and plaques     Dystrophic onycholytic nail with subungual debris c/w onychomycosis     Umbilicated papule    Erythematous-base heme-crusted tan verrucoid plaque consistent with inflamed seborrheic keratosis     Erythematous Silvery Scaling Plaque c/w Psoriasis     See annotation          Assessment / Plan:      Pathology Orders:     Normal Orders This Visit    Tissue Specimen To Pathology, Dermatology     Questions:    Directional Terms:  Other(comment)    Clinical Information:  1 cm hypertrophic plaque r/o SCC    Specific Site:  vertex scalp        Neoplasm of uncertain behavior of skin  -     Tissue Specimen To Pathology, Dermatology  R/o SCC  Of note pt is on Imuran that increases risk for SCC development -   Shave biopsy procedure note:    Shave biopsy performed after verbal consent including risk of infection, scar, recurrence, need for additional treatment of site. Area prepped with alcohol, anesthetized with approximately 1.0cc of 1% lidocaine with epinephrine. Lesional tissue shaved with razor blade. Hemostasis achieved with application of aluminum chloride followed by Monsels solution. No complications. Dressing applied. Wound care  explained.      Screening exam for skin cancer  Upper body skin examination performed today including at least 6 points as noted in physical examination. Suspicious lesions noted.    Actinic keratosis  Cryosurgery Procedure Note    Verbal consent from the patient is obtained including, but not limited to, risk of hypopigmentation/hyperpigmentation, scar, recurrence of lesion. The patient is aware of the precancerous quality and need for treatment of these lesions. Liquid nitrogen cryosurgery is applied to the 4 actinic keratoses, as detailed in the physical exam, to produce a freeze injury. The patient is aware that blisters may form and is instructed on wound care with gentle cleansing and use of vaseline ointment to keep moist until healed. The patient is supplied a handout on cryosurgery and is instructed to call if lesions do not completely resolve.    *pt with AK surrounding biopsy site on vertex scalp; not treated today; will treat after biopsy results return     Xerosis cutis             Follow up if symptoms worsen or fail to improve.

## 2019-09-30 NOTE — PROGRESS NOTES
INR ok at 1.9 considering age and recent trends. His dose was recently increased and was therapeutic last week. Will reevaluate next week

## 2019-10-01 ENCOUNTER — IMMUNIZATION (OUTPATIENT)
Dept: PHARMACY | Facility: CLINIC | Age: 84
End: 2019-10-01
Payer: MEDICARE

## 2019-10-07 ENCOUNTER — ANTI-COAG VISIT (OUTPATIENT)
Dept: CARDIOLOGY | Facility: CLINIC | Age: 84
End: 2019-10-07
Payer: MEDICARE

## 2019-10-07 DIAGNOSIS — Z79.01 LONG TERM CURRENT USE OF ANTICOAGULANT THERAPY: ICD-10-CM

## 2019-10-07 DIAGNOSIS — I48.0 PAROXYSMAL ATRIAL FIBRILLATION: ICD-10-CM

## 2019-10-07 LAB — INR PPP: 2.4

## 2019-10-07 PROCEDURE — 93793 PR ANTICOAGULANT MGMT FOR PT TAKING WARFARIN: ICD-10-PCS | Mod: S$GLB,,,

## 2019-10-07 PROCEDURE — 93793 ANTICOAG MGMT PT WARFARIN: CPT | Mod: S$GLB,,,

## 2019-10-14 ENCOUNTER — ANTI-COAG VISIT (OUTPATIENT)
Dept: CARDIOLOGY | Facility: CLINIC | Age: 84
End: 2019-10-14
Payer: MEDICARE

## 2019-10-14 DIAGNOSIS — Z79.01 LONG TERM CURRENT USE OF ANTICOAGULANT THERAPY: ICD-10-CM

## 2019-10-14 DIAGNOSIS — I48.0 PAROXYSMAL ATRIAL FIBRILLATION: ICD-10-CM

## 2019-10-14 LAB — INR PPP: 2.8

## 2019-10-14 PROCEDURE — 93793 PR ANTICOAGULANT MGMT FOR PT TAKING WARFARIN: ICD-10-PCS | Mod: S$GLB,,,

## 2019-10-14 PROCEDURE — 93793 ANTICOAG MGMT PT WARFARIN: CPT | Mod: S$GLB,,,

## 2019-10-21 ENCOUNTER — ANTI-COAG VISIT (OUTPATIENT)
Dept: CARDIOLOGY | Facility: CLINIC | Age: 84
End: 2019-10-21
Payer: MEDICARE

## 2019-10-21 DIAGNOSIS — Z79.01 LONG TERM CURRENT USE OF ANTICOAGULANT THERAPY: ICD-10-CM

## 2019-10-21 DIAGNOSIS — I48.0 PAROXYSMAL ATRIAL FIBRILLATION: ICD-10-CM

## 2019-10-21 LAB — INR PPP: 2.7

## 2019-10-21 PROCEDURE — 93793 ANTICOAG MGMT PT WARFARIN: CPT | Mod: S$GLB,,,

## 2019-10-21 PROCEDURE — 93793 PR ANTICOAGULANT MGMT FOR PT TAKING WARFARIN: ICD-10-PCS | Mod: S$GLB,,,

## 2019-10-22 DIAGNOSIS — G70.00 MG (MYASTHENIA GRAVIS): ICD-10-CM

## 2019-10-22 RX ORDER — PREDNISONE 5 MG/1
TABLET ORAL
Qty: 90 TABLET | Refills: 1 | Status: SHIPPED | OUTPATIENT
Start: 2019-10-22 | End: 2020-02-26 | Stop reason: SDUPTHER

## 2019-10-28 ENCOUNTER — ANTI-COAG VISIT (OUTPATIENT)
Dept: CARDIOLOGY | Facility: CLINIC | Age: 84
End: 2019-10-28
Payer: MEDICARE

## 2019-10-28 DIAGNOSIS — Z79.01 LONG TERM CURRENT USE OF ANTICOAGULANT THERAPY: ICD-10-CM

## 2019-10-28 DIAGNOSIS — I48.0 PAROXYSMAL ATRIAL FIBRILLATION: ICD-10-CM

## 2019-10-28 LAB — INR PPP: 2.8

## 2019-10-28 PROCEDURE — 93793 ANTICOAG MGMT PT WARFARIN: CPT | Mod: S$GLB,,,

## 2019-10-28 PROCEDURE — 93793 PR ANTICOAGULANT MGMT FOR PT TAKING WARFARIN: ICD-10-PCS | Mod: S$GLB,,,

## 2019-10-29 ENCOUNTER — TELEPHONE (OUTPATIENT)
Dept: HEMATOLOGY/ONCOLOGY | Facility: CLINIC | Age: 84
End: 2019-10-29

## 2019-10-30 ENCOUNTER — LAB VISIT (OUTPATIENT)
Dept: LAB | Facility: HOSPITAL | Age: 84
End: 2019-10-30
Attending: INTERNAL MEDICINE
Payer: MEDICARE

## 2019-10-30 ENCOUNTER — OFFICE VISIT (OUTPATIENT)
Dept: HEMATOLOGY/ONCOLOGY | Facility: CLINIC | Age: 84
End: 2019-10-30
Payer: MEDICARE

## 2019-10-30 VITALS
RESPIRATION RATE: 16 BRPM | DIASTOLIC BLOOD PRESSURE: 55 MMHG | HEART RATE: 52 BPM | SYSTOLIC BLOOD PRESSURE: 126 MMHG | WEIGHT: 182.31 LBS | HEIGHT: 69 IN | BODY MASS INDEX: 27 KG/M2 | OXYGEN SATURATION: 97 %

## 2019-10-30 DIAGNOSIS — Z79.01 LONG TERM CURRENT USE OF ANTICOAGULANT THERAPY: ICD-10-CM

## 2019-10-30 DIAGNOSIS — D63.1 ANEMIA OF RENAL DISEASE: ICD-10-CM

## 2019-10-30 DIAGNOSIS — N18.9 ANEMIA OF RENAL DISEASE: ICD-10-CM

## 2019-10-30 DIAGNOSIS — D47.3 ESSENTIAL THROMBOCYTOSIS: ICD-10-CM

## 2019-10-30 DIAGNOSIS — D47.3 ESSENTIAL THROMBOCYTOSIS: Primary | ICD-10-CM

## 2019-10-30 LAB
ALBUMIN SERPL BCP-MCNC: 3.5 G/DL (ref 3.5–5.2)
ALP SERPL-CCNC: 80 U/L (ref 55–135)
ALT SERPL W/O P-5'-P-CCNC: 11 U/L (ref 10–44)
ANION GAP SERPL CALC-SCNC: 8 MMOL/L (ref 8–16)
ANISOCYTOSIS BLD QL SMEAR: SLIGHT
AST SERPL-CCNC: 21 U/L (ref 10–40)
BASO STIPL BLD QL SMEAR: ABNORMAL
BASOPHILS # BLD AUTO: 0.04 K/UL (ref 0–0.2)
BASOPHILS NFR BLD: 0.4 % (ref 0–1.9)
BILIRUB SERPL-MCNC: 1.3 MG/DL (ref 0.1–1)
BUN SERPL-MCNC: 24 MG/DL (ref 8–23)
CALCIUM SERPL-MCNC: 9.1 MG/DL (ref 8.7–10.5)
CHLORIDE SERPL-SCNC: 103 MMOL/L (ref 95–110)
CO2 SERPL-SCNC: 26 MMOL/L (ref 23–29)
CREAT SERPL-MCNC: 1.3 MG/DL (ref 0.5–1.4)
DIFFERENTIAL METHOD: ABNORMAL
EOSINOPHIL # BLD AUTO: 0.1 K/UL (ref 0–0.5)
EOSINOPHIL NFR BLD: 0.5 % (ref 0–8)
ERYTHROCYTE [DISTWIDTH] IN BLOOD BY AUTOMATED COUNT: 16.9 % (ref 11.5–14.5)
EST. GFR  (AFRICAN AMERICAN): 56.3 ML/MIN/1.73 M^2
EST. GFR  (NON AFRICAN AMERICAN): 48.7 ML/MIN/1.73 M^2
GIANT PLATELETS BLD QL SMEAR: PRESENT
GLUCOSE SERPL-MCNC: 85 MG/DL (ref 70–110)
HCT VFR BLD AUTO: 31.3 % (ref 40–54)
HGB BLD-MCNC: 10.3 G/DL (ref 14–18)
IMM GRANULOCYTES # BLD AUTO: 0.09 K/UL (ref 0–0.04)
IMM GRANULOCYTES NFR BLD AUTO: 0.9 % (ref 0–0.5)
LYMPHOCYTES # BLD AUTO: 0.8 K/UL (ref 1–4.8)
LYMPHOCYTES NFR BLD: 7.7 % (ref 18–48)
MCH RBC QN AUTO: 41.5 PG (ref 27–31)
MCHC RBC AUTO-ENTMCNC: 32.9 G/DL (ref 32–36)
MCV RBC AUTO: 126 FL (ref 82–98)
MONOCYTES # BLD AUTO: 0.6 K/UL (ref 0.3–1)
MONOCYTES NFR BLD: 5.4 % (ref 4–15)
NEUTROPHILS # BLD AUTO: 8.9 K/UL (ref 1.8–7.7)
NEUTROPHILS NFR BLD: 85.1 % (ref 38–73)
NRBC BLD-RTO: 0 /100 WBC
PLATELET # BLD AUTO: 510 K/UL (ref 150–350)
PLATELET BLD QL SMEAR: ABNORMAL
PMV BLD AUTO: 9.5 FL (ref 9.2–12.9)
POLYCHROMASIA BLD QL SMEAR: ABNORMAL
POTASSIUM SERPL-SCNC: 4.2 MMOL/L (ref 3.5–5.1)
PROT SERPL-MCNC: 6.6 G/DL (ref 6–8.4)
RBC # BLD AUTO: 2.48 M/UL (ref 4.6–6.2)
SODIUM SERPL-SCNC: 137 MMOL/L (ref 136–145)
WBC # BLD AUTO: 10.47 K/UL (ref 3.9–12.7)

## 2019-10-30 PROCEDURE — 99214 OFFICE O/P EST MOD 30 MIN: CPT | Mod: HCNC,S$GLB,, | Performed by: INTERNAL MEDICINE

## 2019-10-30 PROCEDURE — 99499 UNLISTED E&M SERVICE: CPT | Mod: HCNC,S$GLB,, | Performed by: INTERNAL MEDICINE

## 2019-10-30 PROCEDURE — 99999 PR PBB SHADOW E&M-EST. PATIENT-LVL III: CPT | Mod: PBBFAC,HCNC,, | Performed by: INTERNAL MEDICINE

## 2019-10-30 PROCEDURE — 1101F PT FALLS ASSESS-DOCD LE1/YR: CPT | Mod: HCNC,CPTII,S$GLB, | Performed by: INTERNAL MEDICINE

## 2019-10-30 PROCEDURE — 1101F PR PT FALLS ASSESS DOC 0-1 FALLS W/OUT INJ PAST YR: ICD-10-PCS | Mod: HCNC,CPTII,S$GLB, | Performed by: INTERNAL MEDICINE

## 2019-10-30 PROCEDURE — 85025 COMPLETE CBC W/AUTO DIFF WBC: CPT | Mod: HCNC

## 2019-10-30 PROCEDURE — 99499 RISK ADDL DX/OHS AUDIT: ICD-10-PCS | Mod: HCNC,S$GLB,, | Performed by: INTERNAL MEDICINE

## 2019-10-30 PROCEDURE — 99999 PR PBB SHADOW E&M-EST. PATIENT-LVL III: ICD-10-PCS | Mod: PBBFAC,HCNC,, | Performed by: INTERNAL MEDICINE

## 2019-10-30 PROCEDURE — 99214 PR OFFICE/OUTPT VISIT, EST, LEVL IV, 30-39 MIN: ICD-10-PCS | Mod: HCNC,S$GLB,, | Performed by: INTERNAL MEDICINE

## 2019-10-30 PROCEDURE — 80053 COMPREHEN METABOLIC PANEL: CPT | Mod: HCNC

## 2019-10-30 NOTE — Clinical Note
Please schedule labs at [facility] in 3 months (CBC, CMP) and 6 months. He prefers to have blood drawn in the afternoon. Return to clinic in 6 months after labs.

## 2019-10-30 NOTE — PROGRESS NOTES
HEMATOLOGIC MALIGNANCIES PROGRESS NOTE    IDENTIFYING STATEMENT   Ernie Phan (Ernie) is a 88 y.o. male with a  of 3/19/1931 from [place] with the diagnosis of essential thrombocytosis.      ONCOLOGY HISTORY:    1. Essential thrombocytosis     2. 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 Myasthenia gravis on azathioprine  4. Pulmonary hypertension  5. HTN  6. Atrial fibrillation  7. CKD-III    INTERVAL HISTORY:      Mr. Phan returns to clinic for follow-up of his essential thrombocytosis and anemia of chronic kidney disease.     He is feeling okay overall and reports no symptoms of anemia. He continues on hydroxyurea at a dose of 1000 mg on day 1 and then 500 mg on days 2 and 3 of a 3-day cycle. He is tolerating this well without adverse effect.      He states he is adapting to life without his wife.     Past Medical History, Past Social History and Past Family History have been reviewed and are unchanged except as noted in the interval history.    MEDICATIONS:     Current Outpatient Medications on File Prior to Visit   Medication Sig Dispense Refill    azaTHIOprine (IMURAN) 50 mg Tab Take 4 tablets (200 mg total) by mouth once daily. 360 tablet 3    DENTURE CLEANSER (EFFERVESCENT DENTURE CLEANSR DENT)       finasteride (PROSCAR) 5 mg tablet TAKE 1 TABLET EVERY DAY 90 tablet 3    flu vacc [id number],65yr up,PF (FLUZONE HIGH-DOSE , PF,) 180 mcg/0.5 mL Syrg admin as directed 0.5 mL 0    FLUZONE HIGH-DOSE , PF, 180 mcg/0.5 mL vaccine       gabapentin (NEURONTIN) 300 MG capsule Take 1 capsule (300 mg total) by mouth 3 (three) times daily. 270 capsule 3    hydroCHLOROthiazide (MICROZIDE) 12.5 mg capsule       hydroxyurea (HYDREA) 500 mg Cap Take 1,000 mg on day 1, then 500 mg on days 2 and 3 and repeat.. 120 capsule 3    IRON, FERROUS SULFATE, ORAL Take 65 mg by mouth once daily.      ONE TOUCH ULTRA TEST Strp TEST DAILY 100 each 3    ONE TOUCH ULTRASOFT LANCETS lancets TEST DAILY  "100 each 3    predniSONE (DELTASONE) 5 MG tablet TAKE 1 TABLET EVERY DAY 90 tablet 1    tamsulosin (FLOMAX) 0.4 mg Cap TAKE 1 CAPSULE EVERY DAY 90 capsule 3    verapamil (CALAN) 120 MG tablet Take 1 tablet (120 mg total) by mouth 2 (two) times daily. 180 tablet 3    vitamin D 1000 units Tab Take 185 mg by mouth once daily.      warfarin (COUMADIN) 5 MG tablet Take 1.5 tablets (7.5 mg total) by mouth Daily. OR AS DIRECTED BY COUMADIN CLINIC 90 tablet 3     No current facility-administered medications on file prior to visit.        ALLERGIES: Review of patient's allergies indicates:  No Known Allergies     ROS:       Review of Systems   Constitutional: Negative for diaphoresis, fatigue, fever and unexpected weight change.   HENT:   Negative for lump/mass and sore throat.    Eyes: Negative for icterus.   Respiratory: Negative for cough and shortness of breath.    Cardiovascular: Negative for chest pain and palpitations.   Gastrointestinal: Negative for abdominal distention, constipation, diarrhea, nausea and vomiting.   Genitourinary: Negative for dysuria and frequency.    Musculoskeletal: Negative for arthralgias, gait problem and myalgias.   Skin: Negative for rash.   Neurological: Negative for dizziness, gait problem and headaches.   Hematological: Negative for adenopathy. Does not bruise/bleed easily.   Psychiatric/Behavioral: Positive for depression (grieving from his wife's recent death). The patient is not nervous/anxious.        PHYSICAL EXAM:  Vitals:    10/30/19 1311   BP: (!) 126/55   Pulse: (!) 52   Resp: 16   SpO2: 97%   Weight: 82.7 kg (182 lb 5.1 oz)   Height: 5' 9" (1.753 m)   PainSc: 0-No pain       KARNOFSKY PERFORMANCE STATUS 60%  ECOG 2    Physical Exam   Constitutional: He is oriented to person, place, and time. He appears well-developed and well-nourished. No distress.   HENT:   Head: Normocephalic and atraumatic.   Mouth/Throat: Mucous membranes are normal. No oral lesions.   Eyes: " Conjunctivae are normal.   Neck: No thyromegaly present.   Cardiovascular: Normal rate, regular rhythm and normal heart sounds.   No murmur heard.  Pulmonary/Chest: Breath sounds normal. He has no wheezes. He has no rales.   Abdominal: Soft. He exhibits no distension and no mass. There is no splenomegaly or hepatomegaly. There is no tenderness.   Lymphadenopathy:     He has no cervical adenopathy.        Right cervical: No deep cervical adenopathy present.       Left cervical: No deep cervical adenopathy present.     He has no axillary adenopathy. No inguinal adenopathy noted on the right or left side.   Neurological: He is alert and oriented to person, place, and time. He has normal strength and normal reflexes. No cranial nerve deficit. Coordination normal.   Skin: No rash noted.       LAB:   Results for orders placed or performed in visit on 10/30/19   CBC auto differential   Result Value Ref Range    WBC 10.47 3.90 - 12.70 K/uL    RBC 2.48 (L) 4.60 - 6.20 M/uL    Hemoglobin 10.3 (L) 14.0 - 18.0 g/dL    Hematocrit 31.3 (L) 40.0 - 54.0 %    Mean Corpuscular Volume 126 (H) 82 - 98 fL    Mean Corpuscular Hemoglobin 41.5 (H) 27.0 - 31.0 pg    Mean Corpuscular Hemoglobin Conc 32.9 32.0 - 36.0 g/dL    RDW 16.9 (H) 11.5 - 14.5 %    Platelets 510 (H) 150 - 350 K/uL    MPV 9.5 9.2 - 12.9 fL    Immature Granulocytes 0.9 (H) 0.0 - 0.5 %    Gran # (ANC) 8.9 (H) 1.8 - 7.7 K/uL    Immature Grans (Abs) 0.09 (H) 0.00 - 0.04 K/uL    Lymph # 0.8 (L) 1.0 - 4.8 K/uL    Mono # 0.6 0.3 - 1.0 K/uL    Eos # 0.1 0.0 - 0.5 K/uL    Baso # 0.04 0.00 - 0.20 K/uL    nRBC 0 0 /100 WBC    Gran% 85.1 (H) 38.0 - 73.0 %    Lymph% 7.7 (L) 18.0 - 48.0 %    Mono% 5.4 4.0 - 15.0 %    Eosinophil% 0.5 0.0 - 8.0 %    Basophil% 0.4 0.0 - 1.9 %    Platelet Estimate Increased (A)     Aniso Slight     Poly Occasional     Basophilic Stippling Occasional     Large/Giant Platelets Present     Differential Method Automated    Comprehensive metabolic panel    Result Value Ref Range    Sodium 137 136 - 145 mmol/L    Potassium 4.2 3.5 - 5.1 mmol/L    Chloride 103 95 - 110 mmol/L    CO2 26 23 - 29 mmol/L    Glucose 85 70 - 110 mg/dL    BUN, Bld 24 (H) 8 - 23 mg/dL    Creatinine 1.3 0.5 - 1.4 mg/dL    Calcium 9.1 8.7 - 10.5 mg/dL    Total Protein 6.6 6.0 - 8.4 g/dL    Albumin 3.5 3.5 - 5.2 g/dL    Total Bilirubin 1.3 (H) 0.1 - 1.0 mg/dL    Alkaline Phosphatase 80 55 - 135 U/L    AST 21 10 - 40 U/L    ALT 11 10 - 44 U/L    Anion Gap 8 8 - 16 mmol/L    eGFR if African American 56.3 (A) >60 mL/min/1.73 m^2    eGFR if non  48.7 (A) >60 mL/min/1.73 m^2     *Note: Due to a large number of results and/or encounters for the requested time period, some results have not been displayed. A complete set of results can be found in Results Review.       PROBLEMS ASSESSED THIS VISIT:    1. Essential thrombocytosis    2. Anemia of renal disease    3. Long term current use of anticoagulant therapy        PLAN:       Essential thrombocytosis  Platelets are 510, above goal of 400. His hemoglobin is 10.3 g/dl. I think the current blood counts are the right balance of platelet reduction while not driving excessive anemia. We will continue current hydroxyurea schedule (See interval history portion of this note).     Long term current use of anticoagulant therapy  Continue warfarin for anticoagulation in atrial fibrillation.     Follow-up   Please schedule labs at [facility] in 3 months (CBC, CMP) and 6 months. He prefers to have blood drawn in the afternoon. Return to clinic in 6 months after labs.     Nick Gutierrez MD  Hematology and Stem Cell Transplant

## 2019-11-04 ENCOUNTER — ANTI-COAG VISIT (OUTPATIENT)
Dept: CARDIOLOGY | Facility: CLINIC | Age: 84
End: 2019-11-04
Payer: MEDICARE

## 2019-11-04 DIAGNOSIS — Z79.01 LONG TERM CURRENT USE OF ANTICOAGULANT THERAPY: ICD-10-CM

## 2019-11-04 DIAGNOSIS — I48.0 PAROXYSMAL ATRIAL FIBRILLATION: ICD-10-CM

## 2019-11-04 LAB — INR PPP: 3.1

## 2019-11-04 PROCEDURE — 93793 ANTICOAG MGMT PT WARFARIN: CPT | Mod: S$GLB,,,

## 2019-11-04 PROCEDURE — 93793 PR ANTICOAGULANT MGMT FOR PT TAKING WARFARIN: ICD-10-PCS | Mod: S$GLB,,,

## 2019-11-06 PROBLEM — D62 ACUTE BLOOD LOSS ANEMIA: Status: RESOLVED | Noted: 2019-03-19 | Resolved: 2019-11-06

## 2019-11-06 NOTE — ASSESSMENT & PLAN NOTE
Platelets are 510, above goal of 400. His hemoglobin is 10.3 g/dl. I think the current blood counts are the right balance of platelet reduction while not driving excessive anemia. We will continue current hydroxyurea schedule (See interval history portion of this note).

## 2019-11-11 ENCOUNTER — ANTI-COAG VISIT (OUTPATIENT)
Dept: CARDIOLOGY | Facility: CLINIC | Age: 84
End: 2019-11-11
Payer: MEDICARE

## 2019-11-11 DIAGNOSIS — I48.0 PAROXYSMAL ATRIAL FIBRILLATION: ICD-10-CM

## 2019-11-11 DIAGNOSIS — Z79.01 LONG TERM CURRENT USE OF ANTICOAGULANT THERAPY: ICD-10-CM

## 2019-11-11 LAB — INR PPP: 3.1

## 2019-11-11 PROCEDURE — 93793 PR ANTICOAGULANT MGMT FOR PT TAKING WARFARIN: ICD-10-PCS | Mod: S$GLB,,,

## 2019-11-11 PROCEDURE — 93793 ANTICOAG MGMT PT WARFARIN: CPT | Mod: S$GLB,,,

## 2019-11-11 NOTE — PROGRESS NOTES
INR not at goal. Medications, chart, and patient findings reviewed. See calendar for adjustments to dose and follow up plan.    Mohs planned 11/19, so will adjust dose for procedure

## 2019-11-16 ENCOUNTER — PATIENT OUTREACH (OUTPATIENT)
Dept: ADMINISTRATIVE | Facility: OTHER | Age: 84
End: 2019-11-16

## 2019-11-18 ENCOUNTER — ANTI-COAG VISIT (OUTPATIENT)
Dept: CARDIOLOGY | Facility: CLINIC | Age: 84
End: 2019-11-18
Payer: MEDICARE

## 2019-11-18 DIAGNOSIS — Z79.01 LONG TERM CURRENT USE OF ANTICOAGULANT THERAPY: ICD-10-CM

## 2019-11-18 DIAGNOSIS — I48.0 PAROXYSMAL ATRIAL FIBRILLATION: ICD-10-CM

## 2019-11-18 LAB — INR PPP: 2.1

## 2019-11-18 PROCEDURE — 93793 ANTICOAG MGMT PT WARFARIN: CPT | Mod: S$GLB,,,

## 2019-11-18 PROCEDURE — 93793 PR ANTICOAGULANT MGMT FOR PT TAKING WARFARIN: ICD-10-PCS | Mod: S$GLB,,,

## 2019-11-19 ENCOUNTER — ANESTHESIA EVENT (OUTPATIENT)
Dept: SURGERY | Facility: HOSPITAL | Age: 84
End: 2019-11-19
Payer: MEDICARE

## 2019-11-19 ENCOUNTER — OFFICE VISIT (OUTPATIENT)
Dept: OTOLARYNGOLOGY | Facility: CLINIC | Age: 84
End: 2019-11-19
Payer: MEDICARE

## 2019-11-19 ENCOUNTER — PROCEDURE VISIT (OUTPATIENT)
Dept: DERMATOLOGY | Facility: CLINIC | Age: 84
End: 2019-11-19
Payer: MEDICARE

## 2019-11-19 VITALS
BODY MASS INDEX: 27.02 KG/M2 | WEIGHT: 182 LBS | DIASTOLIC BLOOD PRESSURE: 74 MMHG | HEART RATE: 66 BPM | HEIGHT: 69 IN | WEIGHT: 183 LBS | BODY MASS INDEX: 26.96 KG/M2 | HEART RATE: 50 BPM | SYSTOLIC BLOOD PRESSURE: 176 MMHG | DIASTOLIC BLOOD PRESSURE: 77 MMHG | SYSTOLIC BLOOD PRESSURE: 161 MMHG

## 2019-11-19 DIAGNOSIS — M95.2 MOHS DEFECT OF SCALP: Primary | ICD-10-CM

## 2019-11-19 DIAGNOSIS — Z98.890 MOHS DEFECT OF SCALP: Primary | ICD-10-CM

## 2019-11-19 DIAGNOSIS — C44.42 SQUAMOUS CELL CARCINOMA, SCALP/NECK: Primary | ICD-10-CM

## 2019-11-19 PROCEDURE — 99204 PR OFFICE/OUTPT VISIT, NEW, LEVL IV, 45-59 MIN: ICD-10-PCS | Mod: HCNC,S$GLB,, | Performed by: OTOLARYNGOLOGY

## 2019-11-19 PROCEDURE — 1126F AMNT PAIN NOTED NONE PRSNT: CPT | Mod: HCNC,S$GLB,, | Performed by: OTOLARYNGOLOGY

## 2019-11-19 PROCEDURE — 17315 MOHS SURG ADDL BLOCK: CPT | Mod: HCNC,S$GLB,, | Performed by: DERMATOLOGY

## 2019-11-19 PROCEDURE — 99499 UNLISTED E&M SERVICE: CPT | Mod: HCNC,S$GLB,, | Performed by: DERMATOLOGY

## 2019-11-19 PROCEDURE — 99999 PR PBB SHADOW E&M-EST. PATIENT-LVL V: CPT | Mod: PBBFAC,HCNC,, | Performed by: OTOLARYNGOLOGY

## 2019-11-19 PROCEDURE — 1101F PR PT FALLS ASSESS DOC 0-1 FALLS W/OUT INJ PAST YR: ICD-10-PCS | Mod: HCNC,CPTII,S$GLB, | Performed by: OTOLARYNGOLOGY

## 2019-11-19 PROCEDURE — 99499 NO LOS: ICD-10-PCS | Mod: HCNC,S$GLB,, | Performed by: DERMATOLOGY

## 2019-11-19 PROCEDURE — 17312: ICD-10-PCS | Mod: HCNC,S$GLB,, | Performed by: DERMATOLOGY

## 2019-11-19 PROCEDURE — 99999 PR PBB SHADOW E&M-EST. PATIENT-LVL V: ICD-10-PCS | Mod: PBBFAC,HCNC,, | Performed by: OTOLARYNGOLOGY

## 2019-11-19 PROCEDURE — 17312 MOHS ADDL STAGE: CPT | Mod: HCNC,S$GLB,, | Performed by: DERMATOLOGY

## 2019-11-19 PROCEDURE — 1126F PR PAIN SEVERITY QUANTIFIED, NO PAIN PRESENT: ICD-10-PCS | Mod: HCNC,S$GLB,, | Performed by: OTOLARYNGOLOGY

## 2019-11-19 PROCEDURE — 1159F MED LIST DOCD IN RCRD: CPT | Mod: HCNC,S$GLB,, | Performed by: OTOLARYNGOLOGY

## 2019-11-19 PROCEDURE — 99204 OFFICE O/P NEW MOD 45 MIN: CPT | Mod: HCNC,S$GLB,, | Performed by: OTOLARYNGOLOGY

## 2019-11-19 PROCEDURE — 1159F PR MEDICATION LIST DOCUMENTED IN MEDICAL RECORD: ICD-10-PCS | Mod: HCNC,S$GLB,, | Performed by: OTOLARYNGOLOGY

## 2019-11-19 PROCEDURE — 17311: ICD-10-PCS | Mod: HCNC,S$GLB,, | Performed by: DERMATOLOGY

## 2019-11-19 PROCEDURE — 17311 MOHS 1 STAGE H/N/HF/G: CPT | Mod: HCNC,S$GLB,, | Performed by: DERMATOLOGY

## 2019-11-19 PROCEDURE — 17315: ICD-10-PCS | Mod: HCNC,S$GLB,, | Performed by: DERMATOLOGY

## 2019-11-19 PROCEDURE — 1101F PT FALLS ASSESS-DOCD LE1/YR: CPT | Mod: HCNC,CPTII,S$GLB, | Performed by: OTOLARYNGOLOGY

## 2019-11-19 NOTE — LETTER
November 19, 2019      Dion Sheehan MD  [address]  [facility] [address]           [facility]  [address]  [facility] [address]  Phone: [phone]  Fax: [phone]          Patient: Ernie Phan   MR Number: [id number]   YOB: 1931   Date of Visit: 11/19/2019       Dear Dr. Dion Sheehan:    Thank you for referring Ernie Phan to me for evaluation. Attached you will find relevant portions of my assessment and plan of care.    If you have questions, please do not hesitate to call me. I look forward to following Ernie Phna along with you.    Sincerely,    Jason [facility] MD    Enclosure  CC:  No Recipients    If you would like to receive this communication electronically, please contact [email or web address] or [phone] to request more information on [facility] access.    For providers and/or their staff who would like to refer a patient to [facility], please contact us through our one-stop-shop provider referral line, [facility], at [phone].    If you feel you have received this communication in error or would no longer like to receive these types of communications, please e-mail [email or web address]

## 2019-11-19 NOTE — Clinical Note
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  Was poorly differentiated and very invasive down to bone.  Just wanted you to know so you can do more frequent tumor surveillances on him.  He is going to [facility] for burring outer table and reconstruction tomorrow. Photo in [facility].Thanks!

## 2019-11-19 NOTE — PROGRESS NOTES
Head and Neck Surgery Consult    Seen in consultation from Dr Sheehan.    HPI: Ernie Phan is a 88 y.o. male presenting with a Mohs defect of the vertex scalp. The margins were cleared but the periosteum was involved. He has had other skin cancers, but denies cervical lymphadenopathy. He is on ASA for A-Fib, and he presents here to discuss reconstructive options.    Past Medical History:   Diagnosis Date    *Atrial fibrillation     Arthritis     Atrial fibrillation     BPH (benign prostatic hypertrophy)     Degenerative disc disease     Depression     GERD (gastroesophageal reflux disease)     Hyperglycemia     Hyperlipidemia     Hypertension     MG (myasthenia gravis)     Postherpetic neuralgia        Past Surgical History:   Procedure Laterality Date    ACHILLES TENDON SURGERY      CHOLECYSTECTOMY      COLONOSCOPY N/A 3/20/2019    Procedure: COLONOSCOPY;  Surgeon: Leeroy Thornton MD;  Location: [facility] ([address] [place] [address]);  Service: Endoscopy;  Laterality: N/A;    ESOPHAGOGASTRODUODENOSCOPY N/A 3/20/2019    Procedure: EGD (ESOPHAGOGASTRODUODENOSCOPY);  Surgeon: Leeroy Thornton MD;  Location: [facility] ([address] [place] [address]);  Service: Endoscopy;  Laterality: N/A;         Current Outpatient Medications:     azaTHIOprine (IMURAN) 50 mg Tab, Take 4 tablets (200 mg total) by mouth once daily., Disp: 360 tablet, Rfl: 3    DENTURE CLEANSER (EFFERVESCENT DENTURE CLEANSR DENT), , Disp: , Rfl:     finasteride (PROSCAR) 5 mg tablet, TAKE 1 TABLET EVERY DAY, Disp: 90 tablet, Rfl: 3    flu vacc [id number],65yr up,PF (FLUZONE HIGH-DOSE 2019-20, PF,) 180 mcg/0.5 mL Syrg, admin as directed, Disp: 0.5 mL, Rfl: 0    FLUZONE HIGH-DOSE 2018-19, PF, 180 mcg/0.5 mL vaccine, , Disp: , Rfl:     gabapentin (NEURONTIN) 300 MG capsule, Take 1 capsule (300 mg total) by mouth 3 (three) times daily., Disp: 270 capsule, Rfl: 3    hydroCHLOROthiazide (MICROZIDE) 12.5 mg capsule, , Disp: , Rfl:     hydroxyurea (HYDREA) 500 mg Cap, Take  1,000 mg on day 1, then 500 mg on days 2 and 3 and repeat.., Disp: 120 capsule, Rfl: 3    IRON, FERROUS SULFATE, ORAL, Take 65 mg by mouth once daily., Disp: , Rfl:     ONE TOUCH ULTRA TEST Strp, TEST DAILY, Disp: 100 each, Rfl: 3    ONE TOUCH ULTRASOFT LANCETS lancets, TEST DAILY, Disp: 100 each, Rfl: 3    predniSONE (DELTASONE) 5 MG tablet, TAKE 1 TABLET EVERY DAY, Disp: 90 tablet, Rfl: 1    tamsulosin (FLOMAX) 0.4 mg Cap, TAKE 1 CAPSULE EVERY DAY, Disp: 90 capsule, Rfl: 3    verapamil (CALAN) 120 MG tablet, Take 1 tablet (120 mg total) by mouth 2 (two) times daily., Disp: 180 tablet, Rfl: 3    vitamin D 1000 units Tab, Take 185 mg by mouth once daily., Disp: , Rfl:     warfarin (COUMADIN) 5 MG tablet, Take 1.5 tablets (7.5 mg total) by mouth Daily. OR AS DIRECTED BY COUMADIN CLINIC, Disp: 90 tablet, Rfl: 3    Review of patient's allergies indicates:  No Known Allergies    Family History   Problem Relation Age of Onset    Stroke Mother     Cancer Father        Social History     Socioeconomic History    Marital status:      Spouse name: Not on file    Number of children: Not on file    Years of education: Not on file    Highest education level: Not on file   Occupational History    Not on file   Social Needs    Financial resource strain: Not on file    Food insecurity:     Worry: Not on file     Inability: Not on file    Transportation needs:     Medical: Not on file     Non-medical: Not on file   Tobacco Use    Smoking status: Former Smoker    Smokeless tobacco: Never Used   Substance and Sexual Activity    Alcohol use: No    Drug use: No    Sexual activity: Not Currently   Lifestyle    Physical activity:     Days per week: Not on file     Minutes per session: Not on file    Stress: Not on file   Relationships    Social connections:     Talks on phone: Not on file     Gets together: Not on file     Attends Caodaism service: Not on file     Active member of club or organization:  Not on file     Attends meetings of clubs or organizations: Not on file     Relationship status: Not on file   Other Topics Concern    Not on file   Social History Narrative    Not on file       Review of Systems -  Constitutional: Denies having night sweats, constant fatigue, loss of appetite or recent substantial weight loss.  Eyes: Denies blurred vision or double vision.  Respiratory: Denies symptoms of shortness of breath, noisy breathing, hoarseness or chronic cough.  GI: Denies symptoms of heartburn, acid regurgitation, or the known presence of a hiatal hernia.  The remainder of a 10-point review of systems is negative    REVIEW OF RADIOLOGICAL FILMS AND RECORDS (PERSONALLY REVIEWED):  Photographs reviewed in dermatology notes    REVIEW OF LABS (PERSONALLY REVIEWED)  Noncontributory    PHYSICAL EXAM:  Vitals - There were no vitals taken for this visit.  Constitutional -      General Appearance: well developed, well nourished, without obvious deformities apart from 8cm central scalp/vertex defect with removed periosteum     Communication: speaks with a normal voice without hoarseness  Head & Face -     Overall: no obvious scars, lesions or masses     Parotid and submandibular glands: no masses or tenderness     Facial strength: normal and equal bilaterally  Eyes -      EOM intact  Ear, Nose, Mouth & Throat -     Ears: both left and right external auditory canals and TM's are normal, no external deformities     Nasal exam: mucosa is pink, septum is midline, visible turbinates are normal on anterior rhinoscopy     Mastication: teeth appear present     Oral Cavity and oropharynx: mucosa, hard and soft palates, tongue, posterior pharyngeal wall, lips and gums are without lesions. Tonsils appear minimal  Respiratory:     Breathing unlabored, no stridor  Cardiovascular:     No JVD, capillary refill normal  Larynx: using the mirror for indirect laryngoscopy, the epiglottic, false cords, true cords, and pyriform  sinuses are without lesions and the true vocal cords move normally  Neck: appears symmetric, and on palpation is without masses   Endocrine:     Thyroid: no asymmetry, thyromegaly, or thyroid nodules on palpation  Lymphatic:     No cervical lymphadenopathy  Cranial Nerves:      II: Pupillary reflexes normal     III, IV, VI: EOM normal     V: 1,2,3: normal sensation     VII: Normal strength in all divisions     IX, X: Normal voice, palatal elevation and sensation     XI: Shoulder strength normal       XII: Tongue mobility normal  Psychiatric:     Appropriate affect    ASSESSMENT: Mohs defect to bone of central scalp    PLAN: Will plan on burring of underlying cortical bone, circumferential advancement flap and application of ACell. Will plan on skin grafting in 2 weeks' time.       [facility]

## 2019-11-19 NOTE — H&P (VIEW-ONLY)
Head and Neck Surgery Consult    Seen in consultation from Dr Sheehan.    HPI: Ernie Phan is a 88 y.o. male presenting with a Mohs defect of the vertex scalp. The margins were cleared but the periosteum was involved. He has had other skin cancers, but denies cervical lymphadenopathy. He is on ASA for A-Fib, and he presents here to discuss reconstructive options.    Past Medical History:   Diagnosis Date    *Atrial fibrillation     Arthritis     Atrial fibrillation     BPH (benign prostatic hypertrophy)     Degenerative disc disease     Depression     GERD (gastroesophageal reflux disease)     Hyperglycemia     Hyperlipidemia     Hypertension     MG (myasthenia gravis)     Postherpetic neuralgia        Past Surgical History:   Procedure Laterality Date    ACHILLES TENDON SURGERY      CHOLECYSTECTOMY      COLONOSCOPY N/A 3/20/2019    Procedure: COLONOSCOPY;  Surgeon: Leeroy Thornton MD;  Location: [facility] ([address] [place] [address]);  Service: Endoscopy;  Laterality: N/A;    ESOPHAGOGASTRODUODENOSCOPY N/A 3/20/2019    Procedure: EGD (ESOPHAGOGASTRODUODENOSCOPY);  Surgeon: Leeroy Thornton MD;  Location: [facility] ([address] [place] [address]);  Service: Endoscopy;  Laterality: N/A;         Current Outpatient Medications:     azaTHIOprine (IMURAN) 50 mg Tab, Take 4 tablets (200 mg total) by mouth once daily., Disp: 360 tablet, Rfl: 3    DENTURE CLEANSER (EFFERVESCENT DENTURE CLEANSR DENT), , Disp: , Rfl:     finasteride (PROSCAR) 5 mg tablet, TAKE 1 TABLET EVERY DAY, Disp: 90 tablet, Rfl: 3    flu vacc [id number],65yr up,PF (FLUZONE HIGH-DOSE 2019-20, PF,) 180 mcg/0.5 mL Syrg, admin as directed, Disp: 0.5 mL, Rfl: 0    FLUZONE HIGH-DOSE 2018-19, PF, 180 mcg/0.5 mL vaccine, , Disp: , Rfl:     gabapentin (NEURONTIN) 300 MG capsule, Take 1 capsule (300 mg total) by mouth 3 (three) times daily., Disp: 270 capsule, Rfl: 3    hydroCHLOROthiazide (MICROZIDE) 12.5 mg capsule, , Disp: , Rfl:     hydroxyurea (HYDREA) 500 mg Cap, Take  1,000 mg on day 1, then 500 mg on days 2 and 3 and repeat.., Disp: 120 capsule, Rfl: 3    IRON, FERROUS SULFATE, ORAL, Take 65 mg by mouth once daily., Disp: , Rfl:     ONE TOUCH ULTRA TEST Strp, TEST DAILY, Disp: 100 each, Rfl: 3    ONE TOUCH ULTRASOFT LANCETS lancets, TEST DAILY, Disp: 100 each, Rfl: 3    predniSONE (DELTASONE) 5 MG tablet, TAKE 1 TABLET EVERY DAY, Disp: 90 tablet, Rfl: 1    tamsulosin (FLOMAX) 0.4 mg Cap, TAKE 1 CAPSULE EVERY DAY, Disp: 90 capsule, Rfl: 3    verapamil (CALAN) 120 MG tablet, Take 1 tablet (120 mg total) by mouth 2 (two) times daily., Disp: 180 tablet, Rfl: 3    vitamin D 1000 units Tab, Take 185 mg by mouth once daily., Disp: , Rfl:     warfarin (COUMADIN) 5 MG tablet, Take 1.5 tablets (7.5 mg total) by mouth Daily. OR AS DIRECTED BY COUMADIN CLINIC, Disp: 90 tablet, Rfl: 3    Review of patient's allergies indicates:  No Known Allergies    Family History   Problem Relation Age of Onset    Stroke Mother     Cancer Father        Social History     Socioeconomic History    Marital status:      Spouse name: Not on file    Number of children: Not on file    Years of education: Not on file    Highest education level: Not on file   Occupational History    Not on file   Social Needs    Financial resource strain: Not on file    Food insecurity:     Worry: Not on file     Inability: Not on file    Transportation needs:     Medical: Not on file     Non-medical: Not on file   Tobacco Use    Smoking status: Former Smoker    Smokeless tobacco: Never Used   Substance and Sexual Activity    Alcohol use: No    Drug use: No    Sexual activity: Not Currently   Lifestyle    Physical activity:     Days per week: Not on file     Minutes per session: Not on file    Stress: Not on file   Relationships    Social connections:     Talks on phone: Not on file     Gets together: Not on file     Attends Mor[employer] service: Not on file     Active member of club or organization:  Not on file     Attends meetings of clubs or organizations: Not on file     Relationship status: Not on file   Other Topics Concern    Not on file   Social History Narrative    Not on file       Review of Systems -  Constitutional: Denies having night sweats, constant fatigue, loss of appetite or recent substantial weight loss.  Eyes: Denies blurred vision or double vision.  Respiratory: Denies symptoms of shortness of breath, noisy breathing, hoarseness or chronic cough.  GI: Denies symptoms of heartburn, acid regurgitation, or the known presence of a hiatal hernia.  The remainder of a 10-point review of systems is negative    REVIEW OF RADIOLOGICAL FILMS AND RECORDS (PERSONALLY REVIEWED):  Photographs reviewed in dermatology notes    REVIEW OF LABS (PERSONALLY REVIEWED)  Noncontributory    PHYSICAL EXAM:  Vitals - There were no vitals taken for this visit.  Constitutional -      General Appearance: well developed, well nourished, without obvious deformities apart from 8cm central scalp/vertex defect with removed periosteum     Communication: speaks with a normal voice without hoarseness  Head & Face -     Overall: no obvious scars, lesions or masses     Parotid and submandibular glands: no masses or tenderness     Facial strength: normal and equal bilaterally  Eyes -      EOM intact  Ear, Nose, Mouth & Throat -     Ears: both left and right external auditory canals and TM's are normal, no external deformities     Nasal exam: mucosa is pink, septum is midline, visible turbinates are normal on anterior rhinoscopy     Mastication: teeth appear present     Oral Cavity and oropharynx: mucosa, hard and soft palates, tongue, posterior pharyngeal wall, lips and gums are without lesions. Tonsils appear minimal  Respiratory:     Breathing unlabored, no stridor  Cardiovascular:     No JVD, capillary refill normal  Larynx: using the mirror for indirect laryngoscopy, the epiglottic, false cords, true cords, and pyriform  sinuses are without lesions and the true vocal cords move normally  Neck: appears symmetric, and on palpation is without masses   Endocrine:     Thyroid: no asymmetry, thyromegaly, or thyroid nodules on palpation  Lymphatic:     No cervical lymphadenopathy  Cranial Nerves:      II: Pupillary reflexes normal     III, IV, VI: EOM normal     V: 1,2,3: normal sensation     VII: Normal strength in all divisions     IX, X: Normal voice, palatal elevation and sensation     XI: Shoulder strength normal       XII: Tongue mobility normal  Psychiatric:     Appropriate affect    ASSESSMENT: Mohs defect to bone of central scalp    PLAN: Will plan on burring of underlying cortical bone, circumferential advancement flap and application of ACell. Will plan on skin grafting in 2 weeks' time.       Jason [facility]

## 2019-11-19 NOTE — ANESTHESIA PREPROCEDURE EVALUATION
Pre-operative evaluation for Procedure(s) (LRB):  CLOSURE, MOHS PROCEDURE DEFECT (N/A)      11/19/2019    Ernie Phan is a 88 y.o. 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      Patient Active Problem List   Diagnosis    Paroxysmal atrial fibrillation    Long term current use of anticoagulant therapy    Myasthenia gravis without acute exacerbation    Chronic kidney disease, stage III (moderate)    Essential hypertension    GERD (gastroesophageal reflux disease)    Postherpetic neuralgia    Hyperglycemia    Hyperlipidemia    Long-term use of immunosuppressant medication    Essential thrombocytosis    Pulmonary hypertension    Ear lesion    Non-rheumatic tricuspid valve insufficiency    Anemia of renal disease    Rectal bleeding    Gastrointestinal hemorrhage    Acute renal failure superimposed on stage 3 chronic kidney disease    Aortic atherosclerosis       Review of patient's allergies indicates:  No Known Allergies    Current Inpatient Medications:      Current Outpatient Medications on File Prior to Visit   Medication Sig Dispense Refill    azaTHIOprine (IMURAN) 50 mg Tab Take 4 tablets (200 mg total) by mouth once daily. 360 tablet 3    DENTURE CLEANSER (EFFERVESCENT DENTURE CLEANSR DENT)       finasteride (PROSCAR) 5 mg tablet TAKE 1 TABLET EVERY DAY 90 tablet 3    flu vacc [id number],65yr up,PF (FLUZONE HIGH-DOSE 2019-20, PF,) 180 mcg/0.5 mL Syrg admin as directed 0.5 mL 0    FLUZONE HIGH-DOSE 2018-19, PF, 180 mcg/0.5 mL vaccine       gabapentin (NEURONTIN) 300 MG capsule Take 1 capsule (300 mg total) by mouth 3 (three) times daily. 270 capsule 3    hydroCHLOROthiazide (MICROZIDE) 12.5 mg capsule       hydroxyurea (HYDREA) 500 mg Cap Take 1,000 mg  on day 1, then 500 mg on days 2 and 3 and repeat.. 120 capsule 3    IRON, FERROUS SULFATE, ORAL Take 65 mg by mouth once daily.      ONE TOUCH ULTRA TEST Strp TEST DAILY 100 each 3    ONE TOUCH ULTRASOFT LANCETS lancets TEST DAILY 100 each 3    predniSONE (DELTASONE) 5 MG tablet TAKE 1 TABLET EVERY DAY 90 tablet 1    tamsulosin (FLOMAX) 0.4 mg Cap TAKE 1 CAPSULE EVERY DAY 90 capsule 3    verapamil (CALAN) 120 MG tablet Take 1 tablet (120 mg total) by mouth 2 (two) times daily. 180 tablet 3    vitamin D 1000 units Tab Take 185 mg by mouth once daily.      warfarin (COUMADIN) 5 MG tablet Take 1.5 tablets (7.5 mg total) by mouth Daily. OR AS DIRECTED BY COUMADIN CLINIC 90 tablet 3     No current facility-administered medications on file prior to visit.        Past Surgical History:   Procedure Laterality Date    ACHILLES TENDON SURGERY      CHOLECYSTECTOMY      COLONOSCOPY N/A 3/20/2019    Procedure: COLONOSCOPY;  Surgeon: Leeroy Thornton MD;  Location: [facility] ([address] [place] [address]);  Service: Endoscopy;  Laterality: N/A;    ESOPHAGOGASTRODUODENOSCOPY N/A 3/20/2019    Procedure: EGD (ESOPHAGOGASTRODUODENOSCOPY);  Surgeon: Leeroy Thornton MD;  Location: [address]);  Service: Endoscopy;  Laterality: N/A;       Social History     Socioeconomic History    Marital status:      Spouse name: Not on file    Number of children: Not on file    Years of education: Not on file    Highest education level: Not on file   Occupational History    Not on file   Social Needs    Financial resource strain: Not on file    Food insecurity:     Worry: Not on file     Inability: Not on file    Transportation needs:     Medical: Not on file     Non-medical: Not on file   Tobacco Use    Smoking status: Former Smoker    Smokeless tobacco: Never Used   Substance and Sexual Activity    Alcohol use: No    Drug use: No    Sexual activity: Not Currently   Lifestyle    Physical activity:     Days per week: Not on  file     Minutes per session: Not on file    Stress: Not on file   Relationships    Social connections:     Talks on phone: Not on file     Gets together: Not on file     Attends Mandaen service: Not on file     Active member of club or organization: Not on file     Attends meetings of clubs or organizations: Not on file     Relationship status: Not on file   Other Topics Concern    Not on file   Social History Narrative    Not on file       OBJECTIVE:     Vital Signs Range (Last 24H):  Pulse:  [50-66]   BP: (121-176)/(58-77)       CBC:   No results for input(s): WBC, RBC, HGB, HCT, PLT, MCV, MCH, MCHC in the last 72 hours.    CMP:   No results for input(s): NA, K, CL, CO2, BUN, CREATININE, GLU, MG, PHOS, CALCIUM, ALBUMIN, PROT, ALKPHOS, ALT, AST, BILITOT in the last 72 hours.    INR:  Recent Labs     11/18/19   INR 2.1       Diagnostic Studies:     EKG: Atrial fibrillation with slow ventricular response  Low voltage QRS  Nonspecific ST and T wave abnormality , probably digitalis effect  Abnormal ECG  When compared with ECG of 19-MAR-2019 01:33,  Significant changes have occurred  Confirmed by Branden Bennett MD ([id number]) on 4/3/2019 1:37:01 PM    Referred By: [email or web address]   SELF           Confirmed By:Branden Bennett MD  2D ECHO:  Results for orders placed or performed during the hospital encounter of 08/15/18   2D echo with color flow doppler   Result Value Ref Range    QEF 65 55 - 65    Mitral Valve Regurgitation MILD     Aortic Valve Regurgitation TRIVIAL TO MILD     Est. PA Systolic Pressure 57.17 (A)     Tricuspid Valve Regurgitation MODERATE (A)          ASSESSMENT/PLAN:         Anesthesia Evaluation    I have reviewed the Patient Summary Reports.    I have reviewed the Nursing Notes.   I have reviewed the Medications.   Prednisone    Review of Systems  Anesthesia Hx:  No problems with previous Anesthesia  History of prior surgery of interest to airway management or planning: Denies Family Hx of Anesthesia  complications.   Denies Personal Hx of Anesthesia complications.   Hematology/Oncology:  Hematology Normal      Current/Recent Cancer.   EENT/Dental:EENT/Dental Normal   Cardiovascular:   Hypertension    Pulmonary:   pulm htn    Renal/:   Chronic Renal Disease, CRI    Hepatic/GI:   GERD GI bleed   Musculoskeletal:   Arthritis     Neurological:   Neuromuscular Disease, (myasthenia gravis) Myasthenia gravis   Dermatological:   SCC of scalp       Physical Exam  General:  Well nourished    Airway/Jaw/Neck:  Airway Findings: Mouth Opening: Normal Tongue: Normal  Mallampati: III  Improves to II with phonation.  TM Distance: Normal, at least 6 cm  Jaw/Neck Findings:  Neck ROM: Extension Decreased, Mod.      Dental:  Dental Findings: Upper Dentures    Chest/Lungs:  Chest/Lungs Findings: Clear to auscultation, Normal Respiratory Rate     Heart/Vascular:  Heart Findings: Rate: Normal  Rhythm: Irregularly Irregular     Abdomen:  Abdomen Findings:  Normal, Soft, Nontender       Mental Status:  Mental Status Findings:  Cooperative, Alert and Oriented         Anesthesia Plan  Type of Anesthesia, risks & benefits discussed:  Anesthesia Type:  general, MAC  Patient's Preference:   Intra-op Monitoring Plan: standard ASA monitors  Intra-op Monitoring Plan Comments:   Post Op Pain Control Plan: multimodal analgesia, IV/PO Opioids PRN and per primary service following discharge from PACU  Post Op Pain Control Plan Comments:   Induction:   IV  Beta Blocker:  Patient is not currently on a Beta-Blocker (No further documentation required).       Informed Consent: Patient understands risks and agrees with Anesthesia plan.  Questions answered. Anesthesia consent signed with patient.  ASA Score: 3     Day of Surgery Review of History & Physical:    H&P update referred to the surgeon.         Ready For Surgery From Anesthesia Perspective.

## 2019-11-19 NOTE — PROGRESS NOTES
PROCEDURE: Mohs' Micrographic Surgery    INDICATION: Biopsy-proven skin cancer of cosmetically and functionally important areas, including head, neck, genital, hand, foot, or areas known for having difficulty in healing, such as the lower anterior legs. Tumors with aggressive clinical behavior (rapidly growing, greater than 1 cm in diameter). Tumor with ill-defined borders.    REFERRING MD: Mary Kay Freeman MD    CASE NUMBER:     ANESTHETIC: 12 cc 0.5% Lidocaine with Epi 1:200,000 mixed 1:1 with 0.5% Bupivacaine and 6 cc 2% Lidocaine with Epinephrine 1:100,000    SURGICAL PREP: Hibiclens    SURGEON: Dion Sheehan MD    ASSISTANTS: Mitzi Landis PA-C, Anne Lima, Surg Tech and Amita Miller, Surg Tech    PREOPERATIVE DIAGNOSIS: squamous cell carcinoma    POSTOPERATIVE DIAGNOSIS: squamous cell carcinoma    PATHOLOGIC DIAGNOSIS: squamous cell carcinoma- invasive, well differentiated, moderately to poorly differentiated    HISTOLOGY OF SPECIMENS IN FIRST STAGE:   Tumor Type: Tumor seen. Invasive squamous cell carcinoma: Proliferation of squamous cells exhibiting atypia and infiltrating within the dermis.  Moderately-differentiated squamous cell carcinoma: Proliferation of squamous cells exhibiting atypia of moderate differentiation and infiltrating within the dermis.  Poorly-differentiated squamous cell carcinoma: Proliferation of squamous cells exhibiting atypia of poor differentiation and infiltrating within the dermis.   Depth of Invasion: epidermis, dermis, subcutaneous tissue and muscle  Perineural Invasion: No    HISTOLOGY OF SPECIMENS IN SUBSEQUENT STAGES:  Tumor Type: Tumor seen. Same as in first stage.   Depth of Invasion: epidermis and dermis  Perineural Invasion: No    STAGES OF MOHS' SURGERY PERFORMED: 3    TUMOR-FREE PLANE ACHIEVED: Yes- down to bone. Will refer patient to Dr. Espinoza in [facility] Plastics for burring of outer table and subsequent reconstruction.    HEMOSTASIS: electrocoagulation  "    SPECIMENS: 14 (8 in stage A, 5 in stage B and 1 in stage C)    LOCATION: Vertex scalp. Patient verified location by looking at photo taken prior to procedure.     INITIAL LESION SIZE: 3.8 x 4.3 cm    FINAL DEFECT SIZE: 5.5 x 6.5 cm    WOUND REPAIR/DISPOSITION: The patient tolerated Mohs' Micrographic Surgery for a squamous cell carcinoma very well. When the tumor was completely removed, the patient was referred to Dr. Espinoza in E[facility] for burring of outer table and for repair of the surgical defect.    Vitals:    11/19/19 0747 11/19/19 1316   BP: (!) 121/58 (!) 161/74   Pulse: 62 (!) 50   Weight: 82.6 kg (182 lb)    Height: 5' 9" (1.753 m)            "

## 2019-11-20 ENCOUNTER — HOSPITAL ENCOUNTER (OUTPATIENT)
Facility: HOSPITAL | Age: 84
Discharge: HOME | End: 2019-11-20
Attending: OTOLARYNGOLOGY | Admitting: OTOLARYNGOLOGY
Payer: MEDICARE

## 2019-11-20 ENCOUNTER — ANESTHESIA (OUTPATIENT)
Dept: SURGERY | Facility: HOSPITAL | Age: 84
End: 2019-11-20
Payer: MEDICARE

## 2019-11-20 VITALS
HEIGHT: 69 IN | OXYGEN SATURATION: 97 % | WEIGHT: 183 LBS | DIASTOLIC BLOOD PRESSURE: 71 MMHG | RESPIRATION RATE: 16 BRPM | HEART RATE: 60 BPM | TEMPERATURE: 98 F | SYSTOLIC BLOOD PRESSURE: 146 MMHG | BODY MASS INDEX: 27.11 KG/M2

## 2019-11-20 DIAGNOSIS — L98.8 MOHS DEFECT: Primary | ICD-10-CM

## 2019-11-20 DIAGNOSIS — Z98.890 MOHS DEFECT: Primary | ICD-10-CM

## 2019-11-20 PROBLEM — T88.4XXA DIFFICULT INTUBATION: Chronic | Status: ACTIVE | Noted: 2019-11-20

## 2019-11-20 LAB
INR PPP: 1.5 (ref 0.8–1.2)
PROTHROMBIN TIME: 14.4 SEC (ref 9–12.5)

## 2019-11-20 PROCEDURE — 63600175 PHARM REV CODE 636 W HCPCS: Mod: HCNC | Performed by: ANESTHESIOLOGY

## 2019-11-20 PROCEDURE — 36000706: Mod: HCNC | Performed by: OTOLARYNGOLOGY

## 2019-11-20 PROCEDURE — 15004 WOUND PREP F/N/HF/G: CPT | Mod: HCNC,,, | Performed by: OTOLARYNGOLOGY

## 2019-11-20 PROCEDURE — 37000008 HC ANESTHESIA 1ST 15 MINUTES: Mod: HCNC | Performed by: OTOLARYNGOLOGY

## 2019-11-20 PROCEDURE — 14301 TIS TRNFR ANY 30.1-60 SQ CM: CPT | Mod: HCNC,,, | Performed by: OTOLARYNGOLOGY

## 2019-11-20 PROCEDURE — D9220A PRA ANESTHESIA: ICD-10-PCS | Mod: HCNC,CRNA,, | Performed by: NURSE ANESTHETIST, CERTIFIED REGISTERED

## 2019-11-20 PROCEDURE — 14301 PR ADJ TISS XFER ANY AREA,30.1-60 SQCM: ICD-10-PCS | Mod: HCNC,,, | Performed by: OTOLARYNGOLOGY

## 2019-11-20 PROCEDURE — D9220A PRA ANESTHESIA: Mod: HCNC,ANES,, | Performed by: ANESTHESIOLOGY

## 2019-11-20 PROCEDURE — 15004 PR WND PREP PED, FACE/NCK/HND/FT/GEN 1ST 100 CM: ICD-10-PCS | Mod: HCNC,,, | Performed by: OTOLARYNGOLOGY

## 2019-11-20 PROCEDURE — 25000003 PHARM REV CODE 250: Mod: HCNC | Performed by: NURSE ANESTHETIST, CERTIFIED REGISTERED

## 2019-11-20 PROCEDURE — 17999 PR ACELL DERM MATRIX IMPLT OTHER THAN BREAST/TRK: ICD-10-PCS | Mod: HCNC,,, | Performed by: OTOLARYNGOLOGY

## 2019-11-20 PROCEDURE — 37000009 HC ANESTHESIA EA ADD 15 MINS: Mod: HCNC | Performed by: OTOLARYNGOLOGY

## 2019-11-20 PROCEDURE — 94761 N-INVAS EAR/PLS OXIMETRY MLT: CPT | Mod: HCNC

## 2019-11-20 PROCEDURE — 17999 UNLISTD PX SKN MUC MEMB SUBQ: CPT | Mod: HCNC,,, | Performed by: OTOLARYNGOLOGY

## 2019-11-20 PROCEDURE — S0028 INJECTION, FAMOTIDINE, 20 MG: HCPCS | Mod: HCNC | Performed by: NURSE ANESTHETIST, CERTIFIED REGISTERED

## 2019-11-20 PROCEDURE — D9220A PRA ANESTHESIA: ICD-10-PCS | Mod: HCNC,ANES,, | Performed by: ANESTHESIOLOGY

## 2019-11-20 PROCEDURE — 71000015 HC POSTOP RECOV 1ST HR: Mod: HCNC | Performed by: OTOLARYNGOLOGY

## 2019-11-20 PROCEDURE — D9220A PRA ANESTHESIA: Mod: HCNC,CRNA,, | Performed by: NURSE ANESTHETIST, CERTIFIED REGISTERED

## 2019-11-20 PROCEDURE — 71000016 HC POSTOP RECOV ADDL HR: Mod: HCNC | Performed by: OTOLARYNGOLOGY

## 2019-11-20 PROCEDURE — 85610 PROTHROMBIN TIME: CPT | Mod: HCNC

## 2019-11-20 PROCEDURE — 71000033 HC RECOVERY, INTIAL HOUR: Mod: HCNC | Performed by: OTOLARYNGOLOGY

## 2019-11-20 PROCEDURE — 63600175 PHARM REV CODE 636 W HCPCS: Mod: HCNC | Performed by: STUDENT IN AN ORGANIZED HEALTH CARE EDUCATION/TRAINING PROGRAM

## 2019-11-20 PROCEDURE — 36000707: Mod: HCNC | Performed by: OTOLARYNGOLOGY

## 2019-11-20 PROCEDURE — 63600175 PHARM REV CODE 636 W HCPCS: Mod: HCNC | Performed by: NURSE ANESTHETIST, CERTIFIED REGISTERED

## 2019-11-20 PROCEDURE — 25000003 PHARM REV CODE 250: Mod: HCNC | Performed by: OTOLARYNGOLOGY

## 2019-11-20 PROCEDURE — 25000003 PHARM REV CODE 250: Mod: HCNC | Performed by: ANESTHESIOLOGY

## 2019-11-20 DEVICE — IMPLANTABLE DEVICE: Type: IMPLANTABLE DEVICE | Site: SCALP | Status: FUNCTIONAL

## 2019-11-20 RX ORDER — PROPOFOL 10 MG/ML
VIAL (ML) INTRAVENOUS
Status: DISCONTINUED | OUTPATIENT
Start: 2019-11-20 | End: 2019-11-20

## 2019-11-20 RX ORDER — SODIUM CHLORIDE 0.9 % (FLUSH) 0.9 %
10 SYRINGE (ML) INJECTION
Status: DISCONTINUED | OUTPATIENT
Start: 2019-11-20 | End: 2019-11-20 | Stop reason: HOSPADM

## 2019-11-20 RX ORDER — SODIUM CHLORIDE 9 MG/ML
INJECTION, SOLUTION INTRAVENOUS CONTINUOUS PRN
Status: DISCONTINUED | OUTPATIENT
Start: 2019-11-20 | End: 2019-11-20

## 2019-11-20 RX ORDER — LIDOCAINE HYDROCHLORIDE 10 MG/ML
1 INJECTION, SOLUTION EPIDURAL; INFILTRATION; INTRACAUDAL; PERINEURAL ONCE
Status: COMPLETED | OUTPATIENT
Start: 2019-11-20 | End: 2019-11-20

## 2019-11-20 RX ORDER — PROPOFOL 10 MG/ML
VIAL (ML) INTRAVENOUS CONTINUOUS PRN
Status: DISCONTINUED | OUTPATIENT
Start: 2019-11-20 | End: 2019-11-20

## 2019-11-20 RX ORDER — LIDOCAINE HCL/PF 100 MG/5ML
SYRINGE (ML) INTRAVENOUS
Status: DISCONTINUED | OUTPATIENT
Start: 2019-11-20 | End: 2019-11-20

## 2019-11-20 RX ORDER — FENTANYL CITRATE 50 UG/ML
25 INJECTION, SOLUTION INTRAMUSCULAR; INTRAVENOUS EVERY 5 MIN PRN
Status: DISCONTINUED | OUTPATIENT
Start: 2019-11-20 | End: 2019-11-20 | Stop reason: HOSPADM

## 2019-11-20 RX ORDER — ONDANSETRON 4 MG/1
4 TABLET, FILM COATED ORAL EVERY 8 HOURS PRN
Qty: 6 TABLET | Refills: 0 | Status: SHIPPED | OUTPATIENT
Start: 2019-11-20

## 2019-11-20 RX ORDER — ONDANSETRON 2 MG/ML
INJECTION INTRAMUSCULAR; INTRAVENOUS
Status: DISCONTINUED | OUTPATIENT
Start: 2019-11-20 | End: 2019-11-20

## 2019-11-20 RX ORDER — ONDANSETRON 2 MG/ML
4 INJECTION INTRAMUSCULAR; INTRAVENOUS DAILY PRN
Status: DISCONTINUED | OUTPATIENT
Start: 2019-11-20 | End: 2019-11-20 | Stop reason: HOSPADM

## 2019-11-20 RX ORDER — PHENYLEPHRINE HYDROCHLORIDE 10 MG/ML
INJECTION INTRAVENOUS
Status: DISCONTINUED | OUTPATIENT
Start: 2019-11-20 | End: 2019-11-20

## 2019-11-20 RX ORDER — FAMOTIDINE 10 MG/ML
INJECTION INTRAVENOUS
Status: DISCONTINUED | OUTPATIENT
Start: 2019-11-20 | End: 2019-11-20

## 2019-11-20 RX ORDER — GLYCOPYRROLATE 0.2 MG/ML
INJECTION INTRAMUSCULAR; INTRAVENOUS
Status: DISCONTINUED | OUTPATIENT
Start: 2019-11-20 | End: 2019-11-20

## 2019-11-20 RX ORDER — CEFAZOLIN SODIUM 1 G/3ML
2 INJECTION, POWDER, FOR SOLUTION INTRAMUSCULAR; INTRAVENOUS ONCE
Status: COMPLETED | OUTPATIENT
Start: 2019-11-20 | End: 2019-11-20

## 2019-11-20 RX ORDER — FENTANYL CITRATE 50 UG/ML
INJECTION, SOLUTION INTRAMUSCULAR; INTRAVENOUS
Status: DISCONTINUED | OUTPATIENT
Start: 2019-11-20 | End: 2019-11-20

## 2019-11-20 RX ORDER — LIDOCAINE HYDROCHLORIDE AND EPINEPHRINE 10; 10 MG/ML; UG/ML
INJECTION, SOLUTION INFILTRATION; PERINEURAL
Status: DISCONTINUED | OUTPATIENT
Start: 2019-11-20 | End: 2019-11-20 | Stop reason: HOSPADM

## 2019-11-20 RX ORDER — DEXAMETHASONE SODIUM PHOSPHATE 4 MG/ML
INJECTION, SOLUTION INTRA-ARTICULAR; INTRALESIONAL; INTRAMUSCULAR; INTRAVENOUS; SOFT TISSUE
Status: DISCONTINUED | OUTPATIENT
Start: 2019-11-20 | End: 2019-11-20

## 2019-11-20 RX ORDER — HYDROCODONE BITARTRATE AND ACETAMINOPHEN 5; 325 MG/1; MG/1
1 TABLET ORAL EVERY 6 HOURS PRN
Qty: 15 TABLET | Refills: 0 | Status: SHIPPED | OUTPATIENT
Start: 2019-11-20 | End: 2020-04-06

## 2019-11-20 RX ADMIN — GLYCOPYRROLATE 0.2 MG: 0.2 INJECTION, SOLUTION INTRAMUSCULAR; INTRAVENOUS at 08:11

## 2019-11-20 RX ADMIN — SODIUM CHLORIDE: 0.9 INJECTION, SOLUTION INTRAVENOUS at 08:11

## 2019-11-20 RX ADMIN — FENTANYL CITRATE 25 MCG: 50 INJECTION, SOLUTION INTRAMUSCULAR; INTRAVENOUS at 08:11

## 2019-11-20 RX ADMIN — PROPOFOL 20 MG: 10 INJECTION, EMULSION INTRAVENOUS at 09:11

## 2019-11-20 RX ADMIN — CEFAZOLIN 2 G: 330 INJECTION, POWDER, FOR SOLUTION INTRAMUSCULAR; INTRAVENOUS at 09:11

## 2019-11-20 RX ADMIN — ONDANSETRON 4 MG: 2 INJECTION INTRAMUSCULAR; INTRAVENOUS at 08:11

## 2019-11-20 RX ADMIN — DEXAMETHASONE SODIUM PHOSPHATE 12 MG: 4 INJECTION, SOLUTION INTRAMUSCULAR; INTRAVENOUS at 09:11

## 2019-11-20 RX ADMIN — FENTANYL CITRATE 25 MCG: 50 INJECTION, SOLUTION INTRAMUSCULAR; INTRAVENOUS at 09:11

## 2019-11-20 RX ADMIN — SODIUM CHLORIDE 1000 ML: 0.9 INJECTION, SOLUTION INTRAVENOUS at 06:11

## 2019-11-20 RX ADMIN — SODIUM CHLORIDE, SODIUM GLUCONATE, SODIUM ACETATE, POTASSIUM CHLORIDE, MAGNESIUM CHLORIDE, SODIUM PHOSPHATE, DIBASIC, AND POTASSIUM PHOSPHATE: .53; .5; .37; .037; .03; .012; .00082 INJECTION, SOLUTION INTRAVENOUS at 09:11

## 2019-11-20 RX ADMIN — LIDOCAINE HYDROCHLORIDE 1 MG: 10 INJECTION, SOLUTION EPIDURAL; INFILTRATION; INTRACAUDAL; PERINEURAL at 06:11

## 2019-11-20 RX ADMIN — PHENYLEPHRINE HYDROCHLORIDE 100 MCG: 10 INJECTION INTRAVENOUS at 09:11

## 2019-11-20 RX ADMIN — PROPOFOL 150 MCG/KG/MIN: 10 INJECTION, EMULSION INTRAVENOUS at 09:11

## 2019-11-20 RX ADMIN — LIDOCAINE HYDROCHLORIDE 50 MG: 20 INJECTION, SOLUTION INTRAVENOUS at 08:11

## 2019-11-20 RX ADMIN — FAMOTIDINE 20 MG: 10 INJECTION, SOLUTION INTRAVENOUS at 08:11

## 2019-11-20 RX ADMIN — PROPOFOL 50 MG: 10 INJECTION, EMULSION INTRAVENOUS at 09:11

## 2019-11-20 RX ADMIN — PROPOFOL 100 MG: 10 INJECTION, EMULSION INTRAVENOUS at 08:11

## 2019-11-20 RX ADMIN — PHENYLEPHRINE HYDROCHLORIDE 100 MCG: 10 INJECTION INTRAVENOUS at 10:11

## 2019-11-20 NOTE — OP NOTE
DATE OF PROCEDURE:  11/20/2019    SURGEON:  Jason Espinoza M.D.    ASSISTANT SURGEON:  Forest Singh M.D. (RES)    ANESTHESIA:  General endotracheal anesthesia.    PROCEDURES PERFORMED:  1.  Circumferential scalp advancement flap measuring 20 x 2 cm.  2.  Site preparation for flap inset with burring of outer cortex of the skull.  3.  Application of ACell measuring 8 x 8 cm.    INDICATIONS FOR PROCEDURE AND PREOPERATIVE DIAGNOSES:  This is an 88-year-old   gentleman who underwent Mohs micrographic surgery for an invasive squamous cell   carcinoma of his scalp yesterday.  The periosteum was focally microscopically   involved, although all gross disease was removed.  He presents for the first   stage of his reconstruction.    POSTOPERATIVE DIAGNOSES:  This is an 88-year-old gentleman who underwent Mohs   micrographic surgery for an invasive squamous cell carcinoma of his scalp   yesterday.  The periosteum was focally microscopically involved, although all   gross disease was removed.  He presents for the first stage of his   reconstruction.    PROCEDURE IN DETAIL:  After obtaining informed consent, the patient was taken to   the Operating Room in the supine position.  General endotracheal anesthesia was   attempted several times.  The tube was able to be passed distal to the vocal   cords; however, approximately 1 to 2 cm deeper than this, the tube was met with   significant obstruction and unable to be passed.  A flexible laryngoscope was   then inserted and was found to have some sort of a step off of the potentially   complete first tracheal ring in the subglottis or superior trachea.  Utilizing   the Seldinger technique with the flexible laryngoscope, the scope was able to be   passed distal to the tube and finding a normal trachea and normal mainstem   bronchi; however, the tube was unable to be passed owing to this same   obstruction.  At this point, induction of anesthesia was aborted and the   remainder  of the case proceeded under local MAC anesthesia.  The area was   prepped and draped in the standard sterile fashion.  The patient was then   rotated 90 degrees to face the operating surgeons.  The site was first prepared   by utilizing a high-speed stacie 6 mm bur to remove a portion of the outer   cortex of the exposed skull in an area measuring 8 x 8 cm.  The 1% lidocaine   with 1:100,000 epinephrine was then injected into the planned circumferential   advancement flap.  Sufficient time was allowed for this to take effect.  A 3-0   barbed Monocryl suture was then used in a pursestring fashion to advance   circumferentially the skin and scalp tissue in order to reduce the overall   defect size.  A 3-layer ACell was then prepared in accordance with   's instructions and the MicroMatrix powder was mixed with sterile   saline to form putty.  This was placed over the entirety of the wound bed.  The   ACell material was then trimmed and secured with a 4-0 running chromic gut   suture.  A Xeroform bolster dressing was then created and secured to the scalp   with 2-0 silk suture.  This concluded the procedure.  The patient was then   rotated back to Anesthesia and awakened in the Operating Room without   difficulty.  There were no complications and estimated blood loss was minimal.      NATHAN/IN  dd: 11/20/2019 12:37:55 (CST)  td: 11/20/2019 13:34:11 (CST)  Doc ID   #[id number]  Job ID #[id number]    CC:

## 2019-11-20 NOTE — PLAN OF CARE
Discharge instructions reviewed w/ pt and daughter in law, verbalized understanding. Pt in NADN. Denies pain. Tolerated liquids w/ no issues. To be d/c'd home w/ family.

## 2019-11-20 NOTE — PLAN OF CARE
Pre op nursing care complete. Orders and surgery consent pending. Family at bedside. HR 40s on monitor. Labs sent as ordered.

## 2019-11-20 NOTE — BRIEF OP NOTE
[facility]  Brief Operative Note     SUMMARY     Surgery Date: 11/20/2019     Surgeon(s) and Role:     * Jason Espinoza MD - Primary     * Forest Singh MD - Resident - Assisting        Pre-op Diagnosis:  Mohs defect of scalp [M95.2]    Post-op Diagnosis:  Post-Op Diagnosis Codes:     * Mohs defect of scalp [M95.2]    Procedure(s) (LRB):  CLOSURE, MOHS PROCEDURE DEFECT (N/A)    Anesthesia: General    Description of the findings of the procedure: Mohs; defect of posterior vertex of scalp    Findings/Key Components: Unsuccesful intubation x 3 with Glidescope (using 6-0 ETT as smallest) and then orotracheal fiberoptic intubation attempted per ENT, which was successful however ventilation was not reliable and ETT removed. Easily masked throughout. Difficulties thought to be secondary to tortuous appearing trachea and anterior tracheal ring shelf. 5.5 x 6.5 cm defect to posterior vertex of scalp with superficial calvarium burred down and Acell paste as well as sheet placed. Xeroform bolster then used to hold in place.    Estimated Blood Loss: Minimal         Specimens:   Specimen (12h ago, onward)    None          Discharge Note    SUMMARY     Admit Date: 11/20/2019    Discharge Date and Time:  11/20/2019 10:51 AM    Hospital Course Following completion of an electively scheduled procedure, he was transferred to the PACU for postoperative monitoring. his hospital course was uneventful and noted for adequate pain control and PO intake following surgery. he is discharged home in good condition and will follow-up with Dr. Espinoza in 1 week.       Final Diagnosis: Post-Op Diagnosis Codes:     * Mohs defect of scalp [M95.2]    Disposition: Home or Self Care    Follow Up/Patient Instructions:     Medications:  Reconciled Home Medications:      Medication List      START taking these medications    HYDROcodone-acetaminophen 5-325 mg per tablet  Commonly known as:  NORCO  Take 1 tablet by mouth every 6  (six) hours as needed for Pain.     ondansetron 4 MG tablet  Commonly known as:  ZOFRAN  Take 1 tablet (4 mg total) by mouth every 8 (eight) hours as needed for Nausea.        CONTINUE taking these medications    azaTHIOprine 50 mg Tab  Commonly known as:  IMURAN  Take 4 tablets (200 mg total) by mouth once daily.     EFFERVESCENT DENTURE CLEANSR DENT     finasteride 5 mg tablet  Commonly known as:  PROSCAR  TAKE 1 TABLET EVERY DAY     Fluzone High-Dose 2018-19 (PF) 180 mcg/0.5 mL vaccine  Generic drug:  influenza     Fluzone High-Dose 2019-20 (PF) 180 mcg/0.5 mL Syrg  Generic drug:  flu vacc [id number](65yr up)PF  admin as directed     gabapentin 300 MG capsule  Commonly known as:  NEURONTIN  Take 1 capsule (300 mg total) by mouth 3 (three) times daily.     hydroCHLOROthiazide 12.5 mg capsule  Commonly known as:  MICROZIDE     hydroxyurea 500 mg Cap  Commonly known as:  HYDREA  Take 1,000 mg on day 1, then 500 mg on days 2 and 3 and repeat..     IRON (FERROUS SULFATE) ORAL  Take 65 mg by mouth once daily.     OneTouch Ultra Test Strp  Generic drug:  blood sugar diagnostic  TEST DAILY     OneTouch UltraSoft Lancets Misc  Generic drug:  lancets  TEST DAILY     predniSONE 5 MG tablet  Commonly known as:  DELTASONE  TAKE 1 TABLET EVERY DAY     tamsulosin 0.4 mg Cap  Commonly known as:  FLOMAX  TAKE 1 CAPSULE EVERY DAY     verapamil 120 MG tablet  Commonly known as:  CALAN  Take 1 tablet (120 mg total) by mouth 2 (two) times daily.     vitamin D 1000 units Tab  Commonly known as:  VITAMIN D3  Take 185 mg by mouth once daily.     warfarin 5 MG tablet  Commonly known as:  COUMADIN  Take 1.5 tablets (7.5 mg total) by mouth Daily. OR AS DIRECTED BY COUMADIN CLINIC          Discharge Procedure Orders   Diet Adult Regular     Notify your health care provider if you experience any of the following:  temperature >100.4     Notify your health care provider if you experience any of the following:  persistent nausea and vomiting or  diarrhea     Notify your health care provider if you experience any of the following:  severe uncontrolled pain     Notify your health care provider if you experience any of the following:  redness, tenderness, or signs of infection (pain, swelling, redness, odor or green/yellow discharge around incision site)     Leave dressing on - Keep it clean, dry, and intact until clinic visit     Activity as tolerated     Follow-up Information     Jason Espinoza MD In 1 week.    Specialty:  Otolaryngology  Contact information:  [facility] [address]  [facility] [address] [phone]

## 2019-11-20 NOTE — INTERVAL H&P NOTE
The patient has been examined and the H&P has been reviewed:    I concur with the findings and no changes have occurred since H&P was written.    Anesthesia/Surgery risks, benefits and alternative options discussed and understood by patient/family.          Active Hospital Problems    Diagnosis  POA    Mohs defect [M95.9]  Yes      Resolved Hospital Problems   No resolved problems to display.

## 2019-11-20 NOTE — TRANSFER OF CARE
"Anesthesia Transfer of Care Note    Patient: Ernie Phan    Procedure(s) Performed: Procedure(s) (LRB):  CLOSURE, MOHS PROCEDURE DEFECT (N/A)    Patient location: PACU    Anesthesia Type: general    Transport from OR: Transported from OR on 6-10 L/min O2 by face mask with adequate spontaneous ventilation    Post pain: adequate analgesia    Post assessment: no apparent anesthetic complications    Post vital signs: stable    Level of consciousness: responds to stimulation and sedated    Nausea/Vomiting: no nausea/vomiting    Complications: none    Transfer of care protocol was followed      Last vitals:   Visit Vitals  BP (!) 147/68 (BP Location: Left arm, Patient Position: Lying)   Pulse (!) 54   Temp 36.4 °C (97.6 °F) (Oral)   Resp 20   Ht 5' 9" (1.753 m)   Wt 83 kg (183 lb)   SpO2 99%   BMI 27.02 kg/m²     "

## 2019-11-21 ENCOUNTER — NURSE TRIAGE (OUTPATIENT)
Dept: ADMINISTRATIVE | Facility: CLINIC | Age: 84
End: 2019-11-21

## 2019-11-22 ENCOUNTER — TELEPHONE (OUTPATIENT)
Dept: INTERNAL MEDICINE | Facility: CLINIC | Age: 84
End: 2019-11-22

## 2019-11-22 ENCOUNTER — TELEPHONE (OUTPATIENT)
Dept: HEMATOLOGY/ONCOLOGY | Facility: CLINIC | Age: 84
End: 2019-11-22

## 2019-11-22 NOTE — TELEPHONE ENCOUNTER
Pt states his stool has been hard and that it will not come out , the nurse suggested immodium and prune juice , he states he has to go to restroom but it wont come out

## 2019-11-22 NOTE — TELEPHONE ENCOUNTER
Reason for Disposition   Unable to have a bowel movement (BM) without laxative or enema    Additional Information   Negative: [1] Abdomen pain is main symptom AND [2] adult male   Negative: [1] Abdomen pain is main symptom AND [2] adult female   Negative: Rectal bleeding or blood in stool is main symptom   Negative: Rectal pain or itching is main symptom   Negative: Constipation in a cancer patient who is currently (or recently) receiving chemotherapy or radiation therapy, or cancer patient who has metastatic or end-stage cancer and is receiving palliative care   Negative: Patient sounds very sick or weak to the triager   Negative: [1] Vomiting AND [2] abdomen looks much more swollen than usual   Negative: [1] Vomiting AND [2] contains bile (green color)   Negative: [1] Constant abdominal pain AND [2] present > 2 hours   Negative: [1] Rectal pain or fullness from fecal impaction (rectum full of stool) AND [2] NOT better after SITZ bath, suppository or enema   Negative: [1] Intermittent mild abdominal pain AND [2] fever   Negative: Abdomen is more swollen than usual   Negative: Last bowel movement (BM) > 4 days ago   Negative: Leaking stool   Negative: Unable to have a bowel movement (BM) without manually removing stool (using finger to pull out stool or perform disimpaction)    Protocols used: CONSTIPATION-A-     Pt called to report he had surgery on Tuesday and now he's constipated and feels like his stool is too hard to pass. Stated he took laxatives and a stool softer. Stated last BM was 2 days ago on Tuesday. Care advice and when to call back provided directly from protocol, Pt verbalized understanding. Message sent to Provider's office to follow up with Pt

## 2019-11-22 NOTE — TELEPHONE ENCOUNTER
----- Message from Andrei Salazar sent at 11/22/2019  8:21 AM CST -----  Contact: [phone]  Patient requesting a call from the office in regards to stood being hard       Please call and advise, Thanks

## 2019-11-22 NOTE — TELEPHONE ENCOUNTER
Spoke to Rosibel.  Instructed to take colace, miralex, and increase fluid intake.  If no relief to contact primary care physician.  Verbalized understanding        ----- Message from Maribel Miller sent at 11/22/2019 11:43 AM CST -----  Contact: Rosibel  Ofe / daughter in law / tel:  [phone]  Returning the call.   Pt. Is constipated and not feeling well.    Has some questions she wants to discuss w/ Kaylie.     Pls call today.

## 2019-11-22 NOTE — TELEPHONE ENCOUNTER
Recommend taking MiraLax powder   1 cap full glass water daily for 7 days    However if at the present time he feels a hard stool in the rectum and cannot pass it, take a tbsp  of mineral oil

## 2019-11-25 ENCOUNTER — ANTI-COAG VISIT (OUTPATIENT)
Dept: CARDIOLOGY | Facility: CLINIC | Age: 84
End: 2019-11-25
Payer: MEDICARE

## 2019-11-25 DIAGNOSIS — Z79.01 LONG TERM CURRENT USE OF ANTICOAGULANT THERAPY: ICD-10-CM

## 2019-11-25 DIAGNOSIS — I48.0 PAROXYSMAL ATRIAL FIBRILLATION: ICD-10-CM

## 2019-11-25 LAB — INR PPP: 1.9

## 2019-11-25 PROCEDURE — 93793 ANTICOAG MGMT PT WARFARIN: CPT | Mod: S$GLB,,,

## 2019-11-25 PROCEDURE — 93793 PR ANTICOAGULANT MGMT FOR PT TAKING WARFARIN: ICD-10-PCS | Mod: S$GLB,,,

## 2019-11-25 NOTE — PROGRESS NOTES
INR a tad low but improved from INR drawn 11/20 s/p Mohs (1.5). He ate greens last week and we lowered his dose last week for the procedure. Will adjust dose so that he gets his larger dose today

## 2019-11-29 ENCOUNTER — OFFICE VISIT (OUTPATIENT)
Dept: OTOLARYNGOLOGY | Facility: CLINIC | Age: 84
End: 2019-11-29
Payer: MEDICARE

## 2019-11-29 VITALS
HEART RATE: 59 BPM | BODY MASS INDEX: 26.63 KG/M2 | DIASTOLIC BLOOD PRESSURE: 53 MMHG | WEIGHT: 180.31 LBS | SYSTOLIC BLOOD PRESSURE: 125 MMHG

## 2019-11-29 DIAGNOSIS — L98.8 MOHS DEFECT: Primary | ICD-10-CM

## 2019-11-29 DIAGNOSIS — Z98.890 MOHS DEFECT: Primary | ICD-10-CM

## 2019-11-29 PROCEDURE — 99024 POSTOP FOLLOW-UP VISIT: CPT | Mod: HCNC,S$GLB,, | Performed by: OTOLARYNGOLOGY

## 2019-11-29 PROCEDURE — 99999 PR PBB SHADOW E&M-EST. PATIENT-LVL IV: ICD-10-PCS | Mod: PBBFAC,HCNC,, | Performed by: OTOLARYNGOLOGY

## 2019-11-29 PROCEDURE — 99999 PR PBB SHADOW E&M-EST. PATIENT-LVL IV: CPT | Mod: PBBFAC,HCNC,, | Performed by: OTOLARYNGOLOGY

## 2019-11-29 PROCEDURE — 99024 PR POST-OP FOLLOW-UP VISIT: ICD-10-PCS | Mod: HCNC,S$GLB,, | Performed by: OTOLARYNGOLOGY

## 2019-11-29 NOTE — H&P (VIEW-ONLY)
[facility] NOTE    CC: F/U Mohs defect repair    TREATMENT HISTORY:  1. Repair of Mohs defect with ACell, planned for [facility] 12/5/2019    INTERVAL HISTORY:Ernie Phan returns to [facility] for follow-up of Mohs repair. No complaints today.Denies dysphagia, odynophagia, throat pain and otalgia.Voice is stable. Does not experience dry mouth. Denies fevers, chills, and nightsweats. There has not been any redness or drainage from the wound.    Exam:  Ira with 100% take  Sutures removed    A/P: Keep dressing in place for 1 week until definitive grafting. He is currently scheduled for this on 12/5, but he  Would like to move this to 12/12 for transportation reasons, and I think this is fine.

## 2019-12-02 ENCOUNTER — ANTI-COAG VISIT (OUTPATIENT)
Dept: CARDIOLOGY | Facility: CLINIC | Age: 84
End: 2019-12-02
Payer: MEDICARE

## 2019-12-02 DIAGNOSIS — Z79.01 LONG TERM CURRENT USE OF ANTICOAGULANT THERAPY: ICD-10-CM

## 2019-12-02 DIAGNOSIS — I48.0 PAROXYSMAL ATRIAL FIBRILLATION: ICD-10-CM

## 2019-12-02 LAB — INR PPP: 2.6

## 2019-12-02 PROCEDURE — 93793 PR ANTICOAGULANT MGMT FOR PT TAKING WARFARIN: ICD-10-PCS | Mod: S$GLB,,,

## 2019-12-02 PROCEDURE — 93793 ANTICOAG MGMT PT WARFARIN: CPT | Mod: S$GLB,,,

## 2019-12-03 ENCOUNTER — TELEPHONE (OUTPATIENT)
Dept: INTERNAL MEDICINE | Facility: CLINIC | Age: 84
End: 2019-12-03

## 2019-12-03 RX ORDER — TAMSULOSIN HYDROCHLORIDE 0.4 MG/1
CAPSULE ORAL
Qty: 90 CAPSULE | Refills: 1 | Status: SHIPPED | OUTPATIENT
Start: 2019-12-03 | End: 2019-12-04 | Stop reason: SDUPTHER

## 2019-12-03 RX ORDER — FINASTERIDE 5 MG/1
TABLET, FILM COATED ORAL
Qty: 90 TABLET | Refills: 1 | Status: SHIPPED | OUTPATIENT
Start: 2019-12-03 | End: 2019-12-04 | Stop reason: SDUPTHER

## 2019-12-03 NOTE — ANESTHESIA PAT ROS NOTE
"11/20/19-  Dr. Espinoza used fiber optic scope to visualize the trachea. Tortuous trachea observed as well as possible shelves/posterior tracheal rings. Dr. Espinoza passed 6.0 over the fiberoptic scope, however, very tight fit and inadequate ventilation. ETT promptly removed and patient kept spontaneously breathing. Decision made to proceed under propofol gtt with spontaneous ventilation. Pt given 12 mg dexamethasone for airway edema/irritation. Some bleeding seen on exam around the cords and in trachea. Dr. Espinoza states that patient "can not be intubated" for any future anesthetics, and if the need arises, an elective trach should be done in the OR.                                                                                                           12/03/2019  Ernie Phan is a 88 y.o., male.      Pre-op Assessment         Review of Systems  Anesthesia Hx:  Hx of Anesthetic complications  History of prior surgery of interest to airway management or planning: Previous anesthesia: General 11/20/19 CLOSURE MOHS with general anesthesia.  Denies Family Hx of Anesthesia complications.  Personal Hx of Anesthesia complications  Difficult Intubation Denies Pulmonary / Ventilatory Issues.   Social:  Non-Smoker, No Alcohol Use    Hematology/Oncology:     Oncology Normal    -- Anemia: Anemia of Chronic Kidney Disease  Chronic   -- Thrombocytosis: Essential (primary) Thrombocytosis    EENT/Dental:EENT/Dental Normal   Cardiovascular:    Denies Angina. hyperlipidemia  Functional Capacity 2 METS, USES WALKER  Hypertension  Disorder of Cardiac Rhythm, Atrial Fibrillation, Chronic Atrial Fibrillation, on warfarin Rx    Pulmonary:   Denies Shortness of breath.  Denies Recent URI. H/O PULMONARY HTN   Renal/:  Kidney Function/Disease, Chronic Kidney Disease (CKD) , CKD Stage III (GFR 30-59)    Hepatic/GI:   GERD, well controlled    Musculoskeletal:   Arthritis     Neurological:   Neuromuscular Disease,  Neuromuscular " "Disease, Myasthenia Gravis MYASTHENIA GRAVIS ON AZATHIOPRINE. MYELOPROLIFERATIVE DISORDER.  Endocrine:  Endocrine Normal    Psych:   depression             Anesthesia Assessment: Preoperative EQUATION    Planned Procedure: Procedure(s) (LRB):  APPLICATION, GRAFT, SKIN, FULL-THICKNESS (N/A)  Requested Anesthesia Type:General  Surgeon: Jason Espinoza MD  Service: ENT  Known or anticipated Date of Surgery:12/12/2019    Surgeon notes: reviewed    Electronic QUestionnaire Assessment completed via nurse interview with patient.        Triage considerations:     The patient has no apparent active cardiac condition (No unstable coronary Syndrome such as severe unstable angina or recent [<1 month] myocardial infarction, decompensated CHF, severe valvular   disease or significant arrhythmia)    Previous anesthesia records:GETA, Difficult airway, Difficult intubation and Complications noted   Airway/Jaw/Neck:  Airway Findings: Mouth Opening: Normal Tongue: Normal  Mallampati: III  Improves to II with phonation.  TM Distance: Normal, at least 6 cm  Jaw/Neck Findings:  Neck ROM: Extension Decreased, Mod.      Dental:  Dental Findings: Upper Dentures    Dr. Espinoza used fiber optic scope to visualize the trachea. Tortuous trachea observed as well as possible shelves/posterior tracheal rings. Dr. Espinoza passed 6.0 over the fiberoptic scope, however, very tight fit and inadequate ventilation. ETT promptly removed and patient kept spontaneously breathing. Decision made to proceed under propofol gtt with spontaneous ventilation. Pt given 12 mg dexamethasone for airway edema/irritation. Some bleeding seen on exam around the cords and in trachea. Dr. Espinoza states that patient "can not be intubated" for any future anesthetics, and if the need arises, an elective trach should be done in the OR.  Last PCP note: 6-12 months ago , within [facility] 4/11/19  Subspecialty notes: Cardiology: ANTI-COAG, Dermatology, ENT, " Hematology/Oncology    Other important co-morbidities: A FIB, GERD, HLD, HTN and THROMBOCYTOSIS, MYASTHENIA GRAVIS, PULM HTN, CKD3, MYELOPROLIFERATIVE DISORDER      Tests already available:  Available tests,  within 1 month , within [facility] .12/2/19-PT/INR, PTT   10/30/19-CMP, CBC  4/1/19-EKG            Instructions given. (See in Nurse's note)    Optimization:  Anesthesia Preop Clinic Assessment  Indicated-NOT INDICATED    Medical Opinion Indicated       Sub-specialist consult indicated:   TBCB PCP      Plan:    Testing:  PT/INR and PTT AM OF SURGERY     Consultation:Patient's PCP for a statement of optimization      Patient  has previously scheduled Medical Appointment:NOT AT THIS TIME    Navigation: Tests Scheduled.              Consults scheduled.             Results will be tracked by Preop Clinic.        MD Viky Ness RN; ALEX Romero Staff             His hematologic conditions are not contraindicated with surgery.     12/10/19-Dr.Joseph Michel- 2D echo revealed normal ejection fraction, mild aortic stenosis, pulmonary hypertension     Electrocardiogram revealed atrial fibrillation, no change     He is cleared for anesthesia and surgery

## 2019-12-03 NOTE — TELEPHONE ENCOUNTER
----- Message from Viky Tinajero RN sent at 12/3/2019  2:45 PM CST -----    ,      This patient is scheduled for surgery (Full thickness skin graft) on 12/12/19 with . This will last approximately 150 min under MAC anesthesia. Requesting medical clearance prior to this procedure. Please advise. Will await your response.                                                                Thanks so much,                                                                            Viky Tinajero RN BSN, [facility] [place] [facility]

## 2019-12-03 NOTE — PROGRESS NOTES
Staff message Viky Tinajero RN: This patient is scheduled for surgery (Full thickness skin graft) on 12/12/19 with . This will last approximately 150 min under MAC anesthesia. Requesting coumadin instructions prior to this procedure. Please advise. Will await your response.    CHADS = 4 (HTN, age, VD) and he is CKD stage 3  Chart routed to pharmacist to review.

## 2019-12-04 RX ORDER — FINASTERIDE 5 MG/1
5 TABLET, FILM COATED ORAL DAILY
Qty: 90 TABLET | Refills: 1 | Status: SHIPPED | OUTPATIENT
Start: 2019-12-04 | End: 2020-04-18

## 2019-12-04 RX ORDER — TAMSULOSIN HYDROCHLORIDE 0.4 MG/1
1 CAPSULE ORAL DAILY
Qty: 90 CAPSULE | Refills: 1 | Status: ON HOLD | OUTPATIENT
Start: 2019-12-04 | End: 2020-05-22

## 2019-12-04 RX ORDER — HYDROCHLOROTHIAZIDE 12.5 MG/1
CAPSULE ORAL
Qty: 90 CAPSULE | Refills: 3 | Status: SHIPPED | OUTPATIENT
Start: 2019-12-04 | End: 2020-04-15

## 2019-12-04 NOTE — PROGRESS NOTES
RN notes reviewed. Would suggest patient hold warfarin x5 days prior to procedure without bridge. Surgery team and Dr. Tate updated.

## 2019-12-07 NOTE — PROGRESS NOTES
PAST MEDICAL HISTORY:   Hypertension.  Myasthenia gravis.  Paroxysmal atrial fibrillation.  Essential thrombocytosis   Anemia , due to Myeloproliferative disorder , Imuran, Hydroxyurea  Hyperlipidemia.  Tricuspid regurgitation moderate  Mitral regurgitation, mild  Aortic regurgitation, mild  Pulmonary Hypertension  BPH.  Gastroesophageal reflux disease.  Postherpetic neuralgia involving the right medial leg.  Cervical degenerative disk disease.  History of depression.  Osteoarthritis of the knees  Cholecystectomy.  Bilateral cataract extraction.  Repair of ruptured right Achilles tendon.  Repair of ptosis, both eyes.     SOCIAL HISTORY:  Tobacco and alcohol use - none           REASON FOR VISIT:  This is an 88-year-old male who is here for Internal Medicine   evaluation and preop evaluation.  On Thursday, December 12th, he is scheduled   to undergo a full-thickness skin graft on top of his scalp.  On November 19th   and 20th, he underwent a Mohs procedure, then a closure for Mohs procedure   defect.    MEDICATIONS:  Azathioprine 50 mg four daily.  Finasteride 5 mg daily.  Gabapentin 300 mg three times a day.  Hydrochlorothiazide 12.5 mg a day.  Hydroxyurea 500 mg two one day, then one tablet the next day and one tablet the   next day and alternates.  Iron.  Prednisone 5 mg daily.  Mestinon 50 mg as needed, which is rare.  Flomax 0.4 mg daily.  Verapamil  mg two daily.  Coumadin.    REVIEW OF SYMPTOMS:  He reports no chest pain.  He mentions that maybe once   every couple of months, he might feel an indigestion feeling in his chest that   lasts 15 seconds.  Overall, breathing is fine.  No abdominal pain.  He has   regular bowel function.  Urination, nocturia x2 or 3.  No dysuria.    PHYSICAL EXAMINATION:  VITAL SIGNS:  Weight is 178 pounds, pulse rate 52, blood pressure 138/72.  LUNGS:  Clear breath sounds.  HEART:  Irregular, 1 to 2 systolic murmur at the base, radiates to carotids and   apex and 1/6  holosystolic murmur at the axillary region.  ABDOMEN:  Active bowel sounds, soft, nontender.  No hepatosplenomegaly or   abdominal masses.  PULSES:  2+ carotid pulses.  No bruits.  EXTREMITIES:  No edema.    Electrocardiogram revealed atrial fibrillation with rapid ventricular response   of 50 beats, no ischemic changes.    IMPRESSION:  1. Persistent atrial fibrillation.  2. Hypertension.  3. Essential thrombocytosis.  4. Chronic anemia.  5. Myasthenia gravis.    PLAN:  Today, CBC, basic metabolic profile, TSH.  Phone review to follow up.    Of note is that for the past few days, he has put a hold on hydrochlorothiazide.    We are to arrange for 2D echo with Doppler and followup with Arrhythmia.        JAM/HN  dd: 12/09/2019 11:05:38 (CST)  td: 12/10/2019 01:36:46 (CST)  Doc ID   #[id number]  Job ID #[id number]    CC:

## 2019-12-09 ENCOUNTER — CLINICAL SUPPORT (OUTPATIENT)
Dept: CARDIOLOGY | Facility: CLINIC | Age: 84
End: 2019-12-09
Attending: INTERNAL MEDICINE
Payer: MEDICARE

## 2019-12-09 ENCOUNTER — OFFICE VISIT (OUTPATIENT)
Dept: INTERNAL MEDICINE | Facility: CLINIC | Age: 84
End: 2019-12-09
Payer: MEDICARE

## 2019-12-09 VITALS
SYSTOLIC BLOOD PRESSURE: 138 MMHG | BODY MASS INDEX: 26.36 KG/M2 | DIASTOLIC BLOOD PRESSURE: 72 MMHG | HEART RATE: 52 BPM | HEIGHT: 69 IN | OXYGEN SATURATION: 97 % | WEIGHT: 178 LBS

## 2019-12-09 VITALS
SYSTOLIC BLOOD PRESSURE: 138 MMHG | HEIGHT: 69 IN | HEART RATE: 60 BPM | WEIGHT: 178 LBS | BODY MASS INDEX: 26.36 KG/M2 | DIASTOLIC BLOOD PRESSURE: 72 MMHG

## 2019-12-09 DIAGNOSIS — D47.3 ESSENTIAL THROMBOCYTOSIS: ICD-10-CM

## 2019-12-09 DIAGNOSIS — D64.9 CHRONIC ANEMIA: ICD-10-CM

## 2019-12-09 DIAGNOSIS — I48.0 PAROXYSMAL ATRIAL FIBRILLATION: Primary | ICD-10-CM

## 2019-12-09 DIAGNOSIS — G70.00 MG (MYASTHENIA GRAVIS): ICD-10-CM

## 2019-12-09 DIAGNOSIS — N18.30 CHRONIC KIDNEY DISEASE, STAGE III (MODERATE): ICD-10-CM

## 2019-12-09 DIAGNOSIS — I48.0 PAROXYSMAL ATRIAL FIBRILLATION: ICD-10-CM

## 2019-12-09 DIAGNOSIS — I10 ESSENTIAL HYPERTENSION: ICD-10-CM

## 2019-12-09 LAB
ASCENDING AORTA: 4.29 CM
AV INDEX (PROSTH): 0.47
AV MEAN GRADIENT: 14 MMHG
AV PEAK GRADIENT: 25 MMHG
AV VALVE AREA: 1.62 CM2
AV VELOCITY RATIO: 0.49
BSA FOR ECHO PROCEDURE: 1.98 M2
CV ECHO LV RWT: 0.36 CM
DOP CALC AO PEAK VEL: 2.51 M/S
DOP CALC AO VTI: 55.92 CM
DOP CALC LVOT AREA: 3.5 CM2
DOP CALC LVOT DIAMETER: 2.1 CM
DOP CALC LVOT PEAK VEL: 1.22 M/S
DOP CALC LVOT STROKE VOLUME: 90.42 CM3
DOP CALCLVOT PEAK VEL VTI: 26.12 CM
E WAVE DECELERATION TIME: 220.86 MSEC
E/A RATIO: 4.57
E/E' RATIO: 15.06 M/S
ECHO LV POSTERIOR WALL: 0.96 CM (ref 0.6–1.1)
FRACTIONAL SHORTENING: 39 % (ref 28–44)
INTERVENTRICULAR SEPTUM: 1.03 CM (ref 0.6–1.1)
IVRT: 0.08 MSEC
LA MAJOR: 7.01 CM
LA MINOR: 7.01 CM
LA WIDTH: 4.53 CM
LEFT ATRIUM SIZE: 5.11 CM
LEFT ATRIUM VOLUME INDEX: 70.2 ML/M2
LEFT ATRIUM VOLUME: 137.93 CM3
LEFT INTERNAL DIMENSION IN SYSTOLE: 3.22 CM (ref 2.1–4)
LEFT VENTRICLE DIASTOLIC VOLUME INDEX: 69.04 ML/M2
LEFT VENTRICLE DIASTOLIC VOLUME: 135.72 ML
LEFT VENTRICLE MASS INDEX: 102 G/M2
LEFT VENTRICLE SYSTOLIC VOLUME INDEX: 21.2 ML/M2
LEFT VENTRICLE SYSTOLIC VOLUME: 41.74 ML
LEFT VENTRICULAR INTERNAL DIMENSION IN DIASTOLE: 5.31 CM (ref 3.5–6)
LEFT VENTRICULAR MASS: 199.69 G
LV LATERAL E/E' RATIO: 12.8 M/S
LV SEPTAL E/E' RATIO: 18.29 M/S
MV PEAK A VEL: 0.28 M/S
MV PEAK E VEL: 1.28 M/S
PISA TR MAX VEL: 3.87 M/S
PULM VEIN S/D RATIO: 0.44
PV PEAK D VEL: 0.77 M/S
PV PEAK S VEL: 0.34 M/S
RA MAJOR: 6.6 CM
RA PRESSURE: 3 MMHG
RA WIDTH: 4.08 CM
RIGHT VENTRICULAR END-DIASTOLIC DIMENSION: 3.79 CM
SINUS: 4.06 CM
STJ: 3.45 CM
TDI LATERAL: 0.1 M/S
TDI SEPTAL: 0.07 M/S
TDI: 0.09 M/S
TR MAX PG: 60 MMHG
TRICUSPID ANNULAR PLANE SYSTOLIC EXCURSION: 1.24 CM
TV REST PULMONARY ARTERY PRESSURE: 63 MMHG

## 2019-12-09 PROCEDURE — 93306 TTE W/DOPPLER COMPLETE: CPT | Mod: HCNC,S$GLB,, | Performed by: INTERNAL MEDICINE

## 2019-12-09 PROCEDURE — 1126F AMNT PAIN NOTED NONE PRSNT: CPT | Mod: HCNC,S$GLB,, | Performed by: INTERNAL MEDICINE

## 2019-12-09 PROCEDURE — 1126F PR PAIN SEVERITY QUANTIFIED, NO PAIN PRESENT: ICD-10-PCS | Mod: HCNC,S$GLB,, | Performed by: INTERNAL MEDICINE

## 2019-12-09 PROCEDURE — 93010 EKG 12-LEAD: ICD-10-PCS | Mod: HCNC,S$GLB,, | Performed by: INTERNAL MEDICINE

## 2019-12-09 PROCEDURE — 99499 UNLISTED E&M SERVICE: CPT | Mod: HCNC,S$GLB,, | Performed by: INTERNAL MEDICINE

## 2019-12-09 PROCEDURE — 1159F PR MEDICATION LIST DOCUMENTED IN MEDICAL RECORD: ICD-10-PCS | Mod: HCNC,S$GLB,, | Performed by: INTERNAL MEDICINE

## 2019-12-09 PROCEDURE — 1101F PR PT FALLS ASSESS DOC 0-1 FALLS W/OUT INJ PAST YR: ICD-10-PCS | Mod: HCNC,CPTII,S$GLB, | Performed by: INTERNAL MEDICINE

## 2019-12-09 PROCEDURE — 99999 PR PBB SHADOW E&M-EST. PATIENT-LVL II: CPT | Mod: PBBFAC,HCNC,,

## 2019-12-09 PROCEDURE — 99999 PR PBB SHADOW E&M-EST. PATIENT-LVL V: ICD-10-PCS | Mod: PBBFAC,HCNC,, | Performed by: INTERNAL MEDICINE

## 2019-12-09 PROCEDURE — 1159F MED LIST DOCD IN RCRD: CPT | Mod: HCNC,S$GLB,, | Performed by: INTERNAL MEDICINE

## 2019-12-09 PROCEDURE — 99214 OFFICE O/P EST MOD 30 MIN: CPT | Mod: HCNC,S$GLB,, | Performed by: INTERNAL MEDICINE

## 2019-12-09 PROCEDURE — 99214 PR OFFICE/OUTPT VISIT, EST, LEVL IV, 30-39 MIN: ICD-10-PCS | Mod: HCNC,S$GLB,, | Performed by: INTERNAL MEDICINE

## 2019-12-09 PROCEDURE — 93005 EKG 12-LEAD: ICD-10-PCS | Mod: HCNC,S$GLB,, | Performed by: INTERNAL MEDICINE

## 2019-12-09 PROCEDURE — 99999 PR PBB SHADOW E&M-EST. PATIENT-LVL V: CPT | Mod: PBBFAC,HCNC,, | Performed by: INTERNAL MEDICINE

## 2019-12-09 PROCEDURE — 99499 RISK ADDL DX/OHS AUDIT: ICD-10-PCS | Mod: HCNC,S$GLB,, | Performed by: INTERNAL MEDICINE

## 2019-12-09 PROCEDURE — 1101F PT FALLS ASSESS-DOCD LE1/YR: CPT | Mod: HCNC,CPTII,S$GLB, | Performed by: INTERNAL MEDICINE

## 2019-12-09 PROCEDURE — 93010 ELECTROCARDIOGRAM REPORT: CPT | Mod: HCNC,S$GLB,, | Performed by: INTERNAL MEDICINE

## 2019-12-09 PROCEDURE — 93005 ELECTROCARDIOGRAM TRACING: CPT | Mod: HCNC,S$GLB,, | Performed by: INTERNAL MEDICINE

## 2019-12-09 PROCEDURE — 99999 PR PBB SHADOW E&M-EST. PATIENT-LVL II: ICD-10-PCS | Mod: PBBFAC,HCNC,,

## 2019-12-09 PROCEDURE — 93306 ECHO (CUPID ONLY): ICD-10-PCS | Mod: HCNC,S$GLB,, | Performed by: INTERNAL MEDICINE

## 2019-12-09 RX ORDER — PYRIDOSTIGMINE BROMIDE 60 MG/1
60 TABLET ORAL
Status: ON HOLD | COMMUNITY
End: 2020-05-22

## 2019-12-10 ENCOUNTER — DOCUMENTATION ONLY (OUTPATIENT)
Dept: INTERNAL MEDICINE | Facility: CLINIC | Age: 84
End: 2019-12-10

## 2019-12-10 ENCOUNTER — TELEPHONE (OUTPATIENT)
Dept: INTERNAL MEDICINE | Facility: CLINIC | Age: 84
End: 2019-12-10

## 2019-12-10 NOTE — PROGRESS NOTES
2D echo revealed normal ejection fraction, mild aortic stenosis, pulmonary hypertension    Electrocardiogram revealed atrial fibrillation, no change    He is cleared for anesthesia and surgery    He has put a hold on the Coumadin and hydrochlorothiazide and recommend to take his medications the morning before of surgery except that of tamsulosin or Flomax in which he can resume afterwards    Also to follow up with arrhythmia appointment in January 2020

## 2019-12-10 NOTE — TELEPHONE ENCOUNTER
----- Message from Viky Tinajero RN sent at 12/10/2019  1:48 PM CST -----  ,       This patient was seen for surgical clearance. I see that his Echo has resulted. Is the patient now clear for surgery (Skin graft) on 12/12/19 with ?                                                                   Thanks so much,                                                                       Viky Tinajero RN BSN, [facility] [place] [facility]

## 2019-12-11 ENCOUNTER — TELEPHONE (OUTPATIENT)
Dept: OTOLARYNGOLOGY | Facility: CLINIC | Age: 84
End: 2019-12-11

## 2019-12-12 ENCOUNTER — ANESTHESIA EVENT (OUTPATIENT)
Dept: SURGERY | Facility: HOSPITAL | Age: 84
End: 2019-12-12
Payer: MEDICARE

## 2019-12-12 ENCOUNTER — ANESTHESIA (OUTPATIENT)
Dept: SURGERY | Facility: HOSPITAL | Age: 84
End: 2019-12-12
Payer: MEDICARE

## 2019-12-12 ENCOUNTER — HOSPITAL ENCOUNTER (OUTPATIENT)
Facility: HOSPITAL | Age: 84
Discharge: HOME HEALTH | End: 2019-12-16
Attending: OTOLARYNGOLOGY | Admitting: OTOLARYNGOLOGY
Payer: MEDICARE

## 2019-12-12 DIAGNOSIS — L98.8 MOHS DEFECT: Primary | ICD-10-CM

## 2019-12-12 DIAGNOSIS — Z98.890 MOHS DEFECT: Primary | ICD-10-CM

## 2019-12-12 DIAGNOSIS — Z79.01 LONG TERM CURRENT USE OF ANTICOAGULANT THERAPY: ICD-10-CM

## 2019-12-12 LAB
APTT BLDCRRT: 26.3 SEC (ref 21–32)
INR PPP: 1.2 (ref 0.8–1.2)
PROTHROMBIN TIME: 11.8 SEC (ref 9–12.5)

## 2019-12-12 PROCEDURE — A4216 STERILE WATER/SALINE, 10 ML: HCPCS | Mod: HCNC | Performed by: NURSE ANESTHETIST, CERTIFIED REGISTERED

## 2019-12-12 PROCEDURE — D9220A PRA ANESTHESIA: Mod: HCNC,ANES,, | Performed by: ANESTHESIOLOGY

## 2019-12-12 PROCEDURE — 25000003 PHARM REV CODE 250: Mod: HCNC | Performed by: OTOLARYNGOLOGY

## 2019-12-12 PROCEDURE — D9220A PRA ANESTHESIA: ICD-10-PCS | Mod: HCNC,CRNA,, | Performed by: NURSE ANESTHETIST, CERTIFIED REGISTERED

## 2019-12-12 PROCEDURE — 36000707: Mod: HCNC | Performed by: OTOLARYNGOLOGY

## 2019-12-12 PROCEDURE — 71000044 HC DOSC ROUTINE RECOVERY FIRST HOUR: Mod: HCNC | Performed by: OTOLARYNGOLOGY

## 2019-12-12 PROCEDURE — D9220A PRA ANESTHESIA: Mod: HCNC,CRNA,, | Performed by: NURSE ANESTHETIST, CERTIFIED REGISTERED

## 2019-12-12 PROCEDURE — 37000009 HC ANESTHESIA EA ADD 15 MINS: Mod: HCNC | Performed by: OTOLARYNGOLOGY

## 2019-12-12 PROCEDURE — 85730 THROMBOPLASTIN TIME PARTIAL: CPT | Mod: HCNC

## 2019-12-12 PROCEDURE — 37000008 HC ANESTHESIA 1ST 15 MINUTES: Mod: HCNC | Performed by: OTOLARYNGOLOGY

## 2019-12-12 PROCEDURE — 63600175 PHARM REV CODE 636 W HCPCS: Mod: HCNC | Performed by: OTOLARYNGOLOGY

## 2019-12-12 PROCEDURE — D9220A PRA ANESTHESIA: ICD-10-PCS | Mod: HCNC,ANES,, | Performed by: ANESTHESIOLOGY

## 2019-12-12 PROCEDURE — 63600175 PHARM REV CODE 636 W HCPCS: Mod: HCNC | Performed by: NURSE ANESTHETIST, CERTIFIED REGISTERED

## 2019-12-12 PROCEDURE — 25000003 PHARM REV CODE 250: Mod: HCNC | Performed by: NURSE ANESTHETIST, CERTIFIED REGISTERED

## 2019-12-12 PROCEDURE — 36000706: Mod: HCNC | Performed by: OTOLARYNGOLOGY

## 2019-12-12 PROCEDURE — 71000015 HC POSTOP RECOV 1ST HR: Mod: HCNC | Performed by: OTOLARYNGOLOGY

## 2019-12-12 PROCEDURE — 85610 PROTHROMBIN TIME: CPT | Mod: HCNC

## 2019-12-12 RX ORDER — VERAPAMIL HYDROCHLORIDE 40 MG/1
120 TABLET ORAL 2 TIMES DAILY
Status: DISCONTINUED | OUTPATIENT
Start: 2019-12-12 | End: 2019-12-16 | Stop reason: HOSPADM

## 2019-12-12 RX ORDER — DEXMEDETOMIDINE HYDROCHLORIDE 100 UG/ML
INJECTION, SOLUTION INTRAVENOUS
Status: DISCONTINUED | OUTPATIENT
Start: 2019-12-12 | End: 2019-12-12

## 2019-12-12 RX ORDER — SODIUM CHLORIDE 0.9 % (FLUSH) 0.9 %
10 SYRINGE (ML) INJECTION
Status: DISCONTINUED | OUTPATIENT
Start: 2019-12-12 | End: 2019-12-16 | Stop reason: HOSPADM

## 2019-12-12 RX ORDER — GABAPENTIN 300 MG/1
300 CAPSULE ORAL 3 TIMES DAILY
Status: DISCONTINUED | OUTPATIENT
Start: 2019-12-12 | End: 2019-12-16 | Stop reason: HOSPADM

## 2019-12-12 RX ORDER — SODIUM CHLORIDE 0.9 % (FLUSH) 0.9 %
3 SYRINGE (ML) INJECTION
Status: DISCONTINUED | OUTPATIENT
Start: 2019-12-12 | End: 2019-12-16 | Stop reason: HOSPADM

## 2019-12-12 RX ORDER — CEFAZOLIN SODIUM 1 G/3ML
2 INJECTION, POWDER, FOR SOLUTION INTRAMUSCULAR; INTRAVENOUS
Status: COMPLETED | OUTPATIENT
Start: 2019-12-12 | End: 2019-12-12

## 2019-12-12 RX ORDER — HYDROCODONE BITARTRATE AND ACETAMINOPHEN 5; 325 MG/1; MG/1
1 TABLET ORAL EVERY 4 HOURS PRN
Status: DISCONTINUED | OUTPATIENT
Start: 2019-12-12 | End: 2019-12-16 | Stop reason: HOSPADM

## 2019-12-12 RX ORDER — SODIUM CHLORIDE 9 MG/ML
INJECTION, SOLUTION INTRAVENOUS CONTINUOUS PRN
Status: DISCONTINUED | OUTPATIENT
Start: 2019-12-12 | End: 2019-12-12

## 2019-12-12 RX ORDER — IBUPROFEN 600 MG/1
600 TABLET ORAL EVERY 6 HOURS PRN
Status: DISCONTINUED | OUTPATIENT
Start: 2019-12-12 | End: 2019-12-16 | Stop reason: HOSPADM

## 2019-12-12 RX ORDER — LIDOCAINE HYDROCHLORIDE AND EPINEPHRINE 10; 10 MG/ML; UG/ML
INJECTION, SOLUTION INFILTRATION; PERINEURAL
Status: DISCONTINUED | OUTPATIENT
Start: 2019-12-12 | End: 2019-12-12 | Stop reason: HOSPADM

## 2019-12-12 RX ORDER — PROPOFOL 10 MG/ML
VIAL (ML) INTRAVENOUS
Status: DISCONTINUED | OUTPATIENT
Start: 2019-12-12 | End: 2019-12-12

## 2019-12-12 RX ORDER — FINASTERIDE 5 MG/1
5 TABLET, FILM COATED ORAL DAILY
Status: DISCONTINUED | OUTPATIENT
Start: 2019-12-13 | End: 2019-12-16 | Stop reason: HOSPADM

## 2019-12-12 RX ORDER — ONDANSETRON 2 MG/ML
4 INJECTION INTRAMUSCULAR; INTRAVENOUS EVERY 12 HOURS PRN
Status: DISCONTINUED | OUTPATIENT
Start: 2019-12-12 | End: 2019-12-16 | Stop reason: HOSPADM

## 2019-12-12 RX ORDER — GLYCOPYRROLATE 0.2 MG/ML
INJECTION INTRAMUSCULAR; INTRAVENOUS
Status: DISCONTINUED | OUTPATIENT
Start: 2019-12-12 | End: 2019-12-12

## 2019-12-12 RX ORDER — HYDROCHLOROTHIAZIDE 12.5 MG/1
12.5 TABLET ORAL DAILY
Status: DISCONTINUED | OUTPATIENT
Start: 2019-12-13 | End: 2019-12-16 | Stop reason: HOSPADM

## 2019-12-12 RX ORDER — LIDOCAINE HYDROCHLORIDE 10 MG/ML
1 INJECTION, SOLUTION EPIDURAL; INFILTRATION; INTRACAUDAL; PERINEURAL ONCE
Status: DISCONTINUED | OUTPATIENT
Start: 2019-12-12 | End: 2019-12-12 | Stop reason: HOSPADM

## 2019-12-12 RX ADMIN — GABAPENTIN 300 MG: 300 CAPSULE ORAL at 09:12

## 2019-12-12 RX ADMIN — DEXMEDETOMIDINE HYDROCHLORIDE 0.7 MCG/KG/HR: 100 INJECTION, SOLUTION, CONCENTRATE INTRAVENOUS at 06:12

## 2019-12-12 RX ADMIN — PROPOFOL 20 MG: 10 INJECTION, EMULSION INTRAVENOUS at 06:12

## 2019-12-12 RX ADMIN — SODIUM CHLORIDE: 0.9 INJECTION, SOLUTION INTRAVENOUS at 06:12

## 2019-12-12 RX ADMIN — GLYCOPYRROLATE 0.2 MG: 0.2 INJECTION, SOLUTION INTRAMUSCULAR; INTRAVENOUS at 06:12

## 2019-12-12 RX ADMIN — CEFAZOLIN 2 G: 330 INJECTION, POWDER, FOR SOLUTION INTRAMUSCULAR; INTRAVENOUS at 06:12

## 2019-12-12 RX ADMIN — VERAPAMIL HYDROCHLORIDE 120 MG: 40 TABLET ORAL at 09:12

## 2019-12-12 RX ADMIN — DEXMEDETOMIDINE HYDROCHLORIDE 80 MCG: 100 INJECTION, SOLUTION, CONCENTRATE INTRAVENOUS at 06:12

## 2019-12-12 NOTE — INTERVAL H&P NOTE
The patient has been examined and the H&P has been reviewed:    I concur with the findings and no changes have occurred since H&P was written.    Anesthesia/Surgery risks, benefits and alternative options discussed and understood by patient/family.          Active Hospital Problems    Diagnosis  POA    Long term current use of anticoagulant therapy [Z79.01]  Not Applicable      Resolved Hospital Problems   No resolved problems to display.

## 2019-12-12 NOTE — H&P
FACIAL PLASTIC SURGERY CLINIC NOTE     CC: F/U Mohs defect repair     TREATMENT HISTORY:  1. Repair of Mohs defect with ACell, planned for FTSG 12/5/2019     INTERVAL HISTORY:Ernie Phan returns to the [facility] for follow-up of Mohs repair. No complaints today.Denies dysphagia, odynophagia, throat pain and otalgia.Voice is stable. Does not experience dry mouth. Denies fevers, chills, and nightsweats. There has not been any redness or drainage from the wound.     Constitutional: Negative for fevers, chills, night sweats, unexpected weight changes  Eyes: Negative for vision changes, diplopia, photophobia  Otolaryngologic: as above   Cardiovascular: Negative for chest pain, MCMILLAN, palpitations, edema  Respiratory: Negative for cough, SOB, wheezing, stridor  Gastrointestinal: Negative for abdominal pain, constipation, diarrhea, nausea, vomiting  Genitourinary: Negative for dysuria, hematuria  Musculoskeletal: Negative for arthralgias, myalgias  Integumentary/Breast: Negative for rashes or wounds  Neurological: Negative for any motor or sensory deficits  Psychiatric: Negative for depression or anxiety  Endocrine: Negative for polyuria, heat/cold intolerance  Hematologic/Lymphatic: Negative for easy bleeding or bruising  Allergic/Immunologic: Negative for LAD, sneezing, rhinorrhea, environmental allergies        Exam:  Ira with 100% take  Sutures removed     A/P:   OR for FTSG

## 2019-12-12 NOTE — ANESTHESIA PREPROCEDURE EVALUATION
12/12/2019  Ernie Phan is a 88 y.o., male.    Anesthesia Evaluation    I have reviewed the Patient Summary Reports.     I have reviewed the Medications.     Review of Systems  Anesthesia Hx:  No problems with previous Anesthesia  History of prior surgery of interest to airway management or planning: Previous anesthesia: General   Social:  Former Smoker, No Alcohol Use    Hematology/Oncology:  Hematology Normal   Oncology Normal     EENT/Dental:EENT/Dental Normal   Cardiovascular:   Exercise tolerance: good Hypertension, well controlled    Pulmonary:  Pulmonary Normal    Renal/:   Chronic Renal Disease BPH    Hepatic/GI:   GERD, well controlled    Musculoskeletal:   Arthritis     Neurological:   Neuromuscular Disease,    Endocrine:  Endocrine Normal    Dermatological:  Skin Normal    Psych:   Psychiatric History          Physical Exam  General:  Well nourished    Airway/Jaw/Neck:  Airway Findings: Mouth Opening: Normal Tongue: Normal  General Airway Assessment: Adult  Mallampati: II  TM Distance: Normal, at least 6 cm     Eyes/Ears/Nose:  Eyes/Ears/Nose Findings: [place]    Dental:  Dental Findings: In tact        Mental Status:  Mental Status Findings:  Cooperative, Alert and Oriented         Anesthesia Plan  Type of Anesthesia, risks & benefits discussed:  Anesthesia Type:  MAC  Patient's Preference: MAC  Intra-op Monitoring Plan: standard ASA monitors  Intra-op Monitoring Plan Comments:   Post Op Pain Control Plan: multimodal analgesia  Post Op Pain Control Plan Comments:   Induction:   IV  Beta Blocker:  Patient is not currently on a Beta-Blocker (No further documentation required).       Informed Consent: Patient understands risks and agrees with Anesthesia plan.  Questions answered. Anesthesia consent signed with patient.  ASA Score: 3     Day of Surgery Review of History & Physical:  There are no  significant changes.  H&P update referred to the surgeon.         Ready For Surgery From Anesthesia Perspective.

## 2019-12-13 DIAGNOSIS — Z79.01 LONG TERM CURRENT USE OF ANTICOAGULANT THERAPY: ICD-10-CM

## 2019-12-13 DIAGNOSIS — I48.0 PAROXYSMAL ATRIAL FIBRILLATION: ICD-10-CM

## 2019-12-13 PROCEDURE — 25000003 PHARM REV CODE 250: Mod: HCNC | Performed by: OTOLARYNGOLOGY

## 2019-12-13 PROCEDURE — 97605 NEG PRS WND THER DME<=50SQCM: CPT | Mod: HCNC,,, | Performed by: OTOLARYNGOLOGY

## 2019-12-13 PROCEDURE — 94761 N-INVAS EAR/PLS OXIMETRY MLT: CPT | Mod: HCNC

## 2019-12-13 PROCEDURE — 11047 DBRDMT BONE EACH ADDL: CPT | Mod: HCNC,,, | Performed by: OTOLARYNGOLOGY

## 2019-12-13 PROCEDURE — 11047: ICD-10-PCS | Mod: HCNC,,, | Performed by: OTOLARYNGOLOGY

## 2019-12-13 PROCEDURE — 11044 PR DEBRIDEMENT, SKIN, SUB-Q TISSUE,MUSCLE,BONE,=<20 SQ CM: ICD-10-PCS | Mod: HCNC,58,, | Performed by: OTOLARYNGOLOGY

## 2019-12-13 PROCEDURE — 97605 PR NEG PRESS WOUND THERAPY (NPWT) W/NON-DISPOSABLE WOUND VAC DEVICE (DME), <=50 CM: ICD-10-PCS | Mod: HCNC,,, | Performed by: OTOLARYNGOLOGY

## 2019-12-13 PROCEDURE — 11044 DBRDMT BONE 1ST 20 SQ CM/<: CPT | Mod: HCNC,58,, | Performed by: OTOLARYNGOLOGY

## 2019-12-13 RX ADMIN — GABAPENTIN 300 MG: 300 CAPSULE ORAL at 09:12

## 2019-12-13 RX ADMIN — IBUPROFEN 600 MG: 600 TABLET ORAL at 12:12

## 2019-12-13 RX ADMIN — FINASTERIDE 5 MG: 5 TABLET, FILM COATED ORAL at 10:12

## 2019-12-13 RX ADMIN — GABAPENTIN 300 MG: 300 CAPSULE ORAL at 03:12

## 2019-12-13 RX ADMIN — VERAPAMIL HYDROCHLORIDE 120 MG: 40 TABLET ORAL at 09:12

## 2019-12-13 RX ADMIN — GABAPENTIN 300 MG: 300 CAPSULE ORAL at 10:12

## 2019-12-13 RX ADMIN — VERAPAMIL HYDROCHLORIDE 120 MG: 40 TABLET ORAL at 10:12

## 2019-12-13 RX ADMIN — HYDROCHLOROTHIAZIDE 12.5 MG: 12.5 TABLET ORAL at 10:12

## 2019-12-13 NOTE — NURSING
Paged ENT  on call.  Vania returned call. Inquired about pt discharge and home health status along with wound vac teaching for discharge. No note regarding above in pt chart. ENT team is scrubbed into a procedure currently. Vania will give them the message.

## 2019-12-13 NOTE — PLAN OF CARE
Received call from patient's nurse regarding patient's discharge.   ENT service contacted and requested completion of wound vac forms.   Forms left in patient's chart per physician request.   Home health referral placed in [facility].

## 2019-12-13 NOTE — ANESTHESIA POSTPROCEDURE EVALUATION
Anesthesia Post Evaluation    Patient: Ernie Phan    Procedure(s) Performed: Procedure(s) (LRB):  DEBRIDEMENT, WOUND  APPLICATION, WOUND VAC (N/A)    Final Anesthesia Type: general    Patient location during evaluation: PACU  Patient participation: Yes- Able to Participate  Level of consciousness: awake and alert and oriented  Post-procedure vital signs: reviewed and stable  Pain management: adequate  Airway patency: patent    PONV status at discharge: No PONV  Anesthetic complications: no      Cardiovascular status: stable  Respiratory status: unassisted, spontaneous ventilation and nasal cannula  Hydration status: euvolemic  Follow-up not needed.          Vitals Value Taken Time   /69 12/12/2019  7:45 PM   Temp 36.4 °C (97.5 °F) 12/12/2019  7:05 PM   Pulse 62 12/12/2019  7:45 PM   Resp 15 12/12/2019  7:45 PM   SpO2 99 % 12/12/2019  7:45 PM         No case tracking events are documented in the log.      Pain/Kenia Score: No data recorded

## 2019-12-13 NOTE — NURSING TRANSFER
Nursing Transfer Note      Pt arrived from PACU via stretcher accompanied by transporter.aaox4.resp even and non labored.wound vac intact to top of head,dressing raisin like, no drainage noted, no leaks detected.teds/scds maintained.vss,safety precautions implemented.bed in low position.rails up x3.call bell explained and in reach.bed alarm activated for pt safety.will monitor.

## 2019-12-13 NOTE — ANESTHESIA PAT ROS NOTE
"11/20/19-  Dr. Espinoza used fiber optic scope to visualize the trachea. Tortuous trachea observed as well as possible shelves/posterior tracheal rings. Dr. Espinoza passed 6.0 over the fiberoptic scope, however, very tight fit and inadequate ventilation. ETT promptly removed and patient kept spontaneously breathing. Decision made to proceed under propofol gtt with spontaneous ventilation. Pt given 12 mg dexamethasone for airway edema/irritation. Some bleeding seen on exam around the cords and in trachea. Dr. Espinoza states that patient "can not be intubated" for any future anesthetics, and if the need arises, an elective trach should be done in the OR.                                                                                                           12/12/2019  Ernie Phan is a 88 y.o., male.      Pre-op Assessment         Review of Systems  Anesthesia Hx:  Hx of Anesthetic complications History of prior surgery of interest to airway management or planning: Previous anesthesia: General 11/20/19 CLOSURE MOHS with general anesthesia.  Denies Family Hx of Anesthesia complications.  Personal Hx of Anesthesia complications  Difficult Intubation Denies Pulmonary / Ventilatory Issues.   Social:  Non-Smoker, No Alcohol Use    Hematology/Oncology:     Oncology Normal    -- Anemia: Anemia of Chronic Kidney Disease  Chronic   -- Thrombocytosis: Essential (primary) Thrombocytosis    EENT/Dental:EENT/Dental Normal   Cardiovascular:    Denies Angina. hyperlipidemia  Functional Capacity 2 METS, USES WALKER  Hypertension  Disorder of Cardiac Rhythm, Atrial Fibrillation, Chronic Atrial Fibrillation, on warfarin Rx    Pulmonary:   Denies Shortness of breath.  Denies Recent URI. H/O PULMONARY HTN   Renal/:  Kidney Function/Disease, Chronic Kidney Disease (CKD) , CKD Stage III (GFR 30-59)    Hepatic/GI:   GERD, well controlled    Musculoskeletal:   Arthritis     Neurological:   Neuromuscular Disease,  Neuromuscular Disease, " "Myasthenia Gravis MYASTHENIA GRAVIS ON AZATHIOPRINE. MYELOPROLIFERATIVE DISORDER.  Endocrine:  Endocrine Normal    Psych:   depression             Anesthesia Assessment: Preoperative EQUATION    Planned Procedure: Procedure(s) (LRB):  APPLICATION, GRAFT, SKIN, FULL-THICKNESS (N/A)  Requested Anesthesia Type:General  Surgeon: Jason Espinoza MD  Service: ENT  Known or anticipated Date of Surgery:12/12/2019    Surgeon notes: reviewed    Electronic QUestionnaire Assessment completed via nurse interview with patient.        Triage considerations:     The patient has no apparent active cardiac condition (No unstable coronary Syndrome such as severe unstable angina or recent [<1 month] myocardial infarction, decompensated CHF, severe valvular   disease or significant arrhythmia)    Previous anesthesia records:GETA, Difficult airway, Difficult intubation and Complications noted   Airway/Jaw/Neck:  Airway Findings: Mouth Opening: Normal Tongue: Normal  Mallampati: III  Improves to II with phonation.  TM Distance: Normal, at least 6 cm  Jaw/Neck Findings:  Neck ROM: Extension Decreased, Mod.      Dental:  Dental Findings: Upper Dentures    Dr. Espinoza used fiber optic scope to visualize the trachea. Tortuous trachea observed as well as possible shelves/posterior tracheal rings. Dr. Espinoza passed 6.0 over the fiberoptic scope, however, very tight fit and inadequate ventilation. ETT promptly removed and patient kept spontaneously breathing. Decision made to proceed under propofol gtt with spontaneous ventilation. Pt given 12 mg dexamethasone for airway edema/irritation. Some bleeding seen on exam around the cords and in trachea. Dr. Espinoza states that patient "can not be intubated" for any future anesthetics, and if the need arises, an elective trach should be done in the OR.  Last PCP note: 6-12 months ago , within [facility] 4/11/19  Subspecialty notes: Cardiology: ANTI-COAG, Dermatology, ENT, Hematology/Oncology    Other " important co-morbidities: A FIB, GERD, HLD, HTN and THROMBOCYTOSIS, MYASTHENIA GRAVIS, PULM HTN, CKD3, MYELOPROLIFERATIVE DISORDER      Tests already available:  Available tests,  within 1 month , within [facility] .12/2/19-PT/INR, PTT   10/30/19-CMP, CBC  4/1/19-EKG            Instructions given. (See in Nurse's note)    Optimization:  Anesthesia Preop Clinic Assessment  Indicated-NOT INDICATED    Medical Opinion Indicated       Sub-specialist consult indicated:   TBCB PCP      Plan:    Testing:  PT/INR and PTT AM OF SURGERY     Consultation:Patient's PCP for a statement of optimization      Patient  has previously scheduled Medical Appointment:NOT AT THIS TIME    Navigation: Tests Scheduled.              Consults scheduled.             Results will be tracked by Preop Clinic.        MD Viky Ness RN; ALEX Romero Staff             His hematologic conditions are not contraindicated with surgery.     12/10/19-Dr.Joseph Michel- 2D echo revealed normal ejection fraction, mild aortic stenosis, pulmonary hypertension     Electrocardiogram revealed atrial fibrillation, no change     He is cleared for anesthesia and surgery

## 2019-12-13 NOTE — ANESTHESIA RELEASE NOTE
"Anesthesia Release from PACU Note    Patient: Ernie Phan    Procedure(s) Performed: Procedure(s) (LRB):  DEBRIDEMENT, WOUND  APPLICATION, WOUND VAC (N/A)    Anesthesia type: GEN    Post pain: Adequate analgesia reported    Post assessment: no apparent anesthetic complications, tolerated procedure well and no evidence of recall    Post vital signs: BP (!) 151/69   Pulse 62   Temp 36.4 °C (97.5 °F) (Temporal)   Resp 15   Ht 5' 9" (1.753 m)   Wt 80.3 kg (177 lb)   SpO2 99%   BMI 26.14 kg/m²     Level of consciousness: awake, alert and oriented    Nausea/Vomiting: no nausea/no vomiting    Complications: none    Airway Patency: patent    Respiratory: unassisted, spontaneous ventilation,   Cardiovascular: stable and blood pressure at baseline    Hydration: euvolemic    "

## 2019-12-13 NOTE — ASSESSMENT & PLAN NOTE
S/p debridement w/ wound vac placement POD#1    - ADOLFOT  - norco PRN  - wound vac @ 125  - SW consult re: wound vac and HH    Dispo: pending HH and wound vac

## 2019-12-13 NOTE — PROGRESS NOTES
[facility]  Otorhinolaryngology-Head & Neck Surgery  Progress Note    Subjective:     Post-Op Info:  Procedure(s) (LRB):  DEBRIDEMENT, WOUND  APPLICATION, WOUND VAC (N/A)   1 Day Post-Op  Hospital Day: 2     Interval History: NAEON    Medications:  Continuous Infusions:  Scheduled Meds:   finasteride  5 mg Oral Daily    gabapentin  300 mg Oral TID    hydroCHLOROthiazide  12.5 mg Oral Daily    verapamil  120 mg Oral BID     PRN Meds:HYDROcodone-acetaminophen, ibuprofen, ondansetron, sodium chloride 0.9%, sodium chloride 0.9%     Review of patient's allergies indicates:  No Known Allergies  Objective:     Vital Signs (24h Range):  Temp:  [97.5 °F (36.4 °C)-97.8 °F (36.6 °C)] 97.7 °F (36.5 °C)  Pulse:  [50-62] 60  Resp:  [15-18] 16  SpO2:  [95 %-100 %] 95 %  BP: (130-179)/(63-78) 179/78       Lines/Drains/Airways     Peripheral Intravenous Line                 Peripheral IV - Single Lumen 12/12/19 1411 20 G Right Forearm less than 1 day                Physical Exam  NAD  Awake and alert  Head AT/NC, wound vac in place holding suction  Auricles WNL AU  Nose w/ normal external appearance  MMM, anterior tongue mobile, FOM soft, OP patent w/ midline uvula  Neck soft, not TTP, normal ROM, no LAD  Normal WOB, no stridor or stertor    Significant Labs:  CBC: No results for input(s): WBC, RBC, HGB, HCT, PLT, MCV, MCH, MCHC in the last 168 hours.  CMP: No results for input(s): GLU, CALCIUM, ALBUMIN, PROT, NA, K, CO2, CL, BUN, CREATININE, ALKPHOS, ALT, AST, BILITOT in the last 168 hours.    Significant Diagnostics:  None    Assessment/Plan:     Mohs defect  S/p debridement w/ wound vac placement POD#1    - ADAT  - norco PRN  - wound vac @ 125  - SW consult re: wound vac and HH    Dispo: pending HH and wound vac        Leeroy Guan MD  Otorhinolaryngology-Head & Neck Surgery  [facility]

## 2019-12-13 NOTE — OP NOTE
DATE OF PROCEDURE:  12/12/2019    SURGEON:  Jason Espinoza M.D.    ASSISTANT SURGEON:  Leeroy Guan M.D. (RES)    ANESTHESIA:  Local MAC anesthesia.    PROCEDURES PERFORMED:  1.  Debridement of open head wound involving the bone measuring 5 x 5.5 cm.  2.  Application of wound VAC.    INDICATIONS FOR PROCEDURE AND PREOPERATIVE DIAGNOSIS:  This is an 88-year-old   gentleman who had ACell placed in an attempt to stimulate granulation tissue as   a result of a Mohs defect several weeks ago.  He presents now for a planned   second stage skin grafting.    POSTOPERATIVE DIAGNOSIS:  Failure of ACell and persistent open head wound.    PROCEDURE IN DETAIL:  After obtaining informed consent, the patient was taken to   the Operating Room in supine position.  Local MAC anesthesia was induced   without difficulty.  The area was prepped and draped in standard sterile fashion   as well as the planned donor site in the right groin.  The bolster was removed   and upon inspection revealed that there is exposed viable skull with very   minimal granulation tissue posteriorly only.  There has been no coverage of the   exposed skull.  At this point, the planned skin graft was abandoned and a wound   VAC was obtained.  So, #9 elevators were then used to aggressively debride the   exposed skull back to healthy bleeding bone, removing any fibrinous exudate and   dried ACell debris.  The wound VAC was then applied and ensured a good seal at   125 mmHg.  This concluded the procedure.  The patient was then rotated back to   anesthesia and awakened in the Operating Room without difficulty.  There were no   complications and estimated blood loss was minimal.      JAYASHREEW/IN  dd: 12/13/2019 10:01:50 (CST)  td: 12/13/2019 13:51:27 (CST)  Doc ID   #[id number]  Job ID #[id number]    CC:

## 2019-12-13 NOTE — PLAN OF CARE
Pt admitted and care plan initiated.wound vac intact to top of head.denies pain.isaac reg diet.voiding.safety precautions maintained.pt free of falls or injuries.bed alarm in use for pt safety.vss.plan for discharge in am.continue plan of care.

## 2019-12-13 NOTE — NURSING TRANSFER
Nursing Transfer Note      12/12/2019     Transfer To: 3082A from [facility] 29    Transfer via stretcher    Transfer with SL and wound vac    Transported by Patient escort    Medicines sent: None    Chart send with patient: Yes    Notified: Report called to Missy ALAN    Patient reassessed at: 2030. Awake and alert. VSS. Denies pain or nausea. Stable condition. Tolerating liquids well. Voiding well.  Upon arrival to floor: bed in lowest position

## 2019-12-13 NOTE — NURSING
ENT team paged again. Pts family continue to wait for discharge information (they were told to be with the pt at 1030 for wound care education).   1500- Vania returned page. Informed her that no information/instruction has been received since last conversation. Vania stated she will look into the matter.  Pt and fmly kept up to date on status of discharge.

## 2019-12-13 NOTE — TRANSFER OF CARE
"Anesthesia Transfer of Care Note    Patient: Ernie Phan    Procedure(s) Performed: Procedure(s) (LRB):  DEBRIDEMENT, WOUND  APPLICATION, WOUND VAC (N/A)    Patient location: [facility]    Anesthesia Type: general    Transport from OR: Transported from OR on 2-3 L/min O2 by NC with adequate spontaneous ventilation    Post pain: adequate analgesia    Post assessment: no apparent anesthetic complications and tolerated procedure well    Post vital signs: stable    Level of consciousness: awake, oriented and alert    Nausea/Vomiting: no nausea/vomiting    Complications: none    Transfer of care protocol was followed      Last vitals:   Visit Vitals  /69 (BP Location: Left arm, Patient Position: Lying)   Pulse 59   Temp 36.6 °C (97.8 °F) (Oral)   Resp 18   Ht 5' 9" (1.753 m)   Wt 80.3 kg (177 lb)   SpO2 100%   BMI 26.14 kg/m²     "

## 2019-12-13 NOTE — SUBJECTIVE & OBJECTIVE
Interval History: NAEON    Medications:  Continuous Infusions:  Scheduled Meds:   finasteride  5 mg Oral Daily    gabapentin  300 mg Oral TID    hydroCHLOROthiazide  12.5 mg Oral Daily    verapamil  120 mg Oral BID     PRN Meds:HYDROcodone-acetaminophen, ibuprofen, ondansetron, sodium chloride 0.9%, sodium chloride 0.9%     Review of patient's allergies indicates:  No Known Allergies  Objective:     Vital Signs (24h Range):  Temp:  [97.5 °F (36.4 °C)-97.8 °F (36.6 °C)] 97.7 °F (36.5 °C)  Pulse:  [50-62] 60  Resp:  [15-18] 16  SpO2:  [95 %-100 %] 95 %  BP: (130-179)/(63-78) 179/78       Lines/Drains/Airways     Peripheral Intravenous Line                 Peripheral IV - Single Lumen 12/12/19 1411 20 G Right Forearm less than 1 day                Physical Exam  NAD  Awake and alert  Head AT/NC, wound vac in place holding suction  Auricles WNL AU  Nose w/ normal external appearance  MMM, anterior tongue mobile, FOM soft, OP patent w/ midline uvula  Neck soft, not TTP, normal ROM, no LAD  Normal WOB, no stridor or stertor    Significant Labs:  CBC: No results for input(s): WBC, RBC, HGB, HCT, PLT, MCV, MCH, MCHC in the last 168 hours.  CMP: No results for input(s): GLU, CALCIUM, ALBUMIN, PROT, NA, K, CO2, CL, BUN, CREATININE, ALKPHOS, ALT, AST, BILITOT in the last 168 hours.    Significant Diagnostics:  None

## 2019-12-13 NOTE — PLAN OF CARE
[facility]    HOME HEALTH ORDERS  FACE TO FACE ENCOUNTER    Patient Name: Ernie Phan  YOB: 1931    PCP: Ernie Michel MD   PCP Address[facility] [address] [facility] [address]  PCP Phone Number: [phone]  PCP Fax: [phone]    Encounter Date: 12/13/2019    Admit to Home Health    Diagnoses:  Active Hospital Problems    Diagnosis  POA    *Long term current use of anticoagulant therapy [Z79.01]  Not Applicable      Resolved Hospital Problems   No resolved problems to display.       Future Appointments   Date Time Provider Department Center   1/24/2020 11:00 AM Phu Tate MD [facility]   1/30/2020  2:00 PM [facility], [facility] [place]   1/31/2020 11:30 AM Mary Kay Freeman MD [facility]   4/29/2020 10:45 AM LAB, [facility] [place]           I have seen and examined this patient face to face today. My clinical findings that support the need for the home health skilled services and home bound status are the following:  Medical restrictions requiring assistance of another human to leave home due to  Wound care needs.    Allergies:Review of patient's allergies indicates:  No Known Allergies    Diet: regular diet    Activities: activity as tolerated    Nursing:   SN to complete comprehensive assessment including routine vital signs. Instruct on disease process and s/s of complications to report to MD. Review/verify medication list sent home with the patient at time of discharge  and instruct patient/caregiver as needed. Frequency may be adjusted depending on start of care date.    Notify MD if SBP > 160 or < 90; DBP > 90 or < 50; HR > 120 or < 50; Temp > 101;      CONSULTS:    Aide to provide assistance with personal care, ADLs, and vital signs.    MISCELLANEOUS CARE:  Wound Care Orders:  yes:  Wound Vac:   Location:  scalp           Dressing changes every Monday, Wednesday and Friday.        ET Consult      WOUND CARE  ORDERS  n/a      Medications: Review discharge medications with patient and family and provide education.      Current Discharge Medication List      CONTINUE these medications which have NOT CHANGED    Details   azaTHIOprine (IMURAN) 50 mg Tab Take 4 tablets (200 mg total) by mouth once daily.  Qty: 360 tablet, Refills: 3    Associated Diagnoses: Myasthenia gravis      finasteride (PROSCAR) 5 mg tablet Take 1 tablet (5 mg total) by mouth once daily.  Qty: 90 tablet, Refills: 1      gabapentin (NEURONTIN) 300 MG capsule Take 1 capsule (300 mg total) by mouth 3 (three) times daily.  Qty: 270 capsule, Refills: 3      HYDROcodone-acetaminophen (NORCO) 5-325 mg per tablet Take 1 tablet by mouth every 6 (six) hours as needed for Pain.  Qty: 15 tablet, Refills: 0    Comments: Quantity prescribed more than 7 day supply? No      hydroxyurea (HYDREA) 500 mg Cap Take 1,000 mg on day 1, then 500 mg on days 2 and 3 and repeat..  Qty: 120 capsule, Refills: 3    Associated Diagnoses: Essential thrombocytosis      IRON, FERROUS SULFATE, ORAL Take 65 mg by mouth once daily.      predniSONE (DELTASONE) 5 MG tablet TAKE 1 TABLET EVERY DAY  Qty: 90 tablet, Refills: 1    Associated Diagnoses: MG (myasthenia gravis)      pyridostigmine (MESTINON) 60 mg Tab Take 60 mg by mouth.      !! tamsulosin (FLOMAX) 0.4 mg Cap TAKE 1 CAPSULE EVERY DAY  Qty: 90 capsule, Refills: 3      verapamil (CALAN) 120 MG tablet Take 1 tablet (120 mg total) by mouth 2 (two) times daily.  Qty: 180 tablet, Refills: 3      warfarin (COUMADIN) 5 MG tablet Take 1.5 tablets (7.5 mg total) by mouth Daily. OR AS DIRECTED BY COUMADIN CLINIC  Qty: 90 tablet, Refills: 3    Associated Diagnoses: Long term current use of anticoagulant therapy; Paroxysmal atrial fibrillation      DENTURE CLEANSER (EFFERVESCENT DENTURE CLEANSR DENT)       flu vacc [id number],65yr up,PF (FLUZONE HIGH-DOSE 2019-20, PF,) 180 mcg/0.5 mL Syrg admin as directed  Qty: 0.5 mL, Refills: 0      FLUZONE  HIGH-DOSE 2018-19, PF, 180 mcg/0.5 mL vaccine       hydroCHLOROthiazide (MICROZIDE) 12.5 mg capsule TAKE 1 CAPSULE EVERY DAY  Qty: 90 capsule, Refills: 3      ondansetron (ZOFRAN) 4 MG tablet Take 1 tablet (4 mg total) by mouth every 8 (eight) hours as needed for Nausea.  Qty: 6 tablet, Refills: 0      ONE TOUCH ULTRA TEST Strp TEST DAILY  Qty: 100 each, Refills: 3      ONE TOUCH ULTRASOFT LANCETS lancets TEST DAILY  Qty: 100 each, Refills: 3      !! tamsulosin (FLOMAX) 0.4 mg Cap Take 1 capsule (0.4 mg total) by mouth once daily.  Qty: 90 capsule, Refills: 1      vitamin D 1000 units Tab Take 185 mg by mouth once daily.       !! - Potential duplicate medications found. Please discuss with provider.          I certify that this patient is confined to his home and needs intermittent skilled nursing care.

## 2019-12-14 PROCEDURE — 25000003 PHARM REV CODE 250: Mod: HCNC | Performed by: OTOLARYNGOLOGY

## 2019-12-14 PROCEDURE — 25000003 PHARM REV CODE 250: Mod: HCNC | Performed by: STUDENT IN AN ORGANIZED HEALTH CARE EDUCATION/TRAINING PROGRAM

## 2019-12-14 RX ORDER — FERROUS SULFATE 325(65) MG
325 TABLET, DELAYED RELEASE (ENTERIC COATED) ORAL DAILY
Status: DISCONTINUED | OUTPATIENT
Start: 2019-12-14 | End: 2019-12-16 | Stop reason: HOSPADM

## 2019-12-14 RX ORDER — HYDROCODONE BITARTRATE AND ACETAMINOPHEN 5; 325 MG/1; MG/1
1 TABLET ORAL EVERY 4 HOURS PRN
Qty: 14 TABLET | Refills: 0 | Status: SHIPPED | OUTPATIENT
Start: 2019-12-14 | End: 2020-04-06

## 2019-12-14 RX ORDER — IBUPROFEN 600 MG/1
600 TABLET ORAL EVERY 6 HOURS PRN
COMMUNITY
Start: 2019-12-14

## 2019-12-14 RX ORDER — HYDROXYUREA 500 MG/1
1000 CAPSULE ORAL ONCE
Status: COMPLETED | OUTPATIENT
Start: 2019-12-14 | End: 2019-12-14

## 2019-12-14 RX ORDER — BISACODYL 5 MG
5 TABLET, DELAYED RELEASE (ENTERIC COATED) ORAL DAILY PRN
Status: DISCONTINUED | OUTPATIENT
Start: 2019-12-14 | End: 2019-12-16 | Stop reason: HOSPADM

## 2019-12-14 RX ORDER — HYDROCODONE BITARTRATE AND ACETAMINOPHEN 5; 325 MG/1; MG/1
1 TABLET ORAL EVERY 4 HOURS PRN
Qty: 15 TABLET | Refills: 0 | Status: SHIPPED | OUTPATIENT
Start: 2019-12-14 | End: 2019-12-14

## 2019-12-14 RX ADMIN — IBUPROFEN 600 MG: 600 TABLET ORAL at 01:12

## 2019-12-14 RX ADMIN — VERAPAMIL HYDROCHLORIDE 120 MG: 40 TABLET ORAL at 08:12

## 2019-12-14 RX ADMIN — DOCUSATE SODIUM 50 MG: 50 CAPSULE, LIQUID FILLED ORAL at 03:12

## 2019-12-14 RX ADMIN — GABAPENTIN 300 MG: 300 CAPSULE ORAL at 08:12

## 2019-12-14 RX ADMIN — DOCUSATE SODIUM 50 MG: 50 CAPSULE, LIQUID FILLED ORAL at 10:12

## 2019-12-14 RX ADMIN — HYDROCODONE BITARTRATE AND ACETAMINOPHEN 1 TABLET: 5; 325 TABLET ORAL at 02:12

## 2019-12-14 RX ADMIN — FERROUS SULFATE TAB EC 325 MG (65 MG FE EQUIVALENT) 325 MG: 325 (65 FE) TABLET DELAYED RESPONSE at 03:12

## 2019-12-14 RX ADMIN — IBUPROFEN 600 MG: 600 TABLET ORAL at 03:12

## 2019-12-14 RX ADMIN — HYDROXYUREA 1000 MG: 500 CAPSULE ORAL at 03:12

## 2019-12-14 RX ADMIN — GABAPENTIN 300 MG: 300 CAPSULE ORAL at 03:12

## 2019-12-14 RX ADMIN — IBUPROFEN 600 MG: 600 TABLET ORAL at 08:12

## 2019-12-14 RX ADMIN — BISACODYL 5 MG: 5 TABLET, COATED ORAL at 10:12

## 2019-12-14 RX ADMIN — HYDROCHLOROTHIAZIDE 12.5 MG: 12.5 TABLET ORAL at 08:12

## 2019-12-14 RX ADMIN — FINASTERIDE 5 MG: 5 TABLET, FILM COATED ORAL at 08:12

## 2019-12-14 RX ADMIN — VERAPAMIL HYDROCHLORIDE 120 MG: 40 TABLET ORAL at 10:12

## 2019-12-14 NOTE — PLAN OF CARE
Wound VAC insurance authorization forms faxed to [facility].   Home health referrals placed in [facility] with [facility] and [facility].   [facility] unable to accept patient until Tuesday, 12/17/19.   Accepted by [facility] in [facility].   Awaiting call from [facility] representative for discharge information.   Awaiting insurance authorization for wound vac.  Spoke with patient's daughter-in-law, Rosibel, regarding status of wound vac authorization.

## 2019-12-14 NOTE — SUBJECTIVE & OBJECTIVE
Interval History: NAEON. Awaiting DME prior to discharge.    Medications:  Continuous Infusions:  Scheduled Meds:   finasteride  5 mg Oral Daily    gabapentin  300 mg Oral TID    hydroCHLOROthiazide  12.5 mg Oral Daily    verapamil  120 mg Oral BID     PRN Meds:HYDROcodone-acetaminophen, ibuprofen, ondansetron, sodium chloride 0.9%, sodium chloride 0.9%     Review of patient's allergies indicates:  No Known Allergies  Objective:     Vital Signs (24h Range):  Temp:  [97.3 °F (36.3 °C)-97.9 °F (36.6 °C)] 97.7 °F (36.5 °C)  Pulse:  [47-65] 64  Resp:  [14-18] 18  SpO2:  [94 %-98 %] 96 %  BP: (116-187)/(59-88) 135/65     Date 12/14/19 0700 - 12/15/19 0659   Shift 3523-4479 4425-7070 9654-5525 24 Hour Total   INTAKE   P.O. 480   480   Shift Total(mL/kg) 480(6)   480(6)   OUTPUT   Shift Total(mL/kg)       Weight (kg) 80.3 80.3 80.3 80.3     Lines/Drains/Airways     Peripheral Intravenous Line                 Peripheral IV - Single Lumen 12/12/19 1411 20 G Right Forearm 1 day                Physical Exam    NAD  Awake and alert  Head AT/NC, wound vac in place holding suction  Auricles WNL AU  Nose w/ normal external appearance  MMM, anterior tongue mobile, FOM soft, OP patent w/ midline uvula  Neck soft, not TTP, normal ROM, no LAD  Normal WOB, no stridor or stertor    Significant Labs:  CBC: No results for input(s): WBC, RBC, HGB, HCT, PLT, MCV, MCH, MCHC in the last 168 hours.  CMP: No results for input(s): GLU, CALCIUM, ALBUMIN, PROT, NA, K, CO2, CL, BUN, CREATININE, ALKPHOS, ALT, AST, BILITOT in the last 168 hours.    Significant Diagnostics:  None

## 2019-12-14 NOTE — NURSING
Mr. Phan is says he is very upset this morning. He feels like he was kept here too many days because no one could contact his doctor. He told me someone came in to talk to him this morning but he was half asleep and does not remember what was said completely. He feels he has been ready to go home and no one is helping him. I have paged Dr. Padilla who apparently saw this patient this morning.

## 2019-12-14 NOTE — NURSING
I spoke to Dr. Padilla who is filling out required paperwork for [facility] to ask for authorization for wound vac at home. I spoke to DEBBIE Henderson who informed me once the doctor brings him the forms then they can be faxed to [facility] and then we need to wait for authorization which may or may not happen today. Dr. Padilla has spoken to this patient's son and set the expectation that the discharge may not happen today. I have let Mr. Phan know. He mentioned to me that he needs to take hydroxyurea, iron tablet and a stool softener. I will mention it to Dr. Padilla.

## 2019-12-14 NOTE — PLAN OF CARE
VS stable, wound VAC dressing in situ, well  sealed, on 125mmHg continous therapy.  Complained on shingles pain on the legs, given motrin with moderate effect, given hydrocodone with much better pain control. Ambulating to use urinal at bedside. Promoted rest and sleep, no bowel movement. Safety maintained, call bell within reach.

## 2019-12-14 NOTE — NURSING
Mr. Phan's daughter in law Rosibel called me, she helps him with arranging healthcare per Mr. Phan. She conveyed how upset she is that he is still here and she does not understand why. I spoke to the ENT intern and was told that they had a staff member who was off and a long, later surgery that all contributed to the delay. The ENT doctor's have spoken to the family and the patient but now the daughter in law wants to talk to the hospital , the billing department and patient relations. I have provided phone numbers and called the  on call and asked him to call her at [phone]. Mr. Phan has been updated on the progress so far which is  has faxed the necessary forms to [facility] for wound vac.

## 2019-12-14 NOTE — ASSESSMENT & PLAN NOTE
S/p debridement w/ wound vac placement POD#2    - ADAT  - norco PRN  - wound vac @ 125  - SW consult re: wound vac and HH    Dispo: pending HH and wound vac

## 2019-12-14 NOTE — PROGRESS NOTES
[facility]  Otorhinolaryngology-Head & Neck Surgery  Progress Note    Subjective:     Post-Op Info:  Procedure(s) (LRB):  DEBRIDEMENT, WOUND  APPLICATION, WOUND VAC (N/A)   2 Days Post-Op  Hospital Day: 3     Interval History: NAEON. Awaiting DME prior to discharge.    Medications:  Continuous Infusions:  Scheduled Meds:   finasteride  5 mg Oral Daily    gabapentin  300 mg Oral TID    hydroCHLOROthiazide  12.5 mg Oral Daily    verapamil  120 mg Oral BID     PRN Meds:HYDROcodone-acetaminophen, ibuprofen, ondansetron, sodium chloride 0.9%, sodium chloride 0.9%     Review of patient's allergies indicates:  No Known Allergies  Objective:     Vital Signs (24h Range):  Temp:  [97.3 °F (36.3 °C)-97.9 °F (36.6 °C)] 97.7 °F (36.5 °C)  Pulse:  [47-65] 64  Resp:  [14-18] 18  SpO2:  [94 %-98 %] 96 %  BP: (116-187)/(59-88) 135/65     Date 12/14/19 0700 - 12/15/19 0659   Shift 0228-2745 5029-5047 8325-6283 24 Hour Total   INTAKE   P.O. 480   480   Shift Total(mL/kg) 480(6)   480(6)   OUTPUT   Shift Total(mL/kg)       Weight (kg) 80.3 80.3 80.3 80.3     Lines/Drains/Airways     Peripheral Intravenous Line                 Peripheral IV - Single Lumen 12/12/19 1411 20 G Right Forearm 1 day                Physical Exam    NAD  Awake and alert  Head AT/NC, wound vac in place holding suction  Auricles WNL AU  Nose w/ normal external appearance  MMM, anterior tongue mobile, FOM soft, OP patent w/ midline uvula  Neck soft, not TTP, normal ROM, no LAD  Normal WOB, no stridor or stertor    Significant Labs:  CBC: No results for input(s): WBC, RBC, HGB, HCT, PLT, MCV, MCH, MCHC in the last 168 hours.  CMP: No results for input(s): GLU, CALCIUM, ALBUMIN, PROT, NA, K, CO2, CL, BUN, CREATININE, ALKPHOS, ALT, AST, BILITOT in the last 168 hours.    Significant Diagnostics:  None    Assessment/Plan:     Mohs defect  S/p debridement w/ wound vac placement POD#2    - ADOLFOT  - norco PRN  - wound vac @ 125  - SW consult re:  wound vac and HH    Dispo: pending HH and wound vac        Silvio Padilla Jr., MD  Otorhinolaryngology-Head & Neck Surgery  [facility]

## 2019-12-15 PROCEDURE — 25000003 PHARM REV CODE 250: Mod: HCNC | Performed by: OTOLARYNGOLOGY

## 2019-12-15 PROCEDURE — 94761 N-INVAS EAR/PLS OXIMETRY MLT: CPT | Mod: HCNC

## 2019-12-15 PROCEDURE — 25000003 PHARM REV CODE 250: Mod: HCNC | Performed by: STUDENT IN AN ORGANIZED HEALTH CARE EDUCATION/TRAINING PROGRAM

## 2019-12-15 RX ADMIN — IBUPROFEN 600 MG: 600 TABLET ORAL at 11:12

## 2019-12-15 RX ADMIN — GABAPENTIN 300 MG: 300 CAPSULE ORAL at 09:12

## 2019-12-15 RX ADMIN — GABAPENTIN 300 MG: 300 CAPSULE ORAL at 08:12

## 2019-12-15 RX ADMIN — FINASTERIDE 5 MG: 5 TABLET, FILM COATED ORAL at 09:12

## 2019-12-15 RX ADMIN — GABAPENTIN 300 MG: 300 CAPSULE ORAL at 03:12

## 2019-12-15 RX ADMIN — FERROUS SULFATE TAB EC 325 MG (65 MG FE EQUIVALENT) 325 MG: 325 (65 FE) TABLET DELAYED RESPONSE at 09:12

## 2019-12-15 RX ADMIN — IBUPROFEN 600 MG: 600 TABLET ORAL at 12:12

## 2019-12-15 RX ADMIN — DOCUSATE SODIUM 50 MG: 50 CAPSULE, LIQUID FILLED ORAL at 09:12

## 2019-12-15 RX ADMIN — VERAPAMIL HYDROCHLORIDE 120 MG: 40 TABLET ORAL at 09:12

## 2019-12-15 RX ADMIN — BISACODYL 5 MG: 5 TABLET, COATED ORAL at 08:12

## 2019-12-15 RX ADMIN — HYDROCHLOROTHIAZIDE 12.5 MG: 12.5 TABLET ORAL at 09:12

## 2019-12-15 RX ADMIN — VERAPAMIL HYDROCHLORIDE 120 MG: 40 TABLET ORAL at 08:12

## 2019-12-15 RX ADMIN — IBUPROFEN 600 MG: 600 TABLET ORAL at 10:12

## 2019-12-15 NOTE — ASSESSMENT & PLAN NOTE
S/p debridement w/ wound vac placement POD#2    - ADAT  - norco PRN  - wound vac @ 125  - SW consult re: wound vac and HH. Awaiting insurance auth    Dispo: pending HH and wound vac

## 2019-12-15 NOTE — PROGRESS NOTES
[facility]  Otorhinolaryngology-Head & Neck Surgery  Progress Note    Subjective:     Post-Op Info:  Procedure(s) (LRB):  DEBRIDEMENT, WOUND  APPLICATION, WOUND VAC (N/A)   3 Days Post-Op  Hospital Day: 4     Interval History: NAEON. Awaiting DME prior to discharge.    Medications:  Continuous Infusions:  Scheduled Meds:   docusate sodium  50 mg Oral Daily    ferrous sulfate  325 mg Oral Daily    finasteride  5 mg Oral Daily    gabapentin  300 mg Oral TID    hydroCHLOROthiazide  12.5 mg Oral Daily    verapamil  120 mg Oral BID     PRN Meds:bisacodyl, HYDROcodone-acetaminophen, ibuprofen, ondansetron, sodium chloride 0.9%, sodium chloride 0.9%     Review of patient's allergies indicates:  No Known Allergies  Objective:     Vital Signs (24h Range):  Temp:  [97.6 °F (36.4 °C)-98.6 °F (37 °C)] 98.6 °F (37 °C)  Pulse:  [44-70] 58  Resp:  [13-20] 18  SpO2:  [94 %-96 %] 94 %  BP: (109-173)/(55-74) 115/73       Lines/Drains/Airways     Peripheral Intravenous Line                 Peripheral IV - Single Lumen 12/12/19 1411 20 G Right Forearm 2 days                Physical Exam    NAD  Awake and alert  Head AT/NC, wound vac in place holding suction  Auricles WNL AU  Nose w/ normal external appearance  MMM, anterior tongue mobile, FOM soft, OP patent w/ midline uvula  Neck soft, not TTP, normal ROM, no LAD  Normal WOB, no stridor or stertor    Significant Labs:  CBC: No results for input(s): WBC, RBC, HGB, HCT, PLT, MCV, MCH, MCHC in the last 168 hours.  CMP: No results for input(s): GLU, CALCIUM, ALBUMIN, PROT, NA, K, CO2, CL, BUN, CREATININE, ALKPHOS, ALT, AST, BILITOT in the last 168 hours.    Significant Diagnostics:  None    Assessment/Plan:     Mohs defect  S/p debridement w/ wound vac placement POD#2    - ADAT  - norco PRN  - wound vac @ 125  - SW consult re: wound vac and HH. Awaiting insurance auth    Dispo: pending HH and wound vac        Silvio Padilla Jr.,  MD  Otorhinolaryngology-Head & Neck Surgery  [facility]

## 2019-12-15 NOTE — PLAN OF CARE
Shingles pain on the right legs, responds well to motrin 600mg. Wound vac dressing continued at 125 mmHg continuous therapy. No leak, minimal discharge on the collecting canister. VS stable. Safety maintained, no fall or injury occurred. Given laxatives and stool softener as per patient request. No BM overnight. Promoted rest and sleep.

## 2019-12-15 NOTE — SUBJECTIVE & OBJECTIVE
Interval History: NAEON. Awaiting DME prior to discharge.    Medications:  Continuous Infusions:  Scheduled Meds:   docusate sodium  50 mg Oral Daily    ferrous sulfate  325 mg Oral Daily    finasteride  5 mg Oral Daily    gabapentin  300 mg Oral TID    hydroCHLOROthiazide  12.5 mg Oral Daily    verapamil  120 mg Oral BID     PRN Meds:bisacodyl, HYDROcodone-acetaminophen, ibuprofen, ondansetron, sodium chloride 0.9%, sodium chloride 0.9%     Review of patient's allergies indicates:  No Known Allergies  Objective:     Vital Signs (24h Range):  Temp:  [97.6 °F (36.4 °C)-98.6 °F (37 °C)] 98.6 °F (37 °C)  Pulse:  [44-70] 58  Resp:  [13-20] 18  SpO2:  [94 %-96 %] 94 %  BP: (109-173)/(55-74) 115/73       Lines/Drains/Airways     Peripheral Intravenous Line                 Peripheral IV - Single Lumen 12/12/19 1411 20 G Right Forearm 2 days                Physical Exam    NAD  Awake and alert  Head AT/NC, wound vac in place holding suction  Auricles WNL AU  Nose w/ normal external appearance  MMM, anterior tongue mobile, FOM soft, OP patent w/ midline uvula  Neck soft, not TTP, normal ROM, no LAD  Normal WOB, no stridor or stertor    Significant Labs:  CBC: No results for input(s): WBC, RBC, HGB, HCT, PLT, MCV, MCH, MCHC in the last 168 hours.  CMP: No results for input(s): GLU, CALCIUM, ALBUMIN, PROT, NA, K, CO2, CL, BUN, CREATININE, ALKPHOS, ALT, AST, BILITOT in the last 168 hours.    Significant Diagnostics:  None

## 2019-12-15 NOTE — PLAN OF CARE
CM followed up on pt's wound vac order - [employer] [facility] does not accept pt's insurance. CM relayed info to MD and obtained order form for an [employer] wound vac. CM faxed all necessary info to local [employer] rep Breann and awaiting callback.    Update: vac auth is in process and Breann is attempting to secure a . SHILOH expects her to call approx 1pm w/ an update on auth and delivery time.    Update: received call from Breann - ann will be to bedside by 4pm. CM updated pt's nurse and MD.    Wknd SHILOH MONSIVAIS [phone]

## 2019-12-16 ENCOUNTER — TELEPHONE (OUTPATIENT)
Dept: OTOLARYNGOLOGY | Facility: CLINIC | Age: 84
End: 2019-12-16

## 2019-12-16 ENCOUNTER — TELEPHONE (OUTPATIENT)
Dept: WOUND CARE | Facility: CLINIC | Age: 84
End: 2019-12-16

## 2019-12-16 VITALS
TEMPERATURE: 98 F | RESPIRATION RATE: 18 BRPM | HEART RATE: 54 BPM | HEIGHT: 69 IN | BODY MASS INDEX: 26.22 KG/M2 | SYSTOLIC BLOOD PRESSURE: 125 MMHG | DIASTOLIC BLOOD PRESSURE: 59 MMHG | WEIGHT: 177 LBS | OXYGEN SATURATION: 97 %

## 2019-12-16 PROCEDURE — 25000003 PHARM REV CODE 250: Mod: HCNC | Performed by: STUDENT IN AN ORGANIZED HEALTH CARE EDUCATION/TRAINING PROGRAM

## 2019-12-16 PROCEDURE — 94761 N-INVAS EAR/PLS OXIMETRY MLT: CPT | Mod: HCNC

## 2019-12-16 PROCEDURE — 25000003 PHARM REV CODE 250: Mod: HCNC | Performed by: OTOLARYNGOLOGY

## 2019-12-16 RX ORDER — WARFARIN SODIUM 5 MG/1
TABLET ORAL
Qty: 90 TABLET | Refills: 3 | Status: SHIPPED | OUTPATIENT
Start: 2019-12-16

## 2019-12-16 RX ADMIN — GABAPENTIN 300 MG: 300 CAPSULE ORAL at 08:12

## 2019-12-16 RX ADMIN — DOCUSATE SODIUM 50 MG: 50 CAPSULE, LIQUID FILLED ORAL at 08:12

## 2019-12-16 RX ADMIN — VERAPAMIL HYDROCHLORIDE 120 MG: 40 TABLET ORAL at 08:12

## 2019-12-16 RX ADMIN — IBUPROFEN 600 MG: 600 TABLET ORAL at 10:12

## 2019-12-16 RX ADMIN — FINASTERIDE 5 MG: 5 TABLET, FILM COATED ORAL at 08:12

## 2019-12-16 RX ADMIN — FERROUS SULFATE TAB EC 325 MG (65 MG FE EQUIVALENT) 325 MG: 325 (65 FE) TABLET DELAYED RESPONSE at 08:12

## 2019-12-16 RX ADMIN — HYDROCHLOROTHIAZIDE 12.5 MG: 12.5 TABLET ORAL at 08:12

## 2019-12-16 NOTE — PLAN OF CARE
Weekend CM, Yolanda MONSIVAIS, received a call from Breann with [employer] - Breann stated there was an issue with patient's delivery and he didn't have dressing packets in the box. The  will return with dressings and is expected to be to bedside by 11am. Breann's contact number is [phone].    Joycelyn Hudson, LCSW [facility]  [id number]

## 2019-12-16 NOTE — PLAN OF CARE
Wound Vac and supplies have been delivered to the patient's bedside.      12/16/19 1303   Post-Acute Status   Post-Acute Authorization HME   HME Status Set-up Complete     Joycelyn Hudson [facility]  [facility]  [id number]

## 2019-12-16 NOTE — PLAN OF CARE
ERASMO following for DC needs. ERASMO in communication with CM.    [facility] has accepted the patient for services. [facility] will see the patient on Wednesday for his next dressing change. Dr. Espinoza is agreeable to this.      12/16/19 1300   Post-Acute Status   Post-Acute Authorization Home Health/Hospice   Home Health/Hospice Status Set-up Complete     Joycelyn Hudson, LCSW [facility]  [id number]

## 2019-12-16 NOTE — TELEPHONE ENCOUNTER
----- Message from Hyun Dee sent at 12/16/2019  1:16 PM CST -----  Contact: Branden Thakur/long #[phone]  Patient still at hospital after being admitted since 12/12/19. Patient was waiting to receive wound vac machine but has it now, and waiting for a physician to come in to change bandage before being discharged. Patient is very upset regarding the amount of time that he has had to be in hospital following outpatient procedure. Please call

## 2019-12-16 NOTE — PLAN OF CARE
Ensured comfort and safety overnight. Given motrin for shingles pain with good effect.   Wound vac dressing in place, no problem , on 125mmHg continuous therapy, good seal. No drainage seen.  No fall or injury occurred overnight. Possible d/c home today once equipment for wound vac completed.

## 2019-12-16 NOTE — TELEPHONE ENCOUNTER
This call will not be returned because patient is currently admitted.  Concerns need to be directed to bedside nurse.

## 2019-12-16 NOTE — SUBJECTIVE & OBJECTIVE
Interval History: NAEON. Awaiting DME prior to discharge.    Medications:  Continuous Infusions:  Scheduled Meds:   docusate sodium  50 mg Oral Daily    ferrous sulfate  325 mg Oral Daily    finasteride  5 mg Oral Daily    gabapentin  300 mg Oral TID    hydroCHLOROthiazide  12.5 mg Oral Daily    verapamil  120 mg Oral BID     PRN Meds:bisacodyl, HYDROcodone-acetaminophen, ibuprofen, ondansetron, sodium chloride 0.9%, sodium chloride 0.9%     Review of patient's allergies indicates:  No Known Allergies  Objective:     Vital Signs (24h Range):  Temp:  [97.6 °F (36.4 °C)-98 °F (36.7 °C)] 97.9 °F (36.6 °C)  Pulse:  [54-67] 54  Resp:  [16-18] 18  SpO2:  [96 %-97 %] 97 %  BP: (117-158)/(53-65) 125/59     Date 12/16/19 0700 - 12/17/19 0659   Shift 1733-3751 8534-8909 2327-9121 24 Hour Total   INTAKE   Shift Total(mL/kg)       OUTPUT   Urine(mL/kg/hr) 200   200   Shift Total(mL/kg) 200(2.5)   200(2.5)   Weight (kg) 80.3 80.3 80.3 80.3     Lines/Drains/Airways     None                 Physical Exam    NAD. Well developed. Well nourished.  Awake and alert  Head AT/NC, wound vac in place holding suction  Auricles WNL AU  Nose w/ normal external appearance  MMM, anterior tongue mobile, FOM soft, OP patent w/ midline uvula  Neck soft, not TTP, normal ROM, no LAD  Normal WOB, no stridor or stertor    Significant Labs:  CBC: No results for input(s): WBC, RBC, HGB, HCT, PLT, MCV, MCH, MCHC in the last 168 hours.  CMP: No results for input(s): GLU, CALCIUM, ALBUMIN, PROT, NA, K, CO2, CL, BUN, CREATININE, ALKPHOS, ALT, AST, BILITOT in the last 168 hours.    Significant Diagnostics:  None

## 2019-12-16 NOTE — TELEPHONE ENCOUNTER
Contacted Rosibel to notify her that wound care clinic is closed today and Nelda will return call tomorrow to schedule appt. States this will be fine.----- Message from Bria Tobias sent at 12/16/2019  3:09 PM CST -----  Contact: Rosibel   Reason: Calling to schedule appt     Communication: [phone]

## 2019-12-17 ENCOUNTER — TELEPHONE (OUTPATIENT)
Dept: WOUND CARE | Facility: CLINIC | Age: 84
End: 2019-12-17

## 2019-12-17 NOTE — TELEPHONE ENCOUNTER
----- Message from Gloria Wakefield RN sent at 12/16/2019  4:43 PM CST -----  Contact: Rosibel Lutz,    Can you call to schedule?    Gloria  ----- Message -----  From: Bria Tobias  Sent: 12/16/2019   3:09 PM CST  To: Lydia Bennett Staff    Reason: Calling to schedule appt     Communication: [phone]

## 2019-12-17 NOTE — TELEPHONE ENCOUNTER
Called and spoke with patient's daughter - offered 9am tomorrow 12/18/19 or 3pm Friday, 12/20/19.  Daughter accepted appointment date/time for Friday, 12/20/19 at 3pm.  Instructed to report to 5th floor clinic [facility] location.

## 2019-12-18 ENCOUNTER — ANTI-COAG VISIT (OUTPATIENT)
Dept: CARDIOLOGY | Facility: CLINIC | Age: 84
End: 2019-12-18
Payer: MEDICARE

## 2019-12-18 DIAGNOSIS — Z79.01 LONG TERM CURRENT USE OF ANTICOAGULANT THERAPY: ICD-10-CM

## 2019-12-18 DIAGNOSIS — I48.0 PAROXYSMAL ATRIAL FIBRILLATION: ICD-10-CM

## 2019-12-18 LAB — INR PPP: 1.2

## 2019-12-18 PROCEDURE — 93793 PR ANTICOAGULANT MGMT FOR PT TAKING WARFARIN: ICD-10-PCS | Mod: S$GLB,,,

## 2019-12-18 PROCEDURE — 93793 ANTICOAG MGMT PT WARFARIN: CPT | Mod: S$GLB,,,

## 2019-12-18 NOTE — PROGRESS NOTES
Pt was d/c from [facility] 12/14 s/p skin graft procedure 12/12. It seems he had extended stay due to needing wound vac and setting up home care for it. He reports not resuming coumadin until 12/17. We will bolus dose today then resume maintenance dose. Resume weekly INR on Mondays

## 2019-12-19 ENCOUNTER — TELEPHONE (OUTPATIENT)
Dept: OTOLARYNGOLOGY | Facility: CLINIC | Age: 84
End: 2019-12-19

## 2019-12-19 NOTE — TELEPHONE ENCOUNTER
Returned call to [place].  Informed I will request PT orders from Dr. Espinoza, and let her know tomorrow if orders done or if they are needed to be done by PCP.  Verbalized understanding.

## 2019-12-19 NOTE — TELEPHONE ENCOUNTER
----- Message from Kim Bowman sent at 12/19/2019  3:37 PM CST -----  Contact: home health nurse  [phone]-please call  home health nurse need orders for physical therapy waiting on a call back thanks.

## 2019-12-19 NOTE — DISCHARGE SUMMARY
[facility]  Otorhinolaryngology-Head & Neck Surgery  Discharge Summary      Patient Name: Ernie Phan  MRN: [id number]  Admission Date: 12/12/2019  Hospital Length of Stay: 0 days 5 days  Discharge Date and Time: 12/16/2019  2:18 PM  Attending Physician: No att. providers found    Discharging Provider: Dar Bernstein MD  Primary Care Provider: Ernie Michel MD    HPI:   Erine Phan is a 88 year old male who was admitted following repair of a Moh's defect on his parietal scalp with acell and wound vac application. The patient tolerated the procedure well. His hospital course was uneventful, but his stay was prolonged due to difficulty acquiring DME. The patient was discharged with home health nursing and wound vac supplies set up to be delivered to his house. He was scheduled close follow-up in Dr. Chacon's clinic.     Procedure(s) (LRB):  DEBRIDEMENT, WOUND  APPLICATION, WOUND VAC (N/A)      Indwelling Lines/Drains at time of discharge:   Lines/Drains/Airways     Pressure Ulcer                 Negative Pressure Wound Therapy  7 days                Pending Diagnostic Studies:     None        Final Active Diagnoses:    Diagnosis Date Noted POA    PRINCIPAL PROBLEM:  Long term current use of anticoagulant therapy [Z79.01] 07/13/2012 Not Applicable    Mohs defect [M95.9] 11/20/2019 Yes      Problems Resolved During this Admission:      Discharged Condition: stable    Disposition: Home or Self Care    Follow Up:  Follow-up Information     [facility]. Call in 1 day.    Specialty:  Wound Care  Contact information:  [address]  [facility] [id number] [phone]  Additional information:  5th Floor Clinic Courtney Espinoza MD. Schedule an appointment as soon as possible for a visit in 2 weeks.    Specialty:  Otolaryngology  Why:  POV wound check  Contact information:  [address]  [facility] [address]  [phone]                 Patient  "Instructions:      WOUND VAC FOR HOME USE   Order Comments: Skilled nurse to change VAC dressing 3 times per week as follows:  Remove old dressing;   Cleanse/irrigate with: Normal Saline or wound cleanser, use 5 mL syringe and 18 gauge Angiocath or spray bottle;   Protect periwound with: VAC drape;  Fill wound with: Black GranuFoam;  Fill tracks, tunnels, undermining with White VersaFoam: No;  VAC setting at: 125 mmHg;  Skilled nursing instruction to patient/caregiver to include: VAC safety alarms, Troubleshooting, Canister changes, Waste disposal, Patching leaks, s/s reportable to skilled nurse/MD, Customer service phone number, Alternate dressing placement, VAC Patient handbook and Universal precautions;  Alternate dressing change PRN, VAC therapy off for greater than 2 hours: Cleanse wound with Normal Saline or wound cleanser     Order Specific Question Answer Comments   Height: 5' 9" (1.753 m)    Weight: 80.3 kg (177 lb)    Does patient have medical equipment at home? walker, rolling    Length of need (1-99 months): 6    Vendor: Other (use comments)    Expected Date of Delivery: 12/13/2019      Ambulatory Referral to Wound Clinic   Referral Priority: Emergency Referral Type: Consultation   Referral Reason: Specialty Services Required   Requested Specialty: Wound Care   Number of Visits Requested: 1     Diet Adult Regular     No driving until:   Order Comments: While on narcotics     Other restrictions (specify):   Order Comments: Sponge bath while wound vac is in place  Home health will change dressing every 3-4 days     Notify your health care provider if you experience any of the following:  temperature >100.4     Notify your health care provider if you experience any of the following:  persistent nausea and vomiting or diarrhea     Notify your health care provider if you experience any of the following:  severe uncontrolled pain     Notify your health care provider if you experience any of the following:  " difficulty breathing or increased cough     Notify your health care provider if you experience any of the following:  worsening rash     Notify your health care provider if you experience any of the following:  persistent dizziness, light-headedness, or visual disturbances     Leave dressing on - Keep it clean, dry, and intact until clinic visit     Activity as tolerated     Medications:  Reconciled Home Medications:      Medication List      START taking these medications    ibuprofen 600 MG tablet  Commonly known as:  ADVIL,MOTRIN  Take 1 tablet (600 mg total) by mouth every 6 (six) hours as needed.        CHANGE how you take these medications    * HYDROcodone-acetaminophen 5-325 mg per tablet  Commonly known as:  NORCO  Take 1 tablet by mouth every 6 (six) hours as needed for Pain.  What changed:  Another medication with the same name was added. Make sure you understand how and when to take each.     * HYDROcodone-acetaminophen 5-325 mg per tablet  Commonly known as:  NORCO  Take 1 tablet by mouth every 4 (four) hours as needed for Pain.  What changed:  You were already taking a medication with the same name, and this prescription was added. Make sure you understand how and when to take each.         * This list has 2 medication(s) that are the same as other medications prescribed for you. Read the directions carefully, and ask your doctor or other care provider to review them with you.            CONTINUE taking these medications    azaTHIOprine 50 mg Tab  Commonly known as:  IMURAN  Take 4 tablets (200 mg total) by mouth once daily.     EFFERVESCENT DENTURE CLEANSR DENT     finasteride 5 mg tablet  Commonly known as:  PROSCAR  Take 1 tablet (5 mg total) by mouth once daily.     Fluzone High-Dose 2018-19 (PF) 180 mcg/0.5 mL vaccine  Generic drug:  influenza     Fluzone High-Dose 2019-20 (PF) 180 mcg/0.5 mL Syrg  Generic drug:  flu vacc [id number](65yr up)PF  admin as directed     gabapentin 300 MG  capsule  Commonly known as:  NEURONTIN  Take 1 capsule (300 mg total) by mouth 3 (three) times daily.     hydroCHLOROthiazide 12.5 mg capsule  Commonly known as:  MICROZIDE  TAKE 1 CAPSULE EVERY DAY     hydroxyurea 500 mg Cap  Commonly known as:  HYDREA  Take 1,000 mg on day 1, then 500 mg on days 2 and 3 and repeat..     IRON (FERROUS SULFATE) ORAL  Take 65 mg by mouth once daily.     ondansetron 4 MG tablet  Commonly known as:  ZOFRAN  Take 1 tablet (4 mg total) by mouth every 8 (eight) hours as needed for Nausea.     OneTouch Ultra Test Strp  Generic drug:  blood sugar diagnostic  TEST DAILY     OneTouch UltraSoft Lancets Misc  Generic drug:  lancets  TEST DAILY     predniSONE 5 MG tablet  Commonly known as:  DELTASONE  TAKE 1 TABLET EVERY DAY     pyridostigmine 60 mg Tab  Commonly known as:  MESTINON  Take 60 mg by mouth.     * tamsulosin 0.4 mg Cap  Commonly known as:  FLOMAX  TAKE 1 CAPSULE EVERY DAY     * tamsulosin 0.4 mg Cap  Commonly known as:  FLOMAX  Take 1 capsule (0.4 mg total) by mouth once daily.     verapamil 120 MG tablet  Commonly known as:  CALAN  Take 1 tablet (120 mg total) by mouth 2 (two) times daily.     vitamin D 1000 units Tab  Commonly known as:  VITAMIN D3  Take 185 mg by mouth once daily.         * This list has 2 medication(s) that are the same as other medications prescribed for you. Read the directions carefully, and ask your doctor or other care provider to review them with you.            ASK your doctor about these medications    warfarin 5 MG tablet  Commonly known as:  COUMADIN  TAKE 1 TABLET EVERY DAY EXCEPT TAKE 1/2 TABLET ON SUNDAY, OR AS DIRECTED BY COUMADIN CLINIC  Ask about: Which instructions should I use?          Time spent on the discharge of patient: 20 minutes    Dar Bernstein MD  Otorhinolaryngology-Head & Neck Surgery  [facility]

## 2019-12-20 ENCOUNTER — OFFICE VISIT (OUTPATIENT)
Dept: WOUND CARE | Facility: CLINIC | Age: 84
End: 2019-12-20
Payer: MEDICARE

## 2019-12-20 ENCOUNTER — PATIENT OUTREACH (OUTPATIENT)
Dept: ADMINISTRATIVE | Facility: OTHER | Age: 84
End: 2019-12-20

## 2019-12-20 VITALS
TEMPERATURE: 97 F | BODY MASS INDEX: 26.36 KG/M2 | WEIGHT: 178 LBS | SYSTOLIC BLOOD PRESSURE: 118 MMHG | DIASTOLIC BLOOD PRESSURE: 60 MMHG | HEART RATE: 70 BPM | HEIGHT: 69 IN

## 2019-12-20 DIAGNOSIS — T81.89XA DELAYED SURGICAL WOUND HEALING, INITIAL ENCOUNTER: ICD-10-CM

## 2019-12-20 PROCEDURE — 1101F PR PT FALLS ASSESS DOC 0-1 FALLS W/OUT INJ PAST YR: ICD-10-PCS | Mod: HCNC,CPTII,S$GLB, | Performed by: NURSE PRACTITIONER

## 2019-12-20 PROCEDURE — 1126F AMNT PAIN NOTED NONE PRSNT: CPT | Mod: HCNC,S$GLB,, | Performed by: NURSE PRACTITIONER

## 2019-12-20 PROCEDURE — 99204 PR OFFICE/OUTPT VISIT, NEW, LEVL IV, 45-59 MIN: ICD-10-PCS | Mod: HCNC,S$GLB,, | Performed by: NURSE PRACTITIONER

## 2019-12-20 PROCEDURE — 99999 PR PBB SHADOW E&M-EST. PATIENT-LVL V: ICD-10-PCS | Mod: PBBFAC,HCNC,, | Performed by: NURSE PRACTITIONER

## 2019-12-20 PROCEDURE — 1101F PT FALLS ASSESS-DOCD LE1/YR: CPT | Mod: HCNC,CPTII,S$GLB, | Performed by: NURSE PRACTITIONER

## 2019-12-20 PROCEDURE — 99999 PR PBB SHADOW E&M-EST. PATIENT-LVL V: CPT | Mod: PBBFAC,HCNC,, | Performed by: NURSE PRACTITIONER

## 2019-12-20 PROCEDURE — 1159F MED LIST DOCD IN RCRD: CPT | Mod: HCNC,S$GLB,, | Performed by: NURSE PRACTITIONER

## 2019-12-20 PROCEDURE — 1159F PR MEDICATION LIST DOCUMENTED IN MEDICAL RECORD: ICD-10-PCS | Mod: HCNC,S$GLB,, | Performed by: NURSE PRACTITIONER

## 2019-12-20 PROCEDURE — 99204 OFFICE O/P NEW MOD 45 MIN: CPT | Mod: HCNC,S$GLB,, | Performed by: NURSE PRACTITIONER

## 2019-12-20 PROCEDURE — 1126F PR PAIN SEVERITY QUANTIFIED, NO PAIN PRESENT: ICD-10-PCS | Mod: HCNC,S$GLB,, | Performed by: NURSE PRACTITIONER

## 2019-12-20 NOTE — PATIENT INSTRUCTIONS
Keep your dressing dry.  On the day home health is coming to change your dressing, you may remove the dressing and shampoo with baby shampoo.  Irrigate the wound with lukewarm water for 5 minutes, then dry thoroughly.  Place a dry bandage over the wound to cover it until the nurse arrives.

## 2019-12-20 NOTE — PROGRESS NOTES
Subjective:       Patient ID: Ernie Phan is a 88 y.o. male.    Chief Complaint: Wound Check    HPI   This is an 88 year old male referred for evaluation and management of a surgical wound to the scalp area.  He is s/p debridement of open head wound involving the bone measuring 5  5.5 cm and application of wound vac on 12/12/19 with Dr. Espinoza. He originally had a repair of Mohs defect with ACell on 12/5/19 but the ACell failed.  He is afebrile He denies increased redness, swelling or purulent drainage. He does not complain of pain.  Review of Systems   Constitutional: Negative for chills, diaphoresis and fever.   HENT: Positive for hearing loss. Negative for postnasal drip, rhinorrhea, sinus pressure, sneezing, sore throat, tinnitus and trouble swallowing.    Eyes: Negative for visual disturbance.   Respiratory: Negative for apnea, cough, shortness of breath and wheezing.    Cardiovascular: Negative for chest pain, palpitations and leg swelling.   Gastrointestinal: Positive for constipation. Negative for diarrhea, nausea and vomiting.   Genitourinary: Positive for frequency. Negative for difficulty urinating, dysuria and hematuria.   Musculoskeletal: Negative for arthralgias, back pain and joint swelling.   Skin: Positive for wound.   Neurological: Positive for weakness. Negative for dizziness, light-headedness and headaches.   Hematological: Bruises/bleeds easily.   Psychiatric/Behavioral: Positive for dysphoric mood. Negative for confusion, decreased concentration and sleep disturbance. The patient is not nervous/anxious.        Objective:      Physical Exam   Constitutional: He is oriented to person, place, and time. He appears well-developed and well-nourished. No distress.   HENT:   Head: Normocephalic and atraumatic.       Mouth/Throat: Oropharynx is clear and moist.   Eyes: Pupils are equal, round, and reactive to light. Conjunctivae and EOM are normal. Right eye exhibits no discharge. Left eye exhibits  no discharge. No scleral icterus.   Neck: Neck supple. No JVD present. No tracheal deviation present. No thyromegaly present.   Cardiovascular: Normal rate, regular rhythm and normal heart sounds. Exam reveals no gallop and no friction rub.   No murmur heard.  Pulmonary/Chest: Effort normal and breath sounds normal. No respiratory distress. He has no wheezes. He has no rales.   Abdominal: Soft. Bowel sounds are normal. He exhibits no distension. There is no tenderness.   Musculoskeletal: He exhibits no edema or tenderness.   Neurological: He is alert and oriented to person, place, and time.   Skin: Skin is warm and dry. No rash noted. He is not diaphoretic. No erythema.   Psychiatric: He has a normal mood and affect. His behavior is normal. Judgment and thought content normal.   Nursing note and vitals reviewed.            Assessment:       1. Delayed surgical wound healing, initial encounter               Plan:            Cleanse wound with wound cleanser.  Continue wound vac therapy.  Place black sponge in base of wound and run vac at 125 mmHg on continuous suction.  Change vac three times weekly.  [facility] notified of orders via email.  Return to clinic in one month.

## 2019-12-20 NOTE — LETTER
December 20, 2019      Leeroy Guan MD  [address]  [facility] [address]           [facility]  [address]  [facility] [address]  Phone: [phone]          Patient: Ernie Phan   MR Number: [id number]   YOB: 1931   Date of Visit: 12/20/2019       Dear Dr. Leeroy Guan:    Thank you for referring Ernie Phan to me for evaluation. Attached you will find relevant portions of my assessment and plan of care.    If you have questions, please do not hesitate to call me. I look forward to following Ernie Phan along with you.    Sincerely,    Sabrina Freeman, KIARA    Enclosure  CC:  No Recipients    If you would like to receive this communication electronically, please contact [email or web address] or [phone] to request more information on [employer] Link access.    For providers and/or their staff who would like to refer a patient to [facility], please contact us through our one-stop-shop provider referral line, [facility], at [phone].    If you feel you have received this communication in error or would no longer like to receive these types of communications, please e-mail [email or web address]

## 2019-12-23 ENCOUNTER — TELEPHONE (OUTPATIENT)
Dept: OTOLARYNGOLOGY | Facility: CLINIC | Age: 84
End: 2019-12-23

## 2019-12-23 LAB — INR PPP: 2

## 2019-12-23 NOTE — TELEPHONE ENCOUNTER
----- Message from Jason Espinoza MD sent at 12/23/2019  7:42 AM CST -----  Contact: home health nurse  Probably PCP.    Jason  ----- Message -----  From: Kalyani Patel RN  Sent: 12/19/2019   4:51 PM CST  To: Jason Espinoza MD    Can you place orders for PT due to deconditioning from hospital stay or should this come from his PCP?   Thanks,  Nusrat    ----- Message -----  From: Kim Bowman  Sent: 12/19/2019   3:37 PM CST  To: Olga Gomez Staff    [phone]-please call [email or web address] home health nurse need orders for physical therapy waiting on a call back thanks.

## 2019-12-24 ENCOUNTER — ANTI-COAG VISIT (OUTPATIENT)
Dept: CARDIOLOGY | Facility: CLINIC | Age: 84
End: 2019-12-24
Payer: MEDICARE

## 2019-12-24 DIAGNOSIS — Z79.01 LONG TERM CURRENT USE OF ANTICOAGULANT THERAPY: ICD-10-CM

## 2019-12-24 DIAGNOSIS — I48.0 PAROXYSMAL ATRIAL FIBRILLATION: ICD-10-CM

## 2019-12-24 PROCEDURE — 93793 ANTICOAG MGMT PT WARFARIN: CPT | Mod: S$GLB,,,

## 2019-12-24 PROCEDURE — 93793 PR ANTICOAGULANT MGMT FOR PT TAKING WARFARIN: ICD-10-PCS | Mod: S$GLB,,,

## 2019-12-27 ENCOUNTER — OFFICE VISIT (OUTPATIENT)
Dept: OTOLARYNGOLOGY | Facility: CLINIC | Age: 84
End: 2019-12-27
Payer: MEDICARE

## 2019-12-27 VITALS
BODY MASS INDEX: 26.29 KG/M2 | TEMPERATURE: 98 F | DIASTOLIC BLOOD PRESSURE: 56 MMHG | SYSTOLIC BLOOD PRESSURE: 103 MMHG | WEIGHT: 178 LBS | HEART RATE: 60 BPM

## 2019-12-27 DIAGNOSIS — T81.89XD DELAYED SURGICAL WOUND HEALING, SUBSEQUENT ENCOUNTER: ICD-10-CM

## 2019-12-27 DIAGNOSIS — L98.8 MOHS DEFECT: Primary | ICD-10-CM

## 2019-12-27 DIAGNOSIS — Z79.60 LONG-TERM USE OF IMMUNOSUPPRESSANT MEDICATION: ICD-10-CM

## 2019-12-27 DIAGNOSIS — Z98.890 MOHS DEFECT: Primary | ICD-10-CM

## 2019-12-27 PROCEDURE — 99024 PR POST-OP FOLLOW-UP VISIT: ICD-10-PCS | Mod: HCNC,S$GLB,, | Performed by: OTOLARYNGOLOGY

## 2019-12-27 PROCEDURE — 99999 PR PBB SHADOW E&M-EST. PATIENT-LVL III: ICD-10-PCS | Mod: PBBFAC,HCNC,, | Performed by: OTOLARYNGOLOGY

## 2019-12-27 PROCEDURE — 99999 PR PBB SHADOW E&M-EST. PATIENT-LVL III: CPT | Mod: PBBFAC,HCNC,, | Performed by: OTOLARYNGOLOGY

## 2019-12-27 PROCEDURE — 99024 POSTOP FOLLOW-UP VISIT: CPT | Mod: HCNC,S$GLB,, | Performed by: OTOLARYNGOLOGY

## 2019-12-27 NOTE — PROGRESS NOTES
HEAD AND NECK SURGICAL ONCOLOGY CLINIC NOTE    CC: F/U head    TREATMENT HISTORY:  1. ACell and bolster 11/20/2019  2. Debridement/Wound Vac 12/12/2019    INTERVAL HISTORY:Ernie Phan returns to the Head and Neck Surgical Oncology Clinic for follow-up of head wound. No complaints today.Denies dysphagia, odynophagia, throat pain and otalgia.Voice is stable. Does not experience dry mouth. Denies fevers, chills, and nightsweats. There has not been any redness or drainage from the wound. He is in the wound care clinic, most recent photos show no appreciable granulation tissue developing in or around wound bed. He and his daughter remain very frustrated by their inpatient stay, and this is understandable. They have voiced their concerns to the Patient Relations Department.     Exam:  Vac in place at 125mm Hg  No surrounding erythema    A/P: We had a long discussion about his state, and that the wound care team will be largely running the show for the forseeable future until the wound is ready for grafting. I emphasized this could be an extremely lengthy process, as healing is impaired due to his myeloproliferative disorder. The other surgical option to close the wound would be via free tissue transfer, requiring tracheostomy, likely ALT donor site. He does not wish to undergo this at this time. He also has many questions regarding generalized deconditioning and the possibility of SNF stay, as well as loose bowel movements. I will have to defer to his PCP regarding these issues.  
I have reviewed and confirmed nurses' notes for patient's medications, allergies, medical history, and surgical history.

## 2019-12-30 ENCOUNTER — ANTI-COAG VISIT (OUTPATIENT)
Dept: CARDIOLOGY | Facility: CLINIC | Age: 84
End: 2019-12-30
Payer: MEDICARE

## 2019-12-30 DIAGNOSIS — Z79.01 LONG TERM CURRENT USE OF ANTICOAGULANT THERAPY: ICD-10-CM

## 2019-12-30 DIAGNOSIS — I48.0 PAROXYSMAL ATRIAL FIBRILLATION: ICD-10-CM

## 2019-12-30 LAB — INR PPP: 2.3

## 2019-12-30 PROCEDURE — 93793 ANTICOAG MGMT PT WARFARIN: CPT | Mod: S$GLB,,,

## 2019-12-30 PROCEDURE — 93793 PR ANTICOAGULANT MGMT FOR PT TAKING WARFARIN: ICD-10-PCS | Mod: S$GLB,,,

## 2020-01-06 ENCOUNTER — ANTI-COAG VISIT (OUTPATIENT)
Dept: CARDIOLOGY | Facility: CLINIC | Age: 85
End: 2020-01-06
Payer: MEDICARE

## 2020-01-06 DIAGNOSIS — Z79.01 LONG TERM CURRENT USE OF ANTICOAGULANT THERAPY: ICD-10-CM

## 2020-01-06 DIAGNOSIS — I48.0 PAROXYSMAL ATRIAL FIBRILLATION: ICD-10-CM

## 2020-01-06 LAB — INR PPP: 2.7

## 2020-01-06 PROCEDURE — 93793 ANTICOAG MGMT PT WARFARIN: CPT | Mod: S$GLB,,,

## 2020-01-06 PROCEDURE — 93793 PR ANTICOAGULANT MGMT FOR PT TAKING WARFARIN: ICD-10-PCS | Mod: S$GLB,,,

## 2020-01-10 ENCOUNTER — PATIENT OUTREACH (OUTPATIENT)
Dept: ADMINISTRATIVE | Facility: HOSPITAL | Age: 85
End: 2020-01-10

## 2020-01-13 ENCOUNTER — ANTI-COAG VISIT (OUTPATIENT)
Dept: CARDIOLOGY | Facility: CLINIC | Age: 85
End: 2020-01-13
Payer: MEDICARE

## 2020-01-13 DIAGNOSIS — G70.00 MYASTHENIA GRAVIS: ICD-10-CM

## 2020-01-13 DIAGNOSIS — I48.0 PAROXYSMAL ATRIAL FIBRILLATION: ICD-10-CM

## 2020-01-13 DIAGNOSIS — Z79.01 LONG TERM CURRENT USE OF ANTICOAGULANT THERAPY: ICD-10-CM

## 2020-01-13 LAB — INR PPP: 4.4

## 2020-01-13 PROCEDURE — 93793 PR ANTICOAGULANT MGMT FOR PT TAKING WARFARIN: ICD-10-PCS | Mod: S$GLB,,,

## 2020-01-13 PROCEDURE — 93793 ANTICOAG MGMT PT WARFARIN: CPT | Mod: S$GLB,,,

## 2020-01-13 NOTE — TELEPHONE ENCOUNTER
----- Message from Tameka Yanes sent at 1/13/2020  1:08 PM CST -----  Contact: self [phone]  Type: Rx    Name of medication(s): azaTHIOprine (IMURAN) 50 mg Tab    Is this a refill? New rx? refill    Who prescribed medication? Hospitalist    Pharmacy Name, Phone, & Location: [facility]    Comments:pt is requesting above refill    Thank you

## 2020-01-13 NOTE — PROGRESS NOTES
Yolanda with [facility] called in a verbal result dated today 1/13/20 as: INR -4.4, hard copy to be faxed

## 2020-01-14 ENCOUNTER — TELEPHONE (OUTPATIENT)
Dept: WOUND CARE | Facility: CLINIC | Age: 85
End: 2020-01-14

## 2020-01-14 RX ORDER — AZATHIOPRINE 50 MG/1
200 TABLET ORAL DAILY
Qty: 360 TABLET | Refills: 3 | Status: SHIPPED | OUTPATIENT
Start: 2020-01-14

## 2020-01-14 NOTE — TELEPHONE ENCOUNTER
Returned call and spoke with Rosibel and advised the earliest I can get the patient in would be Wednesday, 1/22/2020 at 1040am.  Rosibel accepted the appointment date/time - appointment slip mailed out .

## 2020-01-14 NOTE — TELEPHONE ENCOUNTER
----- Message from Aziza Kirkland sent at 1/13/2020 12:51 PM CST -----  Contact: Rosibel Asencio [phone]  Rosibel Asencio called back she states the home health nurse sent over a message to say the pt has some blister around his wound the pt needs to be seen soon as possible.please call back.

## 2020-01-16 ENCOUNTER — ANTI-COAG VISIT (OUTPATIENT)
Dept: CARDIOLOGY | Facility: CLINIC | Age: 85
End: 2020-01-16
Payer: MEDICARE

## 2020-01-16 DIAGNOSIS — Z79.01 LONG TERM CURRENT USE OF ANTICOAGULANT THERAPY: ICD-10-CM

## 2020-01-16 DIAGNOSIS — I48.0 PAROXYSMAL ATRIAL FIBRILLATION: ICD-10-CM

## 2020-01-16 LAB — INR PPP: 2.3

## 2020-01-16 PROCEDURE — 93793 ANTICOAG MGMT PT WARFARIN: CPT | Mod: S$GLB,,, | Performed by: PHARMACIST

## 2020-01-16 PROCEDURE — 93793 PR ANTICOAGULANT MGMT FOR PT TAKING WARFARIN: ICD-10-PCS | Mod: S$GLB,,, | Performed by: PHARMACIST

## 2020-01-20 ENCOUNTER — ANTI-COAG VISIT (OUTPATIENT)
Dept: CARDIOLOGY | Facility: CLINIC | Age: 85
End: 2020-01-20
Payer: MEDICARE

## 2020-01-20 DIAGNOSIS — I48.0 PAROXYSMAL ATRIAL FIBRILLATION: ICD-10-CM

## 2020-01-20 DIAGNOSIS — Z79.01 LONG TERM CURRENT USE OF ANTICOAGULANT THERAPY: ICD-10-CM

## 2020-01-20 LAB — INR PPP: 2.3

## 2020-01-20 PROCEDURE — 93793 PR ANTICOAGULANT MGMT FOR PT TAKING WARFARIN: ICD-10-PCS | Mod: S$GLB,,,

## 2020-01-20 PROCEDURE — 93793 ANTICOAG MGMT PT WARFARIN: CPT | Mod: S$GLB,,,

## 2020-01-20 NOTE — PROGRESS NOTES
Patient called in a verbal result dated today 1/20/20 as: INR -2.3, states he tried calling result in to [facility] but states got a message that office is closed today, asked Patient to continue calling result in to [facility] today or tomorrow so they will have a record from this week and so a hard copy can be faxed to coumadin clinic

## 2020-01-22 ENCOUNTER — OFFICE VISIT (OUTPATIENT)
Dept: WOUND CARE | Facility: CLINIC | Age: 85
End: 2020-01-22
Payer: MEDICARE

## 2020-01-22 ENCOUNTER — TELEPHONE (OUTPATIENT)
Dept: ELECTROPHYSIOLOGY | Facility: CLINIC | Age: 85
End: 2020-01-22

## 2020-01-22 ENCOUNTER — PATIENT OUTREACH (OUTPATIENT)
Dept: ADMINISTRATIVE | Facility: OTHER | Age: 85
End: 2020-01-22

## 2020-01-22 VITALS
WEIGHT: 184 LBS | HEART RATE: 54 BPM | HEIGHT: 69 IN | DIASTOLIC BLOOD PRESSURE: 55 MMHG | BODY MASS INDEX: 27.25 KG/M2 | SYSTOLIC BLOOD PRESSURE: 112 MMHG | TEMPERATURE: 98 F

## 2020-01-22 DIAGNOSIS — I10 ESSENTIAL HYPERTENSION: ICD-10-CM

## 2020-01-22 DIAGNOSIS — T81.89XA DELAYED SURGICAL WOUND HEALING, INITIAL ENCOUNTER: Primary | ICD-10-CM

## 2020-01-22 DIAGNOSIS — I49.8 OTHER SPECIFIED CARDIAC ARRHYTHMIAS: Primary | ICD-10-CM

## 2020-01-22 DIAGNOSIS — I48.0 PAROXYSMAL ATRIAL FIBRILLATION: Primary | ICD-10-CM

## 2020-01-22 PROCEDURE — 1126F PR PAIN SEVERITY QUANTIFIED, NO PAIN PRESENT: ICD-10-PCS | Mod: HCNC,S$GLB,, | Performed by: NURSE PRACTITIONER

## 2020-01-22 PROCEDURE — 1126F AMNT PAIN NOTED NONE PRSNT: CPT | Mod: HCNC,S$GLB,, | Performed by: NURSE PRACTITIONER

## 2020-01-22 PROCEDURE — 1159F PR MEDICATION LIST DOCUMENTED IN MEDICAL RECORD: ICD-10-PCS | Mod: HCNC,S$GLB,, | Performed by: NURSE PRACTITIONER

## 2020-01-22 PROCEDURE — 99213 PR OFFICE/OUTPT VISIT, EST, LEVL III, 20-29 MIN: ICD-10-PCS | Mod: 25,HCNC,S$GLB, | Performed by: NURSE PRACTITIONER

## 2020-01-22 PROCEDURE — 1101F PR PT FALLS ASSESS DOC 0-1 FALLS W/OUT INJ PAST YR: ICD-10-PCS | Mod: HCNC,CPTII,S$GLB, | Performed by: NURSE PRACTITIONER

## 2020-01-22 PROCEDURE — 99999 PR PBB SHADOW E&M-EST. PATIENT-LVL V: CPT | Mod: PBBFAC,HCNC,, | Performed by: NURSE PRACTITIONER

## 2020-01-22 PROCEDURE — 97605 PR NEG PRESS WOUND THERAPY (NPWT) W/NON-DISPOSABLE WOUND VAC DEVICE (DME), <=50 CM: ICD-10-PCS | Mod: HCNC,S$GLB,, | Performed by: NURSE PRACTITIONER

## 2020-01-22 PROCEDURE — 99213 OFFICE O/P EST LOW 20 MIN: CPT | Mod: 25,HCNC,S$GLB, | Performed by: NURSE PRACTITIONER

## 2020-01-22 PROCEDURE — 99999 PR PBB SHADOW E&M-EST. PATIENT-LVL V: ICD-10-PCS | Mod: PBBFAC,HCNC,, | Performed by: NURSE PRACTITIONER

## 2020-01-22 PROCEDURE — 1159F MED LIST DOCD IN RCRD: CPT | Mod: HCNC,S$GLB,, | Performed by: NURSE PRACTITIONER

## 2020-01-22 PROCEDURE — 1101F PT FALLS ASSESS-DOCD LE1/YR: CPT | Mod: HCNC,CPTII,S$GLB, | Performed by: NURSE PRACTITIONER

## 2020-01-22 PROCEDURE — 97605 NEG PRS WND THER DME<=50SQCM: CPT | Mod: HCNC,S$GLB,, | Performed by: NURSE PRACTITIONER

## 2020-01-22 NOTE — LETTER
January 22, 2020      Leeroy Guan MD  [address]  [facility] [address]           [facility]  [address]  [facility] [address]  Phone: [phone]          Patient: Ernie Phan   MR Number: [id number]   YOB: 1931   Date of Visit: 1/22/2020       Dear Dr. Leeroy Guan:    Thank you for referring Ernie Phan to me for evaluation. Attached you will find relevant portions of my assessment and plan of care.    If you have questions, please do not hesitate to call me. I look forward to following Ernie Phan along with you.    Sincerely,    Sabrina Freeman, KIARA    Enclosure  CC:  No Recipients    If you would like to receive this communication electronically, please contact [email or web address] or [phone] to request more information on [employer] Link access.    For providers and/or their staff who would like to refer a patient to [facility], please contact us through our one-stop-shop provider referral line, [facility], at [phone].    If you feel you have received this communication in error or would no longer like to receive these types of communications, please e-mail [email or web address]

## 2020-01-22 NOTE — PROGRESS NOTES
Subjective:       Patient ID: Ernie Phan is a 88 y.o. male.    Chief Complaint: Wound Check    HPI     This is an 88 year old male referred for evaluation and management of a surgical wound to the scalp area.  He is s/p debridement of open head wound involving the bone measuring 5  5.5 cm and application of wound vac on 12/12/19 with Dr. Espinoza. He originally had a repair of Mohs defect with ACell on 12/5/19 but the ACell failed.  He now has some granulation tissue present in the base of the wound. He is afebrile He denies increased redness, swelling or purulent drainage. He does not complain of pain.  Review of Systems   Constitutional: Negative for chills, diaphoresis and fever.   HENT: Positive for hearing loss. Negative for postnasal drip, rhinorrhea, sinus pressure, sneezing, sore throat, tinnitus and trouble swallowing.    Eyes: Negative for visual disturbance.   Respiratory: Negative for apnea, cough, shortness of breath and wheezing.    Cardiovascular: Negative for chest pain, palpitations and leg swelling.   Gastrointestinal: Positive for constipation. Negative for diarrhea, nausea and vomiting.   Genitourinary: Positive for frequency. Negative for difficulty urinating, dysuria and hematuria.   Musculoskeletal: Negative for arthralgias, back pain and joint swelling.   Skin: Positive for wound.   Neurological: Positive for weakness. Negative for dizziness, light-headedness and headaches.   Hematological: Bruises/bleeds easily.   Psychiatric/Behavioral: Positive for dysphoric mood. Negative for confusion, decreased concentration and sleep disturbance. The patient is not nervous/anxious.        Objective:      Physical Exam   Constitutional: He is oriented to person, place, and time. He appears well-developed and well-nourished. No distress.   HENT:   Head: Normocephalic and atraumatic.       Musculoskeletal: He exhibits no edema or tenderness.   Neurological: He is alert and oriented to person, place,  and time.   Skin: Skin is warm and dry. No rash noted. He is not diaphoretic. No erythema.   Psychiatric: He has a normal mood and affect. His behavior is normal. Judgment and thought content normal.   Nursing note and vitals reviewed.      12/20/19 1/22/20            Assessment:       1. Delayed surgical wound healing, initial encounter               Plan:            Cleanse wound with wound cleanser.  Continue wound vac therapy.  Place black sponge in base of wound and run vac at 125 mmHg on continuous suction.  Change vac three times weekly.  [facility] notified of orders via email.  Return to clinic in one month.

## 2020-01-22 NOTE — TELEPHONE ENCOUNTER
Spoke w/ pt & r/s appts. Will mail reminder  ----- Message from Fara Moreno RN sent at 1/22/2020  3:34 PM CST -----  Contact: Self  Done  ----- Message -----  From: Thony Joseph MA  Sent: 1/22/2020   2:30 PM CST  To: Fara Moreno RN    Echo order please  ----- Message -----  From: Isabel Hays  Sent: 1/22/2020   2:14 PM CST  To: Bebeto JOHNSON Staff    Pt is calling to speak with Staff regarding rescheduling his appt next month.      He can be reached at [phone].    Thank you.

## 2020-01-22 NOTE — PATIENT INSTRUCTIONS
Keep your dressing dry.  On the day home health is coming to change your dressing, you may shampoo your scalp with baby shampoo.  Irrigate the wound with lukewarm water for 5 minutes, then dry thoroughly.  Place a dry bandage over the wound to cover it until the nurse arrives.

## 2020-01-23 ENCOUNTER — TELEPHONE (OUTPATIENT)
Dept: INTERNAL MEDICINE | Facility: CLINIC | Age: 85
End: 2020-01-23

## 2020-01-23 NOTE — TELEPHONE ENCOUNTER
Pt is standing doing exercising he has eaten before she arrived he is  asymptomatic , he is fine no dizziness , or anything she wanted to let you know his pressure was low spoke with Val his nurse 90/47, 110[facility]54 [facility]

## 2020-01-23 NOTE — TELEPHONE ENCOUNTER
----- Message from Genia Cardona sent at 1/23/2020 11:11 AM CST -----  Contact: val [facility] [phone]  Val states pt has Low B/P reading and sitting, 90/47 asymptomatic, standing B/P rise up to 110/54, please advise.

## 2020-01-23 NOTE — TELEPHONE ENCOUNTER
Spoke with Nandini([facility]), provider orders given, will monitor x 1 week, calling office with results on 1/29/20.

## 2020-01-23 NOTE — TELEPHONE ENCOUNTER
Contact the home health nurse    If he is still taking Microzide or hydrochlorothiazide 12.5 mg daily, recommend putting a hold on the medication and monitor blood pressure afterwards

## 2020-01-24 ENCOUNTER — PES CALL (OUTPATIENT)
Dept: ADMINISTRATIVE | Facility: CLINIC | Age: 85
End: 2020-01-24

## 2020-01-27 ENCOUNTER — ANTI-COAG VISIT (OUTPATIENT)
Dept: CARDIOLOGY | Facility: CLINIC | Age: 85
End: 2020-01-27
Payer: MEDICARE

## 2020-01-27 DIAGNOSIS — Z79.01 LONG TERM CURRENT USE OF ANTICOAGULANT THERAPY: ICD-10-CM

## 2020-01-27 DIAGNOSIS — I48.0 PAROXYSMAL ATRIAL FIBRILLATION: ICD-10-CM

## 2020-01-27 LAB — INR PPP: 2.3

## 2020-01-27 PROCEDURE — 93793 ANTICOAG MGMT PT WARFARIN: CPT | Mod: S$GLB,,,

## 2020-01-27 PROCEDURE — 93793 PR ANTICOAGULANT MGMT FOR PT TAKING WARFARIN: ICD-10-PCS | Mod: S$GLB,,,

## 2020-01-29 ENCOUNTER — PATIENT OUTREACH (OUTPATIENT)
Dept: ADMINISTRATIVE | Facility: OTHER | Age: 85
End: 2020-01-29

## 2020-01-31 ENCOUNTER — OFFICE VISIT (OUTPATIENT)
Dept: DERMATOLOGY | Facility: CLINIC | Age: 85
End: 2020-01-31
Payer: MEDICARE

## 2020-01-31 ENCOUNTER — TELEPHONE (OUTPATIENT)
Dept: HEMATOLOGY/ONCOLOGY | Facility: CLINIC | Age: 85
End: 2020-01-31

## 2020-01-31 ENCOUNTER — LAB VISIT (OUTPATIENT)
Dept: LAB | Facility: HOSPITAL | Age: 85
End: 2020-01-31
Attending: INTERNAL MEDICINE
Payer: MEDICARE

## 2020-01-31 DIAGNOSIS — L57.0 ACTINIC KERATOSIS: ICD-10-CM

## 2020-01-31 DIAGNOSIS — Z12.83 SCREENING EXAM FOR SKIN CANCER: ICD-10-CM

## 2020-01-31 DIAGNOSIS — L85.3 XEROSIS CUTIS: ICD-10-CM

## 2020-01-31 DIAGNOSIS — D47.3 ESSENTIAL THROMBOCYTOSIS: ICD-10-CM

## 2020-01-31 DIAGNOSIS — L24.9 IRRITANT DERMATITIS: Primary | ICD-10-CM

## 2020-01-31 DIAGNOSIS — C44.42 SQUAMOUS CELL CARCINOMA, SCALP/NECK: ICD-10-CM

## 2020-01-31 LAB
ALBUMIN SERPL BCP-MCNC: 3.4 G/DL (ref 3.5–5.2)
ALP SERPL-CCNC: 114 U/L (ref 55–135)
ALT SERPL W/O P-5'-P-CCNC: 19 U/L (ref 10–44)
ANION GAP SERPL CALC-SCNC: 11 MMOL/L (ref 8–16)
AST SERPL-CCNC: 19 U/L (ref 10–40)
BASOPHILS # BLD AUTO: 0.06 K/UL (ref 0–0.2)
BASOPHILS NFR BLD: 0.4 % (ref 0–1.9)
BILIRUB SERPL-MCNC: 1.1 MG/DL (ref 0.1–1)
BUN SERPL-MCNC: 21 MG/DL (ref 8–23)
CALCIUM SERPL-MCNC: 8.9 MG/DL (ref 8.7–10.5)
CHLORIDE SERPL-SCNC: 104 MMOL/L (ref 95–110)
CO2 SERPL-SCNC: 22 MMOL/L (ref 23–29)
CREAT SERPL-MCNC: 1.3 MG/DL (ref 0.5–1.4)
DIFFERENTIAL METHOD: ABNORMAL
EOSINOPHIL # BLD AUTO: 0.1 K/UL (ref 0–0.5)
EOSINOPHIL NFR BLD: 0.5 % (ref 0–8)
ERYTHROCYTE [DISTWIDTH] IN BLOOD BY AUTOMATED COUNT: 17.9 % (ref 11.5–14.5)
EST. GFR  (AFRICAN AMERICAN): 56.3 ML/MIN/1.73 M^2
EST. GFR  (NON AFRICAN AMERICAN): 48.7 ML/MIN/1.73 M^2
GLUCOSE SERPL-MCNC: 95 MG/DL (ref 70–110)
HCT VFR BLD AUTO: 28.5 % (ref 40–54)
HGB BLD-MCNC: 8.6 G/DL (ref 14–18)
IMM GRANULOCYTES # BLD AUTO: 0.27 K/UL (ref 0–0.04)
IMM GRANULOCYTES NFR BLD AUTO: 2 % (ref 0–0.5)
LYMPHOCYTES # BLD AUTO: 0.8 K/UL (ref 1–4.8)
LYMPHOCYTES NFR BLD: 5.7 % (ref 18–48)
MCH RBC QN AUTO: 39.6 PG (ref 27–31)
MCHC RBC AUTO-ENTMCNC: 30.2 G/DL (ref 32–36)
MCV RBC AUTO: 131 FL (ref 82–98)
MONOCYTES # BLD AUTO: 0.9 K/UL (ref 0.3–1)
MONOCYTES NFR BLD: 6.6 % (ref 4–15)
NEUTROPHILS # BLD AUTO: 11.7 K/UL (ref 1.8–7.7)
NEUTROPHILS NFR BLD: 84.8 % (ref 38–73)
NRBC BLD-RTO: 0 /100 WBC
PLATELET # BLD AUTO: 783 K/UL (ref 150–350)
PMV BLD AUTO: 9 FL (ref 9.2–12.9)
POTASSIUM SERPL-SCNC: 4.4 MMOL/L (ref 3.5–5.1)
PROT SERPL-MCNC: 6.8 G/DL (ref 6–8.4)
RBC # BLD AUTO: 2.17 M/UL (ref 4.6–6.2)
SODIUM SERPL-SCNC: 137 MMOL/L (ref 136–145)
WBC # BLD AUTO: 13.78 K/UL (ref 3.9–12.7)

## 2020-01-31 PROCEDURE — 85025 COMPLETE CBC W/AUTO DIFF WBC: CPT | Mod: HCNC

## 2020-01-31 PROCEDURE — 17003 DESTRUCTION, PREMALIGNANT LESIONS; SECOND THROUGH 14 LESIONS: ICD-10-PCS | Mod: HCNC,S$GLB,, | Performed by: DERMATOLOGY

## 2020-01-31 PROCEDURE — 99999 PR PBB SHADOW E&M-EST. PATIENT-LVL II: CPT | Mod: PBBFAC,HCNC,, | Performed by: DERMATOLOGY

## 2020-01-31 PROCEDURE — 17000 DESTRUCT PREMALG LESION: CPT | Mod: HCNC,S$GLB,, | Performed by: DERMATOLOGY

## 2020-01-31 PROCEDURE — 99999 PR PBB SHADOW E&M-EST. PATIENT-LVL II: ICD-10-PCS | Mod: PBBFAC,HCNC,, | Performed by: DERMATOLOGY

## 2020-01-31 PROCEDURE — 1159F PR MEDICATION LIST DOCUMENTED IN MEDICAL RECORD: ICD-10-PCS | Mod: HCNC,S$GLB,, | Performed by: DERMATOLOGY

## 2020-01-31 PROCEDURE — 1100F PTFALLS ASSESS-DOCD GE2>/YR: CPT | Mod: HCNC,CPTII,S$GLB, | Performed by: DERMATOLOGY

## 2020-01-31 PROCEDURE — 1159F MED LIST DOCD IN RCRD: CPT | Mod: HCNC,S$GLB,, | Performed by: DERMATOLOGY

## 2020-01-31 PROCEDURE — 17000 PR DESTRUCTION(LASER SURGERY,CRYOSURGERY,CHEMOSURGERY),PREMALIGNANT LESIONS,FIRST LESION: ICD-10-PCS | Mod: HCNC,S$GLB,, | Performed by: DERMATOLOGY

## 2020-01-31 PROCEDURE — 99214 OFFICE O/P EST MOD 30 MIN: CPT | Mod: 25,HCNC,S$GLB, | Performed by: DERMATOLOGY

## 2020-01-31 PROCEDURE — 1100F PR PT FALLS ASSESS DOC 2+ FALLS/FALL W/INJURY/YR: ICD-10-PCS | Mod: HCNC,CPTII,S$GLB, | Performed by: DERMATOLOGY

## 2020-01-31 PROCEDURE — 80053 COMPREHEN METABOLIC PANEL: CPT | Mod: HCNC

## 2020-01-31 PROCEDURE — 3288F PR FALLS RISK ASSESSMENT DOCUMENTED: ICD-10-PCS | Mod: HCNC,CPTII,S$GLB, | Performed by: DERMATOLOGY

## 2020-01-31 PROCEDURE — 17003 DESTRUCT PREMALG LES 2-14: CPT | Mod: HCNC,S$GLB,, | Performed by: DERMATOLOGY

## 2020-01-31 PROCEDURE — 99214 PR OFFICE/OUTPT VISIT, EST, LEVL IV, 30-39 MIN: ICD-10-PCS | Mod: 25,HCNC,S$GLB, | Performed by: DERMATOLOGY

## 2020-01-31 PROCEDURE — 1126F PR PAIN SEVERITY QUANTIFIED, NO PAIN PRESENT: ICD-10-PCS | Mod: HCNC,S$GLB,, | Performed by: DERMATOLOGY

## 2020-01-31 PROCEDURE — 3288F FALL RISK ASSESSMENT DOCD: CPT | Mod: HCNC,CPTII,S$GLB, | Performed by: DERMATOLOGY

## 2020-01-31 PROCEDURE — 1126F AMNT PAIN NOTED NONE PRSNT: CPT | Mod: HCNC,S$GLB,, | Performed by: DERMATOLOGY

## 2020-01-31 NOTE — Clinical Note
Hi Dr. Espinoza, I saw this patient for a skin check today after his scalp surgery for poorly diff SCC.  He does have 2 palpable neck nodes, approx 1 cm, bilaterally around the tonsils. I wasn't sure if you'd noted this on him previously and didn't see any imaging on file in the chart.    I also wondered if it is possible to have low grade reactive lymphadenopathy in the neck after a surgery such as this.  I'll be following him every 2 months.  Mary Kay

## 2020-01-31 NOTE — Clinical Note
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  Accordingly, if possible I think that his azathioprine should be changed to another steroid sparing agent for control of his myasthenia if this is an option.  Azathioprine amongst the steroid sparing agents has the highest risk of fostering development of cutaneous SCC.  Please let me know if you have any questions.  Thanks, Mayr Kay Freeman (derm)

## 2020-01-31 NOTE — TELEPHONE ENCOUNTER
Called and advised to take hydrea 500mg day 1, 1000mg day 2, 1000 mg day 3 and repeat.     Set up labs in 1 month to check again.

## 2020-01-31 NOTE — PROGRESS NOTES
Subjective:       Patient ID:  Ernie Phan is a 88 y.o. male who presents for   Chief Complaint   Patient presents with    Skin Check     tbse     Patient is a 87 yo male present for TBSE after recent diagnosis and treatment of high risk scalp SCC.  Of note, he has a pertinent PMhistory of myasthenia gravis and is currently taking 200 mg azathioprine daily in addition to pyridostigmine.    High risk SCC history:  9/30/2019 skin biopsy scalp with me, returns as + invasive SCC  11/19/19 Mohs surgery with Dr. Sheehan, cleared in 3 stages, with poorly and moderately differentiated cells seen on Mohs slides.  No perineural invasion.  Deepest stage extended to periosteum.  11/19/19 Referred to ENT Dr. Espinoza for bone burring and repair.    Pt now with wound vac in place over surgical site, dry and intact, following with wound care.    He is here today with his daughter in law.  He notes a tender spot on his left ear and left shin.    Review of Systems   Skin: Negative for daily sunscreen use, activity-related sunscreen use, recent sunburn and wears hat.   Hematologic/Lymphatic: Bruises/bleeds easily.        Objective:    Physical Exam   Constitutional: He appears well-developed and well-nourished.   Lymphadenopathy:        Head (right side): Tonsillar adenopathy present.        Head (left side): Tonsillar adenopathy present.     He has no cervical adenopathy.     He has no axillary adenopathy.   1 cm mobile tonsillar lymph nodes palpable bilaterally   Neurological: He is alert and oriented to person, place, and time.   Psychiatric: He has a normal mood and affect.   Skin:   Areas Examined (abnormalities noted in diagram):   Scalp / Hair Palpated and Inspected  Head / Face Inspection Performed  Neck Inspection Performed  Chest / Axilla Inspection Performed  Abdomen Inspection Performed  Genitals / Buttocks / Groin Inspection Performed  Back Inspection Performed  RUE Inspected  LUE Inspection Performed  RLE  Inspected  LLE Inspection Performed  Nails and Digits Inspection Performed                   Diagram Legend     Erythematous scaling macule/papule c/w actinic keratosis       Vascular papule c/w angioma      Pigmented verrucoid papule/plaque c/w seborrheic keratosis      Yellow umbilicated papule c/w sebaceous hyperplasia      Irregularly shaped tan macule c/w lentigo     1-2 mm smooth white papules consistent with Milia      Movable subcutaneous cyst with punctum c/w epidermal inclusion cyst      Subcutaneous movable cyst c/w pilar cyst      Firm pink to brown papule c/w dermatofibroma      Pedunculated fleshy papule(s) c/w skin tag(s)      Evenly pigmented macule c/w junctional nevus     Mildly variegated pigmented, slightly irregular-bordered macule c/w mildly atypical nevus      Flesh colored to evenly pigmented papule c/w intradermal nevus       Pink pearly papule/plaque c/w basal cell carcinoma      Erythematous hyperkeratotic cursted plaque c/w SCC      Surgical scar with no sign of skin cancer recurrence      Open and closed comedones      Inflammatory papules and pustules      Verrucoid papule consistent consistent with wart     Erythematous eczematous patches and plaques     Dystrophic onycholytic nail with subungual debris c/w onychomycosis     Umbilicated papule    Erythematous-base heme-crusted tan verrucoid plaque consistent with inflamed seborrheic keratosis     Erythematous Silvery Scaling Plaque c/w Psoriasis     See annotation      Assessment / Plan:        Screening exam for skin cancer  Area of previous SCC examined. Site is granulating via wound vac therapy.    Total body skin examination performed today including at least 12 points as noted in physical examination. No lesions suspicious for malignancy noted.    Squamous cell carcinoma, scalp/neck, poorly differentiated, margins clear  -still healing from surgery and sizeable defect extending to bone (periosteum removed)  -will need close  clinical f/u given risk of recurrence and metastasis  -will message ENT about lymph nodes palpated on exam today (bilateral tonsillar 1 cm) for their input  -I am also concerned about his use of azathioprine given this aggressive SCC and will message his neurologist, rheumatologist and PCP about other options for treating his myasthenia    Xerosis cutis  Good skin care regimen discussed including limiting to one bath or shower/day, using lukewarm water with mild soap and moisturizing cream to skin 1 - 2x/day. Brochure was provided and reviewed with patient.    Actinic keratosis  Sites marked  Recheck 2 mo  Cryosurgery Procedure Note    Verbal consent from the patient is obtained including, but not limited to, risk of hypopigmentation/hyperpigmentation, scar, recurrence of lesion. The patient is aware of the precancerous quality and need for treatment of these lesions. Liquid nitrogen cryosurgery is applied to the 6 actinic keratoses, as detailed in the physical exam, to produce a freeze injury. The patient is aware that blisters may form and is instructed on wound care with gentle cleansing and use of vaseline ointment to keep moist until healed. The patient is supplied a handout on cryosurgery and is instructed to call if lesions do not completely resolve.    Irritant dermatitis  Continue zinc oxide paste, improving           Follow up in about 2 months (around 3/31/2020).

## 2020-02-03 ENCOUNTER — ANTI-COAG VISIT (OUTPATIENT)
Dept: CARDIOLOGY | Facility: CLINIC | Age: 85
End: 2020-02-03
Payer: MEDICARE

## 2020-02-03 DIAGNOSIS — Z79.01 LONG TERM CURRENT USE OF ANTICOAGULANT THERAPY: ICD-10-CM

## 2020-02-03 DIAGNOSIS — I48.0 PAROXYSMAL ATRIAL FIBRILLATION: ICD-10-CM

## 2020-02-03 LAB — INR PPP: 3.5

## 2020-02-03 PROCEDURE — 93793 ANTICOAG MGMT PT WARFARIN: CPT | Mod: S$GLB,,,

## 2020-02-03 PROCEDURE — 93793 PR ANTICOAGULANT MGMT FOR PT TAKING WARFARIN: ICD-10-PCS | Mod: S$GLB,,,

## 2020-02-03 NOTE — PROGRESS NOTES
I called and scheduled the patient for a follow up , I mailed the reminder to the patienet 's home.

## 2020-02-10 ENCOUNTER — ANTI-COAG VISIT (OUTPATIENT)
Dept: CARDIOLOGY | Facility: CLINIC | Age: 85
End: 2020-02-10
Payer: MEDICARE

## 2020-02-10 DIAGNOSIS — I48.0 PAROXYSMAL ATRIAL FIBRILLATION: ICD-10-CM

## 2020-02-10 DIAGNOSIS — Z79.01 LONG TERM CURRENT USE OF ANTICOAGULANT THERAPY: ICD-10-CM

## 2020-02-10 LAB — INR PPP: 2.5

## 2020-02-10 PROCEDURE — 93793 PR ANTICOAGULANT MGMT FOR PT TAKING WARFARIN: ICD-10-PCS | Mod: S$GLB,,, | Performed by: PHARMACIST

## 2020-02-10 PROCEDURE — 93793 ANTICOAG MGMT PT WARFARIN: CPT | Mod: S$GLB,,, | Performed by: PHARMACIST

## 2020-02-13 ENCOUNTER — TELEPHONE (OUTPATIENT)
Dept: WOUND CARE | Facility: CLINIC | Age: 85
End: 2020-02-13

## 2020-02-13 ENCOUNTER — TELEPHONE (OUTPATIENT)
Dept: INTERNAL MEDICINE | Facility: CLINIC | Age: 85
End: 2020-02-13

## 2020-02-13 NOTE — TELEPHONE ENCOUNTER
According to previous note patient is not having problems receiving supplies----- Message from Nimisha Ji sent at 2/13/2020  4:39 PM CST -----  Contact: self   Pt contacted [facility]. [facility] pt relations reached out to the house supervisor at [facility] about the pt wound care supplies. Please call and follow up with pt, Kim Freeman, or Home Health

## 2020-02-13 NOTE — TELEPHONE ENCOUNTER
----- Message from Verónica Cabello sent at 2/13/2020  4:33 PM CST -----  Contact: Aileen Manager from Case Management   Aileen from Case Management.  This pt made a complaint that he can not get any of his wound care supplies.  Please follow up with pt

## 2020-02-13 NOTE — TELEPHONE ENCOUNTER
Called Mr. Phan per request of message from Aileen in Case Management.  Patient advised he is not having any problem getting his supplies.  Patient states when he calls and places order supplies arrive.  Unable to return call to Aileen no contact number or last name

## 2020-02-17 ENCOUNTER — ANTI-COAG VISIT (OUTPATIENT)
Dept: CARDIOLOGY | Facility: CLINIC | Age: 85
End: 2020-02-17
Payer: MEDICARE

## 2020-02-17 ENCOUNTER — PATIENT OUTREACH (OUTPATIENT)
Dept: ADMINISTRATIVE | Facility: OTHER | Age: 85
End: 2020-02-17

## 2020-02-17 DIAGNOSIS — Z79.01 LONG TERM CURRENT USE OF ANTICOAGULANT THERAPY: ICD-10-CM

## 2020-02-17 DIAGNOSIS — I48.0 PAROXYSMAL ATRIAL FIBRILLATION: ICD-10-CM

## 2020-02-17 LAB — INR PPP: 2.1

## 2020-02-17 PROCEDURE — 93793 PR ANTICOAGULANT MGMT FOR PT TAKING WARFARIN: ICD-10-PCS | Mod: S$GLB,,, | Performed by: PHARMACIST

## 2020-02-17 PROCEDURE — 93793 ANTICOAG MGMT PT WARFARIN: CPT | Mod: S$GLB,,, | Performed by: PHARMACIST

## 2020-02-18 NOTE — PROGRESS NOTES
Mr. Phan is a patient of Dr. Tate and was last seen in clinic 8/15/2018.      Subjective:   Patient ID:  Ernie Phan is a 88 y.o. male who presents for follow-up of Atrial Fibrillation  .     HPI:    Mr. Phan is an 88 y.o. male with AF (rate control), HTN, DM,CKD, MG here for annual follow up.     Background:    PAF, asx, stable, rate-control strategy  Myasthenia gravis, stable  HTN, on meds uncontrolled      Since his last office visit, Mr. Phan reports feeling well overall with occasional fatigue per baseline. He denies chest pain, SOB/MCMILLAN, dizziness, palpitations, or syncope. He remains active with ADLs which he is able to complete without difficulty. Last year, we changed BB to CCB, with good effect.  Mr. Phan is doing well from a rhythm perspective; rate-controlled on verapamil and on OAC. (no e/o bleeding disorder)  For his HTN - we will add small dose HCTZ 12.5 mg qd. F/u with PCP for BP review in 1-2 mt.      Update (2020):    Today he says he has been feeling well. BPs had been trending low and he was taken off his HCTZ. Now coming back up nicely. He has a wound vac on his head 2 months after Mohs surgery. Mr. Phan denies chest pain with exertion or at rest, palpitations, SOB, MCMILLAN, dizziness, or syncope. His wife  about 7 months ago.    He is currently taking coumadin for stroke prophylaxis and denies significant bleeding episodes. He is currently being treated with verapamil 120mg BID for HR control.  Kidney function is stable, with a creatinine of 1.3 on 2020.    I have personally reviewed the patient's EKG today, which shows AF at 62bpm. QRS is 82. QTc is 446.    Recent Cardiac Tests:    2D Echo (2019):  · Normal left ventricular systolic function. The estimated ejection fraction is 65%  · No wall motion abnormalities.  · Normal right ventricular systolic function.  · Severe biatrial enlargement.  · Aortic valve area is 1.62 cm2; peak velocity is 2.51 m/s; mean gradient  is 14 mmHg.  · Mild aortic valve stenosis.  · Mild aortic regurgitation.  · Mild mitral regurgitation.  · Mild to moderate tricuspid regurgitation.  · The estimated PA systolic pressure is 63 mm Hg  · Pulmonary hypertension present.  · Normal central venous pressure (3 mm Hg).  · Atrial fibrillation observed.    Current Outpatient Medications   Medication Sig    azaTHIOprine (IMURAN) 50 mg Tab Take 4 tablets (200 mg total) by mouth once daily.    DENTURE CLEANSER (EFFERVESCENT DENTURE CLEANSR DENT)     finasteride (PROSCAR) 5 mg tablet Take 1 tablet (5 mg total) by mouth once daily.    flu vacc [id number],65yr up,PF (FLUZONE HIGH-DOSE 2019-20, PF,) 180 mcg/0.5 mL Syrg admin as directed    FLUZONE HIGH-DOSE 2018-19, PF, 180 mcg/0.5 mL vaccine     gabapentin (NEURONTIN) 300 MG capsule Take 1 capsule (300 mg total) by mouth 3 (three) times daily.    hydroCHLOROthiazide (MICROZIDE) 12.5 mg capsule TAKE 1 CAPSULE EVERY DAY    HYDROcodone-acetaminophen (NORCO) 5-325 mg per tablet Take 1 tablet by mouth every 6 (six) hours as needed for Pain.    HYDROcodone-acetaminophen (NORCO) 5-325 mg per tablet Take 1 tablet by mouth every 4 (four) hours as needed for Pain. (Patient not taking: Reported on 1/22/2020)    hydroxyurea (HYDREA) 500 mg Cap Take 1,000 mg on day 1, then 500 mg on days 2 and 3 and repeat..    ibuprofen (ADVIL,MOTRIN) 600 MG tablet Take 1 tablet (600 mg total) by mouth every 6 (six) hours as needed.    IRON, FERROUS SULFATE, ORAL Take 65 mg by mouth once daily.    ondansetron (ZOFRAN) 4 MG tablet Take 1 tablet (4 mg total) by mouth every 8 (eight) hours as needed for Nausea.    ONE TOUCH ULTRA TEST Strp TEST DAILY (Patient not taking: Reported on 1/22/2020)    ONE TOUCH ULTRASOFT LANCETS lancets TEST DAILY (Patient not taking: Reported on 1/22/2020)    predniSONE (DELTASONE) 5 MG tablet TAKE 1 TABLET EVERY DAY    pyridostigmine (MESTINON) 60 mg Tab Take 60 mg by mouth.    tamsulosin (FLOMAX)  "0.4 mg Cap TAKE 1 CAPSULE EVERY DAY    tamsulosin (FLOMAX) 0.4 mg Cap Take 1 capsule (0.4 mg total) by mouth once daily.    verapamil (CALAN) 120 MG tablet Take 1 tablet (120 mg total) by mouth 2 (two) times daily.    vitamin D 1000 units Tab Take 185 mg by mouth once daily.    warfarin (COUMADIN) 5 MG tablet TAKE 1 TABLET EVERY DAY EXCEPT TAKE 1/2 TABLET ON SUNDAY, OR AS DIRECTED BY COUMADIN CLINIC     No current facility-administered medications for this visit.      Review of Systems   Constitution: Negative for malaise/fatigue.   Cardiovascular: Negative for chest pain, dyspnea on exertion, irregular heartbeat, leg swelling and palpitations.   Respiratory: Negative for shortness of breath.    Hematologic/Lymphatic: Negative for bleeding problem.   Skin: Negative for rash.   Musculoskeletal: Negative for myalgias.   Gastrointestinal: Negative for hematemesis, hematochezia and nausea.   Genitourinary: Negative for hematuria.   Neurological: Negative for light-headedness.   Psychiatric/Behavioral: Negative for altered mental status.   Allergic/Immunologic: Negative for persistent infections.     Objective:        /60   Pulse 63   Ht 5' 9" (1.753 m)   Wt 83.5 kg (184 lb)   BMI 27.17 kg/m²     Physical Exam   Constitutional: He is oriented to person, place, and time. He appears well-developed and well-nourished.   HENT:   Head: Normocephalic.   Nose: Nose normal.   Eyes: Pupils are equal, round, and reactive to light.   Cardiovascular: Normal rate, S1 normal and S2 normal. An irregularly irregular rhythm present.   No murmur heard.  Pulses:       Radial pulses are 2+ on the right side, and 2+ on the left side.   Pulmonary/Chest: Breath sounds normal. No respiratory distress.   Abdominal: Normal appearance.   Musculoskeletal: Normal range of motion. He exhibits no edema.   Neurological: He is alert and oriented to person, place, and time.   Skin: Skin is warm and dry. No erythema.   Wound vac to head "   Psychiatric: He has a normal mood and affect. His speech is normal and behavior is normal.   Nursing note and vitals reviewed.    Lab Results   Component Value Date     01/31/2020    K 4.4 01/31/2020    MG 1.8 04/03/2019    BUN 21 01/31/2020    CREATININE 1.3 01/31/2020    ALT 19 01/31/2020    AST 19 01/31/2020    HGB 8.6 (L) 01/31/2020    HCT 28.5 (L) 01/31/2020    TSH 1.790 01/24/2019    LDLCALC 49.0 (L) 01/24/2019       Recent Labs   Lab 01/27/20 02/03/20 02/10/20 02/17/20   INR 2.3 3.5 2.5 2.1       Assessment:     1. Paroxysmal atrial fibrillation    2. Essential hypertension    3. Long term current use of anticoagulant therapy      Plan:     In summary, Mr. Phan is an 88 y.o. male with AF (rate control), HTN, DM,CKD, MG here for annual follow up.   He is doing well from a rhythm standpoint. Denies new cardiac symptoms. He reports some mild foot edema after stopping his HCTZ. I suggested a Holter. If rates are on the lower side, can reduce verapamil and add back in HCTZ. Patient would like to wait on this for now since he is wearing a wound vac and symptoms are stable. Echo 12/2019 shows normal LV function. He remains on coumadin for CVA prophylaxis.    Continue current medications.  RTC 1 yr with echo, sooner if needed.     *A copy of this note has been sent to Dr. Tate*    Follow up in about 1 year (around 2/19/2021).    ------------------------------------------------------------------    OCHOA Alexander, NP-C  Cardiac Electrophysiology

## 2020-02-19 ENCOUNTER — OFFICE VISIT (OUTPATIENT)
Dept: ELECTROPHYSIOLOGY | Facility: CLINIC | Age: 85
End: 2020-02-19
Payer: MEDICARE

## 2020-02-19 ENCOUNTER — OFFICE VISIT (OUTPATIENT)
Dept: WOUND CARE | Facility: CLINIC | Age: 85
End: 2020-02-19
Payer: MEDICARE

## 2020-02-19 ENCOUNTER — TELEPHONE (OUTPATIENT)
Dept: WOUND CARE | Facility: CLINIC | Age: 85
End: 2020-02-19

## 2020-02-19 ENCOUNTER — HOSPITAL ENCOUNTER (OUTPATIENT)
Dept: CARDIOLOGY | Facility: CLINIC | Age: 85
Discharge: HOME | End: 2020-02-19
Payer: MEDICARE

## 2020-02-19 VITALS
WEIGHT: 195.56 LBS | BODY MASS INDEX: 28.96 KG/M2 | SYSTOLIC BLOOD PRESSURE: 116 MMHG | HEART RATE: 59 BPM | DIASTOLIC BLOOD PRESSURE: 55 MMHG | HEIGHT: 69 IN | TEMPERATURE: 98 F

## 2020-02-19 VITALS
BODY MASS INDEX: 27.25 KG/M2 | SYSTOLIC BLOOD PRESSURE: 128 MMHG | DIASTOLIC BLOOD PRESSURE: 60 MMHG | HEART RATE: 63 BPM | HEIGHT: 69 IN | WEIGHT: 184 LBS

## 2020-02-19 DIAGNOSIS — I48.0 PAROXYSMAL ATRIAL FIBRILLATION: Primary | ICD-10-CM

## 2020-02-19 DIAGNOSIS — I10 ESSENTIAL HYPERTENSION: ICD-10-CM

## 2020-02-19 DIAGNOSIS — Z79.01 LONG TERM CURRENT USE OF ANTICOAGULANT THERAPY: ICD-10-CM

## 2020-02-19 DIAGNOSIS — T81.89XA DELAYED SURGICAL WOUND HEALING, INITIAL ENCOUNTER: Primary | ICD-10-CM

## 2020-02-19 DIAGNOSIS — I49.8 OTHER SPECIFIED CARDIAC ARRHYTHMIAS: ICD-10-CM

## 2020-02-19 PROCEDURE — 99999 PR PBB SHADOW E&M-EST. PATIENT-LVL III: ICD-10-PCS | Mod: PBBFAC,HCNC,, | Performed by: NURSE PRACTITIONER

## 2020-02-19 PROCEDURE — 1159F PR MEDICATION LIST DOCUMENTED IN MEDICAL RECORD: ICD-10-PCS | Mod: HCNC,S$GLB,, | Performed by: NURSE PRACTITIONER

## 2020-02-19 PROCEDURE — 99499 RISK ADDL DX/OHS AUDIT: ICD-10-PCS | Mod: HCNC,S$GLB,, | Performed by: NURSE PRACTITIONER

## 2020-02-19 PROCEDURE — 1100F PR PT FALLS ASSESS DOC 2+ FALLS/FALL W/INJURY/YR: ICD-10-PCS | Mod: HCNC,CPTII,S$GLB, | Performed by: NURSE PRACTITIONER

## 2020-02-19 PROCEDURE — 99999 PR PBB SHADOW E&M-EST. PATIENT-LVL V: CPT | Mod: PBBFAC,HCNC,, | Performed by: NURSE PRACTITIONER

## 2020-02-19 PROCEDURE — 1159F MED LIST DOCD IN RCRD: CPT | Mod: HCNC,S$GLB,, | Performed by: NURSE PRACTITIONER

## 2020-02-19 PROCEDURE — 99214 OFFICE O/P EST MOD 30 MIN: CPT | Mod: HCNC,S$GLB,, | Performed by: NURSE PRACTITIONER

## 2020-02-19 PROCEDURE — 1126F PR PAIN SEVERITY QUANTIFIED, NO PAIN PRESENT: ICD-10-PCS | Mod: HCNC,S$GLB,, | Performed by: NURSE PRACTITIONER

## 2020-02-19 PROCEDURE — 99214 PR OFFICE/OUTPT VISIT, EST, LEVL IV, 30-39 MIN: ICD-10-PCS | Mod: HCNC,S$GLB,, | Performed by: NURSE PRACTITIONER

## 2020-02-19 PROCEDURE — 93005 RHYTHM STRIP: ICD-10-PCS | Mod: HCNC,S$GLB,, | Performed by: INTERNAL MEDICINE

## 2020-02-19 PROCEDURE — 99499 UNLISTED E&M SERVICE: CPT | Mod: HCNC,S$GLB,, | Performed by: NURSE PRACTITIONER

## 2020-02-19 PROCEDURE — 1126F AMNT PAIN NOTED NONE PRSNT: CPT | Mod: HCNC,S$GLB,, | Performed by: NURSE PRACTITIONER

## 2020-02-19 PROCEDURE — 99999 PR PBB SHADOW E&M-EST. PATIENT-LVL V: ICD-10-PCS | Mod: PBBFAC,HCNC,, | Performed by: NURSE PRACTITIONER

## 2020-02-19 PROCEDURE — 3288F FALL RISK ASSESSMENT DOCD: CPT | Mod: HCNC,CPTII,S$GLB, | Performed by: NURSE PRACTITIONER

## 2020-02-19 PROCEDURE — 1100F PTFALLS ASSESS-DOCD GE2>/YR: CPT | Mod: HCNC,CPTII,S$GLB, | Performed by: NURSE PRACTITIONER

## 2020-02-19 PROCEDURE — 99213 OFFICE O/P EST LOW 20 MIN: CPT | Mod: HCNC,S$GLB,, | Performed by: NURSE PRACTITIONER

## 2020-02-19 PROCEDURE — 99999 PR PBB SHADOW E&M-EST. PATIENT-LVL III: CPT | Mod: PBBFAC,HCNC,, | Performed by: NURSE PRACTITIONER

## 2020-02-19 PROCEDURE — 3288F PR FALLS RISK ASSESSMENT DOCUMENTED: ICD-10-PCS | Mod: HCNC,CPTII,S$GLB, | Performed by: NURSE PRACTITIONER

## 2020-02-19 PROCEDURE — 93010 ELECTROCARDIOGRAM REPORT: CPT | Mod: HCNC,S$GLB,, | Performed by: INTERNAL MEDICINE

## 2020-02-19 PROCEDURE — 93010 RHYTHM STRIP: ICD-10-PCS | Mod: HCNC,S$GLB,, | Performed by: INTERNAL MEDICINE

## 2020-02-19 PROCEDURE — 99213 PR OFFICE/OUTPT VISIT, EST, LEVL III, 20-29 MIN: ICD-10-PCS | Mod: HCNC,S$GLB,, | Performed by: NURSE PRACTITIONER

## 2020-02-19 PROCEDURE — 93005 ELECTROCARDIOGRAM TRACING: CPT | Mod: HCNC,S$GLB,, | Performed by: INTERNAL MEDICINE

## 2020-02-19 NOTE — LETTER
February 19, 2020      Ernie Michel MD  [address]  [facility] [address]           [facility]  [address]  [facility] [address]  Phone: [phone]  Fax: [phone]          Patient: Ernie Phan   MR Number: [id number]   YOB: 1931   Date of Visit: 2/19/2020       Dear Dr. Ernie Michel:    Thank you for referring Ernie Phan to me for evaluation. Attached you will find relevant portions of my assessment and plan of care.    If you have questions, please do not hesitate to call me. I look forward to following Ernie Phan along with you.    Sincerely,    Juani Robertson, KIARA    Enclosure  CC:  No Recipients    If you would like to receive this communication electronically, please contact [email or web address] or [phone] to request more information on [facility] [employer] Link access.    For providers and/or their staff who would like to refer a patient to [facility], please contact us through our one-stop-shop provider referral line, [facility], at [phone].    If you feel you have received this communication in error or would no longer like to receive these types of communications, please e-mail [email or web address]

## 2020-02-19 NOTE — PROGRESS NOTES
Subjective:       Patient ID: Ernie Phan is a 88 y.o. male.    Chief Complaint: Wound Check    HPI     This is an 88 year old male referred for evaluation and management of a surgical wound to the scalp area.  He is s/p debridement of open head wound involving the bone and application of wound vac on 12/12/19 with Dr. Espinoza. He originally had a repair of Mohs defect with ACell on 12/5/19 but the ACell failed.  He now has more granulation tissue present in the base of the wound. He is afebrile He denies increased redness, swelling or purulent drainage. He does not complain of pain.  Review of Systems   Constitutional: Negative for chills, diaphoresis and fever.   HENT: Positive for hearing loss. Negative for postnasal drip, rhinorrhea, sinus pressure, sneezing, sore throat, tinnitus and trouble swallowing.    Eyes: Negative for visual disturbance.   Respiratory: Negative for apnea, cough, shortness of breath and wheezing.    Cardiovascular: Negative for chest pain, palpitations and leg swelling.   Gastrointestinal: Positive for constipation. Negative for diarrhea, nausea and vomiting.   Genitourinary: Positive for frequency. Negative for difficulty urinating, dysuria and hematuria.   Musculoskeletal: Negative for arthralgias, back pain and joint swelling.   Skin: Positive for wound.   Neurological: Positive for weakness. Negative for dizziness, light-headedness and headaches.   Hematological: Bruises/bleeds easily.   Psychiatric/Behavioral: Positive for dysphoric mood. Negative for confusion, decreased concentration and sleep disturbance. The patient is not nervous/anxious.        Objective:      Physical Exam   Constitutional: He is oriented to person, place, and time. He appears well-developed and well-nourished. No distress.   HENT:   Head: Normocephalic and atraumatic.       Musculoskeletal: He exhibits no edema or tenderness.   Neurological: He is alert and oriented to person, place, and time.   Skin:  Skin is warm and dry. No rash noted. He is not diaphoretic. No erythema.   Psychiatric: He has a normal mood and affect. His behavior is normal. Judgment and thought content normal.   Nursing note and vitals reviewed.          1/22/20 2/19/20          Assessment:       1. Delayed surgical wound healing, initial encounter               Plan:            Cleanse wound with wound cleanser.  Continue wound vac therapy.  Place black sponge in base of wound and run vac at 125 mmHg on continuous suction.  Change vac three times weekly.  [facility] notified of orders via email.  Return to clinic in one month.

## 2020-02-19 NOTE — TELEPHONE ENCOUNTER
Returned call to Daughter-in-law and left voice message advising to bring patient for his scheduled 2pm appointment this afternoon for re-evaluation of the wound and wound dressing.  Left phone number to call if any questions to [phone]

## 2020-02-19 NOTE — TELEPHONE ENCOUNTER
----- Message from Niraj Whipple sent at 2/19/2020  8:47 AM CST -----  Contact: Pt's daughter in law Rosibel      The caller states that because the bandages for the Pt have been on back order, they won't be received until this afternoon which will be after the appt with you.  The caller states that you usually change the bandage after checking the wound.  She would like to know if the Pt can still be seen with out those new bandages?    Phone # [phone] ( the Pt is hard of hearing and the caller would like you to call her )

## 2020-02-19 NOTE — LETTER
February 19, 2020      Leeroy Guan MD  [address]  [facility] [address]           [facility]  [address]  [facility] [address]  Phone: [phone]          Patient: Ernie Phan   MR Number: [id number]   YOB: 1931   Date of Visit: 2/19/2020       Dear Dr. Leeroy Guan:    Thank you for referring Ernie Phan to me for evaluation. Attached you will find relevant portions of my assessment and plan of care.    If you have questions, please do not hesitate to call me. I look forward to following Ernie Phan along with you.    Sincerely,    Sabrina Freeman, KIARA    Enclosure  CC:  No Recipients    If you would like to receive this communication electronically, please contact [email or web address] or [phone] to request more information on [employer] Link access.    For providers and/or their staff who would like to refer a patient to [facility], please contact us through our one-stop-shop provider referral line, [facility], at [phone].    If you feel you have received this communication in error or would no longer like to receive these types of communications, please e-mail [email or web address]

## 2020-02-19 NOTE — PATIENT INSTRUCTIONS
Keep your dressing dry.  On the day home health is coming to change your dressing, you may shower with a mild soap such as Dove.  Wash your hair with baby shampoo.  Irrigate the wound with lukewarm water for 5 minutes, then dry thoroughly.  Place a dry bandage over the wound to cover it until the nurse arrives.

## 2020-02-19 NOTE — PROGRESS NOTES
Patient, Ernie Phan (MRN #[id number]), presented with a recent Estimated PA Systolic Pressure greater than 40 mmHG consistent with the definition of pulmonary hypertension (ICD10 - I27.0).    Est. PA Systolic Pressure   Date Value Ref Range Status   08/15/2018 57.17 (A)       The patient's pulmonary hypertension was monitored, evaluated, addressed and/or treated. This addendum to the medical record is made on 02/19/2020.

## 2020-02-24 ENCOUNTER — ANTI-COAG VISIT (OUTPATIENT)
Dept: CARDIOLOGY | Facility: CLINIC | Age: 85
End: 2020-02-24
Payer: MEDICARE

## 2020-02-24 DIAGNOSIS — Z79.01 LONG TERM CURRENT USE OF ANTICOAGULANT THERAPY: ICD-10-CM

## 2020-02-24 DIAGNOSIS — I48.0 PAROXYSMAL ATRIAL FIBRILLATION: ICD-10-CM

## 2020-02-24 LAB — INR PPP: 2.6

## 2020-02-24 PROCEDURE — 93793 PR ANTICOAGULANT MGMT FOR PT TAKING WARFARIN: ICD-10-PCS | Mod: S$GLB,,,

## 2020-02-24 PROCEDURE — 93793 ANTICOAG MGMT PT WARFARIN: CPT | Mod: S$GLB,,,

## 2020-03-02 ENCOUNTER — ANTI-COAG VISIT (OUTPATIENT)
Dept: CARDIOLOGY | Facility: CLINIC | Age: 85
End: 2020-03-02
Payer: MEDICARE

## 2020-03-02 DIAGNOSIS — I48.0 PAROXYSMAL ATRIAL FIBRILLATION: ICD-10-CM

## 2020-03-02 DIAGNOSIS — Z79.01 LONG TERM CURRENT USE OF ANTICOAGULANT THERAPY: ICD-10-CM

## 2020-03-02 LAB — INR PPP: 2.5

## 2020-03-02 PROCEDURE — 93793 PR ANTICOAGULANT MGMT FOR PT TAKING WARFARIN: ICD-10-PCS | Mod: S$GLB,,,

## 2020-03-02 PROCEDURE — 93793 ANTICOAG MGMT PT WARFARIN: CPT | Mod: S$GLB,,,

## 2020-03-04 ENCOUNTER — LAB VISIT (OUTPATIENT)
Dept: LAB | Facility: HOSPITAL | Age: 85
End: 2020-03-04
Attending: INTERNAL MEDICINE
Payer: MEDICARE

## 2020-03-04 DIAGNOSIS — D47.3 ESSENTIAL THROMBOCYTOSIS: ICD-10-CM

## 2020-03-04 LAB
ALBUMIN SERPL BCP-MCNC: 3.3 G/DL (ref 3.5–5.2)
ALP SERPL-CCNC: 75 U/L (ref 55–135)
ALT SERPL W/O P-5'-P-CCNC: 10 U/L (ref 10–44)
ANION GAP SERPL CALC-SCNC: 8 MMOL/L (ref 8–16)
AST SERPL-CCNC: 20 U/L (ref 10–40)
BASOPHILS # BLD AUTO: 0.07 K/UL (ref 0–0.2)
BASOPHILS NFR BLD: 0.8 % (ref 0–1.9)
BILIRUB SERPL-MCNC: 1.5 MG/DL (ref 0.1–1)
BUN SERPL-MCNC: 18 MG/DL (ref 8–23)
CALCIUM SERPL-MCNC: 8.9 MG/DL (ref 8.7–10.5)
CHLORIDE SERPL-SCNC: 103 MMOL/L (ref 95–110)
CO2 SERPL-SCNC: 24 MMOL/L (ref 23–29)
CREAT SERPL-MCNC: 1.3 MG/DL (ref 0.5–1.4)
DIFFERENTIAL METHOD: ABNORMAL
EOSINOPHIL # BLD AUTO: 0.1 K/UL (ref 0–0.5)
EOSINOPHIL NFR BLD: 0.8 % (ref 0–8)
ERYTHROCYTE [DISTWIDTH] IN BLOOD BY AUTOMATED COUNT: 19.2 % (ref 11.5–14.5)
EST. GFR  (AFRICAN AMERICAN): 56.3 ML/MIN/1.73 M^2
EST. GFR  (NON AFRICAN AMERICAN): 48.7 ML/MIN/1.73 M^2
GLUCOSE SERPL-MCNC: 68 MG/DL (ref 70–110)
HCT VFR BLD AUTO: 27.9 % (ref 40–54)
HGB BLD-MCNC: 8.3 G/DL (ref 14–18)
IMM GRANULOCYTES # BLD AUTO: 0.05 K/UL (ref 0–0.04)
IMM GRANULOCYTES NFR BLD AUTO: 0.6 % (ref 0–0.5)
LYMPHOCYTES # BLD AUTO: 0.6 K/UL (ref 1–4.8)
LYMPHOCYTES NFR BLD: 6.5 % (ref 18–48)
MCH RBC QN AUTO: 40.1 PG (ref 27–31)
MCHC RBC AUTO-ENTMCNC: 29.7 G/DL (ref 32–36)
MCV RBC AUTO: 135 FL (ref 82–98)
MONOCYTES # BLD AUTO: 0.6 K/UL (ref 0.3–1)
MONOCYTES NFR BLD: 7.3 % (ref 4–15)
NEUTROPHILS # BLD AUTO: 7.3 K/UL (ref 1.8–7.7)
NEUTROPHILS NFR BLD: 84 % (ref 38–73)
NRBC BLD-RTO: 0 /100 WBC
PLATELET # BLD AUTO: 527 K/UL (ref 150–350)
PMV BLD AUTO: 10.1 FL (ref 9.2–12.9)
POTASSIUM SERPL-SCNC: 4.2 MMOL/L (ref 3.5–5.1)
PROT SERPL-MCNC: 6.2 G/DL (ref 6–8.4)
RBC # BLD AUTO: 2.07 M/UL (ref 4.6–6.2)
SODIUM SERPL-SCNC: 135 MMOL/L (ref 136–145)
WBC # BLD AUTO: 8.63 K/UL (ref 3.9–12.7)

## 2020-03-04 PROCEDURE — 80053 COMPREHEN METABOLIC PANEL: CPT | Mod: HCNC

## 2020-03-04 PROCEDURE — 85025 COMPLETE CBC W/AUTO DIFF WBC: CPT | Mod: HCNC

## 2020-03-06 ENCOUNTER — TELEPHONE (OUTPATIENT)
Dept: INTERNAL MEDICINE | Facility: CLINIC | Age: 85
End: 2020-03-06

## 2020-03-06 NOTE — TELEPHONE ENCOUNTER
----- Message from Rosibel Pate sent at 3/6/2020  1:34 PM CST -----  Contact: Radha/[phone]  Type:Home Health(orders,updates,clarifications,etc)    Home Health Agency/Nurse:[facility]    Phone number: [phone]    Reason for call:    Comments: patient increase swelling in the bilateral vain +3 edema.

## 2020-03-06 NOTE — TELEPHONE ENCOUNTER
Spoke to  nurse she states that patient has 3+edema in legs. Lungs are clear b/p is normal. She stated that patient recently was taken off the hydrochlorothiazide patient still had a few of them and decided to take them to help with the edema, but it didn't help any.  Please advise

## 2020-03-09 ENCOUNTER — ANTI-COAG VISIT (OUTPATIENT)
Dept: CARDIOLOGY | Facility: CLINIC | Age: 85
End: 2020-03-09
Payer: MEDICARE

## 2020-03-09 DIAGNOSIS — I48.0 PAROXYSMAL ATRIAL FIBRILLATION: ICD-10-CM

## 2020-03-09 DIAGNOSIS — Z79.01 LONG TERM CURRENT USE OF ANTICOAGULANT THERAPY: ICD-10-CM

## 2020-03-09 LAB — INR PPP: 2.4

## 2020-03-09 PROCEDURE — 93793 PR ANTICOAGULANT MGMT FOR PT TAKING WARFARIN: ICD-10-PCS | Mod: S$GLB,,,

## 2020-03-09 PROCEDURE — 93793 ANTICOAG MGMT PT WARFARIN: CPT | Mod: S$GLB,,,

## 2020-03-10 ENCOUNTER — TELEPHONE (OUTPATIENT)
Dept: PODIATRY | Facility: CLINIC | Age: 85
End: 2020-03-10

## 2020-03-10 NOTE — TELEPHONE ENCOUNTER
----- Message from Cheri Yip sent at 3/10/2020  2:48 PM CDT -----  Contact: self  Pt is calling for an appt. He states its hard for him to get around he has prior appts on Friday April 27 would like to know if you have a morning appt on that day, Asking for a call back.      Contact info [phone]

## 2020-03-16 ENCOUNTER — ANTI-COAG VISIT (OUTPATIENT)
Dept: CARDIOLOGY | Facility: CLINIC | Age: 85
End: 2020-03-16
Payer: MEDICARE

## 2020-03-16 DIAGNOSIS — I48.0 PAROXYSMAL ATRIAL FIBRILLATION: ICD-10-CM

## 2020-03-16 DIAGNOSIS — Z79.01 LONG TERM CURRENT USE OF ANTICOAGULANT THERAPY: ICD-10-CM

## 2020-03-16 LAB — INR PPP: 2.6

## 2020-03-16 PROCEDURE — 93793 PR ANTICOAGULANT MGMT FOR PT TAKING WARFARIN: ICD-10-PCS | Mod: S$GLB,,,

## 2020-03-16 PROCEDURE — 93793 ANTICOAG MGMT PT WARFARIN: CPT | Mod: S$GLB,,,

## 2020-03-22 ENCOUNTER — OFFICE VISIT (OUTPATIENT)
Dept: URGENT CARE | Facility: CLINIC | Age: 85
End: 2020-03-22
Payer: MEDICARE

## 2020-03-22 VITALS
DIASTOLIC BLOOD PRESSURE: 60 MMHG | HEART RATE: 63 BPM | BODY MASS INDEX: 28.88 KG/M2 | TEMPERATURE: 100 F | HEIGHT: 69 IN | SYSTOLIC BLOOD PRESSURE: 141 MMHG | WEIGHT: 195 LBS | RESPIRATION RATE: 10 BRPM | OXYGEN SATURATION: 97 %

## 2020-03-22 DIAGNOSIS — R50.9 FEVER, UNSPECIFIED FEVER CAUSE: ICD-10-CM

## 2020-03-22 DIAGNOSIS — L03.115 CELLULITIS OF RIGHT LOWER LEG: Primary | ICD-10-CM

## 2020-03-22 DIAGNOSIS — M79.89 RIGHT LEG SWELLING: ICD-10-CM

## 2020-03-22 PROCEDURE — 99214 OFFICE O/P EST MOD 30 MIN: CPT | Mod: 25,S$GLB,, | Performed by: NURSE PRACTITIONER

## 2020-03-22 PROCEDURE — 96372 PR INJECTION,THERAP/PROPH/DIAG2ST, IM OR SUBCUT: ICD-10-PCS | Mod: S$GLB,,, | Performed by: INTERNAL MEDICINE

## 2020-03-22 PROCEDURE — 96372 THER/PROPH/DIAG INJ SC/IM: CPT | Mod: S$GLB,,, | Performed by: INTERNAL MEDICINE

## 2020-03-22 PROCEDURE — 99214 PR OFFICE/OUTPT VISIT, EST, LEVL IV, 30-39 MIN: ICD-10-PCS | Mod: 25,S$GLB,, | Performed by: NURSE PRACTITIONER

## 2020-03-22 RX ORDER — DOXYCYCLINE HYCLATE 100 MG
100 TABLET ORAL EVERY 12 HOURS
Qty: 14 TABLET | Refills: 0 | Status: SHIPPED | OUTPATIENT
Start: 2020-03-22 | End: 2020-04-08

## 2020-03-22 RX ORDER — ACETAMINOPHEN 325 MG/1
650 TABLET ORAL
Status: COMPLETED | OUTPATIENT
Start: 2020-03-22 | End: 2020-03-22

## 2020-03-22 RX ORDER — CEFTRIAXONE 1 G/1
1 INJECTION, POWDER, FOR SOLUTION INTRAMUSCULAR; INTRAVENOUS
Status: COMPLETED | OUTPATIENT
Start: 2020-03-22 | End: 2020-03-22

## 2020-03-22 RX ADMIN — ACETAMINOPHEN 650 MG: 325 TABLET ORAL at 04:03

## 2020-03-22 RX ADMIN — CEFTRIAXONE 1 G: 1 INJECTION, POWDER, FOR SOLUTION INTRAMUSCULAR; INTRAVENOUS at 04:03

## 2020-03-22 NOTE — PATIENT INSTRUCTIONS
Abscess/Cellulitis   If your condition worsens or fails to improve we recommend that you receive another evaluation at the ER immediately or contact your PCP to discuss your concerns or return here. You must understand that you've received an urgent care treatment only and that you may be released before all your medical problems are known or treated. You the patient will arrange for follouwp care as instructed.     Soak affected in warm water with epsom salts several times a day. Dilute 2 cups per gallon of water. If you cannot soak  apply warm compresses to the affected area for 20 min, 3-5 times per day and apply warm compresses frequently. If you cannot soak, use the shower head.  Use warm compresses for 20 minutes 3-5 times a day. Avoid picking or manipulating the wound. Clean the wound twice a day and put the antibiotic ointment on it. You should seek ER treatment if fever, increase pain, swelling or other signs of increasing infection.   If you were prescribed antibiotics, please take them to completion  If you are female and on BCP use additional methods to prevent pregnancy while on antibiotics and for one cycle after.   If you were given narcotics do not drive or operate heavy equipment or machinery while taking these medications.   Tylenol or ibuprofen can also be used as directed for pain unless you have an allergy to them or medical condition such as stomach ulcers, kidney or liver disease or blood thinners etc for which you should not be taking these type of medications.   Return in 48- 72 hours for recheck.       Leg Swelling in a Single Leg  Swelling of the arms, feet, ankles, and legs is called edema. It is caused by extra fluid collecting in the tissues. Because of gravity, extra fluid in the body settles to the lowest part. That is why the legs and feet are most affected. You have swelling in a single leg.  Some of the causes for swelling in only a single  leg include:  · Infection in the foot or leg  · Long-term problem with a vein not working well (venous insufficiency)  · Swollen, twisted vein in the leg (varicose veins)  · Insect bite or sting on the foot or leg  · Injury or recent surgery on the foot or leg  · Blood clot in a deep vein of the leg (deep vein thrombosis or DVT)  · Inflammation of the joints of the lower leg  Medical treatment will depend on what is causing your swelling.  Home care  Follow these guidelines when caring for yourself at home:  · Dont wear tight clothing.  · Keep your legs up while lying or sitting.  · Take any medicines as directed.  · If infection, injury, or recent surgery is the cause of your swelling, stay off your legs as much as possible until your symptoms get better.  · If you have venous insufficiency or varicose veins, dont sit or  one place for long periods of time. Take breaks and walk around every few hours. Talk with your healthcare provider about wearing support stockings to help lessen swelling during the day.  · Wear compression stockings with your doctor's approval  Follow-up care  Follow up with your healthcare provider as advised.  Call 911  Call 911 if any of these occur:  · Shortness of breath or trouble breathing  · Chest pain  · Coughing up blood  · Fainting or loss of consciousness   When to seek medical advice  Call your healthcare provider right away if any of these occur:  · Increased pain, swelling, warmth, or redness of the leg, ankle, or foot  · Fever of 100.4°F (38ºC) or higher, or as directed by your healthcare provider  · Weakness or dizziness  · Shaking chills  · Drenching sweats  Date Last Reviewed: 4/11/2016 © 2000-2017 [employer]. [address] [place] [id number]. All rights reserved. This information is not intended as a substitute for professional medical care. Always follow your healthcare professional's instructions.

## 2020-03-22 NOTE — PROGRESS NOTES
"Subjective:       Patient ID: Ernie Phan is a 89 y.o. male.    Vitals:  height is 5' 9" (1.753 m) and weight is 88.5 kg (195 lb). His tympanic temperature is 99.8 °F (37.7 °C). His blood pressure is 141/60 (abnormal) and his pulse is 63. His respiration is 10 and oxygen saturation is 97%.     Chief Complaint: Leg Swelling    Pt c/o right leg pain, draining  and swelling; x 6-7 days   Pt 's doctor call in Lasix    Pt states he was seen by a Home Health Nurse today and she encourage him to seen today at an  Urgent Care  He states nurse observed right leg swelling, local redness and warmth; states pt may need abx     Leg Pain    The incident occurred 5 to 7 days ago. The incident occurred at home. There was no injury mechanism. The pain is present in the right leg. The quality of the pain is described as burning. The pain is at a severity of 7/10. The pain is moderate. The pain has been fluctuating since onset. Associated symptoms include tingling. Pertinent negatives include no numbness. He reports no foreign bodies present. Nothing aggravates the symptoms. Treatments tried: lasix.       Constitution: Negative for chills, fatigue and fever.   HENT: Negative for congestion and sore throat.    Neck: Negative for painful lymph nodes.   Cardiovascular: Negative for chest pain and leg swelling.   Eyes: Negative for double vision and blurred vision.   Respiratory: Negative for cough and shortness of breath.    Gastrointestinal: Negative for nausea, vomiting and diarrhea.   Genitourinary: Negative for dysuria, frequency and urgency.   Musculoskeletal: Positive for pain. Negative for joint pain, joint swelling, muscle cramps and muscle ache.        Right leg   Skin: Negative for color change, pale, rash and erythema.   Allergic/Immunologic: Negative for seasonal allergies.   Neurological: Negative for dizziness, history of vertigo, light-headedness, passing out, headaches and numbness.   Hematologic/Lymphatic: Negative " for swollen lymph nodes, easy bruising/bleeding and history of blood clots. Does not bruise/bleed easily.   Psychiatric/Behavioral: Negative for nervous/anxious, sleep disturbance and depression. The patient is not nervous/anxious.        Objective:      Physical Exam   Constitutional: He is oriented to person, place, and time. He appears well-developed and well-nourished.   HENT:   Head: Normocephalic and atraumatic. Head is without abrasion, without contusion and without laceration.   Right Ear: External ear normal.   Left Ear: External ear normal.   Nose: Nose normal.   Mouth/Throat: Oropharynx is clear and moist and mucous membranes are normal.   Eyes: Pupils are equal, round, and reactive to light. Conjunctivae, EOM and lids are normal.   Neck: Trachea normal, full passive range of motion without pain and phonation normal. Neck supple.   Cardiovascular: Normal rate, regular rhythm and normal heart sounds.   Pulses:       Dorsalis pedis pulses are 2+ on the left side.        Posterior tibial pulses are 2+ on the left side.   Pulmonary/Chest: Effort normal and breath sounds normal. No stridor. No respiratory distress.   Musculoskeletal: Normal range of motion. He exhibits edema.        Feet:    Neurological: He is alert and oriented to person, place, and time. GCS eye subscore is 4. GCS verbal subscore is 5. GCS motor subscore is 6.   Skin: Skin is warm, dry, intact and rash. Capillary refill takes less than 2 seconds. abrasion, burn, bruising, erythema and ecchymosis  Psychiatric: He has a normal mood and affect. His speech is normal and behavior is normal. Judgment and thought content normal. Cognition and memory are normal.   Nursing note and vitals reviewed.                            Assessment:       1. Cellulitis of right lower leg    2. Fever, unspecified fever cause    3. Right leg swelling        Plan:     Cellulitis of right lower leg  -     cefTRIAXone injection 1 g  -     doxycycline (VIBRA-TABS) 100  MG tablet; Take 1 tablet (100 mg total) by mouth every 12 (twelve) hours.  Dispense: 14 tablet; Refill: 0    Fever, unspecified fever cause  -     acetaminophen tablet 650 mg    Right leg swelling        patient given rocephin injection 1 g today.  Advised patient to follow-up with primary care provider tomorrow.  Patient does have signs cellulitis and will be treated with oral doxycycline in addition to Rocephin injection.  PCP is given patient's 7 days of Lasix patient is on day 5.  Patient Instructions                                              Abscess/Cellulitis   If your condition worsens or fails to improve we recommend that you receive another evaluation at the ER immediately or contact your PCP to discuss your concerns or return here. You must understand that you've received an urgent care treatment only and that you may be released before all your medical problems are known or treated. You the patient will arrange for follouwp care as instructed.     Soak affected in warm water with epsom salts several times a day. Dilute 2 cups per gallon of water. If you cannot soak  apply warm compresses to the affected area for 20 min, 3-5 times per day and apply warm compresses frequently. If you cannot soak, use the shower head.  Use warm compresses for 20 minutes 3-5 times a day. Avoid picking or manipulating the wound. Clean the wound twice a day and put the antibiotic ointment on it. You should seek ER treatment if fever, increase pain, swelling or other signs of increasing infection.   If you were prescribed antibiotics, please take them to completion  If you are female and on BCP use additional methods to prevent pregnancy while on antibiotics and for one cycle after.   If you were given narcotics do not drive or operate heavy equipment or machinery while taking these medications.   Tylenol or ibuprofen can also be used as directed for pain unless you have an allergy to them or medical condition such as stomach  ulcers, kidney or liver disease or blood thinners etc for which you should not be taking these type of medications.   Return in 48- 72 hours for recheck.       Leg Swelling in a Single Leg  Swelling of the arms, feet, ankles, and legs is called edema. It is caused by extra fluid collecting in the tissues. Because of gravity, extra fluid in the body settles to the lowest part. That is why the legs and feet are most affected. You have swelling in a single leg.  Some of the causes for swelling in only a single leg include:  · Infection in the foot or leg  · Long-term problem with a vein not working well (venous insufficiency)  · Swollen, twisted vein in the leg (varicose veins)  · Insect bite or sting on the foot or leg  · Injury or recent surgery on the foot or leg  · Blood clot in a deep vein of the leg (deep vein thrombosis or DVT)  · Inflammation of the joints of the lower leg  Medical treatment will depend on what is causing your swelling.  Home care  Follow these guidelines when caring for yourself at home:  · Dont wear tight clothing.  · Keep your legs up while lying or sitting.  · Take any medicines as directed.  · If infection, injury, or recent surgery is the cause of your swelling, stay off your legs as much as possible until your symptoms get better.  · If you have venous insufficiency or varicose veins, dont sit or  one place for long periods of time. Take breaks and walk around every few hours. Talk with your healthcare provider about wearing support stockings to help lessen swelling during the day.  · Wear compression stockings with your doctor's approval  Follow-up care  Follow up with your healthcare provider as advised.  Call 911  Call 911 if any of these occur:  · Shortness of breath or trouble breathing  · Chest pain  · Coughing up blood  · Fainting or loss of consciousness   When to seek medical advice  Call your healthcare provider right away if any of these occur:  · Increased pain,  swelling, warmth, or redness of the leg, ankle, or foot  · Fever of 100.4°F (38ºC) or higher, or as directed by your healthcare provider  · Weakness or dizziness  · Shaking chills  · Drenching sweats  Date Last Reviewed: 4/11/2016  © 3846-1384 [employer]. [address] [place] [address], [place] [address]. All rights reserved. This information is not intended as a substitute for professional medical care. Always follow your healthcare professional's instructions.

## 2020-03-23 LAB — INR PPP: 2.5

## 2020-03-24 ENCOUNTER — ANTI-COAG VISIT (OUTPATIENT)
Dept: CARDIOLOGY | Facility: CLINIC | Age: 85
End: 2020-03-24
Payer: MEDICARE

## 2020-03-24 ENCOUNTER — TELEPHONE (OUTPATIENT)
Dept: WOUND CARE | Facility: CLINIC | Age: 85
End: 2020-03-24

## 2020-03-24 DIAGNOSIS — I48.0 PAROXYSMAL ATRIAL FIBRILLATION: ICD-10-CM

## 2020-03-24 DIAGNOSIS — Z79.01 LONG TERM CURRENT USE OF ANTICOAGULANT THERAPY: ICD-10-CM

## 2020-03-24 PROCEDURE — 93793 PR ANTICOAGULANT MGMT FOR PT TAKING WARFARIN: ICD-10-PCS | Mod: S$GLB,,,

## 2020-03-24 PROCEDURE — 93793 ANTICOAG MGMT PT WARFARIN: CPT | Mod: S$GLB,,,

## 2020-03-24 NOTE — PROGRESS NOTES
INR at goal but patient now on doxy as of 3/22 per [facility]. Repeat INR 3/26 to watch for interaction

## 2020-03-24 NOTE — TELEPHONE ENCOUNTER
Called daughter-in-law to discuss patient's appointment this Friday, 3/27/2020 at 1020am.  Advised provider would like to cancel appointment and set up a telemedicine virtual visit.  Rosibel advised that patient has a new problem - his leg began swelling and he was placed on fluid pills last week.  The  nurse came out on Sunday and the legs were warm to touch and blisters oozing.  Daughter-in-law took patient to Urgent Care and was placed on antibiotics and the  nurse wants patient to keep appointment with Brittney this Friday to address new problem along with his current wound vac visit.  Advised daughter-in-law that I will speak with Brittney and return her a call.  Daughter-in-law states she can do a virtual visit and wanted to know if it could be done on Friday, 3/27/2020 - will discuss with Brittney.

## 2020-03-26 ENCOUNTER — ANTI-COAG VISIT (OUTPATIENT)
Dept: CARDIOLOGY | Facility: CLINIC | Age: 85
End: 2020-03-26
Payer: MEDICARE

## 2020-03-26 DIAGNOSIS — I48.0 PAROXYSMAL ATRIAL FIBRILLATION: ICD-10-CM

## 2020-03-26 DIAGNOSIS — Z79.01 LONG TERM CURRENT USE OF ANTICOAGULANT THERAPY: ICD-10-CM

## 2020-03-26 LAB — INR PPP: 2.5

## 2020-03-26 PROCEDURE — 93793 ANTICOAG MGMT PT WARFARIN: CPT | Mod: S$GLB,,,

## 2020-03-26 PROCEDURE — 93793 PR ANTICOAGULANT MGMT FOR PT TAKING WARFARIN: ICD-10-PCS | Mod: S$GLB,,,

## 2020-03-26 NOTE — PROGRESS NOTES
INR good. Appears doxy is having no effect on INR. Resume weekly INR on Mondays. Continue maintenance dose    Update... Patient reports he has taken 2.5mg on Monday for quite some time now. Looking back on Monday 2/3 he was supposed to take 2.5mg that day then back to higher dose and looks like he just kept taking 2.5mg every Monday. Patient advised to continue reported dose and resume weekly INR on Mondays

## 2020-03-27 ENCOUNTER — OFFICE VISIT (OUTPATIENT)
Dept: WOUND CARE | Facility: CLINIC | Age: 85
End: 2020-03-27
Payer: MEDICARE

## 2020-03-27 ENCOUNTER — PATIENT MESSAGE (OUTPATIENT)
Dept: WOUND CARE | Facility: CLINIC | Age: 85
End: 2020-03-27

## 2020-03-27 ENCOUNTER — PATIENT OUTREACH (OUTPATIENT)
Dept: ADMINISTRATIVE | Facility: OTHER | Age: 85
End: 2020-03-27

## 2020-03-27 VITALS — TEMPERATURE: 97 F

## 2020-03-27 DIAGNOSIS — T81.89XA DELAYED SURGICAL WOUND HEALING, INITIAL ENCOUNTER: ICD-10-CM

## 2020-03-27 DIAGNOSIS — L03.115 CELLULITIS OF RIGHT LOWER EXTREMITY: Primary | ICD-10-CM

## 2020-03-27 DIAGNOSIS — S81.801A OPEN WOUND OF RIGHT LOWER LEG WITH COMPLICATION, INITIAL ENCOUNTER: ICD-10-CM

## 2020-03-27 PROCEDURE — 99214 PR OFFICE/OUTPT VISIT, EST, LEVL IV, 30-39 MIN: ICD-10-PCS | Mod: HCNC,95,, | Performed by: NURSE PRACTITIONER

## 2020-03-27 PROCEDURE — 1125F AMNT PAIN NOTED PAIN PRSNT: CPT | Mod: HCNC,95,, | Performed by: NURSE PRACTITIONER

## 2020-03-27 PROCEDURE — 1159F MED LIST DOCD IN RCRD: CPT | Mod: HCNC,95,, | Performed by: NURSE PRACTITIONER

## 2020-03-27 PROCEDURE — 1159F PR MEDICATION LIST DOCUMENTED IN MEDICAL RECORD: ICD-10-PCS | Mod: HCNC,95,, | Performed by: NURSE PRACTITIONER

## 2020-03-27 PROCEDURE — 99214 OFFICE O/P EST MOD 30 MIN: CPT | Mod: HCNC,95,, | Performed by: NURSE PRACTITIONER

## 2020-03-27 PROCEDURE — 3288F PR FALLS RISK ASSESSMENT DOCUMENTED: ICD-10-PCS | Mod: HCNC,CPTII,95, | Performed by: NURSE PRACTITIONER

## 2020-03-27 PROCEDURE — 1125F PR PAIN SEVERITY QUANTIFIED, PAIN PRESENT: ICD-10-PCS | Mod: HCNC,95,, | Performed by: NURSE PRACTITIONER

## 2020-03-27 PROCEDURE — 1100F PTFALLS ASSESS-DOCD GE2>/YR: CPT | Mod: HCNC,CPTII,95, | Performed by: NURSE PRACTITIONER

## 2020-03-27 PROCEDURE — 3288F FALL RISK ASSESSMENT DOCD: CPT | Mod: HCNC,CPTII,95, | Performed by: NURSE PRACTITIONER

## 2020-03-27 PROCEDURE — 1100F PR PT FALLS ASSESS DOC 2+ FALLS/FALL W/INJURY/YR: ICD-10-PCS | Mod: HCNC,CPTII,95, | Performed by: NURSE PRACTITIONER

## 2020-03-27 RX ORDER — FUROSEMIDE 20 MG/1
20 TABLET ORAL DAILY
Qty: 7 TABLET | Refills: 0 | Status: SHIPPED | OUTPATIENT
Start: 2020-03-27 | End: 2020-04-09 | Stop reason: SDUPTHER

## 2020-03-27 NOTE — PATIENT INSTRUCTIONS
Wound Care  Taking proper care of your wound will help it heal. Your healthcare provider may show you how to clean and dress the wound. He or she will also explain how to tell if the wound is healing normally. Here are the basic steps:      A wound that's not healing normally may be dark in color or have white streaks.    Wash Your Hands  · Use liquid soap and lather for 2 minutes. Scrub between your fingers and under your nails.  · Rinse with warm water, keeping your fingers pointing down.  · Use a paper towel to dry your hands and to turn off the faucet.  Remove the Used Dressing  · Set up your supplies.  · Put on disposable gloves if youre dressing a wound for someone else or your wound is infected.  · Gently take off the old dressing. If you have a drain or tube in the wound, be careful not to pull on it.  · Loosen the tape by pulling gently toward the wound.  · Remove the dressing one layer at a time and put it in a plastic bag.  · Remove your gloves.  Inspect and Dress the Wound  · Each time you change the dressing, inspect the wound carefully to be sure its healing normally.  · Wash your hands again. Put on a new pair of gloves if youre dressing a wound for someone else or if your wound is infected.  · Clean and dress the wound as directed by your doctor or nurse. If you have a drain or tube, be careful not to pull on it.  · Put all supplies in a plastic bag; seal the bag and put it in the trash.  · Be sure to wash your hands again.  Call your healthcare provider if you see any of the following signs of a problem:   · Bleeding that soaks the dressing  · Pink fluid weeping from the wound  · Increased drainage or drainage that is yellow, yellow-green, or foul-smelling  · Increased swelling or pain, or redness or swelling in the skin around the wound  · A change in the color of the wound  · An increase in the size of the wound  · A fever over 101.0°F, increased fatigue, or a loss of appetite.       ©  "1935-4389 [employer], [address] [place] [address], [place] [address]. All rights reserved. This information is not intended as a substitute for professional medical care. Always follow your healthcare professional's instructions.      Cleanse leg wounds with dove soap and water.  Apply medihoney gel to wounds, cover with cotton gauze and ABD pads, and secure with roll gauze.  Apply two 4" ace wraps from above toes to top of calf in spiral configuration.   The first ace wrap should be applied from above the toes to the ankle. The second then goes from the ankle to the top of the calf.  Do not sleep in the ace wraps.  Apply first thing in the morning and remove at bedtime.  Elevate legs when seated.  Complete antibiotics as ordered.  "

## 2020-03-27 NOTE — PROGRESS NOTES
Subjective:       Patient ID: Ernie Phan is a 89 y.o. male.    Chief Complaint: Wound Check    HPI     This is an 88 year old male referred for evaluation and management of a surgical wound to the scalp area.  He is s/p debridement of open head wound involving the bone and application of wound vac on 12/12/19 with Dr. Espinoza. He originally had a repair of Mohs defect with ACell on 12/5/19 but the ACell failed.  He now has more granulation tissue present in the base of the wound. He also has new wounds to the right lower leg.  He begamn having increased edema on 3/16/20. He notified his PCP on 3/18/20 and was given a course of Lasix.  He presented to urgent care on 3/22/20 and is now on a 7 day course of doxycycline.  He does not report any adverse events from the antibiotic. He developed blisters to the leg which then erupted causing open wounds.  He is covering the wounds with gauze and reports a large amount of exudate.  He was wrapping the leg with ace wraps from the ankle to mid calf and has 4+ edema to the foot and ankle as a result. He has [facility] health [facility]. He is afebrile. He reports improvement In the redness but still has  swelling but no purulent drainage. His pain level is 5/10. This is a virtual visit with the patient and caregiver.  Review of Systems   Constitutional: Negative for chills, diaphoresis and fever.   HENT: Positive for hearing loss. Negative for postnasal drip, rhinorrhea, sinus pressure, sneezing, sore throat, tinnitus and trouble swallowing.    Eyes: Negative for visual disturbance.   Respiratory: Negative for apnea, cough, shortness of breath and wheezing.    Cardiovascular: Positive for leg swelling (right leg and foot). Negative for chest pain and palpitations.   Gastrointestinal: Positive for constipation. Negative for diarrhea, nausea and vomiting.   Genitourinary: Positive for frequency. Negative for difficulty urinating, dysuria and hematuria.  "  Musculoskeletal: Negative for arthralgias, back pain and joint swelling.   Skin: Positive for wound.   Neurological: Positive for weakness. Negative for dizziness, light-headedness and headaches.   Hematological: Bruises/bleeds easily.   Psychiatric/Behavioral: Positive for dysphoric mood. Negative for confusion, decreased concentration and sleep disturbance. The patient is not nervous/anxious.        Objective:      Physical Exam   Constitutional: He is oriented to person, place, and time. He appears well-developed and well-nourished. No distress.   HENT:   Head: Normocephalic and atraumatic.       Musculoskeletal: He exhibits no edema or tenderness.        Legs:  Neurological: He is alert and oriented to person, place, and time.   Skin: Skin is warm and dry. No rash noted. He is not diaphoretic. No erythema.   Psychiatric: He has a normal mood and affect. His behavior is normal. Judgment and thought content normal.   Nursing note and vitals reviewed.      2/19/20      3/27/20    Right medial leg    Right posterior leg          Assessment:       1. Cellulitis of right lower extremity    2. Open wound of right lower leg with complication, initial encounter    3. Delayed surgical wound healing, initial encounter               Plan:            Complete doxycycline as ordered.  Cleanse wounds with wound cleanser.  Continue wound vac therapy.  Place black sponge in base of wound and run vac at 125 mmHg on continuous suction. Change vac three times weekly.  Apply medihoney gel to right leg wounds, cover with gauze and ABD pads and secure with roll gauze.    Compression with two 4" ace wraps right lower leg.  Apply wraps first thing in the morning and remove at bedtime. Do not sleep in hose.  [facility] notified of orders via email. We will ask them to see the patient three times weekly.  Return to clinic in two weeks.  We will try to arrange a virtual visit.    The patient location is: their home in [place].  The " chief complaint leading to consultation is: surgical scalp wound and new wounds to the right lower leg secondary to cellulitis.  Visit type: Virtual visit with synchronous audio and video  Total time spent with patient: 45 minutes  Each patient to whom he or she provides medical services by telemedicine is:  (1) informed of the relationship between the physician and patient and the respective role of any other health care provider with respect to management of the patient; and (2) notified that he or she may decline to receive medical services by telemedicine and may withdraw from such care at any time.    Notes: See above

## 2020-03-30 ENCOUNTER — PATIENT MESSAGE (OUTPATIENT)
Dept: WOUND CARE | Facility: CLINIC | Age: 85
End: 2020-03-30

## 2020-03-30 ENCOUNTER — ANTI-COAG VISIT (OUTPATIENT)
Dept: CARDIOLOGY | Facility: CLINIC | Age: 85
End: 2020-03-30
Payer: MEDICARE

## 2020-03-30 DIAGNOSIS — I48.0 PAROXYSMAL ATRIAL FIBRILLATION: ICD-10-CM

## 2020-03-30 DIAGNOSIS — Z79.01 LONG TERM CURRENT USE OF ANTICOAGULANT THERAPY: ICD-10-CM

## 2020-03-30 LAB — INR PPP: 3.6

## 2020-03-30 PROCEDURE — 93793 PR ANTICOAGULANT MGMT FOR PT TAKING WARFARIN: ICD-10-PCS | Mod: S$GLB,,,

## 2020-03-30 PROCEDURE — 93793 ANTICOAG MGMT PT WARFARIN: CPT | Mod: S$GLB,,,

## 2020-03-30 NOTE — PROGRESS NOTES
INR a little high and likely a delayed interaction with doxy. He is due to take low dose today and doxy completed yesterday. Maintain dose. Recheck INR in 1 week

## 2020-04-06 ENCOUNTER — TELEPHONE (OUTPATIENT)
Dept: HEMATOLOGY/ONCOLOGY | Facility: CLINIC | Age: 85
End: 2020-04-06

## 2020-04-06 ENCOUNTER — ANTI-COAG VISIT (OUTPATIENT)
Dept: CARDIOLOGY | Facility: CLINIC | Age: 85
End: 2020-04-06
Payer: MEDICARE

## 2020-04-06 DIAGNOSIS — Z79.01 LONG TERM CURRENT USE OF ANTICOAGULANT THERAPY: ICD-10-CM

## 2020-04-06 DIAGNOSIS — I48.0 PAROXYSMAL ATRIAL FIBRILLATION: ICD-10-CM

## 2020-04-06 LAB — INR PPP: 6

## 2020-04-06 PROCEDURE — 93793 ANTICOAG MGMT PT WARFARIN: CPT | Mod: S$GLB,,,

## 2020-04-06 PROCEDURE — 93793 PR ANTICOAGULANT MGMT FOR PT TAKING WARFARIN: ICD-10-PCS | Mod: S$GLB,,,

## 2020-04-06 RX ORDER — TRAMADOL HYDROCHLORIDE 50 MG/1
50 TABLET ORAL 2 TIMES DAILY PRN
Qty: 14 TABLET | Refills: 0 | Status: SHIPPED | OUTPATIENT
Start: 2020-04-06 | End: 2020-05-13 | Stop reason: SDUPTHER

## 2020-04-06 RX ORDER — HONEY 100 %
PASTE (ML) TOPICAL
COMMUNITY
Start: 2020-03-30

## 2020-04-06 NOTE — PROGRESS NOTES
Patient confirms dose. States he still has the cellulitis.  Denies any changes in diet. He will eat a cup of spinach 4/7. Will hold x2 days. No bleeding reported. INR 4/8. Advised ER for any bleeding issues.

## 2020-04-06 NOTE — PROGRESS NOTES
Called Patient and advised hi to skip coumadin tonight 4/06 and tomorrow 4/07, heat spinach 4/07 and self test again Wednesday 4/08, verbalized understanding, states someone else had already called him

## 2020-04-08 ENCOUNTER — ANTI-COAG VISIT (OUTPATIENT)
Dept: CARDIOLOGY | Facility: CLINIC | Age: 85
End: 2020-04-08

## 2020-04-08 ENCOUNTER — TELEPHONE (OUTPATIENT)
Dept: HEMATOLOGY/ONCOLOGY | Facility: CLINIC | Age: 85
End: 2020-04-08

## 2020-04-08 DIAGNOSIS — Z79.01 LONG TERM CURRENT USE OF ANTICOAGULANT THERAPY: ICD-10-CM

## 2020-04-08 DIAGNOSIS — I48.0 PAROXYSMAL ATRIAL FIBRILLATION: ICD-10-CM

## 2020-04-08 LAB — INR PPP: 3.4

## 2020-04-08 NOTE — PROGRESS NOTES
INR still a little high but much better than Monday. He would like to eat cabbage today which is fine. He c/o increased sciatica pain, taking tramadol bid, and still having cellulitis/swelling. We will decrease dose. Follow closely. Repeat INR Monday

## 2020-04-08 NOTE — PROGRESS NOTES
Patient was given coumadin instructions, told ok to have cabbage tonight 4/08 and self test again 4/13, verbalized understanding

## 2020-04-09 RX ORDER — FUROSEMIDE 20 MG/1
20 TABLET ORAL DAILY
Qty: 7 TABLET | Refills: 0 | Status: SHIPPED | OUTPATIENT
Start: 2020-04-09 | End: 2020-04-15

## 2020-04-13 ENCOUNTER — ANTI-COAG VISIT (OUTPATIENT)
Dept: CARDIOLOGY | Facility: CLINIC | Age: 85
End: 2020-04-13
Payer: MEDICARE

## 2020-04-13 DIAGNOSIS — I48.0 PAROXYSMAL ATRIAL FIBRILLATION: ICD-10-CM

## 2020-04-13 DIAGNOSIS — Z79.01 LONG TERM CURRENT USE OF ANTICOAGULANT THERAPY: ICD-10-CM

## 2020-04-13 LAB — INR PPP: 2.9

## 2020-04-13 PROCEDURE — 93793 ANTICOAG MGMT PT WARFARIN: CPT | Mod: S$GLB,,,

## 2020-04-13 PROCEDURE — 93793 PR ANTICOAGULANT MGMT FOR PT TAKING WARFARIN: ICD-10-PCS | Mod: S$GLB,,,

## 2020-04-13 NOTE — PROGRESS NOTES
Called Patient and advised hi of coumadin dosage and self test again 4/16, verbalized understanding

## 2020-04-15 ENCOUNTER — PATIENT OUTREACH (OUTPATIENT)
Dept: ADMINISTRATIVE | Facility: OTHER | Age: 85
End: 2020-04-15

## 2020-04-15 ENCOUNTER — OFFICE VISIT (OUTPATIENT)
Dept: WOUND CARE | Facility: CLINIC | Age: 85
End: 2020-04-15
Payer: MEDICARE

## 2020-04-15 ENCOUNTER — PATIENT MESSAGE (OUTPATIENT)
Dept: INTERNAL MEDICINE | Facility: CLINIC | Age: 85
End: 2020-04-15

## 2020-04-15 VITALS — DIASTOLIC BLOOD PRESSURE: 60 MMHG | HEART RATE: 62 BPM | TEMPERATURE: 96 F | SYSTOLIC BLOOD PRESSURE: 130 MMHG

## 2020-04-15 DIAGNOSIS — S81.801A OPEN WOUND OF RIGHT LOWER LEG WITH COMPLICATION, INITIAL ENCOUNTER: ICD-10-CM

## 2020-04-15 DIAGNOSIS — T81.89XA DELAYED SURGICAL WOUND HEALING, INITIAL ENCOUNTER: ICD-10-CM

## 2020-04-15 DIAGNOSIS — L03.115 CELLULITIS OF RIGHT LOWER EXTREMITY: Primary | ICD-10-CM

## 2020-04-15 PROCEDURE — 99213 PR OFFICE/OUTPT VISIT, EST, LEVL III, 20-29 MIN: ICD-10-PCS | Mod: HCNC,95,, | Performed by: NURSE PRACTITIONER

## 2020-04-15 PROCEDURE — 3288F PR FALLS RISK ASSESSMENT DOCUMENTED: ICD-10-PCS | Mod: HCNC,CPTII,95, | Performed by: NURSE PRACTITIONER

## 2020-04-15 PROCEDURE — 1100F PTFALLS ASSESS-DOCD GE2>/YR: CPT | Mod: HCNC,CPTII,95, | Performed by: NURSE PRACTITIONER

## 2020-04-15 PROCEDURE — 3288F FALL RISK ASSESSMENT DOCD: CPT | Mod: HCNC,CPTII,95, | Performed by: NURSE PRACTITIONER

## 2020-04-15 PROCEDURE — 1100F PR PT FALLS ASSESS DOC 2+ FALLS/FALL W/INJURY/YR: ICD-10-PCS | Mod: HCNC,CPTII,95, | Performed by: NURSE PRACTITIONER

## 2020-04-15 PROCEDURE — 1159F MED LIST DOCD IN RCRD: CPT | Mod: HCNC,95,, | Performed by: NURSE PRACTITIONER

## 2020-04-15 PROCEDURE — 99213 OFFICE O/P EST LOW 20 MIN: CPT | Mod: HCNC,95,, | Performed by: NURSE PRACTITIONER

## 2020-04-15 PROCEDURE — 1126F PR PAIN SEVERITY QUANTIFIED, NO PAIN PRESENT: ICD-10-PCS | Mod: HCNC,95,, | Performed by: NURSE PRACTITIONER

## 2020-04-15 PROCEDURE — 1126F AMNT PAIN NOTED NONE PRSNT: CPT | Mod: HCNC,95,, | Performed by: NURSE PRACTITIONER

## 2020-04-15 PROCEDURE — 1159F PR MEDICATION LIST DOCUMENTED IN MEDICAL RECORD: ICD-10-PCS | Mod: HCNC,95,, | Performed by: NURSE PRACTITIONER

## 2020-04-15 RX ORDER — FUROSEMIDE 20 MG/1
20 TABLET ORAL DAILY
Qty: 30 TABLET | Refills: 5 | Status: SHIPPED | OUTPATIENT
Start: 2020-04-15

## 2020-04-15 RX ORDER — PREDNISONE 20 MG/1
TABLET ORAL
Qty: 12 TABLET | Refills: 0 | Status: ON HOLD | OUTPATIENT
Start: 2020-04-15 | End: 2020-05-22

## 2020-04-15 RX ORDER — DOXYCYCLINE 100 MG/1
100 CAPSULE ORAL 2 TIMES DAILY
Qty: 28 CAPSULE | Refills: 0 | Status: SHIPPED | OUTPATIENT
Start: 2020-04-15 | End: 2020-04-29

## 2020-04-15 NOTE — PROGRESS NOTES
Subjective:       Patient ID: Ernie Phan is a 89 y.o. male.    Chief Complaint: Wound Check    HPI     This is an 88 year old male referred for evaluation and management of a surgical wound to the scalp area.  He is s/p debridement of open head wound involving the bone and application of wound vac on 12/12/19 with Dr. Espinoza. He originally had a repair of Mohs defect with ACell on 12/5/19 but the ACell failed.  He now has more granulation tissue present in the base of the wound. He also has wounds to the right lower leg.  He began having increased edema on 3/16/20. He notified his PCP on 3/18/20 and was given a course of Lasix.  He developed blisters to the leg which then erupted causing open wounds.  He is using medihoney gel daily on the wounds and using ace wraps for compression. He has home health services through [facility] and they are seeing him three times weekly. He is afebrile. He reports increased redness and swelling but denies purulent drainage. He has no pain from the wounds but is having sciatica pain and his pain level from this is 5/10. This is a virtual visit with the patient, caregiver and home health nurse.  Review of Systems   Constitutional: Negative for chills, diaphoresis and fever.   HENT: Positive for hearing loss. Negative for postnasal drip, rhinorrhea, sinus pressure, sneezing, sore throat, tinnitus and trouble swallowing.    Eyes: Negative for visual disturbance.   Respiratory: Negative for apnea, cough, shortness of breath and wheezing.    Cardiovascular: Positive for leg swelling (right leg and foot). Negative for chest pain and palpitations.   Gastrointestinal: Positive for constipation. Negative for diarrhea, nausea and vomiting.   Genitourinary: Positive for frequency. Negative for difficulty urinating, dysuria and hematuria.   Musculoskeletal: Negative for arthralgias, back pain and joint swelling.   Skin: Positive for color change and wound.   Neurological: Positive for  "weakness. Negative for dizziness, light-headedness and headaches.   Hematological: Bruises/bleeds easily.   Psychiatric/Behavioral: Positive for dysphoric mood. Negative for confusion, decreased concentration and sleep disturbance. The patient is not nervous/anxious.        Objective:      Physical Exam   Constitutional: He is oriented to person, place, and time. He appears well-developed and well-nourished. No distress.   HENT:   Head: Normocephalic and atraumatic.       Cardiovascular:   Palpable pulses per home health nurse   Pulmonary/Chest: No respiratory distress.   Musculoskeletal: He exhibits no edema or tenderness.        Legs:  Neurological: He is alert and oriented to person, place, and time.   Skin: Skin is warm and dry. No rash noted. He is not diaphoretic. No erythema.   Psychiatric: He has a normal mood and affect. His behavior is normal. Judgment and thought content normal.   Nursing note and vitals reviewed.      Scalp 4/15/20      Right medial leg      Right posterior leg      Assessment:       1. Cellulitis of right lower extremity    2. Delayed surgical wound healing, initial encounter    3. Open wound of right lower leg with complication, initial encounter               Plan:            Doxycycline 100 mg twice daily x 14 days.  Cleanse wounds with wound cleanser.  Continue wound vac therapy to scalp wound.  Place black sponge in base of wound and run vac at 125 mmHg on continuous suction. Change vac three times weekly.  Apply medihoney gel to right leg wounds, cover with gauze and ABD pads and secure with roll gauze.    Compression with two 4" ace wraps right lower leg.  Apply wraps first thing in the morning and remove at bedtime. Do not sleep in hose.  Elevate legs at all times when seated.  [facility] notified of orders via email. We will ask them to see the patient three times weekly.  Return to clinic in one to two weeks.  We will try to arrange a virtual visit.    The patient location is: " their home in [place].  The chief complaint leading to consultation is: surgical scalp wound and new wounds to the right lower leg secondary to cellulitis.  Visit type: Audiovisual virtual visit with synchronous audio and video  Total time spent with patient: 25 minutes  Each patient to whom he or she provides medical services by telemedicine is:  (1) informed of the relationship between the physician and patient and the respective role of any other health care provider with respect to management of the patient; and (2) notified that he or she may decline to receive medical services by telemedicine and may withdraw from such care at any time.    Notes: See above

## 2020-04-15 NOTE — TELEPHONE ENCOUNTER
Patient daughter-Rosibel is concern about her Dad...Rt leg swelling swelling.    Rosibel is asking If her Dad need to start taking a fluid pill.  Patient was seen today by Brittney Freeman ...she suggests that patient my need a muscle relaxer. Also, Brittney put patient on an Antibiotic.

## 2020-04-15 NOTE — PATIENT INSTRUCTIONS
"Doxycycline 100 mg twice daily x 14 days.  Cleanse wounds with wound cleanser.  Continue wound vac therapy to scalp wound.  Place black sponge in base of wound and run vac at 125 mmHg on continuous suction. Change vac three times weekly.  Apply medihoney gel to right leg wounds, cover with gauze and ABD pads and secure with roll gauze.    Compression with two 4" ace wraps right lower leg.  Apply wraps first thing in the morning and remove at bedtime. Do not sleep in hose.  Elevate legs at all times when seated.    Please make sure he is keeping his leg elevated at all times when seated.        "

## 2020-04-16 ENCOUNTER — DOCUMENT SCAN (OUTPATIENT)
Dept: HOME HEALTH SERVICES | Facility: HOSPITAL | Age: 85
End: 2020-04-16
Payer: MEDICARE

## 2020-04-16 ENCOUNTER — ANTI-COAG VISIT (OUTPATIENT)
Dept: CARDIOLOGY | Facility: CLINIC | Age: 85
End: 2020-04-16
Payer: MEDICARE

## 2020-04-16 ENCOUNTER — PATIENT MESSAGE (OUTPATIENT)
Dept: HEMATOLOGY/ONCOLOGY | Facility: CLINIC | Age: 85
End: 2020-04-16

## 2020-04-16 DIAGNOSIS — I48.0 PAROXYSMAL ATRIAL FIBRILLATION: ICD-10-CM

## 2020-04-16 DIAGNOSIS — Z79.01 LONG TERM CURRENT USE OF ANTICOAGULANT THERAPY: ICD-10-CM

## 2020-04-16 LAB — INR PPP: 2

## 2020-04-16 PROCEDURE — 93793 PR ANTICOAGULANT MGMT FOR PT TAKING WARFARIN: ICD-10-PCS | Mod: S$GLB,,,

## 2020-04-16 PROCEDURE — G0179 PR HOME HEALTH MD RECERTIFICATION: ICD-10-PCS | Mod: ,,, | Performed by: OTOLARYNGOLOGY

## 2020-04-16 PROCEDURE — G0179 MD RECERTIFICATION HHA PT: HCPCS | Mod: ,,, | Performed by: OTOLARYNGOLOGY

## 2020-04-16 PROCEDURE — 93793 ANTICOAG MGMT PT WARFARIN: CPT | Mod: S$GLB,,,

## 2020-04-17 ENCOUNTER — TELEPHONE (OUTPATIENT)
Dept: HEMATOLOGY/ONCOLOGY | Facility: CLINIC | Age: 85
End: 2020-04-17

## 2020-04-17 NOTE — TELEPHONE ENCOUNTER
"----- Message from Israel Vogt sent at 4/17/2020  3:55 PM CDT -----  Contact: [facility], [place]  Home Health RN / Patient Status Notes:    RN or caller?: [facility] [place]  Treating Provider?: Dr. Gutierrez   Contact Preference?: [phone]  Message/Note:  [facility] health nurse returning Tomasa's calling regarding patient    Additional Notes:  "Thank you for all that you do for our patients'"      "

## 2020-04-18 RX ORDER — TAMSULOSIN HYDROCHLORIDE 0.4 MG/1
0.4 CAPSULE ORAL DAILY
Qty: 90 CAPSULE | Refills: 1 | Status: SHIPPED | OUTPATIENT
Start: 2020-04-18

## 2020-04-18 RX ORDER — FINASTERIDE 5 MG/1
5 TABLET, FILM COATED ORAL DAILY
Qty: 90 TABLET | Refills: 1 | Status: SHIPPED | OUTPATIENT
Start: 2020-04-18

## 2020-04-20 ENCOUNTER — ANTI-COAG VISIT (OUTPATIENT)
Dept: CARDIOLOGY | Facility: CLINIC | Age: 85
End: 2020-04-20
Payer: MEDICARE

## 2020-04-20 DIAGNOSIS — Z79.01 LONG TERM CURRENT USE OF ANTICOAGULANT THERAPY: ICD-10-CM

## 2020-04-20 DIAGNOSIS — I48.0 PAROXYSMAL ATRIAL FIBRILLATION: ICD-10-CM

## 2020-04-20 LAB — INR PPP: 1.8

## 2020-04-20 PROCEDURE — 93793 PR ANTICOAGULANT MGMT FOR PT TAKING WARFARIN: ICD-10-PCS | Mod: S$GLB,,,

## 2020-04-20 PROCEDURE — 93793 ANTICOAG MGMT PT WARFARIN: CPT | Mod: S$GLB,,,

## 2020-04-22 ENCOUNTER — PATIENT OUTREACH (OUTPATIENT)
Dept: ADMINISTRATIVE | Facility: OTHER | Age: 85
End: 2020-04-22

## 2020-04-22 ENCOUNTER — OFFICE VISIT (OUTPATIENT)
Dept: WOUND CARE | Facility: CLINIC | Age: 85
End: 2020-04-22
Payer: MEDICARE

## 2020-04-22 DIAGNOSIS — T81.89XA DELAYED SURGICAL WOUND HEALING, INITIAL ENCOUNTER: ICD-10-CM

## 2020-04-22 DIAGNOSIS — L03.115 CELLULITIS OF RIGHT LOWER EXTREMITY: Primary | ICD-10-CM

## 2020-04-22 PROCEDURE — 1159F PR MEDICATION LIST DOCUMENTED IN MEDICAL RECORD: ICD-10-PCS | Mod: HCNC,95,, | Performed by: NURSE PRACTITIONER

## 2020-04-22 PROCEDURE — 1100F PR PT FALLS ASSESS DOC 2+ FALLS/FALL W/INJURY/YR: ICD-10-PCS | Mod: HCNC,CPTII,95, | Performed by: NURSE PRACTITIONER

## 2020-04-22 PROCEDURE — 99213 PR OFFICE/OUTPT VISIT, EST, LEVL III, 20-29 MIN: ICD-10-PCS | Mod: HCNC,95,, | Performed by: NURSE PRACTITIONER

## 2020-04-22 PROCEDURE — 1159F MED LIST DOCD IN RCRD: CPT | Mod: HCNC,95,, | Performed by: NURSE PRACTITIONER

## 2020-04-22 PROCEDURE — 3288F FALL RISK ASSESSMENT DOCD: CPT | Mod: HCNC,CPTII,95, | Performed by: NURSE PRACTITIONER

## 2020-04-22 PROCEDURE — 3288F PR FALLS RISK ASSESSMENT DOCUMENTED: ICD-10-PCS | Mod: HCNC,CPTII,95, | Performed by: NURSE PRACTITIONER

## 2020-04-22 PROCEDURE — 99213 OFFICE O/P EST LOW 20 MIN: CPT | Mod: HCNC,95,, | Performed by: NURSE PRACTITIONER

## 2020-04-22 PROCEDURE — 1100F PTFALLS ASSESS-DOCD GE2>/YR: CPT | Mod: HCNC,CPTII,95, | Performed by: NURSE PRACTITIONER

## 2020-04-22 NOTE — PROGRESS NOTES
Subjective:       Patient ID: Ernie Phan is a 89 y.o. male.    Chief Complaint: Wound Check    HPI     This is an 88 year old male referred for evaluation and management of a surgical wound to the scalp area.  He is s/p debridement of open head wound involving the bone and application of wound vac on 12/12/19 with Dr. Espinoza. He originally had a repair of Mohs defect with ACell on 12/5/19 but the ACell failed.  He now has more granulation tissue present in the base of the wound. He is also seen today for reevaluation of cellulitis with wounds to the right lower leg.  He has been on doxycycline orally and using medihoney gel daily on the wounds.  The redness is improved and the wounds are beginning to debride.  He has home health services through [facility] and they are seeing him three times weekly. He is afebrile. He reports increased redness and swelling but denies purulent drainage. He has no pain from the wounds but is having sciatica pain and his pain level from this is 5/10. This is a virtual visit with the patient, caregiver and home health nurse.  Review of Systems   Constitutional: Negative for chills, diaphoresis and fever.   HENT: Positive for hearing loss. Negative for postnasal drip, rhinorrhea, sinus pressure, sneezing, sore throat, tinnitus and trouble swallowing.    Eyes: Negative for visual disturbance.   Respiratory: Negative for apnea, cough, shortness of breath and wheezing.    Cardiovascular: Positive for leg swelling (right leg and foot). Negative for chest pain and palpitations.   Gastrointestinal: Positive for constipation. Negative for diarrhea, nausea and vomiting.   Genitourinary: Positive for frequency. Negative for difficulty urinating, dysuria and hematuria.   Musculoskeletal: Negative for arthralgias, back pain and joint swelling.   Skin: Positive for color change and wound.   Neurological: Positive for weakness. Negative for dizziness, light-headedness and headaches.    Hematological: Bruises/bleeds easily.   Psychiatric/Behavioral: Positive for dysphoric mood. Negative for confusion, decreased concentration and sleep disturbance. The patient is not nervous/anxious.        Objective:      Physical Exam   Constitutional: He is oriented to person, place, and time. He appears well-developed and well-nourished. No distress.   HENT:   Head: Normocephalic.       Cardiovascular:   Palpable pulses per home health nurse   Pulmonary/Chest: Effort normal. No respiratory distress.   Musculoskeletal: He exhibits edema (right lower leg and foot). He exhibits no tenderness.        Legs:  Neurological: He is alert and oriented to person, place, and time.   Skin: No rash noted. He is not diaphoretic. There is erythema (improved right lower leg).   Psychiatric: He has a normal mood and affect. His behavior is normal. Judgment and thought content normal.   Nursing note and vitals reviewed.      Scalp 4/22/20      Right medial leg      Right posterior leg      Assessment:       1. Cellulitis of right lower extremity    2. Delayed surgical wound healing, initial encounter               Plan:            Complete Doxycycline 100 mg twice daily as ordered.  Cleanse wounds with wound cleanser.  Continue wound vac therapy to scalp wound.  Place black sponge in base of wound and run vac at 125 mmHg on continuous suction. Change vac three times weekly.  Apply medihoney gel to right leg wounds, cover with hydrofiber and ABD pad.    Coflex compression wrap with zinc oxide right lower leg.  Change dressing three times weekly.  Elevate legs at all times when seated.  [facility] notified of orders via email. We will ask them to see the patient three times weekly.  Return to clinic in two to three weeks.  We will try to arrange another virtual visit.    The patient location is: their home in [place].  The chief complaint leading to consultation is: surgical scalp wound and new wounds to the right lower leg  secondary to cellulitis.  Visit type: Audiovisual virtual visit with synchronous audio and video  Total time spent with patient: 20 minutes  Each patient to whom he or she provides medical services by telemedicine is:  (1) informed of the relationship between the physician and patient and the respective role of any other health care provider with respect to management of the patient; and (2) notified that he or she may decline to receive medical services by telemedicine and may withdraw from such care at any time.    Notes: See above

## 2020-04-23 ENCOUNTER — DOCUMENT SCAN (OUTPATIENT)
Dept: HOME HEALTH SERVICES | Facility: HOSPITAL | Age: 85
End: 2020-04-23
Payer: MEDICARE

## 2020-04-24 ENCOUNTER — DOCUMENT SCAN (OUTPATIENT)
Dept: HOME HEALTH SERVICES | Facility: HOSPITAL | Age: 85
End: 2020-04-24
Payer: MEDICARE

## 2020-04-27 ENCOUNTER — ANTI-COAG VISIT (OUTPATIENT)
Dept: CARDIOLOGY | Facility: CLINIC | Age: 85
End: 2020-04-27
Payer: MEDICARE

## 2020-04-27 ENCOUNTER — LAB VISIT (OUTPATIENT)
Dept: LAB | Facility: HOSPITAL | Age: 85
End: 2020-04-27
Attending: INTERNAL MEDICINE
Payer: MEDICARE

## 2020-04-27 DIAGNOSIS — I48.0 PAROXYSMAL ATRIAL FIBRILLATION: ICD-10-CM

## 2020-04-27 DIAGNOSIS — N18.30 CHRONIC KIDNEY DISEASE, STAGE III (MODERATE): Primary | ICD-10-CM

## 2020-04-27 DIAGNOSIS — Z79.01 LONG TERM CURRENT USE OF ANTICOAGULANT THERAPY: ICD-10-CM

## 2020-04-27 LAB
ALBUMIN SERPL BCP-MCNC: 3.4 G/DL (ref 3.5–5.2)
ALP SERPL-CCNC: 72 U/L (ref 55–135)
ALT SERPL W/O P-5'-P-CCNC: 9 U/L (ref 10–44)
ANION GAP SERPL CALC-SCNC: 15 MMOL/L (ref 8–16)
AST SERPL-CCNC: 21 U/L (ref 10–40)
BASOPHILS # BLD AUTO: 0.01 K/UL (ref 0–0.2)
BASOPHILS NFR BLD: 0.1 % (ref 0–1.9)
BILIRUB SERPL-MCNC: 1.5 MG/DL (ref 0.1–1)
BUN SERPL-MCNC: 19 MG/DL (ref 8–23)
CALCIUM SERPL-MCNC: 8.8 MG/DL (ref 8.7–10.5)
CHLORIDE SERPL-SCNC: 101 MMOL/L (ref 95–110)
CO2 SERPL-SCNC: 23 MMOL/L (ref 23–29)
CREAT SERPL-MCNC: 1.1 MG/DL (ref 0.5–1.4)
DIFFERENTIAL METHOD: ABNORMAL
EOSINOPHIL # BLD AUTO: 0.1 K/UL (ref 0–0.5)
EOSINOPHIL NFR BLD: 0.7 % (ref 0–8)
ERYTHROCYTE [DISTWIDTH] IN BLOOD BY AUTOMATED COUNT: 18.6 % (ref 11.5–14.5)
EST. GFR  (AFRICAN AMERICAN): >60 ML/MIN/1.73 M^2
EST. GFR  (NON AFRICAN AMERICAN): 59.2 ML/MIN/1.73 M^2
GLUCOSE SERPL-MCNC: 65 MG/DL (ref 70–110)
HCT VFR BLD AUTO: 30.3 % (ref 40–54)
HGB BLD-MCNC: 8.8 G/DL (ref 14–18)
IMM GRANULOCYTES # BLD AUTO: 0.04 K/UL (ref 0–0.04)
IMM GRANULOCYTES NFR BLD AUTO: 0.6 % (ref 0–0.5)
INR PPP: 2.3
LYMPHOCYTES # BLD AUTO: 0.5 K/UL (ref 1–4.8)
LYMPHOCYTES NFR BLD: 7.3 % (ref 18–48)
MCH RBC QN AUTO: 40.6 PG (ref 27–31)
MCHC RBC AUTO-ENTMCNC: 29 G/DL (ref 32–36)
MCV RBC AUTO: 140 FL (ref 82–98)
MONOCYTES # BLD AUTO: 0.4 K/UL (ref 0.3–1)
MONOCYTES NFR BLD: 5.1 % (ref 4–15)
NEUTROPHILS # BLD AUTO: 6.1 K/UL (ref 1.8–7.7)
NEUTROPHILS NFR BLD: 86.2 % (ref 38–73)
NRBC BLD-RTO: 0 /100 WBC
PLATELET # BLD AUTO: 545 K/UL (ref 150–350)
PMV BLD AUTO: 9.8 FL (ref 9.2–12.9)
POTASSIUM SERPL-SCNC: 3.6 MMOL/L (ref 3.5–5.1)
PROT SERPL-MCNC: 6.2 G/DL (ref 6–8.4)
RBC # BLD AUTO: 2.17 M/UL (ref 4.6–6.2)
SODIUM SERPL-SCNC: 139 MMOL/L (ref 136–145)
WBC # BLD AUTO: 7.11 K/UL (ref 3.9–12.7)

## 2020-04-27 PROCEDURE — 80053 COMPREHEN METABOLIC PANEL: CPT | Mod: HCNC

## 2020-04-27 PROCEDURE — 93793 ANTICOAG MGMT PT WARFARIN: CPT | Mod: S$GLB,,,

## 2020-04-27 PROCEDURE — 85025 COMPLETE CBC W/AUTO DIFF WBC: CPT | Mod: HCNC

## 2020-04-27 PROCEDURE — 93793 PR ANTICOAGULANT MGMT FOR PT TAKING WARFARIN: ICD-10-PCS | Mod: S$GLB,,,

## 2020-05-04 ENCOUNTER — ANTI-COAG VISIT (OUTPATIENT)
Dept: CARDIOLOGY | Facility: CLINIC | Age: 85
End: 2020-05-04
Payer: MEDICARE

## 2020-05-04 DIAGNOSIS — I48.0 PAROXYSMAL ATRIAL FIBRILLATION: ICD-10-CM

## 2020-05-04 DIAGNOSIS — Z79.01 LONG TERM CURRENT USE OF ANTICOAGULANT THERAPY: ICD-10-CM

## 2020-05-04 LAB — INR PPP: 2.2

## 2020-05-04 PROCEDURE — 93793 ANTICOAG MGMT PT WARFARIN: CPT | Mod: S$GLB,,,

## 2020-05-04 PROCEDURE — 93793 PR ANTICOAGULANT MGMT FOR PT TAKING WARFARIN: ICD-10-PCS | Mod: S$GLB,,,

## 2020-05-07 ENCOUNTER — TELEPHONE (OUTPATIENT)
Dept: HEMATOLOGY/ONCOLOGY | Facility: CLINIC | Age: 85
End: 2020-05-07

## 2020-05-07 NOTE — TELEPHONE ENCOUNTER
----- Message from Payton Lay sent at 5/7/2020  4:27 PM CDT -----  Contact: PT's Daughter in Law   PT is hard of hearing and when they called to confirm his appointment he thought they said May 3rd so he cancelled it by mistake. She would like to know if there is any way to get the appointment back for tomorrow. Please call back     Callback: [phone]

## 2020-05-08 ENCOUNTER — OFFICE VISIT (OUTPATIENT)
Dept: HEMATOLOGY/ONCOLOGY | Facility: CLINIC | Age: 85
End: 2020-05-08
Payer: MEDICARE

## 2020-05-08 DIAGNOSIS — D63.1 ANEMIA OF RENAL DISEASE: ICD-10-CM

## 2020-05-08 DIAGNOSIS — T81.89XD DELAYED SURGICAL WOUND HEALING, SUBSEQUENT ENCOUNTER: ICD-10-CM

## 2020-05-08 DIAGNOSIS — D47.3 ESSENTIAL THROMBOCYTOSIS: Primary | ICD-10-CM

## 2020-05-08 DIAGNOSIS — N18.9 ANEMIA OF RENAL DISEASE: ICD-10-CM

## 2020-05-08 PROCEDURE — 99499 UNLISTED E&M SERVICE: CPT | Mod: HCNC,95,, | Performed by: INTERNAL MEDICINE

## 2020-05-08 PROCEDURE — 3288F PR FALLS RISK ASSESSMENT DOCUMENTED: ICD-10-PCS | Mod: HCNC,CPTII,95, | Performed by: INTERNAL MEDICINE

## 2020-05-08 PROCEDURE — 1100F PTFALLS ASSESS-DOCD GE2>/YR: CPT | Mod: HCNC,CPTII,95, | Performed by: INTERNAL MEDICINE

## 2020-05-08 PROCEDURE — 1159F MED LIST DOCD IN RCRD: CPT | Mod: HCNC,95,, | Performed by: INTERNAL MEDICINE

## 2020-05-08 PROCEDURE — 99214 OFFICE O/P EST MOD 30 MIN: CPT | Mod: HCNC,95,, | Performed by: INTERNAL MEDICINE

## 2020-05-08 PROCEDURE — 1100F PR PT FALLS ASSESS DOC 2+ FALLS/FALL W/INJURY/YR: ICD-10-PCS | Mod: HCNC,CPTII,95, | Performed by: INTERNAL MEDICINE

## 2020-05-08 PROCEDURE — 3288F FALL RISK ASSESSMENT DOCD: CPT | Mod: HCNC,CPTII,95, | Performed by: INTERNAL MEDICINE

## 2020-05-08 PROCEDURE — 99214 PR OFFICE/OUTPT VISIT, EST, LEVL IV, 30-39 MIN: ICD-10-PCS | Mod: HCNC,95,, | Performed by: INTERNAL MEDICINE

## 2020-05-08 PROCEDURE — 99499 RISK ADDL DX/OHS AUDIT: ICD-10-PCS | Mod: HCNC,95,, | Performed by: INTERNAL MEDICINE

## 2020-05-08 PROCEDURE — 1159F PR MEDICATION LIST DOCUMENTED IN MEDICAL RECORD: ICD-10-PCS | Mod: HCNC,95,, | Performed by: INTERNAL MEDICINE

## 2020-05-08 NOTE — PROGRESS NOTES
HEMATOLOGIC MALIGNANCIES PROGRESS NOTE    IDENTIFYING STATEMENT   Ernie Phan (Ernie) is a 89 y.o. male with a  of 3/19/1931 from [place] with the diagnosis of essential thrombocytosis.      TELEMEDICINE DOCUMENTATION    The patient location is: home  The chief complaint leading to consultation is:   Visit type: audiovisual  Total time spent with patient: 25 minutes  Each patient to whom he or she provides medical services by telemedicine is:  (1) informed of the relationship between the physician and patient and the respective role of any other health care provider with respect to management of the patient; and (2) notified that he or she may decline to receive medical services by telemedicine and may withdraw from such care at any time.    Notes:       ONCOLOGY HISTORY:    1. Essential thrombocytosis     2. 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 Myasthenia gravis on azathioprine  4. Pulmonary hypertension  5. HTN  6. Atrial fibrillation  7. CKD-III    INTERVAL HISTORY:      Mr. Phan returns to clinic for follow-up of his essential thrombocytosis and anemia of chronic kidney disease.     He is feeling okay overall and reports no symptoms of anemia. He continues on hydroxyurea at a dose of 1000 mg on day 1 and then 500 mg on days 2 and 3 of a 3-day cycle. He is tolerating this well without adverse effect.      Issues with wound healing (see wound care notes).     Past Medical History, Past Social History and Past Family History have been reviewed and are unchanged except as noted in the interval history.    MEDICATIONS:     Current Outpatient Medications on File Prior to Visit   Medication Sig Dispense Refill    azaTHIOprine (IMURAN) 50 mg Tab Take 4 tablets (200 mg total) by mouth once daily. 360 tablet 3    DENTURE CLEANSER (EFFERVESCENT DENTURE CLEANSR DENT)       finasteride (PROSCAR) 5 mg tablet Take 1 tablet (5 mg total) by mouth once daily. DO NOT CRUSH OR CHEW; SWALLOW WHOLE. 90 tablet  1    flu vacc [id number],65yr up,PF (FLUZONE HIGH-DOSE 2019-20, PF,) 180 mcg/0.5 mL Syrg admin as directed 0.5 mL 0    FLUZONE HIGH-DOSE 2018-19, PF, 180 mcg/0.5 mL vaccine       furosemide (LASIX) 20 MG tablet Take 1 tablet (20 mg total) by mouth once daily. 30 tablet 5    gabapentin (NEURONTIN) 300 MG capsule Take 1 capsule (300 mg total) by mouth 3 (three) times daily. 270 capsule 3    honey (MEDIHOJACK HONEY,) 80 % Gel Apply 1 application topically once daily. 44 mL 1    hydroxyurea (HYDREA) 500 mg Cap Take 1,000 mg on day 1, then 500 mg on days 2 and 3 and repeat.. 120 capsule 3    ibuprofen (ADVIL,MOTRIN) 600 MG tablet Take 1 tablet (600 mg total) by mouth every 6 (six) hours as needed.      IRON, FERROUS SULFATE, ORAL Take 65 mg by mouth once daily.      MEDIHONEY, HONEY, 100 % Pste       ondansetron (ZOFRAN) 4 MG tablet Take 1 tablet (4 mg total) by mouth every 8 (eight) hours as needed for Nausea. 6 tablet 0    ONE TOUCH ULTRA TEST Strp TEST DAILY 100 each 3    ONE TOUCH ULTRASOFT LANCETS lancets TEST DAILY 100 each 3    predniSONE (DELTASONE) 20 MG tablet 2 pills po daily for 4 days, then 1 pill po day for 4 days 12 tablet 0    predniSONE (DELTASONE) 5 MG tablet Take 1 tablet (5 mg total) by mouth once daily. 90 tablet 3    pyridostigmine (MESTINON) 60 mg Tab Take 60 mg by mouth.      tamsulosin (FLOMAX) 0.4 mg Cap TAKE 1 CAPSULE EVERY DAY 90 capsule 3    tamsulosin (FLOMAX) 0.4 mg Cap Take 1 capsule (0.4 mg total) by mouth once daily. 90 capsule 1    tamsulosin (FLOMAX) 0.4 mg Cap Take 1 capsule (0.4 mg total) by mouth once daily. DO NOT CRUSH OR CHEW; SWALLOW WHOLE. 90 capsule 1    verapamil (CALAN) 120 MG tablet Take 1 tablet (120 mg total) by mouth 2 (two) times daily. 180 tablet 3    vitamin D 1000 units Tab Take 185 mg by mouth once daily.      warfarin (COUMADIN) 5 MG tablet TAKE 1 TABLET EVERY DAY EXCEPT TAKE 1/2 TABLET ON SUNDAY, OR AS DIRECTED BY COUMADIN CLINIC 90 tablet 3      No current facility-administered medications on file prior to visit.        ALLERGIES: Review of patient's allergies indicates:  No Known Allergies     ROS:       Review of Systems   Constitutional: Negative for diaphoresis, fatigue, fever and unexpected weight change.   HENT:   Negative for lump/mass and sore throat.    Eyes: Negative for icterus.   Respiratory: Negative for cough and shortness of breath.    Cardiovascular: Negative for chest pain and palpitations.   Gastrointestinal: Negative for abdominal distention, constipation, diarrhea, nausea and vomiting.   Genitourinary: Negative for dysuria and frequency.    Musculoskeletal: Negative for arthralgias, gait problem and myalgias.   Skin: Positive for wound. Negative for rash.   Neurological: Negative for dizziness, gait problem and headaches.   Hematological: Negative for adenopathy. Does not bruise/bleed easily.   Psychiatric/Behavioral: Negative for depression. The patient is not nervous/anxious.        PHYSICAL EXAM:  There were no vitals filed for this visit.    Physical Exam   head and neck visualized and no abnormalities seen.   Wound care notes and images reviewed.     LAB:   Results for orders placed or performed in visit on 05/04/20   Prothrombin w/ INR and PTT   Result Value Ref Range    INR 2.2      *Note: Due to a large number of results and/or encounters for the requested time period, some results have not been displayed. A complete set of results can be found in Results Review.       PROBLEMS ASSESSED THIS VISIT:    1. Essential thrombocytosis    2. Anemia of renal disease    3. Delayed surgical wound healing, subsequent encounter        PLAN:       Essential thrombocytosis  Platelets still above goal (545). Given moderate anemia and wound healing issues, I would consider dose reduction to 500 mg PO daily of hydroxyurea to facilitate better wound healing (will copy wound care RN on this note to see if there are any concerns about rate of  healing).     Follow-up   Follow-up visit in 6 months. He needs labs every two months (CBC, CMP) but wants to do them through home health. Kaylie, can you please help me arrange this with home health company?     Nick Gutierrez MD  Hematology and Stem Cell Transplant

## 2020-05-08 NOTE — Clinical Note
Follow-up visit in 6 months. He needs labs every two months (CBC, CMP) but wants to do them through home health. Kaylie, can you please help me arrange this with home health company?

## 2020-05-11 ENCOUNTER — ANTI-COAG VISIT (OUTPATIENT)
Dept: CARDIOLOGY | Facility: CLINIC | Age: 85
End: 2020-05-11
Payer: MEDICARE

## 2020-05-11 DIAGNOSIS — Z79.01 LONG TERM CURRENT USE OF ANTICOAGULANT THERAPY: ICD-10-CM

## 2020-05-11 DIAGNOSIS — I48.0 PAROXYSMAL ATRIAL FIBRILLATION: ICD-10-CM

## 2020-05-11 LAB — INR PPP: 2.2

## 2020-05-11 PROCEDURE — 93793 ANTICOAG MGMT PT WARFARIN: CPT | Mod: S$GLB,,,

## 2020-05-11 PROCEDURE — 93793 PR ANTICOAGULANT MGMT FOR PT TAKING WARFARIN: ICD-10-PCS | Mod: S$GLB,,,

## 2020-05-12 NOTE — ASSESSMENT & PLAN NOTE
Platelets still above goal (545). Given moderate anemia and wound healing issues, I would consider dose reduction to 500 mg PO daily of hydroxyurea to facilitate better wound healing (will copy wound care RN on this note to see if there are any concerns about rate of healing).

## 2020-05-13 ENCOUNTER — OFFICE VISIT (OUTPATIENT)
Dept: WOUND CARE | Facility: CLINIC | Age: 85
End: 2020-05-13
Payer: MEDICARE

## 2020-05-13 VITALS — DIASTOLIC BLOOD PRESSURE: 60 MMHG | HEART RATE: 70 BPM | SYSTOLIC BLOOD PRESSURE: 110 MMHG | TEMPERATURE: 97 F

## 2020-05-13 DIAGNOSIS — T81.89XA DELAYED SURGICAL WOUND HEALING, INITIAL ENCOUNTER: Primary | ICD-10-CM

## 2020-05-13 PROCEDURE — 99212 OFFICE O/P EST SF 10 MIN: CPT | Mod: HCNC,95,, | Performed by: NURSE PRACTITIONER

## 2020-05-13 PROCEDURE — 1125F AMNT PAIN NOTED PAIN PRSNT: CPT | Mod: HCNC,95,, | Performed by: NURSE PRACTITIONER

## 2020-05-13 PROCEDURE — 1125F PR PAIN SEVERITY QUANTIFIED, PAIN PRESENT: ICD-10-PCS | Mod: HCNC,95,, | Performed by: NURSE PRACTITIONER

## 2020-05-13 PROCEDURE — 1100F PR PT FALLS ASSESS DOC 2+ FALLS/FALL W/INJURY/YR: ICD-10-PCS | Mod: HCNC,CPTII,95, | Performed by: NURSE PRACTITIONER

## 2020-05-13 PROCEDURE — 1159F PR MEDICATION LIST DOCUMENTED IN MEDICAL RECORD: ICD-10-PCS | Mod: HCNC,95,, | Performed by: NURSE PRACTITIONER

## 2020-05-13 PROCEDURE — 99212 PR OFFICE/OUTPT VISIT, EST, LEVL II, 10-19 MIN: ICD-10-PCS | Mod: HCNC,95,, | Performed by: NURSE PRACTITIONER

## 2020-05-13 PROCEDURE — 1159F MED LIST DOCD IN RCRD: CPT | Mod: HCNC,95,, | Performed by: NURSE PRACTITIONER

## 2020-05-13 PROCEDURE — 3288F PR FALLS RISK ASSESSMENT DOCUMENTED: ICD-10-PCS | Mod: HCNC,CPTII,95, | Performed by: NURSE PRACTITIONER

## 2020-05-13 PROCEDURE — 1100F PTFALLS ASSESS-DOCD GE2>/YR: CPT | Mod: HCNC,CPTII,95, | Performed by: NURSE PRACTITIONER

## 2020-05-13 PROCEDURE — 3288F FALL RISK ASSESSMENT DOCD: CPT | Mod: HCNC,CPTII,95, | Performed by: NURSE PRACTITIONER

## 2020-05-13 RX ORDER — TRAMADOL HYDROCHLORIDE 50 MG/1
50 TABLET ORAL 2 TIMES DAILY PRN
Qty: 14 TABLET | Refills: 0 | Status: ON HOLD | OUTPATIENT
Start: 2020-05-13 | End: 2020-05-22 | Stop reason: CLARIF

## 2020-05-13 NOTE — PROGRESS NOTES
Subjective:       Patient ID: Ernie Phan is a 89 y.o. male.    Chief Complaint: Wound Check    HPI     This is an 88 year old male referred for evaluation and management of a surgical wound to the scalp area.  He is s/p debridement of open head wound involving the bone and application of wound vac on 12/12/19 with Dr. Espinoza. He originally had a repair of Mohs defect with ACell on 12/5/19 but the ACell failed.  He now has more granulation tissue present in the base of the wound. He also has traumatic wounds to the right lower leg and a new skin tear to the left forearm which happened the evening of 5/10/20 when he had a fall.  He is using medihoney gel daily on the leg wounds.  He has home health services through [facility] and they are seeing him three times weekly. He is afebrile. He reports increased redness and swelling but denies purulent drainage. He has no pain from the wounds but is having sciatica pain and his pain level from this is 7/10. This is a virtual visit with the patient, caregiver and home health nurse.  Review of Systems   Constitutional: Negative for chills, diaphoresis and fever.   HENT: Positive for hearing loss. Negative for postnasal drip, rhinorrhea, sinus pressure, sneezing, sore throat, tinnitus and trouble swallowing.    Eyes: Negative for visual disturbance.   Respiratory: Negative for apnea, cough, shortness of breath and wheezing.    Cardiovascular: Positive for leg swelling (right leg and foot). Negative for chest pain and palpitations.   Gastrointestinal: Positive for constipation. Negative for diarrhea, nausea and vomiting.   Genitourinary: Positive for frequency. Negative for difficulty urinating, dysuria and hematuria.   Musculoskeletal: Negative for arthralgias, back pain and joint swelling.   Skin: Positive for color change and wound.   Neurological: Positive for weakness. Negative for dizziness, light-headedness and headaches.   Hematological: Bruises/bleeds easily.    Psychiatric/Behavioral: Positive for dysphoric mood. Negative for confusion, decreased concentration and sleep disturbance. The patient is not nervous/anxious.        Objective:      Physical Exam   Constitutional: He is oriented to person, place, and time. He appears well-developed and well-nourished. No distress.   HENT:   Head: Normocephalic.       Cardiovascular:   Palpable pulses per home health nurse   Pulmonary/Chest: Effort normal. No respiratory distress.   Musculoskeletal: He exhibits edema (right lower leg and foot). He exhibits no tenderness.        Arms:       Legs:  Neurological: He is alert and oriented to person, place, and time.   Skin: No rash noted. He is not diaphoretic. No erythema.   Psychiatric: He has a normal mood and affect. His behavior is normal. Judgment and thought content normal.   Nursing note and vitals reviewed.      Scalp 5/13/20    Right medial leg    Right posterior leg    Skin tear left forearm          Assessment:       1. Delayed surgical wound healing, initial encounter               Plan:            Cleanse wounds with wound cleanser.  Continue wound vac therapy to scalp wound.  Place endoform on the healthy granulation tissue. Place black sponge in base of wound and run vac at 125 mmHg on continuous suction. Change vac three times weekly.  Apply medihoney gel to right leg wounds, cover with hydrofiber and ABD pad.    Coflex compression wrap with zinc oxide right lower leg.  Change dressing three times weekly.  Skin prep to periwound area left forearm. Foam border dressing three times weekly to skin tear left forearm.   Elevate legs at all times when seated.  [facility] notified of orders via email. We will ask them to see the patient three times weekly.  Return to clinic in two to three weeks.  We will try to arrange another virtual visit.    The patient location is: their home in [place].  The chief complaint leading to consultation is: surgical scalp wound and new  wounds to the right lower leg secondary to cellulitis.  Visit type: Audiovisual virtual visit with synchronous audio and video  Total time spent with patient: 20 minutes  Each patient to whom he or she provides medical services by telemedicine is:  (1) informed of the relationship between the physician and patient and the respective role of any other health care provider with respect to management of the patient; and (2) notified that he or she may decline to receive medical services by telemedicine and may withdraw from such care at any time.    Notes: See above

## 2020-05-14 ENCOUNTER — DOCUMENT SCAN (OUTPATIENT)
Dept: HOME HEALTH SERVICES | Facility: HOSPITAL | Age: 85
End: 2020-05-14
Payer: MEDICARE

## 2020-05-18 ENCOUNTER — ANTI-COAG VISIT (OUTPATIENT)
Dept: CARDIOLOGY | Facility: CLINIC | Age: 85
End: 2020-05-18
Payer: MEDICARE

## 2020-05-18 DIAGNOSIS — I48.0 PAROXYSMAL ATRIAL FIBRILLATION: ICD-10-CM

## 2020-05-18 DIAGNOSIS — Z79.01 LONG TERM CURRENT USE OF ANTICOAGULANT THERAPY: ICD-10-CM

## 2020-05-18 LAB — INR PPP: 3.1

## 2020-05-18 PROCEDURE — 93793 ANTICOAG MGMT PT WARFARIN: CPT | Mod: S$GLB,,,

## 2020-05-18 PROCEDURE — 93793 PR ANTICOAGULANT MGMT FOR PT TAKING WARFARIN: ICD-10-PCS | Mod: S$GLB,,,

## 2020-05-18 NOTE — PROGRESS NOTES
Pt states he was given a pain pill Tramadol 50mg. Pt states he started taking them 4 days ago for 14 days. Pt denies any changes. Pt states he hit his arm and have some bruising. Pt confirmed correct doses.

## 2020-05-19 ENCOUNTER — PATIENT MESSAGE (OUTPATIENT)
Dept: INTERNAL MEDICINE | Facility: CLINIC | Age: 85
End: 2020-05-19

## 2020-05-20 ENCOUNTER — PATIENT MESSAGE (OUTPATIENT)
Dept: INTERNAL MEDICINE | Facility: CLINIC | Age: 85
End: 2020-05-20

## 2020-05-20 NOTE — PROGRESS NOTES
PAST MEDICAL HISTORY:   Hypertension.  Myasthenia gravis.  Paroxysmal atrial fibrillation.  Essential thrombocytosis   Anemia , due to Myeloproliferative disorder , Imuran, Hydroxyurea  Hyperlipidemia.  Tricuspid regurgitation moderate  Mitral regurgitation, mild  Aortic regurgitation, mild  Pulmonary Hypertension  BPH.  Gastroesophageal reflux disease.  Postherpetic neuralgia involving the right medial leg.  Cervical degenerative disk disease.  History of depression.  Osteoarthritis of the knees  Cholecystectomy.  Bilateral cataract extraction.  Repair of ruptured right Achilles tendon.  Repair of ptosis, both eyes.     SOCIAL HISTORY:  Tobacco and alcohol use - none    MEDICATIONS:  Aspirin 81 mg.  Imuran 50 mg four a day.  Gabapentin 300 mg 2 tid  Finasteride 5 mg.  Iron sulfate.  Prednisone 5 mg daily  Mestinon 60 mg. As needed  Tamsulosin 0.4 mg.  Verapamil  mg 2 a dayCoumadin.  Vitamin D 1000 units.  Lasix 20 mg daily    PAST MEDICAL HISTORY:   Hypertension.  Myasthenia gravis.  Paroxysmal atrial fibrillation.  Essential thrombocytosis   Anemia , due to Myeloproliferative disorder , Imuran, Hydroxyurea  Hyperlipidemia.  Tricuspid regurgitation moderate  Mitral regurgitation, mild  Aortic regurgitation, mild  Pulmonary Hypertension  BPH.  Gastroesophageal reflux disease.  Postherpetic neuralgia involving the right medial leg.  Cervical degenerative disk disease.  History of depression.  Osteoarthritis of the knees  Cholecystectomy.  Bilateral cataract extraction.  Repair of ruptured right Achilles tendon.  Repair of ptosis, both eyes.      89-year-old male who was brought in by his daughter in law    Since yesterday he has not been able to walk or ambulate well since having a fall in the morning  And within the past 7-10 days his mobility has been very diminished    Yesterday morning well 8:00 a.m. he was walking with a walker from the bathroom    when he was near to bed his legs gave out in which he fell  on his butt and then back   [employer] was notify, his son then later medics was able to come out to put him in the wheelchair then later the bed    He had a fall about 10 days ago when he was getting out the bed which he went down    Recently he has been having worsening burning pain involving the medial aspect of the right thigh   he has had this for a while from post herpetic neuralgia but appears to be getting worse    Another situations that on December 12th, he underwent a skin graft on the top of her scalp and was in the hospital 4 days  Since this time he has had edema and cellulitis involving his right distal lower extremity for which he is back on diuretic as well two courses of antibiotics   He has 2 wounds on his leg which he is being seen by wound care      Examination     185 lb  Pulse 60  Blood pressure 100/62  Lungs clear  Heart regular rate and rhythm    His right lower extremity looks so bigger than the left as well as the right knee  He cannot stand up by himself any with assistance is difficult for his stand at all    While sitting down he is able to flex and bend both legs at the knee joint but is difficult phone lift his legs at the hip    There is an area ecchymosis in back of his head    Impression  Leg weakness particularly proximal leg weakness  Painful neuralgia, post herpetic neuralgia right leg  Hypertension, will relatively low blood pressure  Myasthenia gravis    Plan  Given the circumstances, we will make arrangements to be seen emergency room for evaluation with lab testing a possibly the brain imaging and Neurology visit

## 2020-05-21 ENCOUNTER — OFFICE VISIT (OUTPATIENT)
Dept: INTERNAL MEDICINE | Facility: CLINIC | Age: 85
End: 2020-05-21
Payer: MEDICARE

## 2020-05-21 ENCOUNTER — HOSPITAL ENCOUNTER (OUTPATIENT)
Facility: HOSPITAL | Age: 85
Discharge: NURSING, REHAB OR LONG-TERM CARE | End: 2020-05-29
Attending: EMERGENCY MEDICINE | Admitting: INTERNAL MEDICINE
Payer: MEDICARE

## 2020-05-21 VITALS
WEIGHT: 185 LBS | HEART RATE: 58 BPM | BODY MASS INDEX: 27.4 KG/M2 | HEIGHT: 69 IN | SYSTOLIC BLOOD PRESSURE: 100 MMHG | DIASTOLIC BLOOD PRESSURE: 62 MMHG | OXYGEN SATURATION: 96 %

## 2020-05-21 DIAGNOSIS — G70.00 MYASTHENIA GRAVIS: ICD-10-CM

## 2020-05-21 DIAGNOSIS — Z86.69 H/O MYASTHENIA GRAVIS: ICD-10-CM

## 2020-05-21 DIAGNOSIS — M25.561 RIGHT KNEE PAIN: ICD-10-CM

## 2020-05-21 DIAGNOSIS — M79.89 LEG SWELLING: ICD-10-CM

## 2020-05-21 DIAGNOSIS — R29.898 LEG WEAKNESS, BILATERAL: Primary | ICD-10-CM
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
## 2020-05-21 DIAGNOSIS — M79.2 NEURALGIA: ICD-10-CM

## 2020-05-21 DIAGNOSIS — R29.6 RECURRENT FALLS: ICD-10-CM

## 2020-05-21 DIAGNOSIS — R53.1 WEAKNESS: Primary | ICD-10-CM

## 2020-05-21 DIAGNOSIS — M25.551 RIGHT HIP PAIN: ICD-10-CM

## 2020-05-21 DIAGNOSIS — R29.6 FREQUENT FALLS: ICD-10-CM

## 2020-05-21 DIAGNOSIS — D47.3 ESSENTIAL THROMBOCYTOSIS: ICD-10-CM

## 2020-05-21 PROBLEM — N17.9 ACUTE RENAL FAILURE SUPERIMPOSED ON STAGE 3 CHRONIC KIDNEY DISEASE: Status: RESOLVED | Noted: 2019-03-19 | Resolved: 2020-05-21

## 2020-05-21 PROBLEM — N18.30 ACUTE RENAL FAILURE SUPERIMPOSED ON STAGE 3 CHRONIC KIDNEY DISEASE: Status: RESOLVED | Noted: 2019-03-19 | Resolved: 2020-05-21

## 2020-05-21 LAB
ALBUMIN SERPL BCP-MCNC: 3.5 G/DL (ref 3.5–5.2)
ALP SERPL-CCNC: 82 U/L (ref 55–135)
ALT SERPL W/O P-5'-P-CCNC: 10 U/L (ref 10–44)
ANION GAP SERPL CALC-SCNC: 9 MMOL/L (ref 8–16)
AST SERPL-CCNC: 21 U/L (ref 10–40)
BACTERIA #/AREA URNS AUTO: NORMAL /HPF
BASOPHILS # BLD AUTO: 0.05 K/UL (ref 0–0.2)
BASOPHILS NFR BLD: 0.4 % (ref 0–1.9)
BILIRUB SERPL-MCNC: 2.1 MG/DL (ref 0.1–1)
BILIRUB UR QL STRIP: NEGATIVE
BNP SERPL-MCNC: 939 PG/ML (ref 0–99)
BUN SERPL-MCNC: 15 MG/DL (ref 8–23)
CALCIUM SERPL-MCNC: 8.9 MG/DL (ref 8.7–10.5)
CHLORIDE SERPL-SCNC: 105 MMOL/L (ref 95–110)
CLARITY UR REFRACT.AUTO: CLEAR
CO2 SERPL-SCNC: 24 MMOL/L (ref 23–29)
COLOR UR AUTO: YELLOW
CREAT SERPL-MCNC: 1.2 MG/DL (ref 0.5–1.4)
DIFFERENTIAL METHOD: ABNORMAL
EOSINOPHIL # BLD AUTO: 0 K/UL (ref 0–0.5)
EOSINOPHIL NFR BLD: 0 % (ref 0–8)
ERYTHROCYTE [DISTWIDTH] IN BLOOD BY AUTOMATED COUNT: 17.1 % (ref 11.5–14.5)
EST. GFR  (AFRICAN AMERICAN): >60 ML/MIN/1.73 M^2
EST. GFR  (NON AFRICAN AMERICAN): 53.3 ML/MIN/1.73 M^2
GLUCOSE SERPL-MCNC: 103 MG/DL (ref 70–110)
GLUCOSE UR QL STRIP: NEGATIVE
HCT VFR BLD AUTO: 30.1 % (ref 40–54)
HGB BLD-MCNC: 9.2 G/DL (ref 14–18)
HGB UR QL STRIP: ABNORMAL
IMM GRANULOCYTES # BLD AUTO: 0.24 K/UL (ref 0–0.04)
IMM GRANULOCYTES NFR BLD AUTO: 2 % (ref 0–0.5)
INR PPP: 2.7 (ref 0.8–1.2)
INR PPP: 2.8 (ref 0.8–1.2)
KETONES UR QL STRIP: NEGATIVE
LEUKOCYTE ESTERASE UR QL STRIP: NEGATIVE
LYMPHOCYTES # BLD AUTO: 0.8 K/UL (ref 1–4.8)
LYMPHOCYTES NFR BLD: 6.4 % (ref 18–48)
MCH RBC QN AUTO: 40.5 PG (ref 27–31)
MCHC RBC AUTO-ENTMCNC: 30.6 G/DL (ref 32–36)
MCV RBC AUTO: 133 FL (ref 82–98)
MICROSCOPIC COMMENT: NORMAL
MONOCYTES # BLD AUTO: 1.1 K/UL (ref 0.3–1)
MONOCYTES NFR BLD: 8.8 % (ref 4–15)
NEUTROPHILS # BLD AUTO: 9.8 K/UL (ref 1.8–7.7)
NEUTROPHILS NFR BLD: 82.4 % (ref 38–73)
NITRITE UR QL STRIP: NEGATIVE
NRBC BLD-RTO: 0 /100 WBC
PH UR STRIP: 6 [PH] (ref 5–8)
PLATELET # BLD AUTO: 793 K/UL (ref 150–350)
PMV BLD AUTO: 9.2 FL (ref 9.2–12.9)
POTASSIUM SERPL-SCNC: 3.8 MMOL/L (ref 3.5–5.1)
PROT SERPL-MCNC: 6.9 G/DL (ref 6–8.4)
PROT UR QL STRIP: NEGATIVE
PROTHROMBIN TIME: 25.4 SEC (ref 9–12.5)
PROTHROMBIN TIME: 26.4 SEC (ref 9–12.5)
RBC # BLD AUTO: 2.27 M/UL (ref 4.6–6.2)
RBC #/AREA URNS AUTO: 3 /HPF (ref 0–4)
SARS-COV-2 RDRP RESP QL NAA+PROBE: NEGATIVE
SODIUM SERPL-SCNC: 138 MMOL/L (ref 136–145)
SP GR UR STRIP: 1.01 (ref 1–1.03)
URN SPEC COLLECT METH UR: ABNORMAL
WBC # BLD AUTO: 11.94 K/UL (ref 3.9–12.7)
WBC #/AREA URNS AUTO: 0 /HPF (ref 0–5)

## 2020-05-21 PROCEDURE — 99285 EMERGENCY DEPT VISIT HI MDM: CPT | Mod: 25,HCNC

## 2020-05-21 PROCEDURE — 1125F PR PAIN SEVERITY QUANTIFIED, PAIN PRESENT: ICD-10-PCS | Mod: HCNC,S$GLB,, | Performed by: INTERNAL MEDICINE

## 2020-05-21 PROCEDURE — 93010 EKG 12-LEAD: ICD-10-PCS | Mod: HCNC,,, | Performed by: INTERNAL MEDICINE

## 2020-05-21 PROCEDURE — 96374 THER/PROPH/DIAG INJ IV PUSH: CPT | Mod: HCNC

## 2020-05-21 PROCEDURE — 85025 COMPLETE CBC W/AUTO DIFF WBC: CPT | Mod: HCNC

## 2020-05-21 PROCEDURE — 85610 PROTHROMBIN TIME: CPT | Mod: HCNC

## 2020-05-21 PROCEDURE — 99285 EMERGENCY DEPT VISIT HI MDM: CPT | Mod: HCNC,,, | Performed by: EMERGENCY MEDICINE

## 2020-05-21 PROCEDURE — 1101F PT FALLS ASSESS-DOCD LE1/YR: CPT | Mod: HCNC,CPTII,S$GLB, | Performed by: INTERNAL MEDICINE

## 2020-05-21 PROCEDURE — G0378 HOSPITAL OBSERVATION PER HR: HCPCS | Mod: HCNC

## 2020-05-21 PROCEDURE — 99285 PR EMERGENCY DEPT VISIT,LEVEL V: ICD-10-PCS | Mod: HCNC,,, | Performed by: EMERGENCY MEDICINE

## 2020-05-21 PROCEDURE — 93005 ELECTROCARDIOGRAM TRACING: CPT | Mod: HCNC

## 2020-05-21 PROCEDURE — 80053 COMPREHEN METABOLIC PANEL: CPT | Mod: HCNC

## 2020-05-21 PROCEDURE — 99220 PR INITIAL OBSERVATION CARE,LEVL III: ICD-10-PCS | Mod: HCNC,,, | Performed by: PHYSICIAN ASSISTANT

## 2020-05-21 PROCEDURE — 1125F AMNT PAIN NOTED PAIN PRSNT: CPT | Mod: HCNC,S$GLB,, | Performed by: INTERNAL MEDICINE

## 2020-05-21 PROCEDURE — 1159F MED LIST DOCD IN RCRD: CPT | Mod: HCNC,S$GLB,, | Performed by: INTERNAL MEDICINE

## 2020-05-21 PROCEDURE — 1159F PR MEDICATION LIST DOCUMENTED IN MEDICAL RECORD: ICD-10-PCS | Mod: HCNC,S$GLB,, | Performed by: INTERNAL MEDICINE

## 2020-05-21 PROCEDURE — 81001 URINALYSIS AUTO W/SCOPE: CPT | Mod: HCNC

## 2020-05-21 PROCEDURE — 63600175 PHARM REV CODE 636 W HCPCS: Mod: HCNC | Performed by: EMERGENCY MEDICINE

## 2020-05-21 PROCEDURE — 99220 PR INITIAL OBSERVATION CARE,LEVL III: CPT | Mod: HCNC,,, | Performed by: PHYSICIAN ASSISTANT

## 2020-05-21 PROCEDURE — 99214 OFFICE O/P EST MOD 30 MIN: CPT | Mod: HCNC,S$GLB,, | Performed by: INTERNAL MEDICINE

## 2020-05-21 PROCEDURE — U0002 COVID-19 LAB TEST NON-CDC: HCPCS | Mod: HCNC

## 2020-05-21 PROCEDURE — 25000003 PHARM REV CODE 250: Mod: HCNC | Performed by: EMERGENCY MEDICINE

## 2020-05-21 PROCEDURE — 99999 PR PBB SHADOW E&M-EST. PATIENT-LVL IV: CPT | Mod: PBBFAC,HCNC,, | Performed by: INTERNAL MEDICINE

## 2020-05-21 PROCEDURE — 99214 PR OFFICE/OUTPT VISIT, EST, LEVL IV, 30-39 MIN: ICD-10-PCS | Mod: HCNC,S$GLB,, | Performed by: INTERNAL MEDICINE

## 2020-05-21 PROCEDURE — 93010 ELECTROCARDIOGRAM REPORT: CPT | Mod: HCNC,,, | Performed by: INTERNAL MEDICINE

## 2020-05-21 PROCEDURE — 1101F PR PT FALLS ASSESS DOC 0-1 FALLS W/OUT INJ PAST YR: ICD-10-PCS | Mod: HCNC,CPTII,S$GLB, | Performed by: INTERNAL MEDICINE

## 2020-05-21 PROCEDURE — 99999 PR PBB SHADOW E&M-EST. PATIENT-LVL IV: ICD-10-PCS | Mod: PBBFAC,HCNC,, | Performed by: INTERNAL MEDICINE

## 2020-05-21 PROCEDURE — 83880 ASSAY OF NATRIURETIC PEPTIDE: CPT | Mod: HCNC

## 2020-05-21 RX ORDER — AZATHIOPRINE 50 MG/1
200 TABLET ORAL DAILY
Status: DISCONTINUED | OUTPATIENT
Start: 2020-05-22 | End: 2020-05-29 | Stop reason: HOSPADM

## 2020-05-21 RX ORDER — LIDOCAINE 50 MG/G
1 PATCH TOPICAL
Status: DISCONTINUED | OUTPATIENT
Start: 2020-05-21 | End: 2020-05-29 | Stop reason: HOSPADM

## 2020-05-21 RX ORDER — FUROSEMIDE 10 MG/ML
40 INJECTION INTRAMUSCULAR; INTRAVENOUS
Status: DISCONTINUED | OUTPATIENT
Start: 2020-05-21 | End: 2020-05-21

## 2020-05-21 RX ORDER — OXYCODONE HYDROCHLORIDE 5 MG/1
5 TABLET ORAL EVERY 6 HOURS PRN
Status: DISCONTINUED | OUTPATIENT
Start: 2020-05-22 | End: 2020-05-24

## 2020-05-21 RX ORDER — FINASTERIDE 5 MG/1
5 TABLET, FILM COATED ORAL DAILY
Status: DISCONTINUED | OUTPATIENT
Start: 2020-05-22 | End: 2020-05-29 | Stop reason: HOSPADM

## 2020-05-21 RX ORDER — ONDANSETRON 8 MG/1
8 TABLET, ORALLY DISINTEGRATING ORAL EVERY 8 HOURS PRN
Status: DISCONTINUED | OUTPATIENT
Start: 2020-05-22 | End: 2020-05-29 | Stop reason: HOSPADM

## 2020-05-21 RX ORDER — FUROSEMIDE 10 MG/ML
20 INJECTION INTRAMUSCULAR; INTRAVENOUS
Status: COMPLETED | OUTPATIENT
Start: 2020-05-21 | End: 2020-05-21

## 2020-05-21 RX ORDER — SODIUM CHLORIDE 0.9 % (FLUSH) 0.9 %
5 SYRINGE (ML) INJECTION
Status: DISCONTINUED | OUTPATIENT
Start: 2020-05-22 | End: 2020-05-29 | Stop reason: HOSPADM

## 2020-05-21 RX ORDER — WARFARIN SODIUM 5 MG/1
5 TABLET ORAL
Status: DISCONTINUED | OUTPATIENT
Start: 2020-05-23 | End: 2020-05-22

## 2020-05-21 RX ORDER — WARFARIN SODIUM 5 MG/1
5 TABLET ORAL ONCE
Status: COMPLETED | OUTPATIENT
Start: 2020-05-21 | End: 2020-05-21

## 2020-05-21 RX ORDER — VERAPAMIL HYDROCHLORIDE 120 MG/1
120 TABLET, FILM COATED ORAL 2 TIMES DAILY
Status: DISCONTINUED | OUTPATIENT
Start: 2020-05-22 | End: 2020-05-29 | Stop reason: HOSPADM

## 2020-05-21 RX ORDER — GABAPENTIN 300 MG/1
300 CAPSULE ORAL 3 TIMES DAILY
Status: DISCONTINUED | OUTPATIENT
Start: 2020-05-21 | End: 2020-05-29 | Stop reason: HOSPADM

## 2020-05-21 RX ORDER — PREDNISONE 5 MG/1
5 TABLET ORAL DAILY
Status: DISCONTINUED | OUTPATIENT
Start: 2020-05-22 | End: 2020-05-29 | Stop reason: HOSPADM

## 2020-05-21 RX ORDER — AMOXICILLIN 250 MG
1 CAPSULE ORAL 2 TIMES DAILY
Status: DISCONTINUED | OUTPATIENT
Start: 2020-05-21 | End: 2020-05-29 | Stop reason: HOSPADM

## 2020-05-21 RX ORDER — IPRATROPIUM BROMIDE AND ALBUTEROL SULFATE 2.5; .5 MG/3ML; MG/3ML
3 SOLUTION RESPIRATORY (INHALATION) EVERY 4 HOURS PRN
Status: DISCONTINUED | OUTPATIENT
Start: 2020-05-22 | End: 2020-05-29 | Stop reason: HOSPADM

## 2020-05-21 RX ORDER — GLUCAGON 1 MG
1 KIT INJECTION
Status: DISCONTINUED | OUTPATIENT
Start: 2020-05-22 | End: 2020-05-29 | Stop reason: HOSPADM

## 2020-05-21 RX ORDER — HYDROXYUREA 500 MG/1
500 CAPSULE ORAL DAILY
Status: DISCONTINUED | OUTPATIENT
Start: 2020-05-22 | End: 2020-05-29 | Stop reason: HOSPADM

## 2020-05-21 RX ORDER — PYRIDOSTIGMINE BROMIDE 60 MG/1
60 TABLET ORAL EVERY 8 HOURS
Status: DISCONTINUED | OUTPATIENT
Start: 2020-05-22 | End: 2020-05-29 | Stop reason: HOSPADM

## 2020-05-21 RX ORDER — WARFARIN SODIUM 5 MG/1
5 TABLET ORAL
Status: DISCONTINUED | OUTPATIENT
Start: 2020-05-22 | End: 2020-05-22

## 2020-05-21 RX ORDER — IBUPROFEN 200 MG
24 TABLET ORAL
Status: DISCONTINUED | OUTPATIENT
Start: 2020-05-22 | End: 2020-05-29 | Stop reason: HOSPADM

## 2020-05-21 RX ORDER — TAMSULOSIN HYDROCHLORIDE 0.4 MG/1
1 CAPSULE ORAL DAILY
Status: DISCONTINUED | OUTPATIENT
Start: 2020-05-22 | End: 2020-05-29 | Stop reason: HOSPADM

## 2020-05-21 RX ORDER — ACETAMINOPHEN 325 MG/1
650 TABLET ORAL EVERY 4 HOURS PRN
Status: DISCONTINUED | OUTPATIENT
Start: 2020-05-22 | End: 2020-05-29 | Stop reason: HOSPADM

## 2020-05-21 RX ORDER — PROMETHAZINE HYDROCHLORIDE 25 MG/1
25 TABLET ORAL EVERY 6 HOURS PRN
Status: DISCONTINUED | OUTPATIENT
Start: 2020-05-22 | End: 2020-05-29 | Stop reason: HOSPADM

## 2020-05-21 RX ORDER — IBUPROFEN 200 MG
16 TABLET ORAL
Status: DISCONTINUED | OUTPATIENT
Start: 2020-05-22 | End: 2020-05-29 | Stop reason: HOSPADM

## 2020-05-21 RX ORDER — SODIUM CHLORIDE 0.9 % (FLUSH) 0.9 %
10 SYRINGE (ML) INJECTION
Status: DISCONTINUED | OUTPATIENT
Start: 2020-05-21 | End: 2020-05-29 | Stop reason: HOSPADM

## 2020-05-21 RX ORDER — FUROSEMIDE 20 MG/1
20 TABLET ORAL DAILY
Status: DISCONTINUED | OUTPATIENT
Start: 2020-05-22 | End: 2020-05-29 | Stop reason: HOSPADM

## 2020-05-21 RX ORDER — TALC
6 POWDER (GRAM) TOPICAL NIGHTLY PRN
Status: DISCONTINUED | OUTPATIENT
Start: 2020-05-22 | End: 2020-05-29 | Stop reason: HOSPADM

## 2020-05-21 RX ORDER — WARFARIN 2.5 MG/1
2.5 TABLET ORAL
Status: DISCONTINUED | OUTPATIENT
Start: 2020-05-24 | End: 2020-05-22

## 2020-05-21 RX ADMIN — FUROSEMIDE 20 MG: 10 INJECTION, SOLUTION INTRAMUSCULAR; INTRAVENOUS at 08:05

## 2020-05-21 RX ADMIN — WARFARIN SODIUM 5 MG: 5 TABLET ORAL at 10:05

## 2020-05-21 RX ADMIN — LIDOCAINE 1 PATCH: 50 PATCH CUTANEOUS at 07:05

## 2020-05-21 NOTE — ED TRIAGE NOTES
Patient comes into ER with complaints of frequent falls with the last episode being yesterday. Patient states that he doesn't remember hitting his head but did notice a red area to the back of his head. Patient denies LOC.

## 2020-05-21 NOTE — ED PROVIDER NOTES
Encounter Date: 5/21/2020       History     Chief Complaint   Patient presents with    Fall     Pt has had a couple falls in the last week, most recent yesterday.  Pt c/o generalized weakness and unable to walk.  States his right leg is hurting.     Mr. Phan is a 89 M hx of a fib on coumadin, HTN, HLD, myasthenia gravis, recent MOHS on posterior scalp with current wound vac here today for weakness.  Pt reports generalized weakness for the past 7-10 days, worse in his legs, feels like his legs are buckling. He has had two falls, both with lowering to the floor on his butt, no head injury or LOC.  Daughter in law, Rosibel, provides additional hx and states that he is now unable to stand or ambulate with his walker.  After his fall today, she had to call medics as she was not able to get him off the floor.  He also c/o pain to the right inner thigh, burning in nature which has been persistent after past shingles infection.  He currently lives alone but does have family that checks on him and a sitter 3 days a week for 4 hrs.  No hx of acute mysthania crisis in the past.         Review of patient's allergies indicates:  No Known Allergies  Past Medical History:   Diagnosis Date    *Atrial fibrillation     Arthritis     Atrial fibrillation     BPH (benign prostatic hypertrophy)     Degenerative disc disease     Depression     Difficult intubation 11/20/2019    easy mask ventilation but multiple attempts with down-sizing tubes all the way to 6.0 did not pass. Pt kept spontaneous throughout. fiberoptic called for and Dr. Espinoza evaluated airway. Patient with tortuous trachea. Dr. Espinoza states that if patient ever requires intubation he recommends trach.    GERD (gastroesophageal reflux disease)     Hyperglycemia     Hyperlipidemia     Hypertension     MG (myasthenia gravis)     Postherpetic neuralgia     Squamous cell carcinoma of skin      Past Surgical History:   Procedure Laterality Date    ACHILLES  TENDON SURGERY      APPLICATION OF WOUND VACUUM-ASSISTED CLOSURE DEVICE N/A 12/12/2019    Procedure: APPLICATION, WOUND VAC;  Surgeon: Jason Espinoza MD;  Location: [facility] OR [facility] F[facility];  Service: ENT;  Laterality: N/A;    CHOLECYSTECTOMY      CLOSURE OF DEFECT OF MOHS PROCEDURE N/A 11/20/2019    Procedure: CLOSURE, MOHS PROCEDURE DEFECT;  Surgeon: Jason Espinoza MD;  [facility];  Service: Plastics;  Laterality: N/A;  , [employer], plastics set    COLONOSCOPY N/A 3/20/2019    Procedure: COLONOSCOPY;  Surgeon: Leeroy Thornton MD;  Location: [facility] ENDO (2ND FLR);  Service: Endoscopy;  Laterality: N/A;    ESOPHAGOGASTRODUODENOSCOPY N/A 3/20/2019    Procedure: EGD (ESOPHAGOGASTRODUODENOSCOPY);  Surgeon: Leeroy Thornton MD;  Location: [facility] ENDO (2ND FLR);  Service: Endoscopy;  Laterality: N/A;    WOUND DEBRIDEMENT  12/12/2019    Procedure: DEBRIDEMENT, WOUND;  Surgeon: Jason Espinoza MD;  [facility];  Service: ENT;;     Family History   Problem Relation Age of Onset    Stroke Mother     Cancer Father      Social History     Tobacco Use    Smoking status: Former Smoker    Smokeless tobacco: Never Used   Substance Use Topics    Alcohol use: No    Drug use: No     Review of Systems   Constitutional: Positive for fatigue. Negative for chills and fever.   HENT: Negative for congestion and sore throat.    Respiratory: Negative for cough and shortness of breath.    Cardiovascular: Positive for leg swelling. Negative for chest pain and palpitations.   Gastrointestinal: Negative for constipation, diarrhea, nausea and vomiting.   Musculoskeletal: Positive for arthralgias (right knee pain) and myalgias.   Skin: Negative for pallor and rash.   Neurological: Positive for weakness. Negative for syncope, light-headedness and headaches.   Psychiatric/Behavioral: Negative for confusion.       Physical Exam     Initial Vitals [05/21/20 1446]   BP Pulse Resp Temp SpO2   (!) 126/101 71 18 98.1 °F (36.7  °C) 97 %      MAP       --         Physical Exam    Nursing note and vitals reviewed.  Constitutional:   Elderly male, appears stated age   HENT:   Mouth/Throat: Oropharynx is clear and moist.   (+) wound vac on posterior scalp   Eyes: Conjunctivae and EOM are normal. Pupils are equal, round, and reactive to light.   Neck: Neck supple. No JVD present.   No midline cervical tenderness   Cardiovascular: Normal rate, normal heart sounds and intact distal pulses.   Irregular   Pulmonary/Chest: No stridor. He has no wheezes. He has no rales.   Abdominal: Soft. Bowel sounds are normal. There is no tenderness.   Musculoskeletal: He exhibits edema (3+ edema to the knees bilaterally). He exhibits no tenderness (No calf tenderness or significant asymmetry).   Neurological: He is alert and oriented to person, place, and time. No cranial nerve deficit.   Decreased strength in bilateral lower extremities   Skin: Skin is warm. Capillary refill takes less than 2 seconds. No rash noted.         ED Course   Procedures  Labs Reviewed   CBC W/ AUTO DIFFERENTIAL - Abnormal; Notable for the following components:       Result Value    RBC 2.27 (*)     Hemoglobin 9.2 (*)     Hematocrit 30.1 (*)     Mean Corpuscular Volume 133 (*)     Mean Corpuscular Hemoglobin 40.5 (*)     Mean Corpuscular Hemoglobin Conc 30.6 (*)     RDW 17.1 (*)     Platelets 793 (*)     Immature Granulocytes 2.0 (*)     Gran # (ANC) 9.8 (*)     Immature Grans (Abs) 0.24 (*)     Lymph # 0.8 (*)     Mono # 1.1 (*)     Gran% 82.4 (*)     Lymph% 6.4 (*)     All other components within normal limits   COMPREHENSIVE METABOLIC PANEL - Abnormal; Notable for the following components:    Total Bilirubin 2.1 (*)     eGFR if non  53.3 (*)     All other components within normal limits   B-TYPE NATRIURETIC PEPTIDE - Abnormal; Notable for the following components:     (*)     All other components within normal limits   SARS-COV-2 RNA AMPLIFICATION, QUAL    PROTIME-INR   URINALYSIS, REFLEX TO URINE CULTURE   PROTIME-INR     EKG Readings: (Independently Interpreted)   EKG:  AFib at 67, low voltage QRS baseline artifact secondary to tremor, no significant ST elevations or depressions       Imaging Results          US Lower Extremity Veins Bilateral (Final result)  Result time 05/21/20 18:48:11    Final result by Da Jerome MD (05/21/20 18:48:11)                 Impression:      No evidence of deep venous thrombosis in either lower extremity.    Electronically signed by resident: George Nava  Date:    05/21/2020  Time:    18:23    Electronically signed by: Da Jerome MD  Date:    05/21/2020  Time:    18:48             Narrative:    EXAMINATION:  US LOWER EXTREMITY VEINS BILATERAL    CLINICAL HISTORY:  Other specified soft tissue disorders    TECHNIQUE:  Duplex and color flow Doppler and dynamic compression was performed of the bilateral lower extremity veins was performed.    COMPARISON:  Ultrasound lower extremity veins 01/16/2010    FINDINGS:  Right thigh veins: The common femoral, femoral, popliteal, upper greater saphenous, and deep femoral veins are patent and free of thrombus. The veins are normally compressible and have normal phasic flow and augmentation response.    Right calf veins: The visualized calf veins are patent.    Left thigh veins: The common femoral, femoral, popliteal, upper greater saphenous, and deep femoral veins are patent and free of thrombus. The veins are normally compressible and have normal phasic flow and augmentation response.    Left calf veins: The visualized calf veins are patent.    Miscellaneous: Bilateral nonspecific subcutaneous edema.                               CT Head Without Contrast (Final result)  Result time 05/21/20 16:48:08    Final result by George Arnett MD (05/21/20 16:48:08)                 Impression:      Examination mildly degraded by patient motion artifact.    No evidence of acute intracranial  pathology.      Electronically signed by: George Arnett MD  Date:    05/21/2020  Time:    16:48             Narrative:    EXAMINATION:  CT HEAD WITHOUT CONTRAST    CLINICAL HISTORY:  Head trauma, headache;    TECHNIQUE:  Low dose axial CT images obtained throughout the head without the use of intravenous contrast.  Axial, sagittal and coronal reconstructions were performed.    Examination mildly degraded by patient motion artifact.    COMPARISON:  01/15/2010    FINDINGS:  Intracranial compartment:    Ventricles and sulci are normal in size for age without evidence of hydrocephalus.    No parenchymal mass, hemorrhage, edema or major vascular distribution infarct.    No extra-axial blood or fluid collections.    Partially empty sella.    Scattered atherosclerotic calcification about the skull base.    Skull/extracranial contents (limited evaluation):    No fracture.    Mastoid air cells are clear.    Mild patchy mucosal thickening in the paranasal sinuses.                               CT Cervical Spine Without Contrast (Final result)  Result time 05/21/20 16:56:54    Final result by Phu Artis Jr., MD (05/21/20 16:56:54)                 Impression:      Degenerative changes above.  Few mm anterolisthesis C4 on C5 and C7 on T1.    Pleural fluid or thickening right greater than left.      Electronically signed by: Phu Artis MD  Date:    05/21/2020  Time:    16:56             Narrative:    EXAMINATION:  CT CERVICAL SPINE WITHOUT CONTRAST    CLINICAL HISTORY:  C-spine trauma, NEXUS/CCR negative, low risk;    TECHNIQUE:  Low dose axial images, sagittal and coronal reformations were performed though the cervical spine.  Contrast was not administered.    COMPARISON:  None    FINDINGS:  Facet arthropathy throughout the cervical spine.  3 mm anterolisthesis C4 on C5.  Disc space narrowing C5-6 and particularly C6-7 with disc osteophyte complexes.  2 mm anterolisthesis C7 on T1.  No fractures.    C2-3: Neural foramen  and spinal canal are patent.    C3-4: Severe right and mild left neural foraminal narrowing.  Canal appears patent.    C4-5: Mild right neural foraminal narrowing.  Canal is patent.    C5-6: Severe bilateral neural foraminal narrowing.  Disc osteophyte complex may create mild canal narrowing.    C6-7: Severe bilateral neural foraminal narrowing.  Disc osteophyte complex creates moderate canal narrowing.    C7-T1: Neural foramen spinal canal are patent.    Elsewhere there is some pleural fluid or thickening right greater than left.  Calcified plaque in the aorta and great vessels extending into the carotid vessels.  Thyroid submandibular glands are unremarkable.  Visualized parotids are normal.  There is some fluid or thickening in the right maxillary sinus.  Vocal cords are opposed likely phonation or breath hold.  No convincing cervical or mediastinal adenopathy.  Visualized lung apices are clear.                               X-Ray Hip 2 View Right (Final result)  Result time 05/21/20 16:13:59    Final result by Nicola Oquendo III, MD (05/21/20 16:13:59)                 Narrative:    EXAMINATION:  XR HIP 2 VIEW RIGHT    CLINICAL HISTORY:  Pain in right hip    FINDINGS:  Two views right: There is mild DJD.  No fracture, dislocation or bone destruction seen.      Electronically signed by: Nicola Oquendo MD  Date:    05/21/2020  Time:    16:13                             X-Ray Knee 3 View Right (Final result)  Result time 05/21/20 16:13:20    Final result by Nicola Oquendo III, MD (05/21/20 16:13:20)                 Narrative:    EXAMINATION:  XR KNEE 3 VIEW RIGHT    CLINICAL HISTORY:  Pain in right knee    FINDINGS:  Three views: No fracture dislocation bone destruction seen.  There is moderate DJD and a varus deformity.      Electronically signed by: Nicola Oquendo MD  Date:    05/21/2020  Time:    16:13                               Medical Decision Making:   History:   I obtained history from: someone other  than patient.       <> Summary of History: Rosibel, daughter in law  Old Medical Records: I decided to obtain old medical records.  Initial Assessment:   Emergent evaluation of a 89-year-old male here today with generalized weakness, frequent falls.  Vitals reviewed, mildly hypertensive otherwise stable.  Patient was hypotensive in the clinic.  Differential Diagnosis:   Electrolyte derangement, myasthenia gravis crisis, DVT, CHF, debility due to age, arrhythmia  Independently Interpreted Test(s):   I have ordered and independently interpreted X-rays - see prior notes.  I have ordered and independently interpreted EKG Reading(s) - see prior notes  Clinical Tests:   Lab Tests: Ordered and Reviewed  Radiological Study: Reviewed and Ordered  Medical Tests: Ordered and Reviewed  ED Management:  Labs reviewed, grossly stable.  BNP is elevated, similar to previous.  He does have bilateral leg edema to the knees bilaterally, Lasix 20 mg IV ordered    Imaging of the hip and knee is negative for acute fracture dislocation.  There is significant degenerative joint disease.    Ultrasound the legs bilaterally are negative for DVT    CT head is negative for acute intracranial hemorrhage or fracture, CT of the cervical spine is shows significant degenerative disease with few mm anterior listhesis C4 on C5 and C7 on T1    Discussed findings with daughter in-law Rosibel via phone, concern for his frequent falls and weakness.      Will place in observation for PT consult, social work consult.     COVID negative                                Clinical Impression:       ICD-10-CM ICD-9-CM   1. Weakness R53.1 780.79   2. Right hip pain M25.551 719.45   3. Right knee pain M25.561 719.46   4. Fall W19.XXXA E888.9   5. Leg swelling M79.89 729.81   6. Frequent falls R29.6 V15.88   7. H/O myasthenia gravis Z86.69 V12.49                                Luana Umanzor MD  05/21/20 2053

## 2020-05-21 NOTE — PROVIDER PROGRESS NOTES - EMERGENCY DEPT.
" Emergency Department TeleTRIAGE Encounter Note      CHIEF COMPLAINT    Chief Complaint   Patient presents with    Fall     Pt has had a couple falls in the last week, most recent yesterday.  Pt c/o generalized weakness and unable to walk.  States his right leg is hurting.       VITAL SIGNS   Initial Vitals [05/21/20 1446]   BP Pulse Resp Temp SpO2   (!) 126/101 71 18 98.1 °F (36.7 °C) 97 %      MAP       --            ALLERGIES    Review of patient's allergies indicates:  No Known Allergies    PROVIDER TRIAGE NOTE  Patient with past medical history atrial fibrillation, degenerative disc disease, depression, GERD, hyperglycemia, hyperlipidemia, hypertension, myasthenia gravis, post herpetic neuralgia presents to the ED for evaluation after fall.  Patient's daughter-in-law is with the patient and provides much of the history.  She states that he has had more frequent falls over the past 2 weeks because he states his "legs are giving out.  He also reports worsening pain to the medial right thigh where he had shingles in the past.  Today the patient had a hard time sitting up without assistance and they have noticed a significant decline in his general strength so his family decided to bring him be evaluated.  Most recent fall was yesterday while using a walker to walk in his house.  Daughter-in-law states that there is a knot to the back of his head where he hit his head.  Patient is on blood thinners.  He denies numbness, tingling, vision changes, dizziness, chest pain, shortness of breath.       ORDERS  Labs Reviewed - No data to display    ED Orders (720h ago, onward)    Start Ordered     Status Ordering Provider    05/21/20 1458 05/21/20 1457  CT Head Without Contrast  1 time imaging      Ordered SIVAKUMAR ELLIOTT    05/21/20 1458 05/21/20 1457  CT Cervical Spine Without Contrast  1 time imaging      Ordered SIVAKUMAR ELLIOTT            Virtual Visit Note: The provider triage portion of this emergency department " evaluation and documentation was performed via [email or web address], a HIPAA-compliant telemedicine application, in concert with a tele-presenter in the room. A face to face patient evaluation with one of my colleagues will occur once the patient is placed in an emergency department room.      DISCLAIMER: This note was prepared with [employer] voice recognition transcription software. Garbled syntax, mangled pronouns, and other bizarre constructions may be attributed to that software system.

## 2020-05-22 PROBLEM — R79.89 ELEVATED BRAIN NATRIURETIC PEPTIDE (BNP) LEVEL: Status: ACTIVE | Noted: 2020-05-22

## 2020-05-22 PROBLEM — T81.89XA DELAYED SURGICAL WOUND HEALING: Chronic | Status: ACTIVE | Noted: 2019-12-20

## 2020-05-22 LAB
ALBUMIN SERPL BCP-MCNC: 3.1 G/DL (ref 3.5–5.2)
ALP SERPL-CCNC: 79 U/L (ref 55–135)
ALT SERPL W/O P-5'-P-CCNC: 7 U/L (ref 10–44)
ANION GAP SERPL CALC-SCNC: 9 MMOL/L (ref 8–16)
AST SERPL-CCNC: 19 U/L (ref 10–40)
BASOPHILS # BLD AUTO: 0.04 K/UL (ref 0–0.2)
BASOPHILS NFR BLD: 0.3 % (ref 0–1.9)
BILIRUB SERPL-MCNC: 2.5 MG/DL (ref 0.1–1)
BUN SERPL-MCNC: 14 MG/DL (ref 8–23)
CALCIUM SERPL-MCNC: 8.5 MG/DL (ref 8.7–10.5)
CHLORIDE SERPL-SCNC: 103 MMOL/L (ref 95–110)
CO2 SERPL-SCNC: 25 MMOL/L (ref 23–29)
CREAT SERPL-MCNC: 0.9 MG/DL (ref 0.5–1.4)
DIFFERENTIAL METHOD: ABNORMAL
EOSINOPHIL # BLD AUTO: 0 K/UL (ref 0–0.5)
EOSINOPHIL NFR BLD: 0.2 % (ref 0–8)
ERYTHROCYTE [DISTWIDTH] IN BLOOD BY AUTOMATED COUNT: 16.6 % (ref 11.5–14.5)
EST. GFR  (AFRICAN AMERICAN): >60 ML/MIN/1.73 M^2
EST. GFR  (NON AFRICAN AMERICAN): >60 ML/MIN/1.73 M^2
ESTIMATED AVG GLUCOSE: 94 MG/DL (ref 68–131)
GLUCOSE SERPL-MCNC: 97 MG/DL (ref 70–110)
HBA1C MFR BLD HPLC: 4.9 % (ref 4–5.6)
HCT VFR BLD AUTO: 26.1 % (ref 40–54)
HGB BLD-MCNC: 8.2 G/DL (ref 14–18)
IMM GRANULOCYTES # BLD AUTO: 0.21 K/UL (ref 0–0.04)
IMM GRANULOCYTES NFR BLD AUTO: 1.7 % (ref 0–0.5)
INR PPP: 3 (ref 0.8–1.2)
LYMPHOCYTES # BLD AUTO: 0.6 K/UL (ref 1–4.8)
LYMPHOCYTES NFR BLD: 4.6 % (ref 18–48)
MAGNESIUM SERPL-MCNC: 1.7 MG/DL (ref 1.6–2.6)
MCH RBC QN AUTO: 40.8 PG (ref 27–31)
MCHC RBC AUTO-ENTMCNC: 31.4 G/DL (ref 32–36)
MCV RBC AUTO: 130 FL (ref 82–98)
MONOCYTES # BLD AUTO: 1 K/UL (ref 0.3–1)
MONOCYTES NFR BLD: 8.4 % (ref 4–15)
NEUTROPHILS # BLD AUTO: 10.2 K/UL (ref 1.8–7.7)
NEUTROPHILS NFR BLD: 84.8 % (ref 38–73)
NRBC BLD-RTO: 0 /100 WBC
PHOSPHATE SERPL-MCNC: 2.6 MG/DL (ref 2.7–4.5)
PLATELET # BLD AUTO: 728 K/UL (ref 150–350)
PMV BLD AUTO: 8.7 FL (ref 9.2–12.9)
POTASSIUM SERPL-SCNC: 3.4 MMOL/L (ref 3.5–5.1)
PROT SERPL-MCNC: 5.9 G/DL (ref 6–8.4)
PROTHROMBIN TIME: 28 SEC (ref 9–12.5)
RBC # BLD AUTO: 2.01 M/UL (ref 4.6–6.2)
SODIUM SERPL-SCNC: 137 MMOL/L (ref 136–145)
WBC # BLD AUTO: 12.08 K/UL (ref 3.9–12.7)

## 2020-05-22 PROCEDURE — 99226 PR SUBSEQUENT OBSERVATION CARE,LEVEL III: ICD-10-PCS | Mod: HCNC,,, | Performed by: PHYSICIAN ASSISTANT

## 2020-05-22 PROCEDURE — 80053 COMPREHEN METABOLIC PANEL: CPT | Mod: HCNC

## 2020-05-22 PROCEDURE — 85025 COMPLETE CBC W/AUTO DIFF WBC: CPT | Mod: HCNC

## 2020-05-22 PROCEDURE — 36415 COLL VENOUS BLD VENIPUNCTURE: CPT | Mod: HCNC

## 2020-05-22 PROCEDURE — 25000003 PHARM REV CODE 250: Mod: HCNC | Performed by: INTERNAL MEDICINE

## 2020-05-22 PROCEDURE — 97530 THERAPEUTIC ACTIVITIES: CPT | Mod: HCNC

## 2020-05-22 PROCEDURE — 85610 PROTHROMBIN TIME: CPT | Mod: HCNC

## 2020-05-22 PROCEDURE — 83735 ASSAY OF MAGNESIUM: CPT | Mod: HCNC

## 2020-05-22 PROCEDURE — 25000003 PHARM REV CODE 250: Mod: HCNC | Performed by: PHYSICIAN ASSISTANT

## 2020-05-22 PROCEDURE — 97165 OT EVAL LOW COMPLEX 30 MIN: CPT | Mod: HCNC

## 2020-05-22 PROCEDURE — 99900035 HC TECH TIME PER 15 MIN (STAT): Mod: HCNC

## 2020-05-22 PROCEDURE — 30200315 PPD INTRADERMAL TEST REV CODE 302: Mod: HCNC | Performed by: INTERNAL MEDICINE

## 2020-05-22 PROCEDURE — 63600175 PHARM REV CODE 636 W HCPCS: Mod: HCNC | Performed by: PHYSICIAN ASSISTANT

## 2020-05-22 PROCEDURE — 94150 VITAL CAPACITY TEST: CPT | Mod: HCNC,59

## 2020-05-22 PROCEDURE — 84100 ASSAY OF PHOSPHORUS: CPT | Mod: HCNC

## 2020-05-22 PROCEDURE — 94761 N-INVAS EAR/PLS OXIMETRY MLT: CPT | Mod: HCNC

## 2020-05-22 PROCEDURE — G0378 HOSPITAL OBSERVATION PER HR: HCPCS | Mod: HCNC

## 2020-05-22 PROCEDURE — 97161 PT EVAL LOW COMPLEX 20 MIN: CPT | Mod: HCNC

## 2020-05-22 PROCEDURE — 94010 BREATHING CAPACITY TEST: CPT | Mod: HCNC

## 2020-05-22 PROCEDURE — 86580 TB INTRADERMAL TEST: CPT | Mod: HCNC | Performed by: INTERNAL MEDICINE

## 2020-05-22 PROCEDURE — 97535 SELF CARE MNGMENT TRAINING: CPT | Mod: HCNC,59

## 2020-05-22 PROCEDURE — 99226 PR SUBSEQUENT OBSERVATION CARE,LEVEL III: CPT | Mod: HCNC,,, | Performed by: PHYSICIAN ASSISTANT

## 2020-05-22 PROCEDURE — 83036 HEMOGLOBIN GLYCOSYLATED A1C: CPT | Mod: HCNC

## 2020-05-22 RX ORDER — WARFARIN SODIUM 5 MG/1
5 TABLET ORAL
Status: DISCONTINUED | OUTPATIENT
Start: 2020-05-28 | End: 2020-05-27

## 2020-05-22 RX ORDER — WARFARIN 2.5 MG/1
2.5 TABLET ORAL ONCE
Status: COMPLETED | OUTPATIENT
Start: 2020-05-22 | End: 2020-05-22

## 2020-05-22 RX ORDER — WARFARIN 2.5 MG/1
2.5 TABLET ORAL
Status: DISCONTINUED | OUTPATIENT
Start: 2020-05-22 | End: 2020-05-22

## 2020-05-22 RX ORDER — WARFARIN SODIUM 5 MG/1
5 TABLET ORAL
Status: DISCONTINUED | OUTPATIENT
Start: 2020-05-23 | End: 2020-05-22

## 2020-05-22 RX ORDER — WARFARIN 2.5 MG/1
2.5 TABLET ORAL
Status: DISCONTINUED | OUTPATIENT
Start: 2020-05-26 | End: 2020-05-27

## 2020-05-22 RX ORDER — WARFARIN SODIUM 5 MG/1
5 TABLET ORAL
Status: DISCONTINUED | OUTPATIENT
Start: 2020-05-27 | End: 2020-05-22

## 2020-05-22 RX ORDER — WARFARIN SODIUM 5 MG/1
5 TABLET ORAL
Status: DISCONTINUED | OUTPATIENT
Start: 2020-05-24 | End: 2020-05-27

## 2020-05-22 RX ORDER — WARFARIN SODIUM 5 MG/1
5 TABLET ORAL
Status: DISCONTINUED | OUTPATIENT
Start: 2020-05-25 | End: 2020-05-27

## 2020-05-22 RX ORDER — WARFARIN 2.5 MG/1
2.5 TABLET ORAL
Status: DISCONTINUED | OUTPATIENT
Start: 2020-05-23 | End: 2020-05-27

## 2020-05-22 RX ORDER — WARFARIN SODIUM 5 MG/1
5 TABLET ORAL
Status: DISCONTINUED | OUTPATIENT
Start: 2020-05-25 | End: 2020-05-22

## 2020-05-22 RX ADMIN — HYDROXYUREA 500 MG: 500 CAPSULE ORAL at 09:05

## 2020-05-22 RX ADMIN — PYRIDOSTIGMINE BROMIDE 60 MG: 60 TABLET ORAL at 10:05

## 2020-05-22 RX ADMIN — DOCUSATE SODIUM - SENNOSIDES 1 TABLET: 50; 8.6 TABLET, FILM COATED ORAL at 09:05

## 2020-05-22 RX ADMIN — PYRIDOSTIGMINE BROMIDE 60 MG: 60 TABLET ORAL at 06:05

## 2020-05-22 RX ADMIN — AZATHIOPRINE 200 MG: 50 TABLET ORAL at 09:05

## 2020-05-22 RX ADMIN — GABAPENTIN 300 MG: 300 CAPSULE ORAL at 03:05

## 2020-05-22 RX ADMIN — VERAPAMIL HYDROCHLORIDE 120 MG: 120 TABLET, FILM COATED ORAL at 09:05

## 2020-05-22 RX ADMIN — PYRIDOSTIGMINE BROMIDE 60 MG: 60 TABLET ORAL at 03:05

## 2020-05-22 RX ADMIN — TAMSULOSIN HYDROCHLORIDE 0.4 MG: 0.4 CAPSULE ORAL at 09:05

## 2020-05-22 RX ADMIN — GABAPENTIN 300 MG: 300 CAPSULE ORAL at 12:05

## 2020-05-22 RX ADMIN — WARFARIN SODIUM 2.5 MG: 2.5 TABLET ORAL at 05:05

## 2020-05-22 RX ADMIN — GABAPENTIN 300 MG: 300 CAPSULE ORAL at 09:05

## 2020-05-22 RX ADMIN — OXYCODONE HYDROCHLORIDE 5 MG: 5 TABLET ORAL at 12:05

## 2020-05-22 RX ADMIN — FUROSEMIDE 20 MG: 20 TABLET ORAL at 09:05

## 2020-05-22 RX ADMIN — OXYCODONE HYDROCHLORIDE 5 MG: 5 TABLET ORAL at 06:05

## 2020-05-22 RX ADMIN — LIDOCAINE 1 PATCH: 50 PATCH CUTANEOUS at 09:05

## 2020-05-22 RX ADMIN — TUBERCULIN PURIFIED PROTEIN DERIVATIVE 5 UNITS: 5 INJECTION, SOLUTION INTRADERMAL at 03:05

## 2020-05-22 RX ADMIN — FINASTERIDE 5 MG: 5 TABLET, FILM COATED ORAL at 09:05

## 2020-05-22 RX ADMIN — PREDNISONE 5 MG: 5 TABLET ORAL at 09:05

## 2020-05-22 RX ADMIN — OXYCODONE HYDROCHLORIDE 5 MG: 5 TABLET ORAL at 10:05

## 2020-05-22 RX ADMIN — OXYCODONE HYDROCHLORIDE 5 MG: 5 TABLET ORAL at 03:05

## 2020-05-22 NOTE — SUBJECTIVE & OBJECTIVE
Interval History: No acute events overnight. Pt seen at bedside resting in NAD, reports improvement in pain of R thigh with oxy. Pt agreeable to SNF or rehab placement    Review of Systems   Constitutional: Positive for activity change. Negative for appetite change and fever.   Respiratory: Negative for cough and shortness of breath.    Cardiovascular: Negative for chest pain and palpitations.   Gastrointestinal: Negative for abdominal pain, diarrhea, nausea and vomiting.   Genitourinary: Negative for dysuria and hematuria.   Musculoskeletal: Negative for arthralgias and back pain.        Neuralgias   Skin: Negative for color change and rash.   Neurological: Positive for weakness. Negative for syncope and speech difficulty.   Psychiatric/Behavioral: Negative for agitation and confusion.     Objective:     Vital Signs (Most Recent):  Temp: 98.2 °F (36.8 °C) (05/22/20 0715)  Pulse: 90 (05/22/20 0715)  Resp: 20 (05/22/20 0715)  BP: 127/74 (05/22/20 0715)  SpO2: (!) 92 % (05/22/20 0715) Vital Signs (24h Range):  Temp:  [96.9 °F (36.1 °C)-98.2 °F (36.8 °C)] 98.2 °F (36.8 °C)  Pulse:  [] 90  Resp:  [16-20] 20  SpO2:  [92 %-97 %] 92 %  BP: (100-178)/() 127/74     Weight: 84 kg (185 lb 3 oz)  Body mass index is 27.35 kg/m².    Intake/Output Summary (Last 24 hours) at 5/22/2020 1103  Last data filed at 5/21/2020 2210  Gross per 24 hour   Intake --   Output 500 ml   Net -500 ml      Physical Exam   Constitutional: He is oriented to person, place, and time. He appears well-developed and well-nourished. No distress.   HENT:   Head: Normocephalic and atraumatic.   Wound vac to posterior scalp   Neck: Normal range of motion. Carotid bruit is not present.   Cardiovascular: Regular rhythm.   Pulmonary/Chest: Effort normal. No respiratory distress.   Abdominal: There is no splenomegaly or hepatomegaly.   Musculoskeletal: Normal range of motion. He exhibits edema.   Neurological: He is alert and oriented to person, place,  and time. No cranial nerve deficit.   BUE strength 5/5, BLE strength 4/5   Skin: Skin is warm and dry. He is not diaphoretic.   Psychiatric: He has a normal mood and affect. His behavior is normal.       Significant Labs: All pertinent labs within the past 24 hours have been reviewed.    Significant Imaging: I have reviewed all pertinent imaging results/findings within the past 24 hours.

## 2020-05-22 NOTE — PLAN OF CARE
Problem: Occupational Therapy Goal  Goal: Occupational Therapy Goal  Description  Goals to be met by: 6/1/2020     Patient will increase functional independence with ADLs by performing:    UE Dressing with Set-up Assistance.  LE Dressing with Moderate Assistance.  Grooming while seated with Set-up Assistance.  Toileting from bedside commode with Minimal Assistance for hygiene and clothing management.   Sitting at edge of bed x15 minutes with Supervision.  Rolling to Bilateral with Supervision.   Supine to sit with Supervision.  Stand pivot transfers with Moderate Assistance.  Step transfer with Moderate Assistance  Toilet transfer to bedside commode with Moderate Assistance.   Evaluated pt and established OT POC.  Continue OT as tolerated.  Amanda Beal OT  5/22/2020    Outcome: Ongoing, Progressing

## 2020-05-22 NOTE — HPI
"Patient is an 89 year old gentleman with a h/o HTN, HLD, myasthenia gravis, post-herpetic neuralgia, and A Fib on Coumadin.  He presents with weakness.  Patient states that his RLE is particularly weak and has been "giving out" on him.  He has had two falls where he was lowered to the floor with no head trauma or LOC.  He is having difficultly standing or ambulating with his walker.  Patient also notes pain to his right inner thigh secondary to h/o post-herpetic neuralgia.  He denies generalized weakness, SOB.  He has no history of acute myasthenia gravis crisis.  He lives alone, but has family that checks on him frequently and a sitter three days a week for four hours.  He denies chest pain, SOB, dizziness, palpitations, fever/chills, N/V/D, abdominal pain.  "

## 2020-05-22 NOTE — ASSESSMENT & PLAN NOTE
Long term current use of anticoagulant therapy    - Continue verapamil 120mg BID, Coumadin 5mg MTuWThSa, 2.5mg Sunday.  - Monitor PT/INR.  - Will consult PharmD, appreciate assistance  - [id number] VASc score of at least three for HTN and age.

## 2020-05-22 NOTE — ASSESSMENT & PLAN NOTE
"- Per wound care note,  "s/p debridement of open head wound involving the bone and application of wound vac on 12/12/19 with Dr. Espinoza. He originally had a repair of Mohs defect with ACell on 12/5/19 but the ACell failed."  - Will consult wound care, appreciate assistance    "

## 2020-05-22 NOTE — ASSESSMENT & PLAN NOTE
- CT cervical spine without contrast:  Degenerative changes above.  Few mm anterolisthesis C4 on C5 and C7 on T1.  Pleural fluid or thickening right greater than left.  - CT head without contrast:  No evidence of acute intracranial pathology.  - R hip x-ray:  There is mild DJD.  No fracture, dislocation or bone destruction seen.  - R knee x-ray:  No fracture dislocation bone destruction seen.  There is moderate DJD and a varus deformity.  - BLE US:  No evidence of deep venous thrombosis in either lower extremity.  - UA unremarkable.  - Will consult PT/OT, SW.

## 2020-05-22 NOTE — PT/OT/SLP EVAL
Occupational Therapy   Evaluation    Name: Ernie Phan  MRN: [id number]  Admitting Diagnosis:  Weakness      Recommendations:     Discharge Recommendations: nursing facility, skilled  Discharge Equipment Recommendations:  other (see comments)(TBD)  Barriers to discharge:  Inaccessible home environment, Decreased caregiver support    Assessment:     Ernie Phan is a 89 y.o. male with a medical diagnosis of Weakness.  He presents with impaired ADL and mobility performance deficits. Pt eager and appreciative of therapy co-eval today. He reported to PT/OT he lives alone in a condo and typically mobilizes with a RW. He is (I) with ADLs however recently 2/2 LE edema he has been limited in overall mobility and confined to his bed. Pt with several recent falls. During evaluation, pt required min (A) for bed mobility and max (A)x2 with little hip clearance from bed. Pt requesting to use bed pan so therapy placed pan underneath patient for toileting with RN and ADRIANE Delatorre present.  Performance deficits affecting function: weakness, impaired endurance, impaired sensation, impaired self care skills, gait instability, impaired functional mobilty, impaired balance, impaired cognition, decreased upper extremity function, decreased safety awareness, decreased coordination, decreased lower extremity function, impaired skin, impaired cardiopulmonary response to activity, impaired fine motor.  Pt would benefit from continued OT skilled services 4x/wk to improve daily living skills to optimize QOL.  Pt is recommended to discharge to SNF at this time.      Rehab Prognosis: Good; patient would benefit from acute skilled OT services to address these deficits and reach maximum level of function.       Plan:     Patient to be seen 4 x/week to address the above listed problems via self-care/home management, therapeutic activities, therapeutic exercises  · Plan of Care Expires: 06/22/20  · Plan of Care Reviewed with:  patient    Subjective     Chief Complaint: My legs have just been so swollen  Patient/Family Comments/goals: I usually get around okay but recently I haven't been moving much    Occupational Profile:  Living Environment: Pt lives alone in a 3 level condo (pt lives on first level) with no RO. Pt with tub transfer bench for bathing.   Previous level of function: I with ADLs and mobility using RW typically, however 2/2 LE swelling has been bedbound.  Roles and Routines: Pt is a retired restaurant owner, , father.   Equipment Used at Home:  walker, rolling, wheelchair, bath bench, cane, straight  Assistance upon Discharge: Family as able     Pain/Comfort:  · Pain Rating 1: 0/10  · Pain Rating Post-Intervention 1: 0/10    Patients cultural, spiritual, Jehovah's witness conflicts given the current situation: no    Objective:     Communicated with: RN prior to session.  Patient found HOB elevated with telemetry, wound vac upon OT entry to room.    General Precautions: Standard, fall   Orthopedic Precautions:N/A   Braces: N/A     Occupational Performance:    Bed Mobility:    · Patient completed Rolling/Turning to Right with minimum assistance  · Patient completed Scooting/Bridging with minimum assistance  · Patient completed Supine to Sit with minimum assistance  · Patient completed Sit to Supine with minimum assistance    Functional Mobility/Transfers:  · Patient completed Sit <> Stand Transfer with maximal assistance and of 2 persons  with  rolling walker   · Functional Mobility: Pt initiated sit<stand however pt requiring max (A)x2 with ~25% hip clearance from bed.     Activities of Daily Living:  · Toileting: total assistance for placement of bed pan underneath pt. RN and PCT notified    Cognitive/Visual Perceptual:  Cognitive/Psychosocial Skills:     -       Oriented to: Person, Place, Time and Situation   -       Follows Commands/attention:Follows multistep  commands  -       Communication: clear/fluent  -        Memory: No Deficits noted  -       Safety awareness/insight to disability: impaired   -       Mood/Affect/Coping skills/emotional control: Anxious    Physical Exam:  Balance:    -       demo good sitting balance and fair standing balance   Upper Extremity Range of Motion:     -       Right Upper Extremity: WNL  -       Left Upper Extremity: WNL  Upper Extremity Strength:    -       Right Upper Extremity: WFL  -       Left Upper Extremity: WFL   Strength:    -       Right Upper Extremity: WNL  -       Left Upper Extremity: WNL    AMPAC 6 Click ADL:  AMPAC Total Score: 11    Treatment & Education:  Pt educated on role of occupational therapy, POC, and safety during ADLs and functional mobility. Pt and OT discussed importance of safe, continued mobility to optimize daily living skills. Pt verbalized understanding.  White board updated during session. Pt given instruction to call for medical staff/nurse for assistance.     Education:    Patient left HOB elevated with all lines intact, call button in reach, RN and PCT notified and pt's PA present    GOALS:   Multidisciplinary Problems     Occupational Therapy Goals        Problem: Occupational Therapy Goal    Goal Priority Disciplines Outcome Interventions   Occupational Therapy Goal     OT, PT/OT Ongoing, Progressing    Description:  Goals to be met by: 6/1/2020     Patient will increase functional independence with ADLs by performing:    UE Dressing with Set-up Assistance.  LE Dressing with Moderate Assistance.  Grooming while seated with Set-up Assistance.  Toileting from bedside commode with Minimal Assistance for hygiene and clothing management.   Sitting at edge of bed x15 minutes with Supervision.  Rolling to Bilateral with Supervision.   Supine to sit with Supervision.  Stand pivot transfers with Moderate Assistance.  Step transfer with Moderate Assistance  Toilet transfer to bedside commode with Moderate Assistance.                      History:     Past  Medical History:   Diagnosis Date    *Atrial fibrillation     Arthritis     Atrial fibrillation     BPH (benign prostatic hypertrophy)     Degenerative disc disease     Depression     Difficult intubation 11/20/2019    easy mask ventilation but multiple attempts with down-sizing tubes all the way to 6.0 did not pass. Pt kept spontaneous throughout. fiberoptic called for and Dr. Espinoza evaluated airway. Patient with tortuous trachea. Dr. Espinoza states that if patient ever requires intubation he recommends trach.    GERD (gastroesophageal reflux disease)     Hyperglycemia     Hyperlipidemia     Hypertension     MG (myasthenia gravis)     Postherpetic neuralgia     Squamous cell carcinoma of skin        Past Surgical History:   Procedure Laterality Date    ACHILLES TENDON SURGERY      APPLICATION OF WOUND VACUUM-ASSISTED CLOSURE DEVICE N/A 12/12/2019    Procedure: APPLICATION, WOUND VAC;  Surgeon: Jason Espinoza MD;  Location: [facility] OR [address] [place] [address];  Service: ENT;  Laterality: N/A;    CHOLECYSTECTOMY      CLOSURE OF DEFECT OF MOHS PROCEDURE N/A 11/20/2019    Procedure: CLOSURE, MOHS PROCEDURE DEFECT;  Surgeon: Jason Espinoza MD;  Location: [facility] OR [address] [place] [address];  Service: Plastics;  Laterality: N/A;  , ACell, plastics set    COLONOSCOPY N/A 3/20/2019    Procedure: COLONOSCOPY;  Surgeon: Leeroy Thornton MD;  Location: [facility] ([address] [place] [address]);  Service: Endoscopy;  Laterality: N/A;    ESOPHAGOGASTRODUODENOSCOPY N/A 3/20/2019    Procedure: EGD (ESOPHAGOGASTRODUODENOSCOPY);  Surgeon: Leeroy Thornton MD;  Location: [facility] ([address] [place] [address]);  Service: Endoscopy;  Laterality: N/A;    WOUND DEBRIDEMENT  12/12/2019    Procedure: DEBRIDEMENT, WOUND;  Surgeon: Jason Espinoza MD;  Location: [facility] OR [address] [place] [address];  Service: ENT;;       Time Tracking:     OT Date of Treatment: 05/22/20  OT Start Time: 0831  OT Stop Time: 0857  OT Total Time (min): 26 min    Billable Minutes:Evaluation 10 min  Self Care/Home Management 16  jd Beal, OT  5/22/2020

## 2020-05-22 NOTE — ASSESSMENT & PLAN NOTE
- Patient with no improvement in pain with lidocaine patch.  - Supportive care.  - Continue gabapentin 300mg TID.

## 2020-05-22 NOTE — NURSING
Pt safety maintained this shift. Call light in reach and pt verbalized understanding of use. snf consult placed today. Pain controlled with prn pain medications this shift. Wound care placed wound vac to head this am. Pt will have dsg changes q3 days via wound care.

## 2020-05-22 NOTE — PLAN OF CARE
SW assigned to case today 5/20/20. SW will assist team with DC needs. SW in communication with CM.    SW attempted to complete LOCET. SW left name and number and state will call SW back to complete.     UPDATE 3:05 PM  SW completed LOCET and faxed PASRR to state. Waiting on 142.     Joycelyn Hudson, YUMIKO  [facility]  [id number]

## 2020-05-22 NOTE — PLAN OF CARE
Problem: Physical Therapy Goal  Goal: Physical Therapy Goal  Description  Goals to be met by: 20     Patient will increase functional independence with mobility by performin. Supine to sit with Stand-by Assistance  2. Sit to supine with Stand-by Assistance  3. Sit to stand transfer with Minimal Assistance  4. Gait  x 50 feet with Minimal Assistance using Rolling Walker.      Outcome: Ongoing, Progressing   Initial evaluation completed. Patient tolerated assessment well. Established POC and goals. Patient would continue to benefit from skilled PT services in order to improve functional mobility independence.       Mariola Otero, PT, DPT  2020

## 2020-05-22 NOTE — PROGRESS NOTES
PHARMACY CONSULT NOTE: WARFARIN  Ernie Phan is a 89 y.o. male on warfarin therapy for Atrial Fibrillation. PharmD has been consulted for warfarin dosing.    Current order: warfarin 5 mg PO daily except 2.5 mg PO Sunday  Home dose: warfarin 2.5 mg PO Tuesday, Saturday, 5 mg all other days  Coumadin clinic enrollment: Active  INR goal: 2-3    Lab Results   Component Value Date    INR 3.0 (H) 05/22/2020    INR 2.8 (H) 05/21/2020    INR 2.7 (H) 05/21/2020     Significant drug interactions: none  Diet: Regular    Recommendation(s):   1. Give warfarin 2.5 mg today given INR at upper limit of goal. Tomorrow, resume warfarin 2.5 mg every Tuesday and Saturday, 5 mg all other days  2. Hold warfarin for INR > 3  3. PT/INR daily    Thank you for the consult, will continue to follow  Nicholas GranadoD., BCPS  [id number]      **Note: Consults are reviewed Monday-Friday 7:00am-3:30pm. THE ABOVE RECOMMENDATIONS ARE ONLY SUGGESTED.THE RECOMMENDATIONS SHOULD BE CONSIDERED IN CONJUNCTION WITH ALL PATIENT FACTORS. **

## 2020-05-22 NOTE — H&P
"[facility] Medicine  History & Physical    Patient Name: Ernie Phan  MRN: [id number]  Admission Date: 5/21/2020  Attending Physician: Nixon Manley MD   Primary Care Provider: Ernie Michel MD    [facility] Medicine Team: [facility] HOSP MED E Mariola Last PA-C     Patient information was obtained from patient, past medical records and ER records.     Subjective:     Principal Problem:Weakness    Chief Complaint:   Chief Complaint   Patient presents with    Fall     Pt has had a couple falls in the last week, most recent yesterday.  Pt c/o generalized weakness and unable to walk.  States his right leg is hurting.        HPI: Patient is an 89 year old gentleman with a h/o HTN, HLD, myasthenia gravis, post-herpetic neuralgia, and A Fib on Coumadin.  He presents with weakness.  Patient states that his RLE is particularly weak and has been "giving out" on him.  He has had two falls where he was lowered to the floor with no head trauma or LOC.  He is having difficultly standing or ambulating with his walker.  Patient also notes pain to his right inner thigh secondary to h/o post-herpetic neuralgia.  He denies generalized weakness, SOB.  He has no history of acute myasthenia gravis crisis.  He lives alone, but has family that checks on him frequently and a sitter three days a week for four hours.  He denies chest pain, SOB, dizziness, palpitations, fever/chills, N/V/D, abdominal pain.    Past Medical History:   Diagnosis Date    *Atrial fibrillation     Arthritis     Atrial fibrillation     BPH (benign prostatic hypertrophy)     Degenerative disc disease     Depression     Difficult intubation 11/20/2019    easy mask ventilation but multiple attempts with down-sizing tubes all the way to 6.0 did not pass. Pt kept spontaneous throughout. fiberoptic called for and Dr. Espinoza evaluated airway. Patient with tortuous trachea. Dr. Espinoza states that if patient ever requires " intubation he recommends trach.    GERD (gastroesophageal reflux disease)     Hyperglycemia     Hyperlipidemia     Hypertension     MG (myasthenia gravis)     Postherpetic neuralgia     Squamous cell carcinoma of skin        Past Surgical History:   Procedure Laterality Date    ACHILLES TENDON SURGERY      APPLICATION OF WOUND VACUUM-ASSISTED CLOSURE DEVICE N/A 12/12/2019    Procedure: APPLICATION, WOUND VAC;  Surgeon: Jason Espinoza MD;  Location: [facility] OR [address] [place] [address];  Service: ENT;  Laterality: N/A;    CHOLECYSTECTOMY      CLOSURE OF DEFECT OF MOHS PROCEDURE N/A 11/20/2019    Procedure: CLOSURE, MOHS PROCEDURE DEFECT;  Surgeon: Jason Espinoza MD;  Location: [facility] OR [address] [place] [address];  Service: Plastics;  Laterality: N/A;  , ACell, plastics set    COLONOSCOPY N/A 3/20/2019    Procedure: COLONOSCOPY;  Surgeon: Leeroy Thornton MD;  Location: [facility]O ([facility]);  Service: Endoscopy;  Laterality: N/A;    ESOPHAGOGASTRODUODENOSCOPY N/A 3/20/2019    Procedure: EGD (ESOPHAGOGASTRODUODENOSCOPY);  Surgeon: Leeroy Thornton MD;  Location: [facility] ([facility]);  Service: Endoscopy;  Laterality: N/A;    WOUND DEBRIDEMENT  12/12/2019    Procedure: DEBRIDEMENT, WOUND;  Surgeon: Jason Espinoza MD;  Location: [facility] OR [address] [place] [address];  Service: ENT;;       Review of patient's allergies indicates:  No Known Allergies    No current facility-administered medications on file prior to encounter.      Current Outpatient Medications on File Prior to Encounter   Medication Sig    azaTHIOprine (IMURAN) 50 mg Tab Take 4 tablets (200 mg total) by mouth once daily.    DENTURE CLEANSER (EFFERVESCENT DENTURE CLEANSR DENT)     finasteride (PROSCAR) 5 mg tablet Take 1 tablet (5 mg total) by mouth once daily. DO NOT CRUSH OR CHEW; SWALLOW WHOLE.    flu vacc [id number],65yr up,PF (FLUZONE HIGH-DOSE 2019-20, PF,) 180 mcg/0.5 mL Syrg admin as directed    FLUZONE HIGH-DOSE 2018-19, PF, 180 mcg/0.5 mL vaccine     furosemide (LASIX) 20 MG tablet Take  1 tablet (20 mg total) by mouth once daily.    gabapentin (NEURONTIN) 300 MG capsule Take 1 capsule (300 mg total) by mouth 3 (three) times daily.    honey (MEDIHONEY, HONEY,) 80 % Gel Apply 1 application topically once daily.    hydroxyurea (HYDREA) 500 mg Cap Take 1,000 mg on day 1, then 500 mg on days 2 and 3 and repeat..    ibuprofen (ADVIL,MOTRIN) 600 MG tablet Take 1 tablet (600 mg total) by mouth every 6 (six) hours as needed.    IRON, FERROUS SULFATE, ORAL Take 65 mg by mouth once daily.    MEDIHONEY, HONEY, 100 % Pste     ondansetron (ZOFRAN) 4 MG tablet Take 1 tablet (4 mg total) by mouth every 8 (eight) hours as needed for Nausea.    ONE TOUCH ULTRA TEST Strp TEST DAILY    ONE TOUCH ULTRASOFT LANCETS lancets TEST DAILY    predniSONE (DELTASONE) 20 MG tablet 2 pills po daily for 4 days, then 1 pill po day for 4 days (Patient not taking: Reported on 5/21/2020)    predniSONE (DELTASONE) 5 MG tablet Take 1 tablet (5 mg total) by mouth once daily.    pyridostigmine (MESTINON) 60 mg Tab Take 60 mg by mouth.    tamsulosin (FLOMAX) 0.4 mg Cap TAKE 1 CAPSULE EVERY DAY    tamsulosin (FLOMAX) 0.4 mg Cap Take 1 capsule (0.4 mg total) by mouth once daily.    tamsulosin (FLOMAX) 0.4 mg Cap Take 1 capsule (0.4 mg total) by mouth once daily. DO NOT CRUSH OR CHEW; SWALLOW WHOLE.    verapamil (CALAN) 120 MG tablet Take 1 tablet (120 mg total) by mouth 2 (two) times daily.    vitamin D 1000 units Tab Take 185 mg by mouth once daily.    warfarin (COUMADIN) 5 MG tablet TAKE 1 TABLET EVERY DAY EXCEPT TAKE 1/2 TABLET ON SUNDAY, OR AS DIRECTED BY COUMADIN CLINIC     Family History     Problem Relation (Age of Onset)    Cancer Father    Stroke Mother        Tobacco Use    Smoking status: Former Smoker    Smokeless tobacco: Never Used   Substance and Sexual Activity    Alcohol use: No    Drug use: No    Sexual activity: Not Currently     Review of Systems   Constitutional: Positive for activity change.  Negative for appetite change, chills, diaphoresis, fatigue, fever and unexpected weight change.   HENT: Negative for congestion, rhinorrhea, sore throat, trouble swallowing and voice change.    Eyes: Negative for visual disturbance.   Respiratory: Negative for cough, choking, chest tightness, shortness of breath and wheezing.    Cardiovascular: Negative for chest pain, palpitations and leg swelling.   Gastrointestinal: Negative for abdominal distention, abdominal pain, anal bleeding, blood in stool, constipation, diarrhea, nausea and vomiting.   Endocrine: Negative for cold intolerance, heat intolerance, polydipsia and polyuria.   Genitourinary: Negative for dysuria, flank pain, frequency, hematuria and urgency.   Musculoskeletal: Negative for arthralgias, back pain, joint swelling and myalgias.        Neuralgias   Skin: Negative for color change and rash.   Neurological: Positive for weakness. Negative for dizziness, seizures, syncope, facial asymmetry, speech difficulty, light-headedness, numbness and headaches.   Hematological: Negative for adenopathy. Does not bruise/bleed easily.   Psychiatric/Behavioral: Negative for agitation, confusion, hallucinations and suicidal ideas.     Objective:     Vital Signs (Most Recent):  Temp: 96.9 °F (36.1 °C) (05/21/20 2301)  Pulse: 83 (05/21/20 2301)  Resp: 16 (05/21/20 2301)  BP: (!) 173/79 (05/21/20 2301)  SpO2: (!) 93 % (05/21/20 2301) Vital Signs (24h Range):  Temp:  [96.9 °F (36.1 °C)-98.1 °F (36.7 °C)] 96.9 °F (36.1 °C)  Pulse:  [58-83] 83  Resp:  [16-18] 16  SpO2:  [93 %-97 %] 93 %  BP: (100-178)/() 173/79     Weight: 83.9 kg (185 lb)  Body mass index is 27.32 kg/m².    Physical Exam   Constitutional: He is oriented to person, place, and time. He appears well-developed and well-nourished. No distress.   HENT:   Head: Normocephalic and atraumatic.   Wound vac to posterior scalp   Eyes: Pupils are equal, round, and reactive to light.   Neck: Neck supple. Carotid  bruit is not present. No thyromegaly present.   Cardiovascular: Normal rate and regular rhythm. Exam reveals no gallop.   No murmur heard.  Pulmonary/Chest: Effort normal and breath sounds normal. No respiratory distress. He has no wheezes.   Abdominal: Bowel sounds are normal. He exhibits no distension. There is no splenomegaly or hepatomegaly. There is no tenderness.   Musculoskeletal: Normal range of motion. He exhibits edema.   Neurological: He is alert and oriented to person, place, and time. No cranial nerve deficit or sensory deficit.   BUE strength 5/5, BLE strength 4/5   Skin: Skin is warm and dry. No rash noted.   Psychiatric: He has a normal mood and affect. His behavior is normal.         CRANIAL NERVES     CN III, IV, VI   Pupils are equal, round, and reactive to light.       Significant Labs:   CBC:   Recent Labs   Lab 05/21/20  1614   WBC 11.94   HGB 9.2*   HCT 30.1*   *     CMP:   Recent Labs   Lab 05/21/20  1614      K 3.8      CO2 24      BUN 15   CREATININE 1.2   CALCIUM 8.9   PROT 6.9   ALBUMIN 3.5   BILITOT 2.1*   ALKPHOS 82   AST 21   ALT 10   ANIONGAP 9   EGFRNONAA 53.3*     Cardiac Markers:   Recent Labs   Lab 05/21/20  1614   *     Troponin: No results for input(s): TROPONINI in the last 48 hours.  Urine Studies:   Recent Labs   Lab 05/21/20  2204   COLORU Yellow   APPEARANCEUA Clear   PHUR 6.0   SPECGRAV 1.010   PROTEINUA Negative   GLUCUA Negative   KETONESU Negative   BILIRUBINUA Negative   OCCULTUA 1+*   NITRITE Negative   LEUKOCYTESUR Negative   RBCUA 3   WBCUA 0   BACTERIA Rare       Significant Imaging: I have reviewed all pertinent imaging results/findings within the past 24 hours.    Assessment/Plan:     * Weakness  - CT cervical spine without contrast:  Degenerative changes above.  Few mm anterolisthesis C4 on C5 and C7 on T1.  Pleural fluid or thickening right greater than left.  - CT head without contrast:  No evidence of acute intracranial  "pathology.  - R hip x-ray:  There is mild DJD.  No fracture, dislocation or bone destruction seen.  - R knee x-ray:  No fracture dislocation bone destruction seen.  There is moderate DJD and a varus deformity.  - BLE US:  No evidence of deep venous thrombosis in either lower extremity.  - UA unremarkable.  - Will consult PT/OT, SW.    Postherpetic neuralgia  - Patient with no improvement in pain with lidocaine patch.  - Supportive care.  - Continue gabapentin 300mg TID.      Myasthenia gravis without acute exacerbation  Long-term use of immunosuppressant medication     - Continue Imuran 200mg daily, Mestinon 60mg daily, prednisone 5mg daily.  - Will monitor respiratory mechanics.      Elevated brain natriuretic peptide (BNP) level  - .  - Will get chest x-ray.  - Patient received Lasix 20mg IV in ER.  - Strict I&Os, daily weights.    Results for orders placed in visit on 12/09/19   Echo    Narrative · Normal left ventricular systolic function. The estimated ejection   fraction is 65%  · No wall motion abnormalities.  · Normal right ventricular systolic function.  · Severe biatrial enlargement.  · Aortic valve area is 1.62 cm2; peak velocity is 2.51 m/s; mean gradient   is 14 mmHg.  · Mild aortic valve stenosis.  · Mild aortic regurgitation.  · Mild mitral regurgitation.  · Mild to moderate tricuspid regurgitation.  · The estimated PA systolic pressure is 63 mm Hg  · Pulmonary hypertension present.  · Normal central venous pressure (3 mm Hg).  · Atrial fibrillation observed.              Delayed surgical wound healing  - Per wound care note,  "s/p debridement of open head wound involving the bone and application of wound vac on 12/12/19 with Dr. Espinoza. He originally had a repair of Mohs defect with ACell on 12/5/19 but the ACell failed."  - Will consult wound care.      Essential thrombocytosis  - Patient follows with heme/onc.  - Continue hydroxyurea.      Chronic kidney disease, stage III (moderate)  - At " baseline with creatinine of 1.2, GFR 53.3.  - Avoid nephrotoxins.      Paroxysmal atrial fibrillation  Long term current use of anticoagulant therapy    - Continue verapamil 120mg BID, Coumadin 5mg MTuWThSa, 2.5mg Sunday.  - Monitor PT/INR.  - Will consult PharmD.  - [id number] VASc score of at least three for HTN and age.      VTE Risk Mitigation (From admission, onward)         Ordered     warfarin (COUMADIN) tablet 2.5 mg  Every Sunday 05/21/20 2344     warfarin (COUMADIN) tablet 5 mg  Every Tues, Thurs, Sat      05/21/20 2344     warfarin (COUMADIN) tablet 5 mg  Every Mon, Wed, Fri 05/21/20 2344     IP VTE HIGH RISK PATIENT  Once      05/21/20 2344     Place sequential compression device  Until discontinued      05/21/20 2344     Place STELLA hose  Until discontinued      05/21/20 2344     Reason for No Pharmacological VTE Prophylaxis  Once     Question:  Reasons:  Answer:  Already adequately anticoagulated on oral Anticoagulants    05/21/20 2344                   Mariola Last PA-C  Department of Hospital Medicine   [facility]

## 2020-05-22 NOTE — HOSPITAL COURSE
"Patient admitted for generalized weakness as he reported his RLE had been "giving out" secondary to pain from post-herpetic neuralgia. CTH without acute abnormality. CT C-spine "Degenerative changes above.  Few mm anterolisthesis C4 on C5 and C7 on T1." R hip and knee xray without fracture, degenerative disease present. Suspect weakness secondary to deconditioning caused by post-herpetic neuralgia. PTOT evaluated, recommending SNF. Pt is agreeable, however placement has been difficult as facilities do not think they can meed his complex needs (wound vac). Patient was eventually accepted to a facility for further therapy.  "

## 2020-05-22 NOTE — ASSESSMENT & PLAN NOTE
- .  - Will get chest x-ray: unremarkable  - Patient received Lasix 20mg IV in ER.  - Strict I&Os, daily weights.    Results for orders placed in visit on 12/09/19   Echo    Narrative · Normal left ventricular systolic function. The estimated ejection   fraction is 65%  · No wall motion abnormalities.  · Normal right ventricular systolic function.  · Severe biatrial enlargement.  · Aortic valve area is 1.62 cm2; peak velocity is 2.51 m/s; mean gradient   is 14 mmHg.  · Mild aortic valve stenosis.  · Mild aortic regurgitation.  · Mild mitral regurgitation.  · Mild to moderate tricuspid regurgitation.  · The estimated PA systolic pressure is 63 mm Hg  · Pulmonary hypertension present.  · Normal central venous pressure (3 mm Hg).  · Atrial fibrillation observed.

## 2020-05-22 NOTE — PHARMACY MED REC
"Admission Medication Reconciliation - Pharmacy Consult Note    The home medication history was taken by Kathya Grewal Pharmacy Tech.     Based on information gathered and subsequent review by the clinical pharmacist, the items below may need attention.     You may go to "Admission" then "Reconcile Home Medications" tabs to review and/or act upon these items.     Potentially problematic discrepancies with current MAR  o Patient IS NOT taking the following which was ordered upon admit  o Pyridostigmine  o Patient is taking a drug DIFFERENTLY than how ordered upon admit  o Gabapentin 600 mg BID    Please address this information as you see fit.  Feel free to contact us if you have any questions or require assistance.    Darien Morse, Pharm.D., BCPS  [id number]                    .    .            "

## 2020-05-22 NOTE — PROGRESS NOTES
PharmD Consult received for:  1.) Admit medication history/reconciliation: full note to follow  2.) Renally adjust all medications: no renal dose adjustments required  3.) Warfarin dosing (home regimen): full note to follow        Thank you for the consult,  Darien Morse, Pharm.D., BCPS  [id number]      **Note: Consults are reviewed Monday-Friday 7:00am-3:30pm. The above recommendations are only suggested. The recommendations should be considered in conjunction with all patient factors.**

## 2020-05-22 NOTE — NURSING
Received to room 732 per stretcher in NAD,awake alert and oriented,skin warm and dry to touch color pink. Pt. Complaining of severe pain to right thigh,states patch is not helping at all. Told Pt we would discuss with dr when they came to do assess.Oriented to room and call bell system. Instructed not to get OOB unassisted. Pt. States she is unable to ambulate at this time due to pain.

## 2020-05-22 NOTE — ASSESSMENT & PLAN NOTE
- Patient with no improvement in pain with lidocaine patch.  - Supportive care.  - Continue gabapentin 300mg TID.  - pt reports improvement with PRN oxy

## 2020-05-22 NOTE — SUBJECTIVE & OBJECTIVE
Past Medical History:   Diagnosis Date    *Atrial fibrillation     Arthritis     Atrial fibrillation     BPH (benign prostatic hypertrophy)     Degenerative disc disease     Depression     Difficult intubation 11/20/2019    easy mask ventilation but multiple attempts with down-sizing tubes all the way to 6.0 did not pass. Pt kept spontaneous throughout. fiberoptic called for and Dr. Espinoza evaluated airway. Patient with tortuous trachea. Dr. Espinoza states that if patient ever requires intubation he recommends trach.    GERD (gastroesophageal reflux disease)     Hyperglycemia     Hyperlipidemia     Hypertension     MG (myasthenia gravis)     Postherpetic neuralgia     Squamous cell carcinoma of skin        Past Surgical History:   Procedure Laterality Date    ACHILLES TENDON SURGERY      APPLICATION OF WOUND VACUUM-ASSISTED CLOSURE DEVICE N/A 12/12/2019    Procedure: APPLICATION, WOUND VAC;  Surgeon: Jason Espinoza MD;  Location: [facility] OR [address] [place] [address];  Service: ENT;  Laterality: N/A;    CHOLECYSTECTOMY      CLOSURE OF DEFECT OF MOHS PROCEDURE N/A 11/20/2019    Procedure: CLOSURE, MOHS PROCEDURE DEFECT;  Surgeon: Jason Espinoza MD;  Location: [facility] OR [address] [place] [address];  Service: Plastics;  Laterality: N/A;  , ACell, plastics set    COLONOSCOPY N/A 3/20/2019    Procedure: COLONOSCOPY;  Surgeon: Leeroy Thornton MD;  Location: [facility] ([address] [place] [address]);  Service: Endoscopy;  Laterality: N/A;    ESOPHAGOGASTRODUODENOSCOPY N/A 3/20/2019    Procedure: EGD (ESOPHAGOGASTRODUODENOSCOPY);  Surgeon: Leeroy Thornton MD;  Location: [facility] ([address] [place] [address]);  Service: Endoscopy;  Laterality: N/A;    WOUND DEBRIDEMENT  12/12/2019    Procedure: DEBRIDEMENT, WOUND;  Surgeon: Jason Espinoza MD;  Location: [facility] OR [address] [place] [address];  Service: ENT;;       Review of patient's allergies indicates:  No Known Allergies    No current facility-administered medications on file prior to encounter.      Current Outpatient Medications on File Prior to  Encounter   Medication Sig    azaTHIOprine (IMURAN) 50 mg Tab Take 4 tablets (200 mg total) by mouth once daily.    DENTURE CLEANSER (EFFERVESCENT DENTURE CLEANSR DENT)     finasteride (PROSCAR) 5 mg tablet Take 1 tablet (5 mg total) by mouth once daily. DO NOT CRUSH OR CHEW; SWALLOW WHOLE.    flu vacc [id number],65yr up,PF (FLUZONE HIGH-DOSE 2019-20, PF,) 180 mcg/0.5 mL Syrg admin as directed    FLUZONE HIGH-DOSE 2018-19, PF, 180 mcg/0.5 mL vaccine     furosemide (LASIX) 20 MG tablet Take 1 tablet (20 mg total) by mouth once daily.    gabapentin (NEURONTIN) 300 MG capsule Take 1 capsule (300 mg total) by mouth 3 (three) times daily.    honey (MEDIHONEY, HONEY,) 80 % Gel Apply 1 application topically once daily.    hydroxyurea (HYDREA) 500 mg Cap Take 1,000 mg on day 1, then 500 mg on days 2 and 3 and repeat..    ibuprofen (ADVIL,MOTRIN) 600 MG tablet Take 1 tablet (600 mg total) by mouth every 6 (six) hours as needed.    IRON, FERROUS SULFATE, ORAL Take 65 mg by mouth once daily.    MEDIHONEY, HONEY, 100 % Pste     ondansetron (ZOFRAN) 4 MG tablet Take 1 tablet (4 mg total) by mouth every 8 (eight) hours as needed for Nausea.    ONE TOUCH ULTRA TEST Strp TEST DAILY    ONE TOUCH ULTRASOFT LANCETS lancets TEST DAILY    predniSONE (DELTASONE) 20 MG tablet 2 pills po daily for 4 days, then 1 pill po day for 4 days (Patient not taking: Reported on 5/21/2020)    predniSONE (DELTASONE) 5 MG tablet Take 1 tablet (5 mg total) by mouth once daily.    pyridostigmine (MESTINON) 60 mg Tab Take 60 mg by mouth.    tamsulosin (FLOMAX) 0.4 mg Cap TAKE 1 CAPSULE EVERY DAY    tamsulosin (FLOMAX) 0.4 mg Cap Take 1 capsule (0.4 mg total) by mouth once daily.    tamsulosin (FLOMAX) 0.4 mg Cap Take 1 capsule (0.4 mg total) by mouth once daily. DO NOT CRUSH OR CHEW; SWALLOW WHOLE.    verapamil (CALAN) 120 MG tablet Take 1 tablet (120 mg total) by mouth 2 (two) times daily.    vitamin D 1000 units Tab Take 185 mg by  mouth once daily.    warfarin (COUMADIN) 5 MG tablet TAKE 1 TABLET EVERY DAY EXCEPT TAKE 1/2 TABLET ON SUNDAY, OR AS DIRECTED BY COUMADIN CLINIC     Family History     Problem Relation (Age of Onset)    Cancer Father    Stroke Mother        Tobacco Use    Smoking status: Former Smoker    Smokeless tobacco: Never Used   Substance and Sexual Activity    Alcohol use: No    Drug use: No    Sexual activity: Not Currently     Review of Systems   Constitutional: Positive for activity change. Negative for appetite change, chills, diaphoresis, fatigue, fever and unexpected weight change.   HENT: Negative for congestion, rhinorrhea, sore throat, trouble swallowing and voice change.    Eyes: Negative for visual disturbance.   Respiratory: Negative for cough, choking, chest tightness, shortness of breath and wheezing.    Cardiovascular: Negative for chest pain, palpitations and leg swelling.   Gastrointestinal: Negative for abdominal distention, abdominal pain, anal bleeding, blood in stool, constipation, diarrhea, nausea and vomiting.   Endocrine: Negative for cold intolerance, heat intolerance, polydipsia and polyuria.   Genitourinary: Negative for dysuria, flank pain, frequency, hematuria and urgency.   Musculoskeletal: Negative for arthralgias, back pain, joint swelling and myalgias.        Neuralgias   Skin: Negative for color change and rash.   Neurological: Positive for weakness. Negative for dizziness, seizures, syncope, facial asymmetry, speech difficulty, light-headedness, numbness and headaches.   Hematological: Negative for adenopathy. Does not bruise/bleed easily.   Psychiatric/Behavioral: Negative for agitation, confusion, hallucinations and suicidal ideas.     Objective:     Vital Signs (Most Recent):  Temp: 96.9 °F (36.1 °C) (05/21/20 2301)  Pulse: 83 (05/21/20 2301)  Resp: 16 (05/21/20 2301)  BP: (!) 173/79 (05/21/20 2301)  SpO2: (!) 93 % (05/21/20 2301) Vital Signs (24h Range):  Temp:  [96.9 °F (36.1  °C)-98.1 °F (36.7 °C)] 96.9 °F (36.1 °C)  Pulse:  [58-83] 83  Resp:  [16-18] 16  SpO2:  [93 %-97 %] 93 %  BP: (100-178)/() 173/79     Weight: 83.9 kg (185 lb)  Body mass index is 27.32 kg/m².    Physical Exam   Constitutional: He is oriented to person, place, and time. He appears well-developed and well-nourished. No distress.   HENT:   Head: Normocephalic and atraumatic.   Wound vac to posterior scalp   Eyes: Pupils are equal, round, and reactive to light.   Neck: Neck supple. Carotid bruit is not present. No thyromegaly present.   Cardiovascular: Normal rate and regular rhythm. Exam reveals no gallop.   No murmur heard.  Pulmonary/Chest: Effort normal and breath sounds normal. No respiratory distress. He has no wheezes.   Abdominal: Bowel sounds are normal. He exhibits no distension. There is no splenomegaly or hepatomegaly. There is no tenderness.   Musculoskeletal: Normal range of motion. He exhibits edema.   Neurological: He is alert and oriented to person, place, and time. No cranial nerve deficit or sensory deficit.   BUE strength 5/5, BLE strength 4/5   Skin: Skin is warm and dry. No rash noted.   Psychiatric: He has a normal mood and affect. His behavior is normal.         CRANIAL NERVES     CN III, IV, VI   Pupils are equal, round, and reactive to light.       Significant Labs:   CBC:   Recent Labs   Lab 05/21/20  1614   WBC 11.94   HGB 9.2*   HCT 30.1*   *     CMP:   Recent Labs   Lab 05/21/20  1614      K 3.8      CO2 24      BUN 15   CREATININE 1.2   CALCIUM 8.9   PROT 6.9   ALBUMIN 3.5   BILITOT 2.1*   ALKPHOS 82   AST 21   ALT 10   ANIONGAP 9   EGFRNONAA 53.3*     Cardiac Markers:   Recent Labs   Lab 05/21/20  1614   *     Troponin: No results for input(s): TROPONINI in the last 48 hours.  Urine Studies:   Recent Labs   Lab 05/21/20  2204   COLORU Yellow   APPEARANCEUA Clear   PHUR 6.0   SPECGRAV 1.010   PROTEINUA Negative   GLUCUA Negative   KETONESU Negative    BILIRUBINUA Negative   OCCULTUA 1+*   NITRITE Negative   LEUKOCYTESUR Negative   RBCUA 3   WBCUA 0   BACTERIA Rare       Significant Imaging: I have reviewed all pertinent imaging results/findings within the past 24 hours.

## 2020-05-22 NOTE — PLAN OF CARE
Safety precautions in place,telesitter on,pt. Instructed not to get OOB without calling for assistance. Verbalizes understanding. Wd vac dressing in place to posterior scalp,scanty amount straw colored drainage. Pt. Complaining of pain to right thigh, lidoderm patch in place,states it hasn't helped. Will discuss with DR  when they come to see pt. Continue current plan of care.

## 2020-05-22 NOTE — ASSESSMENT & PLAN NOTE
Long-term use of immunosuppressant medication     - Continue Imuran 200mg daily, Mestinon 60mg daily, prednisone 5mg daily.  - Will monitor respiratory mechanics.

## 2020-05-22 NOTE — PLAN OF CARE
05/22/20 1301   Post-Acute Status   Post-Acute Authorization Placement   Post-Acute Placement Status Referrals Sent

## 2020-05-22 NOTE — PT/OT/SLP EVAL
Physical Therapy Evaluation    Patient Name:  Ernie Phan   MRN:  [id number]    Recommendations:     Discharge Recommendations:  nursing facility, skilled   Discharge Equipment Recommendations: other (see comments)(TBD)   Barriers to discharge: Inaccessible home and Decreased caregiver support    Assessment:     Ernie Phan is a 89 y.o. male admitted with a medical diagnosis of Weakness.  He presents with the following impairments/functional limitations:  weakness, impaired functional mobilty, decreased safety awareness, gait instability, impaired endurance, decreased upper extremity function, impaired self care skills, decreased lower extremity function, impaired balance . Patient tolerated assessment well.  Patient will benefit from PT services to address the mentioned deficits in order to promote an improve functional mobility status. Upon d/c he is an appropriate candidate for SNF in order to progress towards an improved level of functional mobility independence.     Rehab Prognosis: Good; patient would benefit from acute skilled PT services to address these deficits and reach maximum level of function.    Recent Surgery: * No surgery found *      Plan:     During this hospitalization, patient to be seen 4 x/week to address the identified rehab impairments via gait training, therapeutic activities, therapeutic exercises, neuromuscular re-education and progress toward the following goals:    · Plan of Care Expires:  06/21/20    History:     Past Medical History:   Diagnosis Date    *Atrial fibrillation     Arthritis     Atrial fibrillation     BPH (benign prostatic hypertrophy)     Degenerative disc disease     Depression     Difficult intubation 11/20/2019    easy mask ventilation but multiple attempts with down-sizing tubes all the way to 6.0 did not pass. Pt kept spontaneous throughout. fiberoptic called for and Dr. Espinoza evaluated airway. Patient with tortuous trachea. Dr. Espinoza states that if  "patient ever requires intubation he recommends trach.    GERD (gastroesophageal reflux disease)     Hyperglycemia     Hyperlipidemia     Hypertension     MG (myasthenia gravis)     Postherpetic neuralgia     Squamous cell carcinoma of skin        Past Surgical History:   Procedure Laterality Date    ACHILLES TENDON SURGERY      APPLICATION OF WOUND VACUUM-ASSISTED CLOSURE DEVICE N/A 12/12/2019    Procedure: APPLICATION, WOUND VAC;  Surgeon: Jason Espinoza MD;  [facility];  Service: ENT;  Laterality: N/A;    CHOLECYSTECTOMY      CLOSURE OF DEFECT OF MOHS PROCEDURE N/A 11/20/2019    Procedure: CLOSURE, MOHS PROCEDURE DEFECT;  Surgeon: Jason Espinoza MD;  [facility];  Service: Plastics;  Laterality: N/A;  , ACell, plastics set    COLONOSCOPY N/A 3/20/2019    Procedure: COLONOSCOPY;  Surgeon: Leeroy Thornton MD;  Location: [facility] ENDO (2ND FLR);  Service: Endoscopy;  Laterality: N/A;    ESOPHAGOGASTRODUODENOSCOPY N/A 3/20/2019    Procedure: EGD (ESOPHAGOGASTRODUODENOSCOPY);  Surgeon: Leeroy Thornton MD;  Location: [facility] ENDO (2ND FLR);  Service: Endoscopy;  Laterality: N/A;    WOUND DEBRIDEMENT  12/12/2019    Procedure: DEBRIDEMENT, WOUND;  Surgeon: Jason Espinoza MD;  [facility];  Service: ENT;;       Subjective     Chief Complaint: weakness   Patient/Family Comments/goals: "I just can't move too good."  Pain/Comfort:  · Pain Rating 1: 0/10  · Pain Rating Post-Intervention 1: 0/10    Patients cultural, spiritual, Hindu conflicts given the current situation: no    Living Environment:  Patient's living environment is as follows:  Home type Condo    1 or 2 stories  3 stories (patient only accesses 1st level)    Number of RO/ rails 0 RO   AD used?/Owned?  RW, SPC, bath bench    Bathroom set-up  Tub/shower   Working? no   Driving? no   Individuals living with patient:  Alone; sitters 3x/wk - 4 hours a day   Hobbies/Roles: Watching sports    Prior to " admission, patients level of function was (A) for ADLs from sitters and Mod(I) with (A) with RW.  Equipment used at home: walker, rolling, wheelchair, bath bench, cane, straight.  DME owned (not currently used): none.  Upon discharge, patient will have assistance from family and sitters - 24/7 unavailable.    Objective:     Communicated with RN prior to session.  Patient found HOB elevated with telemetry, wound vac  upon PT entry to room. See below for detailed functional assessment. Patient agreeable to participate in initial evaluation.    General Precautions: Standard, fall   Orthopedic Precautions:N/A   Braces: N/A     Exams:  · Cognitive Exam:  Patient is oriented to Person, Place, Time and Situation  · Postural Exam:  Patient presented with the following abnormalities: -       Rounded shoulders  · -       Forward head  · Sensation:    LEFT LE: -       Intact  light/touch LE RIGHT LE:-       Intact  light/touch LE   LLE - 3+ pitting edema     ROM and Strength   Right Lower Extremity  Left Lower Extremity    Hip Flexion WFL WFL   Knee Extension  WFL WFL   Knee Flexion  WFL WFL   Ankle DF WFL WFL   Ankle PF  WFL WFL     Functional Mobility:  · Bed Mobility:     · Rolling Right: minimum assistance  · Scooting: maximal assistance and via drawsheet to HOB   · Supine to Sit: minimum assistance  · Sit to Supine: minimum assistance  · Transfers:     · Sit to Stand:  maximal assistance and of 2 persons with rolling walker  · Only able to achieve ~25% stand; poor posture, patient apprehensive, increased knee flexion   · Patient requested to return to sitting 2* needing to have BM   · Balance: sitting EOB - SBA, with LE movement Fatmata 2* posterior trunk lean     Therapeutic Activities and Exercises:  -Patient educated on the role and goal of PT services during acute care LOS. Question and concerns acknowledged and answers as appropriate.   -Will continue to educated as needed.    -White board updated in patients room to  reflect level of assistance needed with nursing.     AM-PAC 6 CLICK MOBILITY  Total Score:12     Patient left supine with all lines intact, call button in reach and RN notified.  White board updated in patient room to reflect level of function with nursing.     GOALS:   Multidisciplinary Problems     Physical Therapy Goals        Problem: Physical Therapy Goal    Goal Priority Disciplines Outcome Goal Variances Interventions   Physical Therapy Goal     PT, PT/OT Ongoing, Progressing     Description:  Goals to be met by: 20     Patient will increase functional independence with mobility by performin. Supine to sit with Stand-by Assistance  2. Sit to supine with Stand-by Assistance  3. Sit to stand transfer with Minimal Assistance  4. Gait  x 50 feet with Minimal Assistance using Rolling Walker.                       Time Tracking:     PT Received On: 20  PT Start Time: 831     PT Stop Time: 851  PT Total Time (min): 20 min     Billable Minutes: Evaluation 10 and Therapeutic Activity 10      Mariola Otero, PT  2020

## 2020-05-22 NOTE — PROGRESS NOTES
"[facility] Hospital Medicine  Progress Note    Patient Name: Ernie Phan  MRN: [id number]  Patient Class: OP- Observation   Admission Date: 5/21/2020  Length of Stay: 0 days  Attending Physician: Nixon Manley MD  Primary Care Provider: Ernie Michel MD    [facility] Medicine Team: [facility] HOSP MED E Nandini Acevedo PA-C    Subjective:     Principal Problem:Weakness        HPI:  Patient is an 89 year old gentleman with a h/o HTN, HLD, myasthenia gravis, post-herpetic neuralgia, and A Fib on Coumadin.  He presents with weakness.  Patient states that his RLE is particularly weak and has been "giving out" on him.  He has had two falls where he was lowered to the floor with no head trauma or LOC.  He is having difficultly standing or ambulating with his walker.  Patient also notes pain to his right inner thigh secondary to h/o post-herpetic neuralgia.  He denies generalized weakness, SOB.  He has no history of acute myasthenia gravis crisis.  He lives alone, but has family that checks on him frequently and a sitter three days a week for four hours.  He denies chest pain, SOB, dizziness, palpitations, fever/chills, N/V/D, abdominal pain.    Overview/Hospital Course:  Patient admitted for generalized weakness. His RLE has been "giving out" secondary to pain from post-herpetic neuralgia. CTH without acute abnormality. CT C-spine "Degenerative changes above.  Few mm anterolisthesis C4 on C5 and C7 on T1." R hip and knee xray without fracture, degenerative disease present. Suspect weakness secondary to deconditioning caused by post-herpetic neuralgia. PTOT evaluated, recommending SNF. Pt is agreeable.     Interval History: No acute events overnight. Pt seen at bedside resting in NAD, reports improvement in pain of R thigh with oxy. Pt agreeable to SNF or rehab placement    Review of Systems   Constitutional: Positive for activity change. Negative for appetite change and fever.   Respiratory: Negative " for cough and shortness of breath.    Cardiovascular: Negative for chest pain and palpitations.   Gastrointestinal: Negative for abdominal pain, diarrhea, nausea and vomiting.   Genitourinary: Negative for dysuria and hematuria.   Musculoskeletal: Negative for arthralgias and back pain.        Neuralgias   Skin: Negative for color change and rash.   Neurological: Positive for weakness. Negative for syncope and speech difficulty.   Psychiatric/Behavioral: Negative for agitation and confusion.     Objective:     Vital Signs (Most Recent):  Temp: 98.2 °F (36.8 °C) (05/22/20 0715)  Pulse: 90 (05/22/20 0715)  Resp: 20 (05/22/20 0715)  BP: 127/74 (05/22/20 0715)  SpO2: (!) 92 % (05/22/20 0715) Vital Signs (24h Range):  Temp:  [96.9 °F (36.1 °C)-98.2 °F (36.8 °C)] 98.2 °F (36.8 °C)  Pulse:  [] 90  Resp:  [16-20] 20  SpO2:  [92 %-97 %] 92 %  BP: (100-178)/() 127/74     Weight: 84 kg (185 lb 3 oz)  Body mass index is 27.35 kg/m².    Intake/Output Summary (Last 24 hours) at 5/22/2020 1103  Last data filed at 5/21/2020 2210  Gross per 24 hour   Intake --   Output 500 ml   Net -500 ml      Physical Exam   Constitutional: He is oriented to person, place, and time. He appears well-developed and well-nourished. No distress.   HENT:   Head: Normocephalic and atraumatic.   Wound vac to posterior scalp   Neck: Normal range of motion. Carotid bruit is not present.   Cardiovascular: Regular rhythm.   Pulmonary/Chest: Effort normal. No respiratory distress.   Abdominal: There is no splenomegaly or hepatomegaly.   Musculoskeletal: Normal range of motion. He exhibits edema.   Neurological: He is alert and oriented to person, place, and time. No cranial nerve deficit.   BUE strength 5/5, BLE strength 4/5   Skin: Skin is warm and dry. He is not diaphoretic.   Psychiatric: He has a normal mood and affect. His behavior is normal.       Significant Labs: All pertinent labs within the past 24 hours have been reviewed.    Significant  Imaging: I have reviewed all pertinent imaging results/findings within the past 24 hours.      Assessment/Plan:      * Weakness  - CT cervical spine without contrast:  Degenerative changes above.  Few mm anterolisthesis C4 on C5 and C7 on T1.  Pleural fluid or thickening right greater than left.  - CT head without contrast:  No evidence of acute intracranial pathology.  - R hip x-ray:  There is mild DJD.  No fracture, dislocation or bone destruction seen.  - R knee x-ray:  No fracture dislocation bone destruction seen.  There is moderate DJD and a varus deformity.  - BLE US:  No evidence of deep venous thrombosis in either lower extremity.  - UA unremarkable.  - Will consult PT/OT, SW.  - suspect deconditioning   - ptot rec SNF, pt agreeable    Postherpetic neuralgia  - Patient with no improvement in pain with lidocaine patch.  - Supportive care.  - Continue gabapentin 300mg TID.  - pt reports improvement with PRN oxy      Myasthenia gravis without acute exacerbation  Long-term use of immunosuppressant medication     - Continue Imuran 200mg daily, Mestinon 60mg daily, prednisone 5mg daily.  - Will monitor respiratory mechanics.      Elevated brain natriuretic peptide (BNP) level  - .  - Will get chest x-ray: unremarkable  - Patient received Lasix 20mg IV in ER.  - Strict I&Os, daily weights.    Results for orders placed in visit on 12/09/19   Echo    Narrative · Normal left ventricular systolic function. The estimated ejection   fraction is 65%  · No wall motion abnormalities.  · Normal right ventricular systolic function.  · Severe biatrial enlargement.  · Aortic valve area is 1.62 cm2; peak velocity is 2.51 m/s; mean gradient   is 14 mmHg.  · Mild aortic valve stenosis.  · Mild aortic regurgitation.  · Mild mitral regurgitation.  · Mild to moderate tricuspid regurgitation.  · The estimated PA systolic pressure is 63 mm Hg  · Pulmonary hypertension present.  · Normal central venous pressure (3 mm Hg).  ·  "Atrial fibrillation observed.              Delayed surgical wound healing  - Per wound care note,  "s/p debridement of open head wound involving the bone and application of wound vac on 12/12/19 with Dr. Espinoza. He originally had a repair of Mohs defect with ACell on 12/5/19 but the ACell failed."  - Will consult wound care, appreciate assistance      Essential thrombocytosis  - Patient follows with heme/onc.  - Continue hydroxyurea.      Chronic kidney disease, stage III (moderate)  - At baseline with creatinine of 1.2, GFR 53.3.  - Avoid nephrotoxins.      Paroxysmal atrial fibrillation  Long term current use of anticoagulant therapy    - Continue verapamil 120mg BID, Coumadin 5mg [id number], 2.5mg Sunday.  - Monitor PT/INR.  - Will consult PharmD, appreciate assistance  - [id number] VASc score of at least three for HTN and age.        VTE Risk Mitigation (From admission, onward)         Ordered     warfarin (COUMADIN) tablet 5 mg  Every Thursday 05/22/20 0702     warfarin (COUMADIN) tablet 2.5 mg  Every Tuesday 05/22/20 0702     warfarin (COUMADIN) tablet 5 mg  Every Mon, Wed, Fri 05/22/20 0702     warfarin (COUMADIN) tablet 5 mg  Every Sunday 05/22/20 0702     warfarin (COUMADIN) tablet 2.5 mg  Every Saturday 05/22/20 0702     warfarin (COUMADIN) tablet 2.5 mg  Once      05/22/20 0702     IP VTE HIGH RISK PATIENT  Once      05/21/20 2344     Place sequential compression device  Until discontinued      05/21/20 2344     Place STELLA hose  Until discontinued      05/21/20 2344     Reason for No Pharmacological VTE Prophylaxis  Once     Question:  Reasons:  Answer:  Already adequately anticoagulated on oral Anticoagulants    05/21/20 2344                      Nandini Acevedo PA-C  Department of Hospital Medicine   [facility]    "

## 2020-05-22 NOTE — PLAN OF CARE
CM met with patient at the bedside to discuss discharge planning assessment. Pt lives alone and has transportation at discharge.  Pt verified PCP and Pharmacy. CM will continue to follow for discharge needs.      05/22/20 1255   Discharge Assessment   Assessment Type Discharge Planning Assessment   Confirmed/corrected address and phone number on facesheet? Yes   Assessment information obtained from? Patient   Expected Length of Stay (days) 4   Communicated expected length of stay with patient/caregiver yes   Prior to hospitilization cognitive status: Alert/Oriented   Prior to hospitalization functional status: Assistive Equipment   Current cognitive status: Alert/Oriented   Current Functional Status: Assistive Equipment   Lives With alone   Able to Return to Prior Arrangements yes   Is patient able to care for self after discharge? Unable to determine at this time (comments)   Who are your caregiver(s) and their phone number(s)? Rosibel Thakur- [facility]- [phone]   Patient's perception of discharge disposition home or selfcare   Readmission Within the Last 30 Days no previous admission in last 30 days   Patient currently being followed by outpatient case management? No   Patient currently receives any other outside agency services? Yes   Name and contact number of agency or person providing outside services [facility]    Equipment Currently Used at Home wheelchair;walker, rolling   Do you have any problems affording any of your prescribed medications? No   Is the patient taking medications as prescribed? yes   Does the patient have transportation home? Yes   Transportation Anticipated family or friend will provide   Does the patient receive services at the [facility]? Yes   Discharge Plan A Skilled Nursing Facility   Discharge Plan B Home Health;Home with family   DME Needed Upon Discharge  none   Patient/Family in Agreement with Plan yes

## 2020-05-22 NOTE — ASSESSMENT & PLAN NOTE
Long term current use of anticoagulant therapy    - Continue verapamil 120mg BID, Coumadin 5mg MTuWThSa, 2.5mg Sunday.  - Monitor PT/INR.  - Will consult PharmD.  - [id number] VASc score of at least three for HTN and age.

## 2020-05-22 NOTE — ASSESSMENT & PLAN NOTE
- CT cervical spine without contrast:  Degenerative changes above.  Few mm anterolisthesis C4 on C5 and C7 on T1.  Pleural fluid or thickening right greater than left.  - CT head without contrast:  No evidence of acute intracranial pathology.  - R hip x-ray:  There is mild DJD.  No fracture, dislocation or bone destruction seen.  - R knee x-ray:  No fracture dislocation bone destruction seen.  There is moderate DJD and a varus deformity.  - BLE US:  No evidence of deep venous thrombosis in either lower extremity.  - UA unremarkable.  - Will consult PT/OT, SW.  - suspect deconditioning   - ptot rec SNF, pt agreeable

## 2020-05-22 NOTE — CONSULTS
Wound care consulted for scalp wound, RLE  PMH:  Frequent falls, HTN, HLD, myasthenia gravis, post-herpetic neuralgia, A-fib on coumadin  Assessment:  The patient has an [employer] wound vac to the scalp wound at -125 mmHg continuous suction, no leaks. It was placed by home  Health nurse on Wednesday.   The home wound vac was disconnected, left at bedside with charge in black carrying case.  Patient aware and did not want to put the wound vac with his personal belongings.  He insisted it be placed on the floor by the bed 'so I can keep an eye on it'.   The scalp wound appears to have a graft placed over the wound bed with pink tissue.  Cleansed mercedez-wound with sterile normal saline, cavilon barrier film to mercedez-wound to protect the skin, black granufoam over the wound bed, drape and tract pad applied, at -125 mmHg continuous suction per hospital [facility] wound vac.   The right lower leg has 2 open ulcerations not related to pressure, slough covered/pink/moist/ full thickness. The right foot is edematous.  The patient is complaining of pain to the right inner thigh- possibly related to post-herpetic neuralgia.   Recommendations:  Continue NPWT- use [facility] hospital equipment.  Change dressing 2 x week  Tues/Fri.  RLE- cover wounds with hydroferaBlue Ready dressing then cover with loose kerlix to secure.  Change Tues/Fri.  Follow-up with home health services when discharged home.   Pressure prevention measures  Nursing to continue care.    Wound care will follow-up prn for wound vac dressing changes and RLE dressing changes.  Discussed changing wound vac to [facility]- not using home vac.  With CANDI Acevedo PA-C.  Agreed.  CANDI Younger  RN, CN  [phone]  Right shin   2.5 cm L x 2.3 cm W    Right medial leg  4 cm L x4 cm W x 0.5 cmD    Scalp 5 cm in diameter

## 2020-05-23 PROBLEM — D50.9 IRON DEFICIENCY ANEMIA: Status: ACTIVE | Noted: 2019-02-13

## 2020-05-23 PROBLEM — D64.9 ANEMIA: Status: ACTIVE | Noted: 2019-02-13

## 2020-05-23 LAB
25(OH)D3+25(OH)D2 SERPL-MCNC: 9 NG/ML (ref 30–96)
ABO + RH BLD: NORMAL
ALBUMIN SERPL BCP-MCNC: 2.8 G/DL (ref 3.5–5.2)
ALP SERPL-CCNC: 105 U/L (ref 55–135)
ALT SERPL W/O P-5'-P-CCNC: 11 U/L (ref 10–44)
ANION GAP SERPL CALC-SCNC: 8 MMOL/L (ref 8–16)
AST SERPL-CCNC: 18 U/L (ref 10–40)
BASOPHILS # BLD AUTO: 0.04 K/UL (ref 0–0.2)
BASOPHILS NFR BLD: 0.4 % (ref 0–1.9)
BILIRUB SERPL-MCNC: 2 MG/DL (ref 0.1–1)
BLD GP AB SCN CELLS X3 SERPL QL: NORMAL
BUN SERPL-MCNC: 17 MG/DL (ref 8–23)
CALCIUM SERPL-MCNC: 8.1 MG/DL (ref 8.7–10.5)
CHLORIDE SERPL-SCNC: 101 MMOL/L (ref 95–110)
CO2 SERPL-SCNC: 27 MMOL/L (ref 23–29)
CREAT SERPL-MCNC: 1.1 MG/DL (ref 0.5–1.4)
DIFFERENTIAL METHOD: ABNORMAL
EOSINOPHIL # BLD AUTO: 0 K/UL (ref 0–0.5)
EOSINOPHIL NFR BLD: 0.4 % (ref 0–8)
ERYTHROCYTE [DISTWIDTH] IN BLOOD BY AUTOMATED COUNT: 16.5 % (ref 11.5–14.5)
EST. GFR  (AFRICAN AMERICAN): >60 ML/MIN/1.73 M^2
EST. GFR  (NON AFRICAN AMERICAN): 59.2 ML/MIN/1.73 M^2
FERRITIN SERPL-MCNC: 665 NG/ML (ref 20–300)
FOLATE SERPL-MCNC: 6.8 NG/ML (ref 4–24)
GLUCOSE SERPL-MCNC: 87 MG/DL (ref 70–110)
HCT VFR BLD AUTO: 24.3 % (ref 40–54)
HGB BLD-MCNC: 7.5 G/DL (ref 14–18)
IMM GRANULOCYTES # BLD AUTO: 0.19 K/UL (ref 0–0.04)
IMM GRANULOCYTES NFR BLD AUTO: 1.7 % (ref 0–0.5)
INR PPP: 2.9 (ref 0.8–1.2)
IRON SERPL-MCNC: 25 UG/DL (ref 45–160)
LYMPHOCYTES # BLD AUTO: 0.8 K/UL (ref 1–4.8)
LYMPHOCYTES NFR BLD: 7.2 % (ref 18–48)
MCH RBC QN AUTO: 40.3 PG (ref 27–31)
MCHC RBC AUTO-ENTMCNC: 30.9 G/DL (ref 32–36)
MCV RBC AUTO: 131 FL (ref 82–98)
MONOCYTES # BLD AUTO: 1 K/UL (ref 0.3–1)
MONOCYTES NFR BLD: 9 % (ref 4–15)
NEUTROPHILS # BLD AUTO: 8.9 K/UL (ref 1.8–7.7)
NEUTROPHILS NFR BLD: 81.3 % (ref 38–73)
NRBC BLD-RTO: 0 /100 WBC
PLATELET # BLD AUTO: 703 K/UL (ref 150–350)
PMV BLD AUTO: 9.3 FL (ref 9.2–12.9)
POTASSIUM SERPL-SCNC: 3.5 MMOL/L (ref 3.5–5.1)
PROT SERPL-MCNC: 5.4 G/DL (ref 6–8.4)
PROTHROMBIN TIME: 27.8 SEC (ref 9–12.5)
RBC # BLD AUTO: 1.86 M/UL (ref 4.6–6.2)
SATURATED IRON: 14 % (ref 20–50)
SODIUM SERPL-SCNC: 136 MMOL/L (ref 136–145)
TOTAL IRON BINDING CAPACITY: 182 UG/DL (ref 250–450)
TRANSFERRIN SERPL-MCNC: 123 MG/DL (ref 200–375)
VIT B12 SERPL-MCNC: 371 PG/ML (ref 210–950)
WBC # BLD AUTO: 10.93 K/UL (ref 3.9–12.7)

## 2020-05-23 PROCEDURE — 25000003 PHARM REV CODE 250: Mod: HCNC | Performed by: PHYSICIAN ASSISTANT

## 2020-05-23 PROCEDURE — G0378 HOSPITAL OBSERVATION PER HR: HCPCS | Mod: HCNC

## 2020-05-23 PROCEDURE — 94150 VITAL CAPACITY TEST: CPT | Mod: HCNC,59

## 2020-05-23 PROCEDURE — 82746 ASSAY OF FOLIC ACID SERUM: CPT | Mod: HCNC

## 2020-05-23 PROCEDURE — 94010 BREATHING CAPACITY TEST: CPT | Mod: HCNC

## 2020-05-23 PROCEDURE — 83540 ASSAY OF IRON: CPT | Mod: HCNC

## 2020-05-23 PROCEDURE — 36415 COLL VENOUS BLD VENIPUNCTURE: CPT | Mod: HCNC

## 2020-05-23 PROCEDURE — 80053 COMPREHEN METABOLIC PANEL: CPT | Mod: HCNC

## 2020-05-23 PROCEDURE — 82728 ASSAY OF FERRITIN: CPT | Mod: HCNC

## 2020-05-23 PROCEDURE — 82607 VITAMIN B-12: CPT | Mod: HCNC

## 2020-05-23 PROCEDURE — 85610 PROTHROMBIN TIME: CPT | Mod: HCNC

## 2020-05-23 PROCEDURE — 63600175 PHARM REV CODE 636 W HCPCS: Mod: HCNC | Performed by: PHYSICIAN ASSISTANT

## 2020-05-23 PROCEDURE — 99226 PR SUBSEQUENT OBSERVATION CARE,LEVEL III: CPT | Mod: HCNC,,, | Performed by: PHYSICIAN ASSISTANT

## 2020-05-23 PROCEDURE — 85025 COMPLETE CBC W/AUTO DIFF WBC: CPT | Mod: HCNC

## 2020-05-23 PROCEDURE — 86850 RBC ANTIBODY SCREEN: CPT | Mod: HCNC

## 2020-05-23 PROCEDURE — 94761 N-INVAS EAR/PLS OXIMETRY MLT: CPT | Mod: HCNC

## 2020-05-23 PROCEDURE — 99226 PR SUBSEQUENT OBSERVATION CARE,LEVEL III: ICD-10-PCS | Mod: HCNC,,, | Performed by: PHYSICIAN ASSISTANT

## 2020-05-23 PROCEDURE — 25000003 PHARM REV CODE 250: Mod: HCNC | Performed by: INTERNAL MEDICINE

## 2020-05-23 PROCEDURE — 82306 VITAMIN D 25 HYDROXY: CPT | Mod: HCNC

## 2020-05-23 RX ORDER — LANOLIN ALCOHOL/MO/W.PET/CERES
1000 CREAM (GRAM) TOPICAL DAILY
Status: DISCONTINUED | OUTPATIENT
Start: 2020-05-23 | End: 2020-05-29 | Stop reason: HOSPADM

## 2020-05-23 RX ORDER — POLYETHYLENE GLYCOL 3350 17 G/17G
17 POWDER, FOR SOLUTION ORAL DAILY
Status: DISCONTINUED | OUTPATIENT
Start: 2020-05-23 | End: 2020-05-29 | Stop reason: HOSPADM

## 2020-05-23 RX ORDER — CHOLECALCIFEROL (VITAMIN D3) 25 MCG
1000 TABLET ORAL DAILY
Status: DISCONTINUED | OUTPATIENT
Start: 2020-05-23 | End: 2020-05-29 | Stop reason: HOSPADM

## 2020-05-23 RX ORDER — IRON POLYSACCHARIDE COMPLEX 150 MG
300 CAPSULE ORAL DAILY
Status: DISCONTINUED | OUTPATIENT
Start: 2020-05-23 | End: 2020-05-29

## 2020-05-23 RX ADMIN — AZATHIOPRINE 200 MG: 50 TABLET ORAL at 09:05

## 2020-05-23 RX ADMIN — OXYCODONE HYDROCHLORIDE 5 MG: 5 TABLET ORAL at 05:05

## 2020-05-23 RX ADMIN — GABAPENTIN 300 MG: 300 CAPSULE ORAL at 02:05

## 2020-05-23 RX ADMIN — PYRIDOSTIGMINE BROMIDE 60 MG: 60 TABLET ORAL at 05:05

## 2020-05-23 RX ADMIN — Medication 300 MG: at 11:05

## 2020-05-23 RX ADMIN — PYRIDOSTIGMINE BROMIDE 60 MG: 60 TABLET ORAL at 09:05

## 2020-05-23 RX ADMIN — CYANOCOBALAMIN TAB 1000 MCG 1000 MCG: 1000 TAB at 11:05

## 2020-05-23 RX ADMIN — FINASTERIDE 5 MG: 5 TABLET, FILM COATED ORAL at 09:05

## 2020-05-23 RX ADMIN — TAMSULOSIN HYDROCHLORIDE 0.4 MG: 0.4 CAPSULE ORAL at 09:05

## 2020-05-23 RX ADMIN — OXYCODONE HYDROCHLORIDE 5 MG: 5 TABLET ORAL at 09:05

## 2020-05-23 RX ADMIN — VERAPAMIL HYDROCHLORIDE 120 MG: 120 TABLET, FILM COATED ORAL at 09:05

## 2020-05-23 RX ADMIN — GABAPENTIN 300 MG: 300 CAPSULE ORAL at 09:05

## 2020-05-23 RX ADMIN — PREDNISONE 5 MG: 5 TABLET ORAL at 09:05

## 2020-05-23 RX ADMIN — ACETAMINOPHEN 650 MG: 325 TABLET ORAL at 02:05

## 2020-05-23 RX ADMIN — WARFARIN SODIUM 2.5 MG: 2.5 TABLET ORAL at 05:05

## 2020-05-23 RX ADMIN — OXYCODONE HYDROCHLORIDE 5 MG: 5 TABLET ORAL at 10:05

## 2020-05-23 RX ADMIN — DOCUSATE SODIUM - SENNOSIDES 1 TABLET: 50; 8.6 TABLET, FILM COATED ORAL at 09:05

## 2020-05-23 RX ADMIN — LIDOCAINE 1 PATCH: 50 PATCH CUTANEOUS at 07:05

## 2020-05-23 RX ADMIN — FUROSEMIDE 20 MG: 20 TABLET ORAL at 09:05

## 2020-05-23 RX ADMIN — OXYCODONE HYDROCHLORIDE 5 MG: 5 TABLET ORAL at 02:05

## 2020-05-23 RX ADMIN — HYDROXYUREA 500 MG: 500 CAPSULE ORAL at 09:05

## 2020-05-23 RX ADMIN — PYRIDOSTIGMINE BROMIDE 60 MG: 60 TABLET ORAL at 02:05

## 2020-05-23 RX ADMIN — Medication 1000 UNITS: at 09:05

## 2020-05-23 RX ADMIN — ACETAMINOPHEN 650 MG: 325 TABLET ORAL at 09:05

## 2020-05-23 NOTE — CARE UPDATE
05/23/20 0826   Patient Assessment/Suction   Level of Consciousness (AVPU) alert   All Lung Fields Breath Sounds Anterior:;Lateral:;diminished   PRE-TX-O2   O2 Device (Oxygen Therapy) room air   SpO2 95 %   Pulse 68   Resp 18   Negative Inspiratory Force   $ Negative Inspiratory Force Charges Given   Negative Inspiratory Force (cm H2O) -40   Effort (NIF) good   Patient Position (NIF) semi-Gonzales's   Vital Capacity   $ Vital Capacity Charges Given   Vital Capacity (mL) 1000   Patient Position (VC) HOB elevated   Effort (VC) good

## 2020-05-23 NOTE — SUBJECTIVE & OBJECTIVE
"Interval History: Pt with pain overnight and R arm pain, improved with PRN pain regimen. Pt seen at bedside, complaining of thigh pain and R arm pain, says he "slept on his arm funny". NVI, strength equal. PRN oxy given. Discussed plan of care with daughter in law yesterday, agreeable to SNF placement. Continue to monitor.     Review of Systems   Constitutional: Positive for activity change. Negative for appetite change and fever.   Respiratory: Negative for shortness of breath.    Cardiovascular: Negative for chest pain and palpitations.   Gastrointestinal: Negative for abdominal pain and vomiting.   Genitourinary: Negative for dysuria and hematuria.   Musculoskeletal: Positive for arthralgias. Negative for back pain.        Neuralgias   Skin: Positive for wound (head s/p mohs; wound vac). Negative for color change.   Neurological: Positive for weakness. Negative for syncope and speech difficulty.   Psychiatric/Behavioral: Negative for agitation and confusion.     Objective:     Vital Signs (Most Recent):  Temp: 96.4 °F (35.8 °C) (05/23/20 0751)  Pulse: 68 (05/23/20 0826)  Resp: 18 (05/23/20 0826)  BP: (!) 142/65 (05/23/20 0751)  SpO2: 95 % (05/23/20 0826) Vital Signs (24h Range):  Temp:  [96 °F (35.6 °C)-97.9 °F (36.6 °C)] 96.4 °F (35.8 °C)  Pulse:  [48-86] 68  Resp:  [12-22] 18  SpO2:  [90 %-99 %] 95 %  BP: ()/(52-80) 142/65     Weight: 84 kg (185 lb 3 oz)  Body mass index is 27.35 kg/m².    Intake/Output Summary (Last 24 hours) at 5/23/2020 1022  Last data filed at 5/23/2020 0600  Gross per 24 hour   Intake 360 ml   Output 600 ml   Net -240 ml      Physical Exam   Constitutional: He is oriented to person, place, and time. He appears well-developed and well-nourished. No distress.   HENT:   Head: Normocephalic and atraumatic.   Wound vac to posterior scalp   Eyes: EOM are normal.   Neck: Normal range of motion. Carotid bruit is not present.   Pulmonary/Chest: Effort normal. No respiratory distress. "   Abdominal: There is no splenomegaly or hepatomegaly.   Musculoskeletal: Normal range of motion. He exhibits edema.   Neurological: He is alert and oriented to person, place, and time.   BUE strength 5/5, BLE strength 4/5   Skin: Skin is warm and dry. He is not diaphoretic.   Psychiatric: He has a normal mood and affect. His behavior is normal.       Significant Labs: All pertinent labs within the past 24 hours have been reviewed.    Significant Imaging: I have reviewed all pertinent imaging results/findings within the past 24 hours.

## 2020-05-23 NOTE — PLAN OF CARE
"Pt. Awake alert and oriented x 4 progressing toward goals. VSS,continues to complain of pain to right thigh gets minimal relief from lidoderm patch and " some relief from pill". Wd. Care saw pt. Today dressing to right lower leg dry and intact,wd vac in place to scalp. Legs remain edematous 2+ edema. Edema noted to right arm as well. Continue current plan of care.  "

## 2020-05-23 NOTE — NURSING
Right arm edematous and complaining of pain,radial pulse strong,elevated on pillow,will report to day nurse.  Pain effectively controlled with Percolone.

## 2020-05-23 NOTE — CARE UPDATE
05/22/20 210   Negative Inspiratory Force   $ Negative Inspiratory Force Charges Given   Negative Inspiratory Force (cm H2O) -40   Effort (NIF) good   Patient Position (NIF) semi-Gonzales's   Vital Capacity   $ Vital Capacity Charges Given   Vital Capacity (mL) 1200   Patient Position (VC) semi-Gonzales's   Effort (VC) good

## 2020-05-23 NOTE — ASSESSMENT & PLAN NOTE
BL ~7-8  9.2 on admission>>7.5  Repeat anemia panel; T/S and precaution but no active signs of bleeding: iron and b12 deficient

## 2020-05-23 NOTE — PROGRESS NOTES
"[facility] Hospital Medicine  Progress Note    Patient Name: Ernie Phan  MRN: [id number]  Patient Class: OP- Observation   Admission Date: 5/21/2020  Length of Stay: 0 days  Attending Physician: Nixon Manley MD  Primary Care Provider: Ernie Michel MD    [facility] Medicine Team: [facility] [place] [facility] [place] Nandini Acevedo PA-C    Subjective:     Principal Problem:Weakness        HPI:  Patient is an 89 year old gentleman with a h/o HTN, HLD, myasthenia gravis, post-herpetic neuralgia, and A Fib on Coumadin.  He presents with weakness.  Patient states that his RLE is particularly weak and has been "giving out" on him.  He has had two falls where he was lowered to the floor with no head trauma or LOC.  He is having difficultly standing or ambulating with his walker.  Patient also notes pain to his right inner thigh secondary to h/o post-herpetic neuralgia.  He denies generalized weakness, SOB.  He has no history of acute myasthenia gravis crisis.  He lives alone, but has family that checks on him frequently and a sitter three days a week for four hours.  He denies chest pain, SOB, dizziness, palpitations, fever/chills, N/V/D, abdominal pain.    Overview/Hospital Course:  Patient admitted for generalized weakness. His RLE has been "giving out" secondary to pain from post-herpetic neuralgia. CTH without acute abnormality. CT C-spine "Degenerative changes above.  Few mm anterolisthesis C4 on C5 and C7 on T1." R hip and knee xray without fracture, degenerative disease present. Suspect weakness secondary to deconditioning caused by post-herpetic neuralgia. PTOT evaluated, recommending SNF. Pt is agreeable.     Interval History: Pt with pain overnight and R arm pain, improved with PRN pain regimen. Pt seen at bedside, complaining of thigh pain and R arm pain, says he "slept on his arm funny". NVI, strength equal. PRN oxy given. Discussed plan of care with daughter in law yesterday, agreeable to SNF " placement. Continue to monitor.     Review of Systems   Constitutional: Positive for activity change. Negative for appetite change and fever.   Respiratory: Negative for shortness of breath.    Cardiovascular: Negative for chest pain and palpitations.   Gastrointestinal: Negative for abdominal pain and vomiting.   Genitourinary: Negative for dysuria and hematuria.   Musculoskeletal: Positive for arthralgias. Negative for back pain.        Neuralgias   Skin: Positive for wound (head s/p mohs; wound vac). Negative for color change.   Neurological: Positive for weakness. Negative for syncope and speech difficulty.   Psychiatric/Behavioral: Negative for agitation and confusion.     Objective:     Vital Signs (Most Recent):  Temp: 96.4 °F (35.8 °C) (05/23/20 0751)  Pulse: 68 (05/23/20 0826)  Resp: 18 (05/23/20 0826)  BP: (!) 142/65 (05/23/20 0751)  SpO2: 95 % (05/23/20 0826) Vital Signs (24h Range):  Temp:  [96 °F (35.6 °C)-97.9 °F (36.6 °C)] 96.4 °F (35.8 °C)  Pulse:  [48-86] 68  Resp:  [12-22] 18  SpO2:  [90 %-99 %] 95 %  BP: ()/(52-80) 142/65     Weight: 84 kg (185 lb 3 oz)  Body mass index is 27.35 kg/m².    Intake/Output Summary (Last 24 hours) at 5/23/2020 1022  Last data filed at 5/23/2020 0600  Gross per 24 hour   Intake 360 ml   Output 600 ml   Net -240 ml      Physical Exam   Constitutional: He is oriented to person, place, and time. He appears well-developed and well-nourished. No distress.   HENT:   Head: Normocephalic and atraumatic.   Wound vac to posterior scalp   Eyes: EOM are normal.   Neck: Normal range of motion. Carotid bruit is not present.   Pulmonary/Chest: Effort normal. No respiratory distress.   Abdominal: There is no splenomegaly or hepatomegaly.   Musculoskeletal: Normal range of motion. He exhibits edema.   Neurological: He is alert and oriented to person, place, and time.   BUE strength 5/5, BLE strength 4/5   Skin: Skin is warm and dry. He is not diaphoretic.   Psychiatric: He has a  normal mood and affect. His behavior is normal.       Significant Labs: All pertinent labs within the past 24 hours have been reviewed.    Significant Imaging: I have reviewed all pertinent imaging results/findings within the past 24 hours.      Assessment/Plan:      * Weakness  - CT cervical spine without contrast:  Degenerative changes above.  Few mm anterolisthesis C4 on C5 and C7 on T1.  Pleural fluid or thickening right greater than left.  - CT head without contrast:  No evidence of acute intracranial pathology.  - R hip x-ray:  There is mild DJD.  No fracture, dislocation or bone destruction seen.  - R knee x-ray:  No fracture dislocation bone destruction seen.  There is moderate DJD and a varus deformity.  - BLE US:  No evidence of deep venous thrombosis in either lower extremity.  - UA unremarkable.  - Will consult PT/OT, SW.  - suspect deconditioning   - ptot rec SNF, pt agreeable    Postherpetic neuralgia  - Patient with no improvement in pain with lidocaine patch.  - Supportive care.  - Continue gabapentin 300mg TID.  - pt reports improvement with PRN oxy      Anemia  BL ~7-8  9.2 on admission>>7.5  Repeat anemia panel; T/S and precaution but no active signs of bleeding      Myasthenia gravis without acute exacerbation  Long-term use of immunosuppressant medication     - Continue Imuran 200mg daily, Mestinon 60mg daily, prednisone 5mg daily.  - Will monitor respiratory mechanics.      Elevated brain natriuretic peptide (BNP) level  - .  - Will get chest x-ray: unremarkable  - Patient received Lasix 20mg IV in ER.  - Strict I&Os, daily weights.    Results for orders placed in visit on 12/09/19   Echo    Narrative · Normal left ventricular systolic function. The estimated ejection   fraction is 65%  · No wall motion abnormalities.  · Normal right ventricular systolic function.  · Severe biatrial enlargement.  · Aortic valve area is 1.62 cm2; peak velocity is 2.51 m/s; mean gradient   is 14 mmHg.  ·  "Mild aortic valve stenosis.  · Mild aortic regurgitation.  · Mild mitral regurgitation.  · Mild to moderate tricuspid regurgitation.  · The estimated PA systolic pressure is 63 mm Hg  · Pulmonary hypertension present.  · Normal central venous pressure (3 mm Hg).  · Atrial fibrillation observed.              Delayed surgical wound healing  - Per wound care note,  "s/p debridement of open head wound involving the bone and application of wound vac on 12/12/19 with Dr. Espinoza. He originally had a repair of Mohs defect with ACell on 12/5/19 but the ACell failed."  - Will consult wound care, appreciate assistance      Essential thrombocytosis  - Patient follows with heme/onc.  - Continue hydroxyurea.      Chronic kidney disease, stage III (moderate)  - At baseline with creatinine of 1.2, GFR 53.3.  - Avoid nephrotoxins.      Paroxysmal atrial fibrillation  Long term current use of anticoagulant therapy    - Continue verapamil 120mg BID, Coumadin 5mg MTuWThSa, 2.5mg Sunday.  - Monitor PT/INR.  - Will consult PharmD, appreciate assistance  - [id number] VASc score of at least three for HTN and age.      VTE Risk Mitigation (From admission, onward)         Ordered     warfarin (COUMADIN) tablet 5 mg  Every Thursday 05/22/20 0702     warfarin (COUMADIN) tablet 2.5 mg  Every Tuesday 05/22/20 0702     warfarin (COUMADIN) tablet 5 mg  Every Mon, Wed, Fri 05/22/20 0702     warfarin (COUMADIN) tablet 5 mg  Every Sunday 05/22/20 0702     warfarin (COUMADIN) tablet 2.5 mg  Every Saturday 05/22/20 0702     IP VTE HIGH RISK PATIENT  Once      05/21/20 2344     Place sequential compression device  Until discontinued      05/21/20 2344     Place STELLA hose  Until discontinued      05/21/20 2344     Reason for No Pharmacological VTE Prophylaxis  Once     Question:  Reasons:  Answer:  Already adequately anticoagulated on oral Anticoagulants    05/21/20 2344                      Nandini Acevedo PA-C  Department of " Hospital Medicine [facility]

## 2020-05-23 NOTE — CARE UPDATE
05/23/20 0516   Negative Inspiratory Force   $ Negative Inspiratory Force Charges Given   Negative Inspiratory Force (cm H2O) -30   Effort (NIF) other (see comments)  (pt complains of some pain)   Patient Position (NIF) semi-Gonzales's   Vital Capacity   $ Vital Capacity Charges Given   Vital Capacity (mL) 950   Patient Position (VC) semi-Gonzales's   Effort (VC) other (see comments)  (pt complains of some pain)

## 2020-05-24 LAB
ALBUMIN SERPL BCP-MCNC: 2.7 G/DL (ref 3.5–5.2)
ALP SERPL-CCNC: 96 U/L (ref 55–135)
ALT SERPL W/O P-5'-P-CCNC: 10 U/L (ref 10–44)
ANION GAP SERPL CALC-SCNC: 9 MMOL/L (ref 8–16)
AST SERPL-CCNC: 20 U/L (ref 10–40)
BASOPHILS # BLD AUTO: 0.03 K/UL (ref 0–0.2)
BASOPHILS NFR BLD: 0.3 % (ref 0–1.9)
BILIRUB SERPL-MCNC: 1.9 MG/DL (ref 0.1–1)
BUN SERPL-MCNC: 16 MG/DL (ref 8–23)
CALCIUM SERPL-MCNC: 8.1 MG/DL (ref 8.7–10.5)
CHLORIDE SERPL-SCNC: 100 MMOL/L (ref 95–110)
CO2 SERPL-SCNC: 25 MMOL/L (ref 23–29)
CREAT SERPL-MCNC: 1 MG/DL (ref 0.5–1.4)
DIFFERENTIAL METHOD: ABNORMAL
EOSINOPHIL # BLD AUTO: 0 K/UL (ref 0–0.5)
EOSINOPHIL NFR BLD: 0.4 % (ref 0–8)
ERYTHROCYTE [DISTWIDTH] IN BLOOD BY AUTOMATED COUNT: 16.4 % (ref 11.5–14.5)
EST. GFR  (AFRICAN AMERICAN): >60 ML/MIN/1.73 M^2
EST. GFR  (NON AFRICAN AMERICAN): >60 ML/MIN/1.73 M^2
GLUCOSE SERPL-MCNC: 84 MG/DL (ref 70–110)
HCT VFR BLD AUTO: 24.1 % (ref 40–54)
HGB BLD-MCNC: 7.4 G/DL (ref 14–18)
IMM GRANULOCYTES # BLD AUTO: 0.15 K/UL (ref 0–0.04)
IMM GRANULOCYTES NFR BLD AUTO: 1.4 % (ref 0–0.5)
INR PPP: 2.9 (ref 0.8–1.2)
LYMPHOCYTES # BLD AUTO: 0.6 K/UL (ref 1–4.8)
LYMPHOCYTES NFR BLD: 5.9 % (ref 18–48)
MCH RBC QN AUTO: 39.8 PG (ref 27–31)
MCHC RBC AUTO-ENTMCNC: 30.7 G/DL (ref 32–36)
MCV RBC AUTO: 130 FL (ref 82–98)
MONOCYTES # BLD AUTO: 0.9 K/UL (ref 0.3–1)
MONOCYTES NFR BLD: 7.9 % (ref 4–15)
NEUTROPHILS # BLD AUTO: 9.1 K/UL (ref 1.8–7.7)
NEUTROPHILS NFR BLD: 84.1 % (ref 38–73)
NRBC BLD-RTO: 0 /100 WBC
PLATELET # BLD AUTO: 665 K/UL (ref 150–350)
PLATELET BLD QL SMEAR: ABNORMAL
PMV BLD AUTO: 9.3 FL (ref 9.2–12.9)
POTASSIUM SERPL-SCNC: 3.6 MMOL/L (ref 3.5–5.1)
PROT SERPL-MCNC: 5.6 G/DL (ref 6–8.4)
PROTHROMBIN TIME: 27.5 SEC (ref 9–12.5)
RBC # BLD AUTO: 1.86 M/UL (ref 4.6–6.2)
SODIUM SERPL-SCNC: 134 MMOL/L (ref 136–145)
WBC # BLD AUTO: 10.82 K/UL (ref 3.9–12.7)

## 2020-05-24 PROCEDURE — 80053 COMPREHEN METABOLIC PANEL: CPT | Mod: HCNC

## 2020-05-24 PROCEDURE — 97530 THERAPEUTIC ACTIVITIES: CPT | Mod: HCNC

## 2020-05-24 PROCEDURE — 25000003 PHARM REV CODE 250: Mod: HCNC | Performed by: INTERNAL MEDICINE

## 2020-05-24 PROCEDURE — 99225 PR SUBSEQUENT OBSERVATION CARE,LEVEL II: ICD-10-PCS | Mod: HCNC,,, | Performed by: PHYSICIAN ASSISTANT

## 2020-05-24 PROCEDURE — G0378 HOSPITAL OBSERVATION PER HR: HCPCS | Mod: HCNC

## 2020-05-24 PROCEDURE — 94010 BREATHING CAPACITY TEST: CPT | Mod: HCNC

## 2020-05-24 PROCEDURE — 94761 N-INVAS EAR/PLS OXIMETRY MLT: CPT | Mod: HCNC

## 2020-05-24 PROCEDURE — 99900035 HC TECH TIME PER 15 MIN (STAT): Mod: HCNC

## 2020-05-24 PROCEDURE — 85025 COMPLETE CBC W/AUTO DIFF WBC: CPT | Mod: HCNC

## 2020-05-24 PROCEDURE — 25000003 PHARM REV CODE 250: Mod: HCNC | Performed by: PHYSICIAN ASSISTANT

## 2020-05-24 PROCEDURE — 63600175 PHARM REV CODE 636 W HCPCS: Mod: HCNC | Performed by: PHYSICIAN ASSISTANT

## 2020-05-24 PROCEDURE — 36415 COLL VENOUS BLD VENIPUNCTURE: CPT | Mod: HCNC

## 2020-05-24 PROCEDURE — 94150 VITAL CAPACITY TEST: CPT | Mod: HCNC

## 2020-05-24 PROCEDURE — 85610 PROTHROMBIN TIME: CPT | Mod: HCNC

## 2020-05-24 PROCEDURE — 97110 THERAPEUTIC EXERCISES: CPT | Mod: HCNC

## 2020-05-24 PROCEDURE — 99225 PR SUBSEQUENT OBSERVATION CARE,LEVEL II: CPT | Mod: HCNC,,, | Performed by: PHYSICIAN ASSISTANT

## 2020-05-24 RX ORDER — OXYCODONE HYDROCHLORIDE 5 MG/1
5 TABLET ORAL EVERY 4 HOURS PRN
Status: DISCONTINUED | OUTPATIENT
Start: 2020-05-24 | End: 2020-05-25

## 2020-05-24 RX ADMIN — OXYCODONE HYDROCHLORIDE 5 MG: 5 TABLET ORAL at 10:05

## 2020-05-24 RX ADMIN — PREDNISONE 5 MG: 5 TABLET ORAL at 10:05

## 2020-05-24 RX ADMIN — FINASTERIDE 5 MG: 5 TABLET, FILM COATED ORAL at 08:05

## 2020-05-24 RX ADMIN — TAMSULOSIN HYDROCHLORIDE 0.4 MG: 0.4 CAPSULE ORAL at 08:05

## 2020-05-24 RX ADMIN — GABAPENTIN 300 MG: 300 CAPSULE ORAL at 03:05

## 2020-05-24 RX ADMIN — VERAPAMIL HYDROCHLORIDE 120 MG: 120 TABLET, FILM COATED ORAL at 08:05

## 2020-05-24 RX ADMIN — Medication 1000 UNITS: at 08:05

## 2020-05-24 RX ADMIN — DOCUSATE SODIUM - SENNOSIDES 1 TABLET: 50; 8.6 TABLET, FILM COATED ORAL at 08:05

## 2020-05-24 RX ADMIN — PYRIDOSTIGMINE BROMIDE 60 MG: 60 TABLET ORAL at 03:05

## 2020-05-24 RX ADMIN — FUROSEMIDE 20 MG: 20 TABLET ORAL at 08:05

## 2020-05-24 RX ADMIN — GABAPENTIN 300 MG: 300 CAPSULE ORAL at 08:05

## 2020-05-24 RX ADMIN — CYANOCOBALAMIN TAB 1000 MCG 1000 MCG: 1000 TAB at 08:05

## 2020-05-24 RX ADMIN — WARFARIN SODIUM 5 MG: 5 TABLET ORAL at 05:05

## 2020-05-24 RX ADMIN — HYDROXYUREA 500 MG: 500 CAPSULE ORAL at 08:05

## 2020-05-24 RX ADMIN — AZATHIOPRINE 200 MG: 50 TABLET ORAL at 08:05

## 2020-05-24 RX ADMIN — PYRIDOSTIGMINE BROMIDE 60 MG: 60 TABLET ORAL at 08:05

## 2020-05-24 RX ADMIN — ACETAMINOPHEN 650 MG: 325 TABLET ORAL at 03:05

## 2020-05-24 RX ADMIN — OXYCODONE 5 MG: 5 TABLET ORAL at 07:05

## 2020-05-24 RX ADMIN — PYRIDOSTIGMINE BROMIDE 60 MG: 60 TABLET ORAL at 05:05

## 2020-05-24 RX ADMIN — Medication 6 MG: at 11:05

## 2020-05-24 RX ADMIN — OXYCODONE 5 MG: 5 TABLET ORAL at 03:05

## 2020-05-24 RX ADMIN — OXYCODONE HYDROCHLORIDE 5 MG: 5 TABLET ORAL at 05:05

## 2020-05-24 RX ADMIN — Medication 300 MG: at 08:05

## 2020-05-24 RX ADMIN — OXYCODONE 5 MG: 5 TABLET ORAL at 11:05

## 2020-05-24 RX ADMIN — ACETAMINOPHEN 650 MG: 325 TABLET ORAL at 08:05

## 2020-05-24 NOTE — SUBJECTIVE & OBJECTIVE
Interval History: Pt still with thigh pain, will adjust pain regimen. Pt seen at bedside, no new complaints. Pleasant, discussed plan of care. Agreeable to SNF.     Review of Systems   Constitutional: Positive for activity change. Negative for appetite change and fever.   Respiratory: Negative for shortness of breath.    Cardiovascular: Negative for chest pain and palpitations.   Gastrointestinal: Negative for abdominal pain and vomiting.   Genitourinary: Negative for dysuria.   Musculoskeletal: Positive for arthralgias. Negative for back pain.        Neuralgias   Skin: Positive for wound (head s/p mohs; wound vac). Negative for color change.   Neurological: Positive for weakness. Negative for speech difficulty.   Psychiatric/Behavioral: Negative for agitation and confusion.     Objective:     Vital Signs (Most Recent):  Temp: 96.3 °F (35.7 °C) (05/24/20 1057)  Pulse: (!) 59 (05/24/20 1057)  Resp: 18 (05/24/20 1057)  BP: (!) 115/55 (05/24/20 1057)  SpO2: (!) 93 % (05/24/20 1057) Vital Signs (24h Range):  Temp:  [96.1 °F (35.6 °C)-98.5 °F (36.9 °C)] 96.3 °F (35.7 °C)  Pulse:  [53-83] 59  Resp:  [12-19] 18  SpO2:  [93 %-97 %] 93 %  BP: (115-147)/(55-67) 115/55     Weight: 72.4 kg (159 lb 9.8 oz)  Body mass index is 23.57 kg/m².    Intake/Output Summary (Last 24 hours) at 5/24/2020 1138  Last data filed at 5/24/2020 0755  Gross per 24 hour   Intake 270 ml   Output 530 ml   Net -260 ml      Physical Exam   Constitutional: He is oriented to person, place, and time. He appears well-developed and well-nourished. No distress.   HENT:   Head: Normocephalic and atraumatic.   Wound vac to posterior scalp   Eyes: EOM are normal.   Neck: Normal range of motion. Carotid bruit is not present.   Pulmonary/Chest: Effort normal. No respiratory distress.   Abdominal: There is no splenomegaly or hepatomegaly.   Musculoskeletal: Normal range of motion. He exhibits edema.   Neurological: He is alert and oriented to person, place, and  time.   BUE strength 5/5, BLE strength 4/5   Skin: Skin is warm and dry. He is not diaphoretic.   Psychiatric: He has a normal mood and affect. His behavior is normal.       Significant Labs: All pertinent labs within the past 24 hours have been reviewed.    Significant Imaging: I have reviewed all pertinent imaging results/findings within the past 24 hours.

## 2020-05-24 NOTE — PLAN OF CARE
Complained of pain on the right side, arm and legs, given oxycodone, position changed with moderate effect on pain. Using urinal, diaper still used as patient spill urine. VAC dressing in situ, intact no leak, maintained at 125 mmHg negative pressure. Promoted rest and sleep.

## 2020-05-24 NOTE — PT/OT/SLP PROGRESS
"Occupational Therapy   Treatment    Name: Ernie Phan  MRN: [id number]  Admitting Diagnosis:  Weakness       Recommendations:     Discharge Recommendations: nursing facility, skilled  Discharge Equipment Recommendations:  (TBD)  Barriers to discharge:  Inaccessible home environment, Decreased caregiver support    Assessment:     Ernie Phan is a 89 y.o. male with a medical diagnosis of Weakness.  He presents with the following performance deficits affecting function are weakness, impaired endurance, impaired sensation, impaired self care skills, gait instability, impaired functional mobilty, decreased upper extremity function, decreased lower extremity function, impaired cardiopulmonary response to activity, impaired fine motor, pain. Patient with limited participation this day due to pain, required minimal assistance for bed mobility, increased time to complete coordination tasks, and inc'd cues for command follow.    Rehab Prognosis:  Good; patient would benefit from acute skilled OT services to address these deficits and reach maximum level of function.       Plan:     Patient to be seen 4 x/week to address the above listed problems via self-care/home management, therapeutic activities, therapeutic exercises  · Plan of Care Expires: 06/22/20  · Plan of Care Reviewed with: patient    Subjective   "Oh no, we are not doing that today."    Pain/Comfort:  · Pain Rating 1: 9/10  · Location - Side 1: Right  · Location - Orientation 1: upper  · Location 1: thigh  · Pain Addressed 1: Pre-medicate for activity, Distraction, Nurse notified, Cessation of Activity  · Pain Rating Post-Intervention 1: 9/10  · Pain Rating 2: 9/10  · Location - Side 2: Right  · Location - Orientation 2: generalized  · Location 2: arm  · Pain Addressed 2: Pre-medicate for activity, Nurse notified, Cessation of Activity, Reposition    Objective:     Communicated with: Desirae prior to session.  Patient found supine with telemetry, wound vac upon " OT entry to room.    General Precautions: Standard, fall   Orthopedic Precautions:N/A   Braces: N/A     Occupational Performance:     Bed Mobility:    · Patient completed Rolling/Turning to Left with  minimum assistance, management of (R) LE  · Patient completed Rolling/Turning to Right with minimum assistance, management of (R) LE  · Patient completed Scooting/Bridging with minimum assistance, cues for placement of UE's and improved technique for bridging     Functional Mobility/Transfers:  · Pt deferred at this time due to pain    Activities of Daily Living:  · Grooming: stand by assistance with HOB elevated  · Upper Body Dressing: stand by assistance with HOB elevated    [facility] 6 Click ADL: 10    Treatment & Education:  -Patient and family educated on roles/goals of OT and POC.  -Pt completed all of the follow exercises with head of bed elevated; neck rotation to (L&R) with gentle stretch at end range, scapular elevation and retraction, shoulder flexion + ER, cues for full ROM and posture.  -White board updated.  -Therapist provided time for questions and answered within scope of practice.  -Patient educated on importance of EOB/OOB activity to maximize recovery.    Patient left supine with all lines intact, call button in reach and nursing notifiedEducation:   and Tonys called    GOALS:   Multidisciplinary Problems     Occupational Therapy Goals        Problem: Occupational Therapy Goal    Goal Priority Disciplines Outcome Interventions   Occupational Therapy Goal     OT, PT/OT Ongoing, Progressing    Description:  Goals to be met by: 6/1/2020     Patient will increase functional independence with ADLs by performing:    UE Dressing with Set-up Assistance.  LE Dressing with Moderate Assistance.  Grooming while seated with Set-up Assistance.  Toileting from bedside commode with Minimal Assistance for hygiene and clothing management.   Sitting at edge of bed x15 minutes with Supervision.  Rolling to Bilateral with  Supervision.   Supine to sit with Supervision.  Stand pivot transfers with Moderate Assistance.  Step transfer with Moderate Assistance  Toilet transfer to bedside commode with Moderate Assistance.                      Time Tracking:     OT Date of Treatment: 05/24/20  OT Start Time: 1034  OT Stop Time: 1100  OT Total Time (min): 26 min    Billable Minutes:Therapeutic Activity 14  Therapeutic Exercise 12    Nandini Whitlock, OT  5/24/2020

## 2020-05-24 NOTE — PLAN OF CARE
Continue per OT POC, all goals remain appropriate.    Problem: Occupational Therapy Goal  Goal: Occupational Therapy Goal  Description  Goals to be met by: 6/1/2020     Patient will increase functional independence with ADLs by performing:    UE Dressing with Set-up Assistance.  LE Dressing with Moderate Assistance.  Grooming while seated with Set-up Assistance.  Toileting from bedside commode with Minimal Assistance for hygiene and clothing management.   Sitting at edge of bed x15 minutes with Supervision.  Rolling to Bilateral with Supervision.   Supine to sit with Supervision.  Stand pivot transfers with Moderate Assistance.  Step transfer with Moderate Assistance  Toilet transfer to bedside commode with Moderate Assistance.     Outcome: Ongoing, Progressing

## 2020-05-24 NOTE — PROGRESS NOTES
"[facility] Hospital Medicine  Progress Note    Patient Name: Ernie Phan  MRN: [id number]  Patient Class: OP- Observation   Admission Date: 5/21/2020  Length of Stay: 0 days  Attending Physician: Nixon Manley MD  Primary Care Provider: Ernie Michel MD    [facility] Medicine Team: [facility] MED E Nandini Acevedo PA-C    Subjective:     Principal Problem:Weakness        HPI:  Patient is an 89 year old gentleman with a h/o HTN, HLD, myasthenia gravis, post-herpetic neuralgia, and A Fib on Coumadin.  He presents with weakness.  Patient states that his RLE is particularly weak and has been "giving out" on him.  He has had two falls where he was lowered to the floor with no head trauma or LOC.  He is having difficultly standing or ambulating with his walker.  Patient also notes pain to his right inner thigh secondary to h/o post-herpetic neuralgia.  He denies generalized weakness, SOB.  He has no history of acute myasthenia gravis crisis.  He lives alone, but has family that checks on him frequently and a sitter three days a week for four hours.  He denies chest pain, SOB, dizziness, palpitations, fever/chills, N/V/D, abdominal pain.    Overview/Hospital Course:  Patient admitted for generalized weakness. His RLE has been "giving out" secondary to pain from post-herpetic neuralgia. CTH without acute abnormality. CT C-spine "Degenerative changes above.  Few mm anterolisthesis C4 on C5 and C7 on T1." R hip and knee xray without fracture, degenerative disease present. Suspect weakness secondary to deconditioning caused by post-herpetic neuralgia. PTOT evaluated, recommending SNF. Pt is agreeable.     Interval History: Pt still with thigh pain, will adjust pain regimen. Pt seen at bedside, no new complaints. Pleasant, discussed plan of care. Agreeable to SNF.     Review of Systems   Constitutional: Positive for activity change. Negative for appetite change and fever.   Respiratory: Negative for " shortness of breath.    Cardiovascular: Negative for chest pain and palpitations.   Gastrointestinal: Negative for abdominal pain and vomiting.   Genitourinary: Negative for dysuria.   Musculoskeletal: Positive for arthralgias. Negative for back pain.        Neuralgias   Skin: Positive for wound (head s/p mohs; wound vac). Negative for color change.   Neurological: Positive for weakness. Negative for speech difficulty.   Psychiatric/Behavioral: Negative for agitation and confusion.     Objective:     Vital Signs (Most Recent):  Temp: 96.3 °F (35.7 °C) (05/24/20 1057)  Pulse: (!) 59 (05/24/20 1057)  Resp: 18 (05/24/20 1057)  BP: (!) 115/55 (05/24/20 1057)  SpO2: (!) 93 % (05/24/20 1057) Vital Signs (24h Range):  Temp:  [96.1 °F (35.6 °C)-98.5 °F (36.9 °C)] 96.3 °F (35.7 °C)  Pulse:  [53-83] 59  Resp:  [12-19] 18  SpO2:  [93 %-97 %] 93 %  BP: (115-147)/(55-67) 115/55     Weight: 72.4 kg (159 lb 9.8 oz)  Body mass index is 23.57 kg/m².    Intake/Output Summary (Last 24 hours) at 5/24/2020 1138  Last data filed at 5/24/2020 0755  Gross per 24 hour   Intake 270 ml   Output 530 ml   Net -260 ml      Physical Exam   Constitutional: He is oriented to person, place, and time. He appears well-developed and well-nourished. No distress.   HENT:   Head: Normocephalic and atraumatic.   Wound vac to posterior scalp   Eyes: EOM are normal.   Neck: Normal range of motion. Carotid bruit is not present.   Pulmonary/Chest: Effort normal. No respiratory distress.   Abdominal: There is no splenomegaly or hepatomegaly.   Musculoskeletal: Normal range of motion. He exhibits edema.   Neurological: He is alert and oriented to person, place, and time.   BUE strength 5/5, BLE strength 4/5   Skin: Skin is warm and dry. He is not diaphoretic.   Psychiatric: He has a normal mood and affect. His behavior is normal.       Significant Labs: All pertinent labs within the past 24 hours have been reviewed.    Significant Imaging: I have reviewed all  pertinent imaging results/findings within the past 24 hours.      Assessment/Plan:      * Weakness  - CT cervical spine without contrast:  Degenerative changes above.  Few mm anterolisthesis C4 on C5 and C7 on T1.  Pleural fluid or thickening right greater than left.  - CT head without contrast:  No evidence of acute intracranial pathology.  - R hip x-ray:  There is mild DJD.  No fracture, dislocation or bone destruction seen.  - R knee x-ray:  No fracture dislocation bone destruction seen.  There is moderate DJD and a varus deformity.  - BLE US:  No evidence of deep venous thrombosis in either lower extremity.  - UA unremarkable.  - Will consult PT/OT, SW.  - suspect deconditioning   - ptot rec SNF, pt agreeable    Postherpetic neuralgia  - Patient with no improvement in pain with lidocaine patch.  - Supportive care.  - Continue gabapentin 300mg TID.  - pt reports improvement with PRN oxy      Iron deficiency anemia  BL ~7-8  9.2 on admission>>7.5  Repeat anemia panel; T/S and precaution but no active signs of bleeding: iron and b12 deficient      Myasthenia gravis without acute exacerbation  Long-term use of immunosuppressant medication     - Continue Imuran 200mg daily, Mestinon 60mg daily, prednisone 5mg daily.  - Will monitor respiratory mechanics.      Elevated brain natriuretic peptide (BNP) level  - .  - Will get chest x-ray: unremarkable  - Patient received Lasix 20mg IV in ER.  - Strict I&Os, daily weights.    Results for orders placed in visit on 12/09/19   Echo    Narrative · Normal left ventricular systolic function. The estimated ejection   fraction is 65%  · No wall motion abnormalities.  · Normal right ventricular systolic function.  · Severe biatrial enlargement.  · Aortic valve area is 1.62 cm2; peak velocity is 2.51 m/s; mean gradient   is 14 mmHg.  · Mild aortic valve stenosis.  · Mild aortic regurgitation.  · Mild mitral regurgitation.  · Mild to moderate tricuspid regurgitation.  · The  "estimated PA systolic pressure is 63 mm Hg  · Pulmonary hypertension present.  · Normal central venous pressure (3 mm Hg).  · Atrial fibrillation observed.              Delayed surgical wound healing  - Per wound care note,  "s/p debridement of open head wound involving the bone and application of wound vac on 12/12/19 with Dr. Espinoza. He originally had a repair of Mohs defect with ACell on 12/5/19 but the ACell failed."  - Will consult wound care, appreciate assistance      Essential thrombocytosis  - Patient follows with heme/onc.  - Continue hydroxyurea.      Chronic kidney disease, stage III (moderate)  - At baseline with creatinine of 1.2, GFR 53.3.  - Avoid nephrotoxins.      Paroxysmal atrial fibrillation  Long term current use of anticoagulant therapy    - Continue verapamil 120mg BID, Coumadin 5mg MTuWThSa, 2.5mg Sunday.  - Monitor PT/INR.  - Will consult PharmD, appreciate assistance  - [id number] VASc score of at least three for HTN and age.        VTE Risk Mitigation (From admission, onward)         Ordered     warfarin (COUMADIN) tablet 5 mg  Every Thursday 05/22/20 0702     warfarin (COUMADIN) tablet 2.5 mg  Every Tuesday 05/22/20 0702     warfarin (COUMADIN) tablet 5 mg  Every Mon, Wed, Fri 05/22/20 0702     warfarin (COUMADIN) tablet 5 mg  Every Sunday 05/22/20 0702     warfarin (COUMADIN) tablet 2.5 mg  Every Saturday 05/22/20 0702     IP VTE HIGH RISK PATIENT  Once      05/21/20 2344     Place sequential compression device  Until discontinued      05/21/20 2344     Place STELLA hose  Until discontinued      05/21/20 2344     Reason for No Pharmacological VTE Prophylaxis  Once     Question:  Reasons:  Answer:  Already adequately anticoagulated on oral Anticoagulants    05/21/20 2344                    Discussed with staff  Nandini Acevedo PA-C  Department of Hospital Medicine   [facility]    "

## 2020-05-25 LAB
ALBUMIN SERPL BCP-MCNC: 2.5 G/DL (ref 3.5–5.2)
ALP SERPL-CCNC: 100 U/L (ref 55–135)
ALT SERPL W/O P-5'-P-CCNC: 7 U/L (ref 10–44)
ANION GAP SERPL CALC-SCNC: 8 MMOL/L (ref 8–16)
AST SERPL-CCNC: 15 U/L (ref 10–40)
BASOPHILS # BLD AUTO: 0.03 K/UL (ref 0–0.2)
BASOPHILS NFR BLD: 0.2 % (ref 0–1.9)
BILIRUB SERPL-MCNC: 2.1 MG/DL (ref 0.1–1)
BUN SERPL-MCNC: 17 MG/DL (ref 8–23)
CALCIUM SERPL-MCNC: 8.1 MG/DL (ref 8.7–10.5)
CHLORIDE SERPL-SCNC: 98 MMOL/L (ref 95–110)
CO2 SERPL-SCNC: 28 MMOL/L (ref 23–29)
CREAT SERPL-MCNC: 1 MG/DL (ref 0.5–1.4)
DIFFERENTIAL METHOD: ABNORMAL
EOSINOPHIL # BLD AUTO: 0 K/UL (ref 0–0.5)
EOSINOPHIL NFR BLD: 0.2 % (ref 0–8)
ERYTHROCYTE [DISTWIDTH] IN BLOOD BY AUTOMATED COUNT: 16.1 % (ref 11.5–14.5)
EST. GFR  (AFRICAN AMERICAN): >60 ML/MIN/1.73 M^2
EST. GFR  (NON AFRICAN AMERICAN): >60 ML/MIN/1.73 M^2
GLUCOSE SERPL-MCNC: 79 MG/DL (ref 70–110)
HCT VFR BLD AUTO: 24.5 % (ref 40–54)
HGB BLD-MCNC: 7.4 G/DL (ref 14–18)
IMM GRANULOCYTES # BLD AUTO: 0.11 K/UL (ref 0–0.04)
IMM GRANULOCYTES NFR BLD AUTO: 0.9 % (ref 0–0.5)
INR PPP: 2.4 (ref 0.8–1.2)
LYMPHOCYTES # BLD AUTO: 0.6 K/UL (ref 1–4.8)
LYMPHOCYTES NFR BLD: 4.9 % (ref 18–48)
MCH RBC QN AUTO: 39.6 PG (ref 27–31)
MCHC RBC AUTO-ENTMCNC: 30.2 G/DL (ref 32–36)
MCV RBC AUTO: 131 FL (ref 82–98)
MONOCYTES # BLD AUTO: 1 K/UL (ref 0.3–1)
MONOCYTES NFR BLD: 8.2 % (ref 4–15)
NEUTROPHILS # BLD AUTO: 10.6 K/UL (ref 1.8–7.7)
NEUTROPHILS NFR BLD: 85.6 % (ref 38–73)
NRBC BLD-RTO: 0 /100 WBC
PLATELET # BLD AUTO: 701 K/UL (ref 150–350)
PMV BLD AUTO: 9.3 FL (ref 9.2–12.9)
POTASSIUM SERPL-SCNC: 3.3 MMOL/L (ref 3.5–5.1)
PROT SERPL-MCNC: 5.5 G/DL (ref 6–8.4)
PROTHROMBIN TIME: 22.7 SEC (ref 9–12.5)
RBC # BLD AUTO: 1.87 M/UL (ref 4.6–6.2)
SODIUM SERPL-SCNC: 134 MMOL/L (ref 136–145)
TB INDURATION 48 - 72 HR READ: 0 MM
WBC # BLD AUTO: 12.42 K/UL (ref 3.9–12.7)

## 2020-05-25 PROCEDURE — 94150 VITAL CAPACITY TEST: CPT | Mod: HCNC

## 2020-05-25 PROCEDURE — 25000003 PHARM REV CODE 250: Mod: HCNC | Performed by: INTERNAL MEDICINE

## 2020-05-25 PROCEDURE — 97535 SELF CARE MNGMENT TRAINING: CPT | Mod: HCNC

## 2020-05-25 PROCEDURE — 25000003 PHARM REV CODE 250: Mod: HCNC | Performed by: PHYSICIAN ASSISTANT

## 2020-05-25 PROCEDURE — 97530 THERAPEUTIC ACTIVITIES: CPT | Mod: HCNC

## 2020-05-25 PROCEDURE — 85610 PROTHROMBIN TIME: CPT | Mod: HCNC

## 2020-05-25 PROCEDURE — 63600175 PHARM REV CODE 636 W HCPCS: Mod: HCNC | Performed by: PHYSICIAN ASSISTANT

## 2020-05-25 PROCEDURE — 36415 COLL VENOUS BLD VENIPUNCTURE: CPT | Mod: HCNC

## 2020-05-25 PROCEDURE — 85025 COMPLETE CBC W/AUTO DIFF WBC: CPT | Mod: HCNC

## 2020-05-25 PROCEDURE — 80053 COMPREHEN METABOLIC PANEL: CPT | Mod: HCNC

## 2020-05-25 PROCEDURE — 99225 PR SUBSEQUENT OBSERVATION CARE,LEVEL II: ICD-10-PCS | Mod: HCNC,,, | Performed by: PHYSICIAN ASSISTANT

## 2020-05-25 PROCEDURE — 99900035 HC TECH TIME PER 15 MIN (STAT): Mod: HCNC

## 2020-05-25 PROCEDURE — 94010 BREATHING CAPACITY TEST: CPT | Mod: HCNC

## 2020-05-25 PROCEDURE — 97110 THERAPEUTIC EXERCISES: CPT | Mod: HCNC

## 2020-05-25 PROCEDURE — 99225 PR SUBSEQUENT OBSERVATION CARE,LEVEL II: CPT | Mod: HCNC,,, | Performed by: PHYSICIAN ASSISTANT

## 2020-05-25 PROCEDURE — U0003 INFECTIOUS AGENT DETECTION BY NUCLEIC ACID (DNA OR RNA); SEVERE ACUTE RESPIRATORY SYNDROME CORONAVIRUS 2 (SARS-COV-2) (CORONAVIRUS DISEASE [COVID-19]), AMPLIFIED PROBE TECHNIQUE, MAKING USE OF HIGH THROUGHPUT TECHNOLOGIES AS DESCRIBED BY CMS-2020-01-R: HCPCS | Mod: HCNC

## 2020-05-25 PROCEDURE — G0378 HOSPITAL OBSERVATION PER HR: HCPCS | Mod: HCNC

## 2020-05-25 PROCEDURE — 94761 N-INVAS EAR/PLS OXIMETRY MLT: CPT | Mod: HCNC

## 2020-05-25 PROCEDURE — 94799 UNLISTED PULMONARY SVC/PX: CPT | Mod: HCNC

## 2020-05-25 RX ORDER — OXYCODONE AND ACETAMINOPHEN 5; 325 MG/1; MG/1
1 TABLET ORAL EVERY 6 HOURS PRN
Status: DISCONTINUED | OUTPATIENT
Start: 2020-05-25 | End: 2020-05-25

## 2020-05-25 RX ORDER — OXYCODONE HYDROCHLORIDE 5 MG/1
5 TABLET ORAL EVERY 4 HOURS
Status: DISCONTINUED | OUTPATIENT
Start: 2020-05-25 | End: 2020-05-26

## 2020-05-25 RX ORDER — OXYCODONE AND ACETAMINOPHEN 5; 325 MG/1; MG/1
1 TABLET ORAL EVERY 6 HOURS PRN
Status: DISCONTINUED | OUTPATIENT
Start: 2020-05-25 | End: 2020-05-29 | Stop reason: HOSPADM

## 2020-05-25 RX ADMIN — VERAPAMIL HYDROCHLORIDE 120 MG: 120 TABLET, FILM COATED ORAL at 09:05

## 2020-05-25 RX ADMIN — PYRIDOSTIGMINE BROMIDE 60 MG: 60 TABLET ORAL at 02:05

## 2020-05-25 RX ADMIN — LIDOCAINE 1 PATCH: 50 PATCH CUTANEOUS at 09:05

## 2020-05-25 RX ADMIN — FUROSEMIDE 20 MG: 20 TABLET ORAL at 08:05

## 2020-05-25 RX ADMIN — Medication 300 MG: at 08:05

## 2020-05-25 RX ADMIN — PREDNISONE 5 MG: 5 TABLET ORAL at 08:05

## 2020-05-25 RX ADMIN — PYRIDOSTIGMINE BROMIDE 60 MG: 60 TABLET ORAL at 09:05

## 2020-05-25 RX ADMIN — Medication 6 MG: at 09:05

## 2020-05-25 RX ADMIN — GABAPENTIN 300 MG: 300 CAPSULE ORAL at 08:05

## 2020-05-25 RX ADMIN — OXYCODONE 5 MG: 5 TABLET ORAL at 02:05

## 2020-05-25 RX ADMIN — POTASSIUM BICARBONATE 50 MEQ: 978 TABLET, EFFERVESCENT ORAL at 10:05

## 2020-05-25 RX ADMIN — GABAPENTIN 300 MG: 300 CAPSULE ORAL at 09:05

## 2020-05-25 RX ADMIN — AZATHIOPRINE 200 MG: 50 TABLET ORAL at 08:05

## 2020-05-25 RX ADMIN — OXYCODONE 5 MG: 5 TABLET ORAL at 09:05

## 2020-05-25 RX ADMIN — PYRIDOSTIGMINE BROMIDE 60 MG: 60 TABLET ORAL at 06:05

## 2020-05-25 RX ADMIN — OXYCODONE 5 MG: 5 TABLET ORAL at 06:05

## 2020-05-25 RX ADMIN — TAMSULOSIN HYDROCHLORIDE 0.4 MG: 0.4 CAPSULE ORAL at 08:05

## 2020-05-25 RX ADMIN — FINASTERIDE 5 MG: 5 TABLET, FILM COATED ORAL at 08:05

## 2020-05-25 RX ADMIN — GABAPENTIN 300 MG: 300 CAPSULE ORAL at 02:05

## 2020-05-25 RX ADMIN — CYANOCOBALAMIN TAB 1000 MCG 1000 MCG: 1000 TAB at 08:05

## 2020-05-25 RX ADMIN — WARFARIN SODIUM 5 MG: 5 TABLET ORAL at 04:05

## 2020-05-25 RX ADMIN — DOCUSATE SODIUM - SENNOSIDES 1 TABLET: 50; 8.6 TABLET, FILM COATED ORAL at 08:05

## 2020-05-25 RX ADMIN — VERAPAMIL HYDROCHLORIDE 120 MG: 120 TABLET, FILM COATED ORAL at 08:05

## 2020-05-25 RX ADMIN — POLYETHYLENE GLYCOL 3350 17 G: 17 POWDER, FOR SOLUTION ORAL at 08:05

## 2020-05-25 RX ADMIN — HYDROXYUREA 500 MG: 500 CAPSULE ORAL at 08:05

## 2020-05-25 RX ADMIN — OXYCODONE 5 MG: 5 TABLET ORAL at 05:05

## 2020-05-25 RX ADMIN — OXYCODONE 5 MG: 5 TABLET ORAL at 11:05

## 2020-05-25 RX ADMIN — OXYCODONE 5 MG: 5 TABLET ORAL at 10:05

## 2020-05-25 RX ADMIN — ACETAMINOPHEN 650 MG: 325 TABLET ORAL at 06:05

## 2020-05-25 RX ADMIN — Medication 1000 UNITS: at 08:05

## 2020-05-25 RX ADMIN — DOCUSATE SODIUM - SENNOSIDES 1 TABLET: 50; 8.6 TABLET, FILM COATED ORAL at 09:05

## 2020-05-25 NOTE — ASSESSMENT & PLAN NOTE
- Patient with no improvement in pain with lidocaine patch.  - Supportive care.  - Continue gabapentin 300mg TID.  - pt reports improvement with PRN oxy, but still difficult to control; adding PRN breakthrough  - Pain Management referral at [facility]

## 2020-05-25 NOTE — NURSING
Pt appears to be confused on the times that pain medication administered and the times that staff go into room. Pt is napping between then states that no one has been there in a long time. Nandini FORREST was notified. Shades were pulled up and attempt was made to redirect pt. Continues to call on call light and says no one has been in there for a long time.

## 2020-05-25 NOTE — PROGRESS NOTES
PHARMACY CONSULT NOTE: WARFARIN  Ernie Phan is a 89 y.o. male on warfarin therapy for Atrial Fibrillation. PharmD has been consulted for warfarin dosing.    Current order:  warfarin 2.5 mg PO Tuesday, Saturday, 5 mg all other days  Home dose: warfarin 2.5 mg PO Tuesday, Saturday, 5 mg all other days  Coumadin clinic enrollment: Active  INR goal: 2-3    Lab Results   Component Value Date    INR 2.4 (H) 05/25/2020    INR 2.9 (H) 05/24/2020    INR 2.9 (H) 05/23/2020     Significant drug interactions: none  Diet: Regular    Recommendation(s):   1. Continue warfarin 2.5 mg every Tuesday and Saturday, 5 mg all other days  2. PT/INR daily    Thank you for the consult, will continue to follow  Darien Morse Pharm.D., BCPS  [id number]      **Note: Consults are reviewed Monday-Friday 7:00am-3:30pm. THE ABOVE RECOMMENDATIONS ARE ONLY SUGGESTED.THE RECOMMENDATIONS SHOULD BE CONSIDERED IN CONJUNCTION WITH ALL PATIENT FACTORS. **

## 2020-05-25 NOTE — NURSING
Patient woke up complaining of pain on his right leg and right arm. Patient appears distressed, again expressed his worries of not being able to walk. Explained that physical therapy will work with him today. PRN pain medicine given, repositioned, incontinent pad changed, gown and linen changed.  Noted skin break on left buttocks, cleaned with saline, dressed with aquacell foam dressing, patient turned to side.

## 2020-05-25 NOTE — PLAN OF CARE
Problem: Occupational Therapy Goal  Goal: Occupational Therapy Goal  Description  Goals to be met by: 6/1/2020     Patient will increase functional independence with ADLs by performing:    UE Dressing with Set-up Assistance.  LE Dressing with Moderate Assistance.  Grooming while seated with Set-up Assistance.  Toileting from bedside commode with Minimal Assistance for hygiene and clothing management.   Sitting at edge of bed x15 minutes with Supervision.  Rolling to Bilateral with Supervision.   Supine to sit with Supervision.  Stand pivot transfers with Moderate Assistance.  Step transfer with Moderate Assistance  Toilet transfer to bedside commode with Moderate Assistance.   Pt progressing well in bed mobility goals. Continue OT as tolerated.  Amanda Beal OT  5/25/2020    Outcome: Ongoing, Progressing

## 2020-05-25 NOTE — SUBJECTIVE & OBJECTIVE
Interval History: No reported events overnight, pt seen at bedside this AM resting in NAD.AOx4. Patient is eager to work with PTOT, agreeable to SNF placement. Reports some improvement in pain, but still with bouts of severe thigh pain. Adding PRN breakthrough medication    Review of Systems   Constitutional: Positive for activity change. Negative for appetite change.   Respiratory: Negative for shortness of breath.    Cardiovascular: Negative for chest pain and palpitations.   Gastrointestinal: Negative for abdominal pain and vomiting.   Genitourinary: Negative for dysuria.   Musculoskeletal: Positive for arthralgias and gait problem. Negative for back pain.        Neuralgias   Skin: Positive for wound (head s/p mohs; wound vac). Negative for color change.   Neurological: Positive for weakness.   Psychiatric/Behavioral: Negative for agitation and confusion.     Objective:     Vital Signs (Most Recent):  Temp: 96.3 °F (35.7 °C) (05/25/20 0704)  Pulse: 82 (05/25/20 0755)  Resp: 19 (05/25/20 0755)  BP: (!) 127/59 (05/25/20 0704)  SpO2: 96 % (05/25/20 0755) Vital Signs (24h Range):  Temp:  [96.3 °F (35.7 °C)-98.6 °F (37 °C)] 96.3 °F (35.7 °C)  Pulse:  [54-83] 82  Resp:  [15-20] 19  SpO2:  [93 %-97 %] 96 %  BP: (115-135)/(55-80) 127/59     Weight: 75 kg (165 lb 5.5 oz)  Body mass index is 24.42 kg/m².    Intake/Output Summary (Last 24 hours) at 5/25/2020 0941  Last data filed at 5/25/2020 0619  Gross per 24 hour   Intake 200 ml   Output 310 ml   Net -110 ml      Physical Exam   Constitutional: He is oriented to person, place, and time. He appears well-developed and well-nourished. No distress.   HENT:   Head: Normocephalic and atraumatic.   Wound vac to posterior scalp   Eyes: EOM are normal.   Neck: Normal range of motion. Carotid bruit is not present.   Pulmonary/Chest: Effort normal. No respiratory distress.   Abdominal: There is no splenomegaly or hepatomegaly.   Musculoskeletal: Normal range of motion. He exhibits  edema.   Neurological: He is alert and oriented to person, place, and time.   Skin: Skin is warm and dry. He is not diaphoretic.   Psychiatric: He has a normal mood and affect. His behavior is normal.       Significant Labs: All pertinent labs within the past 24 hours have been reviewed.    Significant Imaging: I have reviewed all pertinent imaging results/findings within the past 24 hours.

## 2020-05-25 NOTE — PLAN OF CARE
"Pt with no falls or injuries this shift. Pt reports pain level 7-10/10, states, "I am suffering". Pain meds were adjusted, oxycodone was changed to scheduled and percocet prn was ordered.   "

## 2020-05-25 NOTE — PLAN OF CARE
On going intervention continued, given pain medication as needed, patient noted to be anxious about the pain, verbalized his worry of not being able to walk again.   VS stable, assisted with repositioning for comfort. VAC dressing maintained, no issue. Promoted rest and sleep.

## 2020-05-25 NOTE — PT/OT/SLP PROGRESS
Occupational Therapy   Treatment    Name: Ernie Phan  MRN: [id number]  Admitting Diagnosis:  Weakness       Recommendations:     Discharge Recommendations: nursing facility, skilled  Discharge Equipment Recommendations:  other (see comments)(tbd)  Barriers to discharge:  Inaccessible home environment, Decreased caregiver support    Assessment:     Ernie Phan is a 89 y.o. male with a medical diagnosis of Weakness.  He presents with impaired ADL and mobility performance deficits. Pt found pleasant and willing to participate in therapy. Session relied heavily on ADL tasks including bed mobility for placement of bed pan, toileting, and grooming tasks. Pt requiring (A) for opening containers of grooming items however otherwise appearing confidence in self care tasks. Pt still with concerns regarding current cellulits in RLE however reported relief with pillow positioning in bed. Pt reporting appreciation for therapy services as he is anxious with current fx'l ability. Performance deficits affecting function are weakness, impaired endurance, impaired self care skills, gait instability, impaired functional mobilty, impaired balance, impaired cognition, decreased safety awareness, decreased upper extremity function, decreased coordination, decreased lower extremity function, impaired fine motor, impaired cardiopulmonary response to activity. Pt would benefit from continued OT skilled services 4x/wk to improve daily living skills to optimize QOL. Pt is recommended to discharge to SNF at this time.      Rehab Prognosis:  Fair; patient would benefit from acute skilled OT services to address these deficits and reach maximum level of function.       Plan:     Patient to be seen 4 x/week to address the above listed problems via self-care/home management, therapeutic activities, therapeutic exercises  · Plan of Care Expires: 06/22/20  · Plan of Care Reviewed with: patient    Subjective     Pain/Comfort:  · Pain Rating 1:  (discomfort in RLE however not rated)    Objective:     Communicated with: RN prior to session.  Patient found HOB elevated with bed alarm, telemetry, peripheral IV, pulse ox (continuous), wound vac upon OT entry to room.    General Precautions: Standard, fall   Orthopedic Precautions:N/A   Braces: N/A     Occupational Performance:     Bed Mobility:    · Patient completed Rolling/Turning to Left with  moderate assistance  · Patient completed Rolling/Turning to Right with moderate assistance  · Patient completed Scooting/Bridging with moderate assistance  · Patient completed Supine to Sit with moderate assistance  · Patient completed Sit to Supine with moderate assistance       Activities of Daily Living:  · Feeding:  setup (A) of fresh drinking water with HOB elevated   · Grooming: minimum assistance opening tooth brush/tooth paste, and opening mouthwash to assist with denture cleaning in container  · Lower Body Dressing: total assistance donning  socks to RLE   · Toileting: total assistance for pericare       [facility] 6 Click ADL: 11    Treatment & Education:  Pt educated on role of occupational therapy, POC, and safety during ADLs and functional mobility. Pt and OT discussed importance of safe, continued mobility to optimize daily living skills. Pt verbalized understanding.   Pt completed the following during session: bed mobility, toileting, feeding/drinking staff, brushing teeth and cleaning dentures, LB dressing, safe setup in bed with medications and RN aware.  White board updated during session. Pt given instruction to call for medical staff/nurse for assistance.       Patient left HOB elevated with all lines intact, call button in reach and RN Latrice notifiedEducation:      GOALS:   Multidisciplinary Problems     Occupational Therapy Goals        Problem: Occupational Therapy Goal    Goal Priority Disciplines Outcome Interventions   Occupational Therapy Goal     OT, PT/OT Ongoing, Progressing    Description:   Goals to be met by: 6/1/2020     Patient will increase functional independence with ADLs by performing:    UE Dressing with Set-up Assistance.  LE Dressing with Moderate Assistance.  Grooming while seated with Set-up Assistance.  Toileting from bedside commode with Minimal Assistance for hygiene and clothing management.   Sitting at edge of bed x15 minutes with Supervision.  Rolling to Bilateral with Supervision.   Supine to sit with Supervision.  Stand pivot transfers with Moderate Assistance.  Step transfer with Moderate Assistance  Toilet transfer to bedside commode with Moderate Assistance.                      Time Tracking:     OT Date of Treatment: 05/25/20  OT Start Time: 0826  OT Stop Time: 0858  OT Total Time (min): 32 min    Billable Minutes:Self Care/Home Management 32 min    Amanda Beal OT  5/25/2020

## 2020-05-25 NOTE — PROGRESS NOTES
"[facility] Hospital Medicine  Progress Note    Patient Name: Ernie Phan  MRN: [id number]  Patient Class: OP- Observation   Admission Date: 5/21/2020  Length of Stay: 0 days  Attending Physician: Medardo Mcleod MD  Primary Care Provider: Ernie Michel MD    [facility] Medicine Team: [facility] Nandini Acevedo PA-C    Subjective:     Principal Problem:Weakness        HPI:  Patient is an 89 year old gentleman with a h/o HTN, HLD, myasthenia gravis, post-herpetic neuralgia, and A Fib on Coumadin.  He presents with weakness.  Patient states that his RLE is particularly weak and has been "giving out" on him.  He has had two falls where he was lowered to the floor with no head trauma or LOC.  He is having difficultly standing or ambulating with his walker.  Patient also notes pain to his right inner thigh secondary to h/o post-herpetic neuralgia.  He denies generalized weakness, SOB.  He has no history of acute myasthenia gravis crisis.  He lives alone, but has family that checks on him frequently and a sitter three days a week for four hours.  He denies chest pain, SOB, dizziness, palpitations, fever/chills, N/V/D, abdominal pain.    Overview/Hospital Course:  Patient admitted for generalized weakness. His RLE has been "giving out" secondary to pain from post-herpetic neuralgia. CTH without acute abnormality. CT C-spine "Degenerative changes above.  Few mm anterolisthesis C4 on C5 and C7 on T1." R hip and knee xray without fracture, degenerative disease present. Suspect weakness secondary to deconditioning caused by post-herpetic neuralgia. PTOT evaluated, recommending SNF. Pt is agreeable.     Interval History: No reported events overnight, pt seen at bedside this AM resting in NAD.AOx4. Patient is eager to work with PTOT, agreeable to SNF placement. Reports some improvement in pain, but still with bouts of severe thigh pain. Adding PRN breakthrough medication    Review of Systems "   Constitutional: Positive for activity change. Negative for appetite change.   Respiratory: Negative for shortness of breath.    Cardiovascular: Negative for chest pain and palpitations.   Gastrointestinal: Negative for abdominal pain and vomiting.   Genitourinary: Negative for dysuria.   Musculoskeletal: Positive for arthralgias and gait problem. Negative for back pain.        Neuralgias   Skin: Positive for wound (head s/p mohs; wound vac). Negative for color change.   Neurological: Positive for weakness.   Psychiatric/Behavioral: Negative for agitation and confusion.     Objective:     Vital Signs (Most Recent):  Temp: 96.3 °F (35.7 °C) (05/25/20 0704)  Pulse: 82 (05/25/20 0755)  Resp: 19 (05/25/20 0755)  BP: (!) 127/59 (05/25/20 0704)  SpO2: 96 % (05/25/20 0755) Vital Signs (24h Range):  Temp:  [96.3 °F (35.7 °C)-98.6 °F (37 °C)] 96.3 °F (35.7 °C)  Pulse:  [54-83] 82  Resp:  [15-20] 19  SpO2:  [93 %-97 %] 96 %  BP: (115-135)/(55-80) 127/59     Weight: 75 kg (165 lb 5.5 oz)  Body mass index is 24.42 kg/m².    Intake/Output Summary (Last 24 hours) at 5/25/2020 0944  Last data filed at 5/25/2020 0619  Gross per 24 hour   Intake 200 ml   Output 310 ml   Net -110 ml      Physical Exam   Constitutional: He is oriented to person, place, and time. He appears well-developed and well-nourished. No distress.   HENT:   Head: Normocephalic and atraumatic.   Wound vac to posterior scalp   Eyes: EOM are normal.   Neck: Normal range of motion. Carotid bruit is not present.   Pulmonary/Chest: Effort normal. No respiratory distress.   Abdominal: There is no splenomegaly or hepatomegaly.   Musculoskeletal: Normal range of motion. He exhibits edema.   Neurological: He is alert and oriented to person, place, and time.   Skin: Skin is warm and dry. He is not diaphoretic.   Psychiatric: He has a normal mood and affect. His behavior is normal.       Significant Labs: All pertinent labs within the past 24 hours have been  reviewed.    Significant Imaging: I have reviewed all pertinent imaging results/findings within the past 24 hours.      Assessment/Plan:      * Weakness  - CT cervical spine without contrast:  Degenerative changes above.  Few mm anterolisthesis C4 on C5 and C7 on T1.  Pleural fluid or thickening right greater than left.  - CT head without contrast:  No evidence of acute intracranial pathology.  - R hip x-ray:  There is mild DJD.  No fracture, dislocation or bone destruction seen.  - R knee x-ray:  No fracture dislocation bone destruction seen.  There is moderate DJD and a varus deformity.  - BLE US:  No evidence of deep venous thrombosis in either lower extremity.  - UA unremarkable.  - Will consult PT/OT, SW.  - suspect deconditioning   - ptot rec SNF, pt agreeable    Postherpetic neuralgia  - Patient with no improvement in pain with lidocaine patch.  - Supportive care.  - Continue gabapentin 300mg TID.  - pt reports improvement with PRN oxy, but still difficult to control; adding PRN breakthrough  - Pain Management referral at [facility]      Iron deficiency anemia  BL ~7-8  9.2 on admission>>7.5  Repeat anemia panel; T/S and precaution but no active signs of bleeding: iron and b12 deficient      Myasthenia gravis without acute exacerbation  Long-term use of immunosuppressant medication     - Continue Imuran 200mg daily, Mestinon 60mg daily, prednisone 5mg daily.  - Will monitor respiratory mechanics.      Elevated brain natriuretic peptide (BNP) level  - .  - Will get chest x-ray: unremarkable  - Patient received Lasix 20mg IV in ER.  - Strict I&Os, daily weights.    Results for orders placed in visit on 12/09/19   Echo    Narrative · Normal left ventricular systolic function. The estimated ejection   fraction is 65%  · No wall motion abnormalities.  · Normal right ventricular systolic function.  · Severe biatrial enlargement.  · Aortic valve area is 1.62 cm2; peak velocity is 2.51 m/s; mean gradient   is 14  "mmHg.  · Mild aortic valve stenosis.  · Mild aortic regurgitation.  · Mild mitral regurgitation.  · Mild to moderate tricuspid regurgitation.  · The estimated PA systolic pressure is 63 mm Hg  · Pulmonary hypertension present.  · Normal central venous pressure (3 mm Hg).  · Atrial fibrillation observed.              Delayed surgical wound healing  - Per wound care note,  "s/p debridement of open head wound involving the bone and application of wound vac on 12/12/19 with Dr. Espinoza. He originally had a repair of Mohs defect with ACell on 12/5/19 but the ACell failed."  - Will consult wound care, appreciate assistance      Essential thrombocytosis  - Patient follows with heme/onc.  - Continue hydroxyurea.      Chronic kidney disease, stage III (moderate)  - At baseline with creatinine of 1.2, GFR 53.3.  - Avoid nephrotoxins.      Paroxysmal atrial fibrillation  Long term current use of anticoagulant therapy    - Continue verapamil 120mg BID, Coumadin 5mg MTuWThSa, 2.5mg Sunday.  - Monitor PT/INR.  - Will consult PharmD, appreciate assistance  - [id number] VASc score of at least three for HTN and age.        VTE Risk Mitigation (From admission, onward)         Ordered     warfarin (COUMADIN) tablet 5 mg  Every Thursday 05/22/20 0702     warfarin (COUMADIN) tablet 2.5 mg  Every Tuesday 05/22/20 0702     warfarin (COUMADIN) tablet 5 mg  Every Mon, Wed, Fri 05/22/20 0702     warfarin (COUMADIN) tablet 5 mg  Every Sunday 05/22/20 0702     warfarin (COUMADIN) tablet 2.5 mg  Every Saturday 05/22/20 0702     IP VTE HIGH RISK PATIENT  Once      05/21/20 2344     Place sequential compression device  Until discontinued      05/21/20 2344     Place STELLA hose  Until discontinued      05/21/20 2344     Reason for No Pharmacological VTE Prophylaxis  Once     Question:  Reasons:  Answer:  Already adequately anticoagulated on oral Anticoagulants    05/21/20 2344                      Nandini Acevedo, " ZECHARIAH  Department of Hospital Medicine   [facility]

## 2020-05-25 NOTE — PLAN OF CARE
05/25/20 1106   Post-Acute Status   Post-Acute Authorization Placement   Post-Acute Placement Status Referrals Sent     CM spoke to patients Daughter in Law Rosibel about additional preferences. List was emailed to her for preference selection. She will email CM with additional options. CM submitted to 3 additional in network facilities after declines from -SNF (Needs longterm) and [facility] (Too medically complicated).     Chacho Benitez RN Case Manager   #[phone]

## 2020-05-25 NOTE — PLAN OF CARE
Ernie Phan tolerated treatment fair today. Had attempted to see in AM today but in too much pain to participate, seems better controlled this afternoon. He needs encouragement to participate, appears quite sad about his recent regression in mobility as well as falls. Endorses significant medial R thigh pain at rest that increases with any/all activity. Requires mod A for bed mobility, able to sit up at side of bed with SBA. Participates in LE therex at EOB but frequently will lean laterally to the L side during therex, cueing to sit up straight. Obvious weakness at R quadricep and hip flexor. Performed 3 trials of stand activity from side of bed; initially only required mod A but regressed to max A and then total A for final sit to stand attempt. Discussed PT role, POC, goals and recommendations (Skilled Nursing Facility) with patient; verbalized understanding. Ernie Phan will continue to benefit from acute PT services to promote mobility during this admission and improve return to PLOF.    Problem: Physical Therapy Goal  Goal: Physical Therapy Goal  Description  Goals to be met by: 20     Patient will increase functional independence with mobility by performin. Supine to sit with Stand-by Assistance - Not met  2. Sit to supine with Stand-by Assistance - Not met  3. Sit to stand transfer with Minimal Assistance - Not met  4. Gait x 50 feet with Minimal Assistance using Rolling Walker - Not met   Outcome: Ongoing, Progressing    Miguel Beltrán, PT  2020

## 2020-05-25 NOTE — PT/OT/SLP PROGRESS
Physical Therapy  Treatment    Ernie Phan   [id number]    Time Tracking:     PT Received On: 05/25/20   PT Start Time: 1350   PT Stop Time: 1420   PT Total Time (min): 30 min    Billable Minutes: Therapeutic Activity 15 and Therapeutic Exercise 15      Recommendations:     Discharge recommendations: Skilled Nursing Facility     Equipment recommendations: TBD at SNF    Barriers to Discharge: Not ready to discharge home from a mobility standpoint, needs further therapy.    Patient Information:     Recent Surgery: * No surgery found *      Diagnosis: Weakness    Length of Stay: 4 days    General Precautions: Standard, fall  Orthopedic Precautions: None    Assessment:     Ernie Phan tolerated treatment fair today. Had attempted to see in AM today but in too much pain to participate, seems better controlled this afternoon. He needs encouragement to participate, appears quite sad about his recent regression in mobility as well as falls. Endorses significant medial R thigh pain at rest that increases with any/all activity. Requires mod A for bed mobility, able to sit up at side of bed with SBA. Participates in LE therex at EOB but frequently will lean laterally to the L side during therex, cueing to sit up straight. Obvious weakness at R quadricep and hip flexor. Performed 3 trials of stand activity from side of bed; initially only required mod A but regressed to max A and then total A for final sit to stand attempt. Discussed PT role, POC, goals and recommendations (Skilled Nursing Facility) with patient; verbalized understanding. Ernie Phan will continue to benefit from acute PT services to promote mobility during this admission and improve return to PLOF.    Problem List: weakness, decreased endurance, impaired self-care skills, impaired mobility, decreased sitting or standing balance, gait instability, impaired cardiopulmonary response to activity and pain    Rehab Prognosis: Fair; patient would benefit  from acute skilled PT services to address these deficits and reach maximum level of function.    Plan:     Patient to be seen 4 x/week to address the above listed problems via gait training, therapeutic activities, therapeutic exercises, neuromuscular re-education    Plan of Care Expires: 06/21/20  Plan of Care reviewed with: patient    Subjective:     Communicated with RN prior to treatment, appropriate to see for treatment.    Pt found supine in bed (HOB elevated) upon PT entry to room, agreeable to treatment.    Does this patient have any cultural, spiritual, Yazidism conflicts given the current situation? Patient/family has no barriers to learning. Patient/family verbalizes understanding of his/her program and goals and demonstrates them correctly. No cultural, spiritual, or educational needs identified.    Objective:     Patient found with: wound vac, telemetry    Pain:  Pain Rating 1: 6/10 at R medial thigh, increases with activity  Pain Rating Post-Intervention 1: 7/10 at R medial thigh    Functional Mobility:    · Bed Mobility:  · Supine to Sitting: mod A with HOB elevated  · Sitting to Supine: total A with HOB elevated     · Scooting towards HOB in supine: min A, cueing for patient to use his hands on rails in bed as well as bridge through LLE to scoot    · Transfers:  · Sit to Stand: mod A x 1 trial, max A x 1 trial, total A x 1 trial from EOB with no AD (pt's hands on therapist's shoulders for UE support); 3 trials overall    · Gait:  · Non-ambulatory at this time    · Balance:  · Static Sit: Stand-By Assist at EOB for 15 minutes    · Static Stand: Max Assist with no AD (pt's hands on therapist's shoulders for UE support) for 10, 5 and 1 second respectively on 3 trials today    Additional Therapeutic Activity/Exercises:     1. He needs encouragement to participate, appears quite sad about his recent regression in mobility as well as falls. Endorses significant medial R thigh pain at rest that increases  with any/all activity.    2. Requires mod A for bed mobility, able to sit up at side of bed with SBA.    3. Participates in LE therex at EOB but frequently will lean laterally to the L side during therex, cueing to sit up straight.   A. LAQ x 10 reps at either leg, AAROM for RLE, able to perform on LLE without assist   B. Alternating marching x 10 reps at either leg (unable to sit upright and perform at RLE, has to lean to the L to perform without assist)    4. Performed 3 trials of stand activity from side of bed; initially only required mod A but regressed to max A and then total A for final sit to stand attempt.    5. Discussed PT role, POC, goals and recommendations (Skilled Nursing Facility) with patient; verbalized understanding.    AM-PAC 6 CLICK MOBILITY  Turning over in bed (including adjusting bedclothes, sheets and blankets)?: 3  Sitting down on and standing up from a chair with arms (e.g., wheelchair, bedside commode, etc.): 2  Moving from lying on back to sitting on the side of the bed?: 2  Moving to and from a bed to a chair (including a wheelchair)?: 2  Need to walk in hospital room?: 1  Climbing 3-5 steps with a railing?: 1  Basic Mobility Total Score: 11    Patient was left supine in bed (HOB elevated) with all lines intact and call button in reach.    GOALS:   Multidisciplinary Problems     Physical Therapy Goals        Problem: Physical Therapy Goal    Goal Priority Disciplines Outcome Goal Variances Interventions   Physical Therapy Goal     PT, PT/OT Ongoing, Progressing     Description:  Goals to be met by: 20     Patient will increase functional independence with mobility by performin. Supine to sit with Stand-by Assistance - Not met  2. Sit to supine with Stand-by Assistance - Not met  3. Sit to stand transfer with Minimal Assistance - Not met  4. Gait x 50 feet with Minimal Assistance using Rolling Walker - Not met                  Miguel Beltrán, PT   2020

## 2020-05-26 LAB
ALBUMIN SERPL BCP-MCNC: 2.5 G/DL (ref 3.5–5.2)
ALP SERPL-CCNC: 130 U/L (ref 55–135)
ALT SERPL W/O P-5'-P-CCNC: 9 U/L (ref 10–44)
ANION GAP SERPL CALC-SCNC: 9 MMOL/L (ref 8–16)
AST SERPL-CCNC: 16 U/L (ref 10–40)
BASOPHILS # BLD AUTO: 0.03 K/UL (ref 0–0.2)
BASOPHILS NFR BLD: 0.2 % (ref 0–1.9)
BILIRUB SERPL-MCNC: 1.9 MG/DL (ref 0.1–1)
BUN SERPL-MCNC: 19 MG/DL (ref 8–23)
CALCIUM SERPL-MCNC: 8.4 MG/DL (ref 8.7–10.5)
CHLORIDE SERPL-SCNC: 98 MMOL/L (ref 95–110)
CO2 SERPL-SCNC: 29 MMOL/L (ref 23–29)
CREAT SERPL-MCNC: 1.1 MG/DL (ref 0.5–1.4)
DIFFERENTIAL METHOD: ABNORMAL
EOSINOPHIL # BLD AUTO: 0.1 K/UL (ref 0–0.5)
EOSINOPHIL NFR BLD: 0.5 % (ref 0–8)
ERYTHROCYTE [DISTWIDTH] IN BLOOD BY AUTOMATED COUNT: 16.2 % (ref 11.5–14.5)
EST. GFR  (AFRICAN AMERICAN): >60 ML/MIN/1.73 M^2
EST. GFR  (NON AFRICAN AMERICAN): 59.2 ML/MIN/1.73 M^2
GLUCOSE SERPL-MCNC: 88 MG/DL (ref 70–110)
HCT VFR BLD AUTO: 24.3 % (ref 40–54)
HGB BLD-MCNC: 7.4 G/DL (ref 14–18)
IMM GRANULOCYTES # BLD AUTO: 0.14 K/UL (ref 0–0.04)
IMM GRANULOCYTES NFR BLD AUTO: 1.2 % (ref 0–0.5)
INR PPP: 2.8 (ref 0.8–1.2)
LYMPHOCYTES # BLD AUTO: 0.7 K/UL (ref 1–4.8)
LYMPHOCYTES NFR BLD: 5.5 % (ref 18–48)
MCH RBC QN AUTO: 40 PG (ref 27–31)
MCHC RBC AUTO-ENTMCNC: 30.5 G/DL (ref 32–36)
MCV RBC AUTO: 131 FL (ref 82–98)
MONOCYTES # BLD AUTO: 1 K/UL (ref 0.3–1)
MONOCYTES NFR BLD: 8 % (ref 4–15)
NEUTROPHILS # BLD AUTO: 10.2 K/UL (ref 1.8–7.7)
NEUTROPHILS NFR BLD: 84.6 % (ref 38–73)
NRBC BLD-RTO: 0 /100 WBC
PLATELET # BLD AUTO: 685 K/UL (ref 150–350)
PMV BLD AUTO: 9.4 FL (ref 9.2–12.9)
POTASSIUM SERPL-SCNC: 3.8 MMOL/L (ref 3.5–5.1)
PROT SERPL-MCNC: 5.5 G/DL (ref 6–8.4)
PROTHROMBIN TIME: 26.7 SEC (ref 9–12.5)
RBC # BLD AUTO: 1.85 M/UL (ref 4.6–6.2)
SARS-COV-2 RNA RESP QL NAA+PROBE: NOT DETECTED
SODIUM SERPL-SCNC: 136 MMOL/L (ref 136–145)
WBC # BLD AUTO: 12.05 K/UL (ref 3.9–12.7)

## 2020-05-26 PROCEDURE — 99225 PR SUBSEQUENT OBSERVATION CARE,LEVEL II: ICD-10-PCS | Mod: HCNC,,, | Performed by: PHYSICIAN ASSISTANT

## 2020-05-26 PROCEDURE — 25000003 PHARM REV CODE 250: Mod: HCNC | Performed by: INTERNAL MEDICINE

## 2020-05-26 PROCEDURE — 94010 BREATHING CAPACITY TEST: CPT | Mod: HCNC

## 2020-05-26 PROCEDURE — 99900035 HC TECH TIME PER 15 MIN (STAT): Mod: HCNC

## 2020-05-26 PROCEDURE — 25000003 PHARM REV CODE 250: Mod: HCNC | Performed by: PHYSICIAN ASSISTANT

## 2020-05-26 PROCEDURE — 85025 COMPLETE CBC W/AUTO DIFF WBC: CPT | Mod: HCNC

## 2020-05-26 PROCEDURE — 97605 NEG PRS WND THER DME<=50SQCM: CPT | Mod: HCNC

## 2020-05-26 PROCEDURE — 80053 COMPREHEN METABOLIC PANEL: CPT | Mod: HCNC

## 2020-05-26 PROCEDURE — 85610 PROTHROMBIN TIME: CPT | Mod: HCNC

## 2020-05-26 PROCEDURE — 97530 THERAPEUTIC ACTIVITIES: CPT | Mod: HCNC

## 2020-05-26 PROCEDURE — 99225 PR SUBSEQUENT OBSERVATION CARE,LEVEL II: CPT | Mod: HCNC,,, | Performed by: PHYSICIAN ASSISTANT

## 2020-05-26 PROCEDURE — A6209 FOAM DRSG <=16 SQ IN W/O BDR: HCPCS | Mod: HCNC

## 2020-05-26 PROCEDURE — 36415 COLL VENOUS BLD VENIPUNCTURE: CPT | Mod: HCNC

## 2020-05-26 PROCEDURE — 94150 VITAL CAPACITY TEST: CPT | Mod: HCNC

## 2020-05-26 PROCEDURE — 97110 THERAPEUTIC EXERCISES: CPT | Mod: HCNC

## 2020-05-26 PROCEDURE — 63600175 PHARM REV CODE 636 W HCPCS: Mod: HCNC | Performed by: PHYSICIAN ASSISTANT

## 2020-05-26 PROCEDURE — G0378 HOSPITAL OBSERVATION PER HR: HCPCS | Mod: HCNC

## 2020-05-26 RX ORDER — NALOXONE HCL 0.4 MG/ML
0.4 VIAL (ML) INJECTION
Status: DISCONTINUED | OUTPATIENT
Start: 2020-05-26 | End: 2020-05-29 | Stop reason: HOSPADM

## 2020-05-26 RX ORDER — OXYCODONE HYDROCHLORIDE 10 MG/1
10 TABLET ORAL EVERY 4 HOURS
Status: DISCONTINUED | OUTPATIENT
Start: 2020-05-26 | End: 2020-05-27

## 2020-05-26 RX ADMIN — PREDNISONE 5 MG: 5 TABLET ORAL at 09:05

## 2020-05-26 RX ADMIN — OXYCODONE HYDROCHLORIDE 10 MG: 10 TABLET ORAL at 05:05

## 2020-05-26 RX ADMIN — FUROSEMIDE 20 MG: 20 TABLET ORAL at 09:05

## 2020-05-26 RX ADMIN — OXYCODONE 5 MG: 5 TABLET ORAL at 06:05

## 2020-05-26 RX ADMIN — PYRIDOSTIGMINE BROMIDE 60 MG: 60 TABLET ORAL at 06:05

## 2020-05-26 RX ADMIN — OXYCODONE HYDROCHLORIDE 10 MG: 10 TABLET ORAL at 10:05

## 2020-05-26 RX ADMIN — LIDOCAINE 1 PATCH: 50 PATCH CUTANEOUS at 10:05

## 2020-05-26 RX ADMIN — VERAPAMIL HYDROCHLORIDE 120 MG: 120 TABLET, FILM COATED ORAL at 10:05

## 2020-05-26 RX ADMIN — WARFARIN SODIUM 2.5 MG: 2.5 TABLET ORAL at 05:05

## 2020-05-26 RX ADMIN — VERAPAMIL HYDROCHLORIDE 120 MG: 120 TABLET, FILM COATED ORAL at 09:05

## 2020-05-26 RX ADMIN — DOCUSATE SODIUM - SENNOSIDES 1 TABLET: 50; 8.6 TABLET, FILM COATED ORAL at 09:05

## 2020-05-26 RX ADMIN — FINASTERIDE 5 MG: 5 TABLET, FILM COATED ORAL at 09:05

## 2020-05-26 RX ADMIN — OXYCODONE HYDROCHLORIDE 10 MG: 10 TABLET ORAL at 02:05

## 2020-05-26 RX ADMIN — GABAPENTIN 300 MG: 300 CAPSULE ORAL at 09:05

## 2020-05-26 RX ADMIN — HYDROXYUREA 500 MG: 500 CAPSULE ORAL at 09:05

## 2020-05-26 RX ADMIN — AZATHIOPRINE 200 MG: 50 TABLET ORAL at 09:05

## 2020-05-26 RX ADMIN — CYANOCOBALAMIN TAB 1000 MCG 1000 MCG: 1000 TAB at 09:05

## 2020-05-26 RX ADMIN — Medication 300 MG: at 09:05

## 2020-05-26 RX ADMIN — Medication 1000 UNITS: at 09:05

## 2020-05-26 RX ADMIN — PYRIDOSTIGMINE BROMIDE 60 MG: 60 TABLET ORAL at 02:05

## 2020-05-26 RX ADMIN — OXYCODONE 5 MG: 5 TABLET ORAL at 09:05

## 2020-05-26 RX ADMIN — POLYETHYLENE GLYCOL 3350 17 G: 17 POWDER, FOR SOLUTION ORAL at 09:05

## 2020-05-26 RX ADMIN — TAMSULOSIN HYDROCHLORIDE 0.4 MG: 0.4 CAPSULE ORAL at 09:05

## 2020-05-26 RX ADMIN — PYRIDOSTIGMINE BROMIDE 60 MG: 60 TABLET ORAL at 10:05

## 2020-05-26 RX ADMIN — Medication 6 MG: at 10:05

## 2020-05-26 RX ADMIN — OXYCODONE 5 MG: 5 TABLET ORAL at 01:05

## 2020-05-26 RX ADMIN — GABAPENTIN 300 MG: 300 CAPSULE ORAL at 10:05

## 2020-05-26 RX ADMIN — DOCUSATE SODIUM - SENNOSIDES 1 TABLET: 50; 8.6 TABLET, FILM COATED ORAL at 10:05

## 2020-05-26 RX ADMIN — GABAPENTIN 300 MG: 300 CAPSULE ORAL at 02:05

## 2020-05-26 NOTE — PLAN OF CARE
SW sent referrals to:    [facility]  [facility]  [facility]  [facility]  [facility]  [facility]    Declined/Cannot meet needs    [facility] [place] [facility]       05/26/20 1327   Post-Acute Status   Post-Acute Authorization Placement   Post-Acute Placement Status Referrals Sent     SW will continue tot follow patient and assist with needs as indicated.    Juani Mireles, ELVER  [facility]  [phone]

## 2020-05-26 NOTE — PROGRESS NOTES
PHARMACY CONSULT NOTE: WARFARIN  Ernie Phan is a 89 y.o. male on warfarin therapy for Atrial Fibrillation. PharmD has been consulted for warfarin dosing.    Current order:  warfarin 2.5 mg PO Tuesday, Saturday, 5 mg all other days  Home dose: warfarin 2.5 mg PO Tuesday, Saturday, 5 mg all other days  Coumadin clinic enrollment: Active  INR goal: 2-3    Lab Results   Component Value Date    INR 2.8 (H) 05/26/2020    INR 2.4 (H) 05/25/2020    INR 2.9 (H) 05/24/2020     Significant drug interactions: none  Diet: Regular    Recommendation(s):   1. Continue warfarin 2.5 mg every Tuesday and Saturday, 5 mg all other days  2. PT/INR daily    Thank you for the consult, will continue to follow  Darien Morse Pharm.D., BCPS  [id number]      **Note: Consults are reviewed Monday-Friday 7:00am-3:30pm. THE ABOVE RECOMMENDATIONS ARE ONLY SUGGESTED.THE RECOMMENDATIONS SHOULD BE CONSIDERED IN CONJUNCTION WITH ALL PATIENT FACTORS. **

## 2020-05-26 NOTE — PLAN OF CARE
Problem: Physical Therapy Goal  Goal: Physical Therapy Goal  Description  Goals to be met by: 20     Patient will increase functional independence with mobility by performin. Supine to sit with Stand-by Assistance - Not met  2. Sit to supine with Stand-by Assistance - Not met  3. Sit to stand transfer with Minimal Assistance - Not met  4. Gait x 50 feet with Minimal Assistance using Rolling Walker - Not met   Outcome: Ongoing, Progressing   Patient tolerated treatment well. Established POC and goals reviewed and remain appropriate. Plan is to continue to improve patient's functional mobility capabilities.      Mariola Otero, PT, DPT  2020

## 2020-05-26 NOTE — PLAN OF CARE
ERASMO reached out to [facility] for referral review. Hreman will contact ERASMO with SNF admit decision.        05/26/20 1131   Post-Acute Status   Post-Acute Authorization Placement   Post-Acute Placement Status Referrals Sent       Juani Mireles LMSW  [facility]  [phone]

## 2020-05-26 NOTE — ASSESSMENT & PLAN NOTE
- Patient with no improvement in pain with lidocaine patch.  - Supportive care.  - Continue gabapentin 300mg TID.  - pt reports improvement with PRN oxy, but still difficult to control; scheduling Oxy and adding PRN breakthrough  - Pain Management referral at [place]

## 2020-05-26 NOTE — PROGRESS NOTES
"[facility] Hospital Medicine  Progress Note    Patient Name: Ernie Phan  MRN: [id number]  Patient Class: OP- Observation   Admission Date: 5/21/2020  Length of Stay: 0 days  Attending Physician: Medardo Mcleod MD  Primary Care Provider: Ernie Michel MD    [facility] Medicine Team: [facility] HOSP MED E Nandini Acevedo PA-C    Subjective:     Principal Problem:Weakness        HPI:  Patient is an 89 year old gentleman with a h/o HTN, HLD, myasthenia gravis, post-herpetic neuralgia, and A Fib on Coumadin.  He presents with weakness.  Patient states that his RLE is particularly weak and has been "giving out" on him.  He has had two falls where he was lowered to the floor with no head trauma or LOC.  He is having difficultly standing or ambulating with his walker.  Patient also notes pain to his right inner thigh secondary to h/o post-herpetic neuralgia.  He denies generalized weakness, SOB.  He has no history of acute myasthenia gravis crisis.  He lives alone, but has family that checks on him frequently and a sitter three days a week for four hours.  He denies chest pain, SOB, dizziness, palpitations, fever/chills, N/V/D, abdominal pain.    Overview/Hospital Course:  Patient admitted for generalized weakness. His RLE has been "giving out" secondary to pain from post-herpetic neuralgia. CTH without acute abnormality. CT C-spine "Degenerative changes above.  Few mm anterolisthesis C4 on C5 and C7 on T1." R hip and knee xray without fracture, degenerative disease present. Suspect weakness secondary to deconditioning caused by post-herpetic neuralgia. PTOT evaluated, recommending SNF. Pt is agreeable.     Interval History: No reported events overnight. Pt complaining of leg pain still this AM despite scheduled regimen, will increase scheduled oxy from 5mg to 10mg. Chap;rosemarie consulted for emotional support    Review of Systems   Constitutional: Positive for activity change. Negative for appetite " change.   Respiratory: Negative for shortness of breath.    Cardiovascular: Negative for chest pain and palpitations.   Gastrointestinal: Negative for abdominal pain and vomiting.   Genitourinary: Negative for dysuria.   Musculoskeletal: Positive for arthralgias and gait problem. Negative for back pain.        Neuralgias   Skin: Positive for wound (head s/p mohs; wound vac). Negative for color change.   Neurological: Positive for weakness.   Psychiatric/Behavioral: Negative for agitation and confusion.     Objective:     Vital Signs (Most Recent):  Temp: 98.5 °F (36.9 °C) (05/26/20 1153)  Pulse: (!) 54 (05/26/20 1153)  Resp: 18 (05/26/20 1153)  BP: (!) 111/56 (05/26/20 1153)  SpO2: 95 % (05/26/20 1153) Vital Signs (24h Range):  Temp:  [96.5 °F (35.8 °C)-98.5 °F (36.9 °C)] 98.5 °F (36.9 °C)  Pulse:  [54-79] 54  Resp:  [16-20] 18  SpO2:  [92 %-98 %] 95 %  BP: (111-156)/(56-67) 111/56     Weight: 78 kg (171 lb 15.3 oz)  Body mass index is 25.39 kg/m².    Intake/Output Summary (Last 24 hours) at 5/26/2020 1257  Last data filed at 5/26/2020 1133  Gross per 24 hour   Intake 120 ml   Output 75 ml   Net 45 ml      Physical Exam   Constitutional: He is oriented to person, place, and time. He appears well-developed and well-nourished. No distress.   HENT:   Head: Normocephalic and atraumatic.   Wound vac to posterior scalp   Eyes: EOM are normal.   Neck: Normal range of motion. Carotid bruit is not present.   Pulmonary/Chest: Effort normal. No respiratory distress.   Abdominal: He exhibits no distension. There is no splenomegaly or hepatomegaly.   Musculoskeletal: Normal range of motion. He exhibits edema.   Neurological: He is alert and oriented to person, place, and time.   Skin: Skin is warm and dry. He is not diaphoretic.   Psychiatric: His behavior is normal. His mood appears anxious.       Significant Labs: All pertinent labs within the past 24 hours have been reviewed.    Significant Imaging: I have reviewed all  pertinent imaging results/findings within the past 24 hours.      Assessment/Plan:      * Weakness  - CT cervical spine without contrast:  Degenerative changes above.  Few mm anterolisthesis C4 on C5 and C7 on T1.  Pleural fluid or thickening right greater than left.  - CT head without contrast:  No evidence of acute intracranial pathology.  - R hip x-ray:  There is mild DJD.  No fracture, dislocation or bone destruction seen.  - R knee x-ray:  No fracture dislocation bone destruction seen.  There is moderate DJD and a varus deformity.  - BLE US:  No evidence of deep venous thrombosis in either lower extremity.  - UA unremarkable.  - Will consult PT/OT, SW.  - suspect deconditioning   - ptot rec SNF, pt agreeable    Postherpetic neuralgia  - Patient with no improvement in pain with lidocaine patch.  - Supportive care.  - Continue gabapentin 300mg TID.  - pt reports improvement with PRN oxy, but still difficult to control; scheduling Oxy and adding PRN breakthrough  - Pain Management referral at [place]      Iron deficiency anemia  BL ~7-8  9.2 on admission>>7.5  Repeat anemia panel; T/S and precaution but no active signs of bleeding: iron and b12 deficient      Myasthenia gravis without acute exacerbation  Long-term use of immunosuppressant medication     - Continue Imuran 200mg daily, Mestinon 60mg daily, prednisone 5mg daily.  - Will monitor respiratory mechanics.      Elevated brain natriuretic peptide (BNP) level  - .  - Will get chest x-ray: unremarkable  - Patient received Lasix 20mg IV in ER.  - Strict I&Os, daily weights.    Results for orders placed in visit on 12/09/19   Echo    Narrative · Normal left ventricular systolic function. The estimated ejection   fraction is 65%  · No wall motion abnormalities.  · Normal right ventricular systolic function.  · Severe biatrial enlargement.  · Aortic valve area is 1.62 cm2; peak velocity is 2.51 m/s; mean gradient   is 14 mmHg.  · Mild aortic valve stenosis.  ·  "Mild aortic regurgitation.  · Mild mitral regurgitation.  · Mild to moderate tricuspid regurgitation.  · The estimated PA systolic pressure is 63 mm Hg  · Pulmonary hypertension present.  · Normal central venous pressure (3 mm Hg).  · Atrial fibrillation observed.              Delayed surgical wound healing  - Per wound care note,  "s/p debridement of open head wound involving the bone and application of wound vac on 12/12/19 with Dr. Espinoza. He originally had a repair of Mohs defect with ACell on 12/5/19 but the ACell failed."  - Will consult wound care, appreciate assistance      Essential thrombocytosis  - Patient follows with heme/onc.  - Continue hydroxyurea.      Chronic kidney disease, stage III (moderate)  - At baseline with creatinine of 1.2, GFR 53.3.  - Avoid nephrotoxins.      Paroxysmal atrial fibrillation  Long term current use of anticoagulant therapy    - Continue verapamil 120mg BID, Coumadin 5mg MTuWThSa, 2.5mg Sunday.  - Monitor PT/INR.  - Will consult PharmD, appreciate assistance  - [id number] VASc score of at least three for HTN and age.        VTE Risk Mitigation (From admission, onward)         Ordered     warfarin (COUMADIN) tablet 5 mg  Every Thursday 05/22/20 0702     warfarin (COUMADIN) tablet 2.5 mg  Every Tuesday 05/22/20 0702     warfarin (COUMADIN) tablet 5 mg  Every Mon, Wed, Fri 05/22/20 0702     warfarin (COUMADIN) tablet 5 mg  Every Sunday 05/22/20 0702     warfarin (COUMADIN) tablet 2.5 mg  Every Saturday 05/22/20 0702     IP VTE HIGH RISK PATIENT  Once      05/21/20 2344     Place sequential compression device  Until discontinued      05/21/20 2344     Place STELLA hose  Until discontinued      05/21/20 2344     Reason for No Pharmacological VTE Prophylaxis  Once     Question:  Reasons:  Answer:  Already adequately anticoagulated on oral Anticoagulants    05/21/20 2344                      Nandini Acevedo PA-C  Department of Hospital Medicine   [facility]

## 2020-05-26 NOTE — PROGRESS NOTES
Wound care follow-up  Wound vac dressing and RLE dressing changed as ordered.  Patient tolerated well.   Nursing to continue care, wound care will continue to follow  CANDI Younger RN, CN  [id number]     05/26/20 1430        Incision/Site 05/22/20 1030 Parietal region   Date First Assessed/Time First Assessed: 05/22/20 1030   Present Prior to Hospital Arrival?: Yes  Location: Parietal region   Incision WDL ex   Dressing Appearance Dry;Intact;Clean   Drainage Amount Scant   Drainage Characteristics/Odor Serous   Appearance Pink;White;Other (see comments)  (graft in place)   Periwound Area Intact;Dry   Wound Edges Open   Care Cleansed with:;Sterile normal saline;Applied:;Skin Barrier   Dressing Changed;Transparent film;Foam   Dressing Change Due 05/29/20        Negative Pressure Wound Therapy  05/22/20 1030   Placement Date/Time: 05/22/20 1030   Location: Scalp;Parietal region   NPWT Type Vacuum Therapy   Therapy Setting NPWT Continuous therapy   Pressure Setting NPWT 125 mmHg   Therapy Interventions NPWT Dressing changed;Seal intact   Sponges Inserted NPWT Black;1   Sponges Removed NPWT Black;1        Wound 05/22/20 1030 Venous Ulcer Right lower Leg   Date First Assessed/Time First Assessed: 05/22/20 1030   Pre-existing: Yes  Primary Wound Type: Venous Ulcer  Side: Right  Orientation: lower  Location: Leg   Wound WDL ex   Dressing Appearance Dry;Intact;Clean   Drainage Amount Scant   Drainage Characteristics/Odor Creamy;Serous   Appearance Pink;Tan;Moist   Tissue loss description Full thickness   Periwound Area Intact;Dry;Edematous   Wound Edges Open   Care Cleansed with:;Sterile normal saline;Applied:;Skin Barrier   Dressing Changed;Methylene blue/gentian violet;Rolled gauze   Dressing Change Due 05/29/20

## 2020-05-26 NOTE — NURSING
Patient AAOx4, verbalizes needs w/o difficulty, cooperative and appropriate. Wound vac intact to posterior scalp @125mmhg intermittent suction with small amount of serous sanguinous drainage noted. RLE edematous with cellulitis noted. Patient requires mod to max assist with activities and adl's. Reports pain to R knee and R thigh region with mild relief obtain with oxycodone 5mg po pain medication scheduled every 4hrs. Safety maintained, tele sitter camera remains in progress and patient remains free of falls. Will continue to monitor.

## 2020-05-26 NOTE — PT/OT/SLP PROGRESS
Physical Therapy Treatment    Patient Name:  Ernie Phan   MRN:  [id number]    Recommendations:     Discharge Recommendations:  nursing facility, skilled   Discharge Equipment Recommendations: other (see comments)(TBD with progress)   Barriers to discharge: Inaccessible home and Decreased caregiver support    Assessment:     Ernie Phan is a 89 y.o. male admitted with a medical diagnosis of Weakness.  He presents with the following impairments/functional limitations:  weakness, impaired functional mobilty, impaired endurance, gait instability, decreased coordination, decreased upper extremity function, decreased lower extremity function, impaired self care skills, impaired balance, impaired skin, decreased ROM. Patient tolerated session well. Patient demonstrated increased hopeless regarding return to functional independence and showed signs of depression this date. MD notified and aware. Patient would continue to benefit from skilled PT services while in the hospital. At this time, upon d/c he is an approrpaite candidate for SNF in order to progress towards an improved level of functional mobility independence.     Rehab Prognosis: Good; patient would benefit from acute skilled PT services to address these deficits and reach maximum level of function.    Recent Surgery: * No surgery found *      Plan:     During this hospitalization, patient to be seen 4 x/week to address the identified rehab impairments via gait training, therapeutic activities, therapeutic exercises, neuromuscular re-education and progress toward the following goals:    · Plan of Care Expires:  06/21/20    Subjective     Chief Complaint: weakness and R leg pain   Patient/Family Comments/goals: to walk   Pain/Comfort:  · Pain Rating 1: 7/10  · Location - Side 1: Right  · Location 1: (inner thigh )  · Pain Addressed 1: Pre-medicate for activity, Reposition, Distraction      Objective:     Communicated with RN prior to session.  Patient found  supine with telemetry, wound vac upon PT entry to room.     General Precautions: Standard, fall   Orthopedic Precautions:N/A   Braces: N/A     Functional Mobility:  · Bed Mobility:     · Rolling Right: minimum assistance  · Scooting: maximal assistance and of 2 persons toward HOB via drawsheet   · Supine to Sit: moderate assistance  · Sit to Supine: minimum assistance  · Transfers:     · Sit to Stand:  total assistance with no AD  · Unable to clear hips from bed   · Balance: sitting EOB - close SBA-Fatmata 2* L trunk lean  · patient able to maintain midline orientation wihtout BUE support [id number] before L trunk lean; forward trunk flexion to increase tissue extensibility of trunk extensors   · BLE AAROM in sitting - x12 each   · Hip flexion, LAQ, hip add/abd, ankle DF/PF   · RLE hip flexion 2/5   · Knee extension - 3/5       AM-PAC 6 CLICK MOBILITY  Turning over in bed (including adjusting bedclothes, sheets and blankets)?: 3  Sitting down on and standing up from a chair with arms (e.g., wheelchair, bedside commode, etc.): 2  Moving from lying on back to sitting on the side of the bed?: 2  Moving to and from a bed to a chair (including a wheelchair)?: 2  Need to walk in hospital room?: 1  Climbing 3-5 steps with a railing?: 1  Basic Mobility Total Score: 11       Therapeutic Activities and Exercises:  -Patient educated on the continued role and goal of PT  -Questions and concerns answered within the the PT scope of practice.   -White board updated in patient room to reflect level of assistance needed with nursing.     Patient left HOB elevated with all lines intact, call button in reach, bed alarm on, RN notified and [employer]  present..    GOALS:   Multidisciplinary Problems     Physical Therapy Goals        Problem: Physical Therapy Goal    Goal Priority Disciplines Outcome Goal Variances Interventions   Physical Therapy Goal     PT, PT/OT Ongoing, Progressing     Description:  Goals to be met by: 6/1/20     Patient  will increase functional independence with mobility by performin. Supine to sit with Stand-by Assistance - Not met  2. Sit to supine with Stand-by Assistance - Not met  3. Sit to stand transfer with Minimal Assistance - Not met  4. Gait x 50 feet with Minimal Assistance using Rolling Walker - Not met                    Time Tracking:     PT Received On: 20  PT Start Time: 1005     PT Stop Time: 1036  PT Total Time (min): 31 min     Billable Minutes: Therapeutic Activity 20 and Therapeutic Exercise 11    Treatment Type: Treatment  PT/PTA: PT     PTA Visit Number: 0     Mariola Otero, PT  2020

## 2020-05-26 NOTE — PLAN OF CARE
Pt has not had any falls or injuries do to a fall. Dressings on wounds where changed today by  wound care nurses, no changed in orders at this time.

## 2020-05-27 ENCOUNTER — TELEPHONE (OUTPATIENT)
Dept: WOUND CARE | Facility: CLINIC | Age: 85
End: 2020-05-27

## 2020-05-27 LAB
ALBUMIN SERPL BCP-MCNC: 2.3 G/DL (ref 3.5–5.2)
ALP SERPL-CCNC: 107 U/L (ref 55–135)
ALT SERPL W/O P-5'-P-CCNC: 8 U/L (ref 10–44)
ANION GAP SERPL CALC-SCNC: 7 MMOL/L (ref 8–16)
AST SERPL-CCNC: 14 U/L (ref 10–40)
BASOPHILS # BLD AUTO: 0.02 K/UL (ref 0–0.2)
BASOPHILS NFR BLD: 0.2 % (ref 0–1.9)
BILIRUB SERPL-MCNC: 1.7 MG/DL (ref 0.1–1)
BUN SERPL-MCNC: 19 MG/DL (ref 8–23)
CALCIUM SERPL-MCNC: 8.8 MG/DL (ref 8.7–10.5)
CHLORIDE SERPL-SCNC: 97 MMOL/L (ref 95–110)
CO2 SERPL-SCNC: 29 MMOL/L (ref 23–29)
CREAT SERPL-MCNC: 1.1 MG/DL (ref 0.5–1.4)
DIFFERENTIAL METHOD: ABNORMAL
EOSINOPHIL # BLD AUTO: 0.1 K/UL (ref 0–0.5)
EOSINOPHIL NFR BLD: 0.5 % (ref 0–8)
ERYTHROCYTE [DISTWIDTH] IN BLOOD BY AUTOMATED COUNT: 16.2 % (ref 11.5–14.5)
EST. GFR  (AFRICAN AMERICAN): >60 ML/MIN/1.73 M^2
EST. GFR  (NON AFRICAN AMERICAN): 59.2 ML/MIN/1.73 M^2
GLUCOSE SERPL-MCNC: 87 MG/DL (ref 70–110)
HCT VFR BLD AUTO: 25.2 % (ref 40–54)
HGB BLD-MCNC: 7.6 G/DL (ref 14–18)
IMM GRANULOCYTES # BLD AUTO: 0.12 K/UL (ref 0–0.04)
IMM GRANULOCYTES NFR BLD AUTO: 1.1 % (ref 0–0.5)
INR PPP: 3.8 (ref 0.8–1.2)
LYMPHOCYTES # BLD AUTO: 0.8 K/UL (ref 1–4.8)
LYMPHOCYTES NFR BLD: 7.2 % (ref 18–48)
MCH RBC QN AUTO: 39.8 PG (ref 27–31)
MCHC RBC AUTO-ENTMCNC: 30.2 G/DL (ref 32–36)
MCV RBC AUTO: 132 FL (ref 82–98)
MONOCYTES # BLD AUTO: 1 K/UL (ref 0.3–1)
MONOCYTES NFR BLD: 8.9 % (ref 4–15)
NEUTROPHILS # BLD AUTO: 9.1 K/UL (ref 1.8–7.7)
NEUTROPHILS NFR BLD: 82.1 % (ref 38–73)
NRBC BLD-RTO: 0 /100 WBC
PLATELET # BLD AUTO: 622 K/UL (ref 150–350)
PMV BLD AUTO: 10.1 FL (ref 9.2–12.9)
POTASSIUM SERPL-SCNC: 4.1 MMOL/L (ref 3.5–5.1)
PROT SERPL-MCNC: 5.5 G/DL (ref 6–8.4)
PROTHROMBIN TIME: 35.9 SEC (ref 9–12.5)
RBC # BLD AUTO: 1.91 M/UL (ref 4.6–6.2)
SODIUM SERPL-SCNC: 133 MMOL/L (ref 136–145)
WBC # BLD AUTO: 11.11 K/UL (ref 3.9–12.7)

## 2020-05-27 PROCEDURE — 94761 N-INVAS EAR/PLS OXIMETRY MLT: CPT | Mod: HCNC

## 2020-05-27 PROCEDURE — 25000003 PHARM REV CODE 250: Mod: HCNC | Performed by: PHYSICIAN ASSISTANT

## 2020-05-27 PROCEDURE — 85025 COMPLETE CBC W/AUTO DIFF WBC: CPT | Mod: HCNC

## 2020-05-27 PROCEDURE — 94150 VITAL CAPACITY TEST: CPT | Mod: HCNC,59

## 2020-05-27 PROCEDURE — G0378 HOSPITAL OBSERVATION PER HR: HCPCS | Mod: HCNC

## 2020-05-27 PROCEDURE — 99225 PR SUBSEQUENT OBSERVATION CARE,LEVEL II: CPT | Mod: HCNC,,, | Performed by: PHYSICIAN ASSISTANT

## 2020-05-27 PROCEDURE — 85610 PROTHROMBIN TIME: CPT | Mod: HCNC

## 2020-05-27 PROCEDURE — 97110 THERAPEUTIC EXERCISES: CPT | Mod: HCNC

## 2020-05-27 PROCEDURE — 97530 THERAPEUTIC ACTIVITIES: CPT | Mod: HCNC

## 2020-05-27 PROCEDURE — 63600175 PHARM REV CODE 636 W HCPCS: Mod: HCNC | Performed by: PHYSICIAN ASSISTANT

## 2020-05-27 PROCEDURE — 80053 COMPREHEN METABOLIC PANEL: CPT | Mod: HCNC

## 2020-05-27 PROCEDURE — 99225 PR SUBSEQUENT OBSERVATION CARE,LEVEL II: ICD-10-PCS | Mod: HCNC,,, | Performed by: PHYSICIAN ASSISTANT

## 2020-05-27 PROCEDURE — 94010 BREATHING CAPACITY TEST: CPT | Mod: HCNC

## 2020-05-27 PROCEDURE — 36415 COLL VENOUS BLD VENIPUNCTURE: CPT | Mod: HCNC

## 2020-05-27 RX ORDER — OXYCODONE HYDROCHLORIDE 10 MG/1
10 TABLET ORAL EVERY 6 HOURS
Status: DISCONTINUED | OUTPATIENT
Start: 2020-05-27 | End: 2020-05-29 | Stop reason: HOSPADM

## 2020-05-27 RX ADMIN — POLYETHYLENE GLYCOL 3350 17 G: 17 POWDER, FOR SOLUTION ORAL at 09:05

## 2020-05-27 RX ADMIN — VERAPAMIL HYDROCHLORIDE 120 MG: 120 TABLET, FILM COATED ORAL at 09:05

## 2020-05-27 RX ADMIN — PYRIDOSTIGMINE BROMIDE 60 MG: 60 TABLET ORAL at 03:05

## 2020-05-27 RX ADMIN — FUROSEMIDE 20 MG: 20 TABLET ORAL at 09:05

## 2020-05-27 RX ADMIN — AZATHIOPRINE 200 MG: 50 TABLET ORAL at 09:05

## 2020-05-27 RX ADMIN — PYRIDOSTIGMINE BROMIDE 60 MG: 60 TABLET ORAL at 05:05

## 2020-05-27 RX ADMIN — LIDOCAINE 1 PATCH: 50 PATCH CUTANEOUS at 09:05

## 2020-05-27 RX ADMIN — GABAPENTIN 300 MG: 300 CAPSULE ORAL at 09:05

## 2020-05-27 RX ADMIN — CYANOCOBALAMIN TAB 1000 MCG 1000 MCG: 1000 TAB at 09:05

## 2020-05-27 RX ADMIN — Medication 1000 UNITS: at 09:05

## 2020-05-27 RX ADMIN — DOCUSATE SODIUM - SENNOSIDES 1 TABLET: 50; 8.6 TABLET, FILM COATED ORAL at 09:05

## 2020-05-27 RX ADMIN — OXYCODONE HYDROCHLORIDE 10 MG: 10 TABLET ORAL at 02:05

## 2020-05-27 RX ADMIN — OXYCODONE HYDROCHLORIDE 10 MG: 10 TABLET ORAL at 01:05

## 2020-05-27 RX ADMIN — GABAPENTIN 300 MG: 300 CAPSULE ORAL at 03:05

## 2020-05-27 RX ADMIN — Medication 6 MG: at 09:05

## 2020-05-27 RX ADMIN — PYRIDOSTIGMINE BROMIDE 60 MG: 60 TABLET ORAL at 09:05

## 2020-05-27 RX ADMIN — HYDROXYUREA 500 MG: 500 CAPSULE ORAL at 09:05

## 2020-05-27 RX ADMIN — TAMSULOSIN HYDROCHLORIDE 0.4 MG: 0.4 CAPSULE ORAL at 09:05

## 2020-05-27 RX ADMIN — OXYCODONE HYDROCHLORIDE 10 MG: 10 TABLET ORAL at 11:05

## 2020-05-27 RX ADMIN — PREDNISONE 5 MG: 5 TABLET ORAL at 09:05

## 2020-05-27 RX ADMIN — Medication 300 MG: at 09:05

## 2020-05-27 RX ADMIN — OXYCODONE HYDROCHLORIDE 10 MG: 10 TABLET ORAL at 05:05

## 2020-05-27 RX ADMIN — OXYCODONE HYDROCHLORIDE 10 MG: 10 TABLET ORAL at 06:05

## 2020-05-27 RX ADMIN — FINASTERIDE 5 MG: 5 TABLET, FILM COATED ORAL at 09:05

## 2020-05-27 NOTE — ASSESSMENT & PLAN NOTE
- CT cervical spine without contrast:  Degenerative changes above.  Few mm anterolisthesis C4 on C5 and C7 on T1.  Pleural fluid or thickening right greater than left.  - CT head without contrast:  No evidence of acute intracranial pathology.  - R hip x-ray:  There is mild DJD.  No fracture, dislocation or bone destruction seen.  - R knee x-ray:  No fracture dislocation bone destruction seen.  There is moderate DJD and a varus deformity.  - BLE US:  No evidence of deep venous thrombosis in either lower extremity.  - UA unremarkable.  - Will consult PT/OT, SW.  - suspect deconditioning   - ptot rec SNF, pt agreeable: difficulty finding placement for patient due to wound vac needs; CMSW sending out additional referrals

## 2020-05-27 NOTE — PLAN OF CARE
CM updated Daughter In law Rosibel on current discharge plan, and expressed to her the difficulty in finding placement, Rosibel Verbalized understanding.

## 2020-05-27 NOTE — PT/OT/SLP PROGRESS
"Physical Therapy   Progress Note    Patient Name:  Ernie Phan  MRN: [id number]  Recent Surgery: * No surgery found *      Recommendations:     Discharge Recommendations: Skilled Nursing Facility   Equipment recommendations: TBD at SNF   Barriers to discharge: Increased level of skilled assistance required and Fall risk     Assessment:     Ernie Phan is a 89 y.o. male admitted to [facility] on 5/21/2020 with medical diagnosis of weakness. Pt presents with weakness, impaired balance, decreased endurance, impaired functional mobility , edema and gait instability. Pt slowly progressing towards therapy goals. He is not yet at PLOF and remains at high risk for falls.  Ernie Phan would benefit from continued acute PT intervention to address listed functional deficits, provide patient/caregiver education, reduce fall risk, and maximize (I) and safety with functional mobility. Once medically stable, recommending pt discharge to skilled nursing facility for continued therapy.     Rehab Prognosis: Good; patient would benefit from acute skilled PT services to address these deficits and reach maximum level of function.      Plan:     During this hospitalization, patient to be seen 5 x/week to address the identified rehab impairments via gait training, therapeutic activities, therapeutic exercises, neuromuscular re-education and progress towards stated goals.     Plan of Care Expires:  06/21/20  Plan of Care reviewed with: patient    This plan of care has been discussed with the patient/caregiver, who was included in its development and is in agreement with the identified goals and treatment plan.     Subjective     Communicated with RN prior to session.  Patient agreeable to participate in therapy. Pt expressed concern regarding overall care thus far, stating that he doesn't know how he is doing medically and doesn't know that the plan is. Pt stated he doesn't know if he is "knocking on death's door" or doing well. " "Therapist reassured pt that we can contact team to address pt's questions/concerns further and discuss plan with pt's family. PA also arrived to room during session, addressed pt's questions and reassured pt that his family (Rosibel) is updated daily.     Patient comments/goals: "I don't know that I can stand. I'll do whatever I can to get better"    Pain/Comfort:  · Location: RLE    · Pt did not rate pain on numeric scale   · RN notified, pt assisted with repositioning for comfort     Patients cultural, spiritual, Buddhism conflicts given the current situation: No cultural, spiritual, or educational needs identified.    Objective:     Patient found HOB elevated with: telemetry, wound vac    General Precautions: Standard, fall   Orthopedic Precautions:N/A   Braces: N/A   Body mass index is 26.05 kg/m².  Oxygen Device: room air    Cognition:   Pt is Alert and cooperative during session. Pt perseverating on needing to talk with MD team during session- RN notified and pt reassured that team will address his questions/concerns.     Functional Mobility:    Bed Mobility:  · Rolling: to right with minimum assistance   · Scooting: towards HOB in supine with minimum assistance; cueing provided to utilize LLE for pushing and BUEs for pulling during scooting tasks   · Supine > Sit: Moderate Assistance  · Sit > Supine: Moderate Assistance    Transfers:   · Sit <> Stand Transfer: Maximum Assistance from EOB with RW   · 3 attempts performed; pt able to clear hips from EOB on 1 attempt, but otherwise unable to rise into standing. Pt expressed fear of falling and pain with WB through RLE. Max cueing provided to educate pt on set-up of transfer, hand placement, scooting towards EOB, and safely managing RW.                  Gait: unable to perform     Balance:  · Static Sit: Supervision at EOB x ~25 minutes    Outcome Measure: AM-PAC 6 CLICK MOBILITY  Total Score:11     Patient/Caregiver Education and Therapeutic " Activities/Exercises     Therapeutic activities aimed to:  - increase pt's independence, safety, and efficiency with bed mobility and functional transfers. See above for assistance levels.   - improve pt's management and safety with RW use during transfers   - provide active listening and therapeutic encouragement to re-assure pt of care plan, reduce fear of falling and improve pt's attention to task   - reinforce education re: PT POC, goals for therapy, fall risk, Instruction on use of call button and importance of calling nursing staff for assistance with mobility.     TE facilitated to improve BLE strength and ROM required for improved circulation and functional strength. Performed ankle pumps and LAQ while seated EOB x 15 reps bilaterally. Assist provided for RLE knee extension at end range of motion to provide manual stretch. Pt tolerated well. Also educated pt on supine therex including AP, heel slides, quad sets.     Patient/caregiver able to verbalize understanding; will follow-up with pt/caregiver during current admit for additional questions/concerns within scope of practice.     White board updated.     Patient left HOB elevated with all lines intact, call button in reach, bed alarm on and RN notified.    Goals:     Multidisciplinary Problems     Physical Therapy Goals        Problem: Physical Therapy Goal    Goal Priority Disciplines Outcome Goal Variances Interventions   Physical Therapy Goal     PT, PT/OT Ongoing, Progressing     Description:  Goals to be met by: 20     Patient will increase functional independence with mobility by performin. Supine to sit with Stand-by Assistance - Not met  2. Sit to supine with Stand-by Assistance - Not met  3. Sit to stand transfer with Minimal Assistance - Not met  4. Gait x 50 feet with Minimal Assistance using Rolling Walker - Not met   5. Pt will perform BLE therex x 20 reps with assistance as needed  6. Pt will tolerate standing x 3 minutes using  rolling walker with contact guard assistance                     Time Tracking:       PT Received On: 05/27/20  PT Start Time: 0942     PT Stop Time: 1030  PT Total Time (min): 48 min     Billable Minutes: Therapeutic Activity 35 min and Therapeutic Exercise 13 min     Elaine Rao, PT, DPT   5/27/2020  Pager: [phone]

## 2020-05-27 NOTE — PLAN OF CARE
Problem: Physical Therapy Goal  Goal: Physical Therapy Goal  Description  Goals to be met by: 20     Patient will increase functional independence with mobility by performin. Supine to sit with Stand-by Assistance - Not met  2. Sit to supine with Stand-by Assistance - Not met  3. Sit to stand transfer with Minimal Assistance - Not met  4. Gait x 50 feet with Minimal Assistance using Rolling Walker - Not met   5. Pt will perform BLE therex x 20 reps with assistance as needed  6. Pt will tolerate standing x 3 minutes using rolling walker with contact guard assistance    Outcome: Ongoing, Progressing     Goals updated. Continue current POC, progressing as tolerated.     Elaine Rao, PT, DPT   2020  Pager: [phone]

## 2020-05-27 NOTE — ASSESSMENT & PLAN NOTE
Chronic, has gotten 2 rounds of abx outpt  0/4 SIRS on admission, does not appear infected  Wound care following, appreciate recs

## 2020-05-27 NOTE — TELEPHONE ENCOUNTER
----- Message from Mary Ellen Donnelly sent at 5/27/2020  3:50 PM CDT -----  Contact: pt daughter in law   Pt is in hospital and will be transferred to skill nursing so will not know if pt will be avialble for virtual visit. Please give pt daughter a call back at [phone]

## 2020-05-27 NOTE — SUBJECTIVE & OBJECTIVE
Interval History: Pt seems to be better controlled on 10mg oxycodone scheduled. Pt seen sitting at bedside working with PTOT. He continues to complain that no one has come in to see him and that no one comes when he calls. On rounds, 2 people present with patient: PTOT and  waiting outside (called to offer moral support). Reassured patient he is slowly progressing and [facility] is working to find a facility that can take care of his wound care needs. After ~10-15 minutes of reassurance, patient again began to complain no one has been by to visit. Discussed with  who will provide additional emotional reassurance.     Review of Systems   Constitutional: Positive for activity change. Negative for appetite change.   Respiratory: Negative for shortness of breath.    Cardiovascular: Negative for chest pain.   Gastrointestinal: Negative for abdominal pain and vomiting.   Genitourinary: Negative for dysuria.   Musculoskeletal: Positive for arthralgias and gait problem. Negative for back pain.        Neuralgias   Skin: Positive for wound (head s/p mohs; wound vac). Negative for color change.   Neurological: Positive for weakness.   Psychiatric/Behavioral: Negative for agitation and confusion.     Objective:     Vital Signs (Most Recent):  Temp: 97 °F (36.1 °C) (05/27/20 1045)  Pulse: 76 (05/27/20 1045)  Resp: 16 (05/27/20 1045)  BP: (!) 118/58 (05/27/20 1045)  SpO2: 96 % (05/27/20 1045) Vital Signs (24h Range):  Temp:  [97 °F (36.1 °C)-98.7 °F (37.1 °C)] 97 °F (36.1 °C)  Pulse:  [54-78] 76  Resp:  [16-18] 16  SpO2:  [93 %-98 %] 96 %  BP: ()/(52-66) 118/58     Weight: 80 kg (176 lb 5.9 oz)  Body mass index is 26.05 kg/m².    Intake/Output Summary (Last 24 hours) at 5/27/2020 1141  Last data filed at 5/27/2020 0908  Gross per 24 hour   Intake 260 ml   Output 275 ml   Net -15 ml      Physical Exam   Constitutional: He is oriented to person, place, and time. He appears well-developed and well-nourished. No  distress.   HENT:   Head: Normocephalic and atraumatic.   Wound vac to posterior scalp   Eyes: EOM are normal.   Neck: Normal range of motion. Carotid bruit is not present.   Pulmonary/Chest: Effort normal. No respiratory distress.   Abdominal: There is no splenomegaly or hepatomegaly.   Musculoskeletal: Normal range of motion. He exhibits edema.   Neurological: He is alert and oriented to person, place, and time.   Skin: Skin is warm and dry. He is not diaphoretic.   Psychiatric: His behavior is normal. His mood appears anxious.       Significant Labs: All pertinent labs within the past 24 hours have been reviewed.    Significant Imaging: I have reviewed all pertinent imaging results/findings within the past 24 hours.

## 2020-05-27 NOTE — TELEPHONE ENCOUNTER
Returned call and spoke with daughter-in-law who advised patient is currently in the hospital awaiting transfer to skilled nursing unit.  Patient has multiple falls and was admitted on 5/21/2020.  Daughter-in-law request that scheduled virtual visit for 6/3/2020 at 1pm be cancelled. Appointment cancelled per request.

## 2020-05-27 NOTE — PROGRESS NOTES
"[facility] Hospital Medicine  Progress Note    Patient Name: Ernie Phan  MRN: [id number]  Patient Class: OP- Observation   Admission Date: 5/21/2020  Length of Stay: 0 days  Attending Physician: Medardo Mcleod MD  Primary Care Provider: Ernie Michel MD    [facility] Medicine Team: [facility] HOSP MED E Nandini Acevedo PA-C    Subjective:     Principal Problem:Weakness        HPI:  Patient is an 89 year old gentleman with a h/o HTN, HLD, myasthenia gravis, post-herpetic neuralgia, and A Fib on Coumadin.  He presents with weakness.  Patient states that his RLE is particularly weak and has been "giving out" on him.  He has had two falls where he was lowered to the floor with no head trauma or LOC.  He is having difficultly standing or ambulating with his walker.  Patient also notes pain to his right inner thigh secondary to h/o post-herpetic neuralgia.  He denies generalized weakness, SOB.  He has no history of acute myasthenia gravis crisis.  He lives alone, but has family that checks on him frequently and a sitter three days a week for four hours.  He denies chest pain, SOB, dizziness, palpitations, fever/chills, N/V/D, abdominal pain.    Overview/Hospital Course:  Patient admitted for generalized weakness. His RLE has been "giving out" secondary to pain from post-herpetic neuralgia. CTH without acute abnormality. CT C-spine "Degenerative changes above.  Few mm anterolisthesis C4 on C5 and C7 on T1." R hip and knee xray without fracture, degenerative disease present. Suspect weakness secondary to deconditioning caused by post-herpetic neuralgia. PTOT evaluated, recommending SNF. Pt is agreeable, however placement has been difficult as facilities do not think they can meed his complex needs (wound vac). Cm/SW continuing to follow and send out additional refferals    Interval History: Pt seems to be better controlled on 10mg oxycodone scheduled. Pt seen sitting at bedside working with PTOT. He " continues to complain that no one has come in to see him and that no one comes when he calls. On rounds, 2 people present with patient: PTOT and  waiting outside (called to offer moral support). Reassured patient he is slowly progressing and [facility] is working to find a facility that can take care of his wound care needs. After ~10-15 minutes of reassurance, patient again began to complain no one has been by to visit. Discussed with  who will provide additional emotional reassurance.     Review of Systems   Constitutional: Positive for activity change. Negative for appetite change.   Respiratory: Negative for shortness of breath.    Cardiovascular: Negative for chest pain.   Gastrointestinal: Negative for abdominal pain and vomiting.   Genitourinary: Negative for dysuria.   Musculoskeletal: Positive for arthralgias and gait problem. Negative for back pain.        Neuralgias   Skin: Positive for wound (head s/p mohs; wound vac). Negative for color change.   Neurological: Positive for weakness.   Psychiatric/Behavioral: Negative for agitation and confusion.     Objective:     Vital Signs (Most Recent):  Temp: 97 °F (36.1 °C) (05/27/20 1045)  Pulse: 76 (05/27/20 1045)  Resp: 16 (05/27/20 1045)  BP: (!) 118/58 (05/27/20 1045)  SpO2: 96 % (05/27/20 1045) Vital Signs (24h Range):  Temp:  [97 °F (36.1 °C)-98.7 °F (37.1 °C)] 97 °F (36.1 °C)  Pulse:  [54-78] 76  Resp:  [16-18] 16  SpO2:  [93 %-98 %] 96 %  BP: ()/(52-66) 118/58     Weight: 80 kg (176 lb 5.9 oz)  Body mass index is 26.05 kg/m².    Intake/Output Summary (Last 24 hours) at 5/27/2020 1141  Last data filed at 5/27/2020 0908  Gross per 24 hour   Intake 260 ml   Output 275 ml   Net -15 ml      Physical Exam   Constitutional: He is oriented to person, place, and time. He appears well-developed and well-nourished. No distress.   HENT:   Head: Normocephalic and atraumatic.   Wound vac to posterior scalp   Eyes: EOM are normal.   Neck: Normal range of  motion. Carotid bruit is not present.   Pulmonary/Chest: Effort normal. No respiratory distress.   Abdominal: There is no splenomegaly or hepatomegaly.   Musculoskeletal: Normal range of motion. He exhibits edema.   Neurological: He is alert and oriented to person, place, and time.   Skin: Skin is warm and dry. He is not diaphoretic.   Psychiatric: His behavior is normal. His mood appears anxious.       Significant Labs: All pertinent labs within the past 24 hours have been reviewed.    Significant Imaging: I have reviewed all pertinent imaging results/findings within the past 24 hours.      Assessment/Plan:      * Weakness  - CT cervical spine without contrast:  Degenerative changes above.  Few mm anterolisthesis C4 on C5 and C7 on T1.  Pleural fluid or thickening right greater than left.  - CT head without contrast:  No evidence of acute intracranial pathology.  - R hip x-ray:  There is mild DJD.  No fracture, dislocation or bone destruction seen.  - R knee x-ray:  No fracture dislocation bone destruction seen.  There is moderate DJD and a varus deformity.  - BLE US:  No evidence of deep venous thrombosis in either lower extremity.  - UA unremarkable.  - Will consult PT/OT, SW.  - suspect deconditioning   - ptot rec SNF, pt agreeable: difficulty finding placement for patient due to wound vac needs; [facility] sending out additional referrals    Postherpetic neuralgia  - Patient with no improvement in pain with lidocaine patch.  - Supportive care.  - Continue gabapentin 300mg TID.  - pt reports improvement with PRN oxy, but still difficult to control; scheduling Oxy and adding PRN breakthrough  - pain seems to be tolerable on [id number] q6H  - Pain Management referral at [place]      Cellulitis of right lower extremity  Chronic, has gotten 2 rounds of abx outpt  0/4 SIRS on admission, does not appear infected  Wound care following, appreciate recs      Iron deficiency anemia  BL ~7-8  9.2 on admission>>7.5  Repeat anemia panel;  "T/S and precaution but no active signs of bleeding: iron and b12 deficient      Myasthenia gravis without acute exacerbation  Long-term use of immunosuppressant medication     - Continue Imuran 200mg daily, Mestinon 60mg daily, prednisone 5mg daily.  - Will monitor respiratory mechanics.      Elevated brain natriuretic peptide (BNP) level  - .  - Will get chest x-ray: unremarkable  - Patient received Lasix 20mg IV in ER.  - Strict I&Os, daily weights.    Results for orders placed in visit on 12/09/19   Echo    Narrative · Normal left ventricular systolic function. The estimated ejection   fraction is 65%  · No wall motion abnormalities.  · Normal right ventricular systolic function.  · Severe biatrial enlargement.  · Aortic valve area is 1.62 cm2; peak velocity is 2.51 m/s; mean gradient   is 14 mmHg.  · Mild aortic valve stenosis.  · Mild aortic regurgitation.  · Mild mitral regurgitation.  · Mild to moderate tricuspid regurgitation.  · The estimated PA systolic pressure is 63 mm Hg  · Pulmonary hypertension present.  · Normal central venous pressure (3 mm Hg).  · Atrial fibrillation observed.              Delayed surgical wound healing  - Per wound care note,  "s/p debridement of open head wound involving the bone and application of wound vac on 12/12/19 with Dr. Espinoza. He originally had a repair of Mohs defect with ACell on 12/5/19 but the ACell failed."  - Will consult wound care, appreciate assistance      Essential thrombocytosis  - Patient follows with heme/onc.  - Continue hydroxyurea.      Chronic kidney disease, stage III (moderate)  - At baseline with creatinine of 1.2, GFR 53.3.  - Avoid nephrotoxins.      Paroxysmal atrial fibrillation  Long term current use of anticoagulant therapy    - Continue verapamil 120mg BID, Coumadin 5mg [id number], 2.5mg Sunday.  - Monitor PT/INR.  - Will consult PharmD, appreciate assistance  - [id number] VASc score of at least three for HTN and age.        VTE Risk " Mitigation (From admission, onward)         Ordered     IP VTE HIGH RISK PATIENT  Once      05/21/20 2344     Place sequential compression device  Until discontinued      05/21/20 2344     Place STELLA hose  Until discontinued      05/21/20 2344     Reason for No Pharmacological VTE Prophylaxis  Once     Question:  Reasons:  Answer:  Already adequately anticoagulated on oral Anticoagulants    05/21/20 2344                      Nandini Acevedo PA-C  Department of Hospital Medicine   [facility]

## 2020-05-27 NOTE — CONSULTS
Offered spiritual and emotional care to patient who expressed significant grief over loss of wife. Pt also expressed feelings of anxiety and despair over what he perceives as his living with uncertainty, failing health, and loss of anything pleasurable in his life. Asked pt about how he has lived w/ uncertainty  in past and what role his porsche had played. Pt was able to explore this briefly. Will continue to follow.

## 2020-05-27 NOTE — PROGRESS NOTES
PHARMACY CONSULT NOTE: WARFARIN  Ernie Phan is a 89 y.o. male on warfarin therapy for Atrial Fibrillation. PharmD has been consulted for warfarin dosing.    Current order:  warfarin 2.5 mg PO Tuesday, Saturday, 5 mg all other days  Home dose: warfarin 2.5 mg PO Tuesday, Saturday, 5 mg all other days  Coumadin clinic enrollment: Active  INR goal: 2-3    Lab Results   Component Value Date    INR 3.8 (H) 05/27/2020    INR 2.8 (H) 05/26/2020    INR 2.4 (H) 05/25/2020     Significant drug interactions: none  Diet: Regular    Recommendation(s):   1. Hold warfarin today  2. Resume when INR < 3  3. PT/INR daily    Thank you for the consult, will continue to follow  Darien Morse Pharm.D., BCPS  [id number]      **Note: Consults are reviewed Monday-Friday 7:00am-3:30pm. THE ABOVE RECOMMENDATIONS ARE ONLY SUGGESTED.THE RECOMMENDATIONS SHOULD BE CONSIDERED IN CONJUNCTION WITH ALL PATIENT FACTORS. **

## 2020-05-27 NOTE — PLAN OF CARE
SW requested that OLTAC review patient to see if he meets LTAC criteria. Patient has several SNF denials. Facilities reporting they cannot meet needs of the patient. SW will follow and assist with needs as indicated.    Juani Mireles LMSW  [facility]  [phone]

## 2020-05-27 NOTE — PLAN OF CARE
Pt has not had any falls or injuries during this shift, pt and staff had maintain fall precautions. POC was reviewed with him he stated understanding.

## 2020-05-27 NOTE — ASSESSMENT & PLAN NOTE
- Patient with no improvement in pain with lidocaine patch.  - Supportive care.  - Continue gabapentin 300mg TID.  - pt reports improvement with PRN oxy, but still difficult to control; scheduling Oxy and adding PRN breakthrough  - pain seems to be tolerable on [id number] q6H  - Pain Management referral at [place]

## 2020-05-28 LAB
ALBUMIN SERPL BCP-MCNC: 2.3 G/DL (ref 3.5–5.2)
ALP SERPL-CCNC: 117 U/L (ref 55–135)
ALT SERPL W/O P-5'-P-CCNC: 7 U/L (ref 10–44)
ANION GAP SERPL CALC-SCNC: 8 MMOL/L (ref 8–16)
AST SERPL-CCNC: 15 U/L (ref 10–40)
BASOPHILS # BLD AUTO: 0.03 K/UL (ref 0–0.2)
BASOPHILS NFR BLD: 0.3 % (ref 0–1.9)
BILIRUB SERPL-MCNC: 1.6 MG/DL (ref 0.1–1)
BUN SERPL-MCNC: 19 MG/DL (ref 8–23)
CALCIUM SERPL-MCNC: 8.7 MG/DL (ref 8.7–10.5)
CHLORIDE SERPL-SCNC: 96 MMOL/L (ref 95–110)
CO2 SERPL-SCNC: 29 MMOL/L (ref 23–29)
CREAT SERPL-MCNC: 1.1 MG/DL (ref 0.5–1.4)
DIFFERENTIAL METHOD: ABNORMAL
EOSINOPHIL # BLD AUTO: 0 K/UL (ref 0–0.5)
EOSINOPHIL NFR BLD: 0.3 % (ref 0–8)
ERYTHROCYTE [DISTWIDTH] IN BLOOD BY AUTOMATED COUNT: 16.1 % (ref 11.5–14.5)
EST. GFR  (AFRICAN AMERICAN): >60 ML/MIN/1.73 M^2
EST. GFR  (NON AFRICAN AMERICAN): 59.2 ML/MIN/1.73 M^2
GLUCOSE SERPL-MCNC: 100 MG/DL (ref 70–110)
HCT VFR BLD AUTO: 24.6 % (ref 40–54)
HGB BLD-MCNC: 7.6 G/DL (ref 14–18)
IMM GRANULOCYTES # BLD AUTO: 0.14 K/UL (ref 0–0.04)
IMM GRANULOCYTES NFR BLD AUTO: 1.3 % (ref 0–0.5)
INR PPP: 3.2 (ref 0.8–1.2)
LYMPHOCYTES # BLD AUTO: 0.8 K/UL (ref 1–4.8)
LYMPHOCYTES NFR BLD: 6.8 % (ref 18–48)
MCH RBC QN AUTO: 40.2 PG (ref 27–31)
MCHC RBC AUTO-ENTMCNC: 30.9 G/DL (ref 32–36)
MCV RBC AUTO: 130 FL (ref 82–98)
MONOCYTES # BLD AUTO: 1 K/UL (ref 0.3–1)
MONOCYTES NFR BLD: 9.2 % (ref 4–15)
NEUTROPHILS # BLD AUTO: 9.1 K/UL (ref 1.8–7.7)
NEUTROPHILS NFR BLD: 82.1 % (ref 38–73)
NRBC BLD-RTO: 0 /100 WBC
PLATELET # BLD AUTO: 680 K/UL (ref 150–350)
PMV BLD AUTO: 9.1 FL (ref 9.2–12.9)
POTASSIUM SERPL-SCNC: 3.9 MMOL/L (ref 3.5–5.1)
PROT SERPL-MCNC: 5.5 G/DL (ref 6–8.4)
PROTHROMBIN TIME: 30.4 SEC (ref 9–12.5)
RBC # BLD AUTO: 1.89 M/UL (ref 4.6–6.2)
SODIUM SERPL-SCNC: 133 MMOL/L (ref 136–145)
WBC # BLD AUTO: 11.03 K/UL (ref 3.9–12.7)

## 2020-05-28 PROCEDURE — 63600175 PHARM REV CODE 636 W HCPCS: Mod: HCNC | Performed by: PHYSICIAN ASSISTANT

## 2020-05-28 PROCEDURE — G0378 HOSPITAL OBSERVATION PER HR: HCPCS | Mod: HCNC

## 2020-05-28 PROCEDURE — 94761 N-INVAS EAR/PLS OXIMETRY MLT: CPT | Mod: HCNC

## 2020-05-28 PROCEDURE — 85025 COMPLETE CBC W/AUTO DIFF WBC: CPT | Mod: HCNC

## 2020-05-28 PROCEDURE — 25000003 PHARM REV CODE 250: Mod: HCNC | Performed by: PHYSICIAN ASSISTANT

## 2020-05-28 PROCEDURE — 96376 TX/PRO/DX INJ SAME DRUG ADON: CPT | Performed by: EMERGENCY MEDICINE

## 2020-05-28 PROCEDURE — 99226 PR SUBSEQUENT OBSERVATION CARE,LEVEL III: CPT | Mod: HCNC,,, | Performed by: PHYSICIAN ASSISTANT

## 2020-05-28 PROCEDURE — 36415 COLL VENOUS BLD VENIPUNCTURE: CPT | Mod: HCNC

## 2020-05-28 PROCEDURE — 94010 BREATHING CAPACITY TEST: CPT | Mod: HCNC

## 2020-05-28 PROCEDURE — 99226 PR SUBSEQUENT OBSERVATION CARE,LEVEL III: ICD-10-PCS | Mod: HCNC,,, | Performed by: PHYSICIAN ASSISTANT

## 2020-05-28 PROCEDURE — 80053 COMPREHEN METABOLIC PANEL: CPT | Mod: HCNC

## 2020-05-28 PROCEDURE — 97535 SELF CARE MNGMENT TRAINING: CPT | Mod: HCNC

## 2020-05-28 PROCEDURE — 94150 VITAL CAPACITY TEST: CPT | Mod: HCNC,59

## 2020-05-28 PROCEDURE — 85610 PROTHROMBIN TIME: CPT | Mod: HCNC

## 2020-05-28 PROCEDURE — 97530 THERAPEUTIC ACTIVITIES: CPT | Mod: HCNC

## 2020-05-28 PROCEDURE — 97112 NEUROMUSCULAR REEDUCATION: CPT | Mod: HCNC

## 2020-05-28 RX ORDER — ERGOCALCIFEROL 1.25 MG/1
50000 CAPSULE ORAL ONCE
Status: COMPLETED | OUTPATIENT
Start: 2020-05-28 | End: 2020-05-28

## 2020-05-28 RX ORDER — FUROSEMIDE 10 MG/ML
20 INJECTION INTRAMUSCULAR; INTRAVENOUS ONCE
Status: COMPLETED | OUTPATIENT
Start: 2020-05-28 | End: 2020-05-28

## 2020-05-28 RX ORDER — MIRTAZAPINE 7.5 MG/1
7.5 TABLET, FILM COATED ORAL NIGHTLY
Status: DISCONTINUED | OUTPATIENT
Start: 2020-05-28 | End: 2020-05-29 | Stop reason: HOSPADM

## 2020-05-28 RX ADMIN — PYRIDOSTIGMINE BROMIDE 60 MG: 60 TABLET ORAL at 05:05

## 2020-05-28 RX ADMIN — FUROSEMIDE 20 MG: 20 TABLET ORAL at 10:05

## 2020-05-28 RX ADMIN — ERGOCALCIFEROL 50000 UNITS: 1.25 CAPSULE ORAL at 03:05

## 2020-05-28 RX ADMIN — OXYCODONE HYDROCHLORIDE AND ACETAMINOPHEN 1 TABLET: 5; 325 TABLET ORAL at 01:05

## 2020-05-28 RX ADMIN — Medication 6 MG: at 11:05

## 2020-05-28 RX ADMIN — OXYCODONE HYDROCHLORIDE AND ACETAMINOPHEN 1 TABLET: 5; 325 TABLET ORAL at 08:05

## 2020-05-28 RX ADMIN — LIDOCAINE 1 PATCH: 50 PATCH CUTANEOUS at 08:05

## 2020-05-28 RX ADMIN — OXYCODONE HYDROCHLORIDE 10 MG: 10 TABLET ORAL at 05:05

## 2020-05-28 RX ADMIN — VERAPAMIL HYDROCHLORIDE 120 MG: 120 TABLET, FILM COATED ORAL at 10:05

## 2020-05-28 RX ADMIN — GABAPENTIN 300 MG: 300 CAPSULE ORAL at 10:05

## 2020-05-28 RX ADMIN — PREDNISONE 5 MG: 5 TABLET ORAL at 10:05

## 2020-05-28 RX ADMIN — DOCUSATE SODIUM - SENNOSIDES 1 TABLET: 50; 8.6 TABLET, FILM COATED ORAL at 08:05

## 2020-05-28 RX ADMIN — MIRTAZAPINE 7.5 MG: 7.5 TABLET ORAL at 08:05

## 2020-05-28 RX ADMIN — POLYETHYLENE GLYCOL 3350 17 G: 17 POWDER, FOR SOLUTION ORAL at 10:05

## 2020-05-28 RX ADMIN — DOCUSATE SODIUM - SENNOSIDES 1 TABLET: 50; 8.6 TABLET, FILM COATED ORAL at 10:05

## 2020-05-28 RX ADMIN — GABAPENTIN 300 MG: 300 CAPSULE ORAL at 03:05

## 2020-05-28 RX ADMIN — PYRIDOSTIGMINE BROMIDE 60 MG: 60 TABLET ORAL at 09:05

## 2020-05-28 RX ADMIN — Medication 1000 UNITS: at 10:05

## 2020-05-28 RX ADMIN — FUROSEMIDE 20 MG: 10 INJECTION, SOLUTION INTRAMUSCULAR; INTRAVENOUS at 06:05

## 2020-05-28 RX ADMIN — PYRIDOSTIGMINE BROMIDE 60 MG: 60 TABLET ORAL at 03:05

## 2020-05-28 RX ADMIN — CYANOCOBALAMIN TAB 1000 MCG 1000 MCG: 1000 TAB at 10:05

## 2020-05-28 RX ADMIN — VERAPAMIL HYDROCHLORIDE 120 MG: 120 TABLET, FILM COATED ORAL at 08:05

## 2020-05-28 RX ADMIN — TAMSULOSIN HYDROCHLORIDE 0.4 MG: 0.4 CAPSULE ORAL at 10:05

## 2020-05-28 RX ADMIN — OXYCODONE HYDROCHLORIDE 10 MG: 10 TABLET ORAL at 03:05

## 2020-05-28 RX ADMIN — HYDROXYUREA 500 MG: 500 CAPSULE ORAL at 10:05

## 2020-05-28 RX ADMIN — OXYCODONE HYDROCHLORIDE 10 MG: 10 TABLET ORAL at 11:05

## 2020-05-28 RX ADMIN — AZATHIOPRINE 200 MG: 50 TABLET ORAL at 10:05

## 2020-05-28 RX ADMIN — Medication 300 MG: at 10:05

## 2020-05-28 RX ADMIN — GABAPENTIN 300 MG: 300 CAPSULE ORAL at 08:05

## 2020-05-28 RX ADMIN — OXYCODONE HYDROCHLORIDE AND ACETAMINOPHEN 1 TABLET: 5; 325 TABLET ORAL at 10:05

## 2020-05-28 RX ADMIN — FINASTERIDE 5 MG: 5 TABLET, FILM COATED ORAL at 10:05

## 2020-05-28 NOTE — PLAN OF CARE
Problem: Physical Therapy Goal  Goal: Physical Therapy Goal  Description  Goals to be met by: 20     Patient will increase functional independence with mobility by performin. Supine to sit with Stand-by Assistance - Not met  2. Sit to supine with Stand-by Assistance - Not met  3. Sit to stand transfer with Minimal Assistance - Not met  4. Gait x 50 feet with Minimal Assistance using Rolling Walker - Not met   5. Pt will perform BLE therex x 20 reps with assistance as needed  6. Pt will tolerate standing x 3 minutes using rolling walker with contact guard assistance    Outcome: Ongoing, Progressing   Patient tolerated treatment well. Established POC and goals reviewed and remain appropriate. Plan is to continue to improve patient's functional mobility capabilities.      Mariola Adams, PT, DPT  2020

## 2020-05-28 NOTE — SUBJECTIVE & OBJECTIVE
Interval History: Patient resting in bed, pleasant. Accepted to [facility], they have submitted auth for acceptance for tomorrow. RUE slightly edematous, continue to monitor.     Review of Systems   Constitutional: Positive for activity change. Negative for appetite change.   Respiratory: Negative for shortness of breath.    Cardiovascular: Negative for chest pain.   Gastrointestinal: Negative for abdominal pain and vomiting.   Genitourinary: Negative for dysuria.   Musculoskeletal: Positive for arthralgias and gait problem. Negative for back pain.        Neuralgias   Skin: Positive for wound (head s/p mohs; wound vac). Negative for color change.   Neurological: Positive for weakness.   Psychiatric/Behavioral: Negative for agitation and confusion.     Objective:     Vital Signs (Most Recent):  Temp: 97.7 °F (36.5 °C) (05/28/20 1407)  Pulse: 67 (05/28/20 1500)  Resp: 17 (05/28/20 1407)  BP: 137/60 (05/28/20 1500)  SpO2: 97 % (05/28/20 1407) Vital Signs (24h Range):  Temp:  [96 °F (35.6 °C)-98.7 °F (37.1 °C)] 97.7 °F (36.5 °C)  Pulse:  [61-87] 67  Resp:  [12-20] 17  SpO2:  [91 %-97 %] 97 %  BP: ()/(52-64) 137/60     Weight: 84.1 kg (185 lb 6.5 oz)  Body mass index is 27.38 kg/m².    Intake/Output Summary (Last 24 hours) at 5/28/2020 1513  Last data filed at 5/28/2020 0544  Gross per 24 hour   Intake 480 ml   Output 500 ml   Net -20 ml      Physical Exam   Constitutional: He is oriented to person, place, and time. He appears well-developed and well-nourished. No distress.   HENT:   Head: Normocephalic and atraumatic.   Wound vac to posterior scalp   Eyes: EOM are normal.   Neck: Normal range of motion. Carotid bruit is not present.   Pulmonary/Chest: Effort normal. No respiratory distress.   Abdominal: There is no splenomegaly or hepatomegaly.   Musculoskeletal: Normal range of motion. He exhibits edema.   Neurological: He is alert and oriented to person, place, and time.   Skin: Skin is warm and dry. He is not  diaphoretic.   Psychiatric: His behavior is normal. His mood appears anxious.   Nursing note and vitals reviewed.      Significant Labs: All pertinent labs within the past 24 hours have been reviewed.    Significant Imaging: I have reviewed all pertinent imaging results/findings within the past 24 hours.

## 2020-05-28 NOTE — PLAN OF CARE
PLAN OF CARE REVIEWED WITH PT.  PT AA+OX4.  ABLE TO MAKE NEEDS KNOWN.  DOES NOT APPEAR TO BE IN ANY DISTRESS.  C/O PAIN.  PAIN MEDICATION GIVEN AS ORDERED.  REQUIRES TOTAL ASSIST WITH ADLS.  INCONT. OF B/B.  TRISTON CARE PROVIDED.  PT REMAINS FREE FROM FALLS, INJURY, AND TRAUMA.  FALL PRECAUTIONS IN PLACE.  BED IN LOWEST POSITION WITH WHEELS LOCKED.  CALL LIGHT WITHIN REACH.  WILL CONTINUE TO MONITOR.

## 2020-05-28 NOTE — PROGRESS NOTES
Physician Skin Integrity Evaluation    NAME: Ernie Phan  : 3/19/1931  MRN: [id number]        Subjective    Physician Skin Integrity Point Lay Evaluation of patient as part of the comprehensive skin care team during hospital admission.       Ernie Phan 89 y.o. male is being evaluated as per the Proactive skin integrity report.      Patient was admitted to [facility] Medicine E team.   LENGTH OF STAY:  0 days  DATE OF ADMISSION: 2020 .    Principal Problem: Weakness      Patient was seen and evaluated at bedside along with Shahla Luis PA-C. Patients chart was reviewed. Nursing notes were reviewed.     Vitals:    20 1107   BP:    Pulse: 87   Resp:    Temp: 96 °F (35.6 °C)     NAD  Skin - no evidence of pressure injury, small skin tear of L buttock, wound vac intact to scalp    Assessment/Plan:  89 y.o. male admitted to hospital secondary to Weakness  - No evidence of pressure injury  - Recommend frequent pressure relieving maneuvers and weight shift encouraged while in bed, patient also encouraged to remain out of bed as much as possible.  - Will continue to evaluate weekly or as needed

## 2020-05-28 NOTE — PROGRESS NOTES
"[facility] Hospital Medicine  Progress Note    Patient Name: Ernie Phan  MRN: [id number]  Patient Class: OP- Observation   Admission Date: 5/21/2020  Length of Stay: 0 days  Attending Physician: Medardo Mcleod MD  Primary Care Provider: Ernie Michel MD    [facility] Medicine Team: [facility] MED E Byron Garza PA-C    Subjective:     Principal Problem:Weakness        HPI:  Patient is an 89 year old gentleman with a h/o HTN, HLD, myasthenia gravis, post-herpetic neuralgia, and A Fib on Coumadin.  He presents with weakness.  Patient states that his RLE is particularly weak and has been "giving out" on him.  He has had two falls where he was lowered to the floor with no head trauma or LOC.  He is having difficultly standing or ambulating with his walker.  Patient also notes pain to his right inner thigh secondary to h/o post-herpetic neuralgia.  He denies generalized weakness, SOB.  He has no history of acute myasthenia gravis crisis.  He lives alone, but has family that checks on him frequently and a sitter three days a week for four hours.  He denies chest pain, SOB, dizziness, palpitations, fever/chills, N/V/D, abdominal pain.    Overview/Hospital Course:  Patient admitted for generalized weakness. His RLE has been "giving out" secondary to pain from post-herpetic neuralgia. CTH without acute abnormality. CT C-spine "Degenerative changes above.  Few mm anterolisthesis C4 on C5 and C7 on T1." R hip and knee xray without fracture, degenerative disease present. Suspect weakness secondary to deconditioning caused by post-herpetic neuralgia. PTOT evaluated, recommending SNF. Pt is agreeable, however placement has been difficult as facilities do not think they can meed his complex needs (wound vac). Cm/SW continuing to follow and send out additional refferals    Interval History: Patient resting in bed, pleasant. Accepted to [facility], they have submitted auth for acceptance for tomorrow. " RUE slightly edematous, continue to monitor.     Review of Systems   Constitutional: Positive for activity change. Negative for appetite change.   Respiratory: Negative for shortness of breath.    Cardiovascular: Negative for chest pain.   Gastrointestinal: Negative for abdominal pain and vomiting.   Genitourinary: Negative for dysuria.   Musculoskeletal: Positive for arthralgias and gait problem. Negative for back pain.        Neuralgias   Skin: Positive for wound (head s/p mohs; wound vac). Negative for color change.   Neurological: Positive for weakness.   Psychiatric/Behavioral: Negative for agitation and confusion.     Objective:     Vital Signs (Most Recent):  Temp: 97.7 °F (36.5 °C) (05/28/20 1407)  Pulse: 67 (05/28/20 1500)  Resp: 17 (05/28/20 1407)  BP: 137/60 (05/28/20 1500)  SpO2: 97 % (05/28/20 1407) Vital Signs (24h Range):  Temp:  [96 °F (35.6 °C)-98.7 °F (37.1 °C)] 97.7 °F (36.5 °C)  Pulse:  [61-87] 67  Resp:  [12-20] 17  SpO2:  [91 %-97 %] 97 %  BP: ()/(52-64) 137/60     Weight: 84.1 kg (185 lb 6.5 oz)  Body mass index is 27.38 kg/m².    Intake/Output Summary (Last 24 hours) at 5/28/2020 1513  Last data filed at 5/28/2020 0544  Gross per 24 hour   Intake 480 ml   Output 500 ml   Net -20 ml      Physical Exam   Constitutional: He is oriented to person, place, and time. He appears well-developed and well-nourished. No distress.   HENT:   Head: Normocephalic and atraumatic.   Wound vac to posterior scalp   Eyes: EOM are normal.   Neck: Normal range of motion. Carotid bruit is not present.   Pulmonary/Chest: Effort normal. No respiratory distress.   Abdominal: There is no splenomegaly or hepatomegaly.   Musculoskeletal: Normal range of motion. He exhibits edema.   Neurological: He is alert and oriented to person, place, and time.   Skin: Skin is warm and dry. He is not diaphoretic.   Psychiatric: His behavior is normal. His mood appears anxious.   Nursing note and vitals reviewed.      Significant  Labs: All pertinent labs within the past 24 hours have been reviewed.    Significant Imaging: I have reviewed all pertinent imaging results/findings within the past 24 hours.      Assessment/Plan:      * Weakness  - CT cervical spine without contrast:  Degenerative changes above.  Few mm anterolisthesis C4 on C5 and C7 on T1.  Pleural fluid or thickening right greater than left.  - CT head without contrast:  No evidence of acute intracranial pathology.  - R hip x-ray:  There is mild DJD.  No fracture, dislocation or bone destruction seen.  - R knee x-ray:  No fracture dislocation bone destruction seen.  There is moderate DJD and a varus deformity.  - BLE US:  No evidence of deep venous thrombosis in either lower extremity.  - UA unremarkable.  - Will consult PT/OT, SW.  - suspect deconditioning   - ptot rec SNF, pt agreeable: difficulty finding placement for patient due to wound vac needs; CMSW sending out additional referrals    Elevated brain natriuretic peptide (BNP) level  - .  - Will get chest x-ray: unremarkable  - Patient received Lasix 20mg IV in ER.  - Strict I&Os, daily weights.    Results for orders placed in visit on 12/09/19   Echo    Narrative · Normal left ventricular systolic function. The estimated ejection   fraction is 65%  · No wall motion abnormalities.  · Normal right ventricular systolic function.  · Severe biatrial enlargement.  · Aortic valve area is 1.62 cm2; peak velocity is 2.51 m/s; mean gradient   is 14 mmHg.  · Mild aortic valve stenosis.  · Mild aortic regurgitation.  · Mild mitral regurgitation.  · Mild to moderate tricuspid regurgitation.  · The estimated PA systolic pressure is 63 mm Hg  · Pulmonary hypertension present.  · Normal central venous pressure (3 mm Hg).  · Atrial fibrillation observed.              Cellulitis of right lower extremity  Chronic, has gotten 2 rounds of abx outpt  0/4 SIRS on admission, does not appear infected  Wound care following, appreciate  "recs      Delayed surgical wound healing  - Per wound care note,  "s/p debridement of open head wound involving the bone and application of wound vac on 12/12/19 with Dr. Espinoza. He originally had a repair of Mohs defect with ACell on 12/5/19 but the ACell failed."  - Will consult wound care, appreciate assistance      Iron deficiency anemia  BL ~7-8  9.2 on admission>>7.5  Repeat anemia panel; T/S and precaution but no active signs of bleeding: iron and b12 deficient      Essential thrombocytosis  - Patient follows with heme/onc.  - Continue hydroxyurea.      Postherpetic neuralgia  - Patient with no improvement in pain with lidocaine patch.  - Supportive care.  - Continue gabapentin 300mg TID.  - pt reports improvement with PRN oxy, but still difficult to control; scheduling Oxy and adding PRN breakthrough  - pain seems to be tolerable on [id number] q6H  - Pain Management referral at [facility]      Chronic kidney disease, stage III (moderate)  - At baseline with creatinine of 1.2, GFR 53.3.  - Avoid nephrotoxins.      Myasthenia gravis without acute exacerbation  Long-term use of immunosuppressant medication     - Continue Imuran 200mg daily, Mestinon 60mg daily, prednisone 5mg daily.  - Will monitor respiratory mechanics.      Paroxysmal atrial fibrillation  Long term current use of anticoagulant therapy    - Continue verapamil 120mg BID, Coumadin 5mg MTuWThSa, 2.5mg Sunday.  - Monitor PT/INR.  - Will consult PharmD, appreciate assistance  - [id number] VASc score of at least three for HTN and age.        VTE Risk Mitigation (From admission, onward)         Ordered     IP VTE HIGH RISK PATIENT  Once      05/21/20 2344     Place sequential compression device  Until discontinued      05/21/20 2344     Place STELLA hose  Until discontinued      05/21/20 2344     Reason for No Pharmacological VTE Prophylaxis  Once     Question:  Reasons:  Answer:  Already adequately anticoagulated on oral Anticoagulants    05/21/20 2344          "             Byron Garza PA-C  Department of Hospital Medicine   [facility]

## 2020-05-28 NOTE — PROGRESS NOTES
PHARMACY CONSULT NOTE: WARFARIN  Ernie Phan is a 89 y.o. male on warfarin therapy for Atrial Fibrillation. PharmD has been consulted for warfarin dosing.    Current order:  holding  Home dose: warfarin 2.5 mg PO Tuesday, Saturday, 5 mg all other days  Coumadin clinic enrollment: Active  INR goal: 2-3    Lab Results   Component Value Date    INR 3.2 (H) 05/28/2020    INR 3.8 (H) 05/27/2020    INR 2.8 (H) 05/26/2020     Significant drug interactions: none  Diet: Regular    Recommendation(s):   1. Hold warfarin today  2. Resume when INR < 3 (likely tomorrow)  3. PT/INR daily    Thank you for the consult, will continue to follow  Darien Morse Pharm.D., BCPS  [phone]      **Note: Consults are reviewed Monday-Friday 7:00am-3:30pm. THE ABOVE RECOMMENDATIONS ARE ONLY SUGGESTED.THE RECOMMENDATIONS SHOULD BE CONSIDERED IN CONJUNCTION WITH ALL PATIENT FACTORS. **

## 2020-05-28 NOTE — PLAN OF CARE
Problem: Occupational Therapy Goal  Goal: Occupational Therapy Goal  Description  Goals to be met by: 6/1/2020     Patient will increase functional independence with ADLs by performing:    UE Dressing with Set-up Assistance.  LE Dressing with Moderate Assistance.  Grooming while seated with Set-up Assistance.  Toileting from bedside commode with Minimal Assistance for hygiene and clothing management.   Sitting at edge of bed x15 minutes with Supervision.  Rolling to Bilateral with Supervision.   Supine to sit with Supervision.  Stand pivot transfers with Moderate Assistance.  Step transfer with Moderate Assistance  Toilet transfer to bedside commode with Moderate Assistance.   Pt making excellent progress towards bed mobility goals today. Continue OT as tolerated.  Amanda Beal OT  5/28/2020    Outcome: Ongoing, Progressing

## 2020-05-28 NOTE — PLAN OF CARE
SHILOH spoke with Opal at [facility], she states he has submitted auth for acceptance for tomorrow. She will let CM know if auth comes back sooner, She also notified CM that paperwork will be signed with Daughter in Law Rosibel at 330pm today as that is the earliest she could come to the facility. SHILOH will continue to follow.     Chacho Benitez RN Case Manager   #[phone]

## 2020-05-28 NOTE — PLAN OF CARE
05/28/20 1207   Post-Acute Status   Post-Acute Authorization Placement   Post-Acute Placement Status Pending Payor Review     CM spoke with Opal with [facility], pt is clinically accepted, Admissions stated they will submit insurance auth and contact family for paperwork, SHILOH updated Rosibel.

## 2020-05-28 NOTE — NURSING
PT C/O BACK PAIN AND FINGER PAIN.  PT STATES HE CAN NOT MOVE HIS FINGERS.  PT C/O BEING IN PAIN UNABLE TO SLEEP.  SCHEDULED OXYCODONE AND PRN MELATONIN GIVEN.  PT STATES OXYCODONE DOES NOT WORK.  ON CALL PA (AW) NOTIFIED BY THIS NURSE (RM).  PER ON CALL PA (AW) GIVE PRN PERCOCET IN 30 MINUTES IF AND OXYCODONE DOES NOT WORK.  WILL CONTINUE TO MONITOR.

## 2020-05-28 NOTE — PT/OT/SLP PROGRESS
Occupational Therapy   Treatment    Name: Ernie Phan  MRN: [id number]  Admitting Diagnosis:  Weakness       Recommendations:     Discharge Recommendations: nursing facility, skilled  Discharge Equipment Recommendations:  other (see comments)(TBD)  Barriers to discharge:  Inaccessible home environment, Decreased caregiver support    Assessment:     Ernie Phan is a 89 y.o. male with a medical diagnosis of Weakness.  He presents with impaired ADL and mobility performance deficits. Pt very eager to participate in therapy today however remains anxious and requires encouragement related to current global weakness and decreased ability to mobilize. Pt required min-max (A) for bed mobility and sat EOB with SBA for both static and dynamic exercises. Pt completed toilet and EOB ADLs in addition to standing trials. Pt required max-total (A)x2 for standing trials today however improvements in postural control once in standing. Pt remains an excellent candidate for continued therapy as he is not safe to return home. Performance deficits affecting function are weakness, impaired endurance, impaired self care skills, gait instability, impaired functional mobilty, impaired balance, decreased lower extremity function. Pt would benefit from continued OT skilled services 4x/wk to improve daily living skills to optimize QOL. Pt is recommended to discharge to SNF at this time.      Rehab Prognosis:  Good; patient would benefit from acute skilled OT services to address these deficits and reach maximum level of function.       Plan:     Patient to be seen 4 x/week to address the above listed problems via self-care/home management, therapeutic activities, therapeutic exercises  · Plan of Care Expires: 06/22/20  · Plan of Care Reviewed with: patient    Subjective     Pain/Comfort:  · Location - Side 1: Bilateral  · Location - Orientation 1: upper  · Location 1: leg  · Pain Addressed 1: Pre-medicate for activity, Nurse notified,  Reposition, Distraction  · Pain Addressed 2: Reposition, Distraction, Cessation of Activity    Objective:     Communicated with: RN prior to session.  Patient found HOB elevated with telemetry, wound vac upon OT entry to room.    General Precautions: Standard, fall   Orthopedic Precautions:N/A   Braces: N/A     Occupational Performance:     Bed Mobility:    · Patient completed Rolling/Turning to Left with  minimum assistance  · Patient completed Rolling/Turning to Right with minimum assistance  · Patient completed Scooting/Bridging with minimum assistance  · Patient completed Supine to Sit with maximal assistance  · Patient completed Sit to Supine with maximal assistance     Functional Mobility/Transfers:  · Patient completed Sit <> Stand Transfer with total assistance  with  2 people and HHA   · Functional Mobility: Pt completed first trial of sit<stand with total (A)x2 and max (A)x2 with second and third trial.    Activities of Daily Living:  · Feeding:  setup (A) for drinking water at EOB  .  · Grooming: setup (A) washing face at EOB  .  · Lower Body Dressing: total assistance donning  socks on BLE   · Toileting: setup (A) of urinal for toileting.      [facility] 6 Click ADL: 11    Treatment & Education:  Pt educated on role of occupational therapy, POC, and safety during ADLs and functional mobility. Pt and OT discussed importance of safe, continued mobility to optimize daily living skills. Pt verbalized understanding.   Pt completed the following during session: bed mobility, feeding, grooming, LB dressing, toileting, sit<Stand t/f trials, safe setup in bed for morning. Pt also completed 1x5 with dynamic reaching and postural control exercises with weight shifting and upright correction. White board updated during session. Pt given instruction to call for medical staff/nurse for assistance.       Patient left HOB elevated with all lines intact and call button in reachEducation:    RN notified    GOALS:    Multidisciplinary Problems     Occupational Therapy Goals        Problem: Occupational Therapy Goal    Goal Priority Disciplines Outcome Interventions   Occupational Therapy Goal     OT, PT/OT Ongoing, Progressing    Description:  Goals to be met by: 6/1/2020     Patient will increase functional independence with ADLs by performing:    UE Dressing with Set-up Assistance.  LE Dressing with Moderate Assistance.  Grooming while seated with Set-up Assistance.  Toileting from bedside commode with Minimal Assistance for hygiene and clothing management.   Sitting at edge of bed x15 minutes with Supervision.  Rolling to Bilateral with Supervision.   Supine to sit with Supervision.  Stand pivot transfers with Moderate Assistance.  Step transfer with Moderate Assistance  Toilet transfer to bedside commode with Moderate Assistance.                      Time Tracking:     OT Date of Treatment: 05/28/20  OT Start Time: 0836  OT Stop Time: 0914  OT Total Time (min): 38 min    Billable Minutes:Self Care/Home Management 24 min  Neuromuscular Re-education 14 min    Amanda Beal OT  5/28/2020

## 2020-05-28 NOTE — PT/OT/SLP PROGRESS
"Physical Therapy Treatment    Patient Name:  Ernie Phan   MRN:  [id number]    Recommendations:     Discharge Recommendations:  nursing facility, skilled   Discharge Equipment Recommendations: other (see comments)(TBD)   Barriers to discharge: Inaccessible home and Decreased caregiver support    Assessment:     Ernie Phan is a 89 y.o. male admitted with a medical diagnosis of Weakness.  He presents with the following impairments/functional limitations:  weakness, impaired functional mobilty, impaired endurance, gait instability, decreased upper extremity function, decreased lower extremity function, impaired self care skills, impaired balance, impaired skin. Patient tolerated session well. He required motivation and positive encouragement. Extended time provide for therapeutic listening regarding patient's concerns about medical prognosis and the future of his functional mobility independence. Patient would continue to benefit from skilled PT services while in the hospital. At this time, upon d/c he is an appropriate candidate for SNF.    Rehab Prognosis: Good; patient would benefit from acute skilled PT services to address these deficits and reach maximum level of function.    Recent Surgery: * No surgery found *      Plan:     During this hospitalization, patient to be seen 5 x/week to address the identified rehab impairments via gait training, therapeutic activities, therapeutic exercises, neuromuscular re-education and progress toward the following goals:    · Plan of Care Expires:  06/21/20    Subjective     Chief Complaint: BLE leg pain   Patient/Family Comments/goals: "I just don't think I'm doing very well. That standing is going to not go so good."  Pain/Comfort:  · Pain Rating 1: (did not rate)  · Location 1: (BLE (R>L))      Objective:     Communicated with RN prior to session.  Patient found supine with telemetry, wound vac upon PT entry to room.     General Precautions: Standard, fall   Orthopedic " Precautions:N/A   Braces: N/A     Functional Mobility:  · Bed Mobility:     · Rolling Left:  minimum assistance  · Rolling Right: minimum assistance  · Scooting: maximal assistance and of 2 persons  · Supine to Sit: maximal assistance and of 2 persons  · Sit to Supine: maximal assistance and of 2 persons  · Transfers:     · Sit to Stand:  maximal assistance and of 2 persons with no AD  · 3 trials   · 1st - totalA; minimal hip clearance from bed  · 2nd and 3rd trial: maxAx2 - able to achieve full stand with significant FFP and blocking of R knee 2* buckling  · Balance: sitting EOB - CGA-SBA    While sitting EOB - x12 AAROM hip abd/add, hip flexion, LAQ     AM-PAC 6 CLICK MOBILITY  Turning over in bed (including adjusting bedclothes, sheets and blankets)?: 3  Sitting down on and standing up from a chair with arms (e.g., wheelchair, bedside commode, etc.): 2  Moving from lying on back to sitting on the side of the bed?: 2  Moving to and from a bed to a chair (including a wheelchair)?: 2  Need to walk in hospital room?: 1  Climbing 3-5 steps with a railing?: 1  Basic Mobility Total Score: 11       Therapeutic Activities and Exercises:  -Patient educated on the continued role and goal of PT  -Questions and concerns answered within the the PT scope of practice.   -White board updated in patient room to reflect level of assistance needed with nursing.     Patient left HOB elevated with all lines intact, call button in reach, bed alarm on and RN notified..    GOALS:   Multidisciplinary Problems     Physical Therapy Goals        Problem: Physical Therapy Goal    Goal Priority Disciplines Outcome Goal Variances Interventions   Physical Therapy Goal     PT, PT/OT Ongoing, Progressing     Description:  Goals to be met by: 20     Patient will increase functional independence with mobility by performin. Supine to sit with Stand-by Assistance - Not met  2. Sit to supine with Stand-by Assistance - Not met  3. Sit to  stand transfer with Minimal Assistance - Not met  4. Gait x 50 feet with Minimal Assistance using Rolling Walker - Not met   5. Pt will perform BLE therex x 20 reps with assistance as needed  6. Pt will tolerate standing x 3 minutes using rolling walker with contact guard assistance                     Time Tracking:     PT Received On: 05/28/20  PT Start Time: 0836     PT Stop Time: 0914  PT Total Time (min): 38 min     Billable Minutes: Therapeutic Activity 23 and Neuromuscular Re-education 15    Treatment Type: Treatment  PT/PTA: PT     PTA Visit Number: 0     Mariola Otero, PT  05/28/2020

## 2020-05-29 ENCOUNTER — PATIENT MESSAGE (OUTPATIENT)
Dept: WOUND CARE | Facility: CLINIC | Age: 85
End: 2020-05-29

## 2020-05-29 VITALS
BODY MASS INDEX: 27.56 KG/M2 | SYSTOLIC BLOOD PRESSURE: 129 MMHG | HEART RATE: 85 BPM | TEMPERATURE: 98 F | DIASTOLIC BLOOD PRESSURE: 61 MMHG | HEIGHT: 69 IN | RESPIRATION RATE: 18 BRPM | OXYGEN SATURATION: 92 % | WEIGHT: 186.06 LBS

## 2020-05-29 LAB
ALBUMIN SERPL BCP-MCNC: 2.2 G/DL (ref 3.5–5.2)
ALP SERPL-CCNC: 124 U/L (ref 55–135)
ALT SERPL W/O P-5'-P-CCNC: 7 U/L (ref 10–44)
ANION GAP SERPL CALC-SCNC: 6 MMOL/L (ref 8–16)
AST SERPL-CCNC: 12 U/L (ref 10–40)
BASOPHILS # BLD AUTO: 0.02 K/UL (ref 0–0.2)
BASOPHILS NFR BLD: 0.2 % (ref 0–1.9)
BILIRUB SERPL-MCNC: 1.7 MG/DL (ref 0.1–1)
BUN SERPL-MCNC: 20 MG/DL (ref 8–23)
CALCIUM SERPL-MCNC: 8.3 MG/DL (ref 8.7–10.5)
CHLORIDE SERPL-SCNC: 95 MMOL/L (ref 95–110)
CO2 SERPL-SCNC: 32 MMOL/L (ref 23–29)
CREAT SERPL-MCNC: 1.1 MG/DL (ref 0.5–1.4)
DIFFERENTIAL METHOD: ABNORMAL
EOSINOPHIL # BLD AUTO: 0 K/UL (ref 0–0.5)
EOSINOPHIL NFR BLD: 0.4 % (ref 0–8)
ERYTHROCYTE [DISTWIDTH] IN BLOOD BY AUTOMATED COUNT: 15.9 % (ref 11.5–14.5)
EST. GFR  (AFRICAN AMERICAN): >60 ML/MIN/1.73 M^2
EST. GFR  (NON AFRICAN AMERICAN): 59.2 ML/MIN/1.73 M^2
GLUCOSE SERPL-MCNC: 92 MG/DL (ref 70–110)
HCT VFR BLD AUTO: 23.8 % (ref 40–54)
HGB BLD-MCNC: 7.2 G/DL (ref 14–18)
IMM GRANULOCYTES # BLD AUTO: 0.12 K/UL (ref 0–0.04)
IMM GRANULOCYTES NFR BLD AUTO: 1.1 % (ref 0–0.5)
INR PPP: 2 (ref 0.8–1.2)
LYMPHOCYTES # BLD AUTO: 0.7 K/UL (ref 1–4.8)
LYMPHOCYTES NFR BLD: 6.7 % (ref 18–48)
MAGNESIUM SERPL-MCNC: 1.9 MG/DL (ref 1.6–2.6)
MCH RBC QN AUTO: 39.6 PG (ref 27–31)
MCHC RBC AUTO-ENTMCNC: 30.3 G/DL (ref 32–36)
MCV RBC AUTO: 131 FL (ref 82–98)
MONOCYTES # BLD AUTO: 0.9 K/UL (ref 0.3–1)
MONOCYTES NFR BLD: 8.2 % (ref 4–15)
NEUTROPHILS # BLD AUTO: 8.9 K/UL (ref 1.8–7.7)
NEUTROPHILS NFR BLD: 83.4 % (ref 38–73)
NRBC BLD-RTO: 0 /100 WBC
OSMOLALITY SERPL: 284 MOSM/KG (ref 280–300)
PLATELET # BLD AUTO: 591 K/UL (ref 150–350)
PMV BLD AUTO: 9.8 FL (ref 9.2–12.9)
POTASSIUM SERPL-SCNC: 3.6 MMOL/L (ref 3.5–5.1)
PROT SERPL-MCNC: 5.4 G/DL (ref 6–8.4)
PROTHROMBIN TIME: 19.3 SEC (ref 9–12.5)
RBC # BLD AUTO: 1.82 M/UL (ref 4.6–6.2)
SODIUM SERPL-SCNC: 133 MMOL/L (ref 136–145)
WBC # BLD AUTO: 10.62 K/UL (ref 3.9–12.7)

## 2020-05-29 PROCEDURE — 36415 COLL VENOUS BLD VENIPUNCTURE: CPT | Mod: HCNC

## 2020-05-29 PROCEDURE — 97535 SELF CARE MNGMENT TRAINING: CPT | Mod: HCNC

## 2020-05-29 PROCEDURE — 80053 COMPREHEN METABOLIC PANEL: CPT | Mod: HCNC

## 2020-05-29 PROCEDURE — 83930 ASSAY OF BLOOD OSMOLALITY: CPT | Mod: HCNC

## 2020-05-29 PROCEDURE — 94761 N-INVAS EAR/PLS OXIMETRY MLT: CPT | Mod: HCNC

## 2020-05-29 PROCEDURE — 97530 THERAPEUTIC ACTIVITIES: CPT | Mod: HCNC

## 2020-05-29 PROCEDURE — 94010 BREATHING CAPACITY TEST: CPT | Mod: HCNC

## 2020-05-29 PROCEDURE — 99217 PR OBSERVATION CARE DISCHARGE: ICD-10-PCS | Mod: HCNC,,, | Performed by: PHYSICIAN ASSISTANT

## 2020-05-29 PROCEDURE — 97110 THERAPEUTIC EXERCISES: CPT | Mod: HCNC

## 2020-05-29 PROCEDURE — 25000003 PHARM REV CODE 250: Mod: HCNC | Performed by: PHYSICIAN ASSISTANT

## 2020-05-29 PROCEDURE — 94799 UNLISTED PULMONARY SVC/PX: CPT | Mod: HCNC

## 2020-05-29 PROCEDURE — 83735 ASSAY OF MAGNESIUM: CPT | Mod: HCNC

## 2020-05-29 PROCEDURE — 63600175 PHARM REV CODE 636 W HCPCS: Mod: HCNC | Performed by: PHYSICIAN ASSISTANT

## 2020-05-29 PROCEDURE — 99900035 HC TECH TIME PER 15 MIN (STAT): Mod: HCNC

## 2020-05-29 PROCEDURE — 85025 COMPLETE CBC W/AUTO DIFF WBC: CPT | Mod: HCNC

## 2020-05-29 PROCEDURE — G0378 HOSPITAL OBSERVATION PER HR: HCPCS | Mod: HCNC

## 2020-05-29 PROCEDURE — 94150 VITAL CAPACITY TEST: CPT | Mod: HCNC,59

## 2020-05-29 PROCEDURE — 85610 PROTHROMBIN TIME: CPT | Mod: HCNC

## 2020-05-29 PROCEDURE — 99217 PR OBSERVATION CARE DISCHARGE: CPT | Mod: HCNC,,, | Performed by: PHYSICIAN ASSISTANT

## 2020-05-29 RX ORDER — CHOLECALCIFEROL (VITAMIN D3) 25 MCG
1000 TABLET ORAL DAILY
Start: 2020-05-29

## 2020-05-29 RX ORDER — MIRTAZAPINE 7.5 MG/1
7.5 TABLET, FILM COATED ORAL NIGHTLY
Qty: 30 TABLET | Refills: 11 | Status: SHIPPED | OUTPATIENT
Start: 2020-05-29 | End: 2021-05-29

## 2020-05-29 RX ORDER — FERROUS SULFATE 325(65) MG
325 TABLET, DELAYED RELEASE (ENTERIC COATED) ORAL EVERY OTHER DAY
Status: DISCONTINUED | OUTPATIENT
Start: 2020-05-29 | End: 2020-05-29 | Stop reason: HOSPADM

## 2020-05-29 RX ORDER — HYDROXYUREA 500 MG/1
500 CAPSULE ORAL DAILY
Qty: 120 CAPSULE | Refills: 3 | Status: SHIPPED | OUTPATIENT
Start: 2020-05-29

## 2020-05-29 RX ORDER — LANOLIN ALCOHOL/MO/W.PET/CERES
1000 CREAM (GRAM) TOPICAL DAILY
Qty: 30 TABLET | Refills: 11 | Status: SHIPPED | OUTPATIENT
Start: 2020-05-29 | End: 2021-05-29

## 2020-05-29 RX ORDER — OXYCODONE HYDROCHLORIDE 10 MG/1
10 TABLET ORAL EVERY 6 HOURS PRN
Qty: 20 TABLET | Refills: 0 | Status: SHIPPED | OUTPATIENT
Start: 2020-05-29 | End: 2020-06-03

## 2020-05-29 RX ORDER — HYDROXYUREA 500 MG/1
500 CAPSULE ORAL DAILY
Qty: 120 CAPSULE | Refills: 3 | Status: SHIPPED | OUTPATIENT
Start: 2020-05-29 | End: 2020-05-29 | Stop reason: SDUPTHER

## 2020-05-29 RX ORDER — LIDOCAINE 50 MG/G
1 PATCH TOPICAL DAILY
Refills: 0
Start: 2020-05-29

## 2020-05-29 RX ORDER — POLYETHYLENE GLYCOL 3350 17 G/17G
17 POWDER, FOR SOLUTION ORAL DAILY
Refills: 0
Start: 2020-05-29

## 2020-05-29 RX ORDER — WARFARIN SODIUM 5 MG/1
5 TABLET ORAL
Status: DISCONTINUED | OUTPATIENT
Start: 2020-05-29 | End: 2020-05-29 | Stop reason: HOSPADM

## 2020-05-29 RX ORDER — WARFARIN 2.5 MG/1
2.5 TABLET ORAL
Status: DISCONTINUED | OUTPATIENT
Start: 2020-05-30 | End: 2020-05-29 | Stop reason: HOSPADM

## 2020-05-29 RX ORDER — ASCORBIC ACID 500 MG
500 TABLET ORAL EVERY OTHER DAY
COMMUNITY
Start: 2020-05-29

## 2020-05-29 RX ORDER — FERROUS SULFATE 325(65) MG
325 TABLET, DELAYED RELEASE (ENTERIC COATED) ORAL EVERY OTHER DAY
Refills: 0
Start: 2020-05-29

## 2020-05-29 RX ORDER — ASCORBIC ACID 500 MG
500 TABLET ORAL EVERY OTHER DAY
Status: DISCONTINUED | OUTPATIENT
Start: 2020-05-29 | End: 2020-05-29 | Stop reason: HOSPADM

## 2020-05-29 RX ADMIN — VERAPAMIL HYDROCHLORIDE 120 MG: 120 TABLET, FILM COATED ORAL at 10:05

## 2020-05-29 RX ADMIN — DOCUSATE SODIUM - SENNOSIDES 1 TABLET: 50; 8.6 TABLET, FILM COATED ORAL at 10:05

## 2020-05-29 RX ADMIN — Medication 300 MG: at 10:05

## 2020-05-29 RX ADMIN — CYANOCOBALAMIN TAB 1000 MCG 1000 MCG: 1000 TAB at 10:05

## 2020-05-29 RX ADMIN — PYRIDOSTIGMINE BROMIDE 60 MG: 60 TABLET ORAL at 01:05

## 2020-05-29 RX ADMIN — HYDROXYUREA 500 MG: 500 CAPSULE ORAL at 10:05

## 2020-05-29 RX ADMIN — TAMSULOSIN HYDROCHLORIDE 0.4 MG: 0.4 CAPSULE ORAL at 10:05

## 2020-05-29 RX ADMIN — FINASTERIDE 5 MG: 5 TABLET, FILM COATED ORAL at 10:05

## 2020-05-29 RX ADMIN — OXYCODONE HYDROCHLORIDE AND ACETAMINOPHEN 500 MG: 500 TABLET ORAL at 10:05

## 2020-05-29 RX ADMIN — PREDNISONE 5 MG: 5 TABLET ORAL at 10:05

## 2020-05-29 RX ADMIN — FERROUS SULFATE TAB EC 325 MG (65 MG FE EQUIVALENT) 325 MG: 325 (65 FE) TABLET DELAYED RESPONSE at 10:05

## 2020-05-29 RX ADMIN — OXYCODONE HYDROCHLORIDE 10 MG: 10 TABLET ORAL at 05:05

## 2020-05-29 RX ADMIN — Medication 1000 UNITS: at 10:05

## 2020-05-29 RX ADMIN — FUROSEMIDE 20 MG: 20 TABLET ORAL at 10:05

## 2020-05-29 RX ADMIN — OXYCODONE HYDROCHLORIDE 10 MG: 10 TABLET ORAL at 11:05

## 2020-05-29 RX ADMIN — AZATHIOPRINE 200 MG: 50 TABLET ORAL at 10:05

## 2020-05-29 RX ADMIN — PYRIDOSTIGMINE BROMIDE 60 MG: 60 TABLET ORAL at 05:05

## 2020-05-29 RX ADMIN — GABAPENTIN 300 MG: 300 CAPSULE ORAL at 10:05

## 2020-05-29 RX ADMIN — POLYETHYLENE GLYCOL 3350 17 G: 17 POWDER, FOR SOLUTION ORAL at 10:05

## 2020-05-29 RX ADMIN — GABAPENTIN 300 MG: 300 CAPSULE ORAL at 04:05

## 2020-05-29 NOTE — NURSING
Pt was discharged @ 1652 to SNF via wheelchair. Pt's VS where stable at time of discharge. Nurse was given report. Pt was sent with all personal items including personal wound vac. IV was discontinued cath and tip intact.

## 2020-05-29 NOTE — PLAN OF CARE
Pt hasn't had a fall during shift today, pt and staff has maintained fall precaution. POC was reviewed with pt, pt stated understanding and would like for his daughter in law be notified, she was updated on POC she stated understanding and agreement. Pt is being discharged today to SNF transport was placed for  at 1530, report was given to NICK Henderson @ 1570.

## 2020-05-29 NOTE — PLAN OF CARE
Patient awake, alert and responsive.  Vitals stable.  Complained of pain in right lower extremity, managed with scheduled and PRN pain medication.  Assisted patient with meals.  Reviewed plan of care with patient, verbalized understanding.

## 2020-05-29 NOTE — PROGRESS NOTES
PHARMACY CONSULT NOTE: WARFARIN  Ernie Phan is a 89 y.o. male on warfarin therapy for Atrial Fibrillation. PharmD has been consulted for warfarin dosing.    Current order:  holding  Home dose: warfarin 2.5 mg PO Tuesday, Saturday, 5 mg all other days  Coumadin clinic enrollment: Active  INR goal: 2-3    Lab Results   Component Value Date    INR 2.0 (H) 05/29/2020    INR 3.2 (H) 05/28/2020    INR 3.8 (H) 05/27/2020     Significant drug interactions: none  Diet: Regular    Recommendation(s):   1. Restart warfarin at 5 mg MWF and 2.5 mg TTSS  2. Consider enoxaparin bridge if INR < 2  3. PT/INR daily    Thank you for the consult, will continue to follow  Darien Morse, Pharm.D., BCPS  [phone]      **Note: Consults are reviewed Monday-Friday 7:00am-3:30pm. THE ABOVE RECOMMENDATIONS ARE ONLY SUGGESTED.THE RECOMMENDATIONS SHOULD BE CONSIDERED IN CONJUNCTION WITH ALL PATIENT FACTORS. **

## 2020-05-29 NOTE — PLAN OF CARE
Problem: Occupational Therapy Goal  Goal: Occupational Therapy Goal  Description  Goals to be met by: 6/1/2020     Patient will increase functional independence with ADLs by performing:    UE Dressing with Set-up Assistance.  LE Dressing with Moderate Assistance.  Grooming while seated with Set-up Assistance. -MET 5/29  Toileting from bedside commode with Minimal Assistance for hygiene and clothing management.   Sitting at edge of bed x15 minutes with Supervision.  Rolling to Bilateral with Supervision.   Supine to sit with Supervision.  Stand pivot transfers with Moderate Assistance.  Step transfer with Moderate Assistance  Toilet transfer to bedside commode with Moderate Assistance.      Outcome: Ongoing, Progressing     Goals remain appropriate    Da Martinez OT   05/29/2020

## 2020-05-29 NOTE — PT/OT/SLP PROGRESS
Occupational Therapy   Treatment    Name: Ernie Phan  MRN: [id number]  Admitting Diagnosis:  Weakness       Recommendations:     Discharge Recommendations: nursing facility, skilled  Discharge Equipment Recommendations:  (TBD )  Barriers to discharge:  Inaccessible home environment, Decreased caregiver support    Assessment:     Ernie Phan is a 89 y.o. male with a medical diagnosis of Weakness.  He presents with the deficits listed below.  Pt with good participation this date as demonstrated by his willingness to perform ADL task while seated EOB and his effort to attempt to perform sit to stand transfer.  He sat EOB with SBA for sitting balance.  However, pt requires total assistance for toileting and maximal assistance with bed mobility.  He was unable to stand EOB with maximal assistance of 2 people and required maximal assistance to scoot along EOB.  Performance deficits affecting function are weakness, impaired endurance, impaired self care skills, impaired functional mobilty, gait instability, impaired balance, decreased lower extremity function, pain.     Rehab Prognosis:  Good; patient would benefit from acute skilled OT services to address these deficits and reach maximum level of function.       Plan:     Patient to be seen 4 x/week to address the above listed problems via self-care/home management, therapeutic activities, therapeutic exercises  · Plan of Care Expires: 06/22/20  · Plan of Care Reviewed with: patient    Subjective   Pt was pleasant and cooperative during session.  Pain/Comfort:  Pain Rating 1: 8/10  Location - Side 1: Bilateral  Location 1: knee  Pain Addressed 1: Distraction, Reposition, Cessation of Activity  Pain Rating Post-Intervention 1: 8/10    Objective:     Communicated with: RN prior to session.  Patient found HOB elevated with telemetry, wound vac(AVASYS ) upon OT entry to room.    General Precautions: Standard, fall   Orthopedic Precautions:N/A   Braces: N/A      Occupational Performance:     Bed Mobility:    · Patient completed Rolling/Turning to Left with  maximal assistance  · Patient completed Scooting/Bridging with maximal assistance toward HOB while sitting EOB x 3 trials and able to clear buttocks from bed  · Patient completed Supine to Sit with maximal assistance  · Patient completed Sit to Supine with maximal assistance and 2 persons     Functional Mobility/Transfers:  · Patient completed Sit <> Stand Transfer with maximal assistance/total assistance and of 2 persons  without rolling walker for first attempt and with rolling walker for second attempt.  Pt's feet and knees were blocked and he was unable to clear his hips from EOB.  · Functional Mobility: Not performed due to pt's current level of function.    Activities of Daily Living:  · Grooming: stand by assistance with setup to wash face while seated EOB  · Toileting: total assistance for perineal care (had bowel movement) and to doff and eda brief       [facility] 6 Click ADL: 11    Treatment & Education:  - Pt educated on role of OT, POC, benefit of performing EOB activity, and safety when performing functional transfers and self-care tasks.    Patient left supine with all lines intact and PT presentEducation:   finishing her session.  PT had arrived during session.    GOALS:   Multidisciplinary Problems     Occupational Therapy Goals        Problem: Occupational Therapy Goal    Goal Priority Disciplines Outcome Interventions   Occupational Therapy Goal     OT, PT/OT Ongoing, Progressing    Description:  Goals to be met by: 6/1/2020     Patient will increase functional independence with ADLs by performing:    UE Dressing with Set-up Assistance.  LE Dressing with Moderate Assistance.  Grooming while seated with Set-up Assistance. -MET 5/29  Toileting from bedside commode with Minimal Assistance for hygiene and clothing management.   Sitting at edge of bed x15 minutes with Supervision.  Rolling to Bilateral with  Supervision.   Supine to sit with Supervision.  Stand pivot transfers with Moderate Assistance.  Step transfer with Moderate Assistance  Toilet transfer to bedside commode with Moderate Assistance.                       Time Tracking:     OT Date of Treatment: 05/29/20  OT Start Time: 0831  OT Stop Time: 0902  OT Total Time (min): 31 min    Billable Minutes:Self Care/Home Management 16 min  Therapeutic Activity 15 min    Da Martinez, OT  5/29/2020

## 2020-05-29 NOTE — PLAN OF CARE
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       Patient is homebound due to:  Weakness    Allergies:Review of patient's allergies indicates:  No Known Allergies    Vitals:      Every shift (Skilled Nursing patients)    Diet: Diabetic, low sodium     Acitivities:     - Up in a chair each morning as tolerated   - Ambulate with assistance to bathroom   - Scheduled walks once each shift (every 8 hours)   - May use walker, cane, or self-propelled wheelchair    LABS:   PT-INR each week for 1 month then monthly   CBC in 1 week     Nursing Precautions:   - Aspiration precautions:             -  Upright 90 degrees befor during and after meals             -  Suction at bedside          - Fall precautions per nursing home protocol   - Decubitus precautions:        -  for positioning   - Pressure reducing foam mattress   - Turn patient every two hours. Use wedge pillows to anchor patient    CONSULTS:      Physical Therapy to evaluate and treat     Occupational Therapy to evaluate and  treat     Nutrition to evaluate and recommend diet     Psychiatry to evaluate and follow patients for delirium    MISCELLANEOUS CARE:     Routine Skin for Bedridden Patients:  Apply moisture barrier cream to all    skin folds and wet areas in perineal area daily and after baths and                           all bowel movements.    WARFARIN MONITORING:    Warfarin maintenance plan: warfarin at 5 mg MWF and 2.5 mg TTSS  Last dose: 2.5 MG on 5/26   INR goal: 2-3    Lab Results   Component Value Date    INR 2.0 (H) 05/29/2020    INR 3.2 (H) 05/28/2020    INR 3.8 (H) 05/27/2020       Obtain INR and report to:     [facility]   [address]   [facility] [address]   Phone: [phone]   Fax: [phone]       WOUND CARE:  Wound spray or saline for wound cleaning with all dressing changes.    All wounds to be measured with first dressing changes and every week.    Other Wounds:   traumatic              Location:  RLE          Apply the following to wound:              - Apply medihoney gel to right leg wounds, cover with hydrofiber and ABD pad.     - Coflex compression wrap with zinc oxide right lower leg.  Change dressing three times weekly.   - Elevate legs at all times when seated.           -  ET Consult    Other Wounds:   traumatic              Location:  Left forearm          Apply the following to wound:      - Skin prep to periwound area left forearm.    - Foam border dressing three times weekly to skin tear left forearm.     Wound Vac:     Location:  scalp           ET Consult   - Place endoform on the healthy granulation tissue.    - Place black sponge in base of wound and run vac at 125 mmHg on continuous suction.     - Dressing changes every Monday, Wednesday and Friday.      DIABETES CARE:     Check blood sugar:      Fingerstick blood sugar a.m and p.m.    Fingerstick blood sugar AC and HS   Fingerstick blood sugar every 6 hours if unable to eat      Report CBG < 60 or > 400 to  physician.                                          Insulin Sliding Scale          Glucose  Novolog Insulin Subcutaneous        0 - 60   Orange juice or glucose tablet, hold insulin      No insulin   201-250  2 units   251-300  4 units   301-350  6 units   351-400  8 units   >400   10 units then call physician      Medications: Discontinue all previous medication orders, if any. See new list below.     Ernei Phan   Home Medication Instructions CHITO:[id number]    Printed on:05/29/20 1000   Medication Information                      ascorbic acid, vitamin C, (VITAMIN C) 500 MG tablet  Take 1 tablet (500 mg total) by mouth every other day.             azaTHIOprine (IMURAN) 50 mg Tab  Take 4 tablets (200 mg total) by mouth once daily.             cyanocobalamin (VITAMIN B-12) 1000 MCG tablet  Take 1 tablet (1,000 mcg total) by mouth once daily.             ferrous sulfate 325 (65 FE) MG EC tablet  Take 1 tablet (325 mg total) by mouth every other day.             finasteride (PROSCAR) 5 mg tablet  Take 1 tablet (5 mg total) by mouth once daily. DO NOT CRUSH OR CHEW; SWALLOW WHOLE.             furosemide (LASIX) 20 MG tablet  Take 1 tablet (20 mg total) by mouth once daily.             gabapentin (NEURONTIN) 300 MG capsule  Take 1 capsule (300 mg total) by mouth 3 (three) times daily.             honey (DANNY AGUILAR,) 80 % Gel  Apply 1 application topically once daily.             hydroxyurea (HYDREA) 500 mg Cap  Take 1 capsule (500 mg total) by mouth once daily             ibuprofen (ADVIL,MOTRIN) 600 MG tablet  Take 1 tablet (600 mg total) by mouth every 6 (six) hours as needed.             lidocaine (LIDODERM) 5 %  Place 1 patch onto the skin once daily. Remove & Discard patch within 12 hours or as directed by DANNY CASTILLO, 100 % Pste               mirtazapine (REMERON) 7.5 MG Tab  Take 1 tablet (7.5 mg total) by mouth every evening.             ondansetron (ZOFRAN) 4 MG tablet    Take 1 tablet (4 mg total) by mouth every 8 (eight) hours as needed for Nausea.             ONE TOUCH ULTRA TEST Strp  TEST DAILY             ONE TOUCH ULTRASOFT LANCETS lancets  TEST DAILY             oxyCODONE (ROXICODONE) 10 mg Tab immediate release tablet  Take 1 tablet (10 mg total) by mouth every 6 (six) hours as needed for Pain.             polyethylene glycol (GLYCOLAX) 17 gram PwPk  Take 17 g by mouth once daily.             predniSONE (DELTASONE) 5 MG tablet  Take 1 tablet (5 mg total) by mouth once daily.             sennosides (SENNA) 8.6 mg Cap  Take 8.6 mg by mouth once daily.             tamsulosin (FLOMAX) 0.4 mg Cap  Take 1 capsule (0.4 mg total) by mouth once daily. DO NOT CRUSH OR CHEW; SWALLOW WHOLE.             verapamil (CALAN) 120 MG tablet  Take 1 tablet (120 mg total) by mouth 2 (two) times daily.             vitamin D (VITAMIN D3) 1000 units Tab  Take 1 tablet (1,000 Units total) by mouth once daily.             warfarin (COUMADIN) 5 MG tablet  TAKE 1 TABLET EVERY DAY EXCEPT TAKE 1/2 TABLET ON SUNDAY, OR AS DIRECTED BY COUMADIN CLINIC                       _________________________________  Byron Garza PA-C  05/29/2020

## 2020-05-29 NOTE — PLAN OF CARE
On going intervention continued, pain still an issue, PRN and scheduled pain meds given. Repositioned, patient cannot tolerate lying on his side, prefers supine with feet elevated on pillows.   Waffle mattress overlay applied, aquacell foam dressing applied on left buttock skin break. Given melatonin with good effect. Vac dressing in situ, no issues overnight. Safety and fall prevention maintained.

## 2020-05-29 NOTE — PLAN OF CARE
05/29/20 1352   Final Note   Assessment Type Final Discharge Note   Anticipated Discharge Disposition SNF   Right Care Referral Info   Post Acute Recommendation SNF / Sub-Acute Rehab   Facility Name [facility]       Report can be called in to the 200 pod nurse at [phone]  patient will be admitting to Room 207B. [facility] should be used   for transport to our facility. Transportation arranged with [facility] via wheelchair. Scheduled for 330pm. Nurse Notified.     [facility] called to update CM that transportation will arrive closer to 430pm.     Chacho Benitez RN Case Manager   #[phone]

## 2020-05-29 NOTE — ASSESSMENT & PLAN NOTE
- Patient follows with heme/onc.  - Continue hydroxyurea.  Platelets still above goal (545). Given moderate anemia and wound healing issues, I would consider dose reduction to 500 mg PO daily of hydroxyurea to facilitate better wound healing (will copy wound care RN on this note to see if there are any concerns about rate of healing

## 2020-05-29 NOTE — PT/OT/SLP PROGRESS
Physical Therapy Treatment    Patient Name:  Ernie Phan   MRN:  [id number]    Recommendations:     Discharge Recommendations:  nursing facility, skilled   Discharge Equipment Recommendations: other (see comments)(TBD pending progress/next level of care; may need lift device or sliding board)   Barriers to discharge: Decreased caregiver support    Assessment:     Ernie Phan is a 89 y.o. male admitted with a medical diagnosis of Weakness.  He presents with the following impairments/functional limitations:  weakness, gait instability, decreased lower extremity function, impaired balance, impaired endurance, impaired self care skills, pain . Patient w/ good effort. Sits EOB w/ SBA. Unable to stand w/ assist of2, able to scoot along EOB w/ max assist x3 excursions w/ clearance of buttocks from bed.    Rehab Prognosis: Good; patient would benefit from acute skilled PT services to address these deficits and reach maximum level of function.    Recent Surgery: * No surgery found *      Plan:     During this hospitalization, patient to be seen 5 x/week to address the identified rehab impairments via gait training, therapeutic activities, therapeutic exercises, neuromuscular re-education and progress toward the following goals:    · Plan of Care Expires:  06/21/20    Subjective     Chief Complaint: pain in knees/legs  Patient/Family Comments/goals: agreeable to session  Pain/Comfort:  · Pain Rating 1: 8/10  · Location - Side 1: Bilateral  · Location 1: leg  · Pain Addressed 1: Distraction, Reposition, Cessation of Activity  · Pain Rating Post-Intervention 1: 7/10      Objective:     Communicated with nurse prior to session.  Patient found sitting EOB w/ OT for session with telemetry, wound vac upon PT entry to room.     General Precautions: Standard, fall   Orthopedic Precautions:N/A   Braces: N/A     Functional Mobility:  · Bed Mobility:     · Sit to Supine: maximal assistance  · Transfers:     · Sit to Stand:  Unable  to clear hips from EOB; feet/knees blocked, max/TA of 2. Forward weight shift and weight on LEs, attempted w/ and w/o RW  · Gait: unable to stand d/t weakness      AM-PAC 6 CLICK MOBILITY  Turning over in bed (including adjusting bedclothes, sheets and blankets)?: 3  Sitting down on and standing up from a chair with arms (e.g., wheelchair, bedside commode, etc.): 2  Moving from lying on back to sitting on the side of the bed?: 2  Moving to and from a bed to a chair (including a wheelchair)?: 2  Need to walk in hospital room?: 1  Climbing 3-5 steps with a railing?: 1  Basic Mobility Total Score: 11       Therapeutic Activities and Exercises:   patient scoots along EOB w/ max assist of 1 x3 excursion to the right. Patient assisted w/ forward weight shift, acceptance of weight on LEs (B knees blocked) and able to clear hips from EOB (OT repositions new diaper during this activity.)  Patient sits EOB approx 10 minutes w/ SBA  Patient performs LAQ x8 sitting EOB, partial range; Supine w/ assist performs heelslides, QS, GS, AP, hip abd/add x10    Patient left HOB elevated with all lines intact, call button in reach, RT present and camera called to confirm visualization..    GOALS:   Multidisciplinary Problems     Physical Therapy Goals        Problem: Physical Therapy Goal    Goal Priority Disciplines Outcome Goal Variances Interventions   Physical Therapy Goal     PT, PT/OT Ongoing, Progressing     Description:  Goals to be met by: 20     Patient will increase functional independence with mobility by performin. Supine to sit with Stand-by Assistance - Not met  2. Sit to supine with Stand-by Assistance - Not met  3. Sit to stand transfer with Minimal Assistance - Not met  4. Gait x 50 feet with Minimal Assistance using Rolling Walker - Not met   5. Pt will perform BLE therex x 20 reps with assistance as needed  6. Pt will tolerate standing x 3 minutes using rolling walker with contact guard assistance                      Time Tracking:     PT Received On: 05/29/20  PT Start Time: 0848     PT Stop Time: 0911  PT Total Time (min): 23 min     Billable Minutes: Therapeutic Activity 15 and Therapeutic Exercise 8    Treatment Type: Treatment  PT/PTA: PT     PTA Visit Number: 0     Katelyn Camacho, PT  05/29/2020

## 2020-05-29 NOTE — PLAN OF CARE
SHILOH placed a call to [facility] with Josseline WARREN, she is not in yet, per the , SHILOH will continue to follow.     Chacho Benitez RN Case Manager   #[phone]

## 2020-05-29 NOTE — PROGRESS NOTES
Patient to be discharged to SNF unit today.  [facility] wound vac at bedside without supplies.  Nurse Darien will remove the hospital wound vac dressing and place a foam dressing over the wound bed for transfer to SNF.

## 2020-05-30 NOTE — ASSESSMENT & PLAN NOTE
Long term current use of anticoagulant therapy    - Continue verapamil 120mg BID, Coumadin 5mg [id number], 2.5mg Sunday.  - PT/INR within therapeutic range  - [id number] VASc score of at least three for HTN and age.

## 2020-05-31 NOTE — ASSESSMENT & PLAN NOTE
"- Per wound care note,  "s/p debridement of open head wound involving the bone and application of wound vac on 12/12/19 with Dr. Espinoza. He originally had a repair of Mohs defect with ACell on 12/5/19 but the ACell failed."  "

## 2020-05-31 NOTE — ASSESSMENT & PLAN NOTE
- Pain controlled with oxycodone   - Continue gabapentin 300mg TID.  - pain seems to be tolerable on [id number] q6H  - Pain Management referral at [facility]

## 2020-05-31 NOTE — DISCHARGE SUMMARY
"[facility] Hospital Medicine  Discharge Summary      Patient Name: Ernie Phan  MRN: [id number]  Admission Date: 5/21/2020  Hospital Length of Stay: 0 days  Discharge Date and Time: 5/29/2020  4:52 PM  Attending Physician:KATHERIN TINEO   Discharging Provider: Byron Garza PA-C  Primary Care Provider: Ernie Michel MD  [facility] Medicine Team: [facility] Byron Garza PA-C    HPI:   Patient is an 89 year old gentleman with a h/o HTN, HLD, myasthenia gravis, post-herpetic neuralgia, and A Fib on Coumadin.  He presents with weakness.  Patient states that his RLE is particularly weak and has been "giving out" on him.  He has had two falls where he was lowered to the floor with no head trauma or LOC.  He is having difficultly standing or ambulating with his walker.  Patient also notes pain to his right inner thigh secondary to h/o post-herpetic neuralgia.  He denies generalized weakness, SOB.  He has no history of acute myasthenia gravis crisis.  He lives alone, but has family that checks on him frequently and a sitter three days a week for four hours.  He denies chest pain, SOB, dizziness, palpitations, fever/chills, N/V/D, abdominal pain.    * No surgery found *      Hospital Course:   Patient admitted for generalized weakness as he reported his RLE had been "giving out" secondary to pain from post-herpetic neuralgia. CTH without acute abnormality. CT C-spine "Degenerative changes above.  Few mm anterolisthesis C4 on C5 and C7 on T1." R hip and knee xray without fracture, degenerative disease present. Suspect weakness secondary to deconditioning caused by post-herpetic neuralgia. PTOT evaluated, recommending SNF. Pt is agreeable, however placement has been difficult as facilities do not think they can meed his complex needs (wound vac). Patient was eventually accepted to a facility for further therapy.     Consults:   Consults (From admission, onward)        Status Ordering " Provider     Case Management/  Once     Provider:  (Not yet assigned)    Completed PIETER WINCHESTER     Inpatient consult to Midline team  Once     Provider:  (Not yet assigned)    Completed KATHERIN TINEO     Inpatient consult to SNF [facility]  Once     Provider:  (Not yet assigned)    Completed SREEDHAR QUINTANA     Inpatient consult to SNF [facility]  Once     Provider:  (Not yet assigned)    Completed SREEDHAR QUINTANA     Inpatient consult to Spiritual Care  Once     Provider:  (Not yet assigned)    Completed CURTIS MEYERS          * Weakness  - CT cervical spine without contrast:  Degenerative changes above.  Few mm anterolisthesis C4 on C5 and C7 on T1.  Pleural fluid or thickening right greater than left.  - CT head without contrast:  No evidence of acute intracranial pathology.  - R hip x-ray:  There is mild DJD.  No fracture, dislocation or bone destruction seen.  - R knee x-ray:  No fracture dislocation bone destruction seen.  There is moderate DJD and a varus deformity.  - BLE US:  No evidence of deep venous thrombosis in either lower extremity.  - UA unremarkable.  - Will consult PT/OT, SW.  - suspect deconditioning   - ptot rec SNF, pt agreeable: difficulty finding placement for patient due to wound vac needs; CMSW sending out additional referrals    Elevated brain natriuretic peptide (BNP) level  - .  - Will get chest x-ray: unremarkable  - Patient received Lasix 20mg IV in ER.  - Strict I&Os, daily weights.    Results for orders placed in visit on 12/09/19   Echo    Narrative · Normal left ventricular systolic function. The estimated ejection   fraction is 65%  · No wall motion abnormalities.  · Normal right ventricular systolic function.  · Severe biatrial enlargement.  · Aortic valve area is 1.62 cm2; peak velocity is 2.51 m/s; mean gradient   is 14 mmHg.  · Mild aortic valve stenosis.  · Mild aortic regurgitation.  · Mild mitral regurgitation.  · Mild to moderate  "tricuspid regurgitation.  · The estimated PA systolic pressure is 63 mm Hg  · Pulmonary hypertension present.  · Normal central venous pressure (3 mm Hg).  · Atrial fibrillation observed.              Delayed surgical wound healing  - Per wound care note,  "s/p debridement of open head wound involving the bone and application of wound vac on 12/12/19 with Dr. Espinoza. He originally had a repair of Mohs defect with ACell on 12/5/19 but the ACell failed."    Essential thrombocytosis  - Patient follows with heme/onc.  - Continue hydroxyurea.  Platelets still above goal (545). Given moderate anemia and wound healing issues, I would consider dose reduction to 500 mg PO daily of hydroxyurea to facilitate better wound healing (will copy wound care RN on this note to see if there are any concerns about rate of healing      Postherpetic neuralgia  - Pain controlled with oxycodone   - Continue gabapentin 300mg TID.  - pain seems to be tolerable on [id number] q6H  - Pain Management referral at [place]    Chronic kidney disease, stage III (moderate)  - At baseline with creatinine of 1.2, GFR 53.3.  - Avoid nephrotoxins.    Myasthenia gravis without acute exacerbation  Long-term use of immunosuppressant medication     - Continue Imuran 200mg daily, Mestinon 60mg daily, prednisone 5mg daily.  - Will monitor respiratory mechanics.    Paroxysmal atrial fibrillation  Long term current use of anticoagulant therapy    - Continue verapamil 120mg BID, Coumadin 5mg MTuWThSa, 2.5mg Sunday.  - PT/INR within therapeutic range  - [id number] VASc score of at least three for HTN and age.      Final Active Diagnoses:    Diagnosis Date Noted POA    PRINCIPAL PROBLEM:  Weakness [R53.1] 05/21/2020 Yes    Elevated brain natriuretic peptide (BNP) level [R79.89] 05/22/2020 Yes    Cellulitis of right lower extremity [L03.115] 03/27/2020 Yes    Delayed surgical wound healing [T81.89XA] 12/20/2019 Yes     Chronic    Iron deficiency anemia [D50.9] 02/13/2019 " Yes    Essential thrombocytosis [D47.3] 04/07/2016 Yes     Chronic    Chronic kidney disease, stage III (moderate) [N18.3] 11/19/2012 Yes     Chronic    Postherpetic neuralgia [B02.29]  Yes     Chronic    Myasthenia gravis without acute exacerbation [G70.00] 09/20/2012 Yes     Chronic    Paroxysmal atrial fibrillation [I48.0] 04/12/2012 Yes     Chronic      Problems Resolved During this Admission:       Discharged Condition: stable    Disposition: Skilled Nursing Facility    Follow Up:    Patient Instructions:   No discharge procedures on file.    Significant Diagnostic Studies: Labs: All labs within the past 24 hours have been reviewed    Pending Diagnostic Studies:     None         Medications:  Reconciled Home Medications:      Medication List      START taking these medications    ascorbic acid (vitamin C) 500 MG tablet  Commonly known as:  VITAMIN C  Take 1 tablet (500 mg total) by mouth every other day.     cyanocobalamin 1000 MCG tablet  Commonly known as:  VITAMIN B-12  Take 1 tablet (1,000 mcg total) by mouth once daily.     ferrous sulfate 325 (65 FE) MG EC tablet  Take 1 tablet (325 mg total) by mouth every other day.  Replaces:  IRON (FERROUS SULFATE) ORAL     hydroxyurea 500 mg Cap  Commonly known as:  HYDREA  Take 1 capsule (500 mg total) by mouth once daily.     lidocaine 5 %  Commonly known as:  LIDODERM  Place 1 patch onto the skin once daily. Remove & Discard patch within 12 hours or as directed by MD     mirtazapine 7.5 MG Tab  Commonly known as:  REMERON  Take 1 tablet (7.5 mg total) by mouth every evening.     oxyCODONE 10 mg Tab immediate release tablet  Commonly known as:  ROXICODONE  Take 1 tablet (10 mg total) by mouth every 6 (six) hours as needed for Pain.     polyethylene glycol 17 gram Pwpk  Commonly known as:  GLYCOLAX  Take 17 g by mouth once daily.     sennosides 8.6 mg Cap  Commonly known as:  SENNA  Take 8.6 mg by mouth once daily.     vitamin D 1000 units Tab  Commonly known  as:  VITAMIN D3  Take 1 tablet (1,000 Units total) by mouth once daily.        CHANGE how you take these medications    gabapentin 300 MG capsule  Commonly known as:  NEURONTIN  Take 1 capsule (300 mg total) by mouth 3 (three) times daily.  What changed:  how much to take        CONTINUE taking these medications    azaTHIOprine 50 mg Tab  Commonly known as:  IMURAN  Take 4 tablets (200 mg total) by mouth once daily.     finasteride 5 mg tablet  Commonly known as:  PROSCAR  Take 1 tablet (5 mg total) by mouth once daily. DO NOT CRUSH OR CHEW; SWALLOW WHOLE.     furosemide 20 MG tablet  Commonly known as:  LASIX  Take 1 tablet (20 mg total) by mouth once daily.     * honey 80 % Gel  Commonly known as:  MEDIHONEY (HONEY)  Apply 1 application topically once daily.     * MEDIHONEY (HONEY) 100 % Pste  Generic drug:  honey     ibuprofen 600 MG tablet  Commonly known as:  ADVIL,MOTRIN  Take 1 tablet (600 mg total) by mouth every 6 (six) hours as needed.     ondansetron 4 MG tablet  Commonly known as:  ZOFRAN  Take 1 tablet (4 mg total) by mouth every 8 (eight) hours as needed for Nausea.     ONETOUCH ULTRA TEST Strp  Generic drug:  blood sugar diagnostic  TEST DAILY     ONETOUCH ULTRASOFT LANCETS Misc  Generic drug:  lancets  TEST DAILY     predniSONE 5 MG tablet  Commonly known as:  DELTASONE  Take 1 tablet (5 mg total) by mouth once daily.     tamsulosin 0.4 mg Cap  Commonly known as:  FLOMAX  Take 1 capsule (0.4 mg total) by mouth once daily. DO NOT CRUSH OR CHEW; SWALLOW WHOLE.     verapamiL 120 MG tablet  Commonly known as:  CALAN  Take 1 tablet (120 mg total) by mouth 2 (two) times daily.     warfarin 5 MG tablet  Commonly known as:  COUMADIN  TAKE 1 TABLET EVERY DAY EXCEPT TAKE 1/2 TABLET ON SUNDAY, OR AS DIRECTED BY COUMADIN CLINIC         * This list has 2 medication(s) that are the same as other medications prescribed for you. Read the directions carefully, and ask your doctor or other care provider to review them  with you.            STOP taking these medications    IRON (FERROUS SULFATE) ORAL  Replaced by:  ferrous sulfate 325 (65 FE) MG EC tablet            Indwelling Lines/Drains at time of discharge:   Lines/Drains/Airways     None                 Time spent on the discharge of patient: >30 minutes  Patient was seen and examined on the date of discharge and determined to be suitable for discharge.         Byron Garza PA-C  Department of Hospital Medicine  [facility]

## 2020-06-01 ENCOUNTER — PATIENT OUTREACH (OUTPATIENT)
Dept: ADMINISTRATIVE | Facility: OTHER | Age: 85
End: 2020-06-01

## 2020-06-03 ENCOUNTER — OFFICE VISIT (OUTPATIENT)
Dept: WOUND CARE | Facility: CLINIC | Age: 85
End: 2020-06-03
Payer: MEDICARE

## 2020-06-03 ENCOUNTER — TELEPHONE (OUTPATIENT)
Dept: WOUND CARE | Facility: CLINIC | Age: 85
End: 2020-06-03

## 2020-06-03 DIAGNOSIS — T81.89XA DELAYED SURGICAL WOUND HEALING, INITIAL ENCOUNTER: Primary | Chronic | ICD-10-CM

## 2020-06-03 DIAGNOSIS — S41.111A SKIN TEAR OF RIGHT UPPER ARM WITHOUT COMPLICATION, INITIAL ENCOUNTER: ICD-10-CM

## 2020-06-03 DIAGNOSIS — S81.801A OPEN WOUND OF RIGHT LOWER LEG WITH COMPLICATION, INITIAL ENCOUNTER: ICD-10-CM

## 2020-06-03 DIAGNOSIS — L89.322 DECUBITUS ULCER OF LEFT BUTTOCK, STAGE 2: ICD-10-CM

## 2020-06-03 PROCEDURE — 99212 OFFICE O/P EST SF 10 MIN: CPT | Mod: 95,,, | Performed by: NURSE PRACTITIONER

## 2020-06-03 PROCEDURE — 1159F MED LIST DOCD IN RCRD: CPT | Mod: 95,,, | Performed by: NURSE PRACTITIONER

## 2020-06-03 PROCEDURE — 1101F PT FALLS ASSESS-DOCD LE1/YR: CPT | Mod: CPTII,95,, | Performed by: NURSE PRACTITIONER

## 2020-06-03 PROCEDURE — 1101F PR PT FALLS ASSESS DOC 0-1 FALLS W/OUT INJ PAST YR: ICD-10-PCS | Mod: CPTII,95,, | Performed by: NURSE PRACTITIONER

## 2020-06-03 PROCEDURE — 1159F PR MEDICATION LIST DOCUMENTED IN MEDICAL RECORD: ICD-10-PCS | Mod: 95,,, | Performed by: NURSE PRACTITIONER

## 2020-06-03 PROCEDURE — 99212 PR OFFICE/OUTPT VISIT, EST, LEVL II, 10-19 MIN: ICD-10-PCS | Mod: 95,,, | Performed by: NURSE PRACTITIONER

## 2020-06-03 NOTE — PROGRESS NOTES
"Subjective:       Patient ID: Ernie Phan is a 89 y.o. male.    Chief Complaint: Wound Check    HPI     This is an 88 year old male referred for evaluation and management of a surgical wound to the scalp area.  He is s/p debridement of open head wound involving the bone and application of wound vac on 12/12/19 with Dr. Espinoza. He originally had a repair of Mohs defect with ACell on 12/5/19 but the ACell failed.  He now has more granulation tissue present in the base of the wound. He also has traumatic wounds to the right lower leg and a new skin tear to the left forearm which happened the evening of 5/10/20 when he had a fall.  More recently he was admitted to [facility] from 5/21/20-5/29/20 for generalized weakness as he reported his RLE had been "giving out" secondary to pain from post-herpetic neuralgia. CTH without acute abnormality. CT C-spine "Degenerative changes above.  Few mm anterolisthesis C4 on C5 and C7 on T1." R hip and knee xray without fracture, degenerative disease present. Suspect weakness secondary to deconditioning caused by post-herpetic neuralgia. PTOT evaluated, recommending SNF. Pt is agreeable, however placement has been difficult as facilities do not think they can meed his complex needs (wound vac).  Patient was eventually accepted to [facility].  His caregiver and nurse are present for this virtual visit.  Since hospitalization he also has a few stage II decubiti to the left buttock.  He has foam border dressings on all of his wounds. He is afebrile. He denies increased redness, swelling or purulent drainage. He has no pain from the wounds but is having sciatica pain and his pain level from this is 7/10.   Review of Systems   Constitutional: Negative for chills, diaphoresis and fever.   HENT: Positive for hearing loss. Negative for postnasal drip, rhinorrhea, sinus pressure, sneezing, sore throat, tinnitus and trouble swallowing.    Eyes: Negative for visual disturbance. "   Respiratory: Negative for apnea, cough, shortness of breath and wheezing.    Cardiovascular: Positive for leg swelling (right leg and foot). Negative for chest pain and palpitations.   Gastrointestinal: Positive for constipation. Negative for diarrhea, nausea and vomiting.   Genitourinary: Positive for frequency. Negative for difficulty urinating, dysuria and hematuria.   Musculoskeletal: Negative for arthralgias, back pain and joint swelling.   Skin: Positive for color change and wound.   Neurological: Positive for weakness. Negative for dizziness, light-headedness and headaches.   Hematological: Bruises/bleeds easily.   Psychiatric/Behavioral: Positive for dysphoric mood. Negative for confusion, decreased concentration and sleep disturbance. The patient is not nervous/anxious.        Objective:      Physical Exam   Constitutional: He is oriented to person, place, and time. He appears well-developed and well-nourished. No distress.   HENT:   Head: Normocephalic.       Pulmonary/Chest: Effort normal. No respiratory distress.   Musculoskeletal: He exhibits edema (right lower leg and foot). He exhibits no tenderness.        Arms:       Legs:  Neurological: He is alert and oriented to person, place, and time.   Skin: No rash noted. He is not diaphoretic. No erythema.   Psychiatric: He has a normal mood and affect. His behavior is normal. Judgment and thought content normal.   Nursing note and vitals reviewed.      Scalp     Right medial leg      Right posterior leg      Skin tear left forearm healed    Right lower arm healed    Right upper arm    Stage II decibiti left buttock              Assessment:       1. Delayed surgical wound healing, initial encounter    2. Open wound of right lower leg with complication, initial encounter    3. Decubitus ulcer of left buttock, stage 2    4. Skin tear of right upper arm without complication, initial encounter               Plan:            Cleanse wounds with wound  cleanser.  Continue wound vac therapy to scalp wound.  Place collagen powder on the healthy granulation tissue. Place black sponge in base of wound and run vac at 125 mmHg on continuous suction. Change vac three times weekly.  Apply skin prep to periwound area.  Apply foam border dressings to right lower leg ulcers, skin tear right upper arm and sacral foam border dressing to left buttock decibuti.  Change dressings three times weekly.  Elevate legs at all times when seated.  [facility] notified of orders via Epic fax.  The fax number is [phone]..  Return to clinic in one month.  We will try to arrange another virtual visit.    The patient location is: a nursing [place] in [place].  The chief complaint leading to consultation is: surgical wound to the scalp, ulcers to the right lower leg and skin tears to the right upper arm.    Visit type: audiovisual    Face to Face time with patient: 20 minutes  25 minutes minutes of total time spent on the encounter, which includes face to face time and non-face to face time preparing to see the patient (eg, review of tests), Obtaining and/or reviewing separately obtained history, Documenting clinical information in the electronic or other health record, Independently interpreting results (not separately reported) and communicating results to the patient/family/caregiver, or Care coordination (not separately reported).         Each patient to whom he or she provides medical services by telemedicine is:  (1) informed of the relationship between the physician and patient and the respective role of any other health care provider with respect to management of the patient; and (2) notified that he or she may decline to receive medical services by telemedicine and may withdraw from such care at any time.    Notes:  See above

## 2020-06-03 NOTE — PATIENT INSTRUCTIONS
Cleanse wounds with wound cleanser.  Continue wound vac therapy to scalp wound.  Place collagen powder on the healthy granulation tissue. Place black sponge in base of wound and run vac at 125 mmHg on continuous suction. Change vac three times weekly.  Apply skin prep to periwound area.  Apply foam border dressings to right lower leg ulcers, skin tear right upper arm and sacral foam border dressing to left buttock decibuti.  Change dressings three times weekly.  Elevate legs at all times when seated.

## 2020-06-09 ENCOUNTER — TELEPHONE (OUTPATIENT)
Dept: WOUND CARE | Facility: CLINIC | Age: 85
End: 2020-06-09

## 2020-06-09 NOTE — TELEPHONE ENCOUNTER
"Returned call and spoke with Cheri who advised the nursing staff is having a problem with the wound vac keeping its seal - continues to report "Leak" and they re-enforce seal but it continues to fail.  Cheri advised she placed a dressing over the wound so avoid disturbing the patient's skin with removing or reapplying the seal.  Cheri did state that patient is doing more physical therapy and that head area may be perspiring.   I will forward message to Brittney and have her call to discuss options with Cheri.   "

## 2020-06-09 NOTE — TELEPHONE ENCOUNTER
----- Message from Mary Ellen Donnelly sent at 6/9/2020  3:54 PM CDT -----  Contact: cheri  Pt wound vac had to keep being change because it wouldn/t seal. Please give Cheri a call back for advice at [phone]

## 2020-06-10 ENCOUNTER — TELEPHONE (OUTPATIENT)
Dept: WOUND CARE | Facility: CLINIC | Age: 85
End: 2020-06-10

## 2020-06-10 NOTE — TELEPHONE ENCOUNTER
----- Message from Mary Ellen Donnelly sent at 6/10/2020  9:42 AM CDT -----  Contact: rivernd   Please give Betty Alonzo a call back  at [facility] regarding mutual pt at [phone].

## 2020-06-10 NOTE — TELEPHONE ENCOUNTER
----- Message from Ludmila Multani sent at 6/10/2020  2:20 PM CDT -----  Pt wife is calling to speak with the nurse about the pt care and would like for the nurse to give her a call back at [phone]

## 2020-06-10 NOTE — TELEPHONE ENCOUNTER
Returned call and spoke with wife who asked that Brittney return her a call to discuss wound vac and new wound care orders - message forwarded to Brittney

## 2020-06-22 ENCOUNTER — LAB VISIT (OUTPATIENT)
Dept: LAB | Facility: OTHER | Age: 85
End: 2020-06-22
Payer: MEDICARE

## 2020-06-22 DIAGNOSIS — Z20.822 SUSPECTED COVID-19 VIRUS INFECTION: ICD-10-CM

## 2020-06-22 PROCEDURE — U0003 INFECTIOUS AGENT DETECTION BY NUCLEIC ACID (DNA OR RNA); SEVERE ACUTE RESPIRATORY SYNDROME CORONAVIRUS 2 (SARS-COV-2) (CORONAVIRUS DISEASE [COVID-19]), AMPLIFIED PROBE TECHNIQUE, MAKING USE OF HIGH THROUGHPUT TECHNOLOGIES AS DESCRIBED BY CMS-2020-01-R: HCPCS | Mod: HCNC

## 2020-06-25 LAB — SARS-COV-2 RNA RESP QL NAA+PROBE: NOT DETECTED

## 2020-06-29 ENCOUNTER — LAB VISIT (OUTPATIENT)
Dept: LAB | Facility: OTHER | Age: 85
End: 2020-06-29
Payer: MEDICARE

## 2020-06-29 DIAGNOSIS — Z20.822 SUSPECTED COVID-19 VIRUS INFECTION: ICD-10-CM

## 2020-06-29 PROCEDURE — U0003 INFECTIOUS AGENT DETECTION BY NUCLEIC ACID (DNA OR RNA); SEVERE ACUTE RESPIRATORY SYNDROME CORONAVIRUS 2 (SARS-COV-2) (CORONAVIRUS DISEASE [COVID-19]), AMPLIFIED PROBE TECHNIQUE, MAKING USE OF HIGH THROUGHPUT TECHNOLOGIES AS DESCRIBED BY CMS-2020-01-R: HCPCS | Mod: HCNC

## 2020-06-30 ENCOUNTER — TELEPHONE (OUTPATIENT)
Dept: WOUND CARE | Facility: CLINIC | Age: 85
End: 2020-06-30

## 2020-06-30 ENCOUNTER — PATIENT OUTREACH (OUTPATIENT)
Dept: ADMINISTRATIVE | Facility: OTHER | Age: 85
End: 2020-06-30

## 2020-06-30 NOTE — TELEPHONE ENCOUNTER
----- Message from Gwen Simon MA sent at 6/29/2020  2:06 PM CDT -----    ----- Message -----  From: Ludmila Multani  Sent: 6/29/2020  12:03 PM CDT  To: Lydia Bennett Staff    Pt is calling to speak with the nurse and would like for the nurse to give his daughter a call back at [phone] haleigh

## 2020-06-30 NOTE — TELEPHONE ENCOUNTER
Returned call and spoke with patient's daughter - advised that Brittney is not in clinic today and will forward message regarding her concerns about treatment of care and virtual visit tomorrow 7/1/2020 at 2:30pm.

## 2020-07-01 ENCOUNTER — TELEPHONE (OUTPATIENT)
Dept: WOUND CARE | Facility: CLINIC | Age: 85
End: 2020-07-01

## 2020-07-01 NOTE — PROGRESS NOTES
Requested updates within Care Everywhere.  Patient's chart was reviewed for overdue JOYCE topics.  Immunizations reconciled.

## 2020-07-02 LAB — SARS-COV-2 RNA RESP QL NAA+PROBE: NEGATIVE

## 2020-07-06 ENCOUNTER — HOSPITAL ENCOUNTER (INPATIENT)
Facility: HOSPITAL | Age: 85
LOS: 5 days | Discharge: HOSPICE | DRG: 280 | End: 2020-07-11
Attending: EMERGENCY MEDICINE | Admitting: HOSPITALIST
Payer: MEDICARE

## 2020-07-06 DIAGNOSIS — I21.3 STEMI (ST ELEVATION MYOCARDIAL INFARCTION): ICD-10-CM

## 2020-07-06 DIAGNOSIS — I21.3 ST ELEVATION MYOCARDIAL INFARCTION (STEMI), UNSPECIFIED ARTERY: ICD-10-CM

## 2020-07-06 DIAGNOSIS — Z71.89 ADVANCED CARE PLANNING/COUNSELING DISCUSSION: ICD-10-CM

## 2020-07-06 DIAGNOSIS — Z51.5 PALLIATIVE CARE ENCOUNTER: ICD-10-CM

## 2020-07-06 PROBLEM — I10 HYPERTENSION: Chronic | Status: ACTIVE | Noted: 2020-07-06

## 2020-07-06 PROBLEM — I48.0 PAROXYSMAL ATRIAL FIBRILLATION: Chronic | Status: ACTIVE | Noted: 2020-07-06

## 2020-07-06 PROBLEM — T07.XXXA WOUNDS, MULTIPLE: Status: ACTIVE | Noted: 2020-07-06

## 2020-07-06 PROBLEM — N18.30 CHRONIC KIDNEY DISEASE, STAGE III (MODERATE): Status: ACTIVE | Noted: 2020-07-06

## 2020-07-06 PROBLEM — I48.21 PERMANENT ATRIAL FIBRILLATION: Status: ACTIVE | Noted: 2020-07-06

## 2020-07-06 PROBLEM — T07.XXXA WOUNDS, MULTIPLE: Chronic | Status: ACTIVE | Noted: 2020-07-06

## 2020-07-06 PROBLEM — E78.5 HLD (HYPERLIPIDEMIA): Chronic | Status: ACTIVE | Noted: 2020-07-06

## 2020-07-06 PROBLEM — D64.9 ANEMIA: Status: ACTIVE | Noted: 2020-07-06

## 2020-07-06 PROBLEM — D75.839 THROMBOCYTOSIS: Status: ACTIVE | Noted: 2020-07-06

## 2020-07-06 PROBLEM — D72.829 LEUKOCYTOSIS: Status: ACTIVE | Noted: 2020-07-06

## 2020-07-06 LAB
ABO + RH BLD: NORMAL
ALBUMIN SERPL BCP-MCNC: 2.7 G/DL (ref 3.5–5.2)
ALP SERPL-CCNC: 133 U/L (ref 55–135)
ALT SERPL W/O P-5'-P-CCNC: 6 U/L (ref 10–44)
ANION GAP SERPL CALC-SCNC: 12 MMOL/L (ref 8–16)
AORTIC ROOT ANNULUS: 3.88 CM
AORTIC VALVE CUSP SEPERATION: 1.85 CM
APTT BLDCRRT: 33.1 SEC (ref 21–32)
APTT BLDCRRT: 38 SEC (ref 21–32)
APTT BLDCRRT: 38.6 SEC (ref 21–32)
ASCENDING AORTA: 3.39 CM
AST SERPL-CCNC: 14 U/L (ref 10–40)
AV INDEX (PROSTH): 0.61
AV MEAN GRADIENT: 5 MMHG
AV PEAK GRADIENT: 10 MMHG
AV VALVE AREA: 1.96 CM2
AV VELOCITY RATIO: 0.61
BASOPHILS # BLD AUTO: 0.03 K/UL (ref 0–0.2)
BASOPHILS # BLD AUTO: 0.03 K/UL (ref 0–0.2)
BASOPHILS NFR BLD: 0.2 % (ref 0–1.9)
BASOPHILS NFR BLD: 0.2 % (ref 0–1.9)
BILIRUB SERPL-MCNC: 1.1 MG/DL (ref 0.1–1)
BLD GP AB SCN CELLS X3 SERPL QL: NORMAL
BNP SERPL-MCNC: 577 PG/ML (ref 0–99)
BSA FOR ECHO PROCEDURE: 2.01 M2
BUN SERPL-MCNC: 45 MG/DL (ref 8–23)
CALCIUM SERPL-MCNC: 8.7 MG/DL (ref 8.7–10.5)
CHLORIDE SERPL-SCNC: 101 MMOL/L (ref 95–110)
CHOLEST SERPL-MCNC: 103 MG/DL (ref 120–199)
CHOLEST/HDLC SERPL: 4.3 {RATIO} (ref 2–5)
CO2 SERPL-SCNC: 24 MMOL/L (ref 23–29)
CREAT SERPL-MCNC: 1.6 MG/DL (ref 0.5–1.4)
CV ECHO LV RWT: 0.57 CM
DIFFERENTIAL METHOD: ABNORMAL
DIFFERENTIAL METHOD: ABNORMAL
DOP CALC AO PEAK VEL: 1.57 M/S
DOP CALC AO VTI: 29.92 CM
DOP CALC LVOT AREA: 3.2 CM2
DOP CALC LVOT DIAMETER: 2.02 CM
DOP CALC LVOT PEAK VEL: 0.96 M/S
DOP CALC LVOT STROKE VOLUME: 58.65 CM3
DOP CALCLVOT PEAK VEL VTI: 18.31 CM
ECHO LV POSTERIOR WALL: 1.19 CM (ref 0.6–1.1)
EOSINOPHIL # BLD AUTO: 0.1 K/UL (ref 0–0.5)
EOSINOPHIL # BLD AUTO: 0.1 K/UL (ref 0–0.5)
EOSINOPHIL NFR BLD: 0.3 % (ref 0–8)
EOSINOPHIL NFR BLD: 0.3 % (ref 0–8)
ERYTHROCYTE [DISTWIDTH] IN BLOOD BY AUTOMATED COUNT: 17.7 % (ref 11.5–14.5)
ERYTHROCYTE [DISTWIDTH] IN BLOOD BY AUTOMATED COUNT: 17.7 % (ref 11.5–14.5)
EST. GFR  (AFRICAN AMERICAN): 44 ML/MIN/1.73 M^2
EST. GFR  (NON AFRICAN AMERICAN): 38 ML/MIN/1.73 M^2
FERRITIN SERPL-MCNC: 1505 NG/ML (ref 20–300)
FOLATE SERPL-MCNC: 10.5 NG/ML (ref 4–24)
FRACTIONAL SHORTENING: 30 % (ref 28–44)
GLUCOSE SERPL-MCNC: 100 MG/DL (ref 70–110)
HCT VFR BLD AUTO: 23.3 % (ref 40–54)
HCT VFR BLD AUTO: 23.3 % (ref 40–54)
HDLC SERPL-MCNC: 24 MG/DL (ref 40–75)
HDLC SERPL: 23.3 % (ref 20–50)
HGB BLD-MCNC: 6.8 G/DL (ref 14–18)
HGB BLD-MCNC: 6.8 G/DL (ref 14–18)
IMM GRANULOCYTES # BLD AUTO: 0.2 K/UL (ref 0–0.04)
IMM GRANULOCYTES # BLD AUTO: 0.2 K/UL (ref 0–0.04)
IMM GRANULOCYTES NFR BLD AUTO: 1.2 % (ref 0–0.5)
IMM GRANULOCYTES NFR BLD AUTO: 1.2 % (ref 0–0.5)
INR PPP: 1.6 (ref 0.8–1.2)
INTERVENTRICULAR SEPTUM: 1.16 CM (ref 0.6–1.1)
IRON SERPL-MCNC: 49 UG/DL (ref 45–160)
IVRT: 83.04 MSEC
LA MAJOR: 6.82 CM
LA MINOR: 7.86 CM
LA WIDTH: 5.52 CM
LDLC SERPL CALC-MCNC: 53.2 MG/DL (ref 63–159)
LEFT ATRIUM SIZE: 5.96 CM
LEFT ATRIUM VOLUME INDEX: 103.3 ML/M2
LEFT ATRIUM VOLUME: 204.23 CM3
LEFT INTERNAL DIMENSION IN SYSTOLE: 2.94 CM (ref 2.1–4)
LEFT VENTRICLE DIASTOLIC VOLUME INDEX: 39.62 ML/M2
LEFT VENTRICLE DIASTOLIC VOLUME: 78.35 ML
LEFT VENTRICLE MASS INDEX: 87 G/M2
LEFT VENTRICLE SYSTOLIC VOLUME INDEX: 16.8 ML/M2
LEFT VENTRICLE SYSTOLIC VOLUME: 33.31 ML
LEFT VENTRICULAR INTERNAL DIMENSION IN DIASTOLE: 4.19 CM (ref 3.5–6)
LEFT VENTRICULAR MASS: 172.13 G
LYMPHOCYTES # BLD AUTO: 0.6 K/UL (ref 1–4.8)
LYMPHOCYTES # BLD AUTO: 0.6 K/UL (ref 1–4.8)
LYMPHOCYTES NFR BLD: 3.6 % (ref 18–48)
LYMPHOCYTES NFR BLD: 3.6 % (ref 18–48)
MCH RBC QN AUTO: 35.4 PG (ref 27–31)
MCH RBC QN AUTO: 35.4 PG (ref 27–31)
MCHC RBC AUTO-ENTMCNC: 29.2 G/DL (ref 32–36)
MCHC RBC AUTO-ENTMCNC: 29.2 G/DL (ref 32–36)
MCV RBC AUTO: 121 FL (ref 82–98)
MCV RBC AUTO: 121 FL (ref 82–98)
MONOCYTES # BLD AUTO: 0.7 K/UL (ref 0.3–1)
MONOCYTES # BLD AUTO: 0.7 K/UL (ref 0.3–1)
MONOCYTES NFR BLD: 4.2 % (ref 4–15)
MONOCYTES NFR BLD: 4.2 % (ref 4–15)
MV PEAK E VEL: 1.15 M/S
MV STENOSIS PRESSURE HALF TIME: 112.45 MS
MV VALVE AREA P 1/2 METHOD: 1.96 CM2
NEUTROPHILS # BLD AUTO: 15.4 K/UL (ref 1.8–7.7)
NEUTROPHILS # BLD AUTO: 15.4 K/UL (ref 1.8–7.7)
NEUTROPHILS NFR BLD: 90.5 % (ref 38–73)
NEUTROPHILS NFR BLD: 90.5 % (ref 38–73)
NONHDLC SERPL-MCNC: 79 MG/DL
NRBC BLD-RTO: 0 /100 WBC
NRBC BLD-RTO: 0 /100 WBC
PISA TR MAX VEL: 2.59 M/S
PLATELET # BLD AUTO: 930 K/UL (ref 150–350)
PLATELET # BLD AUTO: 930 K/UL (ref 150–350)
PMV BLD AUTO: 9.5 FL (ref 9.2–12.9)
PMV BLD AUTO: 9.5 FL (ref 9.2–12.9)
POTASSIUM SERPL-SCNC: 4.7 MMOL/L (ref 3.5–5.1)
PROCALCITONIN SERPL IA-MCNC: 0.55 NG/ML
PROT SERPL-MCNC: 6.4 G/DL (ref 6–8.4)
PROTHROMBIN TIME: 18.2 SEC (ref 9–12.5)
PV PEAK VELOCITY: 0.88 CM/S
RA MAJOR: 5.98 CM
RA PRESSURE: 15 MMHG
RA WIDTH: 5.12 CM
RBC # BLD AUTO: 1.92 M/UL (ref 4.6–6.2)
RBC # BLD AUTO: 1.92 M/UL (ref 4.6–6.2)
RIGHT VENTRICULAR END-DIASTOLIC DIMENSION: 3.88 CM
RV TISSUE DOPPLER FREE WALL SYSTOLIC VELOCITY 1 (APICAL 4 CHAMBER VIEW): 258.65 CM/S
SARS-COV-2 RDRP RESP QL NAA+PROBE: NEGATIVE
SATURATED IRON: 20 % (ref 20–50)
SINUS: 3.82 CM
SODIUM SERPL-SCNC: 137 MMOL/L (ref 136–145)
STJ: 2.65 CM
T4 FREE SERPL-MCNC: 0.84 NG/DL (ref 0.71–1.51)
TOTAL IRON BINDING CAPACITY: 240 UG/DL (ref 250–450)
TR MAX PG: 27 MMHG
TRANSFERRIN SERPL-MCNC: 162 MG/DL (ref 200–375)
TRICUSPID ANNULAR PLANE SYSTOLIC EXCURSION: 0.93 CM
TRIGL SERPL-MCNC: 129 MG/DL (ref 30–150)
TROPONIN I SERPL DL<=0.01 NG/ML-MCNC: 1.06 NG/ML (ref 0–0.03)
TROPONIN I SERPL DL<=0.01 NG/ML-MCNC: 5.4 NG/ML (ref 0–0.03)
TSH SERPL DL<=0.005 MIU/L-ACNC: 5.04 UIU/ML (ref 0.4–4)
TV REST PULMONARY ARTERY PRESSURE: 42 MMHG
VIT B12 SERPL-MCNC: 574 PG/ML (ref 210–950)
WBC # BLD AUTO: 16.99 K/UL (ref 3.9–12.7)
WBC # BLD AUTO: 16.99 K/UL (ref 3.9–12.7)

## 2020-07-06 PROCEDURE — 83880 ASSAY OF NATRIURETIC PEPTIDE: CPT | Mod: HCNC

## 2020-07-06 PROCEDURE — 99223 PR INITIAL HOSPITAL CARE,LEVL III: ICD-10-PCS | Mod: HCNC,,, | Performed by: INTERNAL MEDICINE

## 2020-07-06 PROCEDURE — 63600175 PHARM REV CODE 636 W HCPCS: Mod: HCNC | Performed by: HOSPITALIST

## 2020-07-06 PROCEDURE — 84484 ASSAY OF TROPONIN QUANT: CPT | Mod: 91,HCNC

## 2020-07-06 PROCEDURE — 86920 COMPATIBILITY TEST SPIN: CPT | Mod: HCNC

## 2020-07-06 PROCEDURE — 25000003 PHARM REV CODE 250: Mod: HCNC | Performed by: HOSPITALIST

## 2020-07-06 PROCEDURE — P9021 RED BLOOD CELLS UNIT: HCPCS | Mod: HCNC

## 2020-07-06 PROCEDURE — 99285 EMERGENCY DEPT VISIT HI MDM: CPT | Mod: 25,HCNC

## 2020-07-06 PROCEDURE — 83540 ASSAY OF IRON: CPT | Mod: HCNC

## 2020-07-06 PROCEDURE — 84145 PROCALCITONIN (PCT): CPT | Mod: HCNC

## 2020-07-06 PROCEDURE — 82728 ASSAY OF FERRITIN: CPT | Mod: HCNC

## 2020-07-06 PROCEDURE — 63600175 PHARM REV CODE 636 W HCPCS: Mod: HCNC | Performed by: EMERGENCY MEDICINE

## 2020-07-06 PROCEDURE — 93010 EKG 12-LEAD: ICD-10-PCS | Mod: HCNC,,, | Performed by: INTERNAL MEDICINE

## 2020-07-06 PROCEDURE — 36430 TRANSFUSION BLD/BLD COMPNT: CPT | Mod: HCNC

## 2020-07-06 PROCEDURE — 93010 ELECTROCARDIOGRAM REPORT: CPT | Mod: HCNC,,, | Performed by: INTERNAL MEDICINE

## 2020-07-06 PROCEDURE — 84439 ASSAY OF FREE THYROXINE: CPT | Mod: HCNC

## 2020-07-06 PROCEDURE — 85025 COMPLETE CBC W/AUTO DIFF WBC: CPT | Mod: HCNC

## 2020-07-06 PROCEDURE — 93005 ELECTROCARDIOGRAM TRACING: CPT | Mod: HCNC

## 2020-07-06 PROCEDURE — 84443 ASSAY THYROID STIM HORMONE: CPT | Mod: HCNC

## 2020-07-06 PROCEDURE — 21400001 HC TELEMETRY ROOM: Mod: HCNC

## 2020-07-06 PROCEDURE — 80061 LIPID PANEL: CPT | Mod: HCNC

## 2020-07-06 PROCEDURE — 85730 THROMBOPLASTIN TIME PARTIAL: CPT | Mod: HCNC

## 2020-07-06 PROCEDURE — 85730 THROMBOPLASTIN TIME PARTIAL: CPT | Mod: 91,HCNC

## 2020-07-06 PROCEDURE — 99223 1ST HOSP IP/OBS HIGH 75: CPT | Mod: HCNC,,, | Performed by: INTERNAL MEDICINE

## 2020-07-06 PROCEDURE — 86850 RBC ANTIBODY SCREEN: CPT | Mod: HCNC

## 2020-07-06 PROCEDURE — 82607 VITAMIN B-12: CPT | Mod: HCNC

## 2020-07-06 PROCEDURE — 80053 COMPREHEN METABOLIC PANEL: CPT | Mod: HCNC

## 2020-07-06 PROCEDURE — U0002 COVID-19 LAB TEST NON-CDC: HCPCS | Mod: HCNC

## 2020-07-06 PROCEDURE — 82746 ASSAY OF FOLIC ACID SERUM: CPT | Mod: HCNC

## 2020-07-06 PROCEDURE — 84484 ASSAY OF TROPONIN QUANT: CPT | Mod: HCNC

## 2020-07-06 PROCEDURE — C9113 INJ PANTOPRAZOLE SODIUM, VIA: HCPCS | Mod: HCNC | Performed by: HOSPITALIST

## 2020-07-06 PROCEDURE — 85610 PROTHROMBIN TIME: CPT | Mod: HCNC

## 2020-07-06 PROCEDURE — 87040 BLOOD CULTURE FOR BACTERIA: CPT | Mod: HCNC

## 2020-07-06 PROCEDURE — 36415 COLL VENOUS BLD VENIPUNCTURE: CPT | Mod: HCNC

## 2020-07-06 PROCEDURE — 25000003 PHARM REV CODE 250: Mod: HCNC | Performed by: EMERGENCY MEDICINE

## 2020-07-06 PROCEDURE — 96374 THER/PROPH/DIAG INJ IV PUSH: CPT | Mod: HCNC

## 2020-07-06 RX ORDER — DICYCLOMINE HYDROCHLORIDE 20 MG/1
20 TABLET ORAL EVERY 6 HOURS
COMMUNITY

## 2020-07-06 RX ORDER — VERAPAMIL HYDROCHLORIDE 120 MG/1
120 CAPSULE, EXTENDED RELEASE ORAL DAILY
Status: ON HOLD | COMMUNITY
End: 2020-07-11 | Stop reason: HOSPADM

## 2020-07-06 RX ORDER — ALPRAZOLAM 0.25 MG/1
TABLET ORAL 3 TIMES DAILY
COMMUNITY

## 2020-07-06 RX ORDER — WARFARIN 3 MG/1
3 TABLET ORAL DAILY
Status: ON HOLD | COMMUNITY
End: 2020-07-11 | Stop reason: HOSPADM

## 2020-07-06 RX ORDER — ACETAMINOPHEN 325 MG/1
650 TABLET ORAL EVERY 8 HOURS PRN
Status: DISCONTINUED | OUTPATIENT
Start: 2020-07-06 | End: 2020-07-11 | Stop reason: HOSPADM

## 2020-07-06 RX ORDER — HYDROXYUREA 500 MG/1
500 CAPSULE ORAL DAILY
COMMUNITY

## 2020-07-06 RX ORDER — SODIUM CHLORIDE 0.9 % (FLUSH) 0.9 %
10 SYRINGE (ML) INJECTION
Status: DISCONTINUED | OUTPATIENT
Start: 2020-07-06 | End: 2020-07-11 | Stop reason: HOSPADM

## 2020-07-06 RX ORDER — FERROUS SULFATE 325(65) MG
325 TABLET ORAL
COMMUNITY

## 2020-07-06 RX ORDER — MORPHINE SULFATE 10 MG/ML
4 INJECTION, SOLUTION INTRAMUSCULAR; INTRAVENOUS EVERY 4 HOURS PRN
Status: DISCONTINUED | OUTPATIENT
Start: 2020-07-06 | End: 2020-07-07

## 2020-07-06 RX ORDER — GABAPENTIN 300 MG/1
300 CAPSULE ORAL 3 TIMES DAILY
COMMUNITY

## 2020-07-06 RX ORDER — ONDANSETRON 2 MG/ML
4 INJECTION INTRAMUSCULAR; INTRAVENOUS EVERY 8 HOURS PRN
Status: DISCONTINUED | OUTPATIENT
Start: 2020-07-06 | End: 2020-07-06

## 2020-07-06 RX ORDER — ONDANSETRON 2 MG/ML
4 INJECTION INTRAMUSCULAR; INTRAVENOUS EVERY 8 HOURS PRN
Status: DISCONTINUED | OUTPATIENT
Start: 2020-07-06 | End: 2020-07-11 | Stop reason: HOSPADM

## 2020-07-06 RX ORDER — CHOLECALCIFEROL (VITAMIN D3) 25 MCG
1000 TABLET ORAL DAILY
Status: ON HOLD | COMMUNITY
End: 2020-07-11 | Stop reason: HOSPADM

## 2020-07-06 RX ORDER — FAMOTIDINE 20 MG/1
20 TABLET, FILM COATED ORAL DAILY
Status: DISCONTINUED | OUTPATIENT
Start: 2020-07-06 | End: 2020-07-07

## 2020-07-06 RX ORDER — SENNOSIDES 8.6 MG/1
1 TABLET ORAL DAILY
Status: ON HOLD | COMMUNITY
End: 2020-07-11 | Stop reason: HOSPADM

## 2020-07-06 RX ORDER — IBUPROFEN 600 MG/1
600 TABLET ORAL EVERY 6 HOURS PRN
COMMUNITY

## 2020-07-06 RX ORDER — HYDROCODONE BITARTRATE AND ACETAMINOPHEN 500; 5 MG/1; MG/1
TABLET ORAL
Status: DISCONTINUED | OUTPATIENT
Start: 2020-07-06 | End: 2020-07-11 | Stop reason: HOSPADM

## 2020-07-06 RX ORDER — SODIUM CHLORIDE 9 MG/ML
INJECTION, SOLUTION INTRAVENOUS CONTINUOUS
Status: DISCONTINUED | OUTPATIENT
Start: 2020-07-06 | End: 2020-07-07

## 2020-07-06 RX ORDER — PANTOPRAZOLE SODIUM 40 MG/10ML
40 INJECTION, POWDER, LYOPHILIZED, FOR SOLUTION INTRAVENOUS EVERY 12 HOURS
Status: DISCONTINUED | OUTPATIENT
Start: 2020-07-06 | End: 2020-07-11 | Stop reason: HOSPADM

## 2020-07-06 RX ORDER — BALSAM PERU/CASTOR OIL
OINTMENT (GRAM) TOPICAL 2 TIMES DAILY
Status: DISCONTINUED | OUTPATIENT
Start: 2020-07-06 | End: 2020-07-11 | Stop reason: HOSPADM

## 2020-07-06 RX ORDER — PREDNISONE 5 MG/1
5 TABLET ORAL DAILY
COMMUNITY

## 2020-07-06 RX ORDER — ERGOCALCIFEROL 1.25 MG/1
50000 CAPSULE ORAL
COMMUNITY

## 2020-07-06 RX ORDER — CLOPIDOGREL 300 MG/1
300 TABLET, FILM COATED ORAL
Status: COMPLETED | OUTPATIENT
Start: 2020-07-06 | End: 2020-07-06

## 2020-07-06 RX ORDER — POLYETHYLENE GLYCOL 3350 17 G/17G
POWDER, FOR SOLUTION ORAL
Status: ON HOLD | COMMUNITY
End: 2020-07-11 | Stop reason: HOSPADM

## 2020-07-06 RX ORDER — AMOXICILLIN 250 MG
1 CAPSULE ORAL 2 TIMES DAILY
Status: DISCONTINUED | OUTPATIENT
Start: 2020-07-06 | End: 2020-07-11 | Stop reason: HOSPADM

## 2020-07-06 RX ORDER — OXYCODONE HCL 10 MG/1
10 TABLET, FILM COATED, EXTENDED RELEASE ORAL EVERY 12 HOURS PRN
COMMUNITY

## 2020-07-06 RX ORDER — HEPARIN SODIUM,PORCINE/D5W 25000/250
12 INTRAVENOUS SOLUTION INTRAVENOUS CONTINUOUS
Status: DISCONTINUED | OUTPATIENT
Start: 2020-07-06 | End: 2020-07-07

## 2020-07-06 RX ORDER — WARFARIN 2.5 MG/1
2.5 TABLET ORAL DAILY
Status: ON HOLD | COMMUNITY
End: 2020-07-11 | Stop reason: HOSPADM

## 2020-07-06 RX ORDER — MORPHINE SULFATE 10 MG/ML
2 INJECTION, SOLUTION INTRAMUSCULAR; INTRAVENOUS EVERY 4 HOURS PRN
Status: DISCONTINUED | OUTPATIENT
Start: 2020-07-06 | End: 2020-07-07

## 2020-07-06 RX ORDER — MIRTAZAPINE 7.5 MG/1
7.5 TABLET, FILM COATED ORAL NIGHTLY
COMMUNITY

## 2020-07-06 RX ORDER — ONDANSETRON 4 MG/1
4 TABLET, FILM COATED ORAL EVERY 8 HOURS PRN
COMMUNITY

## 2020-07-06 RX ORDER — MORPHINE SULFATE 10 MG/ML
2 INJECTION INTRAMUSCULAR; INTRAVENOUS; SUBCUTANEOUS
Status: COMPLETED | OUTPATIENT
Start: 2020-07-06 | End: 2020-07-06

## 2020-07-06 RX ADMIN — DOCUSATE SODIUM 50 MG AND SENNOSIDES 8.6 MG 1 TABLET: 8.6; 5 TABLET, FILM COATED ORAL at 09:07

## 2020-07-06 RX ADMIN — SODIUM CHLORIDE: 0.9 INJECTION, SOLUTION INTRAVENOUS at 10:07

## 2020-07-06 RX ADMIN — HEPARIN SODIUM 12 UNITS/KG/HR: 10000 INJECTION, SOLUTION INTRAVENOUS at 05:07

## 2020-07-06 RX ADMIN — MORPHINE SULFATE 2 MG: 10 INJECTION INTRAVENOUS at 06:07

## 2020-07-06 RX ADMIN — SODIUM CHLORIDE: 0.9 INJECTION, SOLUTION INTRAVENOUS at 09:07

## 2020-07-06 RX ADMIN — CASTOR OIL AND BALSAM, PERU: 788; 87 OINTMENT TOPICAL at 10:07

## 2020-07-06 RX ADMIN — CLOPIDOGREL BISULFATE 300 MG: 300 TABLET, FILM COATED ORAL at 05:07

## 2020-07-06 RX ADMIN — HEPARIN SODIUM 14 UNITS/KG/HR: 10000 INJECTION, SOLUTION INTRAVENOUS at 07:07

## 2020-07-06 RX ADMIN — PANTOPRAZOLE SODIUM 40 MG: 40 INJECTION, POWDER, FOR SOLUTION INTRAVENOUS at 11:07

## 2020-07-06 RX ADMIN — FAMOTIDINE 20 MG: 20 TABLET ORAL at 09:07

## 2020-07-06 RX ADMIN — DOCUSATE SODIUM 50 MG AND SENNOSIDES 8.6 MG 1 TABLET: 8.6; 5 TABLET, FILM COATED ORAL at 10:07

## 2020-07-06 NOTE — ASSESSMENT & PLAN NOTE
Presented with infpost STEMI on EKG with CP.  Now feeling better.  DNR noted->plan med rx.  Pt declines intervention.  Cont heparin gtt, careful with anemia.  Will need eventual resumption of coumadin for goal INR 2.0-3.0 assuming no evidence of GI bleeding  Assuming no GIB, would also add Plavix 75mg qd.  Add BBL/ACEi if BP will allow  Check lipids and start statin  Check echo

## 2020-07-06 NOTE — CONSULTS
[facility]  Cardiology  Consult Note    Patient Name: Ernie Phan  MRN: [id number]  Admission Date: 7/6/2020  Hospital Length of Stay: 0 days  Code Status: DNR   Attending Provider: Duran Lares MD   Consulting Provider: Branden Scott MD  Primary Care Physician: Ernie Michel MD  Principal Problem:STEMI (ST elevation myocardial infarction)    Patient information was obtained from patient, relative(s) and ER records.     Inpatient consult to Cardiology  Consult performed by: Branden Scott MD  Consult ordered by: Harvey King MD  Reason for consult: STEMI        Subjective:     Chief Complaint:  CP     HPI:   89-year-old male presenting with constant left-sided chest pain with radiation the shoulder that woke him from his sleep.  Per EMS and patient, pain started approximately 1:00 a.m..  Patient currently resides at a nursing home.  Notes mild shortness of breath, mild nausea, denies vomiting or diaphoresis.  Reports pain has significantly improved and he now only has some pain in his left shoulder.  Denies fevers, chills, cough.  Reports previously being in his usual state of health.  On EMS arrival, they reported possible STEMI.  Blood pressure was in the 80 systolic, nitroglycerin with held though aspirin given by EMS.  Patient has a history of skin cancer, denies prior heart attacks.    The patient follows with Dr. Tate at [facility] for atrial fibrillation for which a rate control/anticoagulation with Coumadin strategy has been utilized.  See MRN [id number]    Cardiology is consulted for medical management of STEMI.  I was consulted and discussed the case with Dr. King earlier this morning.  Patient presented with chest discomfort from a nursing home.  Inferoposterior ST elevation was noted.  The patient also is DNR, and wishes to remain DNR.  Given this wish, we will manage him medically.  He understands that this may lead to heart failure and early  mortality.  At present, he appears comfortable.  His main complaint appears to be that he is cold.  He also has a history of atrial fibrillation for which he is maintained on Coumadin.  He denies any overt GI bleeding, although his hemoglobin is quite low.  He also reports a history of an enlarged heart, but no prior history of coronary artery disease.  His daughter is at the bedside.    Past Medical History:   Diagnosis Date    A-fib     Anxiety     BPH (benign prostatic hyperplasia)     Cancer     squamous cell carinoma of skin    Cellulitis of right lower limb     Constipation     Diabetes mellitus     GERD (gastroesophageal reflux disease)     Hyperlipidemia     Hypertension     Iron deficiency anemia     Major depressive disorder     Myasthenia gravis     Osteoarthritis     Other postherpetic nervous system involvement     Renal disorder     Thrombocythemia     Vitamin D deficiency        Past Surgical History:   Procedure Laterality Date    ADENOIDECTOMY      CHOLECYSTECTOMY      TONSILLECTOMY         Review of patient's allergies indicates:  No Known Allergies    No current facility-administered medications on file prior to encounter.      Current Outpatient Medications on File Prior to Encounter   Medication Sig    ALPRAZolam (XANAX) 0.25 MG tablet Take by mouth 3 (three) times daily.    dicyclomine (BENTYL) 20 mg tablet Take 20 mg by mouth every 6 (six) hours.    ergocalciferol (VITAMIN D2) 50,000 unit Cap Take 50,000 Units by mouth every 7 days.    ferrous sulfate (FEOSOL) 325 mg (65 mg iron) Tab tablet Take 325 mg by mouth daily with breakfast.    gabapentin (NEURONTIN) 300 MG capsule Take 300 mg by mouth 3 (three) times daily.    hydroxyurea (HYDREA) 500 mg Cap Take 500 mg by mouth once daily.    ibuprofen (ADVIL,MOTRIN) 600 MG tablet Take 600 mg by mouth every 6 (six) hours as needed for Pain.    mirtazapine (REMERON) 7.5 MG Tab Take 7.5 mg by mouth every evening.     "ondansetron (ZOFRAN) 4 MG tablet Take 4 mg by mouth every 8 (eight) hours as needed for Nausea.    oxyCODONE (OXYCONTIN) 10 mg 12 hr tablet Take 10 mg by mouth every 12 (twelve) hours as needed for Pain.    polyethylene glycol (GLYCOLAX) 17 gram PwPk Take by mouth.    predniSONE (DELTASONE) 5 MG tablet Take 5 mg by mouth once daily.    senna (SENOKOT) 8.6 mg tablet Take 1 tablet by mouth once daily.    verapamiL (VERELAN) 120 MG C24P Take 120 mg by mouth once daily.    vitamin D (VITAMIN D3) 1000 units Tab Take 1,000 Units by mouth once daily.    warfarin (COUMADIN) 2.5 MG tablet Take 2.5 mg by mouth once daily. Q Wed, Thurs, Fri, Sat    warfarin (COUMADIN) 3 MG tablet Take 3 mg by mouth once daily. PO Q Sun, Mon and Tues     Family History     None        Tobacco Use    Smoking status: Former Smoker    Tobacco comment: 60 years ago    Substance and Sexual Activity    Alcohol use: Yes     Comment: "I hardly ever drink"     Drug use: Never    Sexual activity: Not on file     Review of Systems   Constitution: Positive for malaise/fatigue. Negative for chills, diaphoresis and fever.   HENT: Negative for nosebleeds.    Eyes: Negative for blurred vision and double vision.   Cardiovascular: Positive for chest pain. Negative for claudication, cyanosis, dyspnea on exertion, leg swelling, orthopnea, palpitations, paroxysmal nocturnal dyspnea and syncope.   Respiratory: Negative for cough, shortness of breath and wheezing.    Skin: Negative for dry skin and poor wound healing.   Musculoskeletal: Negative for back pain, joint swelling and myalgias.   Gastrointestinal: Negative for abdominal pain, nausea and vomiting.   Genitourinary: Negative for hematuria.   Neurological: Negative for dizziness, headaches, numbness, seizures and weakness.   Psychiatric/Behavioral: Negative for altered mental status and depression.     Objective:     Vital Signs (Most Recent):  Temp: 98.3 °F (36.8 °C) (07/06/20 0830)  Pulse: 61 " (07/06/20 0830)  Resp: 16 (07/06/20 0830)  BP: (!) 72/37 (07/06/20 0830)  SpO2: 100 % (07/06/20 0830) Vital Signs (24h Range):  Temp:  [97.6 °F (36.4 °C)-98.3 °F (36.8 °C)] 98.3 °F (36.8 °C)  Pulse:  [61-97] 61  Resp:  [16-28] 16  SpO2:  [100 %] 100 %  BP: ()/(37-57) 72/37     Weight: 83.9 kg (185 lb)  Body mass index is 28.13 kg/m².    SpO2: 100 %  O2 Device (Oxygen Therapy): nasal cannula    No intake or output data in the 24 hours ending 07/06/20 0857    Lines/Drains/Airways     Peripheral Intravenous Line                 Peripheral IV - Single Lumen 07/06/20 0419 20 G Right Antecubital less than 1 day         Peripheral IV - Single Lumen 07/06/20 20 G Left Antecubital less than 1 day                Physical Exam   Constitutional: No distress.   Elderly man, NAD   HENT:   Head: Normocephalic and atraumatic.   Eyes: Pupils are equal, round, and reactive to light. Conjunctivae and EOM are normal. No scleral icterus.   Neck: Normal range of motion. Neck supple. No JVD present. No tracheal deviation present. No thyromegaly present.   Cardiovascular: Normal rate, S1 normal and S2 normal. An irregularly irregular rhythm present. Exam reveals distant heart sounds. Exam reveals no gallop and no friction rub.   No murmur heard.  Pulmonary/Chest: Effort normal and breath sounds normal. No respiratory distress. He has no wheezes. He has no rales. He exhibits no tenderness.   Abdominal: Soft. He exhibits no distension.   Musculoskeletal: Normal range of motion.         General: No edema.   Neurological: He is alert. He has normal strength. A cranial nerve deficit (presbycusis) is present.   Skin: Skin is warm and dry. No rash noted. He is not diaphoretic.   Psychiatric: He has a normal mood and affect. His behavior is normal.       Current Medications:   famotidine  20 mg Oral Daily    senna-docusate 8.6-50 mg  1 tablet Oral BID      sodium chloride 0.9%      heparin (porcine) in D5W 12 Units/kg/hr (07/06/20 0570)      acetaminophen, acetaminophen, heparin (PORCINE), heparin (PORCINE), morphine, morphine, ondansetron, sodium chloride 0.9%    Laboratory (all labs reviewed):  CBC:  Recent Labs   Lab 07/06/20  0430   WBC 16.99 H  16.99 H   Hemoglobin 6.8 L  6.8 L   Hematocrit 23.3 L  23.3 L   Platelets 930 H  930 H       CHEMISTRIES:  Recent Labs   Lab 07/06/20  0430   Glucose 100   Sodium 137   Potassium 4.7   BUN, Bld 45 H   Creatinine 1.6 H   eGFR if  44 A   eGFR if non African American 38 A   Calcium 8.7       CARDIAC BIOMARKERS:  Recent Labs   Lab 07/06/20  0430   Troponin I 1.057 H       COAGS:  Recent Labs   Lab 07/06/20  0430   INR 1.6 H       LIPIDS/LFTS:  Recent Labs   Lab 07/06/20  0430   AST 14   ALT 6 L       BNP:  Recent Labs   Lab 07/06/20  0430    H       TSH:        Free T4:        Diagnostic Results:  ECG (personally reviewed and interpreted tracing(s)):  7/6/20 0419 AF 89, IPMI-acute    Chest X-Ray (personally reviewed and interpreted image(s)): 7/6/20 NAD    Echo: ordered  12/9/19  · Normal left ventricular systolic function. The estimated ejection fraction is 65%  · No wall motion abnormalities.  · Normal right ventricular systolic function.  · Severe biatrial enlargement.  · Aortic valve area is 1.62 cm2; peak velocity is 2.51 m/s; mean gradient is 14 mmHg.  · Mild aortic valve stenosis.  · Mild aortic regurgitation.  · Mild mitral regurgitation.  · Mild to moderate tricuspid regurgitation.  · The estimated PA systolic pressure is 63 mm Hg  · Pulmonary hypertension present.  · Normal central venous pressure (3 mm Hg).  · Atrial fibrillation observed.      Assessment and Plan:     * STEMI (ST elevation myocardial infarction)  Presented with infpost STEMI on EKG with CP.  Now feeling better.  DNR noted->plan med rx.  Pt declines intervention.  Cont heparin gtt, careful with anemia.  Will need eventual resumption of coumadin for goal INR 2.0-3.0 assuming no evidence of GI  bleeding  Assuming no GIB, would also add Plavix 75mg qd.  Add BBL/ACEi if BP will allow  Check lipids and start statin  Check echo    Hypertension  Med rx on hold with low BP    HLD (hyperlipidemia)  Start statin  Check lipids    Permanent atrial fibrillation  Follows with Dr. Tate with strategy of rate control/OAC (coumadin)  Cont heparin gtt  Need GI eval  Eventual resume coumadin or (preferably) DOAC    Chronic kidney disease, stage III (moderate)  Monitor creat    Anemia  Baseline HGB 8.5, now <7  ?GI eval        VTE Risk Mitigation (From admission, onward)         Ordered     IP VTE HIGH RISK PATIENT  Once      07/06/20 0817     Place sequential compression device  Until discontinued      07/06/20 0817     Place STELLA hose  Until discontinued      07/06/20 0817     heparin 25,000 units in dextrose 5% (100 units/ml) IV bolus from bag - ADDITIONAL PRN BOLUS - 60 units/kg (max bolus 4000 units)  As needed (PRN)     Question:  Heparin Infusion Adjustment (DO NOT MODIFY ANSWER)  Answer:  [email or web address]\epic\Images\Pharmacy\HeparinInfusions\heparin LOW INTENSITY nomogram for [facility] [id number]    07/06/20 0448     heparin 25,000 units in dextrose 5% (100 units/ml) IV bolus from bag - ADDITIONAL PRN BOLUS - 30 units/kg (max bolus 4000 units)  As needed (PRN)     Question:  Heparin Infusion Adjustment (DO NOT MODIFY ANSWER)  Answer:  [email or web address]\epic\Images\Pharmacy\HeparinInfusions\heparin LOW INTENSITY nomogram for [facility] [id number].pdf    07/06/20 0448     heparin 25,000 units in dextrose 5% 250 mL (100 units/mL) infusion LOW INTENSITY nomogram - [facility]  Continuous     Question:  Heparin Infusion Adjustment (DO NOT MODIFY ANSWER)  Answer:  [email or web address]\epic\Images\Pharmacy\HeparinInfusions\heparin LOW INTENSITY nomogram for [facility] [id number].pdf    07/06/20 0448                Thank you for your consult. I will follow-up with patient. Please contact us if you have any additional questions.    Branden Scott MD  Cardiology    [facility] - [place], [place]

## 2020-07-06 NOTE — SUBJECTIVE & OBJECTIVE
"    Past Medical History:   Diagnosis Date    A-fib     Anxiety     BPH (benign prostatic hyperplasia)     Cancer     squamous cell carinoma of skin    Cellulitis of right lower limb     Constipation     Diabetes mellitus     GERD (gastroesophageal reflux disease)     Hyperlipidemia     Hypertension     Iron deficiency anemia     Major depressive disorder     Myasthenia gravis     Osteoarthritis     Other postherpetic nervous system involvement     Renal disorder     Thrombocythemia     Vitamin D deficiency        Past Surgical History:   Procedure Laterality Date    ADENOIDECTOMY      CHOLECYSTECTOMY      TONSILLECTOMY         Review of patient's allergies indicates:  No Known Allergies    Medications:  Continuous Infusions:   sodium chloride 0.9% 500 mL/hr at 07/06/20 0901    heparin (porcine) in D5W 12 Units/kg/hr (07/06/20 0545)     Scheduled Meds:   famotidine  20 mg Oral Daily    senna-docusate 8.6-50 mg  1 tablet Oral BID     PRN Meds:sodium chloride, acetaminophen, acetaminophen, heparin (PORCINE), heparin (PORCINE), morphine, morphine, ondansetron, sodium chloride 0.9%    Family History     None        Tobacco Use    Smoking status: Former Smoker    Tobacco comment: 60 years ago    Substance and Sexual Activity    Alcohol use: Yes     Comment: "I hardly ever drink"     Drug use: Never    Sexual activity: Not on file       Review of Systems   Constitutional: Positive for activity change, appetite change and fatigue.   Respiratory: Positive for chest tightness.    Cardiovascular: Positive for chest pain.        Dullness to left chest area   Gastrointestinal: Negative.    Musculoskeletal: Negative.    Skin: Positive for wound (RLE).        Patient reports he has a cellulitis      Objective:     Vital Signs (Most Recent):  Temp: 98.3 °F (36.8 °C) (07/06/20 0830)  Pulse: 61 (07/06/20 0830)  Resp: 16 (07/06/20 0830)  BP: (!) 72/37 (07/06/20 0830)  SpO2: 100 % (07/06/20 0830) Vital " Signs (24h Range):  Temp:  [97.6 °F (36.4 °C)-98.3 °F (36.8 °C)] 98.3 °F (36.8 °C)  Pulse:  [61-97] 61  Resp:  [16-28] 16  SpO2:  [100 %] 100 %  BP: ()/(37-57) 72/37     Weight: 83.9 kg (185 lb)  Body mass index is 28.13 kg/m².    Physical Exam  Vitals signs and nursing note reviewed.   Constitutional:       Comments: Ill appearing, very pale   HENT:      Head: Normocephalic and atraumatic.   Cardiovascular:      Rate and Rhythm: Rhythm irregular.   Pulmonary:      Effort: Pulmonary effort is normal. No respiratory distress.      Breath sounds: Normal breath sounds.      Comments: Oxygen BNC  Abdominal:      General: Abdomen is flat. Bowel sounds are normal.      Palpations: Abdomen is soft.   Musculoskeletal:      Right lower leg: Edema present.      Comments: Right foot rotated outward, 2 + edema in right foot   Skin:     General: Skin is dry.      Coloration: Skin is pale.      Findings: Lesion (inner aspect of RLE ) present.   Neurological:      Mental Status: He is alert and oriented to person, place, and time.         Advance Care Planning   Review of Symptoms    Symptom Assessment (ESAS 0-10 Scale)  Pain:  3  Dyspnea:  0  Anxiety:  0  Nausea:  0  Depression:  0  Anorexia:  0  Fatigue:  3  Insomnia:  0  Restlessness:  0  Agitation:  0     CAM / Delirium:  Negative  Constipation:  Negative  Diarrhea:  Negative    Bowel Management Plan (BMP):  Yes      Comments:  On senna s    Pain Assessment:  Location(s): chest    Chest       Chest Location:  Left       Quality CHEST: dull      Performance Status:  20    ECOG Performance Status Grade:  4 - Completely disabled    Advanced Directives:  Living Will: No    LaPOST: No    Do Not Resuscitate Status:  Yes    Medical Power of : No      Decision Making:  Patient answered questions and Family answered questions    Living Arrangements:  Lives in nursing home    Psychosocial/Cultural:  Patient lost his spouse last year and has since been in nursing facility.  Due to Covid there has been little to no interaction with other residents and he has not been able to see his family. All of which have most likely contributed to his significant decline over the past 6 months.       Time-Based Charting:  Yes  Chart Review: 20 minutes  Face to Face: 10 minutes  Symptom Assessment: 10 minutes  Coordination of Care: 10 minutes    Total Time Spent: 50 minutes         Significant Labs: All pertinent labs within the past 24 hours have been reviewed.  CBC:   Recent Labs   Lab 20  0430   WBC 16.99*  16.99*   HGB 6.8*  6.8*   HCT 23.3*  23.3*   *  121*   *  930*     BMP:  Recent Labs   Lab 20  0430         K 4.7      CO2 24   BUN 45*   CREATININE 1.6*   CALCIUM 8.7     LFT:  Lab Results   Component Value Date    AST 14 2020    ALKPHOS 133 2020    BILITOT 1.1 (H) 2020     Albumin:   Albumin   Date Value Ref Range Status   2020 2.7 (L) 3.5 - 5.2 g/dL Final     Protein:   Total Protein   Date Value Ref Range Status   2020 6.4 6.0 - 8.4 g/dL Final     Lactic acid:   No results found for: LACTATE    Significant Imaging: I have reviewed all pertinent imaging results/findings within the past 24 hours.       Discussed patient's clinical condition with Hospitalist Dr Duran Lares and Dr Sonia KIM prior to my encounter    Assessment/Plan:    Palliative encounter:     Patient is alert and oriented but a little irritable. He is covered with 2 blankets and c/o being cold and that he doesn't feel well. He reports he has a dullness in his left chest area but its not like the pain he had when he came in this am.   His daughter is at the bedside and has not been able to see the patient for several months with Covid restrictions and is tearful as she talks about the decline in patient and that he had been her rock since her mother  last year.    Patient is very tired and having a hard time concentrating on our discussion.  He is to receive transfusion today so we will discuss GOC once he is feeling a little better. Emotional support provided

## 2020-07-06 NOTE — NURSING TRANSFER
Nursing Transfer Note      7/6/2020     Transfer From: ED      Transfer via bed      Transfer with  O2, cardiac monitoring      Transported by Transporter, Nurse      Medicines sent: No      Chart send with patient: Yes      Notified: daughter        Patient reassessed at: 07/06/2020, 0820        Upon arrival to floor: cardiac monitor applied, patient oriented to room, call bell in reach and bed in lowest position

## 2020-07-06 NOTE — PROGRESS NOTES
Pharmacist Renal Dose Adjustment Note    Ernie Phan is a 89 y.o. male being treated with the medication famotidine    Patient Data:    Vital Signs (Most Recent):  Temp: 98.2 °F (36.8 °C) (07/06/20 0722)  Pulse: 72 (07/06/20 0722)  Resp: 19 (07/06/20 0722)  BP: (!) 80/45 (07/06/20 0722)  SpO2: 100 % (07/06/20 0722)   Vital Signs (72h Range):  Temp:  [97.6 °F (36.4 °C)-98.2 °F (36.8 °C)]   Pulse:  [72-97]   Resp:  [19-28]   BP: ()/(45-57)   SpO2:  [100 %]      Recent Labs   Lab 07/06/20 0430   CREATININE 1.6*     Serum creatinine: 1.6 mg/dL (H) 07/06/20 0430  Estimated creatinine clearance: 33 mL/min (A)    Medication:famotidine dose: 20 mg frequency q12h will be changed to medication:famotidine dose:20 mg frequency:q24h    Pharmacist's Name: Santiago Browning  Pharmacist's Extension: [phone]

## 2020-07-06 NOTE — SUBJECTIVE & OBJECTIVE
Past Medical History:   Diagnosis Date    A-fib     Anxiety     BPH (benign prostatic hyperplasia)     Cancer     squamous cell carinoma of skin    Cellulitis of right lower limb     Constipation     Diabetes mellitus     GERD (gastroesophageal reflux disease)     Hyperlipidemia     Hypertension     Iron deficiency anemia     Major depressive disorder     Myasthenia gravis     Osteoarthritis     Other postherpetic nervous system involvement     Renal disorder     Thrombocythemia     Vitamin D deficiency        Past Surgical History:   Procedure Laterality Date    ADENOIDECTOMY      CHOLECYSTECTOMY      TONSILLECTOMY         Review of patient's allergies indicates:  No Known Allergies    No current facility-administered medications on file prior to encounter.      Current Outpatient Medications on File Prior to Encounter   Medication Sig    ALPRAZolam (XANAX) 0.25 MG tablet Take by mouth 3 (three) times daily.    dicyclomine (BENTYL) 20 mg tablet Take 20 mg by mouth every 6 (six) hours.    ergocalciferol (VITAMIN D2) 50,000 unit Cap Take 50,000 Units by mouth every 7 days.    ferrous sulfate (FEOSOL) 325 mg (65 mg iron) Tab tablet Take 325 mg by mouth daily with breakfast.    gabapentin (NEURONTIN) 300 MG capsule Take 300 mg by mouth 3 (three) times daily.    hydroxyurea (HYDREA) 500 mg Cap Take 500 mg by mouth once daily.    ibuprofen (ADVIL,MOTRIN) 600 MG tablet Take 600 mg by mouth every 6 (six) hours as needed for Pain.    mirtazapine (REMERON) 7.5 MG Tab Take 7.5 mg by mouth every evening.    ondansetron (ZOFRAN) 4 MG tablet Take 4 mg by mouth every 8 (eight) hours as needed for Nausea.    oxyCODONE (OXYCONTIN) 10 mg 12 hr tablet Take 10 mg by mouth every 12 (twelve) hours as needed for Pain.    polyethylene glycol (GLYCOLAX) 17 gram PwPk Take by mouth.    predniSONE (DELTASONE) 5 MG tablet Take 5 mg by mouth once daily.    senna (SENOKOT) 8.6 mg tablet Take 1 tablet by mouth  "once daily.    verapamiL (VERELAN) 120 MG C24P Take 120 mg by mouth once daily.    vitamin D (VITAMIN D3) 1000 units Tab Take 1,000 Units by mouth once daily.    warfarin (COUMADIN) 2.5 MG tablet Take 2.5 mg by mouth once daily. Q Wed, Thurs, Fri, Sat    warfarin (COUMADIN) 3 MG tablet Take 3 mg by mouth once daily. PO Q Sun, Mon and Tues     Family History     None        Tobacco Use    Smoking status: Former Smoker    Tobacco comment: 60 years ago    Substance and Sexual Activity    Alcohol use: Yes     Comment: "I hardly ever drink"     Drug use: Never    Sexual activity: Not on file     Review of Systems   Constitution: Positive for malaise/fatigue. Negative for chills, diaphoresis and fever.   HENT: Negative for nosebleeds.    Eyes: Negative for blurred vision and double vision.   Cardiovascular: Positive for chest pain. Negative for claudication, cyanosis, dyspnea on exertion, leg swelling, orthopnea, palpitations, paroxysmal nocturnal dyspnea and syncope.   Respiratory: Negative for cough, shortness of breath and wheezing.    Skin: Negative for dry skin and poor wound healing.   Musculoskeletal: Negative for back pain, joint swelling and myalgias.   Gastrointestinal: Negative for abdominal pain, nausea and vomiting.   Genitourinary: Negative for hematuria.   Neurological: Negative for dizziness, headaches, numbness, seizures and weakness.   Psychiatric/Behavioral: Negative for altered mental status and depression.     Objective:     Vital Signs (Most Recent):  Temp: 98.3 °F (36.8 °C) (07/06/20 0830)  Pulse: 61 (07/06/20 0830)  Resp: 16 (07/06/20 0830)  BP: (!) 72/37 (07/06/20 0830)  SpO2: 100 % (07/06/20 0830) Vital Signs (24h Range):  Temp:  [97.6 °F (36.4 °C)-98.3 °F (36.8 °C)] 98.3 °F (36.8 °C)  Pulse:  [61-97] 61  Resp:  [16-28] 16  SpO2:  [100 %] 100 %  BP: ()/(37-57) 72/37     Weight: 83.9 kg (185 lb)  Body mass index is 28.13 kg/m².    SpO2: 100 %  O2 Device (Oxygen Therapy): nasal " cannula    No intake or output data in the 24 hours ending 07/06/20 0857    Lines/Drains/Airways     Peripheral Intravenous Line                 Peripheral IV - Single Lumen 07/06/20 0419 20 G Right Antecubital less than 1 day         Peripheral IV - Single Lumen 07/06/20 20 G Left Antecubital less than 1 day                Physical Exam   Constitutional: No distress.   Elderly man, NAD   HENT:   Head: Normocephalic and atraumatic.   Eyes: Pupils are equal, round, and reactive to light. Conjunctivae and EOM are normal. No scleral icterus.   Neck: Normal range of motion. Neck supple. No JVD present. No tracheal deviation present. No thyromegaly present.   Cardiovascular: Normal rate, S1 normal and S2 normal. An irregularly irregular rhythm present. Exam reveals distant heart sounds. Exam reveals no gallop and no friction rub.   No murmur heard.  Pulmonary/Chest: Effort normal and breath sounds normal. No respiratory distress. He has no wheezes. He has no rales. He exhibits no tenderness.   Abdominal: Soft. He exhibits no distension.   Musculoskeletal: Normal range of motion.         General: No edema.   Neurological: He is alert. He has normal strength. A cranial nerve deficit (presbycusis) is present.   Skin: Skin is warm and dry. No rash noted. He is not diaphoretic.   Psychiatric: He has a normal mood and affect. His behavior is normal.       Current Medications:   famotidine  20 mg Oral Daily    senna-docusate 8.6-50 mg  1 tablet Oral BID      sodium chloride 0.9%      heparin (porcine) in D5W 12 Units/kg/hr (07/06/20 0545)     acetaminophen, acetaminophen, heparin (PORCINE), heparin (PORCINE), morphine, morphine, ondansetron, sodium chloride 0.9%    Laboratory (all labs reviewed):  CBC:  Recent Labs   Lab 07/06/20  0430   WBC 16.99 H  16.99 H   Hemoglobin 6.8 L  6.8 L   Hematocrit 23.3 L  23.3 L   Platelets 930 H  930 H       CHEMISTRIES:  Recent Labs   Lab 07/06/20  0430   Glucose 100   Sodium 137    Potassium 4.7   BUN, Bld 45 H   Creatinine 1.6 H   eGFR if  44 A   eGFR if non African American 38 A   Calcium 8.7       CARDIAC BIOMARKERS:  Recent Labs   Lab 07/06/20  0430   Troponin I 1.057 H       COAGS:  Recent Labs   Lab 07/06/20  0430   INR 1.6 H       LIPIDS/LFTS:  Recent Labs   Lab 07/06/20  0430   AST 14   ALT 6 L       BNP:  Recent Labs   Lab 07/06/20  0430    H       TSH:        Free T4:        Diagnostic Results:  ECG (personally reviewed and interpreted tracing(s)):  7/6/20 0419 AF 89, IPMI-acute    Chest X-Ray (personally reviewed and interpreted image(s)): 7/6/20 NAD    Echo: ordered  12/9/19  · Normal left ventricular systolic function. The estimated ejection fraction is 65%  · No wall motion abnormalities.  · Normal right ventricular systolic function.  · Severe biatrial enlargement.  · Aortic valve area is 1.62 cm2; peak velocity is 2.51 m/s; mean gradient is 14 mmHg.  · Mild aortic valve stenosis.  · Mild aortic regurgitation.  · Mild mitral regurgitation.  · Mild to moderate tricuspid regurgitation.  · The estimated PA systolic pressure is 63 mm Hg  · Pulmonary hypertension present.  · Normal central venous pressure (3 mm Hg).  · Atrial fibrillation observed.

## 2020-07-06 NOTE — H&P
"[facility] Hospital Medicine  History & Physical    Patient Name: Ernie Phan  MRN: [id number]  Admission Date: 2020  Attending Physician: Duran Lares MD   Primary Care Provider: Ernie Michel MD         Patient information was obtained from patient, past medical records and ER records.     Subjective:     Principal Problem:STEMI (ST elevation myocardial infarction)    Chief Complaint:   Chief Complaint   Patient presents with    Chest Pain     pt present to the ED via EMS reports the patient came from [facility] stated the patient was c/o of left anterior wall chest pain that started around 0100. EMS concerned patient is having a STEMI elevation AVF inferior . patient recieved 325 ASA , 1 nitro that  bp from 112/58 to 77/50. denies n/v or current CP.         HPI: Ernie Phan 89 y.o. male PMHX:HTN,CKD,HLD,AFib presents via EMS from [facility] complaining of left-sided chest pain.He reports acute onset around 0100 today of left-sided chest "tightness" pain(6/10) radiating to left shoulder associated with slight SOB and nausea while in in bed watching TV. Per ED notes EMS reported systolic blood pressure in 80,possible and administered ASA by in route to ED. He denies fever/chills,URI symptoms,vomiting/diarrhea,abd/back pain,dysuria or any other associated symptoms.Also denies Heavy ETOH/Smoking/Illicit drug use. Additionally denies any previous history of CVA/TIA/or MI's    last 2D Echo on 19  LVEF65% with severe biatrial enlargement,mild aortic valve stenosis and atrial fibrillation observed.    Admit to hospital medicine for further evaluation and treatment.    Past Medical History:   Diagnosis Date    A-fib     Anxiety     BPH (benign prostatic hyperplasia)     Cancer     squamous cell carinoma of skin    Cellulitis of right lower limb     Constipation     Diabetes mellitus     GERD (gastroesophageal reflux disease)     " Hyperlipidemia     Hypertension     Iron deficiency anemia     Major depressive disorder     Myasthenia gravis     Osteoarthritis     Other postherpetic nervous system involvement     Renal disorder     Thrombocythemia     Vitamin D deficiency        Past Surgical History:   Procedure Laterality Date    ADENOIDECTOMY      CHOLECYSTECTOMY      TONSILLECTOMY         Review of patient's allergies indicates:  No Known Allergies    No current facility-administered medications on file prior to encounter.      Current Outpatient Medications on File Prior to Encounter   Medication Sig    ALPRAZolam (XANAX) 0.25 MG tablet Take by mouth 3 (three) times daily.    dicyclomine (BENTYL) 20 mg tablet Take 20 mg by mouth every 6 (six) hours.    ergocalciferol (VITAMIN D2) 50,000 unit Cap Take 50,000 Units by mouth every 7 days.    ferrous sulfate (FEOSOL) 325 mg (65 mg iron) Tab tablet Take 325 mg by mouth daily with breakfast.    gabapentin (NEURONTIN) 300 MG capsule Take 300 mg by mouth 3 (three) times daily.    hydroxyurea (HYDREA) 500 mg Cap Take 500 mg by mouth once daily.    ibuprofen (ADVIL,MOTRIN) 600 MG tablet Take 600 mg by mouth every 6 (six) hours as needed for Pain.    mirtazapine (REMERON) 7.5 MG Tab Take 7.5 mg by mouth every evening.    ondansetron (ZOFRAN) 4 MG tablet Take 4 mg by mouth every 8 (eight) hours as needed for Nausea.    oxyCODONE (OXYCONTIN) 10 mg 12 hr tablet Take 10 mg by mouth every 12 (twelve) hours as needed for Pain.    polyethylene glycol (GLYCOLAX) 17 gram PwPk Take by mouth.    predniSONE (DELTASONE) 5 MG tablet Take 5 mg by mouth once daily.    senna (SENOKOT) 8.6 mg tablet Take 1 tablet by mouth once daily.    verapamiL (VERELAN) 120 MG C24P Take 120 mg by mouth once daily.    vitamin D (VITAMIN D3) 1000 units Tab Take 1,000 Units by mouth once daily.    warfarin (COUMADIN) 2.5 MG tablet Take 2.5 mg by mouth once daily. Q Wed, Thurs, Fri, Sat    warfarin  "(COUMADIN) 3 MG tablet Take 3 mg by mouth once daily. PO Q Sun, Mon and Tues     Family History     None        Tobacco Use    Smoking status: Former Smoker    Tobacco comment: 60 years ago    Substance and Sexual Activity    Alcohol use: Yes     Comment: "I hardly ever drink"     Drug use: Never    Sexual activity: Not on file     Review of Systems   Constitutional: Negative for chills and fever.   HENT: Negative for congestion, rhinorrhea and sore throat.    Eyes: Negative for visual disturbance.   Respiratory: Positive for chest tightness. Negative for shortness of breath.    Cardiovascular: Positive for chest pain.   Gastrointestinal: Positive for nausea.   Genitourinary: Negative for dysuria.   Musculoskeletal: Negative for arthralgias.   Skin: Positive for wound.   Neurological: Positive for weakness (Chronic). Negative for syncope, light-headedness and headaches.   Hematological: Bruises/bleeds easily.   Psychiatric/Behavioral: Negative for confusion.     Objective:     Vital Signs (Most Recent):  Temp: 98.3 °F (36.8 °C) (07/06/20 0830)  Pulse: 61 (07/06/20 0830)  Resp: 16 (07/06/20 0830)  BP: (!) 72/37 (07/06/20 0830)  SpO2: 100 % (07/06/20 0830) Vital Signs (24h Range):  Temp:  [97.6 °F (36.4 °C)-98.3 °F (36.8 °C)] 98.3 °F (36.8 °C)  Pulse:  [61-97] 61  Resp:  [16-28] 16  SpO2:  [100 %] 100 %  BP: ()/(37-57) 72/37     Weight: 83.9 kg (185 lb)  Body mass index is 28.13 kg/m².    Physical Exam  Vitals signs and nursing note reviewed.   Constitutional:       Appearance: He is ill-appearing.   HENT:      Head: Normocephalic and atraumatic.      Nose: Nose normal.      Mouth/Throat:      Mouth: Mucous membranes are dry.   Eyes:      Comments: Pale conjunctiva.   Cardiovascular:      Rate and Rhythm: Rhythm irregularly irregular.      Pulses: Normal pulses.      Heart sounds: No murmur.   Pulmonary:      Effort: Pulmonary effort is normal.      Breath sounds: Normal breath sounds.   Abdominal:      " General: Abdomen is flat. Bowel sounds are normal.      Palpations: Abdomen is soft.      Tenderness: There is no abdominal tenderness. There is no guarding or rebound.   Musculoskeletal:         General: Tenderness ( Left foot) and deformity ( right foot/externally rotated ) present.      Right lower le+ Edema (Dependent) present.   Skin:     Capillary Refill: Capillary refill takes less than 2 seconds.      Coloration: Skin is pale.      Findings: Bruising and wound (Multiple) present.   Neurological:      Mental Status: He is alert and oriented to person, place, and time.   Psychiatric:         Mood and Affect: Mood normal.         Thought Content: Thought content normal.             Significant Labs:   CBC:   Recent Labs   Lab 20  0430   WBC 16.99*  16.99*   HGB 6.8*  6.8*   HCT 23.3*  23.3*   *  930*     CMP:   Recent Labs   Lab 20  0430      K 4.7      CO2 24      BUN 45*   CREATININE 1.6*   CALCIUM 8.7   PROT 6.4   ALBUMIN 2.7*   BILITOT 1.1*   ALKPHOS 133   AST 14   ALT 6*   ANIONGAP 12   EGFRNONAA 38*     Cardiac Markers:   Recent Labs   Lab 20  0430   *     Coagulation:   Recent Labs   Lab 20  0430   INR 1.6*   APTT 33.1*  33.1*  33.1*     All pertinent labs within the past 24 hours have been reviewed.    Significant Imaging: I have reviewed all pertinent imaging results/findings within the past 24 hours.    Assessment/Plan:     * STEMI (ST elevation myocardial infarction)   Initial troponin 1.057 EKG STEMI, seen by Cardiology and following.Patient and family refused heart catheterization.  -Telemetry  -Heparin drip for now  -on warfarin at home,PT INR daily resume as warranted  -2D Echo  -TSH,UA and Lipid panel.    Leukocytosis  Unclear etiology.According to previous labs on 2020 white count 11> now greater than 16 K. Meets sepsis criteria with hypotension elevated white count, reactive ???.  -Blood cultures  -Urinalysis with reflex  to culture  -Wound care consult-Zosyn will deferred to ID.  -ID consult      Anemia  H/H is 6.6/23.3  -Obtain Blood transfusion consent  -Type& crossmatch for 2 units PRBC infusion.  -CBC  See above #1.      Permanent atrial fibrillation  EKG shows Afib,on home warfarin and Cardiology following.    Thrombocytosis  Chronic,patient's baseline between 500-600,930 today on Coumadin therapy,followed by hematology outpatient, now on a heparin drip 2/2 STEMI.      Wounds, multiple  Present on admission,see photos under media,Wound care consult.      Chronic kidney disease, stage III (moderate)  Renal function at patient at baseline and maintain euvolemic state.  -Strict I/O's  -BMP  -Renally dose medications  -Avoid nephrotoxic agents        HLD (hyperlipidemia)  -Lipid panel  -Continue home treatment regimen  -Cardiac/Low cholesterol diet                Hypertension  Presented with hypotension  -Telemetry  -Vitals  -BMP  -hold home treatment medication regimen for now,resume when stable.          VTE Risk Mitigation (From admission, onward)         Ordered     IP VTE HIGH RISK PATIENT  Once      07/06/20 0817     Place sequential compression device  Until discontinued      07/06/20 0817     Place STELLA hose  Until discontinued      07/06/20 0817     heparin 25,000 units in dextrose 5% (100 units/ml) IV bolus from bag - ADDITIONAL PRN BOLUS - 60 units/kg (max bolus 4000 units)  As needed (PRN)     Question:  Heparin Infusion Adjustment (DO NOT MODIFY ANSWER)  Answer:  [email or web address]\[email or web address]\Images\Pharmacy\HeparinInfusions\heparin LOW INTENSITY nomogram for [facility] [id number].pdf    07/06/20 0448     heparin 25,000 units in dextrose 5% (100 units/ml) IV bolus from bag - ADDITIONAL PRN BOLUS - 30 units/kg (max bolus 4000 units)  As needed (PRN)     Question:  Heparin Infusion Adjustment (DO NOT MODIFY ANSWER)  Answer:  [email or web address]\[email or web address]\Images\Pharmacy\HeparinInfusions\heparin LOW INTENSITY nomogram for [facility] [id number].pdf    07/06/20 0448      heparin 25,000 units in dextrose 5% 250 mL (100 units/mL) infusion LOW INTENSITY nomogram - [facility]  Continuous     Question:  Heparin Infusion Adjustment (DO NOT MODIFY ANSWER)  Answer:  [email or web address]\epic\Images\Pharmacy\HeparinInfusions\heparin LOW INTENSITY nomogram for [facility] [id number].pdf    07/06/20 0448                   OCHOA Avila, FNP-C  Hospitalist - Department of Hospital Medicine  [address] [place] [facility]  Office [phone]; Pager [phone]

## 2020-07-06 NOTE — ASSESSMENT & PLAN NOTE
Presented with hypotension  -Telemetry  -Vitals  -BMP  -hold home treatment medication regimen for now,resume when stable.

## 2020-07-06 NOTE — ASSESSMENT & PLAN NOTE
Unclear etiology.According to previous labs on 05/28/2020 white count 11> now greater than 16 K. Meets sepsis criteria with hypotension elevated white count, reactive ???.  -Blood cultures  -Urinalysis with reflex to culture  -Wound care consult-Zosyn will deferred to ID.  -ID consult

## 2020-07-06 NOTE — ASSESSMENT & PLAN NOTE
Renal function at patient at baseline and maintain euvolemic state.  -Strict I/O's  -BMP  -Renally dose medications  -Avoid nephrotoxic agents

## 2020-07-06 NOTE — HPI
"Ernie LOI Gallardochau 89 y.o. male PMHX:HTN,CKD,HLD,AFib presents via EMS from [facility] complaining of left-sided chest pain.He reports acute onset around 0100 today of left-sided chest "tightness" pain(6/10) radiating to left shoulder associated with slight SOB and nausea while in in bed watching TV. Per ED notes EMS reported systolic blood pressure in 80,possible and administered ASA by in route to ED. He denies fever/chills,URI symptoms,vomiting/diarrhea,abd/back pain,dysuria or any other associated symptoms.Also denies Heavy ETOH/Smoking/Illicit drug use. Additionally denies any previous history of CVA/TIA/or MI's    last 2D Echo on 12/09/19  LVEF65% with severe biatrial enlargement,mild aortic valve stenosis and atrial fibrillation observed.    Admit to hospital medicine for further evaluation and treatment.  "

## 2020-07-06 NOTE — ASSESSMENT & PLAN NOTE
H/H is 6.6/23.3  -Obtain Blood transfusion consent  -Type& crossmatch for 2 units PRBC infusion.  -CBC  See above #1.

## 2020-07-06 NOTE — ASSESSMENT & PLAN NOTE
Follows with Dr. Tate with strategy of rate control/OAC (coumadin)  Cont heparin gtt  Need GI eval  Eventual resume coumadin or (preferably) DOAC

## 2020-07-06 NOTE — ASSESSMENT & PLAN NOTE
Initial troponin 1.057 EKG STEMI, seen by Cardiology and following.Patient and family refused heart catheterization.  -Telemetry  -Heparin drip for now  -on warfarin at home,PT INR daily resume as warranted  -2D Echo  -TSH,UA and Lipid panel.

## 2020-07-06 NOTE — ED NOTES
MD on the phone with patient daughter; family is on the way to the ED. Patient and family refused going to Cath[facility]. MD updated family on POC.

## 2020-07-06 NOTE — ASSESSMENT & PLAN NOTE
Chronic,patient's baseline between 500-600,930 today on Coumadin therapy,followed by hematology outpatient, now on a heparin drip 2/2 STEMI.

## 2020-07-06 NOTE — HPI
89-year-old male presenting with constant left-sided chest pain with radiation the shoulder that woke him from his sleep.  Per EMS and patient, pain started approximately 1:00 a.m..  Patient currently resides at a nursing home.  Notes mild shortness of breath, mild nausea, denies vomiting or diaphoresis.  Reports pain has significantly improved and he now only has some pain in his left shoulder.  Denies fevers, chills, cough.  Reports previously being in his usual state of health.  On EMS arrival, they reported possible STEMI.  Blood pressure was in the 80 systolic, nitroglycerin with held though aspirin given by EMS.  Patient has a history of skin cancer, denies prior heart attacks.    The patient follows with Dr. Tate at [facility] for atrial fibrillation for which a rate control/anticoagulation with Coumadin strategy has been utilized.  See MRN [id number]    Cardiology is consulted for medical management of STEMI.  I was consulted and discussed the case with Dr. King earlier this morning.  Patient presented with chest discomfort from a nursing home.  Inferoposterior ST elevation was noted.  The patient also is DNR, and wishes to remain DNR.  Given this wish, we will manage him medically.  He understands that this may lead to heart failure and early mortality.  At present, he appears comfortable.  His main complaint appears to be that he is cold.  He also has a history of atrial fibrillation for which he is maintained on Coumadin.  He denies any overt GI bleeding, although his hemoglobin is quite low.  He also reports a history of an enlarged heart, but no prior history of coronary artery disease.  His daughter is at the bedside.

## 2020-07-06 NOTE — ED PROVIDER NOTES
Encounter Date: 2020       History     Chief Complaint   Patient presents with    Chest Pain     pt present to the ED via EMS reports the patient came from [facility] stated the patient was c/o of left anterior wall chest pain that started around 0100. EMS concerned patient is having a STEMI elevation AVF inferior . patient recieved 325 ASA , 1 nitro that  bp from 112/58 to 77/50. denies n/v or current CP.      89-year-old male presenting with constant left-sided chest pain with radiation the shoulder that woke him from his sleep.  Per EMS and patient, pain started approximately 1:00 a.m..  Patient currently resides at a nursing home.  Notes mild shortness of breath, mild nausea, denies vomiting or diaphoresis.  Reports pain has significantly improved and he now only has some pain in his left shoulder.  Denies fevers, chills, cough.  Reports previously being in his usual state of health.  On EMS arrival, they reported possible STEMI.  Blood pressure was in the 80 systolic, nitroglycerin with held though aspirin given by EMS.  Patient has a history of skin cancer, denies prior heart attacks.        Review of patient's allergies indicates:  No Known Allergies  Past Medical History:   Diagnosis Date    A-fib     Anxiety     BPH (benign prostatic hyperplasia)     Cancer     squamous cell carinoma of skin    Cellulitis of right lower limb     Constipation     Diabetes mellitus     GERD (gastroesophageal reflux disease)     Hyperlipidemia     Hypertension     Iron deficiency anemia     Major depressive disorder     Myasthenia gravis     Osteoarthritis     Other postherpetic nervous system involvement     Renal disorder     Thrombocythemia     Vitamin D deficiency      Past Surgical History:   Procedure Laterality Date    ADENOIDECTOMY      CHOLECYSTECTOMY      TONSILLECTOMY       History reviewed. No pertinent family history.  Social History     Tobacco Use    Smoking status: Former  "Smoker    Tobacco comment: 60 years ago    Substance Use Topics    Alcohol use: Yes     Comment: "I hardly ever drink"     Drug use: Never     Review of Systems   Constitutional: Negative for fever.   HENT: Negative for sore throat.    Respiratory: Positive for chest tightness. Negative for shortness of breath.    Cardiovascular: Positive for chest pain. Negative for leg swelling.   Gastrointestinal: Positive for nausea.   Genitourinary: Negative for dysuria.   Musculoskeletal: Negative for back pain.   Skin: Negative for rash.   Neurological: Negative for weakness.   Psychiatric/Behavioral: Negative for confusion.       Physical Exam     Initial Vitals   BP Pulse Resp Temp SpO2   07/06/20 0418 07/06/20 0418 07/06/20 0418 07/06/20 0427 07/06/20 0418   (!) 97/53 97 (!) 21 97.6 °F (36.4 °C) 100 %      MAP       --                Physical Exam    Nursing note and vitals reviewed.  Constitutional: He appears well-developed and well-nourished. He is not diaphoretic. No distress.   Chronically ill-appearing, deconditioned   HENT:   Head: Normocephalic and atraumatic.   Eyes: EOM are normal. Pupils are equal, round, and reactive to light. No scleral icterus.   Pale conjunctiva   Neck: Normal range of motion. Neck supple.   Cardiovascular: Normal rate, regular rhythm, normal heart sounds and intact distal pulses. Exam reveals no gallop and no friction rub.    No murmur heard.  Pulmonary/Chest: Breath sounds normal. No stridor. No respiratory distress. He has no wheezes. He has no rhonchi. He has no rales.   Abdominal: Soft. Bowel sounds are normal. He exhibits no distension. There is no abdominal tenderness. There is no rebound and no guarding.   Musculoskeletal: Normal range of motion. No tenderness or edema.      Comments: Chronic deformities of lower extremity   Neurological: He is alert and oriented to person, place, and time. He has normal strength. No cranial nerve deficit.   Skin: Skin is warm and dry. No rash " noted.   Psychiatric: He has a normal mood and affect. His behavior is normal.         ED Course   Critical Care    Date/Time: 7/6/2020 6:25 AM  Performed by: Harvey King MD  Authorized by: Duran Lares MD   Direct patient critical care time: 45 minutes  Total critical care time (exclusive of procedural time) : 45 minutes  Critical care was necessary to treat or prevent imminent or life-threatening deterioration of the following conditions: cardiac failure.  Critical care was time spent personally by me on the following activities: examination of patient, interpretation of cardiac output measurements, development of treatment plan with patient or surrogate, ordering and performing treatments and interventions, re-evaluation of patient's condition, review of old charts, ordering and review of laboratory studies, obtaining history from patient or surrogate and discussions with consultants.        Labs Reviewed   CBC W/ AUTO DIFFERENTIAL - Abnormal; Notable for the following components:       Result Value    WBC 16.99 (*)     RBC 1.92 (*)     Hemoglobin 6.8 (*)     Hematocrit 23.3 (*)     Mean Corpuscular Volume 121 (*)     Mean Corpuscular Hemoglobin 35.4 (*)     Mean Corpuscular Hemoglobin Conc 29.2 (*)     RDW 17.7 (*)     Platelets 930 (*)     Immature Granulocytes 1.2 (*)     Gran # (ANC) 15.4 (*)     Immature Grans (Abs) 0.20 (*)     Lymph # 0.6 (*)     Gran% 90.5 (*)     Lymph% 3.6 (*)     All other components within normal limits    Narrative:     PTT daily if no changes in infusion rate   COMPREHENSIVE METABOLIC PANEL - Abnormal; Notable for the following components:    BUN, Bld 45 (*)     Creatinine 1.6 (*)     Albumin 2.7 (*)     Total Bilirubin 1.1 (*)     ALT 6 (*)     eGFR if  44 (*)     eGFR if non  38 (*)     All other components within normal limits    Narrative:     PTT daily if no changes in infusion rate   PROTIME-INR - Abnormal; Notable for the following  components:    Prothrombin Time 18.2 (*)     INR 1.6 (*)     All other components within normal limits    Narrative:     PTT daily if no changes in infusion rate   TROPONIN I - Abnormal; Notable for the following components:    Troponin I 1.057 (*)     All other components within normal limits    Narrative:     PTT daily if no changes in infusion rate   APTT - Abnormal; Notable for the following components:    aPTT 33.1 (*)     All other components within normal limits    Narrative:     PTT daily if no changes in infusion rate   APTT - Abnormal; Notable for the following components:    aPTT 33.1 (*)     All other components within normal limits    Narrative:     PTT daily if no changes in infusion rate   APTT - Abnormal; Notable for the following components:    aPTT 33.1 (*)     All other components within normal limits    Narrative:     PTT daily if no changes in infusion rate   CBC W/ AUTO DIFFERENTIAL - Abnormal; Notable for the following components:    WBC 16.99 (*)     RBC 1.92 (*)     Hemoglobin 6.8 (*)     Hematocrit 23.3 (*)     Mean Corpuscular Volume 121 (*)     Mean Corpuscular Hemoglobin 35.4 (*)     Mean Corpuscular Hemoglobin Conc 29.2 (*)     RDW 17.7 (*)     Platelets 930 (*)     Immature Granulocytes 1.2 (*)     Gran # (ANC) 15.4 (*)     Immature Grans (Abs) 0.20 (*)     Lymph # 0.6 (*)     Gran% 90.5 (*)     Lymph% 3.6 (*)     All other components within normal limits    Narrative:     PTT daily if no changes in infusion rate   SARS-COV-2 RNA AMPLIFICATION, QUAL   URINALYSIS, REFLEX TO URINE CULTURE   B-TYPE NATRIURETIC PEPTIDE   TYPE & SCREEN     EKG Readings: (Independently Interpreted)   Initial Reading: STEMI. Rhythm: Atrial Fibrillation. Heart Rate: 89. Ectopy: No Ectopy.   ST segment elevation in 3, AVF.  Reciprocal change in 1 and aVL.  T-wave depression in V1 and V2       Imaging Results          X-Ray Chest AP Portable (In process)  Result time 07/06/20 05:32:10                 Medical  Decision Making:   Initial Assessment:   89-year-old male presenting with chest pain.  EMS reported possible STEMI.  The emergency department was awaiting his arrival in room 17 prepared for STEMI activation on his arrival.  Initial EKG EKG concerning for STEMI.  Patient records indicate he is DNR. Patient is oriented x4.  I discussed the code status with him, he confirms he is DNR.  He reports his wife  1 year ago, he is in poor health, he does not want any significant invasive measures made even to save his life.  This was stated in the presence of the ER nursing staff as well. He deferred final decision to his son and daughter-in-law.  I discussed the case with Dr. Scott.  The patient is not a candidate for cath lab as long as he remains DNR.  I discussed the situation over the phone with his son and daughter-in-law.  I discussed that cardiac intervention may reduce the chances of a fatal heart attack or reduce disability. They support his decision to remain DNR and desire medical management only.  I again discussed this with the patient who again confirmed his wishes.  He is oriented x4, not altered, and appears to understand the consequences of his actions.  The patient's pain is improved.  He will be medically managed with aspirin, Plavix, heparin drip.  Patient will be admitted to telemetry with Cardiology and palliative care consult.                                 Clinical Impression:       ICD-10-CM ICD-9-CM   1. STEMI (ST elevation myocardial infarction)  I21.3 410.90         Disposition:   Disposition: Admitted  Condition: Serious     ED Disposition Condition    Admit                           Harvey King MD  20 0924

## 2020-07-06 NOTE — HOSPITAL COURSE
Mr Phan presented with chest pain secondary to STEMI. Patient is DNR and opted for no invasive treatment. Family supportive of this. Understands risk of not pursuing heart catheterization when blockages highly suspected and workup is consistent with myocardial infarction.  Left ventricular EF is 40% however he does have inferolateral hypokinesis, per echo. No chest pain currently. INR < 2. Patient prefers not to continue on anticoagulation and I believe this cannot be monitored in hospice either. Is aware of higher risk for stroke. Continue on clopidogrel which he needs.  Will not tolerate BB, ACE-I and no need for statin given LDL < 70 and high risk for side effects.   Discharge to inpatient hospice with passages in stable condition.   Patient very aware of transfer to inpatient hospice and and agreement with long-term plan

## 2020-07-06 NOTE — ED TRIAGE NOTES
pt present to the ED via EMS reports the patient came from [facility] stated the patient was c/o of left anterior wall chest pain that started around 0100. EMS concerned patient is having a STEMI elevation AVF inferior . patient recieved 325 ASA , 1 nitro that  bp from 112/58 to 77/50. denies n/v or current CP

## 2020-07-06 NOTE — CONSULTS
An 89 year old male admitted 7/6/20 from [facility] with STEMI; leukocytosis; anemia; permanent Afib; thrombocytosis; multiple wounds; CKD Stage 3; HLD  PMH: Afib; anxiety; BPH; squamous cell carcinoma of skin; cellulitis of right lower limb; DM; GERD; HLD; HTN; anemia; major depressive disorder; myasthenia gravis; osteoarthritis; thrombocythemia; Vit D deficiency  7/6 WBC 16.99 Hgb 6.8 Hct 23.3 Alb 2.7  Admitted with multiple wounds- right lower extremity; buttocks; posterior head; left heel  On Isoflex mattress; dependent bed mobility; bedbound  Assessment:  Photodocumentation (per ER)    Scalp wound- Circular wound ~ 10 cm with dry thick tan base. No drainage.    Left posterior heel- Unstageable pressure injury with 5 cm circular area of stable eschar    Right posterior calf- Chronic leg wound; partial thickness opening ~ 6 cm(L) 1.5 cm(W) with yellow drainage. No surrounding erythema/induration. States has seen Brittney Freeman RN in past for treatment of leg wounds.     Right medial lower leg- Chronic wounds with 6 mm openings. Scant serous drainage. Small amount slough. No surrounding erythema/induration.    Buttock- Patient requested buttock be assessed tomorrow. According to photodocumentation, partial thickness wounds. Appears to be treated with zinc oxide type of cream.   Treatment:  Local wound care to right leg and scalp wounds with Vashe moistened gauze every shift.   Keep eschar on left heel clean and dry. Offload pressure from heel with EHOB boot.   Local wound care to buttocks with Venelex and assess wounds 7/7/20.   Discussed treatment plan with patient and step daughter, Delaney.

## 2020-07-06 NOTE — CONSULTS
"[facility]  Palliative Medicine  Consult Note    Patient Name: Ernie Phan  MRN: [id number]  Admission Date: 2020  Hospital Length of Stay: 0 days  Code Status: DNR   Attending Provider: Duran Lares MD  Consulting Provider: Ana Camacho NP  Primary Care Physician: Ernie Michel MD  Principal Problem:STEMI (ST elevation myocardial infarction)    Patient information was obtained from patient, relative(s), past medical records and ER records.      Inpatient consult to Palliative Care  Consult performed by: Ana Camacho NP  Consult ordered by: Harvey King MD  Reason for consult: GOC        Assessment/Plan:   See assessment note    Plan:  -continue current treatment  -confirmed DNR  -Palliative will continue to follow for support and GOC discussion    Thank you for your consult. I will follow-up with patient. Please contact us if you have any additional questions.    Subjective:     HPI:   Principal Problem:STEMI (ST elevation myocardial infarction)     Chief Complaint:        Chief Complaint   Patient presents with    Chest Pain       pt present to the ED via EMS reports the patient came from [facility] stated the patient was c/o of left anterior wall chest pain that started around 0100. EMS concerned patient is having a STEMI elevation AVF inferior . patient recieved 325 ASA , 1 nitro that  bp from 112/58 to 77/50. denies n/v or current CP.          HPI: Ernie Phan 89 y.o. male PMHX:HTN,CKD,HLD,AFib presents via EMS from [facility] complaining of left-sided chest pain.He reports acute onset around 0100 today of left-sided chest "tightness" pain(6/10) radiating to left shoulder associated with slight SOB and nausea while in in bed watching TV. Per ED notes EMS reported systolic blood pressure in 80,possible and administered ASA by in route to ED. He denies fever/chills,URI symptoms,vomiting/diarrhea,abd/back pain,dysuria or any " "other associated symptoms.Also denies Heavy ETOH/Smoking/Illicit drug use. Additionally denies any previous history of CVA/TIA/or MI's     last 2D Echo on 12/09/19  LVEF65% with severe biatrial enlargement,mild aortic valve stenosis and atrial fibrillation observed.     Admit to hospital medicine for further evaluation and treatment.      Hospital Course:  No notes on file        Past Medical History:   Diagnosis Date    A-fib     Anxiety     BPH (benign prostatic hyperplasia)     Cancer     squamous cell carinoma of skin    Cellulitis of right lower limb     Constipation     Diabetes mellitus     GERD (gastroesophageal reflux disease)     Hyperlipidemia     Hypertension     Iron deficiency anemia     Major depressive disorder     Myasthenia gravis     Osteoarthritis     Other postherpetic nervous system involvement     Renal disorder     Thrombocythemia     Vitamin D deficiency        Past Surgical History:   Procedure Laterality Date    ADENOIDECTOMY      CHOLECYSTECTOMY      TONSILLECTOMY         Review of patient's allergies indicates:  No Known Allergies    Medications:  Continuous Infusions:   sodium chloride 0.9% 500 mL/hr at 07/06/20 0901    heparin (porcine) in D5W 12 Units/kg/hr (07/06/20 0545)     Scheduled Meds:   famotidine  20 mg Oral Daily    senna-docusate 8.6-50 mg  1 tablet Oral BID     PRN Meds:sodium chloride, acetaminophen, acetaminophen, heparin (PORCINE), heparin (PORCINE), morphine, morphine, ondansetron, sodium chloride 0.9%    Family History     None        Tobacco Use    Smoking status: Former Smoker    Tobacco comment: 60 years ago    Substance and Sexual Activity    Alcohol use: Yes     Comment: "I hardly ever drink"     Drug use: Never    Sexual activity: Not on file       Review of Systems   Constitutional: Positive for activity change, appetite change and fatigue.   Respiratory: Positive for chest tightness.    Cardiovascular: Positive for chest pain.     "    Dullness to left chest area   Gastrointestinal: Negative.    Musculoskeletal: Negative.    Skin: Positive for wound (RLE).        Patient reports he has a cellulitis      Objective:     Vital Signs (Most Recent):  Temp: 98.3 °F (36.8 °C) (07/06/20 0830)  Pulse: 61 (07/06/20 0830)  Resp: 16 (07/06/20 0830)  BP: (!) 72/37 (07/06/20 0830)  SpO2: 100 % (07/06/20 0830) Vital Signs (24h Range):  Temp:  [97.6 °F (36.4 °C)-98.3 °F (36.8 °C)] 98.3 °F (36.8 °C)  Pulse:  [61-97] 61  Resp:  [16-28] 16  SpO2:  [100 %] 100 %  BP: ()/(37-57) 72/37     Weight: 83.9 kg (185 lb)  Body mass index is 28.13 kg/m².    Physical Exam  Vitals signs and nursing note reviewed.   Constitutional:       Comments: Ill appearing, very pale   HENT:      Head: Normocephalic and atraumatic.   Cardiovascular:      Rate and Rhythm: Rhythm irregular.   Pulmonary:      Effort: Pulmonary effort is normal. No respiratory distress.      Breath sounds: Normal breath sounds.      Comments: Oxygen BNC  Abdominal:      General: Abdomen is flat. Bowel sounds are normal.      Palpations: Abdomen is soft.   Musculoskeletal:      Right lower leg: Edema present.      Comments: Right foot rotated outward, 2 + edema in right foot   Skin:     General: Skin is dry.      Coloration: Skin is pale.      Findings: Lesion (inner aspect of RLE ) present.   Neurological:      Mental Status: He is alert and oriented to person, place, and time.         Advance Care Planning   Review of Symptoms    Symptom Assessment (ESAS 0-10 Scale)  Pain:  3  Dyspnea:  0  Anxiety:  0  Nausea:  0  Depression:  0  Anorexia:  0  Fatigue:  3  Insomnia:  0  Restlessness:  0  Agitation:  0     CAM / Delirium:  Negative  Constipation:  Negative  Diarrhea:  Negative    Bowel Management Plan (BMP):  Yes      Comments:  On senna s    Pain Assessment:  Location(s): chest    Chest       Chest Location:  Left       Quality CHEST: dull      Performance Status:  20    ECOG Performance Status Grade:   4 - Completely disabled    Advanced Directives:  Living Will: No    LaPOST: No    Do Not Resuscitate Status:  Yes    Medical Power of : No      Decision Making:  Patient answered questions and Family answered questions    Living Arrangements:  Lives in nursing home    Psychosocial/Cultural:  Patient lost his spouse last year and has since been in nursing facility. Due to Covid there has been little to no interaction with other residents and he has not been able to see his family. All of which have most likely contributed to his significant decline over the past 6 months.       Time-Based Charting:  Yes  Chart Review: 20 minutes  Face to Face: 10 minutes  Symptom Assessment: 10 minutes  Coordination of Care: 10 minutes    Total Time Spent: 50 minutes         Significant Labs: All pertinent labs within the past 24 hours have been reviewed.  CBC:   Recent Labs   Lab 07/06/20  0430   WBC 16.99*  16.99*   HGB 6.8*  6.8*   HCT 23.3*  23.3*   *  121*   *  930*     BMP:  Recent Labs   Lab 07/06/20  0430         K 4.7      CO2 24   BUN 45*   CREATININE 1.6*   CALCIUM 8.7     LFT:  Lab Results   Component Value Date    AST 14 07/06/2020    ALKPHOS 133 07/06/2020    BILITOT 1.1 (H) 07/06/2020     Albumin:   Albumin   Date Value Ref Range Status   07/06/2020 2.7 (L) 3.5 - 5.2 g/dL Final     Protein:   Total Protein   Date Value Ref Range Status   07/06/2020 6.4 6.0 - 8.4 g/dL Final     Lactic acid:   No results found for: LACTATE    Significant Imaging: I have reviewed all pertinent imaging results/findings within the past 24 hours.       Discussed patient's clinical condition with Hospitalist Dr Duran Lares and Dr Sonia KIM prior to my encounter    Assessment/Plan:    Palliative encounter:     Patient is alert and oriented but a little irritable. He is covered with 2 blankets and c/o being cold and that he doesn't feel well. He reports he has a dullness in his left chest area  but its not like the pain he had when he came in this am.   His daughter is at the bedside and has not been able to see the patient for several months with Covid restrictions and is tearful as she talks about the decline in patient and that he had been her rock since her mother  last year.    Patient is very tired and having a hard time concentrating on our discussion. He is to receive transfusion today so we will discuss GOC once he is feeling a little better. Emotional support provided            Ana Camacho NP  Palliative Medicine  [facility]

## 2020-07-06 NOTE — HPI
"Principal Problem:STEMI (ST elevation myocardial infarction)     Chief Complaint:        Chief Complaint   Patient presents with    Chest Pain       pt present to the ED via EMS reports the patient came from [facility] stated the patient was c/o of left anterior wall chest pain that started around 0100. EMS concerned patient is having a STEMI elevation AVF inferior . patient recieved 325 ASA , 1 nitro that  bp from 112/58 to 77/50. denies n/v or current CP.          HPI: Ernie MONSIVAIS Samichau 89 y.o. male PMHX:HTN,CKD,HLD,AFib presents via EMS from [facility] complaining of left-sided chest pain.He reports acute onset around 0100 today of left-sided chest "tightness" pain(6/10) radiating to left shoulder associated with slight SOB and nausea while in in bed watching TV. Per ED notes EMS reported systolic blood pressure in 80,possible and administered ASA by in route to ED. He denies fever/chills,URI symptoms,vomiting/diarrhea,abd/back pain,dysuria or any other associated symptoms.Also denies Heavy ETOH/Smoking/Illicit drug use. Additionally denies any previous history of CVA/TIA/or MI's     last 2D Echo on 19  LVEF65% with severe biatrial enlargement,mild aortic valve stenosis and atrial fibrillation observed.     Admit to hospital medicine for further evaluation and treatment.    "

## 2020-07-06 NOTE — PLAN OF CARE
TN spoke with patient's step daughter Delaney at bedside. Patient is a resident of [place] and will be returning at discharge.        07/06/20 1116   Discharge Assessment   Assessment Type Discharge Planning Assessment   Confirmed/corrected address and phone number on facesheet? Yes   Assessment information obtained from? Patient;Other  (step daughter Delaney)   Expected Length of Stay (days) 1   Communicated expected length of stay with patient/caregiver yes   Prior to hospitilization cognitive status: Alert/Oriented   Prior to hospitalization functional status: Assistive Equipment   Current cognitive status: Alert/Oriented   Current Functional Status: Needs Assistance   Facility Arrived From: [facility]   Lives With facility resident   Able to Return to Prior Arrangements yes   Is patient able to care for self after discharge? Unable to determine at this time (comments)   Patient's perception of discharge disposition   ([facility])   Patient currently being followed by outpatient case management? No   Patient currently receives any other outside agency services? No   Equipment Currently Used at Home wheelchair;walker, standard;bedside commode   Part D Coverage [employer] Managed Medicare   Do you have any problems affording any of your prescribed medications? No   Is the patient taking medications as prescribed? yes   Does the patient have transportation home? Yes   Transportation Anticipated health plan transportation   Does the patient receive services at the [facility]? No   DME Needed Upon Discharge  other (see comments)   Patient/Family in Agreement with Plan yes

## 2020-07-06 NOTE — PLAN OF CARE
"TN spoke with patient and step daughter Delaney who is present at the bedside concerning IMM. Patient states that he "does not feel that he is being discharged too soon. A copy of IMM was given to the patient.        07/06/20 1112   Medicare Message   Important Message from Medicare regarding Discharge Appeal Rights Given to patient/caregiver;Explained to patient/caregiver;Signed/date by patient/caregiver   Date IMM was signed 07/06/20   Time IMM was signed 1036     "

## 2020-07-06 NOTE — SUBJECTIVE & OBJECTIVE
Past Medical History:   Diagnosis Date    A-fib     Anxiety     BPH (benign prostatic hyperplasia)     Cancer     squamous cell carinoma of skin    Cellulitis of right lower limb     Constipation     Diabetes mellitus     GERD (gastroesophageal reflux disease)     Hyperlipidemia     Hypertension     Iron deficiency anemia     Major depressive disorder     Myasthenia gravis     Osteoarthritis     Other postherpetic nervous system involvement     Renal disorder     Thrombocythemia     Vitamin D deficiency        Past Surgical History:   Procedure Laterality Date    ADENOIDECTOMY      CHOLECYSTECTOMY      TONSILLECTOMY         Review of patient's allergies indicates:  No Known Allergies    No current facility-administered medications on file prior to encounter.      Current Outpatient Medications on File Prior to Encounter   Medication Sig    ALPRAZolam (XANAX) 0.25 MG tablet Take by mouth 3 (three) times daily.    dicyclomine (BENTYL) 20 mg tablet Take 20 mg by mouth every 6 (six) hours.    ergocalciferol (VITAMIN D2) 50,000 unit Cap Take 50,000 Units by mouth every 7 days.    ferrous sulfate (FEOSOL) 325 mg (65 mg iron) Tab tablet Take 325 mg by mouth daily with breakfast.    gabapentin (NEURONTIN) 300 MG capsule Take 300 mg by mouth 3 (three) times daily.    hydroxyurea (HYDREA) 500 mg Cap Take 500 mg by mouth once daily.    ibuprofen (ADVIL,MOTRIN) 600 MG tablet Take 600 mg by mouth every 6 (six) hours as needed for Pain.    mirtazapine (REMERON) 7.5 MG Tab Take 7.5 mg by mouth every evening.    ondansetron (ZOFRAN) 4 MG tablet Take 4 mg by mouth every 8 (eight) hours as needed for Nausea.    oxyCODONE (OXYCONTIN) 10 mg 12 hr tablet Take 10 mg by mouth every 12 (twelve) hours as needed for Pain.    polyethylene glycol (GLYCOLAX) 17 gram PwPk Take by mouth.    predniSONE (DELTASONE) 5 MG tablet Take 5 mg by mouth once daily.    senna (SENOKOT) 8.6 mg tablet Take 1 tablet by mouth  "once daily.    verapamiL (VERELAN) 120 MG C24P Take 120 mg by mouth once daily.    vitamin D (VITAMIN D3) 1000 units Tab Take 1,000 Units by mouth once daily.    warfarin (COUMADIN) 2.5 MG tablet Take 2.5 mg by mouth once daily. Q Wed, Thurs, Fri, Sat    warfarin (COUMADIN) 3 MG tablet Take 3 mg by mouth once daily. PO Q Sun, Mon and Tues     Family History     None        Tobacco Use    Smoking status: Former Smoker    Tobacco comment: 60 years ago    Substance and Sexual Activity    Alcohol use: Yes     Comment: "I hardly ever drink"     Drug use: Never    Sexual activity: Not on file     Review of Systems   Constitutional: Negative for chills and fever.   HENT: Negative for congestion, rhinorrhea and sore throat.    Eyes: Negative for visual disturbance.   Respiratory: Positive for chest tightness. Negative for shortness of breath.    Cardiovascular: Positive for chest pain.   Gastrointestinal: Positive for nausea.   Genitourinary: Negative for dysuria.   Musculoskeletal: Negative for arthralgias.   Skin: Positive for wound.   Neurological: Positive for weakness (Chronic). Negative for syncope, light-headedness and headaches.   Hematological: Bruises/bleeds easily.   Psychiatric/Behavioral: Negative for confusion.     Objective:     Vital Signs (Most Recent):  Temp: 98.3 °F (36.8 °C) (07/06/20 0830)  Pulse: 61 (07/06/20 0830)  Resp: 16 (07/06/20 0830)  BP: (!) 72/37 (07/06/20 0830)  SpO2: 100 % (07/06/20 0830) Vital Signs (24h Range):  Temp:  [97.6 °F (36.4 °C)-98.3 °F (36.8 °C)] 98.3 °F (36.8 °C)  Pulse:  [61-97] 61  Resp:  [16-28] 16  SpO2:  [100 %] 100 %  BP: ()/(37-57) 72/37     Weight: 83.9 kg (185 lb)  Body mass index is 28.13 kg/m².    Physical Exam  Vitals signs and nursing note reviewed.   Constitutional:       Appearance: He is ill-appearing.   HENT:      Head: Normocephalic and atraumatic.      Nose: Nose normal.      Mouth/Throat:      Mouth: Mucous membranes are dry.   Eyes:      " Comments: Pale conjunctiva.   Cardiovascular:      Rate and Rhythm: Rhythm irregularly irregular.      Pulses: Normal pulses.      Heart sounds: No murmur.   Pulmonary:      Effort: Pulmonary effort is normal.      Breath sounds: Normal breath sounds.   Abdominal:      General: Abdomen is flat. Bowel sounds are normal.      Palpations: Abdomen is soft.      Tenderness: There is no abdominal tenderness. There is no guarding or rebound.   Musculoskeletal:         General: Tenderness ( Left foot) and deformity ( right foot/externally rotated ) present.      Right lower le+ Edema (Dependent) present.   Skin:     Capillary Refill: Capillary refill takes less than 2 seconds.      Coloration: Skin is pale.      Findings: Bruising and wound (Multiple) present.   Neurological:      Mental Status: He is alert and oriented to person, place, and time.   Psychiatric:         Mood and Affect: Mood normal.         Thought Content: Thought content normal.             Significant Labs:   CBC:   Recent Labs   Lab 20  043   WBC 16.99*  16.99*   HGB 6.8*  6.8*   HCT 23.3*  23.3*   *  930*     CMP:   Recent Labs   Lab 20  0430      K 4.7      CO2 24      BUN 45*   CREATININE 1.6*   CALCIUM 8.7   PROT 6.4   ALBUMIN 2.7*   BILITOT 1.1*   ALKPHOS 133   AST 14   ALT 6*   ANIONGAP 12   EGFRNONAA 38*     Cardiac Markers:   Recent Labs   Lab 20  043   *     Coagulation:   Recent Labs   Lab 20  043   INR 1.6*   APTT 33.1*  33.1*  33.1*     All pertinent labs within the past 24 hours have been reviewed.    Significant Imaging: I have reviewed all pertinent imaging results/findings within the past 24 hours.

## 2020-07-07 PROBLEM — E43 SEVERE MALNUTRITION: Status: ACTIVE | Noted: 2020-07-07

## 2020-07-07 PROBLEM — Z71.89 ADVANCED CARE PLANNING/COUNSELING DISCUSSION: Status: ACTIVE | Noted: 2020-07-06

## 2020-07-07 LAB
APTT BLDCRRT: 42.3 SEC (ref 21–32)
BLD PROD TYP BPU: NORMAL
BLD PROD TYP BPU: NORMAL
BLOOD UNIT EXPIRATION DATE: NORMAL
BLOOD UNIT EXPIRATION DATE: NORMAL
BLOOD UNIT TYPE CODE: 5100
BLOOD UNIT TYPE CODE: 5100
BLOOD UNIT TYPE: NORMAL
BLOOD UNIT TYPE: NORMAL
CODING SYSTEM: NORMAL
CODING SYSTEM: NORMAL
CORTIS SERPL-MCNC: 20.5 UG/DL
DISPENSE STATUS: NORMAL
DISPENSE STATUS: NORMAL
INR PPP: 2 (ref 0.8–1.2)
NUM UNITS TRANS PACKED RBC: NORMAL
PROTHROMBIN TIME: 22.4 SEC (ref 9–12.5)
TRANS ERYTHROCYTES VOL PATIENT: NORMAL ML

## 2020-07-07 PROCEDURE — 27000221 HC OXYGEN, UP TO 24 HOURS: Mod: HCNC

## 2020-07-07 PROCEDURE — 99233 SBSQ HOSP IP/OBS HIGH 50: CPT | Mod: HCNC,,, | Performed by: NURSE PRACTITIONER

## 2020-07-07 PROCEDURE — 99233 PR SUBSEQUENT HOSPITAL CARE,LEVL III: ICD-10-PCS | Mod: HCNC,,, | Performed by: NURSE PRACTITIONER

## 2020-07-07 PROCEDURE — 82533 TOTAL CORTISOL: CPT | Mod: HCNC

## 2020-07-07 PROCEDURE — 99497 ADVNCD CARE PLAN 30 MIN: CPT | Mod: HCNC,,, | Performed by: NURSE PRACTITIONER

## 2020-07-07 PROCEDURE — 99498 ADVNCD CARE PLAN ADDL 30 MIN: CPT | Mod: HCNC,,, | Performed by: NURSE PRACTITIONER

## 2020-07-07 PROCEDURE — 25000003 PHARM REV CODE 250: Mod: HCNC | Performed by: EMERGENCY MEDICINE

## 2020-07-07 PROCEDURE — 99497 PR ADVNCD CARE PLAN 30 MIN: ICD-10-PCS | Mod: HCNC,,, | Performed by: NURSE PRACTITIONER

## 2020-07-07 PROCEDURE — 85610 PROTHROMBIN TIME: CPT | Mod: HCNC

## 2020-07-07 PROCEDURE — 36415 COLL VENOUS BLD VENIPUNCTURE: CPT | Mod: HCNC

## 2020-07-07 PROCEDURE — 99232 SBSQ HOSP IP/OBS MODERATE 35: CPT | Mod: HCNC,,, | Performed by: INTERNAL MEDICINE

## 2020-07-07 PROCEDURE — 99498 PR ADVNCD CARE PLAN ADDL 30 MIN: ICD-10-PCS | Mod: HCNC,,, | Performed by: NURSE PRACTITIONER

## 2020-07-07 PROCEDURE — P9016 RBC LEUKOCYTES REDUCED: HCPCS | Mod: HCNC

## 2020-07-07 PROCEDURE — 25000003 PHARM REV CODE 250: Mod: HCNC | Performed by: HOSPITALIST

## 2020-07-07 PROCEDURE — C9113 INJ PANTOPRAZOLE SODIUM, VIA: HCPCS | Mod: HCNC | Performed by: HOSPITALIST

## 2020-07-07 PROCEDURE — 85730 THROMBOPLASTIN TIME PARTIAL: CPT | Mod: HCNC

## 2020-07-07 PROCEDURE — 63600175 PHARM REV CODE 636 W HCPCS: Mod: HCNC | Performed by: EMERGENCY MEDICINE

## 2020-07-07 PROCEDURE — 21400001 HC TELEMETRY ROOM: Mod: HCNC

## 2020-07-07 PROCEDURE — 25000003 PHARM REV CODE 250: Mod: HCNC | Performed by: INTERNAL MEDICINE

## 2020-07-07 PROCEDURE — 94761 N-INVAS EAR/PLS OXIMETRY MLT: CPT | Mod: HCNC

## 2020-07-07 PROCEDURE — 63600175 PHARM REV CODE 636 W HCPCS: Mod: HCNC | Performed by: HOSPITALIST

## 2020-07-07 PROCEDURE — 99232 PR SUBSEQUENT HOSPITAL CARE,LEVL II: ICD-10-PCS | Mod: HCNC,,, | Performed by: INTERNAL MEDICINE

## 2020-07-07 RX ORDER — WARFARIN 3 MG/1
3 TABLET ORAL DAILY
Status: DISCONTINUED | OUTPATIENT
Start: 2020-07-07 | End: 2020-07-08

## 2020-07-07 RX ORDER — FERROUS SULFATE 325(65) MG
325 TABLET, DELAYED RELEASE (ENTERIC COATED) ORAL DAILY
Status: DISCONTINUED | OUTPATIENT
Start: 2020-07-08 | End: 2020-07-11 | Stop reason: HOSPADM

## 2020-07-07 RX ORDER — HYDROXYUREA 500 MG/1
500 CAPSULE ORAL DAILY
Status: DISCONTINUED | OUTPATIENT
Start: 2020-07-08 | End: 2020-07-11 | Stop reason: HOSPADM

## 2020-07-07 RX ORDER — PREDNISONE 5 MG/1
5 TABLET ORAL DAILY
Status: DISCONTINUED | OUTPATIENT
Start: 2020-07-08 | End: 2020-07-11 | Stop reason: HOSPADM

## 2020-07-07 RX ORDER — TAMSULOSIN HYDROCHLORIDE 0.4 MG/1
0.4 CAPSULE ORAL DAILY
Status: DISCONTINUED | OUTPATIENT
Start: 2020-07-08 | End: 2020-07-11 | Stop reason: HOSPADM

## 2020-07-07 RX ORDER — MIRTAZAPINE 7.5 MG/1
7.5 TABLET, FILM COATED ORAL NIGHTLY
Status: DISCONTINUED | OUTPATIENT
Start: 2020-07-07 | End: 2020-07-11 | Stop reason: HOSPADM

## 2020-07-07 RX ORDER — CLOPIDOGREL BISULFATE 75 MG/1
75 TABLET ORAL DAILY
Status: DISCONTINUED | OUTPATIENT
Start: 2020-07-07 | End: 2020-07-11 | Stop reason: HOSPADM

## 2020-07-07 RX ORDER — METOPROLOL TARTRATE 25 MG/1
12.5 TABLET ORAL 2 TIMES DAILY
Status: DISCONTINUED | OUTPATIENT
Start: 2020-07-07 | End: 2020-07-11 | Stop reason: HOSPADM

## 2020-07-07 RX ADMIN — MIRTAZAPINE 7.5 MG: 7.5 TABLET ORAL at 09:07

## 2020-07-07 RX ADMIN — METOPROLOL TARTRATE 12.5 MG: 25 TABLET, FILM COATED ORAL at 09:07

## 2020-07-07 RX ADMIN — HEPARIN SODIUM 14 UNITS/KG/HR: 10000 INJECTION, SOLUTION INTRAVENOUS at 03:07

## 2020-07-07 RX ADMIN — PANTOPRAZOLE SODIUM 40 MG: 40 INJECTION, POWDER, FOR SOLUTION INTRAVENOUS at 09:07

## 2020-07-07 RX ADMIN — PANTOPRAZOLE SODIUM 40 MG: 40 INJECTION, POWDER, FOR SOLUTION INTRAVENOUS at 08:07

## 2020-07-07 RX ADMIN — CASTOR OIL AND BALSAM, PERU: 788; 87 OINTMENT TOPICAL at 09:07

## 2020-07-07 RX ADMIN — CLOPIDOGREL BISULFATE 75 MG: 75 TABLET ORAL at 09:07

## 2020-07-07 RX ADMIN — SODIUM CHLORIDE: 0.9 INJECTION, SOLUTION INTRAVENOUS at 08:07

## 2020-07-07 RX ADMIN — CASTOR OIL AND BALSAM, PERU: 788; 87 OINTMENT TOPICAL at 08:07

## 2020-07-07 RX ADMIN — DOCUSATE SODIUM 50 MG AND SENNOSIDES 8.6 MG 1 TABLET: 8.6; 5 TABLET, FILM COATED ORAL at 09:07

## 2020-07-07 RX ADMIN — SODIUM CHLORIDE: 0.9 INJECTION, SOLUTION INTRAVENOUS at 06:07

## 2020-07-07 RX ADMIN — WARFARIN SODIUM 3 MG: 3 TABLET ORAL at 09:07

## 2020-07-07 RX ADMIN — SODIUM CHLORIDE: 0.9 INJECTION, SOLUTION INTRAVENOUS at 02:07

## 2020-07-07 RX ADMIN — SODIUM CHLORIDE: 0.9 INJECTION, SOLUTION INTRAVENOUS at 01:07

## 2020-07-07 RX ADMIN — DOCUSATE SODIUM 50 MG AND SENNOSIDES 8.6 MG 1 TABLET: 8.6; 5 TABLET, FILM COATED ORAL at 08:07

## 2020-07-07 RX ADMIN — FAMOTIDINE 20 MG: 20 TABLET ORAL at 08:07

## 2020-07-07 RX ADMIN — SODIUM CHLORIDE: 0.9 INJECTION, SOLUTION INTRAVENOUS at 10:07

## 2020-07-07 NOTE — ASSESSMENT & PLAN NOTE
Temporal wasting and 3rd spacing  Patient has no appetite.  Dentures have been brought to room  Will order a diet.  Consider appetite stimulant if patient interested

## 2020-07-07 NOTE — HOSPITAL COURSE
7/6/20 adm with STEMI/DNR->med rx    Interval Hx: sleeping but arousable.  No cp/sob/palps.    Tele: AF 60s, AIVR (personally reviewed and interpreted)

## 2020-07-07 NOTE — PLAN OF CARE
Problem: Fall Injury Risk  Goal: Absence of Fall and Fall-Related Injury  Outcome: Ongoing, Progressing     Problem: Adult Inpatient Plan of Care  Goal: Plan of Care Review  Outcome: Ongoing, Progressing  Goal: Patient-Specific Goal (Individualization)  Outcome: Ongoing, Progressing  Goal: Absence of Hospital-Acquired Illness or Injury  Outcome: Ongoing, Progressing  Goal: Optimal Comfort and Wellbeing  Outcome: Ongoing, Progressing  Goal: Readiness for Transition of Care  Outcome: Ongoing, Progressing  Goal: Rounds/Family Conference  Outcome: Ongoing, Progressing     Problem: Wound  Goal: Optimal Wound Healing  Outcome: Ongoing, Progressing     Problem: Coping Ineffective  Goal: Effective Coping  Outcome: Ongoing, Progressing     Problem: Skin Injury Risk Increased  Goal: Skin Health and Integrity  Outcome: Ongoing, Progressing

## 2020-07-07 NOTE — NURSING
Reported off to oncoming nurse, patient resting in bed, aaox3. Patient can make needs known to staff, max assist required for adls and transfers, no acute distress noted, safety precautions maintained. Heparin handoff completed.    Chart check completed.

## 2020-07-07 NOTE — SUBJECTIVE & OBJECTIVE
Interval History:  Has no appetite.  Denies chest pain or shortness of breath today.   attempted to get blood samples multiple times been unable to.  Heparin infusion discontinued as recommended by cardiologist.  Met with patient and daughter at bedside.  Discussed goals of care.  I also discussed this with daughter-in-law over the phone, as requested by patient and daughter.  All in agreement with DNR code status and transitioning to hospice as best next step in management.     Review of Systems   Constitutional: Positive for activity change and appetite change.   Respiratory: Negative.    Cardiovascular: Negative.    Gastrointestinal: Negative.    Neurological: Positive for weakness.     Objective:     Vital Signs (Most Recent):  Temp: 97.7 °F (36.5 °C) (07/07/20 1549)  Pulse: 82 (07/07/20 1549)  Resp: 16 (07/07/20 1549)  BP: (!) 154/76 (07/07/20 1549)  SpO2: 100 % (07/07/20 1549) Vital Signs (24h Range):  Temp:  [97.1 °F (36.2 °C)-98.1 °F (36.7 °C)] 97.7 °F (36.5 °C)  Pulse:  [64-82] 82  Resp:  [16-18] 16  SpO2:  [92 %-100 %] 100 %  BP: ()/(53-76) 154/76     Weight: 83.9 kg (185 lb)  Body mass index is 28.13 kg/m².    Intake/Output Summary (Last 24 hours) at 7/7/2020 1737  Last data filed at 7/7/2020 1319  Gross per 24 hour   Intake --   Output 203 ml   Net -203 ml      Physical Exam  Vitals signs and nursing note reviewed.   Constitutional:       Appearance: He is ill-appearing.   Cardiovascular:      Rate and Rhythm: Normal rate.   Pulmonary:      Effort: Pulmonary effort is normal.      Breath sounds: No wheezing or rales.   Abdominal:      General: Abdomen is flat.   Musculoskeletal:         General: Swelling present.      Comments: Pressure wounds to sacrum and lower extremities  Surgical wound to scalp  No erythema   Skin:     Capillary Refill: Capillary refill takes 2 to 3 seconds.      Coloration: Skin is pale.   Neurological:      Mental Status: He is alert and oriented to person,  place, and time.   Psychiatric:         Mood and Affect: Mood normal.         Behavior: Behavior normal.         Thought Content: Thought content normal.         Judgment: Judgment normal.         Significant Labs: All pertinent labs within the past 24 hours have been reviewed.    Significant Imaging: I have reviewed all pertinent imaging results/findings within the past 24 hours.  I have reviewed and interpreted all pertinent imaging results/findings within the past 24 hours.

## 2020-07-07 NOTE — ASSESSMENT & PLAN NOTE
Patient is DNR and opted for no invasive treatment.  Understands risk of not pursuing heart catheterization when blockages highly suspected and workup is consistent with myocardial infarction.  Left ventricular EF is 40% however he does have inferolateral hypokinesis, per echo.  No chest pain currently.  Will need to recheck CBC an INR prior to restarting clopidogrel and Coumadin (for stroke prophylaxis).  LDL is 53.  Start statin?

## 2020-07-07 NOTE — PROGRESS NOTES
Noted changes in healh and level of care. Pt in [facility] (under different MRN, pending merge) s/p STEMI. He is DNR and refused cathlab. Palliative consulted. Will close coumadin referral. Caregiver can contact [employer] if meter needs to be returned to them.

## 2020-07-07 NOTE — PROGRESS NOTES
"[facility] Medicine  Progress Note    Patient Name: Ernie Phan  MRN: [id number]  Patient Class: IP- Inpatient   Admission Date: 7/6/2020  Length of Stay: 1 days  Attending Physician: Padmaja Underwood MD  Primary Care Provider: Ernie Michel MD        Subjective:     Principal Problem:STEMI (ST elevation myocardial infarction)        HPI:  Ernie Phan 89 y.o. male PMHX:HTN,CKD,HLD,AFib presents via EMS from [facility] complaining of left-sided chest pain.He reports acute onset around 0100 today of left-sided chest "tightness" pain(6/10) radiating to left shoulder associated with slight SOB and nausea while in in bed watching TV. Per ED notes EMS reported systolic blood pressure in 80,possible and administered ASA by in route to ED. He denies fever/chills,URI symptoms,vomiting/diarrhea,abd/back pain,dysuria or any other associated symptoms.Also denies Heavy ETOH/Smoking/Illicit drug use. Additionally denies any previous history of CVA/TIA/or MI's    last 2D Echo on 12/09/19  LVEF65% with severe biatrial enlargement,mild aortic valve stenosis and atrial fibrillation observed.    Admit to hospital medicine for further evaluation and treatment.    Overview/Hospital Course:  No notes on file    Interval History:  Has no appetite.  Denies chest pain or shortness of breath today.   attempted to get blood samples multiple times been unable to.  Heparin infusion discontinued as recommended by cardiologist.  Met with patient and daughter at bedside.  Discussed goals of care.  I also discussed this with daughter-in-law over the phone, as requested by patient and daughter.  All in agreement with DNR code status and transitioning to hospice as best next step in management.     Review of Systems   Constitutional: Positive for activity change and appetite change.   Respiratory: Negative.    Cardiovascular: Negative.    Gastrointestinal: Negative.  "   Neurological: Positive for weakness.     Objective:     Vital Signs (Most Recent):  Temp: 97.7 °F (36.5 °C) (07/07/20 1549)  Pulse: 82 (07/07/20 1549)  Resp: 16 (07/07/20 1549)  BP: (!) 154/76 (07/07/20 1549)  SpO2: 100 % (07/07/20 1549) Vital Signs (24h Range):  Temp:  [97.1 °F (36.2 °C)-98.1 °F (36.7 °C)] 97.7 °F (36.5 °C)  Pulse:  [64-82] 82  Resp:  [16-18] 16  SpO2:  [92 %-100 %] 100 %  BP: ()/(53-76) 154/76     Weight: 83.9 kg (185 lb)  Body mass index is 28.13 kg/m².    Intake/Output Summary (Last 24 hours) at 7/7/2020 1732  Last data filed at 7/7/2020 1319  Gross per 24 hour   Intake --   Output 203 ml   Net -203 ml      Physical Exam  Vitals signs and nursing note reviewed.   Constitutional:       Appearance: He is ill-appearing.   Cardiovascular:      Rate and Rhythm: Normal rate.   Pulmonary:      Effort: Pulmonary effort is normal.      Breath sounds: No wheezing or rales.   Abdominal:      General: Abdomen is flat.   Musculoskeletal:         General: Swelling present.      Comments: Pressure wounds to sacrum and lower extremities  Surgical wound to scalp  No erythema   Skin:     Capillary Refill: Capillary refill takes 2 to 3 seconds.      Coloration: Skin is pale.   Neurological:      Mental Status: He is alert and oriented to person, place, and time.   Psychiatric:         Mood and Affect: Mood normal.         Behavior: Behavior normal.         Thought Content: Thought content normal.         Judgment: Judgment normal.         Significant Labs: All pertinent labs within the past 24 hours have been reviewed.    Significant Imaging: I have reviewed all pertinent imaging results/findings within the past 24 hours.  I have reviewed and interpreted all pertinent imaging results/findings within the past 24 hours.      Assessment/Plan:      * STEMI (ST elevation myocardial infarction)  Patient is DNR and opted for no invasive treatment.  Understands risk of not pursuing heart catheterization when  blockages highly suspected and workup is consistent with myocardial infarction.  Left ventricular EF is 40% however he does have inferolateral hypokinesis, per echo.  No chest pain currently.  Will need to recheck CBC an INR prior to restarting clopidogrel and Coumadin (for stroke prophylaxis).  LDL is 53.  Start statin?     Severe malnutrition  Temporal wasting and 3rd spacing  Patient has no appetite.  Dentures have been brought to room  Will order a diet.  Consider appetite stimulant if patient interested      Palliative care encounter  Spent more than 60 min discussing code status and goals of care with patient and family.  Currently patient has capacity to make his own medical decisions and is understanding of how his medical conditions have progressed and now his quality of life is being affected.  Patient is very unhappy with the nursing facility he lived in recently.  He is interested in pursuing hospice at home however 24 hr assistance is quite difficult and he understands that.  Daughter and daughter-in-law confirmed this.  Currently working on a different nursing facility that can provide hospice care.   aware and working on this.      Wounds, multiple  Present on admission,see photos under media,Wound care consult.      Leukocytosis  Likely reactive versus dehydration  Unable to obtain CBC today as he was difficult to get labs from despite multiple attempts  Is currently afebrile, has no dysuria and wounds do not look acutely infected.  No consolidations chest x-ray  Hold off on antibiotics for now      Chronic kidney disease, stage III (moderate)  Repeat BMP pending      Thrombocytosis  Resume hydroxyurea      Anemia  He was symptomatic  Does feel better after blood transfusion however unable to obtain labs today to ensure hemoglobin level was above 7 grams/deciliter.  Will re-attempt in a.m.      Permanent atrial fibrillation  Resume metoprolol if able to tolerate  Resume Coumadin  pending INR levels    HLD (hyperlipidemia)  As above                Hypertension  Blood pressure low in setting of heart failure  Hold off on antihypertensive therapy        VTE Risk Mitigation (From admission, onward)         Ordered     warfarin (COUMADIN) tablet 3 mg  Daily      07/07/20 1745     IP VTE HIGH RISK PATIENT  Once      07/06/20 0817     Place sequential compression device  Until discontinued      07/06/20 0817     Place STELLA hose  Until discontinued      07/06/20 0817                Addendum:  Although I do not have a CBC or a BMP currently, I do have an INR which is 2.  Heparin has been held for hours now.  I will resume Coumadin and start clopidogrel.  INR and CBC in a.m.      Padmaja Interiano MD  Department of Hospital Medicine   [facility]

## 2020-07-07 NOTE — CONSULTS
Visited patient to evaluate left buttock wounds- 2 partial thickness wounds on left buttock- wounds 2 cm circular openings with red base. No surrounding erythema. Areas of skin breakdown appear to moisture associated skin damage. Currently treating area with Venelex. If no improvement, recommend using hydrocolloid dressing. Patient turning in bed with moderate amount of assistance.   Scalp wound- improved with less tan debris. Vashe softening and  dry tan debris. Dressing changed using Vashe.   Recommend: Continue treatment plan developed 7/6.

## 2020-07-07 NOTE — PROGRESS NOTES
[facility]  Palliative Medicine  Progress Note    Patient Name: Ernie Phan  MRN: [id number]  Admission Date: 2020  Hospital Length of Stay: 1 days  Code Status: DNR   Attending Provider: Padmaja Underwood MD  Consulting Provider: Ana Camacho NP  Primary Care Physician: Ernie Michel MD  Principal Problem:STEMI (ST elevation myocardial infarction)    Patient information was obtained from patient and ER records.      Assessment/Plan:   See assessment note below (and addendum)    Plan:  -continue current treatment  -Recommend restarting antidepressant Remeron (patient reports depression 10/10)  -patient states he does not want to return to [place] (will discuss with family )  -will consult  to see and ask also if  can see (patient is [facility])  -Patient already has a LaPOST and copy now in Epic  -Palliative to continue to follow     11-12 pm    Addendum:   Patient and family have agreed to hospice.           Subjective:     Chief Complaint:   Chief Complaint   Patient presents with    Chest Pain     pt present to the ED via EMS reports the patient came from [facility] stated the patient was c/o of left anterior wall chest pain that started around 0100. EMS concerned patient is having a STEMI elevation AVF inferior . patient recieved 325 ASA , 1 nitro that  bp from 112/58 to 77/50. denies n/v or current CP.        HPI:   Principal Problem:STEMI (ST elevation myocardial infarction)     Chief Complaint:        Chief Complaint   Patient presents with    Chest Pain       pt present to the ED via EMS reports the patient came from [facility] stated the patient was c/o of left anterior wall chest pain that started around 0100. EMS concerned patient is having a STEMI elevation AVF inferior . patient recieved 325 ASA , 1 nitro that  bp from 112/58 to 77/50. denies n/v or current CP.          HPI: Ernie Phan 89 y.o. male PMHX:HTN,CKD,HLD,AFib presents via  "EMS from [facility] complaining of left-sided chest pain.He reports acute onset around 0100 today of left-sided chest "tightness" pain(6/10) radiating to left shoulder associated with slight SOB and nausea while in in bed watching TV. Per ED notes EMS reported systolic blood pressure in 80,possible and administered ASA by in route to ED. He denies fever/chills,URI symptoms,vomiting/diarrhea,abd/back pain,dysuria or any other associated symptoms.Also denies Heavy ETOH/Smoking/Illicit drug use. Additionally denies any previous history of CVA/TIA/or MI's     last 2D Echo on 12/09/19  LVEF65% with severe biatrial enlargement,mild aortic valve stenosis and atrial fibrillation observed.     Admit to hospital medicine for further evaluation and treatment.      Hospital Course:  No notes on file    Interval History: uneventful night    Medications:  Continuous Infusions:   sodium chloride 0.9% 500 mL/hr at 07/07/20 0843    heparin (porcine) in D5W 14 Units/kg/hr (07/07/20 0325)     Scheduled Meds:   balsam peru-castor oiL   Topical (Top) BID    famotidine  20 mg Oral Daily    pantoprazole  40 mg Intravenous Q12H    senna-docusate 8.6-50 mg  1 tablet Oral BID     PRN Meds:sodium chloride, acetaminophen, acetaminophen, heparin (PORCINE), heparin (PORCINE), morphine, morphine, ondansetron, sodium chloride 0.9%    Objective:     Vital Signs (Most Recent):  Temp: 98 °F (36.7 °C) (07/07/20 0830)  Pulse: 78 (07/07/20 0830)  Resp: 16 (07/07/20 0830)  BP: 128/63 (07/07/20 0830)  SpO2: 100 % (07/07/20 0830) Vital Signs (24h Range):  Temp:  [96.2 °F (35.7 °C)-98.3 °F (36.8 °C)] 98 °F (36.7 °C)  Pulse:  [64-80] 78  Resp:  [16-18] 16  SpO2:  [92 %-100 %] 100 %  BP: ()/(52-63) 128/63     Weight: 83.9 kg (185 lb)  Body mass index is 28.13 kg/m².    Physical Exam  Vitals signs and nursing note reviewed.   HENT:      Head:      Comments: Scalp wound  Cardiovascular:      Rate and Rhythm: Normal rate. " Rhythm irregular.      Heart sounds: Normal heart sounds.   Pulmonary:      Effort: Pulmonary effort is normal. No respiratory distress.      Breath sounds: Normal breath sounds. No wheezing or rales.   Abdominal:      General: Abdomen is flat. Bowel sounds are normal. There is no distension.      Palpations: Abdomen is soft.      Tenderness: There is no abdominal tenderness.   Musculoskeletal:         General: No tenderness.      Right lower leg: Edema present.      Left lower leg: No edema.   Skin:     General: Skin is warm and dry.      Capillary Refill: Capillary refill takes 2 to 3 seconds.      Findings: Lesion present.      Comments: Wound scalp, buttocks, heel, RLE   Neurological:      Mental Status: He is alert and oriented to person, place, and time.      Motor: Weakness present.      Comments: [facility]         Advance Care Planning   Review of Symptoms    Symptom Assessment (ESAS 0-10 Scale)  Pain:  0  Dyspnea:  0  Anxiety:  10  Nausea:  0  Depression:  10  Anorexia:  0  Fatigue:  0  Insomnia:  0  Restlessness:  0  Agitation:  0     CAM / Delirium:  Negative  Constipation:  Negative  Diarrhea:  Negative    Bowel Management Plan (BMP):  Yes            ECOG Performance Status Grade:  4 - Completely disabled    Advanced Directives:  Living Will:  Yes.   LaPOST:  Yes    Do Not Resuscitate Status:  Yes    Medical Power of : Yes     Agent's Name:  Rosibel Thakur daughter in law.     Decision Making:  Patient answered questions         Significant Labs: All pertinent labs within the past 24 hours have been reviewed.  CBC:   Recent Labs   Lab 07/06/20  0430   WBC 16.99*  16.99*   HGB 6.8*  6.8*   HCT 23.3*  23.3*   *  121*   *  930*     BMP:  No results for input(s): GLU, NA, K, CL, CO2, BUN, CREATININE, CALCIUM, MG in the last 24 hours.  LFT:  Lab Results   Component Value Date    AST 14 07/06/2020    ALKPHOS 133 07/06/2020    BILITOT 1.1 (H) 07/06/2020     Albumin:   Albumin   Date Value  "Ref Range Status   2020 2.7 (L) 3.5 - 5.2 g/dL Final     Protein:   Total Protein   Date Value Ref Range Status   2020 6.4 6.0 - 8.4 g/dL Final     Lactic acid:   No results found for: LACTATE    Significant Imaging: I have reviewed all pertinent imaging results/findings within the past 24 hours.     ASSESSMENT/PLAN:    Palliative encounter:     Patient is alert and oriented x 3. He reports he is feeling a little better today.     Advance Care Planning     [facility]  I engaged the patient in a conversation about advance care planning and we specifically addressed what the goals of care would be moving forward, in light of the patient's change in clinical status.We discussed his current clinical condition. He states he was living alone approximately 3 months ago and doing well but had 2 falls and ended up in [facility]. He reports he went there for therapy but does not feel the therapy has helped him too much. He states   " its like I just forgot how to walk in the last few months."   He reports he is very depressed rating 10/10 living at [facility] and would rather be dead than go back. He states he often lies awake at night thinking about his  spouse who  14 months ago and this makes him even more depressed. He wishes that he had the financial resources to be able to stay at home with hired help and feels if he had made better choices with his finances while his spouse was alive that he would not be living at nursing home now. He states he is a  and has never really inquired about VA benefits but wondered if anything out there to assist him with possibly help at home.  He has no children of his own and his spouse but considers his spouses 3 children his family. He states his step daughter Delaney and step son Branden have been the most involved. Johnnie the other step son he doesn't;t have much contact with. His daughter-in- law Rosibel (Adiel' spouse) is his POA.    He reports he has not wanted " to eat for the last few weeks and has no appetite or interest in food. He states he is [employer] but has not practiced in 20 years but has been thinking more about [employer] at night when he can't sleep.    He confirmed he does not want any invasive procedures and when he dies he wants a natural peaceful death; no CPR. He stated again that he does not want to go back to [place] that it is too depressing and asked again if there is anything we can do. I let him know we would need to have a family  Discussion to see if there are other options.    We did not specifically address the patient's likely prognosis, which is undetermined at this time but we discussed that if he continues to be depressed and not eat, along with his debility he may not live long and he seems ok with this. Emotional support provided.          Updated Dr Interiano        Addendum: [id number]   patient has another chart in Epic. Reviewed.   He has only been in nursing facility since 5/29/20 instead of 3 months like he thinks.   Patient discharged from hospital to SNF and at that time had a wound vac which made it hard to place him. He no longer has wound vac and refuses to wear it per notes. He was living at home alone prior to 5/21/20 and  receiving 4 hours 3 days weekly of outside care. He wants to go back home and does not want to return to [place] even if he can't go home. I will discuss if option of paid sisters at home. He is hospice appropriate and if he return to [facility] [place] he cannot have hospice as they are not allowing hospice personeel to enter the facility due to covid restrictions. He would benefit from hospice and have more socialization which would help with his depression and anxiety of being alone. Going to another facility that will allow hospice may be a more appropriate.    Plan unless his family are able to care for him at home.       Hospice criteria:    STEMI, multiple co morbids  PPS 30%  Poor nutritional status  Wt loss  Poor  functional status  Dependent in ADLs  Multiple wounds, nonhealing        Addendum: [id number]   met with patient, step daughter Delaney and Rosibel ELIAS via phone. Dr Interiano gave update and we discussed options. Patient has agreed to hospice. ERASMO Jose        Total ACP 65 mins  > 50% of 80 min visit spent in chart review, face to face discussion of goals of care,  symptom assessment, coordination of care and emotional support.    Ana Camacho, NP  Palliative Medicine  [facility]

## 2020-07-07 NOTE — ASSESSMENT & PLAN NOTE
Spent more than 60 min discussing code status and goals of care with patient and family.  Currently patient has capacity to make his own medical decisions and is understanding of how his medical conditions have progressed and now his quality of life is being affected.  Patient is very unhappy with the nursing facility he lived in recently.  He is interested in pursuing hospice at home however 24 hr assistance is quite difficult and he understands that.  Daughter and daughter-in-law confirmed this.  Currently working on a different nursing facility that can provide hospice care.   aware and working on this.

## 2020-07-07 NOTE — SUBJECTIVE & OBJECTIVE
Past Medical History:   Diagnosis Date    A-fib     Anxiety     BPH (benign prostatic hyperplasia)     Cancer     squamous cell carinoma of skin    Cellulitis of right lower limb     Constipation     Diabetes mellitus     GERD (gastroesophageal reflux disease)     Hyperlipidemia     Hypertension     Iron deficiency anemia     Major depressive disorder     Myasthenia gravis     Osteoarthritis     Other postherpetic nervous system involvement     Renal disorder     Thrombocythemia     Vitamin D deficiency        Past Surgical History:   Procedure Laterality Date    ADENOIDECTOMY      CHOLECYSTECTOMY      TONSILLECTOMY         Review of patient's allergies indicates:  No Known Allergies    No current facility-administered medications on file prior to encounter.      Current Outpatient Medications on File Prior to Encounter   Medication Sig    ALPRAZolam (XANAX) 0.25 MG tablet Take by mouth 3 (three) times daily.    dicyclomine (BENTYL) 20 mg tablet Take 20 mg by mouth every 6 (six) hours.    ergocalciferol (VITAMIN D2) 50,000 unit Cap Take 50,000 Units by mouth every 7 days.    ferrous sulfate (FEOSOL) 325 mg (65 mg iron) Tab tablet Take 325 mg by mouth daily with breakfast.    gabapentin (NEURONTIN) 300 MG capsule Take 300 mg by mouth 3 (three) times daily.    hydroxyurea (HYDREA) 500 mg Cap Take 500 mg by mouth once daily.    ibuprofen (ADVIL,MOTRIN) 600 MG tablet Take 600 mg by mouth every 6 (six) hours as needed for Pain.    mirtazapine (REMERON) 7.5 MG Tab Take 7.5 mg by mouth every evening.    ondansetron (ZOFRAN) 4 MG tablet Take 4 mg by mouth every 8 (eight) hours as needed for Nausea.    oxyCODONE (OXYCONTIN) 10 mg 12 hr tablet Take 10 mg by mouth every 12 (twelve) hours as needed for Pain.    polyethylene glycol (GLYCOLAX) 17 gram PwPk Take by mouth.    predniSONE (DELTASONE) 5 MG tablet Take 5 mg by mouth once daily.    senna (SENOKOT) 8.6 mg tablet Take 1 tablet by mouth  "once daily.    verapamiL (VERELAN) 120 MG C24P Take 120 mg by mouth once daily.    vitamin D (VITAMIN D3) 1000 units Tab Take 1,000 Units by mouth once daily.    warfarin (COUMADIN) 2.5 MG tablet Take 2.5 mg by mouth once daily. Q Wed, Thurs, Fri, Sat    warfarin (COUMADIN) 3 MG tablet Take 3 mg by mouth once daily. PO Q Sun, Mon and Tues     Family History     None        Tobacco Use    Smoking status: Former Smoker    Tobacco comment: 60 years ago    Substance and Sexual Activity    Alcohol use: Yes     Comment: "I hardly ever drink"     Drug use: Never    Sexual activity: Not on file     Review of Systems   Gastrointestinal: Negative for melena.   Genitourinary: Negative for hematuria.     Objective:     Vital Signs (Most Recent):  Temp: 97.4 °F (36.3 °C) (07/07/20 0415)  Pulse: 74 (07/07/20 0415)  Resp: 18 (07/07/20 0415)  BP: (!) 115/55 (07/07/20 0415)  SpO2: 100 % (07/07/20 0415) Vital Signs (24h Range):  Temp:  [96.2 °F (35.7 °C)-98.3 °F (36.8 °C)] 97.4 °F (36.3 °C)  Pulse:  [61-80] 74  Resp:  [16-23] 18  SpO2:  [92 %-100 %] 100 %  BP: ()/(37-58) 115/55     Weight: 83.9 kg (185 lb)  Body mass index is 28.13 kg/m².    SpO2: 100 %  O2 Device (Oxygen Therapy): nasal cannula      Intake/Output Summary (Last 24 hours) at 7/7/2020 0645  Last data filed at 7/6/2020 1800  Gross per 24 hour   Intake 651.67 ml   Output 200 ml   Net 451.67 ml       Lines/Drains/Airways     Peripheral Intravenous Line                 Peripheral IV - Single Lumen 07/06/20 0419 20 G Right Antecubital 1 day         Peripheral IV - Single Lumen 07/06/20 20 G Left Antecubital 1 day         Peripheral IV - Single Lumen 07/07/20 0000 20 G Anterior;Right Upper Arm less than 1 day              Exam unchanged vs 7/6/20  Physical Exam   Constitutional: No distress.   Elderly man, NAD   HENT:   Head: Normocephalic and atraumatic.   Eyes: Pupils are equal, round, and reactive to light. Conjunctivae and EOM are normal. No scleral " icterus.   Neck: Normal range of motion. Neck supple. No JVD present. No tracheal deviation present. No thyromegaly present.   Cardiovascular: Normal rate, S1 normal and S2 normal. An irregularly irregular rhythm present. Exam reveals distant heart sounds. Exam reveals no gallop and no friction rub.   No murmur heard.  Pulmonary/Chest: Effort normal and breath sounds normal. No respiratory distress. He has no wheezes. He has no rales. He exhibits no tenderness.   Abdominal: Soft. He exhibits no distension.   Musculoskeletal: Normal range of motion.         General: No edema.   Neurological: He is alert. He has normal strength. A cranial nerve deficit (presbycusis) is present.   Skin: Skin is warm and dry. No rash noted. He is not diaphoretic.   Psychiatric: He has a normal mood and affect. His behavior is normal.       Current Medications:   balsam peru-castor oiL   Topical (Top) BID    famotidine  20 mg Oral Daily    pantoprazole  40 mg Intravenous Q12H    senna-docusate 8.6-50 mg  1 tablet Oral BID      sodium chloride 0.9% 500 mL/hr at 07/07/20 0638    heparin (porcine) in D5W 14 Units/kg/hr (07/07/20 0325)     sodium chloride, acetaminophen, acetaminophen, heparin (PORCINE), heparin (PORCINE), morphine, morphine, ondansetron, sodium chloride 0.9%    Laboratory (all labs reviewed):  CBC:  Recent Labs   Lab 07/06/20  0430   WBC 16.99 H  16.99 H   Hemoglobin 6.8 L  6.8 L   Hematocrit 23.3 L  23.3 L   Platelets 930 H  930 H       CHEMISTRIES:  Recent Labs   Lab 07/06/20  0430   Glucose 100   Sodium 137   Potassium 4.7   BUN, Bld 45 H   Creatinine 1.6 H   eGFR if  44 A   eGFR if non African American 38 A   Calcium 8.7       CARDIAC BIOMARKERS:  Recent Labs   Lab 07/06/20  0430 07/06/20  0836   Troponin I 1.057 H 5.400 H       COAGS:  Recent Labs   Lab 07/06/20  0430 07/07/20  0147   INR 1.6 H 2.0 H       LIPIDS/LFTS:  Recent Labs   Lab 07/06/20  0430 07/06/20  0836   Cholesterol  --  103 L    Triglycerides  --  129   HDL  --  24 L   LDL Cholesterol  --  53.2 L   Non-HDL Cholesterol  --  79   AST 14  --    ALT 6 L  --        BNP:  Recent Labs   Lab 07/06/20  0430    H       TSH:  Recent Labs   Lab 07/06/20  0836   TSH 5.040 H       Free T4:  Recent Labs   Lab 07/06/20  0836   Free T4 0.84       Diagnostic Results:  ECG (personally reviewed and interpreted tracing(s)):  7/6/20 0419 AF 89, IPMI-acute    Chest X-Ray (personally reviewed and interpreted image(s)): 7/6/20 NAD    Echo: 7/6/20 (images personally reviewed and interpreted) (EF decreased from 65% on report 12/2019).  · Mildly decreased left ventricular systolic function. The estimated ejection fraction is 45%.  · Basal to mid inferoposterior hypokinesis.  · Mild concentric left ventricular hypertrophy.  · Left ventricular diastolic dysfunction.  · The sinuses of Valsalva is mildly dilated, 3.8cm.  · Moderate right ventricular enlargement.  · Moderately reduced right ventricular systolic function.  · Moderate-to-severe mitral regurgitation.  · Moderate tricuspid regurgitation.  · The estimated PA systolic pressure is 42 mmHg.  · Pulmonary hypertension present.

## 2020-07-07 NOTE — PROGRESS NOTES
Called Patient to check SNF status, spoke with Rosibel (daug in law) and she reports that he was transitioning from SNF to Nursing Home, and yesterday suffered a heart attack and was admitted to [facility] -7/06/20, was also considering returning the meter, advised Rosibel to call coumadin clinic once the Patient is discharged from [facility] to check if still on coumadin or if coumadin is changed or stopped then can see what they decide on the meter

## 2020-07-07 NOTE — ASSESSMENT & PLAN NOTE
He was symptomatic  Does feel better after blood transfusion however unable to obtain labs today to ensure hemoglobin level was above 7 grams/deciliter.  Will re-attempt in a.m.

## 2020-07-07 NOTE — ASSESSMENT & PLAN NOTE
Likely reactive versus dehydration  Unable to obtain CBC today as he was difficult to get labs from despite multiple attempts  Is currently afebrile, has no dysuria and wounds do not look acutely infected.  No consolidations chest x-ray  Hold off on antibiotics for now

## 2020-07-07 NOTE — SUBJECTIVE & OBJECTIVE
Interval History: uneventful night    Medications:  Continuous Infusions:   sodium chloride 0.9% 500 mL/hr at 07/07/20 0843    heparin (porcine) in D5W 14 Units/kg/hr (07/07/20 0325)     Scheduled Meds:   balsam peru-castor oiL   Topical (Top) BID    famotidine  20 mg Oral Daily    pantoprazole  40 mg Intravenous Q12H    senna-docusate 8.6-50 mg  1 tablet Oral BID     PRN Meds:sodium chloride, acetaminophen, acetaminophen, heparin (PORCINE), heparin (PORCINE), morphine, morphine, ondansetron, sodium chloride 0.9%    Objective:     Vital Signs (Most Recent):  Temp: 98 °F (36.7 °C) (07/07/20 0830)  Pulse: 78 (07/07/20 0830)  Resp: 16 (07/07/20 0830)  BP: 128/63 (07/07/20 0830)  SpO2: 100 % (07/07/20 0830) Vital Signs (24h Range):  Temp:  [96.2 °F (35.7 °C)-98.3 °F (36.8 °C)] 98 °F (36.7 °C)  Pulse:  [64-80] 78  Resp:  [16-18] 16  SpO2:  [92 %-100 %] 100 %  BP: ()/(52-63) 128/63     Weight: 83.9 kg (185 lb)  Body mass index is 28.13 kg/m².    Physical Exam  Vitals signs and nursing note reviewed.   HENT:      Head:      Comments: Scalp wound  Cardiovascular:      Rate and Rhythm: Normal rate. Rhythm irregular.      Heart sounds: Normal heart sounds.   Pulmonary:      Effort: Pulmonary effort is normal. No respiratory distress.      Breath sounds: Normal breath sounds. No wheezing or rales.   Abdominal:      General: Abdomen is flat. Bowel sounds are normal. There is no distension.      Palpations: Abdomen is soft.      Tenderness: There is no abdominal tenderness.   Musculoskeletal:         General: No tenderness.      Right lower leg: Edema present.      Left lower leg: No edema.   Skin:     General: Skin is warm and dry.      Capillary Refill: Capillary refill takes 2 to 3 seconds.      Findings: Lesion present.      Comments: Wound scalp, buttocks, heel, RLE   Neurological:      Mental Status: He is alert and oriented to person, place, and time.      Motor: Weakness present.      Comments: [place]          Advance Care Planning   Review of Symptoms    Symptom Assessment (ESAS 0-10 Scale)  Pain:  0  Dyspnea:  0  Anxiety:  10  Nausea:  0  Depression:  10  Anorexia:  0  Fatigue:  0  Insomnia:  0  Restlessness:  0  Agitation:  0     CAM / Delirium:  Negative  Constipation:  Negative  Diarrhea:  Negative    Bowel Management Plan (BMP):  Yes            ECOG Performance Status Grade:  4 - Completely disabled    Advanced Directives:  Living Will:  Yes.   LaPOST:  Yes    Do Not Resuscitate Status:  Yes    Medical Power of : Yes     Agent's Name:  Rosibel Thkaur daughter in law.     Decision Making:  Patient answered questions         Significant Labs: All pertinent labs within the past 24 hours have been reviewed.  CBC:   Recent Labs   Lab 07/06/20  0430   WBC 16.99*  16.99*   HGB 6.8*  6.8*   HCT 23.3*  23.3*   *  121*   *  930*     BMP:  No results for input(s): GLU, NA, K, CL, CO2, BUN, CREATININE, CALCIUM, MG in the last 24 hours.  LFT:  Lab Results   Component Value Date    AST 14 07/06/2020    ALKPHOS 133 07/06/2020    BILITOT 1.1 (H) 07/06/2020     Albumin:   Albumin   Date Value Ref Range Status   07/06/2020 2.7 (L) 3.5 - 5.2 g/dL Final     Protein:   Total Protein   Date Value Ref Range Status   07/06/2020 6.4 6.0 - 8.4 g/dL Final     Lactic acid:   No results found for: LACTATE    Significant Imaging: I have reviewed all pertinent imaging results/findings within the past 24 hours.     ASSESSMENT/PLAN:    Palliative encounter:     Patient is alert and oriented x 3. He reports he is feeling a little better today.     Advance Care Planning     [facility]  I engaged the patient in a conversation about advance care planning and we specifically addressed what the goals of care would be moving forward, in light of the patient's change in clinical status.We discussed his current clinical condition. He states he was living alone approximately 3 months ago and doing well but had 2 falls and ended up  "in [facility]. He reports he went there for therapy but does not feel the therapy has helped him too much. He states   " its like I just forgot how to walk in the last few months."   He reports he is very depressed rating 10/10 living at [facility] and would rather be dead than go back. He states he often lies awake at night thinking about his  spouse who  14 months ago and this makes him even more depressed. He wishes that he had the financial resources to be able to stay at home with hired help and feels if he had made better choices with his finances while his spouse was alive that he would not be living at nursing home now. He states he is a  and has never really inquired about VA benefits but wondered if anything out there to assist him with possibly help at home.  He has no children of his own and his spouse but considers his spouses 3 children his family. He states his step daughter Delaney and step son Branden have been the most involved. Johnnie the other step son he doesn't;t have much contact with. His daughter-in- law Rosibel (Adiel' spouse) is his POA.    He reports he has not wanted to eat for the last few weeks and has no appetite or interest in food. 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    He confirmed he does not want any invasive procedures and when he dies he wants a natural peaceful death; no CPR. He stated again that he does not want to go back to [facility] that it is too depressing and asked again if there is anything we can do. I let him know we would need to have a family  Discussion to see if there are other options.    We did not specifically address the patient's likely prognosis, which is undetermined at this time but we discussed that if he continues to be depressed and not eat, along with his debility he may not live long and he seems ok with this. Emotional support provided.    "

## 2020-07-07 NOTE — ASSESSMENT & PLAN NOTE
Presented with infpost STEMI on EKG with CP.  No further CP.  DNR noted->plan med rx.  Pt declined intervention.  INR 2.0 today, stop heparin gtt.  Will need eventual resumption of coumadin for goal INR 2.0-3.0 assuming no evidence of GI bleeding  Assuming no GIB, would also add Plavix 75mg qd.  Add BBL/ACEi if BP will allow  Check lipids and start statin

## 2020-07-08 LAB
ANION GAP SERPL CALC-SCNC: 12 MMOL/L (ref 8–16)
APTT BLDCRRT: 41.5 SEC (ref 21–32)
BASOPHILS # BLD AUTO: 0.06 K/UL (ref 0–0.2)
BASOPHILS NFR BLD: 0.3 % (ref 0–1.9)
BUN SERPL-MCNC: 42 MG/DL (ref 8–23)
CALCIUM SERPL-MCNC: 7.6 MG/DL (ref 8.7–10.5)
CHLORIDE SERPL-SCNC: 113 MMOL/L (ref 95–110)
CO2 SERPL-SCNC: 15 MMOL/L (ref 23–29)
CREAT SERPL-MCNC: 1.5 MG/DL (ref 0.5–1.4)
DIFFERENTIAL METHOD: ABNORMAL
EOSINOPHIL # BLD AUTO: 0.1 K/UL (ref 0–0.5)
EOSINOPHIL NFR BLD: 0.4 % (ref 0–8)
ERYTHROCYTE [DISTWIDTH] IN BLOOD BY AUTOMATED COUNT: 24.3 % (ref 11.5–14.5)
EST. GFR  (AFRICAN AMERICAN): 47 ML/MIN/1.73 M^2
EST. GFR  (NON AFRICAN AMERICAN): 41 ML/MIN/1.73 M^2
GIANT PLATELETS BLD QL SMEAR: PRESENT
GLUCOSE SERPL-MCNC: 78 MG/DL (ref 70–110)
HCT VFR BLD AUTO: 29.7 % (ref 40–54)
HGB BLD-MCNC: 8.7 G/DL (ref 14–18)
IMM GRANULOCYTES # BLD AUTO: 0.36 K/UL (ref 0–0.04)
IMM GRANULOCYTES NFR BLD AUTO: 1.7 % (ref 0–0.5)
INR PPP: 3.8 (ref 0.8–1.2)
LYMPHOCYTES # BLD AUTO: 0.5 K/UL (ref 1–4.8)
LYMPHOCYTES NFR BLD: 2.2 % (ref 18–48)
MCH RBC QN AUTO: 33 PG (ref 27–31)
MCHC RBC AUTO-ENTMCNC: 29.3 G/DL (ref 32–36)
MCV RBC AUTO: 113 FL (ref 82–98)
MONOCYTES # BLD AUTO: 1 K/UL (ref 0.3–1)
MONOCYTES NFR BLD: 4.6 % (ref 4–15)
NEUTROPHILS # BLD AUTO: 18.8 K/UL (ref 1.8–7.7)
NEUTROPHILS NFR BLD: 90.8 % (ref 38–73)
NRBC BLD-RTO: 0 /100 WBC
PLATELET # BLD AUTO: 810 K/UL (ref 150–350)
PLATELET BLD QL SMEAR: ABNORMAL
PMV BLD AUTO: 9.3 FL (ref 9.2–12.9)
POCT GLUCOSE: 88 MG/DL (ref 70–110)
POTASSIUM SERPL-SCNC: 4 MMOL/L (ref 3.5–5.1)
PROTHROMBIN TIME: 42.3 SEC (ref 9–12.5)
RBC # BLD AUTO: 2.64 M/UL (ref 4.6–6.2)
SODIUM SERPL-SCNC: 140 MMOL/L (ref 136–145)
WBC # BLD AUTO: 20.7 K/UL (ref 3.9–12.7)

## 2020-07-08 PROCEDURE — 85730 THROMBOPLASTIN TIME PARTIAL: CPT | Mod: HCNC

## 2020-07-08 PROCEDURE — 63600175 PHARM REV CODE 636 W HCPCS: Mod: HCNC | Performed by: EMERGENCY MEDICINE

## 2020-07-08 PROCEDURE — 94761 N-INVAS EAR/PLS OXIMETRY MLT: CPT | Mod: HCNC

## 2020-07-08 PROCEDURE — 85610 PROTHROMBIN TIME: CPT | Mod: HCNC

## 2020-07-08 PROCEDURE — C9113 INJ PANTOPRAZOLE SODIUM, VIA: HCPCS | Mod: HCNC | Performed by: HOSPITALIST

## 2020-07-08 PROCEDURE — 27000221 HC OXYGEN, UP TO 24 HOURS: Mod: HCNC

## 2020-07-08 PROCEDURE — 85025 COMPLETE CBC W/AUTO DIFF WBC: CPT | Mod: HCNC

## 2020-07-08 PROCEDURE — 21400001 HC TELEMETRY ROOM: Mod: HCNC

## 2020-07-08 PROCEDURE — 63600175 PHARM REV CODE 636 W HCPCS: Mod: HCNC | Performed by: HOSPITALIST

## 2020-07-08 PROCEDURE — 25000003 PHARM REV CODE 250: Mod: HCNC | Performed by: EMERGENCY MEDICINE

## 2020-07-08 PROCEDURE — 97803 MED NUTRITION INDIV SUBSEQ: CPT | Mod: HCNC

## 2020-07-08 PROCEDURE — 36415 COLL VENOUS BLD VENIPUNCTURE: CPT | Mod: HCNC

## 2020-07-08 PROCEDURE — 63600175 PHARM REV CODE 636 W HCPCS: Mod: HCNC | Performed by: INTERNAL MEDICINE

## 2020-07-08 PROCEDURE — 80048 BASIC METABOLIC PNL TOTAL CA: CPT | Mod: HCNC

## 2020-07-08 PROCEDURE — 97802 MEDICAL NUTRITION INDIV IN: CPT | Mod: HCNC

## 2020-07-08 PROCEDURE — 25000003 PHARM REV CODE 250: Mod: HCNC | Performed by: INTERNAL MEDICINE

## 2020-07-08 RX ADMIN — PANTOPRAZOLE SODIUM 40 MG: 40 INJECTION, POWDER, FOR SOLUTION INTRAVENOUS at 09:07

## 2020-07-08 RX ADMIN — METOPROLOL TARTRATE 12.5 MG: 25 TABLET, FILM COATED ORAL at 10:07

## 2020-07-08 RX ADMIN — ONDANSETRON HYDROCHLORIDE 4 MG: 2 SOLUTION INTRAMUSCULAR; INTRAVENOUS at 12:07

## 2020-07-08 RX ADMIN — MIRTAZAPINE 7.5 MG: 7.5 TABLET ORAL at 09:07

## 2020-07-08 RX ADMIN — ACETAMINOPHEN 650 MG: 325 TABLET ORAL at 12:07

## 2020-07-08 RX ADMIN — METOPROLOL TARTRATE 12.5 MG: 25 TABLET, FILM COATED ORAL at 09:07

## 2020-07-08 RX ADMIN — HYDROXYUREA 500 MG: 500 CAPSULE ORAL at 10:07

## 2020-07-08 RX ADMIN — PANTOPRAZOLE SODIUM 40 MG: 40 INJECTION, POWDER, FOR SOLUTION INTRAVENOUS at 10:07

## 2020-07-08 RX ADMIN — TAMSULOSIN HYDROCHLORIDE 0.4 MG: 0.4 CAPSULE ORAL at 10:07

## 2020-07-08 RX ADMIN — CLOPIDOGREL BISULFATE 75 MG: 75 TABLET ORAL at 10:07

## 2020-07-08 RX ADMIN — DOCUSATE SODIUM 50 MG AND SENNOSIDES 8.6 MG 1 TABLET: 8.6; 5 TABLET, FILM COATED ORAL at 10:07

## 2020-07-08 RX ADMIN — PREDNISONE 5 MG: 5 TABLET ORAL at 10:07

## 2020-07-08 RX ADMIN — FERROUS SULFATE TAB EC 325 MG (65 MG FE EQUIVALENT) 325 MG: 325 (65 FE) TABLET DELAYED RESPONSE at 10:07

## 2020-07-08 RX ADMIN — DOCUSATE SODIUM 50 MG AND SENNOSIDES 8.6 MG 1 TABLET: 8.6; 5 TABLET, FILM COATED ORAL at 09:07

## 2020-07-08 RX ADMIN — CASTOR OIL AND BALSAM, PERU: 788; 87 OINTMENT TOPICAL at 09:07

## 2020-07-08 RX ADMIN — CASTOR OIL AND BALSAM, PERU: 788; 87 OINTMENT TOPICAL at 10:07

## 2020-07-08 NOTE — ASSESSMENT & PLAN NOTE
Patient is DNR and opted for no invasive treatment.  Understands risk of not pursuing heart catheterization when blockages highly suspected and workup is consistent with myocardial infarction.  Left ventricular EF is 40% however he does have inferolateral hypokinesis, per echo.  No chest pain currently. INR 3.8 therefore hold on anticoagulation. Patient prefers not to continue on anticoagulation and I believe this cannot be monitored in hospice either. Is aware of higher risk for stroke. On clopidogrel. I did not appreciate any bleeding

## 2020-07-08 NOTE — PLAN OF CARE
Problem: Wound  Goal: Optimal Wound Healing  Intervention: Promote Effective Wound Healing  Flowsheets (Taken 7/8/2020 3562)  Pain Management Interventions:   position adjusted   pillow support provided

## 2020-07-08 NOTE — PLAN OF CARE
Problem: Fall Injury Risk  Goal: Absence of Fall and Fall-Related Injury  Outcome: Ongoing, Progressing  Intervention: Identify and Manage Contributors to Fall Injury Risk  Flowsheets (Taken 7/8/2020 0502)  Self-Care Promotion:   BADL personal objects within reach   BADL personal routines maintained   meal setup provided  Medication Review/Management:   medications reviewed   high risk medications identified  Intervention: Promote Injury-Free Environment  Flowsheets (Taken 7/8/2020 0502)  Safety Promotion/Fall Prevention:   assistive device/personal item within reach   bed alarm set   bed alarm refused   side rails raised x 3   medications reviewed   lighting adjusted   Fall Risk reviewed with patient/family  Environmental Safety Modification:   room organization consistent   clutter free environment maintained   lighting adjusted

## 2020-07-08 NOTE — PROGRESS NOTES
"[facility]  Adult Nutrition  Progress Note    SUMMARY       Recommendations    1. Add Novasource Renal TID.   2. Consider appetite stimulant.   3. Encourage good po intake.  Goals: Pt to consume > 50% of meals by RD follow up.  Nutrition Goal Status: new  Communication of RD Recs: (plan of care)    Reason for Assessment    Reason For Assessment: identified at risk by screening criteria  Diagnosis: stroke/CVA  Relevant Medical History: a fib, anxiety, cancer, constipation, DM, gERD, HLD, HTN, anemia, depression, osteoarthritis, renal disorder, Vit D def  General Information Comments: Visited pt in room. Did not want to disturbe pt's rest, visually assessed. Pt asleep with no family in room. RN reports poor appetite and intake. Partital NFPE. Pt with signs of wasting at temples and orbitals, hands appeared swollen. BMI 28  Nutrition Discharge Planning: Adequate po intake via mechanical soft diet.    Nutrition Risk Screen    Nutrition Risk Screen: large or nonhealing wound, burn or pressure injury    Nutrition/Diet History    Factors Affecting Nutritional Intake: decreased appetite, difficulty/impaired swallowing    Anthropometrics    Temp: 97.8 °F (36.6 °C)  Height Method: Stated  Height: 5' 8" (172.7 cm)  Height (inches): 68 in  Weight Method: Bed Scale  Weight: 83.9 kg (185 lb)  Weight (lb): 185 lb  Ideal Body Weight (IBW), Male: 154 lb  % Ideal Body Weight, Male (lb): 120.13 %  BMI (Calculated): 28.1  BMI Grade: 25 - 29.9 - overweight     Lab/Procedures/Meds    Pertinent Labs Comments: Cl 113, BUN 42, Crea 1.5, GFR 47, Paco 7.6  Pertinent Medications Comments: ferrous sulfate, metroprolo, pantoprazole    Physical Findings/Assessment    Rajinder score 11     Estimated/Assessed Needs    Weight Used For Calorie Calculations: 83.9 kg (184 lb 15.5 oz)  Energy Calorie Requirements (kcal): 4519-2514 kcal daily  Energy Need Method: Kcal/kg(20-25)  Protein Requirements: 101-126 gm daily  Weight Used For " Protein Calculations: 83.9 kg (184 lb 15.5 oz)(1.2-1.5 gm/kg)  Fluid Requirements (mL): 1 mL/kcal or per MD  Estimated Fluid Requirement Method: RDA Method  RDA Method (mL): 1678  CHO Requirement: 210 gm daily    Nutrition Prescription Ordered    Current Diet Order: Mechanical soft 7/7    Evaluation of Received Nutrient/Fluid Intake    % Intake of Estimated Energy Needs: 0 - 25 %  % Meal Intake: 0 - 25 %    Assessment and Plan    Nutrition Problem  Inadequate oral intake    Related to (etiology):   Low appetite and intake    Signs and Symptoms (as evidenced by):   Family and RN statements    Interventions (treatment strategy):  Collaboration with other providers    Nutrition Diagnosis Status:   New    Monitor and Evaluation    Food and Nutrient Intake: food and beverage intake, energy intake  Food and Nutrient Adminstration: diet order  Knowledge/Beliefs/Attitudes: beliefs and attitudes, food and nutrition knowledge/skill  Physical Activity and Function: nutrition-related ADLs and IADLs  Anthropometric Measurements: weight, weight change  Biochemical Data, Medical Tests and Procedures: electrolyte and renal panel, gastrointestinal profile, glucose/endocrine profile, inflammatory profile, lipid profile  Nutrition-Focused Physical Findings: overall appearance     Malnutrition Assessment    Malnutrition Type: chronic illness  Energy Intake: severe energy intake  Orbital Region (Subcutaneous Fat Loss): severe depletion       Nutrition Follow-Up    RD Follow-up?: Yes

## 2020-07-08 NOTE — PLAN OF CARE
Recommendations    1. Add Novasource Renal TID.   2. Consider appetite stimulant.   3. Encourage good po intake.  Goals: Pt to consume > 50% of meals by RD follow up.  Nutrition Goal Status: new

## 2020-07-08 NOTE — CONSULTS
I have visited with the patient several time over the past 3 days.  And, I was able to get Father Fercho to come by and to visit incase there was any [employer] specific request.  The patient has difficulty hearing but once he is heard, he will engage in conversation.      Duran Allen, Staff   [phone]

## 2020-07-08 NOTE — ASSESSMENT & PLAN NOTE
Temporal wasting and 3rd spacing  Patient has no appetite.  Dentures have been brought to room  Continue current diet. Patient states he had a bit more appetite today, but still not significant for adequate nutrition.

## 2020-07-08 NOTE — ASSESSMENT & PLAN NOTE
Likely reactive versus dehydration  Is currently afebrile, has no dysuria and wounds do not look acutely infected.  No consolidations chest x-ray  Hold off on antibiotics for now

## 2020-07-08 NOTE — SUBJECTIVE & OBJECTIVE
Interval History: INR 3.8 today. Patient feels better and in good spirits    Review of Systems   Respiratory: Negative.    Cardiovascular: Negative.    Gastrointestinal: Negative.      Objective:     Vital Signs (Most Recent):  Temp: 98.1 °F (36.7 °C) (07/08/20 1620)  Pulse: 92 (07/08/20 1620)  Resp: 18 (07/08/20 1620)  BP: 120/68 (07/08/20 1620)  SpO2: 99 % (07/08/20 1620) Vital Signs (24h Range):  Temp:  [97.5 °F (36.4 °C)-99.1 °F (37.3 °C)] 98.1 °F (36.7 °C)  Pulse:  [] 92  Resp:  [16-18] 18  SpO2:  [98 %-100 %] 99 %  BP: ()/(49-70) 120/68     Weight: 83.9 kg (185 lb)  Body mass index is 28.13 kg/m².    Intake/Output Summary (Last 24 hours) at 7/8/2020 1745  Last data filed at 7/8/2020 0700  Gross per 24 hour   Intake 300 ml   Output 1 ml   Net 299 ml      Physical Exam  Vitals signs and nursing note reviewed.   Constitutional:       General: He is not in acute distress.     Appearance: He is not toxic-appearing.   Cardiovascular:      Rate and Rhythm: Normal rate.   Pulmonary:      Effort: Pulmonary effort is normal.      Breath sounds: No wheezing or rales.   Abdominal:      General: Abdomen is flat.   Musculoskeletal:         General: Swelling present.      Comments: Pressure wounds to sacrum and lower extremities  Surgical wound to scalp  No erythema   Skin:     Capillary Refill: Capillary refill takes 2 to 3 seconds.      Coloration: Skin is pale.   Neurological:      Mental Status: He is alert and oriented to person, place, and time.   Psychiatric:         Mood and Affect: Mood normal.         Behavior: Behavior normal.         Thought Content: Thought content normal.         Judgment: Judgment normal.         Significant Labs: All pertinent labs within the past 24 hours have been reviewed.    Significant Imaging: I have reviewed all pertinent imaging results/findings within the past 24 hours.  I have reviewed and interpreted all pertinent imaging results/findings within the past 24 hours.

## 2020-07-08 NOTE — PROGRESS NOTES
TN spoke with patient's daughter in law Rosibel @[phone] concerning patient going to another Nursing Home facility. Family does not want the patient to go back to [facility]. [place] call [facility] and spoke to Romina ([phone])to see if another Locet was needed for the patient to transfer to another nursing home. Romina stated that she will send the Locet to [place] by email. Romina stated that as long as the patient does not discharge from the hospital and go back to the nursing home it is ok to transfer the patient. Patient's family requested nursing homes in [place].    07-Sep-2018 20:14

## 2020-07-08 NOTE — PROGRESS NOTES
"[facility] Hospital Medicine  Progress Note    Patient Name: Ernie Phan  MRN: [id number]  Patient Class: IP- Inpatient   Admission Date: 7/6/2020  Length of Stay: 2 days  Attending Physician: Padmaja Underwood MD  Primary Care Provider: Ernie Michel MD        Subjective:     Principal Problem:STEMI (ST elevation myocardial infarction)        HPI:  Ernie Phan 89 y.o. male PMHX:HTN,CKD,HLD,AFib presents via EMS from [facility] complaining of left-sided chest pain.He reports acute onset around 0100 today of left-sided chest "tightness" pain(6/10) radiating to left shoulder associated with slight SOB and nausea while in in bed watching TV. Per ED notes EMS reported systolic blood pressure in 80,possible and administered ASA by in route to ED. He denies fever/chills,URI symptoms,vomiting/diarrhea,abd/back pain,dysuria or any other associated symptoms.Also denies Heavy ETOH/Smoking/Illicit drug use. Additionally denies any previous history of CVA/TIA/or MI's    last 2D Echo on 12/09/19  LVEF65% with severe biatrial enlargement,mild aortic valve stenosis and atrial fibrillation observed.    Admit to hospital medicine for further evaluation and treatment.    Overview/Hospital Course:  No notes on file    Interval History: INR 3.8 today. Patient feels better and in good spirits    Review of Systems   Respiratory: Negative.    Cardiovascular: Negative.    Gastrointestinal: Negative.      Objective:     Vital Signs (Most Recent):  Temp: 98.1 °F (36.7 °C) (07/08/20 1620)  Pulse: 92 (07/08/20 1620)  Resp: 18 (07/08/20 1620)  BP: 120/68 (07/08/20 1620)  SpO2: 99 % (07/08/20 1620) Vital Signs (24h Range):  Temp:  [97.5 °F (36.4 °C)-99.1 °F (37.3 °C)] 98.1 °F (36.7 °C)  Pulse:  [] 92  Resp:  [16-18] 18  SpO2:  [98 %-100 %] 99 %  BP: ()/(49-70) 120/68     Weight: 83.9 kg (185 lb)  Body mass index is 28.13 kg/m².    Intake/Output Summary (Last 24 hours) at 7/8/2020 " 1745  Last data filed at 7/8/2020 0700  Gross per 24 hour   Intake 300 ml   Output 1 ml   Net 299 ml      Physical Exam  Vitals signs and nursing note reviewed.   Constitutional:       General: He is not in acute distress.     Appearance: He is not toxic-appearing.   Cardiovascular:      Rate and Rhythm: Normal rate.   Pulmonary:      Effort: Pulmonary effort is normal.      Breath sounds: No wheezing or rales.   Abdominal:      General: Abdomen is flat.   Musculoskeletal:         General: Swelling present.      Comments: Pressure wounds to sacrum and lower extremities  Surgical wound to scalp  No erythema   Skin:     Capillary Refill: Capillary refill takes 2 to 3 seconds.      Coloration: Skin is pale.   Neurological:      Mental Status: He is alert and oriented to person, place, and time.   Psychiatric:         Mood and Affect: Mood normal.         Behavior: Behavior normal.         Thought Content: Thought content normal.         Judgment: Judgment normal.         Significant Labs: All pertinent labs within the past 24 hours have been reviewed.    Significant Imaging: I have reviewed all pertinent imaging results/findings within the past 24 hours.  I have reviewed and interpreted all pertinent imaging results/findings within the past 24 hours.      Assessment/Plan:      * STEMI (ST elevation myocardial infarction)  Patient is DNR and opted for no invasive treatment.  Understands risk of not pursuing heart catheterization when blockages highly suspected and workup is consistent with myocardial infarction.  Left ventricular EF is 40% however he does have inferolateral hypokinesis, per echo.  No chest pain currently. INR 3.8 therefore hold on anticoagulation. Patient prefers not to continue on anticoagulation and I believe this cannot be monitored in hospice either. Is aware of higher risk for stroke. On clopidogrel. I did not appreciate any bleeding    Severe malnutrition  Temporal wasting and 3rd spacing  Patient  has no appetite.  Dentures have been brought to room  Continue current diet. Patient states he had a bit more appetite today, but still not significant for adequate nutrition.       Palliative care encounter  Spent more than 60 min discussing code status and goals of care with patient and family.  Currently patient has capacity to make his own medical decisions and is understanding of how his medical conditions have progressed and now his quality of life is being affected.  Patient is very unhappy with the nursing facility he lived in recently.  He is interested in pursuing hospice at home however 24 hr assistance is quite difficult and he understands that.  Daughter and daughter-in-law confirmed this.  Currently working on a different nursing facility that can provide hospice care.   aware and working on this.      Wounds, multiple  Present on admission,see photos under media,Wound care consult.      Leukocytosis  Likely reactive versus dehydration  Is currently afebrile, has no dysuria and wounds do not look acutely infected.  No consolidations chest x-ray  Hold off on antibiotics for now      Chronic kidney disease, stage III (moderate)  Repeat BMP pending      Thrombocytosis  Resume hydroxyurea      Anemia  He was symptomatic  Does feel better after blood transfusion. Hgb better      Permanent atrial fibrillation  Reate controlled   Anticoagulation as above    HLD (hyperlipidemia)  As above                Hypertension  Blood pressure low in setting of heart failure  Hold off on antihypertensive therapy        VTE Risk Mitigation (From admission, onward)         Ordered     IP VTE HIGH RISK PATIENT  Once      07/06/20 0817     Place sequential compression device  Until discontinued      07/06/20 0817     Place STELLA hose  Until discontinued      07/06/20 0817                      Padmaja Interiano MD  Department of Hospital Medicine   [facility]

## 2020-07-08 NOTE — PLAN OF CARE
"TN spoke with Rosibel pt's POA concerning IMM. Rosibel stated that she does "not feel that patient is being discharge too soon. Copy was placed in patient's chart.       07/08/20 1546   Medicare Message   Important Message from Medicare regarding Discharge Appeal Rights Given to patient/caregiver;Explained to patient/caregiver;Signed/date by patient/caregiver   Date IMM was signed 07/08/20   Time IMM was signed 1100     "

## 2020-07-09 LAB
BASOPHILS # BLD AUTO: 0.03 K/UL (ref 0–0.2)
BASOPHILS NFR BLD: 0.2 % (ref 0–1.9)
DIFFERENTIAL METHOD: ABNORMAL
EOSINOPHIL # BLD AUTO: 0.1 K/UL (ref 0–0.5)
EOSINOPHIL NFR BLD: 0.6 % (ref 0–8)
ERYTHROCYTE [DISTWIDTH] IN BLOOD BY AUTOMATED COUNT: 23 % (ref 11.5–14.5)
HCT VFR BLD AUTO: 31.8 % (ref 40–54)
HGB BLD-MCNC: 9.4 G/DL (ref 14–18)
IMM GRANULOCYTES # BLD AUTO: 0.19 K/UL (ref 0–0.04)
IMM GRANULOCYTES NFR BLD AUTO: 1.2 % (ref 0–0.5)
INR PPP: 4.5 (ref 0.8–1.2)
LYMPHOCYTES # BLD AUTO: 0.6 K/UL (ref 1–4.8)
LYMPHOCYTES NFR BLD: 4.1 % (ref 18–48)
MCH RBC QN AUTO: 33 PG (ref 27–31)
MCHC RBC AUTO-ENTMCNC: 29.6 G/DL (ref 32–36)
MCV RBC AUTO: 112 FL (ref 82–98)
MONOCYTES # BLD AUTO: 0.7 K/UL (ref 0.3–1)
MONOCYTES NFR BLD: 4.6 % (ref 4–15)
NEUTROPHILS # BLD AUTO: 14.1 K/UL (ref 1.8–7.7)
NEUTROPHILS NFR BLD: 89.3 % (ref 38–73)
NRBC BLD-RTO: 0 /100 WBC
PLATELET # BLD AUTO: 788 K/UL (ref 150–350)
PMV BLD AUTO: 9.5 FL (ref 9.2–12.9)
POCT GLUCOSE: 102 MG/DL (ref 70–110)
POCT GLUCOSE: 108 MG/DL (ref 70–110)
POCT GLUCOSE: 95 MG/DL (ref 70–110)
POCT GLUCOSE: 96 MG/DL (ref 70–110)
POCT GLUCOSE: 97 MG/DL (ref 70–110)
POCT GLUCOSE: 98 MG/DL (ref 70–110)
PROTHROMBIN TIME: 49.5 SEC (ref 9–12.5)
RBC # BLD AUTO: 2.85 M/UL (ref 4.6–6.2)
WBC # BLD AUTO: 15.76 K/UL (ref 3.9–12.7)

## 2020-07-09 PROCEDURE — 63600175 PHARM REV CODE 636 W HCPCS: Mod: HCNC | Performed by: HOSPITALIST

## 2020-07-09 PROCEDURE — 63600175 PHARM REV CODE 636 W HCPCS: Mod: HCNC | Performed by: INTERNAL MEDICINE

## 2020-07-09 PROCEDURE — 25000003 PHARM REV CODE 250: Mod: HCNC | Performed by: INTERNAL MEDICINE

## 2020-07-09 PROCEDURE — 94761 N-INVAS EAR/PLS OXIMETRY MLT: CPT | Mod: HCNC

## 2020-07-09 PROCEDURE — 36415 COLL VENOUS BLD VENIPUNCTURE: CPT | Mod: HCNC

## 2020-07-09 PROCEDURE — C9113 INJ PANTOPRAZOLE SODIUM, VIA: HCPCS | Mod: HCNC | Performed by: HOSPITALIST

## 2020-07-09 PROCEDURE — 85025 COMPLETE CBC W/AUTO DIFF WBC: CPT | Mod: HCNC

## 2020-07-09 PROCEDURE — 25000003 PHARM REV CODE 250: Mod: HCNC | Performed by: EMERGENCY MEDICINE

## 2020-07-09 PROCEDURE — 99233 SBSQ HOSP IP/OBS HIGH 50: CPT | Mod: HCNC,GC,, | Performed by: NURSE PRACTITIONER

## 2020-07-09 PROCEDURE — 85610 PROTHROMBIN TIME: CPT | Mod: HCNC

## 2020-07-09 PROCEDURE — 21400001 HC TELEMETRY ROOM: Mod: HCNC

## 2020-07-09 PROCEDURE — 99233 PR SUBSEQUENT HOSPITAL CARE,LEVL III: ICD-10-PCS | Mod: HCNC,GC,, | Performed by: NURSE PRACTITIONER

## 2020-07-09 RX ORDER — SIMETHICONE 80 MG
1 TABLET,CHEWABLE ORAL ONCE
Status: COMPLETED | OUTPATIENT
Start: 2020-07-09 | End: 2020-07-09

## 2020-07-09 RX ORDER — SIMETHICONE 80 MG
1 TABLET,CHEWABLE ORAL
Status: DISCONTINUED | OUTPATIENT
Start: 2020-07-09 | End: 2020-07-11 | Stop reason: HOSPADM

## 2020-07-09 RX ADMIN — MIRTAZAPINE 7.5 MG: 7.5 TABLET ORAL at 10:07

## 2020-07-09 RX ADMIN — PANTOPRAZOLE SODIUM 40 MG: 40 INJECTION, POWDER, FOR SOLUTION INTRAVENOUS at 10:07

## 2020-07-09 RX ADMIN — SIMETHICONE CHEW TAB 80 MG 80 MG: 80 TABLET ORAL at 04:07

## 2020-07-09 RX ADMIN — ACETAMINOPHEN 650 MG: 325 TABLET ORAL at 01:07

## 2020-07-09 RX ADMIN — SIMETHICONE CHEW TAB 80 MG 80 MG: 80 TABLET ORAL at 10:07

## 2020-07-09 RX ADMIN — METOPROLOL TARTRATE 12.5 MG: 25 TABLET, FILM COATED ORAL at 10:07

## 2020-07-09 RX ADMIN — PREDNISONE 5 MG: 5 TABLET ORAL at 10:07

## 2020-07-09 RX ADMIN — DOCUSATE SODIUM 50 MG AND SENNOSIDES 8.6 MG 1 TABLET: 8.6; 5 TABLET, FILM COATED ORAL at 10:07

## 2020-07-09 RX ADMIN — HYDROXYUREA 500 MG: 500 CAPSULE ORAL at 10:07

## 2020-07-09 RX ADMIN — PHYTONADIONE 2.5 MG: 10 INJECTION, EMULSION INTRAMUSCULAR; INTRAVENOUS; SUBCUTANEOUS at 10:07

## 2020-07-09 RX ADMIN — CLOPIDOGREL BISULFATE 75 MG: 75 TABLET ORAL at 10:07

## 2020-07-09 RX ADMIN — CASTOR OIL AND BALSAM, PERU: 788; 87 OINTMENT TOPICAL at 10:07

## 2020-07-09 RX ADMIN — SIMETHICONE CHEW TAB 80 MG 80 MG: 80 TABLET ORAL at 07:07

## 2020-07-09 RX ADMIN — FERROUS SULFATE TAB EC 325 MG (65 MG FE EQUIVALENT) 325 MG: 325 (65 FE) TABLET DELAYED RESPONSE at 10:07

## 2020-07-09 RX ADMIN — TAMSULOSIN HYDROCHLORIDE 0.4 MG: 0.4 CAPSULE ORAL at 10:07

## 2020-07-09 NOTE — ASSESSMENT & PLAN NOTE
Patient is DNR and opted for no invasive treatment.  Understands risk of not pursuing heart catheterization when blockages highly suspected and workup is consistent with myocardial infarction.  Left ventricular EF is 40% however he does have inferolateral hypokinesis, per echo.  No chest pain currently. INR 4 despite holding anticoagulation. Patient prefers not to continue on anticoagulation and I believe this cannot be monitored in hospice either. Is aware of higher risk for stroke.  Risk for bleeding now high as he is also on clopidogrel which he needs.  Will give a dose of oral vitamin K. I did not appreciate any bleeding.  INR in a.m.

## 2020-07-09 NOTE — PROGRESS NOTES
Sleepy. Aroused, but states very tired. Poor appetite and po intake. Only ate small amount of breakfast and refusing lunch.   Dressing on scalp changed with tan slough on base of wound. Small amount creamy drainage on dressing removed.   Heels suspended off bed with pillow. Positioned on right side with pillow behind back.   Continue wound care and offload pressure with frequent pressure shifts.

## 2020-07-09 NOTE — PHYSICIAN QUERY
"PT Name: Ernie Phan  MR #: [id number]    Physician Query Form - Heart  Condition Clarification     CDS/: Susu Tavares RN              Contact information: [email or web address]  This form is a permanent document in the medical record.     Query Date: 2020    By submitting this query, we are merely seeking further clarification of documentation. Please utilize your independent clinical judgment when addressing the question(s) below.    The medical record contains the following   Indicators     Supporting Clinical Findings Location in Medical Record   x BNP BNP  [facility]      x EF Left ventricular EF is 40% however he does have inferolateral hypokinesis, per echo.    Progress Note       Hospital Medicine   x Radiology findings No acute intrathoracic abnormality identified on this single radiographic view of the chest. CXR    x Echo Results   Echo: 20 (images personally reviewed and interpreted) (EF decreased from 65% on report 2019).    Mildly decreased left ventricular systolic function. The estimated ejection fraction is 45%.    Basal to mid inferoposterior hypokinesis.    Mild concentric left ventricular hypertrophy.    Left ventricular diastolic dysfunction.    The sinuses of Valsalva is mildly dilated, 3.8cm.    Moderate right ventricular enlargement.    Moderately reduced right ventricular systolic function.    Moderate-to-severe mitral regurgitation.    Moderate tricuspid regurgitation.    The estimated PA systolic pressure is 42 mmHg.    Pulmonary hypertension present.      Progress Note        Cardiology              "Ascites" documented     x "SOB" or "MCMILLAN" documented Notes mild shortness of breath   Consult       Cardiology      "Hypoxia" documented     x Heart Failure documented Hypertension   Blood pressure low in setting of heart failure    Progress Note        Hospital Medicine   x "Edema" documented Right lower le+ Edema (Dependent) present. "    H&P 7/6      Hospital Medicine      Diuretics/Meds      Treatment:     x Other:  STEMI (ST elevation myocardial infarction)   Presented with infpost STEMI on EKG with CP.       DNR noted->plan med rx. Pt declined intervention.   INR 2.0 today, stop heparin gtt.   Will need eventual resumption of coumadin for goal INR 2.0-3.0 assuming no evidence of GI bleeding   Assuming no GIB, would also add Plavix 75mg qd.   Add BBL/ACEi if BP will allow      Progress Note 7/7       Cardiology    Progress Note 7/7       Cardiology   Heart failure (HF) can be acute, chronic or both. It is generally further specificed as systolic, diastolic, or combined. Lastly, it is important to identify an underlying etiology if known or suspected.     Common clues to acute exacerbation:  Rapidly progressive symptoms (w/in 2 weeks of presentation), using IV diuretics to treat, using supplemental O2, pulmonary edema on Xray, MI w/in 4 weeks, and/or BNP >500    Systolic Heart Failure: is defined as chart documentation of a left ventricular ejection fraction (LVEF) less than 40%     Diastolic Heart Failure: is defined as a left ventricular ejection fraction (LVEF) greater than 40%   +      Evidence of diastolic dysfunction on echocardiography OR    Right heart catheterization wedge pressure above 12 mm Hg OR    Left heart catheterization left ventricular end diastolic pressure 18 mm Hg or above.    References: *American Heart Association    The clinical guidelines noted below are only system guidelines, and do not replace the providers clinical judgment.     Provider, please specify the diagnosis associated with above clinical findings    Doctor, please clarify the type and acuity of the heart failure.    [  ] Acute Systolic Heart Failure - New diagnosis.  EF < 40%  and acute HF symptoms documented     [ x  ] Acute Combined Systolic and Diastolic Heart Failure      [   ] Other Type of Heart Failure (please specify type):     [   ] Other  (please specify):     [  ] Clinically Undetermined                           Please document in your progress notes daily for the duration of treatment until resolved and include in your discharge summary.

## 2020-07-09 NOTE — SUBJECTIVE & OBJECTIVE
Interval History: INR 4 today.  Hemoglobin stable.  No reports of bleeding.  Patient feels better and in good spirits    Review of Systems   Respiratory: Negative.    Cardiovascular: Negative.    Gastrointestinal: Negative.      Objective:     Vital Signs (Most Recent):  Temp: 97.6 °F (36.4 °C) (07/09/20 1717)  Pulse: 64 (07/09/20 1717)  Resp: 18 (07/09/20 1717)  BP: 109/61 (07/09/20 1717)  SpO2: 97 % (07/09/20 1717) Vital Signs (24h Range):  Temp:  [96.6 °F (35.9 °C)-98 °F (36.7 °C)] 97.6 °F (36.4 °C)  Pulse:  [60-89] 64  Resp:  [18] 18  SpO2:  [93 %-98 %] 97 %  BP: ()/(51-75) 109/61     Weight: 83.9 kg (185 lb)  Body mass index is 28.13 kg/m².  No intake or output data in the 24 hours ending 07/09/20 1729   Physical Exam  Vitals signs and nursing note reviewed.   Constitutional:       General: He is not in acute distress.     Appearance: He is not toxic-appearing.   Cardiovascular:      Rate and Rhythm: Normal rate.   Pulmonary:      Effort: Pulmonary effort is normal.      Breath sounds: No wheezing or rales.   Abdominal:      General: Abdomen is flat.   Musculoskeletal:         General: Swelling present.      Comments: Pressure wounds to sacrum and lower extremities  Surgical wound to scalp  No erythema   Skin:     Capillary Refill: Capillary refill takes less than 2 seconds.      Coloration: Skin is not pale.   Neurological:      Mental Status: He is alert and oriented to person, place, and time.   Psychiatric:         Mood and Affect: Mood normal.         Behavior: Behavior normal.         Thought Content: Thought content normal.         Judgment: Judgment normal.         Significant Labs: All pertinent labs within the past 24 hours have been reviewed.    Significant Imaging: I have reviewed all pertinent imaging results/findings within the past 24 hours.  I have reviewed and interpreted all pertinent imaging results/findings within the past 24 hours.

## 2020-07-09 NOTE — PROGRESS NOTES
"[facility] Hospital Medicine  Progress Note    Patient Name: Ernie Phan  MRN: [id number]  Patient Class: IP- Inpatient   Admission Date: 7/6/2020  Length of Stay: 3 days  Attending Physician: Padmaja Underwood MD  Primary Care Provider: Ernie Michel MD        Subjective:     Principal Problem:STEMI (ST elevation myocardial infarction)        HPI:  Ernie Phan 89 y.o. male PMHX:HTN,CKD,HLD,AFib presents via EMS from [facility] complaining of left-sided chest pain.He reports acute onset around 0100 today of left-sided chest "tightness" pain(6/10) radiating to left shoulder associated with slight SOB and nausea while in in bed watching TV. Per ED notes EMS reported systolic blood pressure in 80,possible and administered ASA by in route to ED. He denies fever/chills,URI symptoms,vomiting/diarrhea,abd/back pain,dysuria or any other associated symptoms.Also denies Heavy ETOH/Smoking/Illicit drug use. Additionally denies any previous history of CVA/TIA/or MI's    last 2D Echo on 12/09/19  LVEF65% with severe biatrial enlargement,mild aortic valve stenosis and atrial fibrillation observed.    Admit to hospital medicine for further evaluation and treatment.    Overview/Hospital Course:  No notes on file    Interval History: INR 4 today.  Hemoglobin stable.  No reports of bleeding.  Patient feels better and in good spirits    Review of Systems   Respiratory: Negative.    Cardiovascular: Negative.    Gastrointestinal: Negative.      Objective:     Vital Signs (Most Recent):  Temp: 97.6 °F (36.4 °C) (07/09/20 1717)  Pulse: 64 (07/09/20 1717)  Resp: 18 (07/09/20 1717)  BP: 109/61 (07/09/20 1717)  SpO2: 97 % (07/09/20 1717) Vital Signs (24h Range):  Temp:  [96.6 °F (35.9 °C)-98 °F (36.7 °C)] 97.6 °F (36.4 °C)  Pulse:  [60-89] 64  Resp:  [18] 18  SpO2:  [93 %-98 %] 97 %  BP: ()/(51-75) 109/61     Weight: 83.9 kg (185 lb)  Body mass index is 28.13 kg/m².  No intake or " output data in the 24 hours ending 07/09/20 7486   Physical Exam  Vitals signs and nursing note reviewed.   Constitutional:       General: He is not in acute distress.     Appearance: He is not toxic-appearing.   Cardiovascular:      Rate and Rhythm: Normal rate.   Pulmonary:      Effort: Pulmonary effort is normal.      Breath sounds: No wheezing or rales.   Abdominal:      General: Abdomen is flat.   Musculoskeletal:         General: Swelling present.      Comments: Pressure wounds to sacrum and lower extremities  Surgical wound to scalp  No erythema   Skin:     Capillary Refill: Capillary refill takes less than 2 seconds.      Coloration: Skin is not pale.   Neurological:      Mental Status: He is alert and oriented to person, place, and time.   Psychiatric:         Mood and Affect: Mood normal.         Behavior: Behavior normal.         Thought Content: Thought content normal.         Judgment: Judgment normal.         Significant Labs: All pertinent labs within the past 24 hours have been reviewed.    Significant Imaging: I have reviewed all pertinent imaging results/findings within the past 24 hours.  I have reviewed and interpreted all pertinent imaging results/findings within the past 24 hours.      Assessment/Plan:      * STEMI (ST elevation myocardial infarction)  Patient is DNR and opted for no invasive treatment.  Understands risk of not pursuing heart catheterization when blockages highly suspected and workup is consistent with myocardial infarction.  Left ventricular EF is 40% however he does have inferolateral hypokinesis, per echo.  No chest pain currently. INR 4 despite holding anticoagulation. Patient prefers not to continue on anticoagulation and I believe this cannot be monitored in hospice either. Is aware of higher risk for stroke.  Risk for bleeding now high as he is also on clopidogrel which he needs.  Will give a dose of oral vitamin K. I did not appreciate any bleeding.  INR in  a.m.    Severe malnutrition  Temporal wasting and 3rd spacing  Patient has no appetite.  Dentures have been brought to room  Continue current diet. Patient states he had a bit more appetite today, but still not significant for adequate nutrition.       Palliative care encounter  Spent more than 60 min discussing code status and goals of care with patient and family.  Currently patient has capacity to make his own medical decisions and is understanding of how his medical conditions have progressed and now his quality of life is being affected.  Patient is very unhappy with the nursing facility he lived in recently.  He is interested in pursuing hospice at home however 24 hr assistance is quite difficult and he understands that.  Daughter and daughter-in-law confirmed this.  Currently working on a different nursing facility that can provide hospice care.   aware and working on this.      Wounds, multiple  Present on admission,see photos under media,Wound care consult.      Leukocytosis  Likely reactive versus dehydration  Is currently afebrile, has no dysuria and wounds do not look acutely infected.  No consolidations chest x-ray  Hold off on antibiotics for now      Chronic kidney disease, stage III (moderate)  Repeat BMP pending      Thrombocytosis  Resume hydroxyurea      Anemia  He was symptomatic  Does feel better after blood transfusion. Hgb better      Permanent atrial fibrillation  Reate controlled   Anticoagulation as above    HLD (hyperlipidemia)  As above                Hypertension  Blood pressure low in setting of heart failure  Hold off on antihypertensive therapy        VTE Risk Mitigation (From admission, onward)         Ordered     IP VTE HIGH RISK PATIENT  Once      07/06/20 0817     Place sequential compression device  Until discontinued      07/06/20 0817     Place STELLA hose  Until discontinued      07/06/20 0817                Palliative care NP reached out to family and discussed  hospice options.      Padmaja Interiano MD  Department of Hospital Medicine   [facility]

## 2020-07-09 NOTE — PROGRESS NOTES
TN spoke with Marissa @ [phone] [facility] and left a message for the admit department (they were in a morning meeting), to call concerning this families interest in coming to their facility.

## 2020-07-09 NOTE — NURSING
End of shift bedside report given to DAYANNA Winkler. Patient in no apparent distress.     12 hour chart check complete.

## 2020-07-09 NOTE — PHYSICIAN QUERY
PT Name: Ernie Phan  MR #: [id number]     PHYSICIAN QUERY - INTEGUMENTARY CLARIFICATION     CDS/: Susu Tavares RN             Contact information: [email or web address]  This form is a permanent document in the medical record.     Query Date: July 9, 2020    By submitting this query, we are merely seeking further clarification of documentation.  Please utilize your independent clinical judgment when addressing the question(s) below.    The Medical Record contains the following:   Indicators   Supporting Clinical Findings Location in Medical Record    Redness     x Decubitus, Pressure, Ulcer, etc. Pressure wounds to sacrum and lower extremities   Progress note 7/7       [facility] Medicine       Deep Tissue Injury     x Wound care consult Visited patient to evaluate left buttock wounds- 2 partial thickness wounds on left buttock- wounds 2 cm circular openings with red base. No surrounding erythema. Areas of skin breakdown appear to moisture associated skin damage.    Consult 7/7       Wound care    Medication:     x Treatment: Currently treating area with Venelex. If no improvement, recommend using hydrocolloid dressing.   Consult 7/7       Wound care   x Other:    Buttock- Patient requested buttock be assessed tomorrow. According to photodocumentation, partial thickness wounds. Appears to be treated with zinc oxide type of cream.  Consult 7/6  Wound Care       National Pressure Ulcer Advisory Panel (2007) Pressure Ulcer Definitions & Stages:  Stage I:                     Intact skin with non-blanchable redness of a localized area usually over a bony prominence.   Stage II:                    Partial thickness loss of dermis presenting as a shallow open ulcer with a red pink wound bed, without slough.   Stage III:                   Full thickness tissue loss. Subcutaneous fat may be visible but bone, tendon or muscle is not exposed. Slough may be                                      present but does not  obscure the depth of tissue loss. May include undermining and tunneling.  Stage IV:                  Full thickness tissue loss with exposed bone, tendon or muscle. Slough or eschar may be present on some parts of the                                      wound bed. Often include undermining and tunneling.  Unstageable:           Full thickness tissue loss but base of ulcer is covered by slough and/or eschar in the wound bed. Until enough                                        slough and/or eschar is removed to expose wound base, its true depth, and therefore stage, cannot be determined  Deep Tissue Injury: Purple or maroon localized area of discolored intact skin or blood-filled blister due to damage of underlying soft tissue   from pressure and/or shear.  Suspected deep tissue injuries may develop into a stageable ulcer or turn out to be another diagnosis (e.g. an ecchymosis)     Provider, please clarify/ provide the diagnosis related to the documentation:    Doctor, please clarify the diagnosis related to the left buttock wound.    [  x ] Decubitus (Pressure) Ulcer      [   ] Non-pressure ulcer     [   ] Other Integumentary Diagnosis (please specify):______________     [  ] Diagnosis Clinically Undetermined       Please document in your progress notes daily for the duration of treatment until resolved and include in your discharge summary.

## 2020-07-09 NOTE — PLAN OF CARE
Problem: Skin Injury Risk Increased  Goal: Skin Health and Integrity  Outcome: Ongoing, Progressing  Intervention: Optimize Skin Protection  Flowsheets (Taken 7/9/2020 0226)  Pressure Reduction Techniques:   heels elevated off bed   pressure points protected  Skin Protection:   electrode sites changed   incontinence pads utilized   skin sealant/moisture barrier applied  Head of Bed (HOB): HOB elevated  Intervention: Promote and Optimize Oral Intake  Flowsheets (Taken 7/9/2020 0226)  Oral Nutrition Promotion:   calorie dense foods provided   safe use of adaptive equipment encouraged

## 2020-07-09 NOTE — PHYSICIAN QUERY
PT Name: Ernie Phan  MR #: [id number]    Consultant Diagnosis Clarification     CDS/: Susu Tavares RN              Contact information: [email or web address]  This form is a permanent document in the medical record.    Query Date: July 9, 2020      By submitting this query, we are merely seeking further clarification of documentation.  Please utilize your independent clinical judgment when addressing the question(s) below.    The Medical Record reflects the following:    Clinical Information Location in Medical Record     Left posterior heel- Unstageable pressure injury with 5 cm circular area of stable eschar                Treatment:  Keep eschar on left heel clean and dry. Offload pressure from heel with EHOB boot.    Consult 7/6       Wound       Please clarify/confirm the Consultants diagnosis of ___Left posterior heel- Unstageable pressure injury__:     [x  ] Diagnosis ruled in     [  ] Diagnosis ruled out     [  ] Other diagnosis (please specify): _____________________________     [  ] Clinically undetermined

## 2020-07-10 LAB
ANION GAP SERPL CALC-SCNC: 12 MMOL/L (ref 8–16)
BACTERIA BLD CULT: NORMAL
BACTERIA BLD CULT: NORMAL
BASOPHILS # BLD AUTO: 0.04 K/UL (ref 0–0.2)
BASOPHILS NFR BLD: 0.3 % (ref 0–1.9)
BUN SERPL-MCNC: 46 MG/DL (ref 8–23)
CALCIUM SERPL-MCNC: 8.5 MG/DL (ref 8.7–10.5)
CHLORIDE SERPL-SCNC: 113 MMOL/L (ref 95–110)
CO2 SERPL-SCNC: 14 MMOL/L (ref 23–29)
CREAT SERPL-MCNC: 1.7 MG/DL (ref 0.5–1.4)
DIFFERENTIAL METHOD: ABNORMAL
EOSINOPHIL # BLD AUTO: 0.2 K/UL (ref 0–0.5)
EOSINOPHIL NFR BLD: 1.1 % (ref 0–8)
ERYTHROCYTE [DISTWIDTH] IN BLOOD BY AUTOMATED COUNT: 22.5 % (ref 11.5–14.5)
EST. GFR  (AFRICAN AMERICAN): 40 ML/MIN/1.73 M^2
EST. GFR  (NON AFRICAN AMERICAN): 35 ML/MIN/1.73 M^2
GLUCOSE SERPL-MCNC: 85 MG/DL (ref 70–110)
HCT VFR BLD AUTO: 32.8 % (ref 40–54)
HGB BLD-MCNC: 9.5 G/DL (ref 14–18)
IMM GRANULOCYTES # BLD AUTO: 0.17 K/UL (ref 0–0.04)
IMM GRANULOCYTES NFR BLD AUTO: 1.1 % (ref 0–0.5)
INR PPP: 1.3 (ref 0.8–1.2)
LYMPHOCYTES # BLD AUTO: 0.8 K/UL (ref 1–4.8)
LYMPHOCYTES NFR BLD: 4.9 % (ref 18–48)
MAGNESIUM SERPL-MCNC: 2.2 MG/DL (ref 1.6–2.6)
MCH RBC QN AUTO: 31.5 PG (ref 27–31)
MCHC RBC AUTO-ENTMCNC: 29 G/DL (ref 32–36)
MCV RBC AUTO: 109 FL (ref 82–98)
MONOCYTES # BLD AUTO: 0.6 K/UL (ref 0.3–1)
MONOCYTES NFR BLD: 4.2 % (ref 4–15)
NEUTROPHILS # BLD AUTO: 13.4 K/UL (ref 1.8–7.7)
NEUTROPHILS NFR BLD: 88.4 % (ref 38–73)
NRBC BLD-RTO: 0 /100 WBC
PLATELET # BLD AUTO: 907 K/UL (ref 150–350)
PMV BLD AUTO: 9.5 FL (ref 9.2–12.9)
POCT GLUCOSE: 109 MG/DL (ref 70–110)
POCT GLUCOSE: 139 MG/DL (ref 70–110)
POTASSIUM SERPL-SCNC: 4.5 MMOL/L (ref 3.5–5.1)
PROTHROMBIN TIME: 14.9 SEC (ref 9–12.5)
RBC # BLD AUTO: 3.02 M/UL (ref 4.6–6.2)
SODIUM SERPL-SCNC: 139 MMOL/L (ref 136–145)
WBC # BLD AUTO: 15.2 K/UL (ref 3.9–12.7)

## 2020-07-10 PROCEDURE — 25000242 PHARM REV CODE 250 ALT 637 W/ HCPCS: Mod: HCNC | Performed by: INTERNAL MEDICINE

## 2020-07-10 PROCEDURE — 25000003 PHARM REV CODE 250: Mod: HCNC | Performed by: EMERGENCY MEDICINE

## 2020-07-10 PROCEDURE — 83735 ASSAY OF MAGNESIUM: CPT | Mod: HCNC

## 2020-07-10 PROCEDURE — C9113 INJ PANTOPRAZOLE SODIUM, VIA: HCPCS | Mod: HCNC | Performed by: HOSPITALIST

## 2020-07-10 PROCEDURE — 99233 SBSQ HOSP IP/OBS HIGH 50: CPT | Mod: HCNC,,, | Performed by: NURSE PRACTITIONER

## 2020-07-10 PROCEDURE — 21400001 HC TELEMETRY ROOM: Mod: HCNC

## 2020-07-10 PROCEDURE — 25000003 PHARM REV CODE 250: Mod: HCNC | Performed by: INTERNAL MEDICINE

## 2020-07-10 PROCEDURE — 99233 PR SUBSEQUENT HOSPITAL CARE,LEVL III: ICD-10-PCS | Mod: HCNC,,, | Performed by: NURSE PRACTITIONER

## 2020-07-10 PROCEDURE — 63600175 PHARM REV CODE 636 W HCPCS: Mod: HCNC | Performed by: INTERNAL MEDICINE

## 2020-07-10 PROCEDURE — 97803 MED NUTRITION INDIV SUBSEQ: CPT | Mod: HCNC

## 2020-07-10 PROCEDURE — 36415 COLL VENOUS BLD VENIPUNCTURE: CPT | Mod: HCNC

## 2020-07-10 PROCEDURE — 85025 COMPLETE CBC W/AUTO DIFF WBC: CPT | Mod: HCNC

## 2020-07-10 PROCEDURE — 94761 N-INVAS EAR/PLS OXIMETRY MLT: CPT | Mod: HCNC

## 2020-07-10 PROCEDURE — 85610 PROTHROMBIN TIME: CPT | Mod: HCNC

## 2020-07-10 PROCEDURE — 63600175 PHARM REV CODE 636 W HCPCS: Mod: HCNC | Performed by: HOSPITALIST

## 2020-07-10 PROCEDURE — 80048 BASIC METABOLIC PNL TOTAL CA: CPT | Mod: HCNC

## 2020-07-10 RX ORDER — NITROGLYCERIN 0.4 MG/1
0.4 TABLET SUBLINGUAL ONCE
Status: COMPLETED | OUTPATIENT
Start: 2020-07-10 | End: 2020-07-10

## 2020-07-10 RX ADMIN — NITROGLYCERIN 0.4 MG: 0.4 TABLET, ORALLY DISINTEGRATING SUBLINGUAL at 11:07

## 2020-07-10 RX ADMIN — TAMSULOSIN HYDROCHLORIDE 0.4 MG: 0.4 CAPSULE ORAL at 09:07

## 2020-07-10 RX ADMIN — DOCUSATE SODIUM 50 MG AND SENNOSIDES 8.6 MG 1 TABLET: 8.6; 5 TABLET, FILM COATED ORAL at 09:07

## 2020-07-10 RX ADMIN — CLOPIDOGREL BISULFATE 75 MG: 75 TABLET ORAL at 09:07

## 2020-07-10 RX ADMIN — MIRTAZAPINE 7.5 MG: 7.5 TABLET ORAL at 09:07

## 2020-07-10 RX ADMIN — CASTOR OIL AND BALSAM, PERU: 788; 87 OINTMENT TOPICAL at 09:07

## 2020-07-10 RX ADMIN — PANTOPRAZOLE SODIUM 40 MG: 40 INJECTION, POWDER, FOR SOLUTION INTRAVENOUS at 11:07

## 2020-07-10 RX ADMIN — CASTOR OIL AND BALSAM, PERU: 788; 87 OINTMENT TOPICAL at 02:07

## 2020-07-10 RX ADMIN — METOPROLOL TARTRATE 12.5 MG: 25 TABLET, FILM COATED ORAL at 09:07

## 2020-07-10 RX ADMIN — LIDOCAINE HYDROCHLORIDE: 20 SOLUTION ORAL; TOPICAL at 11:07

## 2020-07-10 RX ADMIN — ACETAMINOPHEN 650 MG: 325 TABLET ORAL at 11:07

## 2020-07-10 RX ADMIN — SIMETHICONE CHEW TAB 80 MG 80 MG: 80 TABLET ORAL at 09:07

## 2020-07-10 RX ADMIN — ACETAMINOPHEN 650 MG: 325 TABLET ORAL at 03:07

## 2020-07-10 RX ADMIN — HYDROXYUREA 500 MG: 500 CAPSULE ORAL at 09:07

## 2020-07-10 RX ADMIN — PANTOPRAZOLE SODIUM 40 MG: 40 INJECTION, POWDER, FOR SOLUTION INTRAVENOUS at 09:07

## 2020-07-10 RX ADMIN — SIMETHICONE CHEW TAB 80 MG 80 MG: 80 TABLET ORAL at 02:07

## 2020-07-10 RX ADMIN — FERROUS SULFATE TAB EC 325 MG (65 MG FE EQUIVALENT) 325 MG: 325 (65 FE) TABLET DELAYED RESPONSE at 09:07

## 2020-07-10 RX ADMIN — PREDNISONE 5 MG: 5 TABLET ORAL at 09:07

## 2020-07-10 NOTE — PLAN OF CARE
Recommendations    1. Continue Mechanical soft diet + novasource TID, encourage PO intake.   2. Consider appetite stimulant, pt intake 0% x4 days.   3. If appetite stimulant doesn't improve intake consider custom TPN  210g dextrose, 84g protein, fluid and lytes per MD.   4. Weigh pt weekly.    Goals: Pt to consume > 50% of meals by RD follow up.  Nutrition Goal Status: goal not met  Communication of RD Recs: (plan of care)

## 2020-07-10 NOTE — NURSING
Report received from DAYANNA Huerta. Patient resting comfortably in bed, no acute distress noted at this time.PAtient is awake and alert. Plan of care reviewed with patient. Instructed patient to call for assistance  side rails up x2, bed alarm set, call light in reach, non skid socks in use.  Peripheral IV site, no redness or swelling noted.  Patient verbalized understanding of instructions. Will continue to monitor.

## 2020-07-10 NOTE — SUBJECTIVE & OBJECTIVE
Medications:  Continuous Infusions:  Scheduled Meds:   (pyxis) gi cocktail (mylanta 30 mL, lidocaine 2 % viscous 10 mL, dicyclomine 10 mL) 50 mL   Oral Once    balsam peru-castor oiL   Topical (Top) BID    clopidogreL  75 mg Oral Daily    ferrous sulfate  325 mg Oral Daily    hydroxyurea  500 mg Oral Daily    metoprolol tartrate  12.5 mg Oral BID    mirtazapine  7.5 mg Oral QHS    pantoprazole  40 mg Intravenous Q12H    predniSONE  5 mg Oral Daily    senna-docusate 8.6-50 mg  1 tablet Oral BID    simethicone  1 tablet Oral QID (PC + HS)    tamsulosin  0.4 mg Oral Daily     PRN Meds:sodium chloride, acetaminophen, acetaminophen, ondansetron, sodium chloride 0.9%    Objective:     Vital Signs (Most Recent):  Temp: 97.3 °F (36.3 °C) (07/10/20 0815)  Pulse: 73 (07/10/20 0815)  Resp: 20 (07/10/20 0815)  BP: 122/87 (07/10/20 0815)  SpO2: 96 % (07/10/20 0815) Vital Signs (24h Range):  Temp:  [97.3 °F (36.3 °C)-97.9 °F (36.6 °C)] 97.3 °F (36.3 °C)  Pulse:  [] 73  Resp:  [18-20] 20  SpO2:  [94 %-99 %] 96 %  BP: ()/(51-87) 122/87     Weight: 83.9 kg (185 lb)  Body mass index is 28.13 kg/m².    Physical Exam  Constitutional:       General: He is in acute distress.      Appearance: He is ill-appearing.   HENT:      Head:      Comments: Scalp wound  Pulmonary:      Effort: Pulmonary effort is normal. No respiratory distress.      Breath sounds: Normal breath sounds. No wheezing.   Abdominal:      General: Abdomen is flat.      Palpations: Abdomen is soft.      Tenderness: There is abdominal tenderness.      Comments: BS hyperactive   Musculoskeletal:      Right lower leg: Edema present.      Left lower leg: Edema present.   Skin:     General: Skin is warm and dry.      Findings: Lesion present.      Comments: Multiple wounds   Neurological:      Mental Status: He is alert and oriented to person, place, and time.   Psychiatric:      Comments: Restless this am         Advance Care Planning   Review of  Symptoms    Symptom Assessment (ESAS 0-10 Scale)  Pain:  10     CAM / Delirium:  Negative  Constipation:  Negative  Diarrhea:  Negative    Bowel Management Plan (BMP):  Yes      Comments:  Patient states his pain in stomach and he can't talk any more because he hurts too bad.   Bowels are hyperactive and he had a BM 2 days ago and has not eaten anything. Possibly chest pain masked as abdominal pain. Discussed with Dr Interiano who came in. She is ordering a GI cocktail but if no relief will order NTG       Pain Assessment:  Location(s): abdomen    Abdomen       Abdomen Location:  Left    Performance Status:  30    Advanced Directives:  Living Will: No    LaPOST:  Yes    Do Not Resuscitate Status:  Yes    Medical Power of : Yes           Significant Labs: All pertinent labs within the past 24 hours have been reviewed.  CBC:   Recent Labs   Lab 07/10/20  0336   WBC 15.20*   HGB 9.5*   HCT 32.8*   *   *     BMP:  Recent Labs   Lab 07/10/20  0336   GLU 85      K 4.5   *   CO2 14*   BUN 46*   CREATININE 1.7*   CALCIUM 8.5*   MG 2.2     LFT:  Lab Results   Component Value Date    AST 14 07/06/2020    ALKPHOS 133 07/06/2020    BILITOT 1.1 (H) 07/06/2020     Albumin:   Albumin   Date Value Ref Range Status   07/06/2020 2.7 (L) 3.5 - 5.2 g/dL Final     Protein:   Total Protein   Date Value Ref Range Status   07/06/2020 6.4 6.0 - 8.4 g/dL Final     Lactic acid:   No results found for: LACTATE    Significant Imaging: None       Spoke with daughter in law ALICE Thakur to give update because he had called her to c/o pain. Discussed discharge plan possible inpatient hospice now with symptoms and if not then respite since no clear plan for placement and patient refuses to go back to facility. She is pleased

## 2020-07-10 NOTE — PROGRESS NOTES
"[facility]  Palliative Medicine  Progress Note    Patient Name: Ernie Phan  MRN: [id number]  Admission Date: 2020  Hospital Length of Stay: 4 days  Code Status: DNR   Attending Provider: Padmaja Underwood MD  Consulting Provider: Ana Camacho NP  Primary Care Physician: Ernie Michel MD  Principal Problem:STEMI (ST elevation myocardial infarction)    Patient information was obtained from patient, relative(s) and ER records.      Assessment/Plan:     See note below        Subjective:     Chief Complaint:   Chief Complaint   Patient presents with    Chest Pain     pt present to the ED via EMS reports the patient came from [facility] stated the patient was c/o of left anterior wall chest pain that started around 0100. EMS concerned patient is having a STEMI elevation AVF inferior . patient recieved 325 ASA , 1 nitro that  bp from 112/58 to 77/50. denies n/v or current CP.        HPI:   Principal Problem:STEMI (ST elevation myocardial infarction)     Chief Complaint:        Chief Complaint   Patient presents with    Chest Pain       pt present to the ED via EMS reports the patient came from [facility] stated the patient was c/o of left anterior wall chest pain that started around 0100. EMS concerned patient is having a STEMI elevation AVF inferior . patient recieved 325 ASA , 1 nitro that  bp from 112/58 to 77/50. denies n/v or current CP.          HPI: Ernie Phan 89 y.o. male PMHX:HTN,CKD,HLD,AFib presents via EMS from [facility] complaining of left-sided chest pain.He reports acute onset around 0100 today of left-sided chest "tightness" pain(6/10) radiating to left shoulder associated with slight SOB and nausea while in in bed watching TV. Per ED notes EMS reported systolic blood pressure in 80,possible and administered ASA by in route to ED. He denies fever/chills,URI symptoms,vomiting/diarrhea,abd/back pain,dysuria or any other " associated symptoms.Also denies Heavy ETOH/Smoking/Illicit drug use. Additionally denies any previous history of CVA/TIA/or MI's     last 2D Echo on 12/09/19  LVEF65% with severe biatrial enlargement,mild aortic valve stenosis and atrial fibrillation observed.     Admit to hospital medicine for further evaluation and treatment.        Interval History:uneventful night      Medications:  Continuous Infusions:  Scheduled Meds:   balsam peru-castor oiL   Topical (Top) BID    clopidogreL  75 mg Oral Daily    ferrous sulfate  325 mg Oral Daily    hydroxyurea  500 mg Oral Daily    metoprolol tartrate  12.5 mg Oral BID    mirtazapine  7.5 mg Oral QHS    pantoprazole  40 mg Intravenous Q12H    predniSONE  5 mg Oral Daily    senna-docusate 8.6-50 mg  1 tablet Oral BID    simethicone  1 tablet Oral QID (PC + HS)    tamsulosin  0.4 mg Oral Daily     PRN Meds:sodium chloride, acetaminophen, acetaminophen, ondansetron, sodium chloride 0.9%    Objective:     Vital Signs (Most Recent):  Temp: 97.6 °F (36.4 °C) (07/09/20 1717)  Pulse: 64 (07/09/20 1717)  Resp: 18 (07/09/20 1717)  BP: 109/61 (07/09/20 1717)  SpO2: 97 % (07/09/20 1717) Vital Signs (24h Range):  Temp:  [96.6 °F (35.9 °C)-98 °F (36.7 °C)] 97.6 °F (36.4 °C)  Pulse:  [60-89] 64  Resp:  [18] 18  SpO2:  [93 %-98 %] 97 %  BP: ()/(51-75) 109/61     Weight: 83.9 kg (185 lb)  Body mass index is 28.13 kg/m².    Physical Exam  Vitals signs and nursing note reviewed.         Advance Care Planning   Palliative Exam       Significant Labs: All pertinent labs within the past 24 hours have been reviewed.  CBC:   Recent Labs   Lab 07/09/20  0351   WBC 15.76*   HGB 9.4*   HCT 31.8*   *   *     BMP:  No results for input(s): GLU, NA, K, CL, CO2, BUN, CREATININE, CALCIUM, MG in the last 24 hours.  LFT:  Lab Results   Component Value Date    AST 14 07/06/2020    ALKPHOS 133 07/06/2020    BILITOT 1.1 (H) 07/06/2020     Albumin:   Albumin   Date Value Ref  "Range Status   07/06/2020 2.7 (L) 3.5 - 5.2 g/dL Final     Protein:   Total Protein   Date Value Ref Range Status   07/06/2020 6.4 6.0 - 8.4 g/dL Final     Lactic acid:   No results found for: LACTATE    Significant Imaging: None     ASSESSMENT/PLAN:    Palliative encounter:    Patient is less engaging today. He is sleeping more and c/o pain earlier and [facility]. He states he just doesn't feel good but denies chest pain at present. He asked about discharge and states please make sure I don't go back to [place]. I let him know we are working on alternative plans. Step daughter Delaney came in and we walked outside of room to talk while patient sleeping. She states "he doesn't want to eat and Shari tried my best to get him to eat something." Do you think he's trying to deliberately starve himself so he can die?" We discussed EOL nutritional needs and the fact that he is depressed and ill all of which play a major role in appetite. I let her know not to try and force him and let him have this little amount of control that he has left. 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 We called Rosibel Thakur the HPOA daughter in law to discuss hospice options again as patient is really wanting to go home and not to a facility. We discussed going home with hospice as I felt time would be short if he continued to decline and if they see they cannot manage at home and if unable to manage at home may can go to hospice facility and they will assist with placement if need. Rosibel states she thought the SW was looking for a hospice facility. Explained that we were looking for another nursing facility that will allow hospice personnel to come in. Family not sure they want another facility but are also not confident they can care for him at home.   Updated SHILOH Kumar " and hospice agency         > 50% of 45 min visit spent in chart review, face to face discussion of goals of care,  symptom assessment, coordination of care and emotional support.    Ana Camacho NP  Palliative Medicine  [facility]

## 2020-07-10 NOTE — PROGRESS NOTES
"[facility]  Palliative Medicine  Progress Note    Patient Name: Ernie Phan  MRN: [id number]  Admission Date: 2020  Hospital Length of Stay: 4 days  Code Status: DNR   Attending Provider: Padmaja Underwood MD  Consulting Provider: Ana Camacho NP  Primary Care Physician: Ernie Michel MD  Principal Problem:STEMI (ST elevation myocardial infarction)        Assessment/Plan:     Plan is inpatient hospice      Subjective:     Chief Complaint:   Chief Complaint   Patient presents with    Chest Pain     pt present to the ED via EMS reports the patient came from [facility] stated the patient was c/o of left anterior wall chest pain that started around 0100. EMS concerned patient is having a STEMI elevation AVF inferior . patient recieved 325 ASA , 1 nitro that  bp from 112/58 to 77/50. denies n/v or current CP.        HPI:   Principal Problem:STEMI (ST elevation myocardial infarction)     Chief Complaint:        Chief Complaint   Patient presents with    Chest Pain       pt present to the ED via EMS reports the patient came from [facility] stated the patient was c/o of left anterior wall chest pain that started around 0100. EMS concerned patient is having a STEMI elevation AVF inferior . patient recieved 325 ASA , 1 nitro that  bp from 112/58 to 77/50. denies n/v or current CP.          HPI: Ernie Phan 89 y.o. male PMHX:HTN,CKD,HLD,AFib presents via EMS from [facility] complaining of left-sided chest pain.He reports acute onset around 0100 today of left-sided chest "tightness" pain(6/10) radiating to left shoulder associated with slight SOB and nausea while in in bed watching TV. Per ED notes EMS reported systolic blood pressure in 80,possible and administered ASA by in route to ED. He denies fever/chills,URI symptoms,vomiting/diarrhea,abd/back pain,dysuria or any other associated symptoms.Also denies Heavy ETOH/Smoking/Illicit drug use. " Additionally denies any previous history of CVA/TIA/or MI's     last 2D Echo on 12/09/19  LVEF65% with severe biatrial enlargement,mild aortic valve stenosis and atrial fibrillation observed.     Admit to hospital medicine for further evaluation and treatment.            Medications:  Continuous Infusions:  Scheduled Meds:   (pyxis) gi cocktail (mylanta 30 mL, lidocaine 2 % viscous 10 mL, dicyclomine 10 mL) 50 mL   Oral Once    balsam peru-castor oiL   Topical (Top) BID    clopidogreL  75 mg Oral Daily    ferrous sulfate  325 mg Oral Daily    hydroxyurea  500 mg Oral Daily    metoprolol tartrate  12.5 mg Oral BID    mirtazapine  7.5 mg Oral QHS    pantoprazole  40 mg Intravenous Q12H    predniSONE  5 mg Oral Daily    senna-docusate 8.6-50 mg  1 tablet Oral BID    simethicone  1 tablet Oral QID (PC + HS)    tamsulosin  0.4 mg Oral Daily     PRN Meds:sodium chloride, acetaminophen, acetaminophen, ondansetron, sodium chloride 0.9%    Objective:     Vital Signs (Most Recent):  Temp: 97.3 °F (36.3 °C) (07/10/20 0815)  Pulse: 73 (07/10/20 0815)  Resp: 20 (07/10/20 0815)  BP: 122/87 (07/10/20 0815)  SpO2: 96 % (07/10/20 0815) Vital Signs (24h Range):  Temp:  [97.3 °F (36.3 °C)-97.9 °F (36.6 °C)] 97.3 °F (36.3 °C)  Pulse:  [] 73  Resp:  [18-20] 20  SpO2:  [94 %-99 %] 96 %  BP: ()/(51-87) 122/87     Weight: 83.9 kg (185 lb)  Body mass index is 28.13 kg/m².    Physical Exam  Constitutional:       General: He is in acute distress.      Appearance: He is ill-appearing.   HENT:      Head:      Comments: Scalp wound  Pulmonary:      Effort: Pulmonary effort is normal. No respiratory distress.      Breath sounds: Normal breath sounds. No wheezing.   Abdominal:      General: Abdomen is flat.      Palpations: Abdomen is soft.      Tenderness: There is abdominal tenderness.      Comments: BS hyperactive   Musculoskeletal:      Right lower leg: Edema present.      Left lower leg: Edema present.   Skin:      General: Skin is warm and dry.      Findings: Lesion present.      Comments: Multiple wounds   Neurological:      Mental Status: He is alert and oriented to person, place, and time.   Psychiatric:      Comments: Restless this am         Advance Care Planning   Review of Symptoms    Symptom Assessment (ESAS 0-10 Scale)  Pain:  10     CAM / Delirium:  Negative  Constipation:  Negative  Diarrhea:  Negative    Bowel Management Plan (BMP):  Yes      Comments:  Patient states his pain in stomach and he can't talk any more because he hurts too bad.   Bowels are hyperactive and he had a BM 2 days ago and has not eaten anything. Possibly chest pain masked as abdominal pain. Discussed with Dr Interiano who came in. She is ordering a GI cocktail but if no relief will order NTG       Pain Assessment:  Location(s): abdomen    Abdomen       Abdomen Location:  Left    Performance Status:  30    Advanced Directives:  Living Will: No    LaPOST:  Yes    Do Not Resuscitate Status:  Yes    Medical Power of : Yes           Significant Labs: All pertinent labs within the past 24 hours have been reviewed.  CBC:   Recent Labs   Lab 07/10/20  0336   WBC 15.20*   HGB 9.5*   HCT 32.8*   *   *     BMP:  Recent Labs   Lab 07/10/20  0336   GLU 85      K 4.5   *   CO2 14*   BUN 46*   CREATININE 1.7*   CALCIUM 8.5*   MG 2.2     LFT:  Lab Results   Component Value Date    AST 14 07/06/2020    ALKPHOS 133 07/06/2020    BILITOT 1.1 (H) 07/06/2020     Albumin:   Albumin   Date Value Ref Range Status   07/06/2020 2.7 (L) 3.5 - 5.2 g/dL Final     Protein:   Total Protein   Date Value Ref Range Status   07/06/2020 6.4 6.0 - 8.4 g/dL Final     Lactic acid:   No results found for: LACTATE    Significant Imaging: None       Spoke with daughter in law ALICE Thakur to give update because he had called her to c/o pain. Discussed discharge plan possible inpatient hospice now with symptoms and if not then respite since no clear  plan for placement and patient refuses to go back to facility. She is pleased       > 50% of 40 min visit spent in chart review, face to face discussion of goals of care,  symptom assessment, coordination of care and emotional support.    Ana Camacho, NP  Palliative Medicine  [facility]

## 2020-07-10 NOTE — PROGRESS NOTES
"[facility]  Adult Nutrition  Progress Note    SUMMARY       Recommendations    1. Continue Mechanical soft diet + novasource TID, encourage PO intake.   2. Consider appetite stimulant, pt intake 0% x4 days.   3. If appetite stimulant doesn't improve intake consider custom TPN  210g dextrose, 84g protein, fluid and lytes per MD.   4. Weigh pt weekly.    Goals: Pt to consume > 50% of meals by RD follow up.  Nutrition Goal Status: goal not met  Communication of RD Recs: (plan of care)    Reason for Assessment    Reason For Assessment: RD follow-up  Diagnosis: (NSTEMI)  Relevant Medical History: a fib, anxiety, cancer, constipation, DM, gERD, HLD, HTN, anemia, depression, osteoarthritis, renal disorder, Vit D def    General Information Comments: Pt lying in bed awake at visit, trying to work the phone. Pt had lunch tray in room but pt stated he is not hungry, has no appetite, no desire to eat. He stated he will probably only drink apple juice. Also noted oral supplement wasn't on tray. Pt has had poor intake since admit. Pt is from [facility] but stated he doesn't want ot go back. No weight pt per chart.    Nutrition Discharge Planning: Adequate po intake via mechanical soft diet.    Nutrition Risk Screen    Nutrition Risk Screen: no indicators present    Nutrition/Diet History    Patient Reported Diet/Restrictions/Preferences: diabetic diet  Spiritual, Cultural Beliefs, Hoahaoism Practices, Values that Affect Care: no  Food Allergies: NKFA  Factors Affecting Nutritional Intake: decreased appetite, difficulty/impaired swallowing    Anthropometrics    Temp: 96.6 °F (35.9 °C)  Height Method: Stated  Height: 5' 8" (172.7 cm)  Height (inches): 68 in  Weight Method: Bed Scale  Weight: 83.9 kg (185 lb)  Weight (lb): 185 lb  Ideal Body Weight (IBW), Male: 154 lb  % Ideal Body Weight, Male (lb): 120.13 %  BMI (Calculated): 28.1  BMI Grade: 25 - 29.9 - overweight  Weight Loss: (no wt hx)   "   Lab/Procedures/Meds    Pertinent Labs Comments: H/H 9.5/32.8, Cl 113, BUN 46, Care 1.7, Ca 8.5  Pertinent Medications Comments: ferrous sulfate, pantoprazole, senna, simethicone    Estimated/Assessed Needs    Weight Used For Calorie Calculations: 83.9 kg (184 lb 15.5 oz)  Energy Calorie Requirements (kcal): 0059-9395 kcal daily  Energy Need Method: Kcal/kg(20-25)  Protein Requirements: 84-100g (1.0-1.2 g/kg)  Weight Used For Protein Calculations: 83.9 kg (184 lb 15.5 oz)  Fluid Requirements (mL): 1 mL/kcal or per MD  Estimated Fluid Requirement Method: RDA Method  RDA Method (mL): 1678  CHO Requirement: 210 gm daily    Nutrition Prescription Ordered    Current Diet Order: Mechanical soft  Oral Nutrition Supplement: Novasource renal TID    Evaluation of Received Nutrient/Fluid Intake    I/O: +947 since admit  Comments: Last BM 7/9  % Intake of Estimated Energy Needs: 0 - 25 %  % Meal Intake: 0 - 25 %    Nutrition Risk    Level of Risk/Frequency of Follow-up: moderate - high(2x/week)     Assessment and Plan    Nutrition Problem  Inadequate oral intake     Related to (etiology):   Decreased appetite     Signs and Symptoms (as evidenced by):   0% of meal intake     Interventions (treatment strategy):  1. Commercial beverage  2. Parental nutrition  3. Collaboration with other providers     Nutrition Diagnosis Status:   Continues    Monitor and Evaluation    Food and Nutrient Intake: food and beverage intake, energy intake  Food and Nutrient Adminstration: diet order  Knowledge/Beliefs/Attitudes: beliefs and attitudes, food and nutrition knowledge/skill  Physical Activity and Function: nutrition-related ADLs and IADLs  Anthropometric Measurements: weight, weight change  Biochemical Data, Medical Tests and Procedures: electrolyte and renal panel  Nutrition-Focused Physical Findings: overall appearance     Malnutrition Assessment  Malnutrition Type: chronic illness  Energy Intake: (< 25% of meals since admit)  Skin  (Micronutrient): (anthony score-13)  Teeth (Micronutrient): carries, pitted(teeth missing)       Weight Loss (Malnutrition): (7/06/20 83.9 kg)   Orbital Region (Subcutaneous Fat Loss): severe depletion       Edema (Fluid Accumulation): 3-->moderate   Subcutaneous Fat Loss (Final Summary): (unable to assess)  Muscle Loss Evaluation (Final Summary): (unable to assess)       Nutrition Follow-Up    RD Follow-up?: Yes

## 2020-07-10 NOTE — ASSESSMENT & PLAN NOTE
Patient is DNR and opted for no invasive treatment.  Understands risk of not pursuing heart catheterization when blockages highly suspected and workup is consistent with myocardial infarction.  Left ventricular EF is 40% however he does have inferolateral hypokinesis, per echo.  No chest pain currently. INR < 2. Patient prefers not to continue on anticoagulation and I believe this cannot be monitored in hospice either. Is aware of higher risk for stroke. Continue on clopidogrel which he needs.  Will not tolerate BB, ACE-I and no need for statin given LDL < 70 and high risk for side effects.  Pursuing inpatient hospice

## 2020-07-10 NOTE — SUBJECTIVE & OBJECTIVE
Interval History:uneventful night      Medications:  Continuous Infusions:  Scheduled Meds:   balsam peru-castor oiL   Topical (Top) BID    clopidogreL  75 mg Oral Daily    ferrous sulfate  325 mg Oral Daily    hydroxyurea  500 mg Oral Daily    metoprolol tartrate  12.5 mg Oral BID    mirtazapine  7.5 mg Oral QHS    pantoprazole  40 mg Intravenous Q12H    predniSONE  5 mg Oral Daily    senna-docusate 8.6-50 mg  1 tablet Oral BID    simethicone  1 tablet Oral QID (PC + HS)    tamsulosin  0.4 mg Oral Daily     PRN Meds:sodium chloride, acetaminophen, acetaminophen, ondansetron, sodium chloride 0.9%    Objective:     Vital Signs (Most Recent):  Temp: 97.6 °F (36.4 °C) (07/09/20 1717)  Pulse: 64 (07/09/20 1717)  Resp: 18 (07/09/20 1717)  BP: 109/61 (07/09/20 1717)  SpO2: 97 % (07/09/20 1717) Vital Signs (24h Range):  Temp:  [96.6 °F (35.9 °C)-98 °F (36.7 °C)] 97.6 °F (36.4 °C)  Pulse:  [60-89] 64  Resp:  [18] 18  SpO2:  [93 %-98 %] 97 %  BP: ()/(51-75) 109/61     Weight: 83.9 kg (185 lb)  Body mass index is 28.13 kg/m².    Physical Exam  Vitals signs and nursing note reviewed.         Advance Care Planning   Palliative Exam       Significant Labs: All pertinent labs within the past 24 hours have been reviewed.  CBC:   Recent Labs   Lab 07/09/20  0351   WBC 15.76*   HGB 9.4*   HCT 31.8*   *   *     BMP:  No results for input(s): GLU, NA, K, CL, CO2, BUN, CREATININE, CALCIUM, MG in the last 24 hours.  LFT:  Lab Results   Component Value Date    AST 14 07/06/2020    ALKPHOS 133 07/06/2020    BILITOT 1.1 (H) 07/06/2020     Albumin:   Albumin   Date Value Ref Range Status   07/06/2020 2.7 (L) 3.5 - 5.2 g/dL Final     Protein:   Total Protein   Date Value Ref Range Status   07/06/2020 6.4 6.0 - 8.4 g/dL Final     Lactic acid:   No results found for: LACTATE    Significant Imaging: None     ASSESSMENT/PLAN:    Palliative encounter:    Patient is less engaging today. He is sleeping more and c/o  "pain earlier and received medication per [facility] nurse. He states he just doesn't feel good but denies chest pain at present. He asked about discharge and states please make sure I don't go back to [place]. I let him know we are working on alternative plans. Step daughter Delaney came in and we walked outside of room to talk while patient sleeping. She states "he doesn't want to eat and Shari tried my best to get him to eat something." Do you think he's trying to deliberately starve himself so he can die?" We discussed EOL nutritional needs and the fact that he is depressed and ill all of which play a major role in appetite. I let her know not to try and force him and let him have this little amount of control that he has left. 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 We called Rosibel Sahninett the HPOA daughter in law to discuss hospice options again as patient is really wanting to go home and not to a facility. We discussed going home with hospice as I felt time would be short if he continued to decline and if they see they cannot manage at home and if unable to manage at home may can go to hospice facility and they will assist with placement if need. Rosibel states she thought the SW was looking for a hospice facility. Explained that we were looking for another nursing facility that will allow hospice personnel to come in. Family not sure they want another facility but are also not confident they can care for him at home.   Updated SHILOH Kumar and hospice agency     "

## 2020-07-10 NOTE — PLAN OF CARE
Problem: Fall Injury Risk  Goal: Absence of Fall and Fall-Related Injury  Outcome: Ongoing, Progressing     Problem: Adult Inpatient Plan of Care  Goal: Plan of Care Review  Outcome: Ongoing, Progressing  Goal: Patient-Specific Goal (Individualization)  Outcome: Ongoing, Progressing  Goal: Absence of Hospital-Acquired Illness or Injury  Outcome: Ongoing, Progressing  Goal: Optimal Comfort and Wellbeing  Outcome: Ongoing, Progressing  Goal: Readiness for Transition of Care  Outcome: Ongoing, Progressing  Goal: Rounds/Family Conference  Outcome: Ongoing, Progressing     Problem: Wound  Goal: Optimal Wound Healing  Outcome: Ongoing, Progressing     Problem: Wound  Goal: Optimal Wound Healing  Outcome: Ongoing, Progressing     Problem: Coping Ineffective  Goal: Effective Coping  Outcome: Ongoing, Progressing     Problem: Skin Injury Risk Increased  Goal: Skin Health and Integrity  Outcome: Ongoing, Progressing

## 2020-07-10 NOTE — PLAN OF CARE
Problem: Adult Inpatient Plan of Care  Goal: Plan of Care Review  Outcome: Ongoing, Progressing  Goal: Patient-Specific Goal (Individualization)  Outcome: Ongoing, Progressing  Goal: Absence of Hospital-Acquired Illness or Injury  Outcome: Ongoing, Progressing  Goal: Optimal Comfort and Wellbeing  Outcome: Ongoing, Progressing  Goal: Rounds/Family Conference  Outcome: Ongoing, Progressing     Problem: Wound  Goal: Optimal Wound Healing  Outcome: Ongoing, Progressing  Intervention: Promote Effective Wound Healing  Flowsheets (Taken 7/10/2020 0638)  Oral Nutrition Promotion: rest periods promoted  Sleep/Rest Enhancement:   relaxation techniques promoted   regular sleep/rest pattern promoted  Pain Management Interventions:   breathing exercises   pain management plan reviewed with patient/caregiver   relaxation techniques promoted

## 2020-07-10 NOTE — CARE UPDATE
ERASMO supervisor followed up with KIARA Alvarez, Palliative Care, to confirm this pt. Was accepted by [facility], and will be transferred there in AM.  Ana stated Emelia, [facility], met with pt's family today.  Ana stated she notified Romaine MATAMOROS RN, of pt's disposition.  Pt. Will be d/c's to [facility] tomorrow morning, and Emelia will arrange EMS transport. ERASMO supervisor has no further intervention.

## 2020-07-10 NOTE — SUBJECTIVE & OBJECTIVE
Interval History: INR < 2. Dyspepsia and abdominal cramping.     Review of Systems   Respiratory: Negative.    Cardiovascular: Negative.    Gastrointestinal: Positive for abdominal pain.     Objective:     Vital Signs (Most Recent):  Temp: 97.1 °F (36.2 °C) (07/10/20 1552)  Pulse: 65 (07/10/20 1552)  Resp: 16 (07/10/20 1552)  BP: 112/65 (07/10/20 1552)  SpO2: 97 % (07/10/20 1552) Vital Signs (24h Range):  Temp:  [96.6 °F (35.9 °C)-97.9 °F (36.6 °C)] 97.1 °F (36.2 °C)  Pulse:  [] 65  Resp:  [16-20] 16  SpO2:  [94 %-99 %] 97 %  BP: ()/(52-87) 112/65     Weight: 83.9 kg (185 lb)  Body mass index is 28.13 kg/m².  No intake or output data in the 24 hours ending 07/10/20 1747   Physical Exam  Vitals signs and nursing note reviewed.   Constitutional:       General: He is in acute distress.      Appearance: He is not toxic-appearing.      Comments: Complaining of abd pain   Cardiovascular:      Rate and Rhythm: Normal rate.   Pulmonary:      Effort: Pulmonary effort is normal.      Breath sounds: No wheezing or rales.   Abdominal:      General: Abdomen is flat.   Musculoskeletal:         General: Swelling present.      Comments: Pressure wounds to sacrum and lower extremities  Surgical wound to scalp  No erythema   Skin:     Capillary Refill: Capillary refill takes less than 2 seconds.      Coloration: Skin is not pale.   Neurological:      Mental Status: He is alert and oriented to person, place, and time.   Psychiatric:         Mood and Affect: Mood normal.         Behavior: Behavior normal.         Thought Content: Thought content normal.         Judgment: Judgment normal.         Significant Labs: All pertinent labs within the past 24 hours have been reviewed.    Significant Imaging: I have reviewed all pertinent imaging results/findings within the past 24 hours.  I have reviewed and interpreted all pertinent imaging results/findings within the past 24 hours.

## 2020-07-10 NOTE — ASSESSMENT & PLAN NOTE
Spent more than 60 min discussing code status and goals of care with patient and family.  Currently patient has capacity to make his own medical decisions and is understanding of how his medical conditions have progressed and now his quality of life is being affected.  Patient is very unhappy with the nursing facility he lived in recently.  He is interested in pursuing hospice at home however 24 hr assistance is quite difficult and he understands that.  Daughter and daughter-in-law confirmed this. Will go to inpatient hospice with [facility] tomorrow.

## 2020-07-10 NOTE — PROGRESS NOTES
Patient has been accepted to [facility]. Patient is pending transferring in the morning to [address] [place] [address]. Ricci Crisostomo was informed of this patient's possible discharge.

## 2020-07-10 NOTE — PROGRESS NOTES
"[facility] Hospital Medicine  Progress Note    Patient Name: Ernie Phan  MRN: [id number]  Patient Class: IP- Inpatient   Admission Date: 7/6/2020  Length of Stay: 4 days  Attending Physician: Padmaja Underwood MD  Primary Care Provider: Ernie Michel MD        Subjective:     Principal Problem:STEMI (ST elevation myocardial infarction)        HPI:  Ernie Phan 89 y.o. male PMHX:HTN,CKD,HLD,AFib presents via EMS from [facility] complaining of left-sided chest pain.He reports acute onset around 0100 today of left-sided chest "tightness" pain(6/10) radiating to left shoulder associated with slight SOB and nausea while in in bed watching TV. Per ED notes EMS reported systolic blood pressure in 80,possible and administered ASA by in route to ED. He denies fever/chills,URI symptoms,vomiting/diarrhea,abd/back pain,dysuria or any other associated symptoms.Also denies Heavy ETOH/Smoking/Illicit drug use. Additionally denies any previous history of CVA/TIA/or MI's    last 2D Echo on 12/09/19  LVEF65% with severe biatrial enlargement,mild aortic valve stenosis and atrial fibrillation observed.    Admit to hospital medicine for further evaluation and treatment.    Overview/Hospital Course:  Mr Phan presented with chest pain secondary to STEMI.     Interval History: INR < 2. Dyspepsia and abdominal cramping.     Review of Systems   Respiratory: Negative.    Cardiovascular: Negative.    Gastrointestinal: Positive for abdominal pain.     Objective:     Vital Signs (Most Recent):  Temp: 97.1 °F (36.2 °C) (07/10/20 1552)  Pulse: 65 (07/10/20 1552)  Resp: 16 (07/10/20 1552)  BP: 112/65 (07/10/20 1552)  SpO2: 97 % (07/10/20 1552) Vital Signs (24h Range):  Temp:  [96.6 °F (35.9 °C)-97.9 °F (36.6 °C)] 97.1 °F (36.2 °C)  Pulse:  [] 65  Resp:  [16-20] 16  SpO2:  [94 %-99 %] 97 %  BP: ()/(52-87) 112/65     Weight: 83.9 kg (185 lb)  Body mass index is 28.13 kg/m².  No " intake or output data in the 24 hours ending 07/10/20 3114   Physical Exam  Vitals signs and nursing note reviewed.   Constitutional:       General: He is in acute distress.      Appearance: He is not toxic-appearing.      Comments: Complaining of abd pain   Cardiovascular:      Rate and Rhythm: Normal rate.   Pulmonary:      Effort: Pulmonary effort is normal.      Breath sounds: No wheezing or rales.   Abdominal:      General: Abdomen is flat.   Musculoskeletal:         General: Swelling present.      Comments: Pressure wounds to sacrum and lower extremities  Surgical wound to scalp  No erythema   Skin:     Capillary Refill: Capillary refill takes less than 2 seconds.      Coloration: Skin is not pale.   Neurological:      Mental Status: He is alert and oriented to person, place, and time.   Psychiatric:         Mood and Affect: Mood normal.         Behavior: Behavior normal.         Thought Content: Thought content normal.         Judgment: Judgment normal.         Significant Labs: All pertinent labs within the past 24 hours have been reviewed.    Significant Imaging: I have reviewed all pertinent imaging results/findings within the past 24 hours.  I have reviewed and interpreted all pertinent imaging results/findings within the past 24 hours.      Assessment/Plan:      * STEMI (ST elevation myocardial infarction)  Patient is DNR and opted for no invasive treatment.  Understands risk of not pursuing heart catheterization when blockages highly suspected and workup is consistent with myocardial infarction.  Left ventricular EF is 40% however he does have inferolateral hypokinesis, per echo.  No chest pain currently. INR < 2. Patient prefers not to continue on anticoagulation and I believe this cannot be monitored in hospice either. Is aware of higher risk for stroke. Continue on clopidogrel which he needs.  Will not tolerate BB, ACE-I and no need for statin given LDL < 70 and high risk for side effects.  Pursuing  inpatient hospice    Severe malnutrition  Temporal wasting and 3rd spacing  Patient has no appetite.  Dentures have been brought to room  Continue current diet. Patient states he had a bit more appetite today, but still not significant for adequate nutrition.       Palliative care encounter  Spent more than 60 min discussing code status and goals of care with patient and family.  Currently patient has capacity to make his own medical decisions and is understanding of how his medical conditions have progressed and now his quality of life is being affected.  Patient is very unhappy with the nursing facility he lived in recently.  He is interested in pursuing hospice at home however 24 hr assistance is quite difficult and he understands that.  Daughter and daughter-in-law confirmed this. Will go to inpatient hospice with [facility] tomorrow.       Wounds, multiple  Present on admission,see photos under media,Wound care consult.      Leukocytosis  Likely reactive versus dehydration  Is currently afebrile, has no dysuria and wounds do not look acutely infected.  No consolidations chest x-ray  Hold off on antibiotics for now      Chronic kidney disease, stage III (moderate)  Repeat BMP pending      Thrombocytosis  Resume hydroxyurea      Anemia  He was symptomatic  Does feel better after blood transfusion. Hgb better      Permanent atrial fibrillation  Reate controlled   Anticoagulation as above    HLD (hyperlipidemia)  As above                Hypertension  Blood pressure low in setting of heart failure  Hold off on antihypertensive therapy          VTE Risk Mitigation (From admission, onward)         Ordered     IP VTE HIGH RISK PATIENT  Once      07/06/20 0817     Place sequential compression device  Until discontinued      07/06/20 0817     Place STELLA hose  Until discontinued      07/06/20 0817              Will give GI cocktail and SL nitro. To inpatient hospice tomorrow        Padmaja Interiano MD  Department of Hospital  Medicine   [facility]

## 2020-07-11 VITALS
TEMPERATURE: 98 F | WEIGHT: 184.75 LBS | SYSTOLIC BLOOD PRESSURE: 104 MMHG | BODY MASS INDEX: 28 KG/M2 | DIASTOLIC BLOOD PRESSURE: 55 MMHG | HEART RATE: 73 BPM | OXYGEN SATURATION: 93 % | RESPIRATION RATE: 16 BRPM | HEIGHT: 68 IN

## 2020-07-11 PROCEDURE — 94761 N-INVAS EAR/PLS OXIMETRY MLT: CPT | Mod: HCNC

## 2020-07-11 PROCEDURE — 63600175 PHARM REV CODE 636 W HCPCS: Mod: HCNC | Performed by: INTERNAL MEDICINE

## 2020-07-11 PROCEDURE — 25000003 PHARM REV CODE 250: Mod: HCNC | Performed by: INTERNAL MEDICINE

## 2020-07-11 PROCEDURE — 25000003 PHARM REV CODE 250: Mod: HCNC | Performed by: EMERGENCY MEDICINE

## 2020-07-11 PROCEDURE — C9113 INJ PANTOPRAZOLE SODIUM, VIA: HCPCS | Mod: HCNC | Performed by: HOSPITALIST

## 2020-07-11 PROCEDURE — 63600175 PHARM REV CODE 636 W HCPCS: Mod: HCNC | Performed by: HOSPITALIST

## 2020-07-11 RX ORDER — TAMSULOSIN HYDROCHLORIDE 0.4 MG/1
0.4 CAPSULE ORAL DAILY
Qty: 30 CAPSULE | Refills: 0 | Status: SHIPPED | OUTPATIENT
Start: 2020-07-12 | End: 2020-08-11

## 2020-07-11 RX ORDER — CLOPIDOGREL BISULFATE 75 MG/1
75 TABLET ORAL DAILY
Qty: 30 TABLET | Refills: 0 | Status: SHIPPED | OUTPATIENT
Start: 2020-07-12 | End: 2020-08-11

## 2020-07-11 RX ORDER — PANTOPRAZOLE SODIUM 40 MG/1
40 TABLET, DELAYED RELEASE ORAL DAILY
Qty: 30 TABLET | Refills: 0 | Status: SHIPPED | OUTPATIENT
Start: 2020-07-11 | End: 2020-08-10

## 2020-07-11 RX ORDER — METOPROLOL TARTRATE 25 MG/1
12.5 TABLET, FILM COATED ORAL 2 TIMES DAILY
Qty: 30 TABLET | Refills: 0 | Status: SHIPPED | OUTPATIENT
Start: 2020-07-11 | End: 2020-08-10

## 2020-07-11 RX ADMIN — METOPROLOL TARTRATE 12.5 MG: 25 TABLET, FILM COATED ORAL at 09:07

## 2020-07-11 RX ADMIN — PANTOPRAZOLE SODIUM 40 MG: 40 INJECTION, POWDER, FOR SOLUTION INTRAVENOUS at 09:07

## 2020-07-11 RX ADMIN — PREDNISONE 5 MG: 5 TABLET ORAL at 09:07

## 2020-07-11 RX ADMIN — FERROUS SULFATE TAB EC 325 MG (65 MG FE EQUIVALENT) 325 MG: 325 (65 FE) TABLET DELAYED RESPONSE at 09:07

## 2020-07-11 RX ADMIN — CASTOR OIL AND BALSAM, PERU: 788; 87 OINTMENT TOPICAL at 09:07

## 2020-07-11 RX ADMIN — TAMSULOSIN HYDROCHLORIDE 0.4 MG: 0.4 CAPSULE ORAL at 09:07

## 2020-07-11 RX ADMIN — SIMETHICONE CHEW TAB 80 MG 80 MG: 80 TABLET ORAL at 09:07

## 2020-07-11 RX ADMIN — DOCUSATE SODIUM 50 MG AND SENNOSIDES 8.6 MG 1 TABLET: 8.6; 5 TABLET, FILM COATED ORAL at 09:07

## 2020-07-11 RX ADMIN — HYDROXYUREA 500 MG: 500 CAPSULE ORAL at 09:07

## 2020-07-11 RX ADMIN — CLOPIDOGREL BISULFATE 75 MG: 75 TABLET ORAL at 09:07

## 2020-07-11 NOTE — NURSING
Report called to [facility] [phone] at [facility]. Patient wound care completed per orders. Tolerated well. IV and tele monitor discontinued. All patient belongings with transport. [facility] to transport patient. Patient left off unit on stretcher. NAD noted at this time.

## 2020-07-11 NOTE — PLAN OF CARE
[facility]     Department of Hospital Medicine     [address] [place] [address]     [phone] after hours  [phone] fax                                   HOSPICE  ORDERS     Patient Name: Ernie Phan  YOB: 1931    07/11/2020    Admit to Hospice: Inpatient Service     Diagnoses:  Active Hospital Problems    Diagnosis  POA    *STEMI (ST elevation myocardial infarction) [I21.3]  Yes    Severe malnutrition [E43]  Yes    Hypertension [I10]  Yes     Chronic    HLD (hyperlipidemia) [E78.5]  Yes     Chronic    Permanent atrial fibrillation [I48.21]  Yes    Anemia [D64.9]  Yes    Thrombocytosis [D47.3]  Yes    Chronic kidney disease, stage III (moderate) [N18.3]  Yes    Leukocytosis [D72.829]  Yes    Wounds, multiple [T07.XXXA]  Yes     Chronic    Palliative care encounter [Z51.5]  Not Applicable      Resolved Hospital Problems   No resolved problems to display.       Hospice Qualifying Diagnoses: severe malnutrition, STEMI, heart failure, pressure wounds       Patient has a life expectancy < 6 months due to these conditions.    Vital Signs: Routine per Hospice Protocol.    Allergies:Review of patient's allergies indicates:  No Known Allergies    Diet: cardiac diet     Activities: activity as tolerated    Nursing: Per Hospice Routine    Future Orders:  Hospice Medical Director may dictate new orders for comfortable care measures & sign death certificate.    Medications:    Comfort meds as per Hospice MD Phan Ernie LOI   Home Medication Instructions CHITO:[id number]    Printed on:07/11/20 1037   Medication Information           clopidogreL (PLAVIX) 75 mg tablet  Take 1 tablet (75 mg total) by mouth once daily.             ferrous sulfate (FEOSOL) 325 mg (65 mg iron) Tab tablet  Take 325 mg by mouth daily with breakfast.             hydroxyurea (HYDREA) 500 mg Cap  Take 500 mg by mouth once daily.             metoprolol tartrate  (LOPRESSOR) 25 MG tablet  Take 0.5 tablets (12.5 mg total) by mouth 2 (two) times daily.             mirtazapine (REMERON) 7.5 MG Tab  Take 7.5 mg by mouth every evening.             pantoprazole (PROTONIX) 40 MG tablet  Take 1 tablet (40 mg total) by mouth once daily.             predniSONE (DELTASONE) 5 MG tablet  Take 5 mg by mouth once daily.             tamsulosin (FLOMAX) 0.4 mg Cap  Take 1 capsule (0.4 mg total) by mouth once daily.               Electronically signed  _________________________________  Padmaja Interiano MD  07/11/2020

## 2020-07-11 NOTE — PLAN OF CARE
07/11/20 1200   Medicare Message   Important Message from Medicare regarding Discharge Appeal Rights Explained to patient/caregiver;Other (comments)   Patient unable to sign.  (confused)  TN spoke with ALICE López via phone who verbalized the patient's appeal rights.  TN informed Ms Gleason that a copy will be mailed to the home via certified mail.

## 2020-07-11 NOTE — NURSING
Report received from DAYANNA Bazan. Patient lying in bed with eyes closed. Respirations even and unlabored. Easily aroused by loud verbal stimuli. NAD noted at this time. All safety precautions in place. Will continue to monitor.

## 2020-07-13 NOTE — DISCHARGE SUMMARY
"[facility] Hospital Medicine  Discharge Summary      Patient Name: Ernie Phan  MRN: [id number]  Admission Date: 7/6/2020  Hospital Length of Stay: 5 days  Discharge Date and Time:  07/11/2020 6:58 PM  Attending Physician: No att. providers found   Discharging Provider: Padmaja Interiano MD  Primary Care Provider: Ernie Michel MD      HPI:   Ernie Phan 89 y.o. male PMHX:HTN,CKD,HLD,AFib presents via EMS from [facility] complaining of left-sided chest pain.He reports acute onset around 0100 today of left-sided chest "tightness" pain(6/10) radiating to left shoulder associated with slight SOB and nausea while in in bed watching TV. Per ED notes EMS reported systolic blood pressure in 80,possible and administered ASA by in route to ED. He denies fever/chills,URI symptoms,vomiting/diarrhea,abd/back pain,dysuria or any other associated symptoms.Also denies Heavy ETOH/Smoking/Illicit drug use. Additionally denies any previous history of CVA/TIA/or MI's    last 2D Echo on 12/09/19  LVEF65% with severe biatrial enlargement,mild aortic valve stenosis and atrial fibrillation observed.    Admit to hospital medicine for further evaluation and treatment.    * No surgery found *      Hospital Course:   Mr Phan presented with chest pain secondary to STEMI. Patient is DNR and opted for no invasive treatment. Family supportive of this. Understands risk of not pursuing heart catheterization when blockages highly suspected and workup is consistent with myocardial infarction.  Left ventricular EF is 40% however he does have inferolateral hypokinesis, per echo. No chest pain currently. INR < 2. Patient prefers not to continue on anticoagulation and I believe this cannot be monitored in hospice either. Is aware of higher risk for stroke. Continue on clopidogrel which he needs.  Will not tolerate BB, ACE-I and no need for statin given LDL < 70 and high risk for side effects.   Discharge " to [facility] with [facility] in stable condition.   Patient very aware of transfer to inpatient hospice and and agreement with long-term plan.  Palliative Care was on board.  Emotional support provided     Consults:   Consults (From admission, onward)        Status Ordering Provider     Inpatient consult to Cardiology  Once     Provider:  Branden Scott MD    Completed LYRIC DAVIES     Inpatient consult to Palliative Care  Once     Provider:  Ana Camacho NP    Completed LYRIC DAVIES     Inpatient consult to Spiritual Care  Once     Provider:  (Not yet assigned)    LYRIC Drew     Inpatient consult to Spiritual Care  Once     Provider:  (Not yet assigned)    ANA Rivera        Final Active Diagnoses:    Diagnosis Date Noted POA    PRINCIPAL PROBLEM:  STEMI (ST elevation myocardial infarction) [I21.3] 07/06/2020 Yes    Severe malnutrition [E43] 07/07/2020 Yes    Hypertension [I10] 07/06/2020 Yes     Chronic    HLD (hyperlipidemia) [E78.5] 07/06/2020 Yes     Chronic    Permanent atrial fibrillation [I48.21] 07/06/2020 Yes    Anemia [D64.9] 07/06/2020 Yes    Thrombocytosis [D47.3] 07/06/2020 Yes    Chronic kidney disease, stage III (moderate) [N18.3] 07/06/2020 Yes    Leukocytosis [D72.829] 07/06/2020 Yes    Wounds, multiple [T07.XXXA] 07/06/2020 Yes     Chronic    Palliative care encounter [Z51.5] 07/06/2020 Not Applicable      Problems Resolved During this Admission:       Discharged Condition: stable    Disposition: Hospice/Medical Facility    Follow Up:  Follow-up Information     [facility].    Specialties: Hospice and Palliative Medicine, Hospice Services  Contact information:  [address] [place] [id number] [phone]                  Medications:  Reconciled Home Medications:      Medication List      START taking these medications    clopidogreL 75 mg tablet  Commonly known as: PLAVIX  Take 1 tablet (75 mg total) by  mouth once daily.     metoprolol tartrate 25 MG tablet  Commonly known as: LOPRESSOR  Take 0.5 tablets (12.5 mg total) by mouth 2 (two) times daily.     pantoprazole 40 MG tablet  Commonly known as: PROTONIX  Take 1 tablet (40 mg total) by mouth once daily.     tamsulosin 0.4 mg Cap  Commonly known as: FLOMAX  Take 1 capsule (0.4 mg total) by mouth once daily.        CONTINUE taking these medications    ferrous sulfate 325 mg (65 mg iron) Tab tablet  Commonly known as: FEOSOL  Take 325 mg by mouth daily with breakfast.     hydroxyurea 500 mg Cap  Commonly known as: HYDREA  Take 500 mg by mouth once daily.     mirtazapine 7.5 MG Tab  Commonly known as: REMERON  Take 7.5 mg by mouth every evening.     ondansetron 4 MG tablet  Commonly known as: ZOFRAN  Take 4 mg by mouth every 8 (eight) hours as needed for Nausea.     predniSONE 5 MG tablet  Commonly known as: DELTASONE  Take 5 mg by mouth once daily.        STOP taking these medications    polyethylene glycol 17 gram Pwpk  Commonly known as: GLYCOLAX     senna 8.6 mg tablet  Commonly known as: SENOKOT     verapamiL 120 MG C24p  Commonly known as: VERELAN     vitamin D 1000 units Tab  Commonly known as: VITAMIN D3     warfarin 2.5 MG tablet  Commonly known as: COUMADIN     warfarin 3 MG tablet  Commonly known as: COUMADIN        ASK your doctor about these medications    ALPRAZolam 0.25 MG tablet  Commonly known as: XANAX  Take by mouth 3 (three) times daily.     dicyclomine 20 mg tablet  Commonly known as: BENTYL  Take 20 mg by mouth every 6 (six) hours.     gabapentin 300 MG capsule  Commonly known as: NEURONTIN  Take 300 mg by mouth 3 (three) times daily.     ibuprofen 600 MG tablet  Commonly known as: ADVIL,MOTRIN  Take 600 mg by mouth every 6 (six) hours as needed for Pain.     oxyCODONE 10 mg 12 hr tablet  Commonly known as: OXYCONTIN  Take 10 mg by mouth every 12 (twelve) hours as needed for Pain.     VITAMIN D2 50,000 unit Cap  Generic drug:  ergocalciferol  Take 50,000 Units by mouth every 7 days.            Indwelling Lines/Drains at time of discharge:   Lines/Drains/Airways     None                 Time spent on the discharge of patient: > 60 minutes  Patient was seen and examined on the date of discharge and determined to be suitable for discharge  To inpatient hospice         Padmaja Interiano MD  Department of Hospital Medicine  [facility]

## 2020-07-13 NOTE — PHYSICIAN QUERY
PT Name: Ernie Phan  MR #: [id number]     PHYSICIAN QUERY -  INTEGUMENTARY CLARIFICATION     CDS/: Susu Tavares RN             Contact information: [email or web address]  This form is a permanent document in the medical record.     Query Date: July 13, 2020    By submitting this query, we are merely seeking further clarification of documentation.  Please utilize your independent clinical judgment when addressing the question(s) below.  The Medical Record contains the following:   Indicators   Supporting Clinical Findings Location in Medical Record    Redness     x Decubitus, Pressure, Ulcer, etc. Decubitus (Pressure) Ulcer  Physician Query Response                   7/11  Dr. Underwood    Deep Tissue Injury     x Wound care consult Visited patient to evaluate left buttock wounds- 2 partial thickness wounds on left buttock- wounds 2 cm circular openings with red base. No surrounding erythema. Areas of skin breakdown appear to moisture associated skin damage.    Consult 7/7       Wound care    Medication:      Treatment:     x Other:     Consult 7/6       Wound Care       National Pressure Ulcer Advisory Panel (2007) Pressure Ulcer Definitions & Stages:  Stage I:                     Intact skin with non-blanchable redness of a localized area usually over a bony prominence.   Stage II:                    Partial thickness loss of dermis presenting as a shallow open ulcer with a red pink wound bed, without slough.   Stage III:                   Full thickness tissue loss. Subcutaneous fat may be visible but bone, tendon or muscle is not exposed. Slough may be                                      present but does not obscure the depth of tissue loss. May include undermining and tunneling.  Stage IV:                  Full thickness tissue loss with exposed bone, tendon or muscle. Slough or eschar may be present on some parts of the                                      wound bed. Often include undermining and  tunneling.  Unstageable:           Full thickness tissue loss but base of ulcer is covered by slough and/or eschar in the wound bed. Until enough                                        slough and/or eschar is removed to expose wound base, its true depth, and therefore stage, cannot be determined  Deep Tissue Injury: Purple or maroon localized area of discolored intact skin or blood-filled blister due to damage of underlying soft tissue   from pressure and/or shear.  Suspected deep tissue injuries may develop into a stageable ulcer or turn out to be another diagnosis (e.g. an ecchymosis)         Provider, please specify the stage of the diagnosis:___Left Buttock pressure ulcer_____________       SITE LATERALITY STAGE    buttock  left [x  ] 2    [  ] Clinically Undetermined     [  ] other site (please specify): ______ [  ]  right       [  ]  left [  ] 1         [  ] 2  [  ] 3         [  ] 4  [  ]Unstageable  [  ] DTI       [  ] Clinically undetermined         Please document in your progress notes daily for the duration of treatment until resolved and include in your discharge summary.

## 2020-12-15 ENCOUNTER — DOCUMENT SCAN (OUTPATIENT)
Dept: HOME HEALTH SERVICES | Facility: HOSPITAL | Age: 85
End: 2020-12-15
Payer: MEDICARE

## 2020-12-22 ENCOUNTER — DOCUMENT SCAN (OUTPATIENT)
Dept: HOME HEALTH SERVICES | Facility: HOSPITAL | Age: 85
End: 2020-12-22
Payer: MEDICARE

## 2020-12-23 ENCOUNTER — EXTERNAL HOME HEALTH (OUTPATIENT)
Dept: HOME HEALTH SERVICES | Facility: HOSPITAL | Age: 85
End: 2020-12-23
Payer: MEDICARE

## 2021-07-06 ENCOUNTER — PATIENT MESSAGE (OUTPATIENT)
Dept: ADMINISTRATIVE | Facility: HOSPITAL | Age: 86
End: 2021-07-06

## 2021-10-05 ENCOUNTER — PATIENT MESSAGE (OUTPATIENT)
Dept: ADMINISTRATIVE | Facility: HOSPITAL | Age: 86
End: 2021-10-05

## 2022-10-11 NOTE — CONSULTS
Diet controlled   [facility]  Neurology  Consult Note    Patient Name: Ernie Phan  MRN: [id number]  Admission Date: 3/19/2019  Hospital Length of Stay: 0 days  Code Status: Full Code   Attending Provider: Renny Dixon MD   Consulting Provider: Darius Barrios MD  Primary Care Physician: Ernie Michel MD  Principal Problem:Rectal bleeding    Inpatient consult to Neurology  Consult performed by: Darius Barrios MD  Consult ordered by: Renny Dixon MD         Subjective:     Chief Complaint:  weakness     HPI:   88 yr old male with PMHx of A fib (on Coumadin), myasthenia gravis, essential thrombocytosis who presents to the ED with chief complaint of rectal bleeding. Neurology is being consulted for possible myasthenia flare.   History obtained from the patient and his wife. Patient reports that for the past 7-10 days, he has been having symptoms of generalized fatigue, dizziness, and shortness of breath. Patient reports that he walking a few steps within his condo has been because of the shortness of breath. Prior to 10 days back, patient reports that he intermittently has diplopia for the past 1 year. Denies symptoms of neck weakness, swallowing difficulties or worsening diplopia in the past 10 days however.  Patient's wife reports that over the past 1 year, patient has been slowing declining - specifically that he gets short winded and has weakness with minimal activity and that overall his activity level has decreased this past year compared to years prior. He has been on prednisone 5mg every other day and azathioprine 200mg daily for several years now.   He used to be followed by Dr. Crawford in clinic and most recently was followed by Dr. Gutierrez in neurology. His azathioprine has been managed by Dr. Diaz in rheumatology for myasthenia gravis.   On admit, patient's labs were remarkable for H/H of 6.9/21.7, a drop from 9.6/29.1, 2 weeks prior. He was administered one unit of pRBC by the primary team  this morning.      Past Medical History:   Diagnosis Date    *Atrial fibrillation     Arthritis     Atrial fibrillation     BPH (benign prostatic hypertrophy)     Degenerative disc disease     Depression     GERD (gastroesophageal reflux disease)     Hyperglycemia     Hyperlipidemia     Hypertension     MG (myasthenia gravis)     Postherpetic neuralgia        Past Surgical History:   Procedure Laterality Date    ACHILLES TENDON SURGERY      CHOLECYSTECTOMY         Review of patient's allergies indicates:  No Known Allergies    Current Neurological Medications:   Imuran 200mg qd  Prednisone 5mg qd  Mestinon 60mg PRN    No current facility-administered medications on file prior to encounter.      Current Outpatient Medications on File Prior to Encounter   Medication Sig    aspirin 81 mg Tab Take 81 mg by mouth Daily.    azaTHIOprine (IMURAN) 50 mg Tab Take 4 tablets (200 mg total) by mouth once daily.    benazepril (LOTENSIN) 40 MG tablet TAKE 1 TABLET EVERY DAY    DENTURE CLEANSER (EFFERVESCENT DENTURE CLEANSR DENT)     finasteride (PROSCAR) 5 mg tablet TAKE 1 TABLET EVERY DAY    FLUZONE HIGH-DOSE 2018-19, PF, 180 mcg/0.5 mL vaccine     hydroxyurea (HYDREA) 500 mg Cap Take 2 capsules (1,000 mg) on day 1; 2 caps (1,000 mg) on day 2; and then 1 capsule (500 mg) on day 3. Repeat continuously..    IRON, FERROUS SULFATE, ORAL Take 65 mg by mouth once daily.    ONE TOUCH ULTRA TEST Strp TEST DAILY    ONE TOUCH ULTRASOFT LANCETS lancets TEST DAILY    predniSONE (DELTASONE) 5 MG tablet Take 1 tablet (5 mg total) by mouth every other day.    pyridostigmine (MESTINON) 60 mg Tab Take 1 tablet (60 mg total) by mouth every 6 to 8 hours as needed.    tamsulosin (FLOMAX) 0.4 mg Cap Take 1 capsule (0.4 mg total) by mouth once daily.    tamsulosin (FLOMAX) 0.4 mg Cap TAKE 1 CAPSULE EVERY DAY    verapamil (VERELAN) 240 MG C24P TAKE 1 CAPSULE EVERY DAY    vitamin D 1000 units Tab Take 185 mg by mouth once  daily.    warfarin (COUMADIN) 5 MG tablet TAKE 1 TABLET EVERY DAY EXCEPT TAKE 1/2 TABLET ON SUNDAY, OR AS DIRECTED BY COUMADIN CLINIC    hydroCHLOROthiazide (MICROZIDE) 12.5 mg capsule Take 1 capsule (12.5 mg total) by mouth once daily.     Family History     Problem Relation (Age of Onset)    Cancer Father    Stroke Mother        Tobacco Use    Smoking status: Former Smoker    Smokeless tobacco: Never Used   Substance and Sexual Activity    Alcohol use: No    Drug use: No    Sexual activity: Not Currently     Review of Systems   Constitutional: Positive for fatigue.   Eyes: Negative for visual disturbance.   Respiratory: Positive for shortness of breath.    Cardiovascular: Negative for chest pain.   Gastrointestinal: Negative for abdominal pain.   Genitourinary: Negative for dysuria.   Musculoskeletal: Negative for back pain.   Neurological: Positive for weakness. Negative for numbness.   Psychiatric/Behavioral: Negative for agitation.     Objective:     Vital Signs (Most Recent):  Temp: 97.6 °F (36.4 °C) (03/19/19 0627)  Pulse: (!) 51 (03/19/19 1732)  Resp: 15 (03/19/19 1447)  BP: 129/60 (03/19/19 1732)  SpO2: 100 % (03/19/19 1732) Vital Signs (24h Range):  Temp:  [97.4 °F (36.3 °C)-97.6 °F (36.4 °C)] 97.6 °F (36.4 °C)  Pulse:  [48-85] 51  Resp:  [15-40] 15  SpO2:  [98 %-100 %] 100 %  BP: (102-151)/(53-70) 129/60     Weight: 77.1 kg (170 lb)  Body mass index is 25.1 kg/m².    Physical Exam   Constitutional: He is oriented to person, place, and time. He appears well-developed and well-nourished.   HENT:   Head: Normocephalic and atraumatic.   Eyes: EOM are normal. Pupils are equal, round, and reactive to light.   Neurological: He is oriented to person, place, and time. He has normal strength.       NEUROLOGICAL EXAMINATION:     MENTAL STATUS   Oriented to person, place, and time.   Level of consciousness: alert    CRANIAL NERVES     CN II   Visual fields full to confrontation.     CN III, IV, VI   Pupils are  equal, round, and reactive to light.  Extraocular motions are normal.     CN V   Facial sensation intact.     CN VII   Facial expression full, symmetric.     CN IX, X   CN IX normal.     CN XII   CN XII normal.     MOTOR EXAM   Muscle bulk: normal  Overall muscle tone: normal    Strength   Strength 5/5 throughout.   Right deltoid: 5/5  Left deltoid: 5/5  Right biceps: 5/5  Left biceps: 5/5  Right triceps: 5/5  Left triceps: 5/5  Right wrist flexion: 5/5  Left wrist flexion: 5/5  Right wrist extension: 5/5  Left wrist extension: 5/5  Right interossei: 5/5  Left interossei: 5/5  Right iliopsoas: 5/5  Left iliopsoas: 5/5  Right quadriceps: 5/5  Left quadriceps: 5/5  Right hamstrin/5  Left hamstrin/5  Right anterior tibial: 5/5  Left anterior tibial: 5/5  Right gastroc: 5/5  Left gastroc: 5/5       Neck flexion 5/5  Neck extension 5/5       Significant Labs:   Recent Lab Results       19  0639   19  0139        Immature Grans (Abs)   CANCELED  Comment:  Mild elevation in immature granulocytes is non specific and   can be seen in a variety of conditions including stress response,   acute inflammation, trauma and pregnancy. Correlation with other   laboratory and clinical findings is essential.    Result canceled by the ancillary.       Unit Blood Type Code   7300[P]        7300[P]     Unit Expiration   [id number][P]        [id number][P]     Unit Blood Type   B POS[P]        B POS[P]     Albumin   3.7     Alkaline Phosphatase   88     ALT   36     Anion Gap   13     Aniso   Moderate     Appearance, UA Clear       AST   55  Comment:  *Result may be interfered by visible hemolysis     BANDS   1.0     Baso #   Test Not Performed  Comment:  Corrected result; previously reported as 0.00 on %DDDDDDDD% at %TTT%.[C]     Basophil%   0.0     Bilirubin (UA) Negative       Total Bilirubin   1.4  Comment:  For infants and newborns, interpretation of results should be based  on gestational age, weight and in  agreement with clinical  observations.  Premature Infant recommended reference ranges:  Up to 24 hours.............<8.0 mg/dL  Up to 48 hours............<12.0 mg/dL  3-5 days..................<15.0 mg/dL  6-29 days.................<15.0 mg/dL       BUN, Bld   67     [place] Cells   Occasional     Calcium   9.2     Chloride   109     CO2   14     CODING SYSTEM   UELF587[P]        [id number][P]     Color, UA Yellow       Creatinine   2.2     Differential Method   Manual     DISPENSE STATUS   CROSSMATCHED[P]        ISSUED[P]     eGFR if African American   29.8     eGFR if non    25.8  Comment:  Calculation used to obtain the estimated glomerular filtration  rate (eGFR) is the CKD-EPI equation.        Eos #   Test Not Performed  Comment:  Corrected result; previously reported as 0.0 on %DDDDDDDD% at %TTT%.[C]     Eosinophil%   1.0  Comment:  Corrected result; previously reported as 0.6 on %DDDDDDDD% at %TTT%.[C]     Glucose   98     Glucose, UA Negative       Gran%   54.0  Comment:  Corrected result; previously reported as 44.4 on %DDDDDDDD% at %TTT%.[C]     Group & Rh   B POS     Hematocrit   21.7     Hemoglobin   6.9     Immature Granulocytes   CANCELED  Comment:  Result canceled by the ancillary.     INDIRECT PHAN   NEG     Coumadin Monitoring INR   2.4  Comment:  Coumadin Therapy:  2.0 - 3.0 for INR for all indicators except mechanical heart valves  and antiphospholipid syndromes which should use 2.5 - 3.5.       Ketones, UA Negative       Leukocytes, UA Negative       Lymph #   Test Not Performed  Comment:  Corrected result; previously reported as 0.6 on %DDDDDDDD% at %TTT%.[C]     Lymph%   37.0  Comment:  Corrected result; previously reported as 36.9 on %DDDDDDDD% at %TTT%.[C]     MCH   38.3     MCHC   31.8     MCV   121     Mono #   Test Not Performed  Comment:  Corrected result; previously reported as 0.2 on %DDDDDDDD% at %TTT%.[C]     Mono%   7.0  Comment:  Corrected result; previously reported as  10.6 on %DDDDDDDD% at %TTT%.[C]     MPV   10.5     Nitrite, UA Negative       nRBC   0     Occult Blood UA Negative       Ovalocytes   Occasional     pH, UA 5.0       Platelet Estimate   Appears normal     Platelets   266     Poik   Slight     Poly   Occasional     Potassium   5.2  Comment:  *Result may be interfered by visible hemolysis     PRODUCT CODE   [id number][P]        [id number][P]     Total Protein   7.2  Comment:  *Result may be interfered by visible hemolysis     Protein, UA Negative  Comment:  Recommend a 24 hour urine protein or a urine   protein/creatinine ratio if globulin induced proteinuria is  clinically suspected.         Protime   22.8     RBC   1.80     RDW   SEE COMMENT  Comment:  Result not available.     Sodium   136     Specific Gravity, UA 1.010       Specimen UA Urine, Clean Catch       UNIT NUMBER   [id number][P]        [id number][P]     WBC   1.60  Comment:  WBC critical result(s) called and verbal readback obtained from   FAIZAN SANDOVAL RN, 03/19/2019 02:09               Assessment and Plan:     * Rectal bleeding    Management per primary team and GI consulted  Plan for EGD+colonoscopy tomorrow.   Continue to trend H/H     Symptomatic anemia    Hgb 6.9 today - drop from 9.6, 2 weeks prior  Symptoms in the last week most consistent severe anemia rather than a myasthenic crisis.  Management per primary team     Essential thrombocytosis    On hydroxyurea  Followed by Dr. Welsh as an outpatient     Myasthenia gravis without acute exacerbation    Patient does report 1 year history of worsening weakness and SOB presumably from myasthenia gravis and would want to adjust medications to help optimize his symptoms. However, acute worsening over the last week most likely related to anemia and patient does not have symptoms concerning for myasthenic exacerbation/crisis at this time    - continue prednisone 5mg qd  - mestinon 60mg q6-8h PRN for worsening myasthenic symptoms.   For both the above  mentioned medications - plan for the patient to have a bowel prep tonight and unlikely that these medications will be well absorbed orally.   - In the interim, please consider switching to IV mestinon 2mg q6h PRN and IV methyprednisolone 4mg daily until patient is to resume a PO diet  - continue imuran 200mg qd for now as myasthenic symptoms are not optimized and would not want to discontinue imuran at this time. Trend CBC to follow WBC, H/H - less likely that imuran is cause for bone marrow suppression (although platelets are normal -- patient has a history of essential thrombocytosis and is on hydroxyurea which can also cause suspected bone marrow suppression - management per primary team)  - Check NIF/VC daily    Myasthenia Gravis IMPORTANT INFORMATION:    Elective intubation should be considered if serial measurements of the VC show values less than 20 mL/kg or if the MIF is worse than -30 cmH20.    Medication warning list:          1. Absolute contraindication   curare,   d-penicillamine,   botulinum toxin,   interferon alpha    2. Contraindicated    a. Antibiotics-- aminoglycosides (gentamycin, kanamycin, neomycin, streptomycin, tobramycine); macrolides (erythromycin, azithromycin (Z-pack), telithromycin, Biaxin)  Fluoroquinolones ( ciprofloxacin, norfloxacin, levofloxacin);  b. quinine, quinidine, procainamide,  c. magnesium salts, iv magnesium replacement.    3. Caution- may exacerbate weakness in some myasthenics  a. Calcium channel blockers  b. Beta blockers  c. Lithium  d. Statins  e. Iodinated contrast agents  F. benzodiazepines         VTE Risk Mitigation (From admission, onward)        Ordered     IP VTE HIGH RISK PATIENT  Once      03/19/19 1327     Place STELLA hose  Until discontinued      03/19/19 1327     IP VTE LOW RISK PATIENT  Once      03/19/19 1327          Thank you for your consult. I will follow-up with patient. Please contact us if you have any additional questions.    Darius Barrios,  MD  Neurology  [facility]

## 2023-02-01 NOTE — PROGRESS NOTES
Patient questioned and confirmed dose .  Reports having cellulitis and finished doxycycline hyclate doxin 100mg BID yesterday 3/29/2020 . No other changes reported .   Bactrim Counseling:  I discussed with the patient the risks of sulfa antibiotics including but not limited to GI upset, allergic reaction, drug rash, diarrhea, dizziness, photosensitivity, and yeast infections.  Rarely, more serious reactions can occur including but not limited to aplastic anemia, agranulocytosis, methemoglobinemia, blood dyscrasias, liver or kidney failure, lung infiltrates or desquamative/blistering drug rashes.

## 2024-05-23 NOTE — PROGRESS NOTES
Patient's chart was reviewed.   Requested updates within Care Everywhere.  Immunizations reconciled.    Health Maintenance was updated.]   I have personally seen and examined the patient. I have collaborated with and supervised the

## 2025-05-11 NOTE — ED NOTES
"Pt reports pain under left arm/ribs area after a trip and fall Friday morning pt stated "was putting on robe and trying turn light on and tripped over rug and landed with arms stretched out" Pain increases with movement    LOC: The patient is awake, alert and aware of environment with an appropriate affect, the patient is oriented x 3 and speaking appropriately.  APPEARANCE: Patient resting comfortably and in no acute distress, patient is clean and well groomed, patient's clothing is properly fastened.  SKIN: The skin is warm and dry, intact, patient has normal skin turgor and moist mucus membranes.   RESPIRATORY: Airway is open and patent, respirations are spontaneous, patient has a normal effort and rate.  CARDIAC: Patient has a normal rate and rhythm, no periphreal edema noted, capillary refill < 3 seconds. Bilateral radial pulses +2  ABDOMEN: Soft and non tender to palpation, no distention noted. Bowel sounds present  NEUROLOGIC: PERRL, facial expression is symmetrical, patient moving all extremities spontaneously, normal sensation in all extremities when touched with a finger. Follows all commands appropriately  MUSCULOSKELETAL: No obvious deformities. Full ROM in all 4 extremities.   "
[FreeTextEntry2] : mri l-spine done at stand up

## (undated) DEVICE — SUT VICRYL 4-0 RB1 27IN UD

## (undated) DEVICE — SPONGE DERMACEA GAUZE 4X4

## (undated) DEVICE — CORD BIPOLAR 12 FOOT

## (undated) DEVICE — BLADE SURG #15 CARBON STEEL

## (undated) DEVICE — ELECTRODE BLADE INSULATED 1 IN

## (undated) DEVICE — GOWN SURG 2XL DISP TIE BACK

## (undated) DEVICE — TRAY MINOR GEN SURG

## (undated) DEVICE — SEE MEDLINE ITEM 154981

## (undated) DEVICE — ELECTRODE REM PLYHSV RETURN 9

## (undated) DEVICE — GAUZE SPONGE PEANUT STRL

## (undated) DEVICE — COVER LIGHT HANDLE 80/CA

## (undated) DEVICE — SEE MEDLINE ITEM 157128

## (undated) DEVICE — DRESSING XEROFORM 5X9IN

## (undated) DEVICE — SUT 5/0 18IN COATED VICRYL

## (undated) DEVICE — CANISTER INFOV.A.C WOUND 500ML

## (undated) DEVICE — SEE MEDLINE ITEM 152622

## (undated) DEVICE — SPONGE GAUZE 16PLY 4X4

## (undated) DEVICE — SUT 4-0 VICRYL / SH

## (undated) DEVICE — NDL HYPO REG 25G X 1 1/2

## (undated) DEVICE — SKINMARKER & RULER REGULAR X-F

## (undated) DEVICE — SHEET EENT SPLIT

## (undated) DEVICE — GOWN SURGICAL X-LARGE

## (undated) DEVICE — CONTAINER SPECIMEN STRL 4OZ

## (undated) DEVICE — SUT VICRYL PLUS 3-0 SH 18IN

## (undated) DEVICE — Device

## (undated) DEVICE — STAPLER SKIN ROTATING HEAD